# Patient Record
Sex: FEMALE | Race: WHITE | NOT HISPANIC OR LATINO | URBAN - METROPOLITAN AREA
[De-identification: names, ages, dates, MRNs, and addresses within clinical notes are randomized per-mention and may not be internally consistent; named-entity substitution may affect disease eponyms.]

---

## 2017-02-15 VITALS
OXYGEN SATURATION: 97 % | OXYGEN SATURATION: 96 % | OXYGEN SATURATION: 76 % | RESPIRATION RATE: 19 BRPM | DIASTOLIC BLOOD PRESSURE: 96 MMHG | SYSTOLIC BLOOD PRESSURE: 135 MMHG | SYSTOLIC BLOOD PRESSURE: 147 MMHG | OXYGEN SATURATION: 94 % | HEART RATE: 101 BPM | WEIGHT: 156 LBS | DIASTOLIC BLOOD PRESSURE: 93 MMHG | RESPIRATION RATE: 20 BRPM | SYSTOLIC BLOOD PRESSURE: 149 MMHG | DIASTOLIC BLOOD PRESSURE: 77 MMHG | RESPIRATION RATE: 17 BRPM | DIASTOLIC BLOOD PRESSURE: 85 MMHG | OXYGEN SATURATION: 98 % | SYSTOLIC BLOOD PRESSURE: 137 MMHG | HEART RATE: 95 BPM | TEMPERATURE: 97.8 F | RESPIRATION RATE: 15 BRPM | HEART RATE: 97 BPM | DIASTOLIC BLOOD PRESSURE: 73 MMHG | RESPIRATION RATE: 21 BRPM | DIASTOLIC BLOOD PRESSURE: 83 MMHG | SYSTOLIC BLOOD PRESSURE: 133 MMHG | HEART RATE: 99 BPM | SYSTOLIC BLOOD PRESSURE: 145 MMHG | HEART RATE: 94 BPM | HEART RATE: 91 BPM | SYSTOLIC BLOOD PRESSURE: 142 MMHG | RESPIRATION RATE: 18 BRPM | HEART RATE: 90 BPM | SYSTOLIC BLOOD PRESSURE: 122 MMHG | RESPIRATION RATE: 16 BRPM | SYSTOLIC BLOOD PRESSURE: 164 MMHG | HEART RATE: 102 BPM | HEART RATE: 96 BPM | OXYGEN SATURATION: 95 % | DIASTOLIC BLOOD PRESSURE: 80 MMHG | DIASTOLIC BLOOD PRESSURE: 95 MMHG | SYSTOLIC BLOOD PRESSURE: 140 MMHG | DIASTOLIC BLOOD PRESSURE: 87 MMHG | DIASTOLIC BLOOD PRESSURE: 88 MMHG | HEART RATE: 88 BPM | DIASTOLIC BLOOD PRESSURE: 82 MMHG | HEART RATE: 98 BPM | SYSTOLIC BLOOD PRESSURE: 144 MMHG | HEART RATE: 92 BPM | TEMPERATURE: 97.2 F | DIASTOLIC BLOOD PRESSURE: 79 MMHG | DIASTOLIC BLOOD PRESSURE: 74 MMHG | DIASTOLIC BLOOD PRESSURE: 71 MMHG | SYSTOLIC BLOOD PRESSURE: 146 MMHG | SYSTOLIC BLOOD PRESSURE: 153 MMHG | HEIGHT: 67 IN

## 2017-02-16 ENCOUNTER — AMBULATORY SURGICAL CENTER (AMBULATORY)
Dept: URBAN - METROPOLITAN AREA SURGERY 17 | Facility: SURGERY | Age: 72
End: 2017-02-16
Payer: COMMERCIAL

## 2017-02-16 DIAGNOSIS — K59.00 CONSTIPATION, UNSPECIFIED: ICD-10-CM

## 2017-02-16 DIAGNOSIS — K50.80 CROHN'S DISEASE OF BOTH SMALL AND LARGE INTESTINE WITHOUT CO: ICD-10-CM

## 2017-02-16 DIAGNOSIS — K56.69 OTHER INTESTINAL OBSTRUCTION: ICD-10-CM

## 2017-02-16 DIAGNOSIS — R10.84 GENERALIZED ABDOMINAL PAIN: ICD-10-CM

## 2017-02-16 DIAGNOSIS — R19.4 CHANGE IN BOWEL HABIT: ICD-10-CM

## 2017-02-16 PROCEDURE — 45386 COLONOSCOPY W/BALLOON DILAT: CPT | Performed by: INTERNAL MEDICINE

## 2017-02-16 PROCEDURE — 45381 COLONOSCOPY SUBMUCOUS NJX: CPT | Performed by: INTERNAL MEDICINE

## 2017-02-16 RX ADMIN — LIDOCAINE HYDROCHLORIDE 25 MG: 10 INJECTION, SOLUTION EPIDURAL; INFILTRATION; INTRACAUDAL; PERINEURAL at 07:59

## 2017-02-16 RX ADMIN — PROPOFOL 100 MG: 10 INJECTION, EMULSION INTRAVENOUS at 08:18

## 2017-02-16 RX ADMIN — PROPOFOL 50 MG: 10 INJECTION, EMULSION INTRAVENOUS at 07:58

## 2017-02-16 RX ADMIN — PROPOFOL 25 MG: 10 INJECTION, EMULSION INTRAVENOUS at 07:59

## 2017-02-16 RX ADMIN — PROPOFOL 100 MG: 10 INJECTION, EMULSION INTRAVENOUS at 07:55

## 2017-02-16 RX ADMIN — PROPOFOL 25 MG: 10 INJECTION, EMULSION INTRAVENOUS at 08:02

## 2017-02-16 RX ADMIN — PROPOFOL 50 MG: 10 INJECTION, EMULSION INTRAVENOUS at 08:07

## 2017-02-16 RX ADMIN — PROPOFOL 50 MG: 10 INJECTION, EMULSION INTRAVENOUS at 08:23

## 2017-02-16 RX ADMIN — LIDOCAINE HYDROCHLORIDE 50 MG: 10 INJECTION, SOLUTION EPIDURAL; INFILTRATION; INTRACAUDAL; PERINEURAL at 07:55

## 2017-02-16 RX ADMIN — PROPOFOL 50 MG: 10 INJECTION, EMULSION INTRAVENOUS at 08:06

## 2017-04-25 ENCOUNTER — OFFICE VISIT (OUTPATIENT)
Dept: OBSTETRICS AND GYNECOLOGY | Facility: CLINIC | Age: 72
End: 2017-04-25

## 2017-04-25 VITALS
WEIGHT: 161 LBS | DIASTOLIC BLOOD PRESSURE: 70 MMHG | SYSTOLIC BLOOD PRESSURE: 138 MMHG | HEIGHT: 66 IN | BODY MASS INDEX: 25.88 KG/M2

## 2017-04-25 DIAGNOSIS — N95.0 POST-MENOPAUSAL BLEEDING: Primary | ICD-10-CM

## 2017-04-25 PROCEDURE — 99212 OFFICE O/P EST SF 10 MIN: CPT | Performed by: OBSTETRICS & GYNECOLOGY

## 2017-04-25 NOTE — PROGRESS NOTES
Subjective   Joellen Dominguez is a 72 y.o. female is here today as a self referral for PM bleedingl.    History of Present Illness-patient experienced some light spotting approximately 1 week ago but has not noticed any for the past 2 days.    The following portions of the patient's history were reviewed and updated as appropriate: allergies, current medications, past family history, past medical history, past social history, past surgical history and problem list.    Review of Systems   Constitutional: Negative.    Gastrointestinal: Negative.    Genitourinary: Positive for vaginal bleeding.   Neurological: Negative.    Psychiatric/Behavioral: Negative.        Objective   Physical Exam   Constitutional: She is oriented to person, place, and time. She appears well-developed and well-nourished.   HENT:   Head: Normocephalic and atraumatic.   Eyes: Pupils are equal, round, and reactive to light.   Pulmonary/Chest: Effort normal.   Abdominal: Soft. She exhibits no distension. There is no tenderness.   Genitourinary: Vagina normal and uterus normal.   Neurological: She is alert and oriented to person, place, and time. She has normal reflexes.   Skin: Skin is warm and dry.   Psychiatric: She has a normal mood and affect. Her behavior is normal. Judgment and thought content normal.   Nursing note and vitals reviewed.        Assessment/Plan   Problems Addressed this Visit     None      Visit Diagnoses     Post-menopausal bleeding    -  Primary        Today's exam is negative.  Pathology report from her D&C in October revealed a fibroid tumor.  She had an ultrasound last fall as well which was negative.  We discussed proceeding with observation for now and considering a repeat hysteroscopy and D&C and adding a uterine ablation if she has further bleeding.

## 2017-06-07 ENCOUNTER — OFFICE (AMBULATORY)
Dept: URBAN - METROPOLITAN AREA CLINIC 75 | Facility: CLINIC | Age: 72
End: 2017-06-07
Payer: COMMERCIAL

## 2017-06-07 VITALS
DIASTOLIC BLOOD PRESSURE: 76 MMHG | HEART RATE: 74 BPM | WEIGHT: 160 LBS | HEIGHT: 67 IN | SYSTOLIC BLOOD PRESSURE: 124 MMHG

## 2017-06-07 DIAGNOSIS — K29.70 GASTRITIS, UNSPECIFIED, WITHOUT BLEEDING: ICD-10-CM

## 2017-06-07 DIAGNOSIS — R11.0 NAUSEA: ICD-10-CM

## 2017-06-07 DIAGNOSIS — K20.8 OTHER ESOPHAGITIS: ICD-10-CM

## 2017-06-07 DIAGNOSIS — R10.84 GENERALIZED ABDOMINAL PAIN: ICD-10-CM

## 2017-06-07 DIAGNOSIS — K50.10 CROHN'S DISEASE OF LARGE INTESTINE WITHOUT COMPLICATIONS: ICD-10-CM

## 2017-06-07 DIAGNOSIS — R53.83 OTHER FATIGUE: ICD-10-CM

## 2017-06-07 DIAGNOSIS — R19.4 CHANGE IN BOWEL HABIT: ICD-10-CM

## 2017-06-07 DIAGNOSIS — K50.00 CROHN'S DISEASE OF SMALL INTESTINE WITHOUT COMPLICATIONS: ICD-10-CM

## 2017-06-07 DIAGNOSIS — K59.00 CONSTIPATION, UNSPECIFIED: ICD-10-CM

## 2017-06-07 PROCEDURE — 99214 OFFICE O/P EST MOD 30 MIN: CPT

## 2017-06-07 RX ORDER — ADALIMUMAB 40MG/0.8ML
KIT SUBCUTANEOUS
Qty: 4 | Refills: 1 | Status: COMPLETED
Start: 2016-12-07 | End: 2017-12-12

## 2017-10-17 ENCOUNTER — OFFICE (AMBULATORY)
Dept: URBAN - METROPOLITAN AREA CLINIC 75 | Facility: CLINIC | Age: 72
End: 2017-10-17
Payer: COMMERCIAL

## 2017-10-17 VITALS
HEART RATE: 107 BPM | WEIGHT: 159 LBS | DIASTOLIC BLOOD PRESSURE: 90 MMHG | SYSTOLIC BLOOD PRESSURE: 140 MMHG | HEIGHT: 67 IN

## 2017-10-17 DIAGNOSIS — R19.4 CHANGE IN BOWEL HABIT: ICD-10-CM

## 2017-10-17 DIAGNOSIS — Z92.25 PERSONAL HISTORY OF IMMUNOSUPPRESSION THERAPY: ICD-10-CM

## 2017-10-17 DIAGNOSIS — K29.70 GASTRITIS, UNSPECIFIED, WITHOUT BLEEDING: ICD-10-CM

## 2017-10-17 DIAGNOSIS — K50.10 CROHN'S DISEASE OF LARGE INTESTINE WITHOUT COMPLICATIONS: ICD-10-CM

## 2017-10-17 DIAGNOSIS — K20.8 OTHER ESOPHAGITIS: ICD-10-CM

## 2017-10-17 DIAGNOSIS — K50.00 CROHN'S DISEASE OF SMALL INTESTINE WITHOUT COMPLICATIONS: ICD-10-CM

## 2017-10-17 DIAGNOSIS — R53.83 OTHER FATIGUE: ICD-10-CM

## 2017-10-17 PROCEDURE — 99214 OFFICE O/P EST MOD 30 MIN: CPT

## 2017-10-18 ENCOUNTER — OFFICE (AMBULATORY)
Dept: URBAN - METROPOLITAN AREA LAB 2 | Facility: LAB | Age: 72
End: 2017-10-18
Payer: MEDICARE

## 2017-10-18 DIAGNOSIS — R19.4 CHANGE IN BOWEL HABIT: ICD-10-CM

## 2017-10-18 DIAGNOSIS — K50.10 CROHN'S DISEASE OF LARGE INTESTINE WITHOUT COMPLICATIONS: ICD-10-CM

## 2017-10-18 DIAGNOSIS — K50.00 CROHN'S DISEASE OF SMALL INTESTINE WITHOUT COMPLICATIONS: ICD-10-CM

## 2017-10-18 PROCEDURE — 87999 UNLISTED MICROBIOLOGY PX: CPT

## 2017-12-12 ENCOUNTER — OFFICE (AMBULATORY)
Dept: URBAN - METROPOLITAN AREA CLINIC 75 | Facility: CLINIC | Age: 72
End: 2017-12-12
Payer: COMMERCIAL

## 2017-12-12 VITALS
HEIGHT: 67 IN | SYSTOLIC BLOOD PRESSURE: 140 MMHG | HEART RATE: 79 BPM | DIASTOLIC BLOOD PRESSURE: 80 MMHG | WEIGHT: 158 LBS

## 2017-12-12 DIAGNOSIS — K50.10 CROHN'S DISEASE OF LARGE INTESTINE WITHOUT COMPLICATIONS: ICD-10-CM

## 2017-12-12 DIAGNOSIS — R19.4 CHANGE IN BOWEL HABIT: ICD-10-CM

## 2017-12-12 DIAGNOSIS — Z92.25 PERSONAL HISTORY OF IMMUNOSUPPRESSION THERAPY: ICD-10-CM

## 2017-12-12 DIAGNOSIS — K50.00 CROHN'S DISEASE OF SMALL INTESTINE WITHOUT COMPLICATIONS: ICD-10-CM

## 2017-12-12 DIAGNOSIS — R11.0 NAUSEA: ICD-10-CM

## 2017-12-12 DIAGNOSIS — K29.70 GASTRITIS, UNSPECIFIED, WITHOUT BLEEDING: ICD-10-CM

## 2017-12-12 DIAGNOSIS — R10.84 GENERALIZED ABDOMINAL PAIN: ICD-10-CM

## 2017-12-12 PROCEDURE — 99214 OFFICE O/P EST MOD 30 MIN: CPT

## 2018-01-09 ENCOUNTER — OFFICE (AMBULATORY)
Dept: URBAN - METROPOLITAN AREA INFUSION 3 | Facility: INFUSION | Age: 73
End: 2018-01-09
Payer: COMMERCIAL

## 2018-01-09 VITALS
HEART RATE: 103 BPM | WEIGHT: 158 LBS | SYSTOLIC BLOOD PRESSURE: 103 MMHG | SYSTOLIC BLOOD PRESSURE: 129 MMHG | SYSTOLIC BLOOD PRESSURE: 126 MMHG | RESPIRATION RATE: 18 BRPM | SYSTOLIC BLOOD PRESSURE: 127 MMHG | RESPIRATION RATE: 16 BRPM | DIASTOLIC BLOOD PRESSURE: 73 MMHG | HEART RATE: 101 BPM | HEART RATE: 102 BPM | HEART RATE: 113 BPM | DIASTOLIC BLOOD PRESSURE: 59 MMHG | HEART RATE: 108 BPM | SYSTOLIC BLOOD PRESSURE: 102 MMHG | DIASTOLIC BLOOD PRESSURE: 70 MMHG | SYSTOLIC BLOOD PRESSURE: 97 MMHG | TEMPERATURE: 97.2 F | HEART RATE: 106 BPM | DIASTOLIC BLOOD PRESSURE: 63 MMHG | SYSTOLIC BLOOD PRESSURE: 121 MMHG | RESPIRATION RATE: 20 BRPM | HEART RATE: 100 BPM | HEIGHT: 67 IN | TEMPERATURE: 99 F | DIASTOLIC BLOOD PRESSURE: 72 MMHG | DIASTOLIC BLOOD PRESSURE: 74 MMHG | SYSTOLIC BLOOD PRESSURE: 135 MMHG

## 2018-01-09 DIAGNOSIS — K50.10 CROHN'S DISEASE OF LARGE INTESTINE WITHOUT COMPLICATIONS: ICD-10-CM

## 2018-01-09 PROCEDURE — 96415 CHEMO IV INFUSION ADDL HR: CPT | Performed by: INTERNAL MEDICINE

## 2018-01-09 PROCEDURE — 96413 CHEMO IV INFUSION 1 HR: CPT | Performed by: INTERNAL MEDICINE

## 2018-01-09 RX ADMIN — Medication 25 MG: at 10:28

## 2018-01-23 ENCOUNTER — OFFICE (AMBULATORY)
Dept: URBAN - METROPOLITAN AREA INFUSION 3 | Facility: INFUSION | Age: 73
End: 2018-01-23
Payer: COMMERCIAL

## 2018-01-23 VITALS
DIASTOLIC BLOOD PRESSURE: 74 MMHG | WEIGHT: 158 LBS | HEART RATE: 84 BPM | SYSTOLIC BLOOD PRESSURE: 130 MMHG | DIASTOLIC BLOOD PRESSURE: 90 MMHG | SYSTOLIC BLOOD PRESSURE: 146 MMHG | HEART RATE: 83 BPM | DIASTOLIC BLOOD PRESSURE: 83 MMHG | SYSTOLIC BLOOD PRESSURE: 145 MMHG | SYSTOLIC BLOOD PRESSURE: 128 MMHG | RESPIRATION RATE: 16 BRPM | HEART RATE: 86 BPM | HEART RATE: 80 BPM | HEIGHT: 67 IN | DIASTOLIC BLOOD PRESSURE: 84 MMHG | HEART RATE: 85 BPM | DIASTOLIC BLOOD PRESSURE: 82 MMHG | TEMPERATURE: 97.3 F | RESPIRATION RATE: 18 BRPM | TEMPERATURE: 98.2 F | SYSTOLIC BLOOD PRESSURE: 154 MMHG | HEART RATE: 87 BPM | SYSTOLIC BLOOD PRESSURE: 156 MMHG | DIASTOLIC BLOOD PRESSURE: 76 MMHG

## 2018-01-23 DIAGNOSIS — K50.10 CROHN'S DISEASE OF LARGE INTESTINE WITHOUT COMPLICATIONS: ICD-10-CM

## 2018-01-23 PROCEDURE — 96413 CHEMO IV INFUSION 1 HR: CPT | Performed by: INTERNAL MEDICINE

## 2018-01-23 PROCEDURE — 96415 CHEMO IV INFUSION ADDL HR: CPT | Performed by: INTERNAL MEDICINE

## 2018-01-23 RX ADMIN — Medication 25 MG: at 10:35

## 2018-01-23 NOTE — SERVICENOTES
MEDS PROVIDED BY [facility]
NEGATIVE PPD or Quantiferon Gold: Annual 1/18

## 2018-02-13 ENCOUNTER — OFFICE (AMBULATORY)
Dept: URBAN - METROPOLITAN AREA CLINIC 75 | Facility: CLINIC | Age: 73
End: 2018-02-13
Payer: COMMERCIAL

## 2018-02-13 VITALS
HEART RATE: 111 BPM | WEIGHT: 152 LBS | DIASTOLIC BLOOD PRESSURE: 80 MMHG | SYSTOLIC BLOOD PRESSURE: 140 MMHG | HEIGHT: 67 IN

## 2018-02-13 DIAGNOSIS — K50.00 CROHN'S DISEASE OF SMALL INTESTINE WITHOUT COMPLICATIONS: ICD-10-CM

## 2018-02-13 DIAGNOSIS — K59.00 CONSTIPATION, UNSPECIFIED: ICD-10-CM

## 2018-02-13 DIAGNOSIS — Z92.25 PERSONAL HISTORY OF IMMUNOSUPPRESSION THERAPY: ICD-10-CM

## 2018-02-13 DIAGNOSIS — K50.10 CROHN'S DISEASE OF LARGE INTESTINE WITHOUT COMPLICATIONS: ICD-10-CM

## 2018-02-13 DIAGNOSIS — K29.70 GASTRITIS, UNSPECIFIED, WITHOUT BLEEDING: ICD-10-CM

## 2018-02-13 DIAGNOSIS — R13.10 DYSPHAGIA, UNSPECIFIED: ICD-10-CM

## 2018-02-13 DIAGNOSIS — R19.4 CHANGE IN BOWEL HABIT: ICD-10-CM

## 2018-02-13 DIAGNOSIS — R53.83 OTHER FATIGUE: ICD-10-CM

## 2018-02-13 DIAGNOSIS — R11.0 NAUSEA: ICD-10-CM

## 2018-02-13 DIAGNOSIS — K20.8 OTHER ESOPHAGITIS: ICD-10-CM

## 2018-02-13 DIAGNOSIS — R10.84 GENERALIZED ABDOMINAL PAIN: ICD-10-CM

## 2018-02-13 PROCEDURE — 99214 OFFICE O/P EST MOD 30 MIN: CPT | Performed by: INTERNAL MEDICINE

## 2018-02-13 RX ORDER — MESALAMINE 1.2 G/1
4.8 TABLET, DELAYED RELEASE ORAL
Qty: 360 | Refills: 4 | Status: COMPLETED
Start: 2018-02-13 | End: 2018-10-02

## 2018-02-13 RX ORDER — MESALAMINE 500 MG/1
CAPSULE ORAL
Qty: 720 | Refills: 3 | Status: COMPLETED
Start: 2016-04-08 | End: 2018-02-13

## 2018-02-20 ENCOUNTER — OFFICE (AMBULATORY)
Dept: URBAN - METROPOLITAN AREA INFUSION 3 | Facility: INFUSION | Age: 73
End: 2018-02-20
Payer: COMMERCIAL

## 2018-02-20 VITALS
WEIGHT: 156 LBS | DIASTOLIC BLOOD PRESSURE: 85 MMHG | DIASTOLIC BLOOD PRESSURE: 75 MMHG | TEMPERATURE: 98.1 F | RESPIRATION RATE: 18 BRPM | RESPIRATION RATE: 16 BRPM | TEMPERATURE: 97.6 F | DIASTOLIC BLOOD PRESSURE: 83 MMHG | HEART RATE: 93 BPM | DIASTOLIC BLOOD PRESSURE: 88 MMHG | SYSTOLIC BLOOD PRESSURE: 143 MMHG | HEART RATE: 92 BPM | SYSTOLIC BLOOD PRESSURE: 155 MMHG | HEIGHT: 67 IN | HEART RATE: 86 BPM | DIASTOLIC BLOOD PRESSURE: 79 MMHG | DIASTOLIC BLOOD PRESSURE: 67 MMHG | SYSTOLIC BLOOD PRESSURE: 142 MMHG | HEART RATE: 88 BPM | DIASTOLIC BLOOD PRESSURE: 81 MMHG | SYSTOLIC BLOOD PRESSURE: 160 MMHG | HEART RATE: 94 BPM | SYSTOLIC BLOOD PRESSURE: 129 MMHG | SYSTOLIC BLOOD PRESSURE: 119 MMHG | SYSTOLIC BLOOD PRESSURE: 122 MMHG | HEART RATE: 87 BPM | SYSTOLIC BLOOD PRESSURE: 149 MMHG

## 2018-02-20 DIAGNOSIS — K50.10 CROHN'S DISEASE OF LARGE INTESTINE WITHOUT COMPLICATIONS: ICD-10-CM

## 2018-02-20 PROCEDURE — 96415 CHEMO IV INFUSION ADDL HR: CPT | Performed by: INTERNAL MEDICINE

## 2018-02-20 PROCEDURE — 96413 CHEMO IV INFUSION 1 HR: CPT | Performed by: INTERNAL MEDICINE

## 2018-02-20 RX ADMIN — Medication 25 MG: at 10:08

## 2018-03-29 ENCOUNTER — OFFICE (AMBULATORY)
Dept: URBAN - METROPOLITAN AREA INFUSION 3 | Facility: INFUSION | Age: 73
End: 2018-03-29
Payer: COMMERCIAL

## 2018-03-29 VITALS
DIASTOLIC BLOOD PRESSURE: 74 MMHG | SYSTOLIC BLOOD PRESSURE: 118 MMHG | HEIGHT: 67 IN | RESPIRATION RATE: 16 BRPM | HEART RATE: 96 BPM | DIASTOLIC BLOOD PRESSURE: 68 MMHG | RESPIRATION RATE: 18 BRPM | SYSTOLIC BLOOD PRESSURE: 111 MMHG | TEMPERATURE: 98 F | TEMPERATURE: 97.8 F

## 2018-03-29 DIAGNOSIS — D50.9 IRON DEFICIENCY ANEMIA, UNSPECIFIED: ICD-10-CM

## 2018-03-29 DIAGNOSIS — K90.9 INTESTINAL MALABSORPTION, UNSPECIFIED: ICD-10-CM

## 2018-03-29 PROCEDURE — 96365 THER/PROPH/DIAG IV INF INIT: CPT | Performed by: INTERNAL MEDICINE

## 2018-04-05 ENCOUNTER — OFFICE (AMBULATORY)
Dept: URBAN - METROPOLITAN AREA INFUSION 3 | Facility: INFUSION | Age: 73
End: 2018-04-05
Payer: COMMERCIAL

## 2018-04-05 VITALS
TEMPERATURE: 97.3 F | RESPIRATION RATE: 16 BRPM | HEART RATE: 82 BPM | TEMPERATURE: 96.8 F | DIASTOLIC BLOOD PRESSURE: 70 MMHG | HEART RATE: 84 BPM | DIASTOLIC BLOOD PRESSURE: 71 MMHG | SYSTOLIC BLOOD PRESSURE: 114 MMHG | HEIGHT: 67 IN | SYSTOLIC BLOOD PRESSURE: 119 MMHG

## 2018-04-05 DIAGNOSIS — K90.9 INTESTINAL MALABSORPTION, UNSPECIFIED: ICD-10-CM

## 2018-04-05 DIAGNOSIS — D50.9 IRON DEFICIENCY ANEMIA, UNSPECIFIED: ICD-10-CM

## 2018-04-05 PROCEDURE — 96365 THER/PROPH/DIAG IV INF INIT: CPT | Performed by: INTERNAL MEDICINE

## 2018-04-17 ENCOUNTER — OFFICE (AMBULATORY)
Dept: URBAN - METROPOLITAN AREA INFUSION 3 | Facility: INFUSION | Age: 73
End: 2018-04-17
Payer: COMMERCIAL

## 2018-04-17 VITALS
DIASTOLIC BLOOD PRESSURE: 73 MMHG | HEART RATE: 90 BPM | HEART RATE: 100 BPM | DIASTOLIC BLOOD PRESSURE: 71 MMHG | HEART RATE: 94 BPM | SYSTOLIC BLOOD PRESSURE: 107 MMHG | DIASTOLIC BLOOD PRESSURE: 69 MMHG | HEART RATE: 93 BPM | HEART RATE: 89 BPM | HEIGHT: 67 IN | SYSTOLIC BLOOD PRESSURE: 121 MMHG | SYSTOLIC BLOOD PRESSURE: 126 MMHG | DIASTOLIC BLOOD PRESSURE: 65 MMHG | SYSTOLIC BLOOD PRESSURE: 113 MMHG | TEMPERATURE: 97.8 F | TEMPERATURE: 98 F | SYSTOLIC BLOOD PRESSURE: 106 MMHG | SYSTOLIC BLOOD PRESSURE: 131 MMHG | SYSTOLIC BLOOD PRESSURE: 127 MMHG | HEART RATE: 95 BPM | RESPIRATION RATE: 16 BRPM | RESPIRATION RATE: 18 BRPM | SYSTOLIC BLOOD PRESSURE: 116 MMHG | WEIGHT: 156 LBS | DIASTOLIC BLOOD PRESSURE: 79 MMHG | DIASTOLIC BLOOD PRESSURE: 64 MMHG

## 2018-04-17 DIAGNOSIS — K50.10 CROHN'S DISEASE OF LARGE INTESTINE WITHOUT COMPLICATIONS: ICD-10-CM

## 2018-04-17 PROCEDURE — 96413 CHEMO IV INFUSION 1 HR: CPT | Performed by: INTERNAL MEDICINE

## 2018-04-17 PROCEDURE — 96415 CHEMO IV INFUSION ADDL HR: CPT | Performed by: INTERNAL MEDICINE

## 2018-04-17 RX ADMIN — Medication 25 MG: at 10:04

## 2018-04-17 NOTE — SERVICENOTES
MEDS PROVIDED BY [facility]
NEGATIVE PPD or Quantiferon Gold: Annual 1/18
Anser IFX lab drawn, given to Юлия to send out

## 2018-05-21 ENCOUNTER — OFFICE (AMBULATORY)
Dept: URBAN - METROPOLITAN AREA CLINIC 75 | Facility: CLINIC | Age: 73
End: 2018-05-21
Payer: COMMERCIAL

## 2018-05-21 VITALS
HEIGHT: 67 IN | WEIGHT: 155 LBS | DIASTOLIC BLOOD PRESSURE: 80 MMHG | HEART RATE: 93 BPM | SYSTOLIC BLOOD PRESSURE: 122 MMHG

## 2018-05-21 DIAGNOSIS — R11.0 NAUSEA: ICD-10-CM

## 2018-05-21 DIAGNOSIS — R13.10 DYSPHAGIA, UNSPECIFIED: ICD-10-CM

## 2018-05-21 DIAGNOSIS — K50.00 CROHN'S DISEASE OF SMALL INTESTINE WITHOUT COMPLICATIONS: ICD-10-CM

## 2018-05-21 DIAGNOSIS — K59.00 CONSTIPATION, UNSPECIFIED: ICD-10-CM

## 2018-05-21 DIAGNOSIS — R19.4 CHANGE IN BOWEL HABIT: ICD-10-CM

## 2018-05-21 DIAGNOSIS — R10.84 GENERALIZED ABDOMINAL PAIN: ICD-10-CM

## 2018-05-21 DIAGNOSIS — D50.9 IRON DEFICIENCY ANEMIA, UNSPECIFIED: ICD-10-CM

## 2018-05-21 DIAGNOSIS — K90.9 INTESTINAL MALABSORPTION, UNSPECIFIED: ICD-10-CM

## 2018-05-21 DIAGNOSIS — R53.83 OTHER FATIGUE: ICD-10-CM

## 2018-05-21 DIAGNOSIS — D64.9 ANEMIA, UNSPECIFIED: ICD-10-CM

## 2018-05-21 DIAGNOSIS — K29.70 GASTRITIS, UNSPECIFIED, WITHOUT BLEEDING: ICD-10-CM

## 2018-05-21 DIAGNOSIS — K50.10 CROHN'S DISEASE OF LARGE INTESTINE WITHOUT COMPLICATIONS: ICD-10-CM

## 2018-05-21 PROCEDURE — 99214 OFFICE O/P EST MOD 30 MIN: CPT | Performed by: INTERNAL MEDICINE

## 2018-05-21 RX ORDER — INFLIXIMAB 100 MG/10ML
INJECTION, POWDER, LYOPHILIZED, FOR SOLUTION INTRAVENOUS
Qty: 700 | Refills: 6 | Status: COMPLETED
End: 2018-08-16

## 2018-06-12 ENCOUNTER — OFFICE (AMBULATORY)
Dept: URBAN - METROPOLITAN AREA INFUSION 3 | Facility: INFUSION | Age: 73
End: 2018-06-12
Payer: COMMERCIAL

## 2018-06-12 VITALS
WEIGHT: 156 LBS | TEMPERATURE: 97.2 F | RESPIRATION RATE: 16 BRPM | SYSTOLIC BLOOD PRESSURE: 152 MMHG | SYSTOLIC BLOOD PRESSURE: 140 MMHG | DIASTOLIC BLOOD PRESSURE: 83 MMHG | HEART RATE: 92 BPM | DIASTOLIC BLOOD PRESSURE: 82 MMHG | HEART RATE: 87 BPM | HEART RATE: 105 BPM | SYSTOLIC BLOOD PRESSURE: 143 MMHG | SYSTOLIC BLOOD PRESSURE: 146 MMHG | SYSTOLIC BLOOD PRESSURE: 137 MMHG | DIASTOLIC BLOOD PRESSURE: 86 MMHG | RESPIRATION RATE: 18 BRPM | DIASTOLIC BLOOD PRESSURE: 77 MMHG | DIASTOLIC BLOOD PRESSURE: 85 MMHG | TEMPERATURE: 97.4 F | HEART RATE: 93 BPM | DIASTOLIC BLOOD PRESSURE: 74 MMHG | HEART RATE: 84 BPM | HEART RATE: 83 BPM | HEART RATE: 96 BPM | HEIGHT: 67 IN | SYSTOLIC BLOOD PRESSURE: 125 MMHG | SYSTOLIC BLOOD PRESSURE: 120 MMHG | SYSTOLIC BLOOD PRESSURE: 136 MMHG

## 2018-06-12 DIAGNOSIS — K50.10 CROHN'S DISEASE OF LARGE INTESTINE WITHOUT COMPLICATIONS: ICD-10-CM

## 2018-06-12 PROCEDURE — 96415 CHEMO IV INFUSION ADDL HR: CPT | Performed by: INTERNAL MEDICINE

## 2018-06-12 PROCEDURE — 96413 CHEMO IV INFUSION 1 HR: CPT | Performed by: INTERNAL MEDICINE

## 2018-06-12 RX ADMIN — Medication 25 MG: at 10:19

## 2018-06-12 NOTE — SERVICENOTES
Pt has red/flat rash on hands/forearms and legs per pt.  Pt said it started after taking new med. Dr Young at bedside to talk w/pt and evaluate rash.  Ok to proceed w/infusion per MD.  Task sent to Dr Maldonado.
MEDS PROVIDED BY [facility]
NEGATIVE PPD or Quantiferon Gold: Annual 1/18

## 2018-06-26 ENCOUNTER — OFFICE (AMBULATORY)
Dept: URBAN - METROPOLITAN AREA CLINIC 75 | Facility: CLINIC | Age: 73
End: 2018-06-26
Payer: COMMERCIAL

## 2018-06-26 VITALS
DIASTOLIC BLOOD PRESSURE: 80 MMHG | HEIGHT: 67 IN | SYSTOLIC BLOOD PRESSURE: 130 MMHG | HEART RATE: 84 BPM | WEIGHT: 155 LBS

## 2018-06-26 DIAGNOSIS — L28.2 OTHER PRURIGO: ICD-10-CM

## 2018-06-26 DIAGNOSIS — K62.5 HEMORRHAGE OF ANUS AND RECTUM: ICD-10-CM

## 2018-06-26 DIAGNOSIS — K50.10 CROHN'S DISEASE OF LARGE INTESTINE WITHOUT COMPLICATIONS: ICD-10-CM

## 2018-06-26 DIAGNOSIS — R13.10 DYSPHAGIA, UNSPECIFIED: ICD-10-CM

## 2018-06-26 DIAGNOSIS — Z92.25 PERSONAL HISTORY OF IMMUNOSUPPRESSION THERAPY: ICD-10-CM

## 2018-06-26 PROCEDURE — 99214 OFFICE O/P EST MOD 30 MIN: CPT

## 2018-06-26 NOTE — SERVICEHPINOTES
8/23/16... Cynthia returns for followup of her Crohn's disease. She also has been experiencing some difficulty with occasional dysphagia for liquids and solids such as chicken. Rarely she will have something to go down the wrong way such as saliva which is not pain tension. Her weight is stable her appetite has been good but she's been experiencing intermittent nausea which when it occurs can last for a whole day. She attributes this to her Crohn's disease. She has had difficulty with recent constipation and has needed to use low dose of MiraLax less than 17 g every other day or so to give her a bowel movement. She notes that normally her bowel movement is daily on a Navarro scale a four. When she becomes constipated is a Navarro 1. She questions whether she needs to continue on WelChol and because it may be contributing to her constipation I suggest that she decreased to 1 tablet 3 times daily and we will follow that up. otherwise she remains on folic acid Pentasa for control of her inflammatory bowel disease. Her last colonoscopy was in 2014 and her last EGD was in 2011 BR10/18/16.....We had a long discussion about her colonoscopy EGD and biopsies. We discussed options for therapy and because she appears to have a terminal ileal stricture causing majority of her difficulties, we will plan to try to do a dilation of the terminal ileum and subsequent injection with Kenalog. She is aware of the potential risk of perforation we also discussed and looked at all the other possible options such as mercaptopurine therapy, biologic therapy and she and her  opted to attempt the dilation therapy so as to avoid the use of other medications at this time based on the findings of her pathology. I am in agreement with this approach.6/7/17... We have followed the patient for Crohns ileocolitis. Disease course has been Improving on therapy but still symptomatic. Currently on treatment with Humira x 5 months. Baseline IBD symptoms have improved since last visit. The patient has been improving overall. Currently having 3-5 on a good day and 10+ bowel movement(s) per day on a bad day. Active GI symptoms include fecal urgency, RUQ pain and LUQ pain. They are considered by the patient to be moderate in nature. Alarm symptoms reported: none. The patient also reports the following potential extraintestinal symptom manifestations: none. Symptoms have recently caused the patient to avoid leaving home. She is less fatigued, no longer losing weight, and less bad days.10/17/17... We have followed the patient for Crohns ileocolitis. Disease course has been flaring since last visit. Currently on treatment with Humira weekly, welchol, and Pentasa, pamine forte (PRN). Baseline IBD symptoms have symptomatic since last visit. The patient has not been doing well overall. Currently having 3 bowel movement(s) per day. Active GI symptoms include bloody diarrhea which is mostly in the morning, anal leakage which is affecting her life.. They are considered by the patient to be moderate in nature. Alarm symptoms reported: blood in the stool. The patient also reports the following potential extraintestinal symptom manifestations: mouth sores. Symptoms have recently caused the patient to avoid leaving home. She feels like the Humira has only helped her minimally since initiation. 12/12/17... We have followed the patient for Crohns ileocolitis. Disease course has been having recent recurrent flares. Currently on treatment with Pentasa, Pamine Forte, Welchol, and changing to Remicade in January. Baseline IBD symptoms have symptomatic since last visit. The patient has been doing fair overall. Currently having 3 bowel movement(s) per day. Active GI symptoms include nausea and upper abdominal pain--feels tight today--some better today stools are soft to formed, blood with anal pain. They are considered by the patient to be moderate in nature. Alarm symptoms reported: none. The patient also reports the following potential extraintestinal symptom manifestations: possible fissures per patient. Symptoms have recently caused the patient to avoid leaving home especially morning episodes. Her upper abdominal pain worsened by Wednesday but has since improved. She states that there was no change with food. Pain was constant. She had a cough and some back problems as well which could be the source.2/13/18..... We have followed the patient for Crohns ileocolitis. Disease course has been stable on current medication. Currently on treatment with Pentasa and Remicade. Baseline IBD symptoms have asymptomatic since last visit. The patient has been doing well overall. Currently having bowel movement(s) per day. Active GI symptoms include nothing currently. They are considered by the patient to be minimal in nature. Alarm symptoms reported: none. The patient also reports the following potential extraintestinal symptom manifestations: none. Symptoms have recently caused the patient to change. in early January she was having a flare of her Crohn's disease. She take a short course of steroids that we have given her wish helped resolve her symptoms and now she is off of that and feels fine. She is concerned about her insurance along her continuing to cover Pentasa and we discussed...Lab 1/23/18...H&ampH 11.3/31.9, and IVC 19.3, CMP negative, CRP 0.5 5/21/18.....We have followed the patient for Crohns ileocolitis. Disease course has been flaring since last visit. Currently on treatment with Remicade. Baseline IBD symptoms have worsened since last visit. The patient has not been doing well overall. Currently having 2 bowel movement(s) per day. Active GI symptoms include RLQ pain. They are considered by the patient to be moderate in nature. Alarm symptoms reported: none. The patient also reports the following potential extraintestinal symptom manifestations: none. Symptoms have recently caused the patient to change nothing. the biggest change has been that she is experiencing pain or symptoms about 2 days per week. There is enough discomfort for her to not leave the house and not be able to eat well. The longest episode lasted 2 days. She has cut back from a trip to [facility] for a wedding and then inability where her  went to medical school in [facility] and did very well initially. We discussed her Remicade which he feels isn't working and we discussed increasing her dosing. We also discussed other options as noted below.6/26/18.... We have followed the patient for     Crohns ileocolitis  . Disease course has been   stable on current medication  . Currently on treatment with   Remicade 10mg/kg  . Baseline IBD symptoms have   remain the same  since last visit. The patient      has been doing well  overall. Currently having   3  bowel movement(s) per day. Active GI symptoms include   occasional and non-bloody diarrhea, also reports soft stools  . They are considered by the patient to be   mild   in nature. Alarm symptoms reported:   none  . The patient also reports the following potential extraintestinal symptom manifestations:   skin rash to juarez surface of hands bilaterally, across chest, and bilateral knees. Onset two months ago w/ progressive worsening. Tried topicals from PCP w/ no chnage  . Symptoms have recently caused the patient to   change nothing  .  Her last colonoscopy was 2/2017 with Long stricture in the TI due to Crohns disease. (Dilation, Injection). Stenosis in the terminal ileum. (Injection). Erythema in the colon.  She is due for repeat colonoscopy this year for reevaluation of stricture.  Regarding upper GI symptoms, she denies nausea, vomiting, heartburn, or acid reflux.  She does have issues with dysphagia.  Worse with meat and bread.  She will feel food lodge mid throat and have to wait or drink water for relief.  This happens one to 2 times a month.  Her appetite is good.  Her weight is stable.  Her most recent upper endoscopy was 9/16 w/ LA Grade D esophagitis in the gastroesophageal junction compatible with ulcerative esophagitis. (Biopsy). Normal mucosa in the cricopharyngeus, lower third of the esophagus, middle third of the esophagus and upper third of the esophagus. Normal mucosa in the cardia, fundus, stomach body, incisura of the stomach and pylorus. Erythema in the antrum and pre-pyloric region compatible with gastritis. (Biopsy). Normal mucosa in the duodenal bulb, area of the papilla and second part of the duodenum. (Biopsy). Lab work from 6/12/18 w/ H/H 12.5/35.1 CMP  otherwise normal CRP 13.3.BR

## 2018-07-11 ENCOUNTER — OFFICE (AMBULATORY)
Dept: URBAN - METROPOLITAN AREA INFUSION 3 | Facility: INFUSION | Age: 73
End: 2018-07-11
Payer: COMMERCIAL

## 2018-07-11 VITALS
DIASTOLIC BLOOD PRESSURE: 76 MMHG | TEMPERATURE: 97.9 F | TEMPERATURE: 96.6 F | SYSTOLIC BLOOD PRESSURE: 127 MMHG | HEART RATE: 94 BPM | SYSTOLIC BLOOD PRESSURE: 114 MMHG | DIASTOLIC BLOOD PRESSURE: 70 MMHG | SYSTOLIC BLOOD PRESSURE: 115 MMHG | HEART RATE: 90 BPM | DIASTOLIC BLOOD PRESSURE: 66 MMHG | HEART RATE: 88 BPM | HEIGHT: 67 IN | RESPIRATION RATE: 18 BRPM

## 2018-07-11 DIAGNOSIS — K50.00 CROHN'S DISEASE OF SMALL INTESTINE WITHOUT COMPLICATIONS: ICD-10-CM

## 2018-07-11 PROCEDURE — 96413 CHEMO IV INFUSION 1 HR: CPT

## 2018-07-11 PROCEDURE — 96365 THER/PROPH/DIAG IV INF INIT: CPT

## 2018-07-25 ENCOUNTER — OFFICE (AMBULATORY)
Dept: URBAN - METROPOLITAN AREA INFUSION 3 | Facility: INFUSION | Age: 73
End: 2018-07-25
Payer: COMMERCIAL

## 2018-07-25 VITALS
DIASTOLIC BLOOD PRESSURE: 82 MMHG | TEMPERATURE: 97.8 F | SYSTOLIC BLOOD PRESSURE: 141 MMHG | SYSTOLIC BLOOD PRESSURE: 135 MMHG | SYSTOLIC BLOOD PRESSURE: 133 MMHG | HEART RATE: 91 BPM | HEIGHT: 67 IN | TEMPERATURE: 96.9 F | DIASTOLIC BLOOD PRESSURE: 80 MMHG | DIASTOLIC BLOOD PRESSURE: 81 MMHG | RESPIRATION RATE: 18 BRPM | HEART RATE: 90 BPM

## 2018-07-25 DIAGNOSIS — K50.00 CROHN'S DISEASE OF SMALL INTESTINE WITHOUT COMPLICATIONS: ICD-10-CM

## 2018-07-25 PROCEDURE — 96365 THER/PROPH/DIAG IV INF INIT: CPT

## 2018-08-16 ENCOUNTER — OFFICE (AMBULATORY)
Dept: URBAN - METROPOLITAN AREA CLINIC 75 | Facility: CLINIC | Age: 73
End: 2018-08-16
Payer: COMMERCIAL

## 2018-08-16 VITALS
DIASTOLIC BLOOD PRESSURE: 80 MMHG | HEIGHT: 67 IN | HEART RATE: 97 BPM | SYSTOLIC BLOOD PRESSURE: 122 MMHG | WEIGHT: 154 LBS

## 2018-08-16 DIAGNOSIS — K12.1 OTHER FORMS OF STOMATITIS: ICD-10-CM

## 2018-08-16 DIAGNOSIS — K50.10 CROHN'S DISEASE OF LARGE INTESTINE WITHOUT COMPLICATIONS: ICD-10-CM

## 2018-08-16 DIAGNOSIS — K50.00 CROHN'S DISEASE OF SMALL INTESTINE WITHOUT COMPLICATIONS: ICD-10-CM

## 2018-08-16 PROCEDURE — 99213 OFFICE O/P EST LOW 20 MIN: CPT

## 2018-08-16 RX ORDER — NYSTATIN 100000 [USP'U]/ML
SUSPENSION ORAL
Qty: 400 | Refills: 1 | Status: COMPLETED
Start: 2018-08-07 | End: 2018-08-16

## 2018-08-16 RX ORDER — LIDOCAINE HYDROCHLORIDE 20 MG/ML
SOLUTION ORAL; TOPICAL
Qty: 560 | Refills: 1 | Status: COMPLETED
Start: 2018-08-16 | End: 2018-10-02

## 2018-08-16 RX ORDER — PREDNISONE 10 MG/1
TABLET ORAL
Qty: 38 | Refills: 0 | Status: COMPLETED
Start: 2018-08-16 | End: 2018-10-02

## 2018-08-20 VITALS
HEART RATE: 72 BPM | DIASTOLIC BLOOD PRESSURE: 73 MMHG | DIASTOLIC BLOOD PRESSURE: 58 MMHG | SYSTOLIC BLOOD PRESSURE: 135 MMHG | SYSTOLIC BLOOD PRESSURE: 117 MMHG | SYSTOLIC BLOOD PRESSURE: 124 MMHG | SYSTOLIC BLOOD PRESSURE: 114 MMHG | HEART RATE: 81 BPM | DIASTOLIC BLOOD PRESSURE: 85 MMHG | RESPIRATION RATE: 18 BRPM | SYSTOLIC BLOOD PRESSURE: 120 MMHG | HEART RATE: 83 BPM | DIASTOLIC BLOOD PRESSURE: 83 MMHG | HEIGHT: 67 IN | SYSTOLIC BLOOD PRESSURE: 129 MMHG | RESPIRATION RATE: 14 BRPM | SYSTOLIC BLOOD PRESSURE: 131 MMHG | SYSTOLIC BLOOD PRESSURE: 158 MMHG | RESPIRATION RATE: 15 BRPM | RESPIRATION RATE: 17 BRPM | HEART RATE: 78 BPM | WEIGHT: 154 LBS | RESPIRATION RATE: 16 BRPM | DIASTOLIC BLOOD PRESSURE: 74 MMHG | OXYGEN SATURATION: 97 % | OXYGEN SATURATION: 95 % | HEART RATE: 87 BPM | DIASTOLIC BLOOD PRESSURE: 69 MMHG | DIASTOLIC BLOOD PRESSURE: 72 MMHG | DIASTOLIC BLOOD PRESSURE: 70 MMHG | SYSTOLIC BLOOD PRESSURE: 136 MMHG | HEART RATE: 74 BPM | SYSTOLIC BLOOD PRESSURE: 126 MMHG | RESPIRATION RATE: 19 BRPM | OXYGEN SATURATION: 100 % | TEMPERATURE: 99.3 F | OXYGEN SATURATION: 98 % | OXYGEN SATURATION: 99 % | TEMPERATURE: 99 F | SYSTOLIC BLOOD PRESSURE: 134 MMHG | HEART RATE: 79 BPM | DIASTOLIC BLOOD PRESSURE: 63 MMHG | OXYGEN SATURATION: 72 %

## 2018-08-22 ENCOUNTER — OFFICE (AMBULATORY)
Dept: URBAN - METROPOLITAN AREA INFUSION 3 | Facility: INFUSION | Age: 73
End: 2018-08-22
Payer: COMMERCIAL

## 2018-08-22 VITALS
HEART RATE: 106 BPM | DIASTOLIC BLOOD PRESSURE: 71 MMHG | HEART RATE: 84 BPM | DIASTOLIC BLOOD PRESSURE: 76 MMHG | HEIGHT: 67 IN | DIASTOLIC BLOOD PRESSURE: 73 MMHG | TEMPERATURE: 98.1 F | TEMPERATURE: 97.2 F | SYSTOLIC BLOOD PRESSURE: 114 MMHG | DIASTOLIC BLOOD PRESSURE: 70 MMHG | SYSTOLIC BLOOD PRESSURE: 123 MMHG | HEART RATE: 85 BPM | RESPIRATION RATE: 18 BRPM | SYSTOLIC BLOOD PRESSURE: 124 MMHG | SYSTOLIC BLOOD PRESSURE: 119 MMHG

## 2018-08-22 DIAGNOSIS — K50.00 CROHN'S DISEASE OF SMALL INTESTINE WITHOUT COMPLICATIONS: ICD-10-CM

## 2018-08-22 PROCEDURE — 96365 THER/PROPH/DIAG IV INF INIT: CPT

## 2018-08-24 ENCOUNTER — OFFICE (AMBULATORY)
Dept: URBAN - METROPOLITAN AREA PATHOLOGY 4 | Facility: PATHOLOGY | Age: 73
End: 2018-08-24
Payer: COMMERCIAL

## 2018-08-24 ENCOUNTER — AMBULATORY SURGICAL CENTER (AMBULATORY)
Dept: URBAN - METROPOLITAN AREA SURGERY 17 | Facility: SURGERY | Age: 73
End: 2018-08-24
Payer: COMMERCIAL

## 2018-08-24 DIAGNOSIS — K31.89 OTHER DISEASES OF STOMACH AND DUODENUM: ICD-10-CM

## 2018-08-24 DIAGNOSIS — K50.80 CROHN'S DISEASE OF BOTH SMALL AND LARGE INTESTINE WITHOUT CO: ICD-10-CM

## 2018-08-24 DIAGNOSIS — K29.70 GASTRITIS, UNSPECIFIED, WITHOUT BLEEDING: ICD-10-CM

## 2018-08-24 DIAGNOSIS — K12.1 OTHER FORMS OF STOMATITIS: ICD-10-CM

## 2018-08-24 DIAGNOSIS — K25.9 GASTRIC ULCER, UNSPECIFIED AS ACUTE OR CHRONIC, WITHOUT HEMO: ICD-10-CM

## 2018-08-24 DIAGNOSIS — K52.9 NONINFECTIVE GASTROENTERITIS AND COLITIS, UNSPECIFIED: ICD-10-CM

## 2018-08-24 DIAGNOSIS — K62.6 ULCER OF ANUS AND RECTUM: ICD-10-CM

## 2018-08-24 DIAGNOSIS — R13.10 DYSPHAGIA, UNSPECIFIED: ICD-10-CM

## 2018-08-24 DIAGNOSIS — R11.0 NAUSEA: ICD-10-CM

## 2018-08-24 LAB
GI HISTOLOGY: A. UNSPECIFIED: (no result)
GI HISTOLOGY: B. UNSPECIFIED: (no result)
GI HISTOLOGY: C. UNSPECIFIED: (no result)
GI HISTOLOGY: D. SELECT: (no result)
GI HISTOLOGY: E. UNSPECIFIED: (no result)
GI HISTOLOGY: F. SELECT: (no result)
GI HISTOLOGY: G. UNSPECIFIED: (no result)
GI HISTOLOGY: PDF REPORT: (no result)

## 2018-08-24 PROCEDURE — 88305 TISSUE EXAM BY PATHOLOGIST: CPT | Performed by: INTERNAL MEDICINE

## 2018-08-24 PROCEDURE — 43239 EGD BIOPSY SINGLE/MULTIPLE: CPT | Performed by: INTERNAL MEDICINE

## 2018-08-24 PROCEDURE — 45380 COLONOSCOPY AND BIOPSY: CPT | Performed by: INTERNAL MEDICINE

## 2018-10-02 ENCOUNTER — OFFICE (AMBULATORY)
Dept: URBAN - METROPOLITAN AREA CLINIC 75 | Facility: CLINIC | Age: 73
End: 2018-10-02
Payer: COMMERCIAL

## 2018-10-02 VITALS
HEIGHT: 67 IN | HEART RATE: 88 BPM | WEIGHT: 159 LBS | SYSTOLIC BLOOD PRESSURE: 125 MMHG | DIASTOLIC BLOOD PRESSURE: 80 MMHG

## 2018-10-02 DIAGNOSIS — K50.00 CROHN'S DISEASE OF SMALL INTESTINE WITHOUT COMPLICATIONS: ICD-10-CM

## 2018-10-02 DIAGNOSIS — K21.9 GASTRO-ESOPHAGEAL REFLUX DISEASE WITHOUT ESOPHAGITIS: ICD-10-CM

## 2018-10-02 PROCEDURE — 99214 OFFICE O/P EST MOD 30 MIN: CPT

## 2018-10-16 ENCOUNTER — OFFICE (AMBULATORY)
Dept: URBAN - METROPOLITAN AREA INFUSION 3 | Facility: INFUSION | Age: 73
End: 2018-10-16
Payer: COMMERCIAL

## 2018-10-16 VITALS
TEMPERATURE: 98.2 F | HEIGHT: 67 IN | DIASTOLIC BLOOD PRESSURE: 72 MMHG | HEART RATE: 85 BPM | HEART RATE: 89 BPM | DIASTOLIC BLOOD PRESSURE: 74 MMHG | SYSTOLIC BLOOD PRESSURE: 117 MMHG | TEMPERATURE: 97.7 F | HEART RATE: 82 BPM | SYSTOLIC BLOOD PRESSURE: 124 MMHG | DIASTOLIC BLOOD PRESSURE: 70 MMHG | RESPIRATION RATE: 18 BRPM | SYSTOLIC BLOOD PRESSURE: 130 MMHG

## 2018-10-16 DIAGNOSIS — K50.00 CROHN'S DISEASE OF SMALL INTESTINE WITHOUT COMPLICATIONS: ICD-10-CM

## 2018-10-16 PROCEDURE — 96365 THER/PROPH/DIAG IV INF INIT: CPT

## 2018-10-16 NOTE — SERVICENOTES
Pt reports that she had molar extracted yesterday, discussed w/Dt Mireille and he gave the ok to proceed.
MEDS PROVIDED BY [facility]
NEGATIVE PPD or Quantiferon Gold: Annual 1/18

## 2018-11-09 ENCOUNTER — TRANSCRIBE ORDERS (OUTPATIENT)
Dept: ADMINISTRATIVE | Facility: HOSPITAL | Age: 73
End: 2018-11-09

## 2018-11-09 DIAGNOSIS — R74.8 ACID PHOSPHATASE ELEVATED: Primary | ICD-10-CM

## 2018-11-14 ENCOUNTER — HOSPITAL ENCOUNTER (OUTPATIENT)
Dept: ULTRASOUND IMAGING | Facility: HOSPITAL | Age: 73
Discharge: HOME | End: 2018-11-14
Admitting: INTERNAL MEDICINE

## 2018-11-14 DIAGNOSIS — R74.8 ACID PHOSPHATASE ELEVATED: ICD-10-CM

## 2018-11-14 PROCEDURE — 76705 ECHO EXAM OF ABDOMEN: CPT

## 2018-12-11 ENCOUNTER — OFFICE (AMBULATORY)
Dept: URBAN - METROPOLITAN AREA INFUSION 3 | Facility: INFUSION | Age: 73
End: 2018-12-11
Payer: COMMERCIAL

## 2018-12-11 VITALS
DIASTOLIC BLOOD PRESSURE: 77 MMHG | SYSTOLIC BLOOD PRESSURE: 126 MMHG | TEMPERATURE: 97.6 F | HEIGHT: 67 IN | DIASTOLIC BLOOD PRESSURE: 68 MMHG | HEART RATE: 108 BPM | SYSTOLIC BLOOD PRESSURE: 119 MMHG | HEART RATE: 89 BPM | RESPIRATION RATE: 16 BRPM

## 2018-12-11 DIAGNOSIS — K50.00 CROHN'S DISEASE OF SMALL INTESTINE WITHOUT COMPLICATIONS: ICD-10-CM

## 2018-12-11 PROCEDURE — 96365 THER/PROPH/DIAG IV INF INIT: CPT

## 2018-12-11 NOTE — SERVICENOTES
NEGATIVE PPD or Quantiferon Gold: Annual 12/18
MEDS PROVIDED BY [facility]

## 2019-02-05 ENCOUNTER — OFFICE (AMBULATORY)
Dept: URBAN - METROPOLITAN AREA CLINIC 75 | Facility: CLINIC | Age: 74
End: 2019-02-05
Payer: COMMERCIAL

## 2019-02-05 ENCOUNTER — OFFICE (AMBULATORY)
Dept: URBAN - METROPOLITAN AREA INFUSION 4 | Facility: INFUSION | Age: 74
End: 2019-02-05
Payer: COMMERCIAL

## 2019-02-05 VITALS
WEIGHT: 155 LBS | DIASTOLIC BLOOD PRESSURE: 80 MMHG | HEART RATE: 97 BPM | SYSTOLIC BLOOD PRESSURE: 140 MMHG | HEIGHT: 67 IN

## 2019-02-05 VITALS
SYSTOLIC BLOOD PRESSURE: 122 MMHG | SYSTOLIC BLOOD PRESSURE: 120 MMHG | HEART RATE: 96 BPM | HEART RATE: 92 BPM | SYSTOLIC BLOOD PRESSURE: 127 MMHG | TEMPERATURE: 97.2 F | RESPIRATION RATE: 18 BRPM | DIASTOLIC BLOOD PRESSURE: 76 MMHG | HEART RATE: 88 BPM | DIASTOLIC BLOOD PRESSURE: 78 MMHG | HEIGHT: 67 IN

## 2019-02-05 DIAGNOSIS — K58.1 IRRITABLE BOWEL SYNDROME WITH CONSTIPATION: ICD-10-CM

## 2019-02-05 DIAGNOSIS — R19.4 CHANGE IN BOWEL HABIT: ICD-10-CM

## 2019-02-05 DIAGNOSIS — K50.80 CROHN'S DISEASE OF BOTH SMALL AND LARGE INTESTINE WITHOUT CO: ICD-10-CM

## 2019-02-05 DIAGNOSIS — K50.00 CROHN'S DISEASE OF SMALL INTESTINE WITHOUT COMPLICATIONS: ICD-10-CM

## 2019-02-05 PROCEDURE — 99214 OFFICE O/P EST MOD 30 MIN: CPT | Performed by: INTERNAL MEDICINE

## 2019-02-05 PROCEDURE — 96365 THER/PROPH/DIAG IV INF INIT: CPT | Performed by: INTERNAL MEDICINE

## 2019-03-28 ENCOUNTER — OFFICE (AMBULATORY)
Dept: URBAN - METROPOLITAN AREA INFUSION 4 | Facility: INFUSION | Age: 74
End: 2019-03-28
Payer: COMMERCIAL

## 2019-03-28 VITALS
HEART RATE: 86 BPM | TEMPERATURE: 97 F | DIASTOLIC BLOOD PRESSURE: 77 MMHG | DIASTOLIC BLOOD PRESSURE: 83 MMHG | RESPIRATION RATE: 18 BRPM | SYSTOLIC BLOOD PRESSURE: 116 MMHG | HEART RATE: 83 BPM | HEIGHT: 67 IN | SYSTOLIC BLOOD PRESSURE: 129 MMHG | HEART RATE: 89 BPM | SYSTOLIC BLOOD PRESSURE: 128 MMHG | TEMPERATURE: 97.1 F | DIASTOLIC BLOOD PRESSURE: 76 MMHG

## 2019-03-28 DIAGNOSIS — K50.00 CROHN'S DISEASE OF SMALL INTESTINE WITHOUT COMPLICATIONS: ICD-10-CM

## 2019-03-28 PROCEDURE — 96365 THER/PROPH/DIAG IV INF INIT: CPT | Performed by: INTERNAL MEDICINE

## 2019-04-29 ENCOUNTER — OFFICE VISIT (OUTPATIENT)
Dept: INTERNAL MEDICINE | Facility: CLINIC | Age: 74
End: 2019-04-29

## 2019-04-29 ENCOUNTER — ANTICOAGULATION VISIT (OUTPATIENT)
Dept: INTERNAL MEDICINE | Facility: CLINIC | Age: 74
End: 2019-04-29

## 2019-04-29 VITALS
HEART RATE: 74 BPM | WEIGHT: 157 LBS | BODY MASS INDEX: 24.64 KG/M2 | DIASTOLIC BLOOD PRESSURE: 72 MMHG | SYSTOLIC BLOOD PRESSURE: 128 MMHG | HEIGHT: 67 IN

## 2019-04-29 DIAGNOSIS — E78.1 PURE HYPERGLYCERIDEMIA: ICD-10-CM

## 2019-04-29 DIAGNOSIS — I10 ESSENTIAL HYPERTENSION: ICD-10-CM

## 2019-04-29 DIAGNOSIS — F33.1 MODERATE EPISODE OF RECURRENT MAJOR DEPRESSIVE DISORDER (HCC): Primary | ICD-10-CM

## 2019-04-29 DIAGNOSIS — E03.9 ACQUIRED HYPOTHYROIDISM: ICD-10-CM

## 2019-04-29 DIAGNOSIS — D68.61 ANTIPHOSPHOLIPID ANTIBODY SYNDROME (HCC): ICD-10-CM

## 2019-04-29 PROBLEM — R32 INCONTINENCE: Status: ACTIVE | Noted: 2019-04-29

## 2019-04-29 PROBLEM — M51.369 DEGENERATION OF LUMBAR INTERVERTEBRAL DISC: Status: ACTIVE | Noted: 2018-07-25

## 2019-04-29 PROBLEM — E78.5 HYPERLIPIDEMIA: Status: ACTIVE | Noted: 2019-04-29

## 2019-04-29 PROBLEM — M51.36 DEGENERATION OF LUMBAR INTERVERTEBRAL DISC: Status: ACTIVE | Noted: 2018-07-25

## 2019-04-29 PROBLEM — F32.A DEPRESSION: Status: ACTIVE | Noted: 2019-04-29

## 2019-04-29 LAB — INR PPP: 2.7 (ref 0.9–1.1)

## 2019-04-29 PROCEDURE — 99214 OFFICE O/P EST MOD 30 MIN: CPT | Performed by: INTERNAL MEDICINE

## 2019-04-29 PROCEDURE — 36416 COLLJ CAPILLARY BLOOD SPEC: CPT | Performed by: INTERNAL MEDICINE

## 2019-04-29 PROCEDURE — 85610 PROTHROMBIN TIME: CPT | Performed by: INTERNAL MEDICINE

## 2019-04-29 RX ORDER — BUPROPION HYDROCHLORIDE 150 MG/1
150 TABLET ORAL DAILY
Qty: 90 TABLET | Refills: 1 | Status: SHIPPED | OUTPATIENT
Start: 2019-04-29 | End: 2019-10-01 | Stop reason: SDUPTHER

## 2019-04-29 NOTE — PROGRESS NOTES
Assessment and Plan  Problems Addressed this Visit        Cardiovascular and Mediastinum    Hyperlipidemia    Hypertension       Immune and Lymphatic    Antiphospholipid antibody syndrome (CMS/HCC)       Other    Depression - Primary    Relevant Medications    buPROPion XL (WELLBUTRIN XL) 150 MG 24 hr tablet      Other Visit Diagnoses     Acquired hypothyroidism            F/U and Patient Instructions    Return in about 4 months (around 8/29/2019) for Medicare Wellness.  There are no Patient Instructions on file for this visit.    Subjective    Joellen Dominguez is a 74 y.o. female being seen in our office today for Depression     History of the Present Illness  HPI Here for problems with depression.  She does not think it is working anymore.  Doesn't want to get out of bed, go be active, she's very nervous about bad things happening- how she would react and handle it.  She has poor energy.  Doesn't get much done during the day.   Having another epidural later this month, had decent results with the first one.   Crohn's is decent but still has regular episodes of diarrhea, etc. Every couple of weeks.   Patient History        Significant Past History  The following portions of the patient's history were reviewed and updated as appropriate:PMHroutine: Social history , Past Medical History, Allergies, Current Medications, Active Problem List and Health Maintenance              Social History  She  reports that she quit smoking about 52 years ago. Her smoking use included cigarettes. She has a 16.00 pack-year smoking history. She does not have any smokeless tobacco history on file. She reports that she does not drink alcohol or use drugs.                         Review of Symptoms  Review of Systems   Constitution: Negative for decreased appetite.   Respiratory: Negative.    Endocrine: Negative.    Musculoskeletal: Positive for back pain.   Genitourinary: Positive for bladder incontinence.     Objective  Vital Signs          BP Readings from Last 1 Encounters:   04/29/19 128/72     Wt Readings from Last 3 Encounters:   04/29/19 71.2 kg (157 lb)   04/25/17 73 kg (161 lb)   10/20/16 72.1 kg (159 lb)   Body mass index is 24.59 kg/m².          Physical Exam   Physical Exam   Constitutional: No distress.   Psychiatric: She has a normal mood and affect. Her behavior is normal.     Data Reviewed    No results found for this or any previous visit (from the past 2016 hour(s)).

## 2019-04-30 LAB — INR PPP: 2.7 (ref 0.9–1.1)

## 2019-05-30 ENCOUNTER — OFFICE (AMBULATORY)
Dept: URBAN - METROPOLITAN AREA INFUSION 4 | Facility: INFUSION | Age: 74
End: 2019-05-30
Payer: COMMERCIAL

## 2019-05-30 VITALS
DIASTOLIC BLOOD PRESSURE: 76 MMHG | DIASTOLIC BLOOD PRESSURE: 78 MMHG | SYSTOLIC BLOOD PRESSURE: 130 MMHG | HEART RATE: 81 BPM | SYSTOLIC BLOOD PRESSURE: 145 MMHG | HEIGHT: 67 IN | DIASTOLIC BLOOD PRESSURE: 73 MMHG | RESPIRATION RATE: 18 BRPM | HEART RATE: 82 BPM | TEMPERATURE: 97 F | HEART RATE: 90 BPM | TEMPERATURE: 97.7 F

## 2019-05-30 DIAGNOSIS — K50.00 CROHN'S DISEASE OF SMALL INTESTINE WITHOUT COMPLICATIONS: ICD-10-CM

## 2019-05-30 PROCEDURE — 96365 THER/PROPH/DIAG IV INF INIT: CPT | Performed by: INTERNAL MEDICINE

## 2019-06-11 ENCOUNTER — OFFICE (AMBULATORY)
Dept: URBAN - METROPOLITAN AREA CLINIC 75 | Facility: CLINIC | Age: 74
End: 2019-06-11
Payer: COMMERCIAL

## 2019-06-11 VITALS
WEIGHT: 153 LBS | HEART RATE: 96 BPM | SYSTOLIC BLOOD PRESSURE: 110 MMHG | HEIGHT: 67 IN | DIASTOLIC BLOOD PRESSURE: 74 MMHG

## 2019-06-11 DIAGNOSIS — R10.31 RIGHT LOWER QUADRANT PAIN: ICD-10-CM

## 2019-06-11 DIAGNOSIS — K50.80 CROHN'S DISEASE OF BOTH SMALL AND LARGE INTESTINE WITHOUT CO: ICD-10-CM

## 2019-06-11 PROCEDURE — 99214 OFFICE O/P EST MOD 30 MIN: CPT | Performed by: INTERNAL MEDICINE

## 2019-07-24 ENCOUNTER — OFFICE (AMBULATORY)
Dept: URBAN - METROPOLITAN AREA INFUSION 4 | Facility: INFUSION | Age: 74
End: 2019-07-24
Payer: COMMERCIAL

## 2019-07-24 VITALS
DIASTOLIC BLOOD PRESSURE: 72 MMHG | HEIGHT: 67 IN | SYSTOLIC BLOOD PRESSURE: 148 MMHG | SYSTOLIC BLOOD PRESSURE: 119 MMHG | DIASTOLIC BLOOD PRESSURE: 70 MMHG | HEART RATE: 93 BPM | HEART RATE: 92 BPM | RESPIRATION RATE: 18 BRPM | SYSTOLIC BLOOD PRESSURE: 116 MMHG | DIASTOLIC BLOOD PRESSURE: 88 MMHG | TEMPERATURE: 97.6 F | HEART RATE: 95 BPM | TEMPERATURE: 97.4 F

## 2019-07-24 DIAGNOSIS — K50.00 CROHN'S DISEASE OF SMALL INTESTINE WITHOUT COMPLICATIONS: ICD-10-CM

## 2019-07-24 PROCEDURE — 96365 THER/PROPH/DIAG IV INF INIT: CPT | Performed by: INTERNAL MEDICINE

## 2019-08-29 ENCOUNTER — RESULTS ENCOUNTER (OUTPATIENT)
Dept: INTERNAL MEDICINE | Facility: CLINIC | Age: 74
End: 2019-08-29

## 2019-08-29 DIAGNOSIS — I10 ESSENTIAL HYPERTENSION: ICD-10-CM

## 2019-08-29 DIAGNOSIS — D68.61 ANTIPHOSPHOLIPID ANTIBODY SYNDROME (HCC): ICD-10-CM

## 2019-08-29 DIAGNOSIS — E03.9 ACQUIRED HYPOTHYROIDISM: ICD-10-CM

## 2019-08-29 DIAGNOSIS — E78.1 PURE HYPERGLYCERIDEMIA: ICD-10-CM

## 2019-08-29 RX ORDER — CLOBETASOL PROPIONATE 0.5 MG/G
CREAM TOPICAL
Refills: 1 | COMMUNITY
Start: 2019-08-06 | End: 2020-05-07 | Stop reason: HOSPADM

## 2019-08-29 RX ORDER — CYANOCOBALAMIN 1000 UG/ML
INJECTION, SOLUTION INTRAMUSCULAR; SUBCUTANEOUS
Refills: 3 | COMMUNITY
Start: 2019-07-08 | End: 2020-05-07 | Stop reason: HOSPADM

## 2019-08-29 RX ORDER — DULOXETIN HYDROCHLORIDE 60 MG/1
60 CAPSULE, DELAYED RELEASE ORAL DAILY
Qty: 90 CAPSULE | Refills: 1 | Status: SHIPPED | OUTPATIENT
Start: 2019-08-29 | End: 2019-11-23 | Stop reason: SDUPTHER

## 2019-09-23 ENCOUNTER — HOSPITAL ENCOUNTER (OUTPATIENT)
Dept: GENERAL RADIOLOGY | Facility: HOSPITAL | Age: 74
Discharge: HOME | End: 2019-09-23
Admitting: INTERNAL MEDICINE

## 2019-09-23 ENCOUNTER — LAB (OUTPATIENT)
Dept: LAB | Facility: HOSPITAL | Age: 74
End: 2019-09-23

## 2019-09-23 ENCOUNTER — TRANSCRIBE ORDERS (OUTPATIENT)
Dept: ADMINISTRATIVE | Facility: HOSPITAL | Age: 74
End: 2019-09-23

## 2019-09-23 DIAGNOSIS — K50.80 CROHN'S DISEASE OF BOTH SMALL AND LARGE INTESTINE WITHOUT COMPLICATION (HCC): ICD-10-CM

## 2019-09-23 DIAGNOSIS — R10.31 RIGHT LOWER QUADRANT ABDOMINAL PAIN: ICD-10-CM

## 2019-09-23 DIAGNOSIS — K50.819 CROHN'S DISEASE OF SMALL AND LARGE INTESTINES WITH COMPLICATION (HCC): ICD-10-CM

## 2019-09-23 DIAGNOSIS — R10.31 RIGHT LOWER QUADRANT ABDOMINAL PAIN: Primary | ICD-10-CM

## 2019-09-23 LAB
ALBUMIN SERPL-MCNC: 3.7 G/DL (ref 3.5–5.2)
ALBUMIN/GLOB SERPL: 1.1 G/DL
ALP SERPL-CCNC: 105 U/L (ref 39–117)
ALT SERPL W P-5'-P-CCNC: 17 U/L (ref 1–33)
ANION GAP SERPL CALCULATED.3IONS-SCNC: 12.8 MMOL/L (ref 5–15)
AST SERPL-CCNC: 17 U/L (ref 1–32)
BASOPHILS # BLD AUTO: 0.1 10*3/MM3 (ref 0–0.2)
BASOPHILS NFR BLD AUTO: 0.9 % (ref 0–1.5)
BILIRUB SERPL-MCNC: 1.1 MG/DL (ref 0.2–1.2)
BUN BLD-MCNC: 26 MG/DL (ref 8–23)
BUN/CREAT SERPL: 23.4 (ref 7–25)
CALCIUM SPEC-SCNC: 9.3 MG/DL (ref 8.6–10.5)
CHLORIDE SERPL-SCNC: 103 MMOL/L (ref 98–107)
CO2 SERPL-SCNC: 25.2 MMOL/L (ref 22–29)
CREAT BLD-MCNC: 1.11 MG/DL (ref 0.57–1)
CRP SERPL-MCNC: 0.84 MG/DL (ref 0–0.5)
DEPRECATED RDW RBC AUTO: 40.4 FL (ref 37–54)
EOSINOPHIL # BLD AUTO: 0.98 10*3/MM3 (ref 0–0.4)
EOSINOPHIL NFR BLD AUTO: 8.7 % (ref 0.3–6.2)
ERYTHROCYTE [DISTWIDTH] IN BLOOD BY AUTOMATED COUNT: 13 % (ref 12.3–15.4)
GFR SERPL CREATININE-BSD FRML MDRD: 48 ML/MIN/1.73
GLOBULIN UR ELPH-MCNC: 3.4 GM/DL
GLUCOSE BLD-MCNC: 126 MG/DL (ref 65–99)
HCT VFR BLD AUTO: 40.7 % (ref 34–46.6)
HGB BLD-MCNC: 13.2 G/DL (ref 12–15.9)
IMM GRANULOCYTES # BLD AUTO: 0.09 10*3/MM3 (ref 0–0.05)
IMM GRANULOCYTES NFR BLD AUTO: 0.8 % (ref 0–0.5)
LYMPHOCYTES # BLD AUTO: 1.63 10*3/MM3 (ref 0.7–3.1)
LYMPHOCYTES NFR BLD AUTO: 14.4 % (ref 19.6–45.3)
MCH RBC QN AUTO: 28.6 PG (ref 26.6–33)
MCHC RBC AUTO-ENTMCNC: 32.4 G/DL (ref 31.5–35.7)
MCV RBC AUTO: 88.1 FL (ref 79–97)
MONOCYTES # BLD AUTO: 0.55 10*3/MM3 (ref 0.1–0.9)
MONOCYTES NFR BLD AUTO: 4.9 % (ref 5–12)
NEUTROPHILS # BLD AUTO: 7.97 10*3/MM3 (ref 1.7–7)
NEUTROPHILS NFR BLD AUTO: 70.3 % (ref 42.7–76)
NRBC BLD AUTO-RTO: 0 /100 WBC (ref 0–0.2)
PLATELET # BLD AUTO: 385 10*3/MM3 (ref 140–450)
PMV BLD AUTO: 9.7 FL (ref 6–12)
POTASSIUM BLD-SCNC: 3.9 MMOL/L (ref 3.5–5.2)
PROT SERPL-MCNC: 7.1 G/DL (ref 6–8.5)
RBC # BLD AUTO: 4.62 10*6/MM3 (ref 3.77–5.28)
SODIUM BLD-SCNC: 141 MMOL/L (ref 136–145)
WBC NRBC COR # BLD: 11.32 10*3/MM3 (ref 3.4–10.8)

## 2019-09-23 PROCEDURE — 86140 C-REACTIVE PROTEIN: CPT

## 2019-09-23 PROCEDURE — 36415 COLL VENOUS BLD VENIPUNCTURE: CPT

## 2019-09-23 PROCEDURE — 85025 COMPLETE CBC W/AUTO DIFF WBC: CPT

## 2019-09-23 PROCEDURE — 80053 COMPREHEN METABOLIC PANEL: CPT

## 2019-09-23 PROCEDURE — 71046 X-RAY EXAM CHEST 2 VIEWS: CPT

## 2019-09-24 RX ORDER — PROMETHAZINE HCL/CODEINE 6.25-10/5
5 SYRUP ORAL EVERY 4 HOURS PRN
Qty: 120 ML | Refills: 0 | Status: SHIPPED | OUTPATIENT
Start: 2019-09-24 | End: 2020-03-18 | Stop reason: ALTCHOICE

## 2019-09-26 ENCOUNTER — OFFICE (AMBULATORY)
Dept: URBAN - METROPOLITAN AREA INFUSION 4 | Facility: INFUSION | Age: 74
End: 2019-09-26
Payer: COMMERCIAL

## 2019-09-26 VITALS
SYSTOLIC BLOOD PRESSURE: 121 MMHG | SYSTOLIC BLOOD PRESSURE: 136 MMHG | RESPIRATION RATE: 18 BRPM | DIASTOLIC BLOOD PRESSURE: 74 MMHG | HEART RATE: 99 BPM | TEMPERATURE: 97.4 F | DIASTOLIC BLOOD PRESSURE: 76 MMHG | SYSTOLIC BLOOD PRESSURE: 102 MMHG | TEMPERATURE: 97.6 F | HEIGHT: 67 IN | HEART RATE: 96 BPM | DIASTOLIC BLOOD PRESSURE: 64 MMHG | HEART RATE: 94 BPM

## 2019-09-26 DIAGNOSIS — K50.00 CROHN'S DISEASE OF SMALL INTESTINE WITHOUT COMPLICATIONS: ICD-10-CM

## 2019-09-26 PROCEDURE — 96365 THER/PROPH/DIAG IV INF INIT: CPT | Performed by: INTERNAL MEDICINE

## 2019-10-01 RX ORDER — BUPROPION HYDROCHLORIDE 150 MG/1
150 TABLET ORAL DAILY
Qty: 90 TABLET | Refills: 1 | Status: SHIPPED | OUTPATIENT
Start: 2019-10-01 | End: 2019-11-23 | Stop reason: SDUPTHER

## 2019-10-04 RX ORDER — ATORVASTATIN CALCIUM 10 MG/1
10 TABLET, FILM COATED ORAL DAILY
Qty: 90 TABLET | Refills: 1 | Status: SHIPPED | OUTPATIENT
Start: 2019-10-04 | End: 2019-11-23 | Stop reason: SDUPTHER

## 2019-10-04 RX ORDER — LOSARTAN POTASSIUM 50 MG/1
50 TABLET ORAL DAILY
Qty: 90 TABLET | Refills: 1 | Status: SHIPPED | OUTPATIENT
Start: 2019-10-04 | End: 2019-11-23 | Stop reason: SDUPTHER

## 2019-10-04 RX ORDER — WARFARIN SODIUM 5 MG/1
5 TABLET ORAL
Qty: 90 TABLET | Refills: 1 | Status: SHIPPED | OUTPATIENT
Start: 2019-10-04 | End: 2019-11-23 | Stop reason: SDUPTHER

## 2019-10-07 ENCOUNTER — CLINICAL SUPPORT (OUTPATIENT)
Dept: INTERNAL MEDICINE | Facility: CLINIC | Age: 74
End: 2019-10-07

## 2019-10-07 ENCOUNTER — ANTICOAGULATION VISIT (OUTPATIENT)
Dept: INTERNAL MEDICINE | Facility: CLINIC | Age: 74
End: 2019-10-07

## 2019-10-07 LAB — INR PPP: 2.7 (ref 0.9–1.1)

## 2019-10-07 PROCEDURE — G0008 ADMIN INFLUENZA VIRUS VAC: HCPCS | Performed by: INTERNAL MEDICINE

## 2019-10-07 PROCEDURE — 85610 PROTHROMBIN TIME: CPT | Performed by: INTERNAL MEDICINE

## 2019-10-07 PROCEDURE — 90653 IIV ADJUVANT VACCINE IM: CPT | Performed by: INTERNAL MEDICINE

## 2019-10-07 PROCEDURE — 36416 COLLJ CAPILLARY BLOOD SPEC: CPT | Performed by: INTERNAL MEDICINE

## 2019-10-08 ENCOUNTER — OFFICE (AMBULATORY)
Dept: URBAN - METROPOLITAN AREA CLINIC 75 | Facility: CLINIC | Age: 74
End: 2019-10-08
Payer: COMMERCIAL

## 2019-10-08 VITALS
HEART RATE: 92 BPM | DIASTOLIC BLOOD PRESSURE: 82 MMHG | OXYGEN SATURATION: 97 % | HEIGHT: 67 IN | WEIGHT: 157 LBS | SYSTOLIC BLOOD PRESSURE: 142 MMHG

## 2019-10-08 DIAGNOSIS — R10.11 RIGHT UPPER QUADRANT PAIN: ICD-10-CM

## 2019-10-08 DIAGNOSIS — K50.80 CROHN'S DISEASE OF BOTH SMALL AND LARGE INTESTINE WITHOUT CO: ICD-10-CM

## 2019-10-08 DIAGNOSIS — R74.8 ABNORMAL LEVELS OF OTHER SERUM ENZYMES: ICD-10-CM

## 2019-10-08 PROCEDURE — 99214 OFFICE O/P EST MOD 30 MIN: CPT | Performed by: INTERNAL MEDICINE

## 2019-10-08 NOTE — SERVICEHPINOTES
8/23/16... Cynthia returns for followup of her Crohn's disease. She also has been experiencing some difficulty with occasional dysphagia for liquids and solids such as chicken. Rarely she will have something to go down the wrong way such as saliva which is not pain tension. Her weight is stable her appetite has been good but she's been experiencing intermittent nausea which when it occurs can last for a whole day. She attributes this to her Crohn's disease. She has had difficulty with recent constipation and has needed to use low dose of MiraLax less than 17 g every other day or so to give her a bowel movement. She notes that normally her bowel movement is daily on a [place] scale a four. When she becomes constipated is a [place] 1. She questions whether she needs to continue on WelChol and because it may be contributing to her constipation I suggest that she decreased to 1 tablet 3 times daily and we will follow that up. otherwise she remains on folic acid Pentasa for control of her inflammatory bowel disease. Her last colonoscopy was in 2014 and her last EGD was in 2011 BR10/18/16.....We had a long discussion about her colonoscopy EGD and biopsies. We discussed options for therapy and because she appears to have a terminal ileal stricture causing majority of her difficulties, we will plan to try to do a dilation of the terminal ileum and subsequent injection with Kenalog. She is aware of the potential risk of perforation we also discussed and looked at all the other possible options such as mercaptopurine therapy, biologic therapy and she and her  opted to attempt the dilation therapy so as to avoid the use of other medications at this time based on the findings of her pathology. I am in agreement with this approach.6/7/17... We have followed the patient for Crohns ileocolitis. Disease course has been Improving on therapy but still symptomatic. Currently on treatment with Humira x 5 months. Baseline IBD symptoms have improved since last visit. The patient has been improving overall. Currently having 3-5 on a good day and 10+ bowel movement(s) per day on a bad day. Active GI symptoms include fecal urgency, RUQ pain and LUQ pain. They are considered by the patient to be moderate in nature. Alarm symptoms reported: none. The patient also reports the following potential extraintestinal symptom manifestations: none. Symptoms have recently caused the patient to avoid leaving home. She is less fatigued, no longer losing weight, and less bad days.10/17/17... We have followed the patient for Crohns ileocolitis. Disease course has been flaring since last visit. Currently on treatment with Humira weekly, welchol, and Pentasa, pamine forte (PRN). Baseline IBD symptoms have symptomatic since last visit. The patient has not been doing well overall. Currently having 3 bowel movement(s) per day. Active GI symptoms include bloody diarrhea which is mostly in the morning, anal leakage which is affecting her life.. They are considered by the patient to be moderate in nature. Alarm symptoms reported: blood in the stool. The patient also reports the following potential extraintestinal symptom manifestations: mouth sores. Symptoms have recently caused the patient to avoid leaving home. She feels like the Humira has only helped her minimally since initiation. 12/12/17... We have followed the patient for Crohns ileocolitis. Disease course has been having recent recurrent flares. Currently on treatment with Pentasa, Pamine Forte, Welchol, and changing to Remicade in January. Baseline IBD symptoms have symptomatic since last visit. The patient has been doing fair overall. Currently having 3 bowel movement(s) per day. Active GI symptoms include nausea and upper abdominal pain--feels tight today--some better today stools are soft to formed, blood with anal pain. They are considered by the patient to be moderate in nature. Alarm symptoms reported: none. The patient also reports the following potential extraintestinal symptom manifestations: possible fissures per patient. Symptoms have recently caused the patient to avoid leaving home especially morning episodes. Her upper abdominal pain worsened by Wednesday but has since improved. She states that there was no change with food. Pain was constant. She had a cough and some back problems as well which could be the source.2/13/18..... We have followed the patient for Crohns ileocolitis. Disease course has been stable on current medication. Currently on treatment with Pentasa and Remicade. Baseline IBD symptoms have asymptomatic since last visit. The patient has been doing well overall. Currently having bowel movement(s) per day. Active GI symptoms include nothing currently. They are considered by the patient to be minimal in nature. Alarm symptoms reported: none. The patient also reports the following potential extraintestinal symptom manifestations: none. Symptoms have recently caused the patient to change. in early January she was having a flare of her Crohn's disease. She take a short course of steroids that we have given her wish helped resolve her symptoms and now she is off of that and feels fine. She is concerned about her insurance along her continuing to cover Pentasa and we discussed...Lab 1/23/18...H&ampH 11.3/31.9, and IVC 19.3, CMP negative, CRP 0.5 5/21/18.....We have followed the patient for Crohns ileocolitis. Disease course has been flaring since last visit. Currently on treatment with Remicade. Baseline IBD symptoms have worsened since last visit. The patient has not been doing well overall. Currently having 2 bowel movement(s) per day. Active GI symptoms include RLQ pain. They are considered by the patient to be moderate in nature. Alarm symptoms reported: none. The patient also reports the following potential extraintestinal symptom manifestations: none. Symptoms have recently caused the patient to change nothing. the biggest change has been that she is experiencing pain or symptoms about 2 days per week. There is enough discomfort for her to not leave the house and not be able to eat well. The longest episode lasted 2 days. She has cut back from a trip to [facility] for a wedding and then inability where her  went to medical school in [facility] and did very well initially. We discussed her Remicade which he feels isn't working and we discussed increasing her dosing. We also discussed other options as noted below.6/26/18.... We have followed the patient for Crohns ileocolitis. Disease course has been stable on current medication. Currently on treatment with Remicade 10mg/kg. Baseline IBD symptoms have remain the same since last visit. The patient has been doing well overall. Currently having 3 bowel movement(s) per day. Active GI symptoms include occasional and non-bloody diarrhea, also reports soft stools. They are considered by the patient to be mild in nature. Alarm symptoms reported: none. The patient also reports the following potential extraintestinal symptom manifestations: skin rash to juarez surface of hands bilaterally, across chest, and bilateral knees. Onset two months ago w/ progressive worsening. Tried topicals from PCP w/ no chnage. Symptoms have recently caused the patient to change nothing. Her last colonoscopy was 2/2017 with Long stricture in the TI due to Crohns disease. (Dilation, Injection). Stenosis in the terminal ileum. (Injection). Erythema in the colon. She is due for repeat colonoscopy this year for reevaluation of stricture. BRRegarding upper GI symptoms, she denies nausea, vomiting, heartburn, or acid reflux. She does have issues with dysphagia. Worse with meat and bread. She will feel food lodge mid throat and have to wait or drink water for relief. This happens one to 2 times a month. Her appetite is good. Her weight is stable. Her most recent upper endoscopy was 9/16 w/ LA Grade D esophagitis in the gastroesophageal junction compatible with ulcerative esophagitis. (Biopsy). Normal mucosa in the cricopharyngeus, lower third of the esophagus, middle third of the esophagus and upper third of the esophagus. Normal mucosa in the cardia, fundus, stomach body, incisura of the stomach and pylorus. Erythema in the antrum and pre-pyloric region compatible with gastritis. (Biopsy). Normal mucosa in the duodenal bulb, area of the papilla and second part of the duodenum. (Biopsy). BRLab work from 6/12/18 w/ H/H 12.5/35.1 CMP  otherwise normal CRP 13.3.8/16/18...Mrs. Bentley follows up with c/o sores in her mouth x2weeks ago. She can barely talk due to the pain. The sores have changed from diffuse erythema and redness to white round sores. They are on the buccal mucosa, hard palate, and tongue. We called in a combination mouthwash that was lidocaine, benadryl, and nystatin rinse. She reports that this numbed her mouth for 30mins only. No improvement on this after a week+. Her Crohn's medication was changed from Remicade to Entyvio infusions in June due to psoriasis. She has had 3 doses so far. She did see a dermatologist who gave her a cream for the psoriasis and told her that there is a chance the psoriasis may not resolve. She does have a brother with psoriasis. Labs from 7/25 showed H&ampH 13.0/35.0, CMP negative except alkaline phosphatase 120/117, CRP 3.9 10/2/18...Patient seen today in f/u. We have followed the patient for Crohns ileitis. Disease course has been stable on current medication. Currently on treatment with Entyvio and Pentasa. Baseline IBD symptoms have been asymptomatic since last visit. The patient has been doing well overall. Currently having 2 bowel movement(s) per day. Active GI symptoms include nothing currently. They are considered by the patient to be in remission in nature. Alarm symptoms reported: no. The patient also reports the following potential extraintestinal symptom manifestations: skin rash (improving bilateral arms. Was treated with steroids by her dermatologist. Thought to be triggered by Remicade). Symptoms have recently caused the patient to change nothing. She had routine labs draw last week at [facility], we do have final results. Oral lesions have completely resolved. EGD/Colon from 8/18Normal mucosa in the cricopharyngeus, lower third of the esophagus, middle third of the esophagus and upper third of the esophagus. BRNormal mucosa in the cardia, fundus, stomach body, incisura of the stomach and pylorus. BRErythema in the antrum and pre-pyloric region compatible with gastritis. (Biopsy). BRNormal mucosa in the duodenum. (Biopsy, Biopsy). Aphthas in the terminal ileum. (Biopsy). BRCongestion and erythema in the ascending colon, cecum and hepatic flexure compatible with colitis. (Biopsy). BRNormal mucosa in the transverse colon, descending colon, sigmoid colon and rectum. (Biopsy). BRCrohn's disease of both small and large intestine without complications. Biopsies w/ gastritis.We are seeing the patient for GERD. The patient has typically complained of heartburn and dysphagia. Treatment has consisted of Pantoprazole taken at once daily. This therapy has been associated with complete relief. The patient has not been having breakthru GERD symptoms. This problem does not wake the patient from sleep. They have been treating residual symptoms with Nothing. Continuing symptoms may be brought on by missing doses of antireflux medication. Of note she fell and broke her shoulder two weeks ago and is requiring PRN pain meds. Dr. Arciniega is following her for this. Not sure if she will need surgery or PT. 2/5/2019..... We have followed the patient for Crohns ileocolitis. Disease course has been stable on current medication. Currently on treatment with Entyvio and Pentasa(generic from [place]). Baseline IBD symptoms have improved since last visit. The patient has not been doing well due to alternating diarrhea and constipation with as much as 6 days of constipation after taking Pamine followed by laxatives overall. Currently having bowel movement(s) per day. Active GI symptoms include nothing currently and tenesmus and blood associated with severe bowel movements. They are considered by the patient to be mild in nature. Alarm symptoms reported: none. The patient also reports the following potential extraintestinal symptom manifestations: none. Symptoms have recently caused the patient to change nothing. She feels that her rectal bleeding and straining is due to constipation and wants to be able to control this better. 6/11/19..... We have followed the patient for Crohns ileocolitis. Disease course has been stable on current medication. Currently on treatment with Entyvio and Sulfasalazine. Baseline IBD symptoms have remain the same since last visit. The patient has been doing well overall. Currently having 2 bowel movement(s) per day. Active GI symptoms include nothing currently. They are considered by the patient to be in remission in nature. Alarm symptoms reported: none. The patient also reports the following potential extraintestinal symptom manifestations: none. Symptoms have recently caused the patient to change nothing. 10/08/19... We have followed the patient for     Crohns ileocolitis  . Disease course has been   stable on current medication  . Currently on treatment with   Entyvio, Pentasa, welchol and pamine forte PRN  . Baseline IBD symptoms have   remain the same  since last visit. The patient      has been doing well  overall. Currently having   2  bowel movement(s) per day. Active GI symptoms include   formed stools. Occasional diarrhea which she takes pamine forte for. She admits to morning nausea which resolves with morning eating. No vomiting and only occasional heartburn--takes Nexium 40mg QD  . She does mention RUQ pain which is new over the last couple days   . They are considered by the patient to be   mild   in nature. Alarm symptoms reported:   none  . The patient also reports the following potential extraintestinal symptom manifestations:   none  . Symptoms have recently caused the patient to   change nothing  . BR9/26 labs: H&ampH 13.2/35.2, MCV 90 CMP significant for creatinine 1.07 BUN 29, alkaline phosphatase 149/117

## 2019-10-08 NOTE — SERVICENOTES
The above note was scribed by Anna Ly PA-C. Dr. Maldonado saw this patient and examined this patient while in the office.

## 2019-10-31 RX ORDER — MELOXICAM 15 MG/1
15 TABLET ORAL DAILY
Qty: 90 TABLET | Refills: 2 | Status: SHIPPED | OUTPATIENT
Start: 2019-10-31 | End: 2019-11-23 | Stop reason: SDUPTHER

## 2019-10-31 RX ORDER — LEVOTHYROXINE SODIUM 0.12 MG/1
125 TABLET ORAL DAILY
Qty: 90 TABLET | Refills: 2 | Status: SHIPPED | OUTPATIENT
Start: 2019-10-31 | End: 2019-11-23 | Stop reason: SDUPTHER

## 2019-11-21 ENCOUNTER — OFFICE (AMBULATORY)
Dept: URBAN - METROPOLITAN AREA INFUSION 4 | Facility: INFUSION | Age: 74
End: 2019-11-21
Payer: COMMERCIAL

## 2019-11-21 VITALS
SYSTOLIC BLOOD PRESSURE: 134 MMHG | HEART RATE: 95 BPM | HEIGHT: 67 IN | TEMPERATURE: 98.1 F | SYSTOLIC BLOOD PRESSURE: 153 MMHG | TEMPERATURE: 97 F | DIASTOLIC BLOOD PRESSURE: 77 MMHG | SYSTOLIC BLOOD PRESSURE: 126 MMHG | HEART RATE: 93 BPM | HEART RATE: 89 BPM | DIASTOLIC BLOOD PRESSURE: 82 MMHG | DIASTOLIC BLOOD PRESSURE: 71 MMHG | RESPIRATION RATE: 18 BRPM

## 2019-11-21 DIAGNOSIS — K50.00 CROHN'S DISEASE OF SMALL INTESTINE WITHOUT COMPLICATIONS: ICD-10-CM

## 2019-11-21 PROCEDURE — 96365 THER/PROPH/DIAG IV INF INIT: CPT | Performed by: INTERNAL MEDICINE

## 2019-11-25 RX ORDER — BUPROPION HYDROCHLORIDE 150 MG/1
150 TABLET ORAL DAILY
Qty: 90 TABLET | Refills: 1 | Status: SHIPPED | OUTPATIENT
Start: 2019-11-25 | End: 2020-05-07 | Stop reason: HOSPADM

## 2019-11-25 RX ORDER — WARFARIN SODIUM 5 MG/1
5 TABLET ORAL
Qty: 90 TABLET | Refills: 1 | Status: SHIPPED | OUTPATIENT
Start: 2019-11-25 | End: 2020-05-07 | Stop reason: HOSPADM

## 2019-11-25 RX ORDER — DULOXETIN HYDROCHLORIDE 60 MG/1
60 CAPSULE, DELAYED RELEASE ORAL DAILY
Qty: 90 CAPSULE | Refills: 1 | Status: SHIPPED | OUTPATIENT
Start: 2019-11-25 | End: 2020-05-07 | Stop reason: HOSPADM

## 2019-11-25 RX ORDER — LEVOTHYROXINE SODIUM 0.12 MG/1
125 TABLET ORAL DAILY
Qty: 90 TABLET | Refills: 2 | Status: SHIPPED | OUTPATIENT
Start: 2019-11-25 | End: 2020-05-07 | Stop reason: HOSPADM

## 2019-11-25 RX ORDER — ATORVASTATIN CALCIUM 10 MG/1
10 TABLET, FILM COATED ORAL DAILY
Qty: 90 TABLET | Refills: 1 | Status: SHIPPED | OUTPATIENT
Start: 2019-11-25 | End: 2020-05-07 | Stop reason: HOSPADM

## 2019-11-25 RX ORDER — MELOXICAM 15 MG/1
15 TABLET ORAL DAILY
Qty: 90 TABLET | Refills: 2 | Status: SHIPPED | OUTPATIENT
Start: 2019-11-25 | End: 2020-05-07 | Stop reason: HOSPADM

## 2019-11-25 RX ORDER — LOSARTAN POTASSIUM 50 MG/1
50 TABLET ORAL DAILY
Qty: 90 TABLET | Refills: 1 | Status: SHIPPED | OUTPATIENT
Start: 2019-11-25 | End: 2020-05-07 | Stop reason: HOSPADM

## 2019-12-06 ENCOUNTER — CLINICAL SUPPORT (OUTPATIENT)
Dept: INTERNAL MEDICINE | Facility: CLINIC | Age: 74
End: 2019-12-06

## 2019-12-06 ENCOUNTER — OFFICE VISIT (OUTPATIENT)
Dept: INTERNAL MEDICINE | Facility: CLINIC | Age: 74
End: 2019-12-06

## 2019-12-06 VITALS
HEART RATE: 80 BPM | BODY MASS INDEX: 24.33 KG/M2 | RESPIRATION RATE: 16 BRPM | HEIGHT: 67 IN | DIASTOLIC BLOOD PRESSURE: 78 MMHG | WEIGHT: 155 LBS | SYSTOLIC BLOOD PRESSURE: 132 MMHG

## 2019-12-06 DIAGNOSIS — D68.61 ANTIPHOSPHOLIPID ANTIBODY SYNDROME (HCC): ICD-10-CM

## 2019-12-06 DIAGNOSIS — S60.219A ECCHYMOSIS OF WRIST: ICD-10-CM

## 2019-12-06 DIAGNOSIS — R79.1 SUPRATHERAPEUTIC INR: Primary | ICD-10-CM

## 2019-12-06 LAB — INR PPP: 5.8 (ref 0.9–1.1)

## 2019-12-06 PROCEDURE — 99213 OFFICE O/P EST LOW 20 MIN: CPT | Performed by: NURSE PRACTITIONER

## 2019-12-06 PROCEDURE — 85610 PROTHROMBIN TIME: CPT | Performed by: INTERNAL MEDICINE

## 2019-12-06 PROCEDURE — 93793 ANTICOAG MGMT PT WARFARIN: CPT | Performed by: INTERNAL MEDICINE

## 2019-12-06 PROCEDURE — 36416 COLLJ CAPILLARY BLOOD SPEC: CPT | Performed by: INTERNAL MEDICINE

## 2019-12-06 RX ORDER — PREDNISONE 20 MG/1
20 TABLET ORAL DAILY
COMMUNITY
End: 2020-03-18 | Stop reason: ALTCHOICE

## 2019-12-06 NOTE — PROGRESS NOTES
"Subjective   Chief Complaint   Patient presents with   • Coagulation Disorder     5.8 reading, arm bruised        History of Present Illness     73 yo wf presents with cc as above. INR 5.8 today. She has a bruise on her left wrist for five days. Doesn't recall injury. Denies pain. Denies bruising anywhere else. Denies blood in stool. She reports she had cranberries at [facility]. She is on Prednisone for her Crohn's--sees Dr. Flores.      Patient Active Problem List   Diagnosis   • Crohn's disease (CMS/HCC)   • Incontinence   • Degeneration of lumbar intervertebral disc   • Hyperlipidemia   • Hypertension   • Depression   • Antiphospholipid antibody syndrome (CMS/HCC)       Allergies   Allergen Reactions   • Sulfa Antibiotics Hives   • Infliximab Rash       Current Outpatient Medications on File Prior to Visit   Medication Sig Dispense Refill   • Ascorbic Acid (HM VITAMIN C) 500 MG chewable tablet Chew 1 tablet 2 (Two) Times a Day. HOLD PRIOR TO SURGERY     • aspirin 81 MG chewable tablet Chew 81 mg Every Night. HOLD PRIOR TO SURGERY     • atorvastatin (LIPITOR) 10 MG tablet Take 1 tablet by mouth Daily. 90 tablet 1   • B-D 3CC LUER-CELESTE SYR 25GX5/8\" 25G X 5/8\" 3 ML misc USE AS DIRECTED WITH B12  1   • buPROPion XL (WELLBUTRIN XL) 150 MG 24 hr tablet Take 1 tablet by mouth Daily. 90 tablet 1   • clobetasol (TEMOVATE) 0.05 % cream APPLY PEA SIZED AMOUNT TO LABIA AND PERINEUM TWICE A DAY FOR 2 WEEKS THEN ONCE DAILY FOR 1 WEEK THEN EVERY OTHER DAY FOR 2 WEEKS THEN THREE  1   • colesevelam (WELCHOL) 625 MG tablet Take 1,875 mg by mouth 3 (Three) Times a Day With Meals.     • cyanocobalamin 1000 MCG/ML injection INJECT 1 ML INTRAMUSCULARLY ONCE A WEEK FOR 4 WEEKS THEN 1 ML EVERY 2 WEEKS FOR ONE MONTH THEN 1 ML ONCE MONTHLY THEREAFTER  3   • DULoxetine (CYMBALTA) 60 MG capsule Take 1 capsule by mouth Daily. 90 capsule 1   • esomeprazole (NEXIUM) 40 MG capsule Take 40 mg by mouth Every Evening.     • eszopiclone " (LUNESTA) 3 MG tablet Take 3 mg by mouth As Needed.     • fexofenadine (ALLEGRA) 180 MG tablet Take 180 mg by mouth Daily.     • folic acid (FOLVITE) 1 MG tablet Take 1 mg by mouth Daily.     • Glucosamine-Chondroitin 750-600 MG chewable tablet Chew 1 tablet Daily. HOLD PRIOR TO SURGERY     • levothyroxine (SYNTHROID) 125 MCG tablet Take 1 tablet by mouth Daily. 90 tablet 2   • losartan (COZAAR) 50 MG tablet Take 1 tablet by mouth Daily. 90 tablet 1   • meloxicam (MOBIC) 15 MG tablet Take 1 tablet by mouth Daily. 90 tablet 2   • mesalamine (PENTASA) 500 MG CR capsule Take 1,000 mg by mouth 4 (Four) Times a Day.     • methscopolamine (PAMINE FORTE) 5 MG tablet Take 5 mg by mouth Daily As Needed.     • Omega-3 Fatty Acids (FISH OIL) 1000 MG capsule capsule Take 1,000 mg by mouth Daily. HOLD PRIOR TO SURGERY     • pantoprazole (PROTONIX) 40 MG EC tablet      • predniSONE (DELTASONE) 20 MG tablet Take 20 mg by mouth Daily.     • promethazine-codeine (PHENERGAN with CODEINE) 6.25-10 MG/5ML syrup Take 5 mL by mouth Every 4 (Four) Hours As Needed for Cough. 120 mL 0   • warfarin (COUMADIN) 5 MG tablet Take 1 tablet by mouth Daily. HOLD PRIOR TO SURGERY 90 tablet 1   • zinc gluconate 50 MG tablet Take 50 mg by mouth Daily. HOLD PRIOR TO SURGERY     • Multiple Vitamins-Minerals (OCUVITE-LUTEIN PO) Take 1 tablet by mouth Daily.       No current facility-administered medications on file prior to visit.        Past Medical History:   Diagnosis Date   • Acute deep vein thrombosis of lower extremity (CMS/HCC)    • Antiphospholipid antibody syndrome (CMS/HCC)    • Antiphospholipid antibody syndrome (CMS/HCC)    • Arthritis     OSTEO   • Benign essential hypertension    • Crohn's disease (CMS/HCC)    • Cutaneous candidiasis    • Depression    • Disease of thyroid gland    • Hyperlipidemia    • Hypertension    • Osteopenia    • Polyp, uterus corpus        Family History   Problem Relation Age of Onset   • Lung cancer Paternal Uncle   "      Social History     Socioeconomic History   • Marital status:      Spouse name: Not on file   • Number of children: Not on file   • Years of education: Not on file   • Highest education level: Not on file   Tobacco Use   • Smoking status: Former Smoker     Packs/day: 2.00     Years: 8.00     Pack years: 16.00     Types: Cigarettes     Last attempt to quit: 1967     Years since quittin.9   Substance and Sexual Activity   • Alcohol use: No   • Drug use: No   • Sexual activity: Defer       Past Surgical History:   Procedure Laterality Date   • ABDOMINAL HERNIA REPAIR     • AMPUTATION DIGIT Left     SECOND TOE    • BILATERAL SALPINGO OOPHORECTOMY     • BREAST BIOPSY Right     BENIGN   • CHOLECYSTECTOMY     • D&C HYSTEROSCOPY N/A 10/13/2016    Procedure: DILATATION AND CURETTAGE HYSTEROSCOPY WITH MYOSURE;  Surgeon: Christopher Doll MD;  Location[facility];  Service:    • EYE SURGERY Bilateral     cataract   • FACELIFT     • FEMORAL ARTERY - FEMORAL ARTERY BYPASS GRAFT Bilateral    • HEMORRHOIDECTOMY     • TONSILLECTOMY AND ADENOIDECTOMY       The following portions of the patient's history were reviewed and updated as appropriate: problem list, allergies, current medications, past medical history and past social history.    Review of Systems   Hematologic/Lymphatic:        Bruise Left wrist.    Gastrointestinal: Negative for hematochezia and melena.       Immunization History   Administered Date(s) Administered   • FLUAD TRI 65YR+ 10/07/2019       Objective   Vitals:    19 1114 19 1134   BP:  132/78   Pulse:  80   Resp:  16   Weight: 70.3 kg (155 lb)    Height: 170.2 cm (67\")      Body mass index is 24.28 kg/m².  Physical Exam   Constitutional: She is oriented to person, place, and time. She appears well-developed and well-nourished.   HENT:   Head: Normocephalic and atraumatic.   Cardiovascular: Normal rate, regular rhythm, S1 normal, S2 normal and normal heart sounds. "   Pulmonary/Chest: Effort normal and breath sounds normal.   Neurological: She is alert and oriented to person, place, and time.   Skin: Skin is warm and dry.   Palm sized bruise left ventral wrist.    Psychiatric: She has a normal mood and affect.   Vitals reviewed.      Procedures    Assessment/Plan   Joellen was seen today for coagulation disorder.    Diagnoses and all orders for this visit:    Supratherapeutic INR  Comments:  New problem. INR 5.8. Hold Coumadin. Recheck INR 12/09. Denies blood in stool or other bruising. WIll call should this occur.     Ecchymosis of wrist  Comments:  New problem; no known injury.         No Follow-up on file.

## 2019-12-09 ENCOUNTER — ANTICOAGULATION VISIT (OUTPATIENT)
Dept: INTERNAL MEDICINE | Facility: CLINIC | Age: 74
End: 2019-12-09

## 2019-12-09 ENCOUNTER — TELEPHONE (OUTPATIENT)
Dept: INTERNAL MEDICINE | Facility: CLINIC | Age: 74
End: 2019-12-09

## 2019-12-09 ENCOUNTER — CLINICAL SUPPORT (OUTPATIENT)
Dept: INTERNAL MEDICINE | Facility: CLINIC | Age: 74
End: 2019-12-09

## 2019-12-09 DIAGNOSIS — D68.61 ANTIPHOSPHOLIPID ANTIBODY SYNDROME (HCC): Primary | ICD-10-CM

## 2019-12-09 LAB
INR PPP: 1.8 (ref 0.9–1.1)
INR PPP: 1.8 (ref 0.9–1.1)

## 2019-12-09 PROCEDURE — 85610 PROTHROMBIN TIME: CPT | Performed by: INTERNAL MEDICINE

## 2019-12-09 PROCEDURE — 93793 ANTICOAG MGMT PT WARFARIN: CPT | Performed by: INTERNAL MEDICINE

## 2019-12-09 PROCEDURE — 36416 COLLJ CAPILLARY BLOOD SPEC: CPT | Performed by: INTERNAL MEDICINE

## 2019-12-09 NOTE — TELEPHONE ENCOUNTER
Dr. GLYNN I sent you INR results from today, she came in on Friday was high needed to repeat.  She's not sure how to take because she needs to be off starting this thurs for a procedure she is having early next week.

## 2019-12-09 NOTE — TELEPHONE ENCOUNTER
Tell her to take warfarin 5 mg daily ( I think her previous dose) until she is supposed to stop on Thursday.  After she restarts, call for f/u INR 2 weeks.

## 2019-12-23 ENCOUNTER — CLINICAL SUPPORT (OUTPATIENT)
Dept: INTERNAL MEDICINE | Facility: CLINIC | Age: 74
End: 2019-12-23

## 2019-12-23 ENCOUNTER — TELEPHONE (OUTPATIENT)
Dept: INTERNAL MEDICINE | Facility: CLINIC | Age: 74
End: 2019-12-23

## 2019-12-23 DIAGNOSIS — D68.61 ANTIPHOSPHOLIPID ANTIBODY SYNDROME (HCC): ICD-10-CM

## 2019-12-23 LAB — INR PPP: 1.3 (ref 0.9–1.1)

## 2019-12-23 PROCEDURE — 93793 ANTICOAG MGMT PT WARFARIN: CPT | Performed by: INTERNAL MEDICINE

## 2019-12-23 PROCEDURE — 85610 PROTHROMBIN TIME: CPT | Performed by: INTERNAL MEDICINE

## 2019-12-23 PROCEDURE — 36416 COLLJ CAPILLARY BLOOD SPEC: CPT | Performed by: INTERNAL MEDICINE

## 2019-12-23 NOTE — TELEPHONE ENCOUNTER
Pt/inr in chart 1.3 pt on 5mg and 2.5mg pt just restarted last Wenesday due to epidurals please advise of further instructions

## 2020-01-08 ENCOUNTER — OFFICE VISIT (OUTPATIENT)
Dept: INTERNAL MEDICINE | Facility: CLINIC | Age: 75
End: 2020-01-08

## 2020-01-08 VITALS
HEART RATE: 74 BPM | DIASTOLIC BLOOD PRESSURE: 70 MMHG | BODY MASS INDEX: 25.11 KG/M2 | SYSTOLIC BLOOD PRESSURE: 134 MMHG | WEIGHT: 160 LBS | HEIGHT: 67 IN

## 2020-01-08 DIAGNOSIS — M85.89 OSTEOPENIA OF MULTIPLE SITES: ICD-10-CM

## 2020-01-08 DIAGNOSIS — R25.1 TREMOR OF BOTH HANDS: Primary | ICD-10-CM

## 2020-01-08 DIAGNOSIS — Z12.31 SCREENING MAMMOGRAM, ENCOUNTER FOR: ICD-10-CM

## 2020-01-08 DIAGNOSIS — E78.49 OTHER HYPERLIPIDEMIA: ICD-10-CM

## 2020-01-08 DIAGNOSIS — D68.61 ANTIPHOSPHOLIPID ANTIBODY SYNDROME (HCC): ICD-10-CM

## 2020-01-08 LAB — INR PPP: 3.1 (ref 0.9–1.1)

## 2020-01-08 PROCEDURE — 99214 OFFICE O/P EST MOD 30 MIN: CPT | Performed by: INTERNAL MEDICINE

## 2020-01-08 PROCEDURE — 85610 PROTHROMBIN TIME: CPT | Performed by: INTERNAL MEDICINE

## 2020-01-16 ENCOUNTER — OFFICE (AMBULATORY)
Dept: URBAN - METROPOLITAN AREA INFUSION 4 | Facility: INFUSION | Age: 75
End: 2020-01-16
Payer: COMMERCIAL

## 2020-01-16 VITALS
HEART RATE: 98 BPM | RESPIRATION RATE: 18 BRPM | HEART RATE: 100 BPM | SYSTOLIC BLOOD PRESSURE: 154 MMHG | DIASTOLIC BLOOD PRESSURE: 89 MMHG | HEART RATE: 90 BPM | DIASTOLIC BLOOD PRESSURE: 90 MMHG | DIASTOLIC BLOOD PRESSURE: 94 MMHG | TEMPERATURE: 97.5 F | SYSTOLIC BLOOD PRESSURE: 142 MMHG | SYSTOLIC BLOOD PRESSURE: 170 MMHG | TEMPERATURE: 97 F | HEIGHT: 67 IN

## 2020-01-16 DIAGNOSIS — K50.00 CROHN'S DISEASE OF SMALL INTESTINE WITHOUT COMPLICATIONS: ICD-10-CM

## 2020-01-16 PROCEDURE — 96365 THER/PROPH/DIAG IV INF INIT: CPT | Performed by: INTERNAL MEDICINE

## 2020-01-16 NOTE — SERVICENOTES
MEDS PROVIDED BY [facility] 
NEGATIVE PPD or Quantiferon Gold:Annual 1/2019; lab order given to patient. Unable to obtain today.

## 2020-01-21 ENCOUNTER — TRANSCRIBE ORDERS (OUTPATIENT)
Dept: ADMINISTRATIVE | Facility: HOSPITAL | Age: 75
End: 2020-01-21

## 2020-01-21 ENCOUNTER — OFFICE (AMBULATORY)
Dept: URBAN - METROPOLITAN AREA CLINIC 75 | Facility: CLINIC | Age: 75
End: 2020-01-21
Payer: COMMERCIAL

## 2020-01-21 VITALS — WEIGHT: 157 LBS | DIASTOLIC BLOOD PRESSURE: 88 MMHG | SYSTOLIC BLOOD PRESSURE: 126 MMHG | HEIGHT: 67 IN

## 2020-01-21 DIAGNOSIS — R10.84 GENERALIZED ABDOMINAL PAIN: ICD-10-CM

## 2020-01-21 DIAGNOSIS — R19.4 CHANGE IN BOWEL HABIT: ICD-10-CM

## 2020-01-21 DIAGNOSIS — K50.819 CROHN'S DISEASE OF SMALL AND LARGE INTESTINES WITH COMPLICATION (HCC): Primary | ICD-10-CM

## 2020-01-21 DIAGNOSIS — K50.80 CROHN'S DISEASE OF BOTH SMALL AND LARGE INTESTINE WITHOUT CO: ICD-10-CM

## 2020-01-21 PROCEDURE — 99214 OFFICE O/P EST MOD 30 MIN: CPT | Performed by: INTERNAL MEDICINE

## 2020-02-13 ENCOUNTER — HOSPITAL ENCOUNTER (OUTPATIENT)
Dept: CT IMAGING | Facility: HOSPITAL | Age: 75
Discharge: HOME | End: 2020-02-13
Admitting: PHYSICIAN ASSISTANT

## 2020-02-13 DIAGNOSIS — K50.819 CROHN'S DISEASE OF SMALL AND LARGE INTESTINES WITH COMPLICATION (HCC): ICD-10-CM

## 2020-02-13 LAB — CREAT BLDA-MCNC: 1 MG/DL (ref 0.6–1.3)

## 2020-02-13 PROCEDURE — 25010000002 IOPAMIDOL 61 % SOLUTION: Performed by: PHYSICIAN ASSISTANT

## 2020-02-13 PROCEDURE — 74177 CT ABD & PELVIS W/CONTRAST: CPT

## 2020-02-13 PROCEDURE — 82565 ASSAY OF CREATININE: CPT

## 2020-02-13 RX ADMIN — IOPAMIDOL 95 ML: 612 INJECTION, SOLUTION INTRAVENOUS at 13:38

## 2020-03-10 ENCOUNTER — OFFICE (AMBULATORY)
Dept: URBAN - METROPOLITAN AREA CLINIC 75 | Facility: CLINIC | Age: 75
End: 2020-03-10
Payer: COMMERCIAL

## 2020-03-10 VITALS
SYSTOLIC BLOOD PRESSURE: 136 MMHG | OXYGEN SATURATION: 98 % | HEIGHT: 67 IN | HEART RATE: 98 BPM | WEIGHT: 156 LBS | DIASTOLIC BLOOD PRESSURE: 82 MMHG

## 2020-03-10 DIAGNOSIS — K50.80 CROHN'S DISEASE OF BOTH SMALL AND LARGE INTESTINE WITHOUT CO: ICD-10-CM

## 2020-03-10 DIAGNOSIS — R10.31 RIGHT LOWER QUADRANT PAIN: ICD-10-CM

## 2020-03-10 DIAGNOSIS — D64.9 ANEMIA, UNSPECIFIED: ICD-10-CM

## 2020-03-10 DIAGNOSIS — R10.11 RIGHT UPPER QUADRANT PAIN: ICD-10-CM

## 2020-03-10 DIAGNOSIS — K21.9 GASTRO-ESOPHAGEAL REFLUX DISEASE WITHOUT ESOPHAGITIS: ICD-10-CM

## 2020-03-10 DIAGNOSIS — K58.9 IRRITABLE BOWEL SYNDROME WITHOUT DIARRHEA: ICD-10-CM

## 2020-03-10 DIAGNOSIS — Z92.25 PERSONAL HISTORY OF IMMUNOSUPPRESSION THERAPY: ICD-10-CM

## 2020-03-10 DIAGNOSIS — K29.70 GASTRITIS, UNSPECIFIED, WITHOUT BLEEDING: ICD-10-CM

## 2020-03-10 DIAGNOSIS — R53.83 OTHER FATIGUE: ICD-10-CM

## 2020-03-10 DIAGNOSIS — K59.00 CONSTIPATION, UNSPECIFIED: ICD-10-CM

## 2020-03-10 PROBLEM — D50.9 ANEMIA, IRON DEFICIENCY: Status: ACTIVE | Noted: 2018-03-29

## 2020-03-10 PROBLEM — K90.9 INTESTINAL MALABSORPTION, UNSPEC: Status: ACTIVE | Noted: 2018-03-29

## 2020-03-10 PROCEDURE — 99214 OFFICE O/P EST MOD 30 MIN: CPT | Performed by: INTERNAL MEDICINE

## 2020-03-10 RX ORDER — VEDOLIZUMAB 300 MG/5ML
INJECTION, POWDER, LYOPHILIZED, FOR SOLUTION INTRAVENOUS
Qty: 0 | Refills: 0 | Status: COMPLETED
End: 2020-03-10

## 2020-03-10 RX ORDER — USTEKINUMAB 45 MG/.5ML
INJECTION, SOLUTION SUBCUTANEOUS
Qty: 1 | Refills: 13 | Status: COMPLETED
Start: 2020-03-10 | End: 2020-05-15

## 2020-03-17 ENCOUNTER — OFFICE VISIT (OUTPATIENT)
Dept: INTERNAL MEDICINE | Facility: CLINIC | Age: 75
End: 2020-03-17

## 2020-03-17 ENCOUNTER — CLINICAL SUPPORT (OUTPATIENT)
Dept: INTERNAL MEDICINE | Facility: CLINIC | Age: 75
End: 2020-03-17

## 2020-03-17 DIAGNOSIS — D68.61 ANTIPHOSPHOLIPID ANTIBODY SYNDROME (HCC): Primary | ICD-10-CM

## 2020-03-17 DIAGNOSIS — R79.1 ELEVATED INR: ICD-10-CM

## 2020-03-17 PROBLEM — L90.0 LICHEN SCLEROSUS ET ATROPHICUS: Status: ACTIVE | Noted: 2020-03-17

## 2020-03-17 LAB — INR PPP: 6.4 (ref 0.9–1.1)

## 2020-03-17 PROCEDURE — 85610 PROTHROMBIN TIME: CPT | Performed by: INTERNAL MEDICINE

## 2020-03-17 PROCEDURE — 36416 COLLJ CAPILLARY BLOOD SPEC: CPT | Performed by: INTERNAL MEDICINE

## 2020-03-17 PROCEDURE — 99214 OFFICE O/P EST MOD 30 MIN: CPT | Performed by: INTERNAL MEDICINE

## 2020-03-17 NOTE — PROGRESS NOTES
Assessment and Plan  Joellen was seen today for coagulation disorder.    Diagnoses and all orders for this visit:    Antiphospholipid antibody syndrome (CMS/HCC)  -     POCT INR    Elevated INR  Comments:  hold until 3/20 and return for repeat testing.  Likely due to long steroid taper. ER or call with any prlonged bleeding.       F/U and Patient Instructions    No follow-ups on file.  There are no Patient Instructions on file for this visit.    Subjective    Joellen Dominguez is a 74 y.o. female being seen in our office today for Coagulation Disorder (elevatedINR)     History of the Present Illness  HPI Here for INR monitoring- had bad bloody nose 2 days ago so she thought she needed to recheck.  Some GI bleeding but more related to current Crohn's flare, which is better.  She is on steroid taper currently and being switched to a different immunologic.  Denies lightheaded, dizzy.    Patient History        Significant Past History  The following portions of the patient's history were reviewed and updated as appropriate:PMHroutine: Social history , Allergies, Current Medications, Active Problem List and Health Maintenance              Social History  She  reports that she quit smoking about 53 years ago. Her smoking use included cigarettes. She has a 16.00 pack-year smoking history. She has never used smokeless tobacco. She reports that she does not drink alcohol or use drugs.                         Review of Symptoms  Review of Systems   Constitution: Negative.   Cardiovascular: Negative.    Respiratory: Negative.    Skin: Negative.    Gastrointestinal: Positive for diarrhea.   Psychiatric/Behavioral: Negative.      Objective  Vital Signs         BP Readings from Last 1 Encounters:   01/08/20 134/70     Wt Readings from Last 3 Encounters:   01/08/20 72.6 kg (160 lb)   12/06/19 70.3 kg (155 lb)   04/29/19 71.2 kg (157 lb)   There is no height or weight on file to calculate BMI.          Physical Exam   Physical Exam    Constitutional: No distress.   Pulmonary/Chest: Effort normal.   Musculoskeletal: She exhibits no edema.     Data Reviewed    Recent Results (from the past 2016 hour(s))   POCT INR    Collection Time: 01/08/20  4:57 PM   Result Value Ref Range    INR 3.1 (A) 0.9 - 1.1   POC Creatinine    Collection Time: 02/13/20 12:21 PM   Result Value Ref Range    Creatinine 1.00 0.60 - 1.30 mg/dL   POCT INR    Collection Time: 03/17/20  2:00 PM   Result Value Ref Range    INR 6.4 (A) 0.9 - 1.1

## 2020-03-17 NOTE — PROGRESS NOTES
"  Assessment and Plan  {[facility]}  F/U and Patient Instructions    No follow-ups on file.  There are no Patient Instructions on file for this visit.    Subjective    Joellen Dominguez is a 74 y.o. female being seen in our office today for No chief complaint on file.     History of the Present Illness  HPI Nose bleed on Sunday night, though she should come in for PT- toda INR is 6.4   Recently saw Dr. Flores- ordered CT, Crohn's flared.  She is being changed to Stelara.  She is also on Prednisone now- on a slow taper- will likely be on it another month or so.  Current 5 mg Tues, Sat  2.5 mg other days    Patient History        Significant Past History  The following portions of the patient's history were reviewed and updated as appropriate:{PMHrout[facility]:52693::\"Social history \",\"Allergies\",\"Current Medications\",\"Active Problem List\",\"Health Maintenance\"}              Social History  She  reports that she quit smoking about 53 years ago. Her smoking use included cigarettes. She has a 16.00 pack-year smoking history. She does not have any smokeless tobacco history on file. She reports that she does not drink alcohol or use drugs.                         Review of Symptoms  ROS  Objective  Vital Signs         BP Readings from Last 1 Encounters:   01/08/20 134/70     Wt Readings from Last 3 Encounters:   01/08/20 72.6 kg (160 lb)   12/06/19 70.3 kg (155 lb)   04/29/19 71.2 kg (157 lb)   There is no height or weight on file to calculate BMI.          Physical Exam   Physical Exam  Data Reviewed    Recent Results (from the past 2016 hour(s))   POCT INR    Collection Time: 01/08/20  4:57 PM   Result Value Ref Range    INR 3.1 (A) 0.9 - 1.1   POC Creatinine    Collection Time: 02/13/20 12:21 PM   Result Value Ref Range    Creatinine 1.00 0.60 - 1.30 mg/dL           "

## 2020-03-18 PROBLEM — E78.49 OTHER HYPERLIPIDEMIA: Status: ACTIVE | Noted: 2019-04-29

## 2020-03-18 RX ORDER — PREDNISONE 10 MG/1
TABLET ORAL
Status: ON HOLD | COMMUNITY
Start: 2020-02-21 | End: 2020-04-20

## 2020-03-20 ENCOUNTER — CLINICAL SUPPORT (OUTPATIENT)
Dept: INTERNAL MEDICINE | Facility: CLINIC | Age: 75
End: 2020-03-20

## 2020-03-20 DIAGNOSIS — R79.1 ELEVATED INR: Primary | ICD-10-CM

## 2020-03-20 DIAGNOSIS — R79.1 SUPRATHERAPEUTIC INR: Primary | ICD-10-CM

## 2020-03-20 LAB — INR PPP: 1.7 (ref 0.9–1.1)

## 2020-03-20 PROCEDURE — 85610 PROTHROMBIN TIME: CPT | Performed by: PHYSICIAN ASSISTANT

## 2020-03-20 PROCEDURE — 93793 ANTICOAG MGMT PT WARFARIN: CPT | Performed by: PHYSICIAN ASSISTANT

## 2020-03-20 PROCEDURE — 36416 COLLJ CAPILLARY BLOOD SPEC: CPT | Performed by: PHYSICIAN ASSISTANT

## 2020-03-20 RX ORDER — WARFARIN SODIUM 1 MG/1
TABLET ORAL
Qty: 30 TABLET | Refills: 1 | Status: ON HOLD | OUTPATIENT
Start: 2020-03-20 | End: 2020-05-07 | Stop reason: SDUPTHER

## 2020-03-23 ENCOUNTER — CLINICAL SUPPORT (OUTPATIENT)
Dept: INTERNAL MEDICINE | Facility: CLINIC | Age: 75
End: 2020-03-23

## 2020-03-23 LAB — INR PPP: 1.2 (ref 0.9–1.1)

## 2020-03-23 PROCEDURE — 36416 COLLJ CAPILLARY BLOOD SPEC: CPT | Performed by: INTERNAL MEDICINE

## 2020-03-23 PROCEDURE — 85610 PROTHROMBIN TIME: CPT | Performed by: INTERNAL MEDICINE

## 2020-03-30 ENCOUNTER — OFFICE (AMBULATORY)
Dept: URBAN - METROPOLITAN AREA INFUSION 4 | Facility: INFUSION | Age: 75
End: 2020-03-30
Payer: COMMERCIAL

## 2020-03-30 VITALS
RESPIRATION RATE: 18 BRPM | HEART RATE: 91 BPM | HEART RATE: 90 BPM | HEIGHT: 67 IN | SYSTOLIC BLOOD PRESSURE: 125 MMHG | SYSTOLIC BLOOD PRESSURE: 142 MMHG | HEART RATE: 89 BPM | RESPIRATION RATE: 16 BRPM | SYSTOLIC BLOOD PRESSURE: 134 MMHG | TEMPERATURE: 97.1 F | DIASTOLIC BLOOD PRESSURE: 83 MMHG | TEMPERATURE: 96.7 F | DIASTOLIC BLOOD PRESSURE: 82 MMHG | WEIGHT: 156 LBS | DIASTOLIC BLOOD PRESSURE: 93 MMHG

## 2020-03-30 DIAGNOSIS — K50.80 CROHN'S DISEASE OF BOTH SMALL AND LARGE INTESTINE WITHOUT CO: ICD-10-CM

## 2020-03-30 PROCEDURE — 96365 THER/PROPH/DIAG IV INF INIT: CPT | Performed by: INTERNAL MEDICINE

## 2020-03-30 NOTE — SERVICENOTES
NEGATIVE PPD or Quantiferon Gold: Annual 1/2020
MEDS PROVIDED BY [facility]

## 2020-04-07 ENCOUNTER — CLINICAL SUPPORT (OUTPATIENT)
Dept: INTERNAL MEDICINE | Facility: CLINIC | Age: 75
End: 2020-04-07

## 2020-04-07 DIAGNOSIS — D68.61 ANTIPHOSPHOLIPID ANTIBODY SYNDROME (HCC): Primary | ICD-10-CM

## 2020-04-07 LAB — INR PPP: 3 (ref 0.9–1.1)

## 2020-04-07 PROCEDURE — 85610 PROTHROMBIN TIME: CPT | Performed by: INTERNAL MEDICINE

## 2020-04-07 PROCEDURE — 36416 COLLJ CAPILLARY BLOOD SPEC: CPT | Performed by: INTERNAL MEDICINE

## 2020-04-08 ENCOUNTER — OFFICE VISIT (OUTPATIENT)
Dept: INTERNAL MEDICINE | Facility: CLINIC | Age: 75
End: 2020-04-08

## 2020-04-08 ENCOUNTER — RESULTS ENCOUNTER (OUTPATIENT)
Dept: INTERNAL MEDICINE | Facility: CLINIC | Age: 75
End: 2020-04-08

## 2020-04-08 DIAGNOSIS — D68.61 ANTIPHOSPHOLIPID ANTIBODY SYNDROME (HCC): Primary | ICD-10-CM

## 2020-04-08 DIAGNOSIS — D68.61 ANTIPHOSPHOLIPID ANTIBODY SYNDROME (HCC): ICD-10-CM

## 2020-04-08 DIAGNOSIS — E78.49 OTHER HYPERLIPIDEMIA: ICD-10-CM

## 2020-04-19 ENCOUNTER — APPOINTMENT (OUTPATIENT)
Dept: GENERAL RADIOLOGY | Facility: HOSPITAL | Age: 75
End: 2020-04-19

## 2020-04-19 ENCOUNTER — APPOINTMENT (OUTPATIENT)
Dept: CARDIOLOGY | Facility: HOSPITAL | Age: 75
End: 2020-04-19

## 2020-04-19 ENCOUNTER — HOSPITAL ENCOUNTER (INPATIENT)
Facility: HOSPITAL | Age: 75
LOS: 18 days | Discharge: HOME HEALTH | End: 2020-05-07
Attending: EMERGENCY MEDICINE | Admitting: INTERNAL MEDICINE

## 2020-04-19 ENCOUNTER — APPOINTMENT (OUTPATIENT)
Dept: CT IMAGING | Facility: HOSPITAL | Age: 75
End: 2020-04-19

## 2020-04-19 DIAGNOSIS — I48.91 ATRIAL FIBRILLATION WITH RAPID VENTRICULAR RESPONSE (HCC): ICD-10-CM

## 2020-04-19 DIAGNOSIS — I21.4 NSTEMI (NON-ST ELEVATED MYOCARDIAL INFARCTION) (HCC): ICD-10-CM

## 2020-04-19 DIAGNOSIS — R65.21 SEPSIS WITH ACUTE RENAL FAILURE AND SEPTIC SHOCK, DUE TO UNSPECIFIED ORGANISM, UNSPECIFIED ACUTE RENAL FAILURE TYPE (HCC): Primary | ICD-10-CM

## 2020-04-19 DIAGNOSIS — A41.9 SEPSIS WITH ACUTE RENAL FAILURE AND SEPTIC SHOCK, DUE TO UNSPECIFIED ORGANISM, UNSPECIFIED ACUTE RENAL FAILURE TYPE (HCC): Primary | ICD-10-CM

## 2020-04-19 DIAGNOSIS — R79.1 ELEVATED INR: ICD-10-CM

## 2020-04-19 DIAGNOSIS — D64.9 ACUTE ANEMIA: ICD-10-CM

## 2020-04-19 DIAGNOSIS — I48.91 NEW ONSET ATRIAL FIBRILLATION (HCC): ICD-10-CM

## 2020-04-19 DIAGNOSIS — N17.9 ACUTE KIDNEY INJURY (HCC): ICD-10-CM

## 2020-04-19 DIAGNOSIS — D72.829 LEUKOCYTOSIS, UNSPECIFIED TYPE: ICD-10-CM

## 2020-04-19 DIAGNOSIS — Z87.19 HISTORY OF CROHN'S DISEASE: ICD-10-CM

## 2020-04-19 DIAGNOSIS — N17.9 SEPSIS WITH ACUTE RENAL FAILURE AND SEPTIC SHOCK, DUE TO UNSPECIFIED ORGANISM, UNSPECIFIED ACUTE RENAL FAILURE TYPE (HCC): Primary | ICD-10-CM

## 2020-04-19 LAB
ALBUMIN SERPL-MCNC: 3.7 G/DL (ref 3.5–5.2)
ALBUMIN/GLOB SERPL: 1.1 G/DL
ALP SERPL-CCNC: 98 U/L (ref 39–117)
ALT SERPL W P-5'-P-CCNC: 47 U/L (ref 1–33)
AMORPH URATE CRY URNS QL MICRO: ABNORMAL /HPF
ANION GAP SERPL CALCULATED.3IONS-SCNC: 11.4 MMOL/L (ref 5–15)
ANION GAP SERPL CALCULATED.3IONS-SCNC: 18.1 MMOL/L (ref 5–15)
APTT PPP: 107.5 SECONDS (ref 22.7–35.4)
ARTERIAL PATENCY WRIST A: POSITIVE
AST SERPL-CCNC: 37 U/L (ref 1–32)
ATMOSPHERIC PRESS: 743.8 MMHG
B PARAPERT DNA SPEC QL NAA+PROBE: NOT DETECTED
B PERT DNA SPEC QL NAA+PROBE: NOT DETECTED
BACTERIA UR QL AUTO: ABNORMAL /HPF
BASE EXCESS BLDA CALC-SCNC: -7.7 MMOL/L (ref 0–2)
BASOPHILS # BLD AUTO: 0.03 10*3/MM3 (ref 0–0.2)
BASOPHILS # BLD AUTO: 0.1 10*3/MM3 (ref 0–0.2)
BASOPHILS NFR BLD AUTO: 0.1 % (ref 0–1.5)
BASOPHILS NFR BLD AUTO: 0.4 % (ref 0–1.5)
BDY SITE: ABNORMAL
BH CV ECHO MEAS - AO MAX PG (FULL): 0.98 MMHG
BH CV ECHO MEAS - AO MAX PG: 4.7 MMHG
BH CV ECHO MEAS - AO MEAN PG (FULL): 0 MMHG
BH CV ECHO MEAS - AO MEAN PG: 2 MMHG
BH CV ECHO MEAS - AO V2 MAX: 108 CM/SEC
BH CV ECHO MEAS - AO V2 MEAN: 71.7 CM/SEC
BH CV ECHO MEAS - AO V2 VTI: 19.6 CM
BH CV ECHO MEAS - ASC AORTA: 3.1 CM
BH CV ECHO MEAS - AVA(I,A): 3.4 CM^2
BH CV ECHO MEAS - AVA(I,D): 3.4 CM^2
BH CV ECHO MEAS - AVA(V,A): 3.4 CM^2
BH CV ECHO MEAS - AVA(V,D): 3.4 CM^2
BH CV ECHO MEAS - BSA(HAYCOCK): 1.8 M^2
BH CV ECHO MEAS - BSA: 1.8 M^2
BH CV ECHO MEAS - BZI_BMI: 22.8 KILOGRAMS/M^2
BH CV ECHO MEAS - BZI_METRIC_HEIGHT: 172.7 CM
BH CV ECHO MEAS - BZI_METRIC_WEIGHT: 68 KG
BH CV ECHO MEAS - EDV(CUBED): 19.7 ML
BH CV ECHO MEAS - EDV(MOD-SP2): 61 ML
BH CV ECHO MEAS - EDV(MOD-SP4): 59 ML
BH CV ECHO MEAS - EDV(TEICH): 27 ML
BH CV ECHO MEAS - EF(CUBED): 45.9 %
BH CV ECHO MEAS - EF(MOD-BP): 62 %
BH CV ECHO MEAS - EF(MOD-SP2): 60.7 %
BH CV ECHO MEAS - EF(MOD-SP4): 62.7 %
BH CV ECHO MEAS - EF(TEICH): 40 %
BH CV ECHO MEAS - ESV(CUBED): 10.6 ML
BH CV ECHO MEAS - ESV(MOD-SP2): 24 ML
BH CV ECHO MEAS - ESV(MOD-SP4): 22 ML
BH CV ECHO MEAS - ESV(TEICH): 16.2 ML
BH CV ECHO MEAS - FS: 18.5 %
BH CV ECHO MEAS - IVS/LVPW: 0.93
BH CV ECHO MEAS - IVSD: 1.4 CM
BH CV ECHO MEAS - LAT PEAK E' VEL: 14.6 CM/SEC
BH CV ECHO MEAS - LV DIASTOLIC VOL/BSA (35-75): 32.6 ML/M^2
BH CV ECHO MEAS - LV MASS(C)D: 130.3 GRAMS
BH CV ECHO MEAS - LV MASS(C)DI: 72.1 GRAMS/M^2
BH CV ECHO MEAS - LV MAX PG: 3.7 MMHG
BH CV ECHO MEAS - LV MEAN PG: 2 MMHG
BH CV ECHO MEAS - LV SYSTOLIC VOL/BSA (12-30): 12.2 ML/M^2
BH CV ECHO MEAS - LV V1 MAX: 96 CM/SEC
BH CV ECHO MEAS - LV V1 MEAN: 61.6 CM/SEC
BH CV ECHO MEAS - LV V1 VTI: 17.5 CM
BH CV ECHO MEAS - LVIDD: 2.7 CM
BH CV ECHO MEAS - LVIDS: 2.2 CM
BH CV ECHO MEAS - LVLD AP2: 6.5 CM
BH CV ECHO MEAS - LVLD AP4: 6.3 CM
BH CV ECHO MEAS - LVLS AP2: 6 CM
BH CV ECHO MEAS - LVLS AP4: 6 CM
BH CV ECHO MEAS - LVOT AREA (M): 3.8 CM^2
BH CV ECHO MEAS - LVOT AREA: 3.8 CM^2
BH CV ECHO MEAS - LVOT DIAM: 2.2 CM
BH CV ECHO MEAS - LVPWD: 1.5 CM
BH CV ECHO MEAS - MED PEAK E' VEL: 13.9 CM/SEC
BH CV ECHO MEAS - MV DEC SLOPE: 382 CM/SEC^2
BH CV ECHO MEAS - MV DEC TIME: 0.13 SEC
BH CV ECHO MEAS - MV E MAX VEL: 120 CM/SEC
BH CV ECHO MEAS - MV MAX PG: 5.9 MMHG
BH CV ECHO MEAS - MV MEAN PG: 2 MMHG
BH CV ECHO MEAS - MV P1/2T MAX VEL: 115 CM/SEC
BH CV ECHO MEAS - MV P1/2T: 88.2 MSEC
BH CV ECHO MEAS - MV V2 MAX: 121 CM/SEC
BH CV ECHO MEAS - MV V2 MEAN: 59.7 CM/SEC
BH CV ECHO MEAS - MV V2 VTI: 25 CM
BH CV ECHO MEAS - MVA P1/2T LCG: 1.9 CM^2
BH CV ECHO MEAS - MVA(P1/2T): 2.5 CM^2
BH CV ECHO MEAS - MVA(VTI): 2.7 CM^2
BH CV ECHO MEAS - PA ACC TIME: 0.11 SEC
BH CV ECHO MEAS - PA MAX PG (FULL): -1.2 MMHG
BH CV ECHO MEAS - PA MAX PG: 1.8 MMHG
BH CV ECHO MEAS - PA PR(ACCEL): 27.7 MMHG
BH CV ECHO MEAS - PA V2 MAX: 67.9 CM/SEC
BH CV ECHO MEAS - PVA(V,A): 4.9 CM^2
BH CV ECHO MEAS - PVA(V,D): 4.9 CM^2
BH CV ECHO MEAS - QP/QS: 1.1
BH CV ECHO MEAS - RAP SYSTOLE: 3 MMHG
BH CV ECHO MEAS - RV MAX PG: 3 MMHG
BH CV ECHO MEAS - RV MEAN PG: 2 MMHG
BH CV ECHO MEAS - RV V1 MAX: 86.9 CM/SEC
BH CV ECHO MEAS - RV V1 MEAN: 63.8 CM/SEC
BH CV ECHO MEAS - RV V1 VTI: 18.6 CM
BH CV ECHO MEAS - RVOT AREA: 3.8 CM^2
BH CV ECHO MEAS - RVOT DIAM: 2.2 CM
BH CV ECHO MEAS - RVSP: 39.2 MMHG
BH CV ECHO MEAS - SI(CUBED): 5 ML/M^2
BH CV ECHO MEAS - SI(LVOT): 36.8 ML/M^2
BH CV ECHO MEAS - SI(MOD-SP2): 20.5 ML/M^2
BH CV ECHO MEAS - SI(MOD-SP4): 20.5 ML/M^2
BH CV ECHO MEAS - SI(TEICH): 6 ML/M^2
BH CV ECHO MEAS - SV(CUBED): 9 ML
BH CV ECHO MEAS - SV(LVOT): 66.5 ML
BH CV ECHO MEAS - SV(MOD-SP2): 37 ML
BH CV ECHO MEAS - SV(MOD-SP4): 37 ML
BH CV ECHO MEAS - SV(RVOT): 70.7 ML
BH CV ECHO MEAS - SV(TEICH): 10.8 ML
BH CV ECHO MEAS - TR MAX VEL: 301 CM/SEC
BH CV ECHO MEASUREMENTS AVERAGE E/E' RATIO: 8.42
BH CV XLRA - RV BASE: 2.9 CM
BH CV XLRA - RV LENGTH: 5.6 CM
BH CV XLRA - RV MID: 2.3 CM
BH CV XLRA - TDI S': 12.8 CM/SEC
BILIRUB SERPL-MCNC: 0.6 MG/DL (ref 0.2–1.2)
BILIRUB UR QL STRIP: NEGATIVE
BUN BLD-MCNC: 48 MG/DL (ref 8–23)
BUN BLD-MCNC: 53 MG/DL (ref 8–23)
BUN/CREAT SERPL: 31.2 (ref 7–25)
BUN/CREAT SERPL: 32.7 (ref 7–25)
C PNEUM DNA NPH QL NAA+NON-PROBE: NOT DETECTED
CALCIUM SPEC-SCNC: 8.1 MG/DL (ref 8.6–10.5)
CALCIUM SPEC-SCNC: 9.4 MG/DL (ref 8.6–10.5)
CHLORIDE SERPL-SCNC: 106 MMOL/L (ref 98–107)
CHLORIDE SERPL-SCNC: 114 MMOL/L (ref 98–107)
CLARITY UR: CLEAR
CO2 SERPL-SCNC: 17.9 MMOL/L (ref 22–29)
CO2 SERPL-SCNC: 18.6 MMOL/L (ref 22–29)
COARSE GRAN CASTS URNS QL MICRO: ABNORMAL /LPF
COLOR UR: YELLOW
CREAT BLD-MCNC: 1.47 MG/DL (ref 0.57–1)
CREAT BLD-MCNC: 1.7 MG/DL (ref 0.57–1)
D DIMER PPP FEU-MCNC: 1.13 MCGFEU/ML (ref 0–0.49)
D-LACTATE SERPL-SCNC: 1.5 MMOL/L (ref 0.5–2)
D-LACTATE SERPL-SCNC: 2.7 MMOL/L (ref 0.5–2)
DEPRECATED RDW RBC AUTO: 44 FL (ref 37–54)
DEPRECATED RDW RBC AUTO: 44.2 FL (ref 37–54)
EOSINOPHIL # BLD AUTO: 0.01 10*3/MM3 (ref 0–0.4)
EOSINOPHIL # BLD AUTO: 0.01 10*3/MM3 (ref 0–0.4)
EOSINOPHIL NFR BLD AUTO: 0 % (ref 0.3–6.2)
EOSINOPHIL NFR BLD AUTO: 0 % (ref 0.3–6.2)
ERYTHROCYTE [DISTWIDTH] IN BLOOD BY AUTOMATED COUNT: 14.8 % (ref 12.3–15.4)
ERYTHROCYTE [DISTWIDTH] IN BLOOD BY AUTOMATED COUNT: 15.2 % (ref 12.3–15.4)
FLUAV H1 2009 PAND RNA NPH QL NAA+PROBE: NOT DETECTED
FLUAV H1 HA GENE NPH QL NAA+PROBE: NOT DETECTED
FLUAV H3 RNA NPH QL NAA+PROBE: NOT DETECTED
FLUAV SUBTYP SPEC NAA+PROBE: NOT DETECTED
FLUBV RNA ISLT QL NAA+PROBE: NOT DETECTED
GAS FLOW AIRWAY: 1 LPM
GFR SERPL CREATININE-BSD FRML MDRD: 29 ML/MIN/1.73
GFR SERPL CREATININE-BSD FRML MDRD: 35 ML/MIN/1.73
GLOBULIN UR ELPH-MCNC: 3.3 GM/DL
GLUCOSE BLD-MCNC: 107 MG/DL (ref 65–99)
GLUCOSE BLD-MCNC: 180 MG/DL (ref 65–99)
GLUCOSE BLDC GLUCOMTR-MCNC: 117 MG/DL (ref 70–130)
GLUCOSE BLDC GLUCOMTR-MCNC: 123 MG/DL (ref 70–130)
GLUCOSE BLDC GLUCOMTR-MCNC: 132 MG/DL (ref 70–130)
GLUCOSE UR STRIP-MCNC: NEGATIVE MG/DL
HADV DNA SPEC NAA+PROBE: NOT DETECTED
HCO3 BLDA-SCNC: 20 MMOL/L (ref 22–28)
HCOV 229E RNA SPEC QL NAA+PROBE: NOT DETECTED
HCOV HKU1 RNA SPEC QL NAA+PROBE: NOT DETECTED
HCOV NL63 RNA SPEC QL NAA+PROBE: NOT DETECTED
HCOV OC43 RNA SPEC QL NAA+PROBE: NOT DETECTED
HCT VFR BLD AUTO: 27.5 % (ref 34–46.6)
HCT VFR BLD AUTO: 33 % (ref 34–46.6)
HGB BLD-MCNC: 10.5 G/DL (ref 12–15.9)
HGB BLD-MCNC: 8.6 G/DL (ref 12–15.9)
HGB UR QL STRIP.AUTO: NEGATIVE
HMPV RNA NPH QL NAA+NON-PROBE: NOT DETECTED
HPIV1 RNA SPEC QL NAA+PROBE: NOT DETECTED
HPIV2 RNA SPEC QL NAA+PROBE: NOT DETECTED
HPIV3 RNA NPH QL NAA+PROBE: NOT DETECTED
HPIV4 P GENE NPH QL NAA+PROBE: NOT DETECTED
HYALINE CASTS UR QL AUTO: ABNORMAL /LPF
IMM GRANULOCYTES # BLD AUTO: 0.55 10*3/MM3 (ref 0–0.05)
IMM GRANULOCYTES # BLD AUTO: 0.58 10*3/MM3 (ref 0–0.05)
IMM GRANULOCYTES NFR BLD AUTO: 2.5 % (ref 0–0.5)
IMM GRANULOCYTES NFR BLD AUTO: 2.6 % (ref 0–0.5)
INR PPP: 1.11 (ref 0.9–1.1)
KETONES UR QL STRIP: ABNORMAL
LACTATE HOLD SPECIMEN: NORMAL
LEFT ATRIUM VOLUME INDEX: 41 ML/M2
LEUKOCYTE ESTERASE UR QL STRIP.AUTO: NEGATIVE
LYMPHOCYTES # BLD AUTO: 1 10*3/MM3 (ref 0.7–3.1)
LYMPHOCYTES # BLD AUTO: 1.69 10*3/MM3 (ref 0.7–3.1)
LYMPHOCYTES NFR BLD AUTO: 4.8 % (ref 19.6–45.3)
LYMPHOCYTES NFR BLD AUTO: 7.3 % (ref 19.6–45.3)
M PNEUMO IGG SER IA-ACNC: NOT DETECTED
MAGNESIUM SERPL-MCNC: 2 MG/DL (ref 1.6–2.4)
MAXIMAL PREDICTED HEART RATE: 144 BPM
MCH RBC QN AUTO: 25.7 PG (ref 26.6–33)
MCH RBC QN AUTO: 27 PG (ref 26.6–33)
MCHC RBC AUTO-ENTMCNC: 31.3 G/DL (ref 31.5–35.7)
MCHC RBC AUTO-ENTMCNC: 31.8 G/DL (ref 31.5–35.7)
MCV RBC AUTO: 82.1 FL (ref 79–97)
MCV RBC AUTO: 84.8 FL (ref 79–97)
MODALITY: ABNORMAL
MONOCYTES # BLD AUTO: 0.99 10*3/MM3 (ref 0.1–0.9)
MONOCYTES # BLD AUTO: 1.13 10*3/MM3 (ref 0.1–0.9)
MONOCYTES NFR BLD AUTO: 4.3 % (ref 5–12)
MONOCYTES NFR BLD AUTO: 5.4 % (ref 5–12)
NEUTROPHILS # BLD AUTO: 18.18 10*3/MM3 (ref 1.7–7)
NEUTROPHILS # BLD AUTO: 19.67 10*3/MM3 (ref 1.7–7)
NEUTROPHILS NFR BLD AUTO: 85.5 % (ref 42.7–76)
NEUTROPHILS NFR BLD AUTO: 87.1 % (ref 42.7–76)
NITRITE UR QL STRIP: NEGATIVE
NRBC BLD AUTO-RTO: 0.2 /100 WBC (ref 0–0.2)
NRBC BLD AUTO-RTO: 0.2 /100 WBC (ref 0–0.2)
NT-PROBNP SERPL-MCNC: 6770 PG/ML (ref 5–1800)
PCO2 BLDA: 49.7 MM HG (ref 35–45)
PH BLDA: 7.21 PH UNITS (ref 7.35–7.45)
PH UR STRIP.AUTO: <=5 [PH] (ref 5–8)
PLATELET # BLD AUTO: 345 10*3/MM3 (ref 140–450)
PLATELET # BLD AUTO: 477 10*3/MM3 (ref 140–450)
PMV BLD AUTO: 10 FL (ref 6–12)
PMV BLD AUTO: 9.9 FL (ref 6–12)
PO2 BLDA: 63.6 MM HG (ref 80–100)
POTASSIUM BLD-SCNC: 4.5 MMOL/L (ref 3.5–5.2)
POTASSIUM BLD-SCNC: 4.7 MMOL/L (ref 3.5–5.2)
PROCALCITONIN SERPL-MCNC: 0.1 NG/ML (ref 0.1–0.25)
PROT SERPL-MCNC: 7 G/DL (ref 6–8.5)
PROT UR QL STRIP: ABNORMAL
PROTHROMBIN TIME: 14 SECONDS (ref 11.7–14.2)
RBC # BLD AUTO: 3.35 10*6/MM3 (ref 3.77–5.28)
RBC # BLD AUTO: 3.89 10*6/MM3 (ref 3.77–5.28)
RBC # UR: ABNORMAL /HPF
REF LAB TEST METHOD: ABNORMAL
RHINOVIRUS RNA SPEC NAA+PROBE: NOT DETECTED
RSV RNA NPH QL NAA+NON-PROBE: NOT DETECTED
SAO2 % BLDCOA: 86.7 % (ref 92–99)
SARS-COV-2 RNA RESP QL NAA+PROBE: NOT DETECTED
SODIUM BLD-SCNC: 142 MMOL/L (ref 136–145)
SODIUM BLD-SCNC: 144 MMOL/L (ref 136–145)
SP GR UR STRIP: 1.02 (ref 1–1.03)
SQUAMOUS #/AREA URNS HPF: ABNORMAL /HPF
STRESS TARGET HR: 122 BPM
TOTAL RATE: 28 BREATHS/MINUTE
TROPONIN T SERPL-MCNC: 0.2 NG/ML (ref 0–0.03)
TROPONIN T SERPL-MCNC: 0.2 NG/ML (ref 0–0.03)
TSH SERPL DL<=0.05 MIU/L-ACNC: 0.22 UIU/ML (ref 0.27–4.2)
UROBILINOGEN UR QL STRIP: ABNORMAL
WBC CASTS #/AREA URNS LPF: ABNORMAL /LPF
WBC NRBC COR # BLD: 20.9 10*3/MM3 (ref 3.4–10.8)
WBC NRBC COR # BLD: 23.04 10*3/MM3 (ref 3.4–10.8)
WBC UR QL AUTO: ABNORMAL /HPF

## 2020-04-19 PROCEDURE — 85730 THROMBOPLASTIN TIME PARTIAL: CPT | Performed by: INTERNAL MEDICINE

## 2020-04-19 PROCEDURE — 99222 1ST HOSP IP/OBS MODERATE 55: CPT | Performed by: INTERNAL MEDICINE

## 2020-04-19 PROCEDURE — 81001 URINALYSIS AUTO W/SCOPE: CPT | Performed by: EMERGENCY MEDICINE

## 2020-04-19 PROCEDURE — 36600 WITHDRAWAL OF ARTERIAL BLOOD: CPT

## 2020-04-19 PROCEDURE — 85610 PROTHROMBIN TIME: CPT | Performed by: EMERGENCY MEDICINE

## 2020-04-19 PROCEDURE — 82803 BLOOD GASES ANY COMBINATION: CPT

## 2020-04-19 PROCEDURE — 71045 X-RAY EXAM CHEST 1 VIEW: CPT

## 2020-04-19 PROCEDURE — 84443 ASSAY THYROID STIM HORMONE: CPT | Performed by: EMERGENCY MEDICINE

## 2020-04-19 PROCEDURE — 85025 COMPLETE CBC W/AUTO DIFF WBC: CPT | Performed by: EMERGENCY MEDICINE

## 2020-04-19 PROCEDURE — 83605 ASSAY OF LACTIC ACID: CPT | Performed by: EMERGENCY MEDICINE

## 2020-04-19 PROCEDURE — 25010000002 PIPERACILLIN SOD-TAZOBACTAM PER 1 G: Performed by: EMERGENCY MEDICINE

## 2020-04-19 PROCEDURE — 83735 ASSAY OF MAGNESIUM: CPT | Performed by: EMERGENCY MEDICINE

## 2020-04-19 PROCEDURE — 85025 COMPLETE CBC W/AUTO DIFF WBC: CPT | Performed by: INTERNAL MEDICINE

## 2020-04-19 PROCEDURE — 25010000002 HEPARIN (PORCINE) PER 1000 UNITS: Performed by: INTERNAL MEDICINE

## 2020-04-19 PROCEDURE — 93306 TTE W/DOPPLER COMPLETE: CPT

## 2020-04-19 PROCEDURE — 93010 ELECTROCARDIOGRAM REPORT: CPT | Performed by: INTERNAL MEDICINE

## 2020-04-19 PROCEDURE — 80053 COMPREHEN METABOLIC PANEL: CPT | Performed by: EMERGENCY MEDICINE

## 2020-04-19 PROCEDURE — 0100U HC BIOFIRE FILMARRAY RESP PANEL 2: CPT | Performed by: EMERGENCY MEDICINE

## 2020-04-19 PROCEDURE — 82962 GLUCOSE BLOOD TEST: CPT

## 2020-04-19 PROCEDURE — 25010000002 VANCOMYCIN 10 G RECONSTITUTED SOLUTION: Performed by: EMERGENCY MEDICINE

## 2020-04-19 PROCEDURE — 87635 SARS-COV-2 COVID-19 AMP PRB: CPT | Performed by: EMERGENCY MEDICINE

## 2020-04-19 PROCEDURE — 84484 ASSAY OF TROPONIN QUANT: CPT | Performed by: EMERGENCY MEDICINE

## 2020-04-19 PROCEDURE — 84484 ASSAY OF TROPONIN QUANT: CPT | Performed by: INTERNAL MEDICINE

## 2020-04-19 PROCEDURE — 93306 TTE W/DOPPLER COMPLETE: CPT | Performed by: INTERNAL MEDICINE

## 2020-04-19 PROCEDURE — 87040 BLOOD CULTURE FOR BACTERIA: CPT | Performed by: EMERGENCY MEDICINE

## 2020-04-19 PROCEDURE — 85379 FIBRIN DEGRADATION QUANT: CPT | Performed by: EMERGENCY MEDICINE

## 2020-04-19 PROCEDURE — 84145 PROCALCITONIN (PCT): CPT | Performed by: EMERGENCY MEDICINE

## 2020-04-19 PROCEDURE — 25010000002 FUROSEMIDE PER 20 MG: Performed by: INTERNAL MEDICINE

## 2020-04-19 PROCEDURE — 99284 EMERGENCY DEPT VISIT MOD MDM: CPT

## 2020-04-19 PROCEDURE — 93005 ELECTROCARDIOGRAM TRACING: CPT | Performed by: EMERGENCY MEDICINE

## 2020-04-19 PROCEDURE — 74176 CT ABD & PELVIS W/O CONTRAST: CPT

## 2020-04-19 PROCEDURE — 25010000002 DIGOXIN PER 500 MCG: Performed by: EMERGENCY MEDICINE

## 2020-04-19 PROCEDURE — 83880 ASSAY OF NATRIURETIC PEPTIDE: CPT | Performed by: EMERGENCY MEDICINE

## 2020-04-19 PROCEDURE — P9612 CATHETERIZE FOR URINE SPEC: HCPCS

## 2020-04-19 RX ORDER — FUROSEMIDE 10 MG/ML
40 INJECTION INTRAMUSCULAR; INTRAVENOUS ONCE
Status: COMPLETED | OUTPATIENT
Start: 2020-04-19 | End: 2020-04-19

## 2020-04-19 RX ORDER — SODIUM CHLORIDE 0.9 % (FLUSH) 0.9 %
10 SYRINGE (ML) INJECTION EVERY 12 HOURS SCHEDULED
Status: DISCONTINUED | OUTPATIENT
Start: 2020-04-19 | End: 2020-05-07 | Stop reason: HOSPADM

## 2020-04-19 RX ORDER — HEPARIN SODIUM 5000 [USP'U]/ML
40-80 INJECTION, SOLUTION INTRAVENOUS; SUBCUTANEOUS EVERY 6 HOURS PRN
Status: DISCONTINUED | OUTPATIENT
Start: 2020-04-19 | End: 2020-04-28

## 2020-04-19 RX ORDER — SODIUM CHLORIDE 9 MG/ML
125 INJECTION, SOLUTION INTRAVENOUS CONTINUOUS
Status: DISCONTINUED | OUTPATIENT
Start: 2020-04-19 | End: 2020-04-19

## 2020-04-19 RX ORDER — SODIUM CHLORIDE 0.9 % (FLUSH) 0.9 %
10 SYRINGE (ML) INJECTION AS NEEDED
Status: DISCONTINUED | OUTPATIENT
Start: 2020-04-19 | End: 2020-05-07 | Stop reason: HOSPADM

## 2020-04-19 RX ORDER — DILTIAZEM HYDROCHLORIDE 5 MG/ML
10 INJECTION INTRAVENOUS ONCE
Status: COMPLETED | OUTPATIENT
Start: 2020-04-19 | End: 2020-04-19

## 2020-04-19 RX ORDER — ACETAMINOPHEN 650 MG/1
650 SUPPOSITORY RECTAL EVERY 4 HOURS PRN
Status: DISCONTINUED | OUTPATIENT
Start: 2020-04-19 | End: 2020-05-07 | Stop reason: HOSPADM

## 2020-04-19 RX ORDER — HEPARIN SODIUM 10000 [USP'U]/100ML
18 INJECTION, SOLUTION INTRAVENOUS
Status: DISCONTINUED | OUTPATIENT
Start: 2020-04-19 | End: 2020-04-28

## 2020-04-19 RX ORDER — ONDANSETRON 2 MG/ML
4 INJECTION INTRAMUSCULAR; INTRAVENOUS EVERY 6 HOURS PRN
Status: DISCONTINUED | OUTPATIENT
Start: 2020-04-19 | End: 2020-05-07 | Stop reason: HOSPADM

## 2020-04-19 RX ORDER — ACETAMINOPHEN 325 MG/1
650 TABLET ORAL EVERY 4 HOURS PRN
Status: DISCONTINUED | OUTPATIENT
Start: 2020-04-19 | End: 2020-05-07 | Stop reason: HOSPADM

## 2020-04-19 RX ORDER — ASPIRIN 81 MG/1
324 TABLET, CHEWABLE ORAL ONCE
Status: COMPLETED | OUTPATIENT
Start: 2020-04-19 | End: 2020-04-19

## 2020-04-19 RX ORDER — DIGOXIN 0.25 MG/ML
500 INJECTION INTRAMUSCULAR; INTRAVENOUS ONCE
Status: COMPLETED | OUTPATIENT
Start: 2020-04-19 | End: 2020-04-19

## 2020-04-19 RX ORDER — HEPARIN SODIUM 5000 [USP'U]/ML
80 INJECTION, SOLUTION INTRAVENOUS; SUBCUTANEOUS ONCE
Status: COMPLETED | OUTPATIENT
Start: 2020-04-19 | End: 2020-04-19

## 2020-04-19 RX ADMIN — VANCOMYCIN HYDROCHLORIDE 1250 MG: 10 INJECTION, POWDER, LYOPHILIZED, FOR SOLUTION INTRAVENOUS at 12:50

## 2020-04-19 RX ADMIN — SODIUM CHLORIDE, PRESERVATIVE FREE 10 ML: 5 INJECTION INTRAVENOUS at 20:16

## 2020-04-19 RX ADMIN — SODIUM CHLORIDE 125 ML/HR: 9 INJECTION, SOLUTION INTRAVENOUS at 11:25

## 2020-04-19 RX ADMIN — DIGOXIN 500 MCG: 0.25 INJECTION INTRAMUSCULAR; INTRAVENOUS at 14:02

## 2020-04-19 RX ADMIN — METOPROLOL TARTRATE 5 MG: 5 INJECTION, SOLUTION INTRAVENOUS at 10:21

## 2020-04-19 RX ADMIN — SODIUM CHLORIDE 10 MG/HR: 900 INJECTION, SOLUTION INTRAVENOUS at 22:44

## 2020-04-19 RX ADMIN — TAZOBACTAM SODIUM AND PIPERACILLIN SODIUM 3.38 G: 375; 3 INJECTION, SOLUTION INTRAVENOUS at 11:50

## 2020-04-19 RX ADMIN — DILTIAZEM HYDROCHLORIDE 10 MG: 5 INJECTION INTRAVENOUS at 13:05

## 2020-04-19 RX ADMIN — HEPARIN SODIUM 18 UNITS/KG/HR: 10000 INJECTION, SOLUTION INTRAVENOUS at 14:53

## 2020-04-19 RX ADMIN — FUROSEMIDE 40 MG: 10 INJECTION, SOLUTION INTRAMUSCULAR; INTRAVENOUS at 17:14

## 2020-04-19 RX ADMIN — HEPARIN SODIUM 5400 UNITS: 5000 INJECTION INTRAVENOUS; SUBCUTANEOUS at 14:52

## 2020-04-19 RX ADMIN — SODIUM CHLORIDE 500 ML: 9 INJECTION, SOLUTION INTRAVENOUS at 10:53

## 2020-04-19 RX ADMIN — SODIUM CHLORIDE 500 ML: 9 INJECTION, SOLUTION INTRAVENOUS at 11:46

## 2020-04-19 RX ADMIN — METOPROLOL TARTRATE 25 MG: 25 TABLET ORAL at 10:14

## 2020-04-19 RX ADMIN — SODIUM CHLORIDE 5 MG/HR: 900 INJECTION, SOLUTION INTRAVENOUS at 13:06

## 2020-04-19 RX ADMIN — ASPIRIN 324 MG: 81 TABLET, CHEWABLE ORAL at 11:43

## 2020-04-20 ENCOUNTER — APPOINTMENT (OUTPATIENT)
Dept: NUCLEAR MEDICINE | Facility: HOSPITAL | Age: 75
End: 2020-04-20

## 2020-04-20 ENCOUNTER — APPOINTMENT (OUTPATIENT)
Dept: CARDIOLOGY | Facility: HOSPITAL | Age: 75
End: 2020-04-20

## 2020-04-20 ENCOUNTER — APPOINTMENT (OUTPATIENT)
Dept: GENERAL RADIOLOGY | Facility: HOSPITAL | Age: 75
End: 2020-04-20

## 2020-04-20 LAB
ANION GAP SERPL CALCULATED.3IONS-SCNC: 15.2 MMOL/L (ref 5–15)
APTT PPP: 106.7 SECONDS (ref 22.7–35.4)
APTT PPP: 171.6 SECONDS (ref 22.7–35.4)
APTT PPP: 62.7 SECONDS (ref 22.7–35.4)
BASOPHILS # BLD AUTO: 0.04 10*3/MM3 (ref 0–0.2)
BASOPHILS NFR BLD AUTO: 0.2 % (ref 0–1.5)
BH CV LOWER VASCULAR LEFT COMMON FEMORAL AUGMENT: NORMAL
BH CV LOWER VASCULAR LEFT COMMON FEMORAL COMPETENT: NORMAL
BH CV LOWER VASCULAR LEFT COMMON FEMORAL COMPRESS: NORMAL
BH CV LOWER VASCULAR LEFT COMMON FEMORAL PHASIC: NORMAL
BH CV LOWER VASCULAR LEFT COMMON FEMORAL SPONT: NORMAL
BH CV LOWER VASCULAR LEFT DISTAL FEMORAL COMPRESS: NORMAL
BH CV LOWER VASCULAR LEFT GASTRONEMIUS COMPRESS: NORMAL
BH CV LOWER VASCULAR LEFT GREATER SAPH AK COMPRESS: NORMAL
BH CV LOWER VASCULAR LEFT GREATER SAPH BK COMPRESS: NORMAL
BH CV LOWER VASCULAR LEFT MID FEMORAL AUGMENT: NORMAL
BH CV LOWER VASCULAR LEFT MID FEMORAL COMPETENT: NORMAL
BH CV LOWER VASCULAR LEFT MID FEMORAL COMPRESS: NORMAL
BH CV LOWER VASCULAR LEFT MID FEMORAL PHASIC: NORMAL
BH CV LOWER VASCULAR LEFT MID FEMORAL SPONT: NORMAL
BH CV LOWER VASCULAR LEFT PERONEAL COMPRESS: NORMAL
BH CV LOWER VASCULAR LEFT POPLITEAL AUGMENT: NORMAL
BH CV LOWER VASCULAR LEFT POPLITEAL COMPETENT: NORMAL
BH CV LOWER VASCULAR LEFT POPLITEAL COMPRESS: NORMAL
BH CV LOWER VASCULAR LEFT POPLITEAL PHASIC: NORMAL
BH CV LOWER VASCULAR LEFT POPLITEAL SPONT: NORMAL
BH CV LOWER VASCULAR LEFT POSTERIOR TIBIAL COMPRESS: NORMAL
BH CV LOWER VASCULAR LEFT PROFUNDA FEMORAL COMPRESS: NORMAL
BH CV LOWER VASCULAR LEFT PROXIMAL FEMORAL COMPRESS: NORMAL
BH CV LOWER VASCULAR LEFT SAPHENOFEMORAL JUNCTION COMPRESS: NORMAL
BH CV LOWER VASCULAR RIGHT COMMON FEMORAL AUGMENT: NORMAL
BH CV LOWER VASCULAR RIGHT COMMON FEMORAL COMPETENT: NORMAL
BH CV LOWER VASCULAR RIGHT COMMON FEMORAL COMPRESS: NORMAL
BH CV LOWER VASCULAR RIGHT COMMON FEMORAL PHASIC: NORMAL
BH CV LOWER VASCULAR RIGHT COMMON FEMORAL SPONT: NORMAL
BH CV LOWER VASCULAR RIGHT DISTAL FEMORAL COMPRESS: NORMAL
BH CV LOWER VASCULAR RIGHT GASTRONEMIUS COMPRESS: NORMAL
BH CV LOWER VASCULAR RIGHT GREATER SAPH AK COMPRESS: NORMAL
BH CV LOWER VASCULAR RIGHT GREATER SAPH BK COMPRESS: NORMAL
BH CV LOWER VASCULAR RIGHT MID FEMORAL AUGMENT: NORMAL
BH CV LOWER VASCULAR RIGHT MID FEMORAL COMPETENT: NORMAL
BH CV LOWER VASCULAR RIGHT MID FEMORAL COMPRESS: NORMAL
BH CV LOWER VASCULAR RIGHT MID FEMORAL PHASIC: NORMAL
BH CV LOWER VASCULAR RIGHT MID FEMORAL SPONT: NORMAL
BH CV LOWER VASCULAR RIGHT PERONEAL COMPRESS: NORMAL
BH CV LOWER VASCULAR RIGHT POPLITEAL AUGMENT: NORMAL
BH CV LOWER VASCULAR RIGHT POPLITEAL COMPETENT: NORMAL
BH CV LOWER VASCULAR RIGHT POPLITEAL COMPRESS: NORMAL
BH CV LOWER VASCULAR RIGHT POPLITEAL PHASIC: NORMAL
BH CV LOWER VASCULAR RIGHT POPLITEAL SPONT: NORMAL
BH CV LOWER VASCULAR RIGHT POSTERIOR TIBIAL COMPRESS: NORMAL
BH CV LOWER VASCULAR RIGHT PROFUNDA FEMORAL COMPRESS: NORMAL
BH CV LOWER VASCULAR RIGHT PROXIMAL FEMORAL COMPRESS: NORMAL
BH CV LOWER VASCULAR RIGHT SAPHENOFEMORAL JUNCTION COMPRESS: NORMAL
BUN BLD-MCNC: 46 MG/DL (ref 8–23)
BUN/CREAT SERPL: 40.7 (ref 7–25)
CALCIUM SPEC-SCNC: 8.3 MG/DL (ref 8.6–10.5)
CHLORIDE SERPL-SCNC: 107 MMOL/L (ref 98–107)
CO2 SERPL-SCNC: 21.8 MMOL/L (ref 22–29)
CREAT BLD-MCNC: 1.13 MG/DL (ref 0.57–1)
DEPRECATED RDW RBC AUTO: 41.6 FL (ref 37–54)
EOSINOPHIL # BLD AUTO: 0.1 10*3/MM3 (ref 0–0.4)
EOSINOPHIL NFR BLD AUTO: 0.6 % (ref 0.3–6.2)
ERYTHROCYTE [DISTWIDTH] IN BLOOD BY AUTOMATED COUNT: 14.5 % (ref 12.3–15.4)
GFR SERPL CREATININE-BSD FRML MDRD: 47 ML/MIN/1.73
GLUCOSE BLD-MCNC: 103 MG/DL (ref 65–99)
GLUCOSE BLDC GLUCOMTR-MCNC: 112 MG/DL (ref 70–130)
GLUCOSE BLDC GLUCOMTR-MCNC: 114 MG/DL (ref 70–130)
HCT VFR BLD AUTO: 28.6 % (ref 34–46.6)
HGB BLD-MCNC: 8.9 G/DL (ref 12–15.9)
IMM GRANULOCYTES # BLD AUTO: 0.34 10*3/MM3 (ref 0–0.05)
IMM GRANULOCYTES NFR BLD AUTO: 2.1 % (ref 0–0.5)
LYMPHOCYTES # BLD AUTO: 0.88 10*3/MM3 (ref 0.7–3.1)
LYMPHOCYTES NFR BLD AUTO: 5.3 % (ref 19.6–45.3)
MCH RBC QN AUTO: 24.9 PG (ref 26.6–33)
MCHC RBC AUTO-ENTMCNC: 31.1 G/DL (ref 31.5–35.7)
MCV RBC AUTO: 80.1 FL (ref 79–97)
MONOCYTES # BLD AUTO: 0.71 10*3/MM3 (ref 0.1–0.9)
MONOCYTES NFR BLD AUTO: 4.3 % (ref 5–12)
NEUTROPHILS # BLD AUTO: 14.49 10*3/MM3 (ref 1.7–7)
NEUTROPHILS NFR BLD AUTO: 87.5 % (ref 42.7–76)
NRBC BLD AUTO-RTO: 0.1 /100 WBC (ref 0–0.2)
PLATELET # BLD AUTO: 313 10*3/MM3 (ref 140–450)
PMV BLD AUTO: 10.8 FL (ref 6–12)
POTASSIUM BLD-SCNC: 3.7 MMOL/L (ref 3.5–5.2)
RBC # BLD AUTO: 3.57 10*6/MM3 (ref 3.77–5.28)
SODIUM BLD-SCNC: 144 MMOL/L (ref 136–145)
T4 FREE SERPL-MCNC: 1.81 NG/DL (ref 0.93–1.7)
TROPONIN T SERPL-MCNC: 0.13 NG/ML (ref 0–0.03)
TSH SERPL DL<=0.05 MIU/L-ACNC: 0.08 UIU/ML (ref 0.27–4.2)
WBC NRBC COR # BLD: 16.56 10*3/MM3 (ref 3.4–10.8)

## 2020-04-20 PROCEDURE — 82962 GLUCOSE BLOOD TEST: CPT

## 2020-04-20 PROCEDURE — 0 XENON XE 133: Performed by: INTERNAL MEDICINE

## 2020-04-20 PROCEDURE — 99232 SBSQ HOSP IP/OBS MODERATE 35: CPT | Performed by: INTERNAL MEDICINE

## 2020-04-20 PROCEDURE — 84439 ASSAY OF FREE THYROXINE: CPT | Performed by: INTERNAL MEDICINE

## 2020-04-20 PROCEDURE — 25010000002 HEPARIN (PORCINE) PER 1000 UNITS: Performed by: INTERNAL MEDICINE

## 2020-04-20 PROCEDURE — 78582 LUNG VENTILAT&PERFUS IMAGING: CPT

## 2020-04-20 PROCEDURE — 85025 COMPLETE CBC W/AUTO DIFF WBC: CPT | Performed by: INTERNAL MEDICINE

## 2020-04-20 PROCEDURE — 80048 BASIC METABOLIC PNL TOTAL CA: CPT | Performed by: INTERNAL MEDICINE

## 2020-04-20 PROCEDURE — 84484 ASSAY OF TROPONIN QUANT: CPT | Performed by: INTERNAL MEDICINE

## 2020-04-20 PROCEDURE — 84443 ASSAY THYROID STIM HORMONE: CPT | Performed by: INTERNAL MEDICINE

## 2020-04-20 PROCEDURE — 93970 EXTREMITY STUDY: CPT

## 2020-04-20 PROCEDURE — 85730 THROMBOPLASTIN TIME PARTIAL: CPT | Performed by: INTERNAL MEDICINE

## 2020-04-20 PROCEDURE — 0 TECHNETIUM ALBUMIN AGGREGATED: Performed by: INTERNAL MEDICINE

## 2020-04-20 PROCEDURE — 71046 X-RAY EXAM CHEST 2 VIEWS: CPT

## 2020-04-20 PROCEDURE — A9540 TC99M MAA: HCPCS | Performed by: INTERNAL MEDICINE

## 2020-04-20 PROCEDURE — A9558 XE133 XENON 10MCI: HCPCS | Performed by: INTERNAL MEDICINE

## 2020-04-20 RX ORDER — PANTOPRAZOLE SODIUM 40 MG/1
40 TABLET, DELAYED RELEASE ORAL EVERY MORNING
Status: DISCONTINUED | OUTPATIENT
Start: 2020-04-20 | End: 2020-05-02

## 2020-04-20 RX ORDER — ATORVASTATIN CALCIUM 20 MG/1
10 TABLET, FILM COATED ORAL DAILY
Status: DISCONTINUED | OUTPATIENT
Start: 2020-04-20 | End: 2020-05-07 | Stop reason: HOSPADM

## 2020-04-20 RX ORDER — LOSARTAN POTASSIUM 50 MG/1
50 TABLET ORAL DAILY
Status: DISCONTINUED | OUTPATIENT
Start: 2020-04-20 | End: 2020-04-20

## 2020-04-20 RX ORDER — FOLIC ACID 1 MG/1
1 TABLET ORAL DAILY
Status: DISCONTINUED | OUTPATIENT
Start: 2020-04-20 | End: 2020-05-07 | Stop reason: HOSPADM

## 2020-04-20 RX ORDER — METOPROLOL TARTRATE 50 MG/1
50 TABLET, FILM COATED ORAL EVERY 12 HOURS SCHEDULED
Status: DISCONTINUED | OUTPATIENT
Start: 2020-04-20 | End: 2020-05-07 | Stop reason: HOSPADM

## 2020-04-20 RX ORDER — LEVOTHYROXINE SODIUM 0.1 MG/1
100 TABLET ORAL
Status: DISCONTINUED | OUTPATIENT
Start: 2020-04-21 | End: 2020-05-07 | Stop reason: HOSPADM

## 2020-04-20 RX ORDER — DULOXETIN HYDROCHLORIDE 60 MG/1
60 CAPSULE, DELAYED RELEASE ORAL DAILY
Status: DISCONTINUED | OUTPATIENT
Start: 2020-04-20 | End: 2020-05-07 | Stop reason: HOSPADM

## 2020-04-20 RX ORDER — BUPROPION HYDROCHLORIDE 150 MG/1
150 TABLET ORAL DAILY
Status: DISCONTINUED | OUTPATIENT
Start: 2020-04-20 | End: 2020-05-07 | Stop reason: HOSPADM

## 2020-04-20 RX ADMIN — METOPROLOL TARTRATE 50 MG: 25 TABLET ORAL at 09:52

## 2020-04-20 RX ADMIN — FOLIC ACID 1 MG: 1 TABLET ORAL at 12:39

## 2020-04-20 RX ADMIN — HEPARIN SODIUM 5000 UNITS: 5000 INJECTION INTRAVENOUS; SUBCUTANEOUS at 18:15

## 2020-04-20 RX ADMIN — DULOXETINE HYDROCHLORIDE 60 MG: 60 CAPSULE, DELAYED RELEASE ORAL at 12:39

## 2020-04-20 RX ADMIN — MESALAMINE 1000 MG: 250 CAPSULE ORAL at 18:18

## 2020-04-20 RX ADMIN — SODIUM CHLORIDE, PRESERVATIVE FREE 10 ML: 5 INJECTION INTRAVENOUS at 21:18

## 2020-04-20 RX ADMIN — MESALAMINE 1000 MG: 250 CAPSULE ORAL at 12:39

## 2020-04-20 RX ADMIN — MESALAMINE 1000 MG: 250 CAPSULE ORAL at 21:18

## 2020-04-20 RX ADMIN — SODIUM CHLORIDE, PRESERVATIVE FREE 10 ML: 5 INJECTION INTRAVENOUS at 08:36

## 2020-04-20 RX ADMIN — Medication 1 DOSE: at 11:56

## 2020-04-20 RX ADMIN — METOPROLOL TARTRATE 50 MG: 25 TABLET ORAL at 21:18

## 2020-04-20 RX ADMIN — SODIUM CHLORIDE 10 MG/HR: 900 INJECTION, SOLUTION INTRAVENOUS at 08:48

## 2020-04-20 RX ADMIN — ATORVASTATIN CALCIUM 10 MG: 20 TABLET, FILM COATED ORAL at 12:38

## 2020-04-20 RX ADMIN — PANTOPRAZOLE SODIUM 40 MG: 40 TABLET, DELAYED RELEASE ORAL at 12:38

## 2020-04-20 RX ADMIN — BUPROPION HYDROCHLORIDE 150 MG: 150 TABLET, FILM COATED, EXTENDED RELEASE ORAL at 12:39

## 2020-04-20 RX ADMIN — XENON XE-133 12.7 MILLICURIE: 10 GAS RESPIRATORY (INHALATION) at 11:20

## 2020-04-20 RX ADMIN — HEPARIN SODIUM 15 UNITS/KG/HR: 10000 INJECTION, SOLUTION INTRAVENOUS at 18:14

## 2020-04-21 PROBLEM — I21.4 NSTEMI (NON-ST ELEVATED MYOCARDIAL INFARCTION): Status: ACTIVE | Noted: 2020-04-19

## 2020-04-21 LAB
ANION GAP SERPL CALCULATED.3IONS-SCNC: 14.2 MMOL/L (ref 5–15)
APTT PPP: 134.9 SECONDS (ref 22.7–35.4)
APTT PPP: 28.5 SECONDS (ref 22.7–35.4)
BASOPHILS # BLD AUTO: 0.11 10*3/MM3 (ref 0–0.2)
BASOPHILS NFR BLD AUTO: 0.6 % (ref 0–1.5)
BUN BLD-MCNC: 46 MG/DL (ref 8–23)
BUN/CREAT SERPL: 39 (ref 7–25)
CALCIUM SPEC-SCNC: 8.7 MG/DL (ref 8.6–10.5)
CHLORIDE SERPL-SCNC: 106 MMOL/L (ref 98–107)
CO2 SERPL-SCNC: 20.8 MMOL/L (ref 22–29)
CREAT BLD-MCNC: 1.18 MG/DL (ref 0.57–1)
DEPRECATED RDW RBC AUTO: 43.3 FL (ref 37–54)
EOSINOPHIL # BLD AUTO: 0.36 10*3/MM3 (ref 0–0.4)
EOSINOPHIL NFR BLD AUTO: 2 % (ref 0.3–6.2)
ERYTHROCYTE [DISTWIDTH] IN BLOOD BY AUTOMATED COUNT: 15.6 % (ref 12.3–15.4)
GFR SERPL CREATININE-BSD FRML MDRD: 45 ML/MIN/1.73
GLUCOSE BLD-MCNC: 142 MG/DL (ref 65–99)
HCT VFR BLD AUTO: 27.9 % (ref 34–46.6)
HGB BLD-MCNC: 9.5 G/DL (ref 12–15.9)
IMM GRANULOCYTES # BLD AUTO: 0.52 10*3/MM3 (ref 0–0.05)
IMM GRANULOCYTES NFR BLD AUTO: 2.9 % (ref 0–0.5)
LYMPHOCYTES # BLD AUTO: 1.08 10*3/MM3 (ref 0.7–3.1)
LYMPHOCYTES NFR BLD AUTO: 6.1 % (ref 19.6–45.3)
MCH RBC QN AUTO: 28.9 PG (ref 26.6–33)
MCHC RBC AUTO-ENTMCNC: 34.1 G/DL (ref 31.5–35.7)
MCV RBC AUTO: 84.8 FL (ref 79–97)
MONOCYTES # BLD AUTO: 0.85 10*3/MM3 (ref 0.1–0.9)
MONOCYTES NFR BLD AUTO: 4.8 % (ref 5–12)
NEUTROPHILS # BLD AUTO: 14.86 10*3/MM3 (ref 1.7–7)
NEUTROPHILS NFR BLD AUTO: 83.6 % (ref 42.7–76)
NRBC BLD AUTO-RTO: 0.1 /100 WBC (ref 0–0.2)
PLATELET # BLD AUTO: 183 10*3/MM3 (ref 140–450)
PMV BLD AUTO: 11 FL (ref 6–12)
POTASSIUM BLD-SCNC: 4.2 MMOL/L (ref 3.5–5.2)
RBC # BLD AUTO: 3.29 10*6/MM3 (ref 3.77–5.28)
SODIUM BLD-SCNC: 141 MMOL/L (ref 136–145)
TROPONIN T SERPL-MCNC: 0.14 NG/ML (ref 0–0.03)
WBC NRBC COR # BLD: 17.78 10*3/MM3 (ref 3.4–10.8)

## 2020-04-21 PROCEDURE — 85730 THROMBOPLASTIN TIME PARTIAL: CPT | Performed by: INTERNAL MEDICINE

## 2020-04-21 PROCEDURE — 25010000002 HEPARIN (PORCINE) PER 1000 UNITS: Performed by: INTERNAL MEDICINE

## 2020-04-21 PROCEDURE — 99232 SBSQ HOSP IP/OBS MODERATE 35: CPT | Performed by: INTERNAL MEDICINE

## 2020-04-21 PROCEDURE — 93010 ELECTROCARDIOGRAM REPORT: CPT | Performed by: INTERNAL MEDICINE

## 2020-04-21 PROCEDURE — 80048 BASIC METABOLIC PNL TOTAL CA: CPT | Performed by: INTERNAL MEDICINE

## 2020-04-21 PROCEDURE — 93005 ELECTROCARDIOGRAM TRACING: CPT | Performed by: INTERNAL MEDICINE

## 2020-04-21 PROCEDURE — 84484 ASSAY OF TROPONIN QUANT: CPT | Performed by: INTERNAL MEDICINE

## 2020-04-21 PROCEDURE — 85025 COMPLETE CBC W/AUTO DIFF WBC: CPT | Performed by: INTERNAL MEDICINE

## 2020-04-21 RX ORDER — DILTIAZEM HYDROCHLORIDE 5 MG/ML
10 INJECTION INTRAVENOUS ONCE
Status: COMPLETED | OUTPATIENT
Start: 2020-04-21 | End: 2020-04-21

## 2020-04-21 RX ADMIN — HEPARIN SODIUM 14 UNITS/KG/HR: 10000 INJECTION, SOLUTION INTRAVENOUS at 18:32

## 2020-04-21 RX ADMIN — DULOXETINE HYDROCHLORIDE 60 MG: 60 CAPSULE, DELAYED RELEASE ORAL at 08:08

## 2020-04-21 RX ADMIN — MESALAMINE 1000 MG: 250 CAPSULE ORAL at 20:03

## 2020-04-21 RX ADMIN — PANTOPRAZOLE SODIUM 40 MG: 40 TABLET, DELAYED RELEASE ORAL at 06:09

## 2020-04-21 RX ADMIN — MESALAMINE 1000 MG: 250 CAPSULE ORAL at 08:08

## 2020-04-21 RX ADMIN — SODIUM CHLORIDE, PRESERVATIVE FREE 10 ML: 5 INJECTION INTRAVENOUS at 08:23

## 2020-04-21 RX ADMIN — FOLIC ACID 1 MG: 1 TABLET ORAL at 08:08

## 2020-04-21 RX ADMIN — MESALAMINE 1000 MG: 250 CAPSULE ORAL at 12:19

## 2020-04-21 RX ADMIN — METOPROLOL TARTRATE 50 MG: 25 TABLET ORAL at 20:03

## 2020-04-21 RX ADMIN — HEPARIN SODIUM 5000 UNITS: 5000 INJECTION INTRAVENOUS; SUBCUTANEOUS at 06:55

## 2020-04-21 RX ADMIN — SODIUM CHLORIDE 10 MG/HR: 900 INJECTION, SOLUTION INTRAVENOUS at 18:32

## 2020-04-21 RX ADMIN — METOPROLOL TARTRATE 2.5 MG: 5 INJECTION, SOLUTION INTRAVENOUS at 08:17

## 2020-04-21 RX ADMIN — BUPROPION HYDROCHLORIDE 150 MG: 150 TABLET, FILM COATED, EXTENDED RELEASE ORAL at 08:08

## 2020-04-21 RX ADMIN — SODIUM CHLORIDE, PRESERVATIVE FREE 10 ML: 5 INJECTION INTRAVENOUS at 20:03

## 2020-04-21 RX ADMIN — SODIUM CHLORIDE, PRESERVATIVE FREE 10 ML: 5 INJECTION INTRAVENOUS at 08:08

## 2020-04-21 RX ADMIN — MESALAMINE 1000 MG: 250 CAPSULE ORAL at 17:29

## 2020-04-21 RX ADMIN — SODIUM CHLORIDE 10 MG/HR: 900 INJECTION, SOLUTION INTRAVENOUS at 09:23

## 2020-04-21 RX ADMIN — DILTIAZEM HYDROCHLORIDE 10 MG: 5 INJECTION INTRAVENOUS at 09:21

## 2020-04-21 RX ADMIN — LEVOTHYROXINE SODIUM 100 MCG: 100 TABLET ORAL at 06:09

## 2020-04-21 RX ADMIN — ATORVASTATIN CALCIUM 10 MG: 20 TABLET, FILM COATED ORAL at 08:08

## 2020-04-21 RX ADMIN — METOPROLOL TARTRATE 50 MG: 25 TABLET ORAL at 08:08

## 2020-04-22 LAB
ANION GAP SERPL CALCULATED.3IONS-SCNC: 11.6 MMOL/L (ref 5–15)
APTT PPP: 35.6 SECONDS (ref 22.7–35.4)
APTT PPP: 37.7 SECONDS (ref 22.7–35.4)
BASOPHILS # BLD AUTO: 0.05 10*3/MM3 (ref 0–0.2)
BASOPHILS NFR BLD AUTO: 0.3 % (ref 0–1.5)
BUN BLD-MCNC: 34 MG/DL (ref 8–23)
BUN/CREAT SERPL: 33.3 (ref 7–25)
CALCIUM SPEC-SCNC: 9.1 MG/DL (ref 8.6–10.5)
CHLORIDE SERPL-SCNC: 106 MMOL/L (ref 98–107)
CO2 SERPL-SCNC: 25.4 MMOL/L (ref 22–29)
CREAT BLD-MCNC: 1.02 MG/DL (ref 0.57–1)
DEPRECATED RDW RBC AUTO: 44 FL (ref 37–54)
EOSINOPHIL # BLD AUTO: 0.36 10*3/MM3 (ref 0–0.4)
EOSINOPHIL NFR BLD AUTO: 1.8 % (ref 0.3–6.2)
ERYTHROCYTE [DISTWIDTH] IN BLOOD BY AUTOMATED COUNT: 15 % (ref 12.3–15.4)
GFR SERPL CREATININE-BSD FRML MDRD: 53 ML/MIN/1.73
GLUCOSE BLD-MCNC: 139 MG/DL (ref 65–99)
HCT VFR BLD AUTO: 31.1 % (ref 34–46.6)
HGB BLD-MCNC: 9.9 G/DL (ref 12–15.9)
IMM GRANULOCYTES # BLD AUTO: 0.79 10*3/MM3 (ref 0–0.05)
IMM GRANULOCYTES NFR BLD AUTO: 4 % (ref 0–0.5)
LYMPHOCYTES # BLD AUTO: 1.72 10*3/MM3 (ref 0.7–3.1)
LYMPHOCYTES NFR BLD AUTO: 8.6 % (ref 19.6–45.3)
MCH RBC QN AUTO: 26.5 PG (ref 26.6–33)
MCHC RBC AUTO-ENTMCNC: 31.8 G/DL (ref 31.5–35.7)
MCV RBC AUTO: 83.4 FL (ref 79–97)
MONOCYTES # BLD AUTO: 0.98 10*3/MM3 (ref 0.1–0.9)
MONOCYTES NFR BLD AUTO: 4.9 % (ref 5–12)
NEUTROPHILS # BLD AUTO: 15.99 10*3/MM3 (ref 1.7–7)
NEUTROPHILS NFR BLD AUTO: 80.4 % (ref 42.7–76)
NRBC BLD AUTO-RTO: 0.1 /100 WBC (ref 0–0.2)
PLATELET # BLD AUTO: 410 10*3/MM3 (ref 140–450)
PMV BLD AUTO: 10.5 FL (ref 6–12)
POTASSIUM BLD-SCNC: 3.8 MMOL/L (ref 3.5–5.2)
RBC # BLD AUTO: 3.73 10*6/MM3 (ref 3.77–5.28)
SODIUM BLD-SCNC: 143 MMOL/L (ref 136–145)
WBC NRBC COR # BLD: 19.89 10*3/MM3 (ref 3.4–10.8)

## 2020-04-22 PROCEDURE — B2151ZZ FLUOROSCOPY OF LEFT HEART USING LOW OSMOLAR CONTRAST: ICD-10-PCS | Performed by: INTERNAL MEDICINE

## 2020-04-22 PROCEDURE — 25010000002 HEPARIN (PORCINE) PER 1000 UNITS: Performed by: INTERNAL MEDICINE

## 2020-04-22 PROCEDURE — 93460 R&L HRT ART/VENTRICLE ANGIO: CPT | Performed by: INTERNAL MEDICINE

## 2020-04-22 PROCEDURE — B2111ZZ FLUOROSCOPY OF MULTIPLE CORONARY ARTERIES USING LOW OSMOLAR CONTRAST: ICD-10-PCS | Performed by: INTERNAL MEDICINE

## 2020-04-22 PROCEDURE — C1769 GUIDE WIRE: HCPCS | Performed by: INTERNAL MEDICINE

## 2020-04-22 PROCEDURE — 0 IOPAMIDOL PER 1 ML: Performed by: INTERNAL MEDICINE

## 2020-04-22 PROCEDURE — 93010 ELECTROCARDIOGRAM REPORT: CPT | Performed by: INTERNAL MEDICINE

## 2020-04-22 PROCEDURE — 80048 BASIC METABOLIC PNL TOTAL CA: CPT | Performed by: INTERNAL MEDICINE

## 2020-04-22 PROCEDURE — 25010000002 AMIODARONE IN DEXTROSE 5% 360-4.14 MG/200ML-% SOLUTION: Performed by: INTERNAL MEDICINE

## 2020-04-22 PROCEDURE — C1894 INTRO/SHEATH, NON-LASER: HCPCS | Performed by: INTERNAL MEDICINE

## 2020-04-22 PROCEDURE — 85730 THROMBOPLASTIN TIME PARTIAL: CPT | Performed by: INTERNAL MEDICINE

## 2020-04-22 PROCEDURE — 25010000002 AMIODARONE IN DEXTROSE 5% 150-4.21 MG/100ML-% SOLUTION: Performed by: INTERNAL MEDICINE

## 2020-04-22 PROCEDURE — 4A023N8 MEASUREMENT OF CARDIAC SAMPLING AND PRESSURE, BILATERAL, PERCUTANEOUS APPROACH: ICD-10-PCS | Performed by: INTERNAL MEDICINE

## 2020-04-22 PROCEDURE — 25010000002 FENTANYL CITRATE (PF) 100 MCG/2ML SOLUTION: Performed by: INTERNAL MEDICINE

## 2020-04-22 PROCEDURE — 93005 ELECTROCARDIOGRAM TRACING: CPT | Performed by: INTERNAL MEDICINE

## 2020-04-22 PROCEDURE — 99152 MOD SED SAME PHYS/QHP 5/>YRS: CPT | Performed by: INTERNAL MEDICINE

## 2020-04-22 PROCEDURE — 25010000002 MIDAZOLAM PER 1 MG: Performed by: INTERNAL MEDICINE

## 2020-04-22 PROCEDURE — 85025 COMPLETE CBC W/AUTO DIFF WBC: CPT | Performed by: INTERNAL MEDICINE

## 2020-04-22 RX ORDER — ACETAMINOPHEN 325 MG/1
650 TABLET ORAL EVERY 4 HOURS PRN
Status: DISCONTINUED | OUTPATIENT
Start: 2020-04-22 | End: 2020-05-07 | Stop reason: HOSPADM

## 2020-04-22 RX ORDER — LIDOCAINE HYDROCHLORIDE 20 MG/ML
INJECTION, SOLUTION INFILTRATION; PERINEURAL AS NEEDED
Status: DISCONTINUED | OUTPATIENT
Start: 2020-04-22 | End: 2020-04-22 | Stop reason: HOSPADM

## 2020-04-22 RX ORDER — FENTANYL CITRATE 50 UG/ML
INJECTION, SOLUTION INTRAMUSCULAR; INTRAVENOUS AS NEEDED
Status: DISCONTINUED | OUTPATIENT
Start: 2020-04-22 | End: 2020-04-22 | Stop reason: HOSPADM

## 2020-04-22 RX ORDER — SODIUM CHLORIDE 9 MG/ML
INJECTION, SOLUTION INTRAVENOUS CONTINUOUS PRN
Status: COMPLETED | OUTPATIENT
Start: 2020-04-22 | End: 2020-04-22

## 2020-04-22 RX ORDER — MIDAZOLAM HYDROCHLORIDE 1 MG/ML
INJECTION INTRAMUSCULAR; INTRAVENOUS AS NEEDED
Status: DISCONTINUED | OUTPATIENT
Start: 2020-04-22 | End: 2020-04-22 | Stop reason: HOSPADM

## 2020-04-22 RX ADMIN — AMIODARONE HYDROCHLORIDE 1 MG/MIN: 1.8 INJECTION, SOLUTION INTRAVENOUS at 15:24

## 2020-04-22 RX ADMIN — METOPROLOL TARTRATE 50 MG: 25 TABLET ORAL at 20:10

## 2020-04-22 RX ADMIN — BUPROPION HYDROCHLORIDE 150 MG: 150 TABLET, FILM COATED, EXTENDED RELEASE ORAL at 09:34

## 2020-04-22 RX ADMIN — AMIODARONE HYDROCHLORIDE 0.5 MG/MIN: 1.8 INJECTION, SOLUTION INTRAVENOUS at 23:04

## 2020-04-22 RX ADMIN — AMIODARONE HYDROCHLORIDE 150 MG: 1.5 INJECTION, SOLUTION INTRAVENOUS at 15:09

## 2020-04-22 RX ADMIN — DULOXETINE HYDROCHLORIDE 60 MG: 60 CAPSULE, DELAYED RELEASE ORAL at 09:34

## 2020-04-22 RX ADMIN — ATORVASTATIN CALCIUM 10 MG: 20 TABLET, FILM COATED ORAL at 09:34

## 2020-04-22 RX ADMIN — PANTOPRAZOLE SODIUM 40 MG: 40 TABLET, DELAYED RELEASE ORAL at 06:07

## 2020-04-22 RX ADMIN — SODIUM CHLORIDE 10 MG/HR: 900 INJECTION, SOLUTION INTRAVENOUS at 02:43

## 2020-04-22 RX ADMIN — MESALAMINE 1000 MG: 250 CAPSULE ORAL at 20:10

## 2020-04-22 RX ADMIN — HEPARIN SODIUM 11 UNITS/KG/HR: 10000 INJECTION, SOLUTION INTRAVENOUS at 20:19

## 2020-04-22 RX ADMIN — METOPROLOL TARTRATE 50 MG: 25 TABLET ORAL at 09:34

## 2020-04-22 RX ADMIN — LEVOTHYROXINE SODIUM 100 MCG: 100 TABLET ORAL at 06:07

## 2020-04-22 RX ADMIN — MESALAMINE 1000 MG: 250 CAPSULE ORAL at 09:34

## 2020-04-22 RX ADMIN — FOLIC ACID 1 MG: 1 TABLET ORAL at 09:34

## 2020-04-22 RX ADMIN — SODIUM CHLORIDE, PRESERVATIVE FREE 10 ML: 5 INJECTION INTRAVENOUS at 20:16

## 2020-04-22 RX ADMIN — MESALAMINE 1000 MG: 250 CAPSULE ORAL at 18:42

## 2020-04-23 ENCOUNTER — APPOINTMENT (OUTPATIENT)
Dept: CARDIOLOGY | Facility: HOSPITAL | Age: 75
End: 2020-04-23

## 2020-04-23 ENCOUNTER — APPOINTMENT (OUTPATIENT)
Dept: GENERAL RADIOLOGY | Facility: HOSPITAL | Age: 75
End: 2020-04-23

## 2020-04-23 LAB
ANION GAP SERPL CALCULATED.3IONS-SCNC: 13.9 MMOL/L (ref 5–15)
APTT PPP: 33.6 SECONDS (ref 22.7–35.4)
APTT PPP: 38.4 SECONDS (ref 22.7–35.4)
ASCENDING AORTA: 3.5 CM
BASOPHILS # BLD AUTO: 0.08 10*3/MM3 (ref 0–0.2)
BASOPHILS NFR BLD AUTO: 0.4 % (ref 0–1.5)
BH CV ECHO MEAS - ASC AORTA: 3.5 CM
BH CV ECHO MEAS - BSA(HAYCOCK): 1.9 M^2
BH CV ECHO MEAS - BSA: 1.9 M^2
BH CV ECHO MEAS - BZI_BMI: 24.2 KILOGRAMS/M^2
BH CV ECHO MEAS - BZI_METRIC_HEIGHT: 172.7 CM
BH CV ECHO MEAS - BZI_METRIC_WEIGHT: 72.1 KG
BH CV ECHO MEAS - MR MAX PG: 96 MMHG
BH CV ECHO MEAS - MR MAX VEL: 490 CM/SEC
BH CV ECHO MEAS - MR MEAN PG: 59 MMHG
BH CV ECHO MEAS - MR MEAN VEL: 363 CM/SEC
BH CV ECHO MEAS - MR VTI: 145 CM
BH CV VAS BP RIGHT ARM: NORMAL MMHG
BUN BLD-MCNC: 35 MG/DL (ref 8–23)
BUN/CREAT SERPL: 32.4 (ref 7–25)
CALCIUM SPEC-SCNC: 9.2 MG/DL (ref 8.6–10.5)
CHLORIDE SERPL-SCNC: 105 MMOL/L (ref 98–107)
CO2 SERPL-SCNC: 24.1 MMOL/L (ref 22–29)
CREAT BLD-MCNC: 1.08 MG/DL (ref 0.57–1)
DEPRECATED RDW RBC AUTO: 43.5 FL (ref 37–54)
EOSINOPHIL # BLD AUTO: 0.19 10*3/MM3 (ref 0–0.4)
EOSINOPHIL NFR BLD AUTO: 1 % (ref 0.3–6.2)
ERYTHROCYTE [DISTWIDTH] IN BLOOD BY AUTOMATED COUNT: 14.7 % (ref 12.3–15.4)
GFR SERPL CREATININE-BSD FRML MDRD: 49 ML/MIN/1.73
GLUCOSE BLD-MCNC: 145 MG/DL (ref 65–99)
HCT VFR BLD AUTO: 30.4 % (ref 34–46.6)
HGB BLD-MCNC: 9.4 G/DL (ref 12–15.9)
IMM GRANULOCYTES # BLD AUTO: 0.67 10*3/MM3 (ref 0–0.05)
IMM GRANULOCYTES NFR BLD AUTO: 3.5 % (ref 0–0.5)
LYMPHOCYTES # BLD AUTO: 1.43 10*3/MM3 (ref 0.7–3.1)
LYMPHOCYTES NFR BLD AUTO: 7.5 % (ref 19.6–45.3)
MCH RBC QN AUTO: 25.2 PG (ref 26.6–33)
MCHC RBC AUTO-ENTMCNC: 30.9 G/DL (ref 31.5–35.7)
MCV RBC AUTO: 81.5 FL (ref 79–97)
MONOCYTES # BLD AUTO: 0.88 10*3/MM3 (ref 0.1–0.9)
MONOCYTES NFR BLD AUTO: 4.6 % (ref 5–12)
MR PISA EROA: 23 CM2
MV REGURGITANT VOLUME: 33 CC
NEUTROPHILS # BLD AUTO: 15.71 10*3/MM3 (ref 1.7–7)
NEUTROPHILS NFR BLD AUTO: 83 % (ref 42.7–76)
NRBC BLD AUTO-RTO: 0.1 /100 WBC (ref 0–0.2)
PISA ALIASING VEL: 3.7 M/S
PISA RADIUS: 0.7 CM
PLATELET # BLD AUTO: 400 10*3/MM3 (ref 140–450)
PMV BLD AUTO: 9.9 FL (ref 6–12)
POTASSIUM BLD-SCNC: 4.1 MMOL/L (ref 3.5–5.2)
RBC # BLD AUTO: 3.73 10*6/MM3 (ref 3.77–5.28)
SODIUM BLD-SCNC: 143 MMOL/L (ref 136–145)
WBC NRBC COR # BLD: 18.96 10*3/MM3 (ref 3.4–10.8)

## 2020-04-23 PROCEDURE — 99152 MOD SED SAME PHYS/QHP 5/>YRS: CPT

## 2020-04-23 PROCEDURE — 76376 3D RENDER W/INTRP POSTPROCES: CPT

## 2020-04-23 PROCEDURE — 71045 X-RAY EXAM CHEST 1 VIEW: CPT

## 2020-04-23 PROCEDURE — 93320 DOPPLER ECHO COMPLETE: CPT

## 2020-04-23 PROCEDURE — 25010000002 AMIODARONE IN DEXTROSE 5% 360-4.14 MG/200ML-% SOLUTION: Performed by: INTERNAL MEDICINE

## 2020-04-23 PROCEDURE — 94640 AIRWAY INHALATION TREATMENT: CPT

## 2020-04-23 PROCEDURE — 85730 THROMBOPLASTIN TIME PARTIAL: CPT | Performed by: INTERNAL MEDICINE

## 2020-04-23 PROCEDURE — 94799 UNLISTED PULMONARY SVC/PX: CPT

## 2020-04-23 PROCEDURE — 25010000002 FENTANYL CITRATE (PF) 100 MCG/2ML SOLUTION: Performed by: INTERNAL MEDICINE

## 2020-04-23 PROCEDURE — 93312 ECHO TRANSESOPHAGEAL: CPT

## 2020-04-23 PROCEDURE — 85025 COMPLETE CBC W/AUTO DIFF WBC: CPT | Performed by: INTERNAL MEDICINE

## 2020-04-23 PROCEDURE — 80048 BASIC METABOLIC PNL TOTAL CA: CPT | Performed by: INTERNAL MEDICINE

## 2020-04-23 PROCEDURE — 93325 DOPPLER ECHO COLOR FLOW MAPG: CPT | Performed by: INTERNAL MEDICINE

## 2020-04-23 PROCEDURE — 25010000002 HEPARIN (PORCINE) PER 1000 UNITS: Performed by: INTERNAL MEDICINE

## 2020-04-23 PROCEDURE — 25010000002 MIDAZOLAM PER 1 MG: Performed by: INTERNAL MEDICINE

## 2020-04-23 PROCEDURE — 76376 3D RENDER W/INTRP POSTPROCES: CPT | Performed by: INTERNAL MEDICINE

## 2020-04-23 PROCEDURE — 99153 MOD SED SAME PHYS/QHP EA: CPT

## 2020-04-23 PROCEDURE — 93320 DOPPLER ECHO COMPLETE: CPT | Performed by: INTERNAL MEDICINE

## 2020-04-23 PROCEDURE — 93312 ECHO TRANSESOPHAGEAL: CPT | Performed by: INTERNAL MEDICINE

## 2020-04-23 PROCEDURE — 93325 DOPPLER ECHO COLOR FLOW MAPG: CPT

## 2020-04-23 PROCEDURE — B246ZZ4 ULTRASONOGRAPHY OF RIGHT AND LEFT HEART, TRANSESOPHAGEAL: ICD-10-PCS | Performed by: INTERNAL MEDICINE

## 2020-04-23 RX ORDER — IPRATROPIUM BROMIDE AND ALBUTEROL SULFATE 2.5; .5 MG/3ML; MG/3ML
3 SOLUTION RESPIRATORY (INHALATION) EVERY 4 HOURS PRN
Status: DISCONTINUED | OUTPATIENT
Start: 2020-04-23 | End: 2020-05-07 | Stop reason: HOSPADM

## 2020-04-23 RX ORDER — FENTANYL CITRATE 50 UG/ML
INJECTION, SOLUTION INTRAMUSCULAR; INTRAVENOUS
Status: COMPLETED | OUTPATIENT
Start: 2020-04-23 | End: 2020-04-23

## 2020-04-23 RX ORDER — SODIUM CHLORIDE 9 MG/ML
INJECTION, SOLUTION INTRAVENOUS
Status: COMPLETED | OUTPATIENT
Start: 2020-04-23 | End: 2020-04-23

## 2020-04-23 RX ORDER — LIDOCAINE HYDROCHLORIDE 20 MG/ML
SOLUTION OROPHARYNGEAL
Status: COMPLETED | OUTPATIENT
Start: 2020-04-23 | End: 2020-04-23

## 2020-04-23 RX ORDER — MIDAZOLAM HYDROCHLORIDE 1 MG/ML
INJECTION INTRAMUSCULAR; INTRAVENOUS
Status: COMPLETED | OUTPATIENT
Start: 2020-04-23 | End: 2020-04-23

## 2020-04-23 RX ADMIN — IPRATROPIUM BROMIDE AND ALBUTEROL SULFATE 3 ML: 2.5; .5 SOLUTION RESPIRATORY (INHALATION) at 06:44

## 2020-04-23 RX ADMIN — METOPROLOL TARTRATE 50 MG: 25 TABLET ORAL at 20:40

## 2020-04-23 RX ADMIN — MIDAZOLAM 2 MG: 1 INJECTION INTRAMUSCULAR; INTRAVENOUS at 11:45

## 2020-04-23 RX ADMIN — MIDAZOLAM 2 MG: 1 INJECTION INTRAMUSCULAR; INTRAVENOUS at 11:48

## 2020-04-23 RX ADMIN — LIDOCAINE HYDROCHLORIDE 10 ML: 20 SOLUTION ORAL; TOPICAL at 11:22

## 2020-04-23 RX ADMIN — METOPROLOL TARTRATE 2.5 MG: 5 INJECTION, SOLUTION INTRAVENOUS at 04:44

## 2020-04-23 RX ADMIN — MESALAMINE 1000 MG: 250 CAPSULE ORAL at 17:12

## 2020-04-23 RX ADMIN — FOLIC ACID 1 MG: 1 TABLET ORAL at 17:12

## 2020-04-23 RX ADMIN — HEPARIN SODIUM 15 UNITS/KG/HR: 10000 INJECTION, SOLUTION INTRAVENOUS at 04:23

## 2020-04-23 RX ADMIN — METOPROLOL TARTRATE 50 MG: 25 TABLET ORAL at 07:13

## 2020-04-23 RX ADMIN — AMIODARONE HYDROCHLORIDE 0.5 MG/MIN: 1.8 INJECTION, SOLUTION INTRAVENOUS at 12:53

## 2020-04-23 RX ADMIN — LEVOTHYROXINE SODIUM 100 MCG: 100 TABLET ORAL at 06:12

## 2020-04-23 RX ADMIN — ATORVASTATIN CALCIUM 10 MG: 20 TABLET, FILM COATED ORAL at 17:12

## 2020-04-23 RX ADMIN — DULOXETINE HYDROCHLORIDE 60 MG: 60 CAPSULE, DELAYED RELEASE ORAL at 17:12

## 2020-04-23 RX ADMIN — SODIUM CHLORIDE, PRESERVATIVE FREE 10 ML: 5 INJECTION INTRAVENOUS at 07:44

## 2020-04-23 RX ADMIN — MESALAMINE 1000 MG: 250 CAPSULE ORAL at 20:39

## 2020-04-23 RX ADMIN — FENTANYL CITRATE 25 MCG: 50 INJECTION INTRAMUSCULAR; INTRAVENOUS at 11:45

## 2020-04-23 RX ADMIN — SODIUM CHLORIDE, PRESERVATIVE FREE 10 ML: 5 INJECTION INTRAVENOUS at 20:40

## 2020-04-23 RX ADMIN — BENZOCAINE, BUTAMBEN, AND TETRACAINE HYDROCHLORIDE 1 SPRAY: .028; .004; .004 AEROSOL, SPRAY TOPICAL at 11:25

## 2020-04-23 RX ADMIN — PANTOPRAZOLE SODIUM 40 MG: 40 TABLET, DELAYED RELEASE ORAL at 06:12

## 2020-04-23 RX ADMIN — FENTANYL CITRATE 25 MCG: 50 INJECTION INTRAMUSCULAR; INTRAVENOUS at 11:48

## 2020-04-23 RX ADMIN — BUPROPION HYDROCHLORIDE 150 MG: 150 TABLET, FILM COATED, EXTENDED RELEASE ORAL at 17:12

## 2020-04-23 RX ADMIN — SODIUM CHLORIDE 50 ML/HR: 9 INJECTION, SOLUTION INTRAVENOUS at 11:21

## 2020-04-24 LAB
ANION GAP SERPL CALCULATED.3IONS-SCNC: 11.2 MMOL/L (ref 5–15)
APTT PPP: 181.4 SECONDS (ref 22.7–35.4)
APTT PPP: 33.9 SECONDS (ref 22.7–35.4)
APTT PPP: 35.1 SECONDS (ref 22.7–35.4)
BACTERIA SPEC AEROBE CULT: NORMAL
BACTERIA SPEC AEROBE CULT: NORMAL
BASOPHILS # BLD AUTO: 0.04 10*3/MM3 (ref 0–0.2)
BASOPHILS NFR BLD AUTO: 0.2 % (ref 0–1.5)
BUN BLD-MCNC: 33 MG/DL (ref 8–23)
BUN/CREAT SERPL: 33.7 (ref 7–25)
CALCIUM SPEC-SCNC: 9.1 MG/DL (ref 8.6–10.5)
CHLORIDE SERPL-SCNC: 101 MMOL/L (ref 98–107)
CO2 SERPL-SCNC: 27.8 MMOL/L (ref 22–29)
CREAT BLD-MCNC: 0.98 MG/DL (ref 0.57–1)
DEPRECATED RDW RBC AUTO: 43.1 FL (ref 37–54)
EOSINOPHIL # BLD AUTO: 0.3 10*3/MM3 (ref 0–0.4)
EOSINOPHIL NFR BLD AUTO: 1.7 % (ref 0.3–6.2)
ERYTHROCYTE [DISTWIDTH] IN BLOOD BY AUTOMATED COUNT: 15 % (ref 12.3–15.4)
GFR SERPL CREATININE-BSD FRML MDRD: 55 ML/MIN/1.73
GLUCOSE BLD-MCNC: 136 MG/DL (ref 65–99)
HCT VFR BLD AUTO: 30.3 % (ref 34–46.6)
HGB BLD-MCNC: 9.3 G/DL (ref 12–15.9)
IMM GRANULOCYTES # BLD AUTO: 0.49 10*3/MM3 (ref 0–0.05)
IMM GRANULOCYTES NFR BLD AUTO: 2.7 % (ref 0–0.5)
INR PPP: 1.13 (ref 0.9–1.1)
LYMPHOCYTES # BLD AUTO: 1.27 10*3/MM3 (ref 0.7–3.1)
LYMPHOCYTES NFR BLD AUTO: 7.1 % (ref 19.6–45.3)
MCH RBC QN AUTO: 24.6 PG (ref 26.6–33)
MCHC RBC AUTO-ENTMCNC: 30.7 G/DL (ref 31.5–35.7)
MCV RBC AUTO: 80.2 FL (ref 79–97)
MONOCYTES # BLD AUTO: 0.99 10*3/MM3 (ref 0.1–0.9)
MONOCYTES NFR BLD AUTO: 5.5 % (ref 5–12)
NEUTROPHILS # BLD AUTO: 14.85 10*3/MM3 (ref 1.7–7)
NEUTROPHILS NFR BLD AUTO: 82.8 % (ref 42.7–76)
NRBC BLD AUTO-RTO: 0.1 /100 WBC (ref 0–0.2)
PLATELET # BLD AUTO: 380 10*3/MM3 (ref 140–450)
PMV BLD AUTO: 10.4 FL (ref 6–12)
POTASSIUM BLD-SCNC: 3.7 MMOL/L (ref 3.5–5.2)
PROTHROMBIN TIME: 14.2 SECONDS (ref 11.7–14.2)
RBC # BLD AUTO: 3.78 10*6/MM3 (ref 3.77–5.28)
SODIUM BLD-SCNC: 140 MMOL/L (ref 136–145)
WBC NRBC COR # BLD: 17.94 10*3/MM3 (ref 3.4–10.8)

## 2020-04-24 PROCEDURE — 93010 ELECTROCARDIOGRAM REPORT: CPT | Performed by: INTERNAL MEDICINE

## 2020-04-24 PROCEDURE — 36415 COLL VENOUS BLD VENIPUNCTURE: CPT | Performed by: INTERNAL MEDICINE

## 2020-04-24 PROCEDURE — 80048 BASIC METABOLIC PNL TOTAL CA: CPT | Performed by: INTERNAL MEDICINE

## 2020-04-24 PROCEDURE — 25010000002 HEPARIN (PORCINE) PER 1000 UNITS: Performed by: INTERNAL MEDICINE

## 2020-04-24 PROCEDURE — 99232 SBSQ HOSP IP/OBS MODERATE 35: CPT | Performed by: INTERNAL MEDICINE

## 2020-04-24 PROCEDURE — 85730 THROMBOPLASTIN TIME PARTIAL: CPT | Performed by: INTERNAL MEDICINE

## 2020-04-24 PROCEDURE — 25010000002 AMIODARONE IN DEXTROSE 5% 150-4.21 MG/100ML-% SOLUTION: Performed by: INTERNAL MEDICINE

## 2020-04-24 PROCEDURE — 85025 COMPLETE CBC W/AUTO DIFF WBC: CPT | Performed by: INTERNAL MEDICINE

## 2020-04-24 PROCEDURE — 25010000002 AMIODARONE IN DEXTROSE 5% 360-4.14 MG/200ML-% SOLUTION: Performed by: NURSE PRACTITIONER

## 2020-04-24 PROCEDURE — 93005 ELECTROCARDIOGRAM TRACING: CPT | Performed by: NURSE PRACTITIONER

## 2020-04-24 PROCEDURE — 85610 PROTHROMBIN TIME: CPT | Performed by: INTERNAL MEDICINE

## 2020-04-24 RX ORDER — WARFARIN SODIUM 5 MG/1
5 TABLET ORAL
Status: DISCONTINUED | OUTPATIENT
Start: 2020-04-24 | End: 2020-04-24

## 2020-04-24 RX ORDER — WARFARIN SODIUM 2.5 MG/1
2.5 TABLET ORAL
Status: COMPLETED | OUTPATIENT
Start: 2020-04-24 | End: 2020-04-24

## 2020-04-24 RX ADMIN — PANTOPRAZOLE SODIUM 40 MG: 40 TABLET, DELAYED RELEASE ORAL at 06:47

## 2020-04-24 RX ADMIN — FOLIC ACID 1 MG: 1 TABLET ORAL at 08:17

## 2020-04-24 RX ADMIN — ATORVASTATIN CALCIUM 10 MG: 20 TABLET, FILM COATED ORAL at 08:16

## 2020-04-24 RX ADMIN — WARFARIN 2.5 MG: 2.5 TABLET ORAL at 17:24

## 2020-04-24 RX ADMIN — BUPROPION HYDROCHLORIDE 150 MG: 150 TABLET, FILM COATED, EXTENDED RELEASE ORAL at 08:17

## 2020-04-24 RX ADMIN — LEVOTHYROXINE SODIUM 100 MCG: 100 TABLET ORAL at 05:22

## 2020-04-24 RX ADMIN — HEPARIN SODIUM 18 UNITS/KG/HR: 10000 INJECTION, SOLUTION INTRAVENOUS at 13:59

## 2020-04-24 RX ADMIN — AMIODARONE HYDROCHLORIDE 150 MG: 1.5 INJECTION, SOLUTION INTRAVENOUS at 11:44

## 2020-04-24 RX ADMIN — METOPROLOL TARTRATE 50 MG: 25 TABLET ORAL at 20:38

## 2020-04-24 RX ADMIN — AMIODARONE HYDROCHLORIDE 0.5 MG/MIN: 1.8 INJECTION, SOLUTION INTRAVENOUS at 01:29

## 2020-04-24 RX ADMIN — MESALAMINE 1000 MG: 250 CAPSULE ORAL at 17:24

## 2020-04-24 RX ADMIN — DULOXETINE HYDROCHLORIDE 60 MG: 60 CAPSULE, DELAYED RELEASE ORAL at 08:17

## 2020-04-24 RX ADMIN — HEPARIN SODIUM 5400 UNITS: 5000 INJECTION INTRAVENOUS; SUBCUTANEOUS at 13:55

## 2020-04-24 RX ADMIN — MESALAMINE 1000 MG: 250 CAPSULE ORAL at 20:38

## 2020-04-24 RX ADMIN — METOPROLOL TARTRATE 50 MG: 25 TABLET ORAL at 08:17

## 2020-04-24 RX ADMIN — AMIODARONE HYDROCHLORIDE 0.5 MG/MIN: 1.8 INJECTION, SOLUTION INTRAVENOUS at 14:06

## 2020-04-24 RX ADMIN — MESALAMINE 1000 MG: 250 CAPSULE ORAL at 11:44

## 2020-04-24 RX ADMIN — SODIUM CHLORIDE, PRESERVATIVE FREE 10 ML: 5 INJECTION INTRAVENOUS at 08:16

## 2020-04-24 RX ADMIN — MESALAMINE 1000 MG: 250 CAPSULE ORAL at 08:17

## 2020-04-25 LAB
ANION GAP SERPL CALCULATED.3IONS-SCNC: 9.6 MMOL/L (ref 5–15)
APTT PPP: 123.7 SECONDS (ref 22.7–35.4)
APTT PPP: 84.1 SECONDS (ref 22.7–35.4)
BASOPHILS # BLD AUTO: 0.05 10*3/MM3 (ref 0–0.2)
BASOPHILS NFR BLD AUTO: 0.3 % (ref 0–1.5)
BUN BLD-MCNC: 27 MG/DL (ref 8–23)
BUN/CREAT SERPL: 31.8 (ref 7–25)
CALCIUM SPEC-SCNC: 8.8 MG/DL (ref 8.6–10.5)
CHLORIDE SERPL-SCNC: 102 MMOL/L (ref 98–107)
CO2 SERPL-SCNC: 27.4 MMOL/L (ref 22–29)
CREAT BLD-MCNC: 0.85 MG/DL (ref 0.57–1)
DEPRECATED RDW RBC AUTO: 42.9 FL (ref 37–54)
EOSINOPHIL # BLD AUTO: 0.51 10*3/MM3 (ref 0–0.4)
EOSINOPHIL NFR BLD AUTO: 2.7 % (ref 0.3–6.2)
ERYTHROCYTE [DISTWIDTH] IN BLOOD BY AUTOMATED COUNT: 14.7 % (ref 12.3–15.4)
GFR SERPL CREATININE-BSD FRML MDRD: 65 ML/MIN/1.73
GLUCOSE BLD-MCNC: 134 MG/DL (ref 65–99)
HCT VFR BLD AUTO: 29.1 % (ref 34–46.6)
HGB BLD-MCNC: 8.9 G/DL (ref 12–15.9)
IMM GRANULOCYTES # BLD AUTO: 0.5 10*3/MM3 (ref 0–0.05)
IMM GRANULOCYTES NFR BLD AUTO: 2.7 % (ref 0–0.5)
INR PPP: 1.16 (ref 0.9–1.1)
LYMPHOCYTES # BLD AUTO: 1.32 10*3/MM3 (ref 0.7–3.1)
LYMPHOCYTES NFR BLD AUTO: 7 % (ref 19.6–45.3)
MCH RBC QN AUTO: 24.4 PG (ref 26.6–33)
MCHC RBC AUTO-ENTMCNC: 30.6 G/DL (ref 31.5–35.7)
MCV RBC AUTO: 79.7 FL (ref 79–97)
MONOCYTES # BLD AUTO: 1.03 10*3/MM3 (ref 0.1–0.9)
MONOCYTES NFR BLD AUTO: 5.5 % (ref 5–12)
NEUTROPHILS # BLD AUTO: 15.42 10*3/MM3 (ref 1.7–7)
NEUTROPHILS NFR BLD AUTO: 81.8 % (ref 42.7–76)
NRBC BLD AUTO-RTO: 0.1 /100 WBC (ref 0–0.2)
PLATELET # BLD AUTO: 305 10*3/MM3 (ref 140–450)
PMV BLD AUTO: 10.9 FL (ref 6–12)
POTASSIUM BLD-SCNC: 3.3 MMOL/L (ref 3.5–5.2)
PROTHROMBIN TIME: 14.5 SECONDS (ref 11.7–14.2)
RBC # BLD AUTO: 3.65 10*6/MM3 (ref 3.77–5.28)
SODIUM BLD-SCNC: 139 MMOL/L (ref 136–145)
WBC NRBC COR # BLD: 18.83 10*3/MM3 (ref 3.4–10.8)

## 2020-04-25 PROCEDURE — 92960 CARDIOVERSION ELECTRIC EXT: CPT | Performed by: INTERNAL MEDICINE

## 2020-04-25 PROCEDURE — 5A2204Z RESTORATION OF CARDIAC RHYTHM, SINGLE: ICD-10-PCS | Performed by: INTERNAL MEDICINE

## 2020-04-25 PROCEDURE — 85610 PROTHROMBIN TIME: CPT | Performed by: INTERNAL MEDICINE

## 2020-04-25 PROCEDURE — 25010000002 AMIODARONE IN DEXTROSE 5% 360-4.14 MG/200ML-% SOLUTION: Performed by: NURSE PRACTITIONER

## 2020-04-25 PROCEDURE — 85025 COMPLETE CBC W/AUTO DIFF WBC: CPT | Performed by: INTERNAL MEDICINE

## 2020-04-25 PROCEDURE — 85730 THROMBOPLASTIN TIME PARTIAL: CPT | Performed by: INTERNAL MEDICINE

## 2020-04-25 PROCEDURE — 99232 SBSQ HOSP IP/OBS MODERATE 35: CPT | Performed by: INTERNAL MEDICINE

## 2020-04-25 PROCEDURE — 80048 BASIC METABOLIC PNL TOTAL CA: CPT | Performed by: INTERNAL MEDICINE

## 2020-04-25 PROCEDURE — 25010000003 POTASSIUM CHLORIDE 10 MEQ/100ML SOLUTION: Performed by: INTERNAL MEDICINE

## 2020-04-25 PROCEDURE — 25010000002 HEPARIN (PORCINE) PER 1000 UNITS: Performed by: INTERNAL MEDICINE

## 2020-04-25 RX ORDER — POTASSIUM CHLORIDE 7.45 MG/ML
10 INJECTION INTRAVENOUS
Status: COMPLETED | OUTPATIENT
Start: 2020-04-25 | End: 2020-04-25

## 2020-04-25 RX ORDER — AMIODARONE HYDROCHLORIDE 200 MG/1
200 TABLET ORAL EVERY 12 HOURS SCHEDULED
Status: DISCONTINUED | OUTPATIENT
Start: 2020-04-25 | End: 2020-05-03

## 2020-04-25 RX ORDER — SODIUM CHLORIDE 9 MG/ML
INJECTION, SOLUTION INTRAVENOUS CONTINUOUS PRN
Status: COMPLETED | OUTPATIENT
Start: 2020-04-25 | End: 2020-04-25

## 2020-04-25 RX ORDER — WARFARIN SODIUM 5 MG/1
5 TABLET ORAL
Status: COMPLETED | OUTPATIENT
Start: 2020-04-25 | End: 2020-04-25

## 2020-04-25 RX ORDER — LOPERAMIDE HYDROCHLORIDE 2 MG/1
2 CAPSULE ORAL 4 TIMES DAILY PRN
Status: DISCONTINUED | OUTPATIENT
Start: 2020-04-25 | End: 2020-05-01

## 2020-04-25 RX ADMIN — LOPERAMIDE HYDROCHLORIDE 2 MG: 2 CAPSULE ORAL at 20:09

## 2020-04-25 RX ADMIN — MESALAMINE 1000 MG: 250 CAPSULE ORAL at 08:04

## 2020-04-25 RX ADMIN — HEPARIN SODIUM 13 UNITS/KG/HR: 10000 INJECTION, SOLUTION INTRAVENOUS at 14:50

## 2020-04-25 RX ADMIN — AMIODARONE HYDROCHLORIDE 0.5 MG/MIN: 1.8 INJECTION, SOLUTION INTRAVENOUS at 03:26

## 2020-04-25 RX ADMIN — WARFARIN SODIUM 5 MG: 5 TABLET ORAL at 17:54

## 2020-04-25 RX ADMIN — SODIUM CHLORIDE, PRESERVATIVE FREE 10 ML: 5 INJECTION INTRAVENOUS at 20:10

## 2020-04-25 RX ADMIN — LEVOTHYROXINE SODIUM 100 MCG: 100 TABLET ORAL at 06:08

## 2020-04-25 RX ADMIN — AMIODARONE HYDROCHLORIDE 200 MG: 200 TABLET ORAL at 20:09

## 2020-04-25 RX ADMIN — FOLIC ACID 1 MG: 1 TABLET ORAL at 08:04

## 2020-04-25 RX ADMIN — MESALAMINE 1000 MG: 250 CAPSULE ORAL at 17:55

## 2020-04-25 RX ADMIN — MESALAMINE 1000 MG: 250 CAPSULE ORAL at 14:19

## 2020-04-25 RX ADMIN — SODIUM CHLORIDE, PRESERVATIVE FREE 10 ML: 5 INJECTION INTRAVENOUS at 08:08

## 2020-04-25 RX ADMIN — LOPERAMIDE HYDROCHLORIDE 2 MG: 2 CAPSULE ORAL at 14:19

## 2020-04-25 RX ADMIN — POTASSIUM CHLORIDE 10 MEQ: 7.46 INJECTION, SOLUTION INTRAVENOUS at 14:48

## 2020-04-25 RX ADMIN — DULOXETINE HYDROCHLORIDE 60 MG: 60 CAPSULE, DELAYED RELEASE ORAL at 08:04

## 2020-04-25 RX ADMIN — METOPROLOL TARTRATE 50 MG: 25 TABLET ORAL at 20:09

## 2020-04-25 RX ADMIN — MESALAMINE 1000 MG: 250 CAPSULE ORAL at 20:09

## 2020-04-25 RX ADMIN — ATORVASTATIN CALCIUM 10 MG: 20 TABLET, FILM COATED ORAL at 08:04

## 2020-04-25 RX ADMIN — POTASSIUM CHLORIDE 10 MEQ: 7.46 INJECTION, SOLUTION INTRAVENOUS at 15:45

## 2020-04-25 RX ADMIN — AMIODARONE HYDROCHLORIDE 200 MG: 200 TABLET ORAL at 16:57

## 2020-04-25 RX ADMIN — PANTOPRAZOLE SODIUM 40 MG: 40 TABLET, DELAYED RELEASE ORAL at 06:08

## 2020-04-25 RX ADMIN — BUPROPION HYDROCHLORIDE 150 MG: 150 TABLET, FILM COATED, EXTENDED RELEASE ORAL at 08:04

## 2020-04-25 RX ADMIN — METOPROLOL TARTRATE 50 MG: 25 TABLET ORAL at 08:04

## 2020-04-26 ENCOUNTER — APPOINTMENT (OUTPATIENT)
Dept: GENERAL RADIOLOGY | Facility: HOSPITAL | Age: 75
End: 2020-04-26

## 2020-04-26 LAB
ANION GAP SERPL CALCULATED.3IONS-SCNC: 13.1 MMOL/L (ref 5–15)
APTT PPP: 50.9 SECONDS (ref 22.7–35.4)
APTT PPP: 91.7 SECONDS (ref 22.7–35.4)
APTT PPP: 99.9 SECONDS (ref 22.7–35.4)
BASOPHILS # BLD AUTO: 0.04 10*3/MM3 (ref 0–0.2)
BASOPHILS NFR BLD AUTO: 0.2 % (ref 0–1.5)
BUN BLD-MCNC: 24 MG/DL (ref 8–23)
BUN/CREAT SERPL: 23.5 (ref 7–25)
CALCIUM SPEC-SCNC: 8.5 MG/DL (ref 8.6–10.5)
CHLORIDE SERPL-SCNC: 100 MMOL/L (ref 98–107)
CO2 SERPL-SCNC: 25.9 MMOL/L (ref 22–29)
CREAT BLD-MCNC: 1.02 MG/DL (ref 0.57–1)
DEPRECATED RDW RBC AUTO: 42.3 FL (ref 37–54)
EOSINOPHIL # BLD AUTO: 0.46 10*3/MM3 (ref 0–0.4)
EOSINOPHIL NFR BLD AUTO: 2.1 % (ref 0.3–6.2)
ERYTHROCYTE [DISTWIDTH] IN BLOOD BY AUTOMATED COUNT: 14.8 % (ref 12.3–15.4)
GFR SERPL CREATININE-BSD FRML MDRD: 53 ML/MIN/1.73
GLUCOSE BLD-MCNC: 117 MG/DL (ref 65–99)
HCT VFR BLD AUTO: 28.2 % (ref 34–46.6)
HGB BLD-MCNC: 8.8 G/DL (ref 12–15.9)
IMM GRANULOCYTES # BLD AUTO: 0.62 10*3/MM3 (ref 0–0.05)
IMM GRANULOCYTES NFR BLD AUTO: 2.9 % (ref 0–0.5)
INR PPP: 1.27 (ref 0.9–1.1)
LYMPHOCYTES # BLD AUTO: 1.38 10*3/MM3 (ref 0.7–3.1)
LYMPHOCYTES NFR BLD AUTO: 6.4 % (ref 19.6–45.3)
MCH RBC QN AUTO: 24.9 PG (ref 26.6–33)
MCHC RBC AUTO-ENTMCNC: 31.2 G/DL (ref 31.5–35.7)
MCV RBC AUTO: 79.9 FL (ref 79–97)
MONOCYTES # BLD AUTO: 0.95 10*3/MM3 (ref 0.1–0.9)
MONOCYTES NFR BLD AUTO: 4.4 % (ref 5–12)
NEUTROPHILS # BLD AUTO: 18.16 10*3/MM3 (ref 1.7–7)
NEUTROPHILS NFR BLD AUTO: 84 % (ref 42.7–76)
NRBC BLD AUTO-RTO: 0 /100 WBC (ref 0–0.2)
NT-PROBNP SERPL-MCNC: 1417 PG/ML (ref 5–1800)
PLATELET # BLD AUTO: 323 10*3/MM3 (ref 140–450)
PMV BLD AUTO: 11.5 FL (ref 6–12)
POTASSIUM BLD-SCNC: 3 MMOL/L (ref 3.5–5.2)
PROCALCITONIN SERPL-MCNC: 0.13 NG/ML (ref 0.1–0.25)
PROTHROMBIN TIME: 15.6 SECONDS (ref 11.7–14.2)
RBC # BLD AUTO: 3.53 10*6/MM3 (ref 3.77–5.28)
SODIUM BLD-SCNC: 139 MMOL/L (ref 136–145)
WBC NRBC COR # BLD: 21.61 10*3/MM3 (ref 3.4–10.8)

## 2020-04-26 PROCEDURE — 83880 ASSAY OF NATRIURETIC PEPTIDE: CPT | Performed by: INTERNAL MEDICINE

## 2020-04-26 PROCEDURE — 87040 BLOOD CULTURE FOR BACTERIA: CPT | Performed by: INTERNAL MEDICINE

## 2020-04-26 PROCEDURE — 94799 UNLISTED PULMONARY SVC/PX: CPT

## 2020-04-26 PROCEDURE — 85730 THROMBOPLASTIN TIME PARTIAL: CPT | Performed by: INTERNAL MEDICINE

## 2020-04-26 PROCEDURE — 25010000002 FUROSEMIDE PER 20 MG: Performed by: INTERNAL MEDICINE

## 2020-04-26 PROCEDURE — 71046 X-RAY EXAM CHEST 2 VIEWS: CPT

## 2020-04-26 PROCEDURE — 85025 COMPLETE CBC W/AUTO DIFF WBC: CPT | Performed by: INTERNAL MEDICINE

## 2020-04-26 PROCEDURE — 84145 PROCALCITONIN (PCT): CPT | Performed by: INTERNAL MEDICINE

## 2020-04-26 PROCEDURE — 36415 COLL VENOUS BLD VENIPUNCTURE: CPT | Performed by: INTERNAL MEDICINE

## 2020-04-26 PROCEDURE — 99232 SBSQ HOSP IP/OBS MODERATE 35: CPT | Performed by: INTERNAL MEDICINE

## 2020-04-26 PROCEDURE — 80048 BASIC METABOLIC PNL TOTAL CA: CPT | Performed by: INTERNAL MEDICINE

## 2020-04-26 PROCEDURE — 25010000002 HEPARIN (PORCINE) PER 1000 UNITS: Performed by: INTERNAL MEDICINE

## 2020-04-26 PROCEDURE — 85610 PROTHROMBIN TIME: CPT | Performed by: INTERNAL MEDICINE

## 2020-04-26 RX ORDER — WARFARIN SODIUM 5 MG/1
5 TABLET ORAL
Status: COMPLETED | OUTPATIENT
Start: 2020-04-26 | End: 2020-04-26

## 2020-04-26 RX ORDER — POTASSIUM CHLORIDE 1.5 G/1.77G
40 POWDER, FOR SOLUTION ORAL AS NEEDED
Status: DISCONTINUED | OUTPATIENT
Start: 2020-04-26 | End: 2020-05-07 | Stop reason: HOSPADM

## 2020-04-26 RX ORDER — FUROSEMIDE 10 MG/ML
40 INJECTION INTRAMUSCULAR; INTRAVENOUS ONCE
Status: COMPLETED | OUTPATIENT
Start: 2020-04-26 | End: 2020-04-26

## 2020-04-26 RX ORDER — DOXYCYCLINE 100 MG/1
100 CAPSULE ORAL 2 TIMES DAILY
Status: DISCONTINUED | OUTPATIENT
Start: 2020-04-26 | End: 2020-04-30

## 2020-04-26 RX ORDER — POTASSIUM CHLORIDE 750 MG/1
40 CAPSULE, EXTENDED RELEASE ORAL ONCE
Status: COMPLETED | OUTPATIENT
Start: 2020-04-26 | End: 2020-04-26

## 2020-04-26 RX ORDER — POTASSIUM CHLORIDE 7.45 MG/ML
10 INJECTION INTRAVENOUS
Status: DISCONTINUED | OUTPATIENT
Start: 2020-04-26 | End: 2020-05-07 | Stop reason: HOSPADM

## 2020-04-26 RX ORDER — WARFARIN SODIUM 5 MG/1
5 TABLET ORAL
Status: DISCONTINUED | OUTPATIENT
Start: 2020-04-26 | End: 2020-04-26 | Stop reason: SDUPTHER

## 2020-04-26 RX ORDER — POTASSIUM CHLORIDE 750 MG/1
40 CAPSULE, EXTENDED RELEASE ORAL AS NEEDED
Status: DISCONTINUED | OUTPATIENT
Start: 2020-04-26 | End: 2020-05-07 | Stop reason: HOSPADM

## 2020-04-26 RX ADMIN — HEPARIN SODIUM 15 UNITS/KG/HR: 10000 INJECTION, SOLUTION INTRAVENOUS at 21:34

## 2020-04-26 RX ADMIN — HEPARIN SODIUM 15 UNITS/KG/HR: 10000 INJECTION, SOLUTION INTRAVENOUS at 16:14

## 2020-04-26 RX ADMIN — FUROSEMIDE 40 MG: 10 INJECTION, SOLUTION INTRAMUSCULAR; INTRAVENOUS at 16:14

## 2020-04-26 RX ADMIN — HEPARIN SODIUM 11 UNITS/KG/HR: 10000 INJECTION, SOLUTION INTRAVENOUS at 08:07

## 2020-04-26 RX ADMIN — DULOXETINE HYDROCHLORIDE 60 MG: 60 CAPSULE, DELAYED RELEASE ORAL at 08:03

## 2020-04-26 RX ADMIN — WARFARIN SODIUM 5 MG: 5 TABLET ORAL at 18:10

## 2020-04-26 RX ADMIN — POTASSIUM CHLORIDE 40 MEQ: 10 CAPSULE, COATED, EXTENDED RELEASE ORAL at 16:15

## 2020-04-26 RX ADMIN — SODIUM CHLORIDE, PRESERVATIVE FREE 10 ML: 5 INJECTION INTRAVENOUS at 16:15

## 2020-04-26 RX ADMIN — MESALAMINE 1000 MG: 250 CAPSULE ORAL at 12:20

## 2020-04-26 RX ADMIN — MESALAMINE 1000 MG: 250 CAPSULE ORAL at 18:10

## 2020-04-26 RX ADMIN — BUPROPION HYDROCHLORIDE 150 MG: 150 TABLET, FILM COATED, EXTENDED RELEASE ORAL at 08:05

## 2020-04-26 RX ADMIN — SODIUM CHLORIDE, PRESERVATIVE FREE 10 ML: 5 INJECTION INTRAVENOUS at 20:15

## 2020-04-26 RX ADMIN — LOPERAMIDE HYDROCHLORIDE 2 MG: 2 CAPSULE ORAL at 12:20

## 2020-04-26 RX ADMIN — LOPERAMIDE HYDROCHLORIDE 2 MG: 2 CAPSULE ORAL at 06:10

## 2020-04-26 RX ADMIN — AMIODARONE HYDROCHLORIDE 200 MG: 200 TABLET ORAL at 08:03

## 2020-04-26 RX ADMIN — METOPROLOL TARTRATE 50 MG: 25 TABLET ORAL at 20:14

## 2020-04-26 RX ADMIN — POTASSIUM CHLORIDE 40 MEQ: 10 CAPSULE, COATED, EXTENDED RELEASE ORAL at 20:14

## 2020-04-26 RX ADMIN — LEVOTHYROXINE SODIUM 100 MCG: 100 TABLET ORAL at 06:10

## 2020-04-26 RX ADMIN — FOLIC ACID 1 MG: 1 TABLET ORAL at 08:02

## 2020-04-26 RX ADMIN — LOPERAMIDE HYDROCHLORIDE 2 MG: 2 CAPSULE ORAL at 18:10

## 2020-04-26 RX ADMIN — ATORVASTATIN CALCIUM 10 MG: 20 TABLET, FILM COATED ORAL at 08:02

## 2020-04-26 RX ADMIN — IPRATROPIUM BROMIDE AND ALBUTEROL SULFATE 3 ML: 2.5; .5 SOLUTION RESPIRATORY (INHALATION) at 21:58

## 2020-04-26 RX ADMIN — PANTOPRAZOLE SODIUM 40 MG: 40 TABLET, DELAYED RELEASE ORAL at 06:10

## 2020-04-26 RX ADMIN — DOXYCYCLINE 100 MG: 100 CAPSULE ORAL at 22:38

## 2020-04-26 RX ADMIN — MESALAMINE 1000 MG: 250 CAPSULE ORAL at 20:14

## 2020-04-26 RX ADMIN — POTASSIUM CHLORIDE 40 MEQ: 10 CAPSULE, COATED, EXTENDED RELEASE ORAL at 16:27

## 2020-04-26 RX ADMIN — METOPROLOL TARTRATE 50 MG: 25 TABLET ORAL at 08:02

## 2020-04-26 RX ADMIN — SODIUM CHLORIDE, PRESERVATIVE FREE 10 ML: 5 INJECTION INTRAVENOUS at 08:03

## 2020-04-26 RX ADMIN — AMIODARONE HYDROCHLORIDE 200 MG: 200 TABLET ORAL at 20:14

## 2020-04-26 RX ADMIN — POTASSIUM CHLORIDE 40 MEQ: 10 CAPSULE, COATED, EXTENDED RELEASE ORAL at 12:20

## 2020-04-26 RX ADMIN — MESALAMINE 1000 MG: 250 CAPSULE ORAL at 08:02

## 2020-04-27 ENCOUNTER — APPOINTMENT (OUTPATIENT)
Dept: CT IMAGING | Facility: HOSPITAL | Age: 75
End: 2020-04-27

## 2020-04-27 PROBLEM — I48.0 PAF (PAROXYSMAL ATRIAL FIBRILLATION) (HCC): Status: ACTIVE | Noted: 2020-04-27

## 2020-04-27 PROBLEM — D72.829 LEUKOCYTOSIS: Status: ACTIVE | Noted: 2020-04-27

## 2020-04-27 PROBLEM — I21.A1 MYOCARDIAL INFARCTION TYPE 2: Status: ACTIVE | Noted: 2020-04-19

## 2020-04-27 PROBLEM — Z92.89 HISTORY OF CARDIOVERSION: Status: ACTIVE | Noted: 2020-04-27

## 2020-04-27 PROBLEM — R06.02 SOB (SHORTNESS OF BREATH): Status: ACTIVE | Noted: 2020-04-27

## 2020-04-27 PROBLEM — I27.20 PULMONARY HTN: Status: ACTIVE | Noted: 2020-04-27

## 2020-04-27 PROBLEM — Z98.890 HISTORY OF CARDIOVERSION: Status: ACTIVE | Noted: 2020-04-27

## 2020-04-27 LAB
ANION GAP SERPL CALCULATED.3IONS-SCNC: 12.9 MMOL/L (ref 5–15)
APTT PPP: 102.3 SECONDS (ref 22.7–35.4)
APTT PPP: 96.3 SECONDS (ref 22.7–35.4)
APTT PPP: 96.9 SECONDS (ref 22.7–35.4)
BASOPHILS # BLD AUTO: 0.06 10*3/MM3 (ref 0–0.2)
BASOPHILS NFR BLD AUTO: 0.3 % (ref 0–1.5)
BUN BLD-MCNC: 28 MG/DL (ref 8–23)
BUN/CREAT SERPL: 22.8 (ref 7–25)
CALCIUM SPEC-SCNC: 9.1 MG/DL (ref 8.6–10.5)
CHLORIDE SERPL-SCNC: 105 MMOL/L (ref 98–107)
CO2 SERPL-SCNC: 23.1 MMOL/L (ref 22–29)
CREAT BLD-MCNC: 1.23 MG/DL (ref 0.57–1)
DEPRECATED RDW RBC AUTO: 44.7 FL (ref 37–54)
EOSINOPHIL # BLD AUTO: 0.19 10*3/MM3 (ref 0–0.4)
EOSINOPHIL NFR BLD AUTO: 1 % (ref 0.3–6.2)
ERYTHROCYTE [DISTWIDTH] IN BLOOD BY AUTOMATED COUNT: 15.1 % (ref 12.3–15.4)
GFR SERPL CREATININE-BSD FRML MDRD: 42 ML/MIN/1.73
GLUCOSE BLD-MCNC: 138 MG/DL (ref 65–99)
HCT VFR BLD AUTO: 31.7 % (ref 34–46.6)
HGB BLD-MCNC: 9.5 G/DL (ref 12–15.9)
IMM GRANULOCYTES # BLD AUTO: 0.55 10*3/MM3 (ref 0–0.05)
IMM GRANULOCYTES NFR BLD AUTO: 2.8 % (ref 0–0.5)
INR PPP: 1.9 (ref 0.9–1.1)
LYMPHOCYTES # BLD AUTO: 1.12 10*3/MM3 (ref 0.7–3.1)
LYMPHOCYTES NFR BLD AUTO: 5.7 % (ref 19.6–45.3)
MCH RBC QN AUTO: 24.8 PG (ref 26.6–33)
MCHC RBC AUTO-ENTMCNC: 30 G/DL (ref 31.5–35.7)
MCV RBC AUTO: 82.8 FL (ref 79–97)
MONOCYTES # BLD AUTO: 0.82 10*3/MM3 (ref 0.1–0.9)
MONOCYTES NFR BLD AUTO: 4.1 % (ref 5–12)
NEUTROPHILS # BLD AUTO: 17.04 10*3/MM3 (ref 1.7–7)
NEUTROPHILS NFR BLD AUTO: 86.1 % (ref 42.7–76)
NRBC BLD AUTO-RTO: 0.1 /100 WBC (ref 0–0.2)
PLATELET # BLD AUTO: 323 10*3/MM3 (ref 140–450)
PMV BLD AUTO: 10.2 FL (ref 6–12)
POTASSIUM BLD-SCNC: 5.4 MMOL/L (ref 3.5–5.2)
PROTHROMBIN TIME: 21.5 SECONDS (ref 11.7–14.2)
RBC # BLD AUTO: 3.83 10*6/MM3 (ref 3.77–5.28)
SODIUM BLD-SCNC: 141 MMOL/L (ref 136–145)
WBC NRBC COR # BLD: 19.78 10*3/MM3 (ref 3.4–10.8)

## 2020-04-27 PROCEDURE — 99233 SBSQ HOSP IP/OBS HIGH 50: CPT | Performed by: INTERNAL MEDICINE

## 2020-04-27 PROCEDURE — 80048 BASIC METABOLIC PNL TOTAL CA: CPT | Performed by: INTERNAL MEDICINE

## 2020-04-27 PROCEDURE — 97162 PT EVAL MOD COMPLEX 30 MIN: CPT

## 2020-04-27 PROCEDURE — 85730 THROMBOPLASTIN TIME PARTIAL: CPT | Performed by: INTERNAL MEDICINE

## 2020-04-27 PROCEDURE — 0 DIATRIZOATE MEGLUMINE & SODIUM PER 1 ML: Performed by: INTERNAL MEDICINE

## 2020-04-27 PROCEDURE — 74177 CT ABD & PELVIS W/CONTRAST: CPT

## 2020-04-27 PROCEDURE — 36415 COLL VENOUS BLD VENIPUNCTURE: CPT | Performed by: INTERNAL MEDICINE

## 2020-04-27 PROCEDURE — 25010000002 HEPARIN (PORCINE) PER 1000 UNITS: Performed by: INTERNAL MEDICINE

## 2020-04-27 PROCEDURE — 97110 THERAPEUTIC EXERCISES: CPT

## 2020-04-27 PROCEDURE — 85025 COMPLETE CBC W/AUTO DIFF WBC: CPT | Performed by: INTERNAL MEDICINE

## 2020-04-27 PROCEDURE — 25010000002 IOPAMIDOL 61 % SOLUTION: Performed by: INTERNAL MEDICINE

## 2020-04-27 PROCEDURE — 71250 CT THORAX DX C-: CPT

## 2020-04-27 PROCEDURE — 85610 PROTHROMBIN TIME: CPT | Performed by: INTERNAL MEDICINE

## 2020-04-27 RX ORDER — WARFARIN SODIUM 2.5 MG/1
2.5 TABLET ORAL
Status: COMPLETED | OUTPATIENT
Start: 2020-04-27 | End: 2020-04-27

## 2020-04-27 RX ADMIN — AMIODARONE HYDROCHLORIDE 200 MG: 200 TABLET ORAL at 20:00

## 2020-04-27 RX ADMIN — AMIODARONE HYDROCHLORIDE 200 MG: 200 TABLET ORAL at 08:03

## 2020-04-27 RX ADMIN — METOPROLOL TARTRATE 50 MG: 25 TABLET ORAL at 20:01

## 2020-04-27 RX ADMIN — DOXYCYCLINE 100 MG: 100 CAPSULE ORAL at 20:00

## 2020-04-27 RX ADMIN — LOPERAMIDE HYDROCHLORIDE 2 MG: 2 CAPSULE ORAL at 08:03

## 2020-04-27 RX ADMIN — MESALAMINE 1000 MG: 250 CAPSULE ORAL at 11:42

## 2020-04-27 RX ADMIN — BUPROPION HYDROCHLORIDE 150 MG: 150 TABLET, FILM COATED, EXTENDED RELEASE ORAL at 08:03

## 2020-04-27 RX ADMIN — SODIUM CHLORIDE, PRESERVATIVE FREE 10 ML: 5 INJECTION INTRAVENOUS at 20:00

## 2020-04-27 RX ADMIN — DIATRIZOATE MEGLUMINE AND DIATRIZOATE SODIUM 30 ML: 600; 100 SOLUTION ORAL; RECTAL at 14:29

## 2020-04-27 RX ADMIN — IOPAMIDOL 85 ML: 612 INJECTION, SOLUTION INTRAVENOUS at 16:09

## 2020-04-27 RX ADMIN — PANTOPRAZOLE SODIUM 40 MG: 40 TABLET, DELAYED RELEASE ORAL at 06:09

## 2020-04-27 RX ADMIN — LEVOTHYROXINE SODIUM 100 MCG: 100 TABLET ORAL at 06:09

## 2020-04-27 RX ADMIN — MESALAMINE 1000 MG: 250 CAPSULE ORAL at 08:03

## 2020-04-27 RX ADMIN — MESALAMINE 1000 MG: 250 CAPSULE ORAL at 16:54

## 2020-04-27 RX ADMIN — LOPERAMIDE HYDROCHLORIDE 2 MG: 2 CAPSULE ORAL at 20:00

## 2020-04-27 RX ADMIN — DULOXETINE HYDROCHLORIDE 60 MG: 60 CAPSULE, DELAYED RELEASE ORAL at 08:03

## 2020-04-27 RX ADMIN — FOLIC ACID 1 MG: 1 TABLET ORAL at 08:03

## 2020-04-27 RX ADMIN — DOXYCYCLINE 100 MG: 100 CAPSULE ORAL at 08:03

## 2020-04-27 RX ADMIN — ATORVASTATIN CALCIUM 10 MG: 20 TABLET, FILM COATED ORAL at 08:02

## 2020-04-27 RX ADMIN — HEPARIN SODIUM 9 UNITS/KG/HR: 10000 INJECTION, SOLUTION INTRAVENOUS at 23:13

## 2020-04-27 RX ADMIN — METOPROLOL TARTRATE 50 MG: 25 TABLET ORAL at 08:03

## 2020-04-27 RX ADMIN — MESALAMINE 1000 MG: 250 CAPSULE ORAL at 20:01

## 2020-04-27 RX ADMIN — SODIUM CHLORIDE, PRESERVATIVE FREE 10 ML: 5 INJECTION INTRAVENOUS at 08:03

## 2020-04-27 RX ADMIN — WARFARIN 2.5 MG: 2.5 TABLET ORAL at 16:49

## 2020-04-28 LAB
ANION GAP SERPL CALCULATED.3IONS-SCNC: 10.8 MMOL/L (ref 5–15)
APTT PPP: 75.5 SECONDS (ref 22.7–35.4)
BASOPHILS # BLD AUTO: 0.06 10*3/MM3 (ref 0–0.2)
BASOPHILS NFR BLD AUTO: 0.4 % (ref 0–1.5)
BUN BLD-MCNC: 26 MG/DL (ref 8–23)
BUN/CREAT SERPL: 26.5 (ref 7–25)
CALCIUM SPEC-SCNC: 9.1 MG/DL (ref 8.6–10.5)
CHLORIDE SERPL-SCNC: 97 MMOL/L (ref 98–107)
CO2 SERPL-SCNC: 25.2 MMOL/L (ref 22–29)
CREAT BLD-MCNC: 0.98 MG/DL (ref 0.57–1)
DEPRECATED RDW RBC AUTO: 41 FL (ref 37–54)
EOSINOPHIL # BLD AUTO: 0.38 10*3/MM3 (ref 0–0.4)
EOSINOPHIL NFR BLD AUTO: 2.3 % (ref 0.3–6.2)
ERYTHROCYTE [DISTWIDTH] IN BLOOD BY AUTOMATED COUNT: 14.9 % (ref 12.3–15.4)
GFR SERPL CREATININE-BSD FRML MDRD: 55 ML/MIN/1.73
GLUCOSE BLD-MCNC: 105 MG/DL (ref 65–99)
HCT VFR BLD AUTO: 28.1 % (ref 34–46.6)
HGB BLD-MCNC: 8.8 G/DL (ref 12–15.9)
IMM GRANULOCYTES # BLD AUTO: 0.47 10*3/MM3 (ref 0–0.05)
IMM GRANULOCYTES NFR BLD AUTO: 2.9 % (ref 0–0.5)
INR PPP: 2.57 (ref 0.9–1.1)
LYMPHOCYTES # BLD AUTO: 1.14 10*3/MM3 (ref 0.7–3.1)
LYMPHOCYTES NFR BLD AUTO: 7 % (ref 19.6–45.3)
MCH RBC QN AUTO: 24.4 PG (ref 26.6–33)
MCHC RBC AUTO-ENTMCNC: 31.3 G/DL (ref 31.5–35.7)
MCV RBC AUTO: 78.1 FL (ref 79–97)
MONOCYTES # BLD AUTO: 0.76 10*3/MM3 (ref 0.1–0.9)
MONOCYTES NFR BLD AUTO: 4.7 % (ref 5–12)
NEUTROPHILS # BLD AUTO: 13.49 10*3/MM3 (ref 1.7–7)
NEUTROPHILS NFR BLD AUTO: 82.7 % (ref 42.7–76)
NRBC BLD AUTO-RTO: 0 /100 WBC (ref 0–0.2)
PLATELET # BLD AUTO: 334 10*3/MM3 (ref 140–450)
PMV BLD AUTO: 11 FL (ref 6–12)
POTASSIUM BLD-SCNC: 4 MMOL/L (ref 3.5–5.2)
PROCALCITONIN SERPL-MCNC: 0.13 NG/ML (ref 0.1–0.25)
PROTHROMBIN TIME: 27.3 SECONDS (ref 11.7–14.2)
RBC # BLD AUTO: 3.6 10*6/MM3 (ref 3.77–5.28)
SODIUM BLD-SCNC: 133 MMOL/L (ref 136–145)
WBC NRBC COR # BLD: 16.3 10*3/MM3 (ref 3.4–10.8)

## 2020-04-28 PROCEDURE — 80048 BASIC METABOLIC PNL TOTAL CA: CPT | Performed by: INTERNAL MEDICINE

## 2020-04-28 PROCEDURE — 84145 PROCALCITONIN (PCT): CPT | Performed by: INTERNAL MEDICINE

## 2020-04-28 PROCEDURE — 85730 THROMBOPLASTIN TIME PARTIAL: CPT | Performed by: INTERNAL MEDICINE

## 2020-04-28 PROCEDURE — 99232 SBSQ HOSP IP/OBS MODERATE 35: CPT | Performed by: INTERNAL MEDICINE

## 2020-04-28 PROCEDURE — 99222 1ST HOSP IP/OBS MODERATE 55: CPT | Performed by: INTERNAL MEDICINE

## 2020-04-28 PROCEDURE — 97110 THERAPEUTIC EXERCISES: CPT

## 2020-04-28 PROCEDURE — 85025 COMPLETE CBC W/AUTO DIFF WBC: CPT | Performed by: INTERNAL MEDICINE

## 2020-04-28 PROCEDURE — 83993 ASSAY FOR CALPROTECTIN FECAL: CPT | Performed by: INTERNAL MEDICINE

## 2020-04-28 PROCEDURE — 85610 PROTHROMBIN TIME: CPT | Performed by: INTERNAL MEDICINE

## 2020-04-28 RX ORDER — WARFARIN SODIUM 1 MG/1
1 TABLET ORAL
Status: COMPLETED | OUTPATIENT
Start: 2020-04-28 | End: 2020-04-28

## 2020-04-28 RX ADMIN — MESALAMINE 1000 MG: 250 CAPSULE ORAL at 18:05

## 2020-04-28 RX ADMIN — LEVOTHYROXINE SODIUM 100 MCG: 100 TABLET ORAL at 06:33

## 2020-04-28 RX ADMIN — ATORVASTATIN CALCIUM 10 MG: 20 TABLET, FILM COATED ORAL at 08:30

## 2020-04-28 RX ADMIN — MESALAMINE 1000 MG: 250 CAPSULE ORAL at 12:04

## 2020-04-28 RX ADMIN — WARFARIN SODIUM 1 MG: 1 TABLET ORAL at 18:05

## 2020-04-28 RX ADMIN — FOLIC ACID 1 MG: 1 TABLET ORAL at 08:30

## 2020-04-28 RX ADMIN — AMIODARONE HYDROCHLORIDE 200 MG: 200 TABLET ORAL at 08:30

## 2020-04-28 RX ADMIN — AMIODARONE HYDROCHLORIDE 200 MG: 200 TABLET ORAL at 20:12

## 2020-04-28 RX ADMIN — PANTOPRAZOLE SODIUM 40 MG: 40 TABLET, DELAYED RELEASE ORAL at 06:33

## 2020-04-28 RX ADMIN — LOPERAMIDE HYDROCHLORIDE 2 MG: 2 CAPSULE ORAL at 12:04

## 2020-04-28 RX ADMIN — MESALAMINE 1000 MG: 250 CAPSULE ORAL at 08:30

## 2020-04-28 RX ADMIN — SODIUM CHLORIDE, PRESERVATIVE FREE 10 ML: 5 INJECTION INTRAVENOUS at 08:30

## 2020-04-28 RX ADMIN — SODIUM CHLORIDE, PRESERVATIVE FREE 10 ML: 5 INJECTION INTRAVENOUS at 20:12

## 2020-04-28 RX ADMIN — METOPROLOL TARTRATE 50 MG: 25 TABLET ORAL at 08:30

## 2020-04-28 RX ADMIN — MESALAMINE 1000 MG: 250 CAPSULE ORAL at 20:13

## 2020-04-28 RX ADMIN — DULOXETINE HYDROCHLORIDE 60 MG: 60 CAPSULE, DELAYED RELEASE ORAL at 08:30

## 2020-04-28 RX ADMIN — DOXYCYCLINE 100 MG: 100 CAPSULE ORAL at 20:12

## 2020-04-28 RX ADMIN — BUPROPION HYDROCHLORIDE 150 MG: 150 TABLET, FILM COATED, EXTENDED RELEASE ORAL at 08:30

## 2020-04-28 RX ADMIN — METOPROLOL TARTRATE 50 MG: 25 TABLET ORAL at 20:13

## 2020-04-28 RX ADMIN — DOXYCYCLINE 100 MG: 100 CAPSULE ORAL at 08:30

## 2020-04-29 ENCOUNTER — TELEPHONE (OUTPATIENT)
Dept: INTERNAL MEDICINE | Facility: CLINIC | Age: 75
End: 2020-04-29

## 2020-04-29 LAB
ADV 40+41 DNA STL QL NAA+NON-PROBE: NOT DETECTED
ANION GAP SERPL CALCULATED.3IONS-SCNC: 9.9 MMOL/L (ref 5–15)
ASTRO TYP 1-8 RNA STL QL NAA+NON-PROBE: NOT DETECTED
BASOPHILS # BLD AUTO: 0.04 10*3/MM3 (ref 0–0.2)
BASOPHILS NFR BLD AUTO: 0.2 % (ref 0–1.5)
BUN BLD-MCNC: 24 MG/DL (ref 8–23)
BUN/CREAT SERPL: 24.5 (ref 7–25)
C CAYETANENSIS DNA STL QL NAA+NON-PROBE: NOT DETECTED
C DIFF TOX GENS STL QL NAA+PROBE: NEGATIVE
CALCIUM SPEC-SCNC: 8.9 MG/DL (ref 8.6–10.5)
CAMPY SP DNA.DIARRHEA STL QL NAA+PROBE: NOT DETECTED
CHLORIDE SERPL-SCNC: 103 MMOL/L (ref 98–107)
CO2 SERPL-SCNC: 27.1 MMOL/L (ref 22–29)
CREAT BLD-MCNC: 0.98 MG/DL (ref 0.57–1)
CRYPTOSP STL CULT: NOT DETECTED
DEPRECATED RDW RBC AUTO: 41.5 FL (ref 37–54)
E COLI DNA SPEC QL NAA+PROBE: NOT DETECTED
E HISTOLYT AG STL-ACNC: NOT DETECTED
EAEC PAA PLAS AGGR+AATA ST NAA+NON-PRB: NOT DETECTED
EC STX1 + STX2 GENES STL NAA+PROBE: NOT DETECTED
EOSINOPHIL # BLD AUTO: 0.4 10*3/MM3 (ref 0–0.4)
EOSINOPHIL NFR BLD AUTO: 2.4 % (ref 0.3–6.2)
EPEC EAE GENE STL QL NAA+NON-PROBE: NOT DETECTED
ERYTHROCYTE [DISTWIDTH] IN BLOOD BY AUTOMATED COUNT: 15 % (ref 12.3–15.4)
ETEC LTA+ST1A+ST1B TOX ST NAA+NON-PROBE: NOT DETECTED
FATTY ACIDS: NORMAL
G LAMBLIA DNA SPEC QL NAA+PROBE: NOT DETECTED
GFR SERPL CREATININE-BSD FRML MDRD: 55 ML/MIN/1.73
GLUCOSE BLD-MCNC: 117 MG/DL (ref 65–99)
HCT VFR BLD AUTO: 27.9 % (ref 34–46.6)
HGB BLD-MCNC: 8.7 G/DL (ref 12–15.9)
IMM GRANULOCYTES # BLD AUTO: 0.46 10*3/MM3 (ref 0–0.05)
IMM GRANULOCYTES NFR BLD AUTO: 2.7 % (ref 0–0.5)
INR PPP: 2.41 (ref 0.9–1.1)
LACTOFERRIN STL QL LA: POSITIVE
LYMPHOCYTES # BLD AUTO: 1.08 10*3/MM3 (ref 0.7–3.1)
LYMPHOCYTES NFR BLD AUTO: 6.4 % (ref 19.6–45.3)
MCH RBC QN AUTO: 24.4 PG (ref 26.6–33)
MCHC RBC AUTO-ENTMCNC: 31.2 G/DL (ref 31.5–35.7)
MCV RBC AUTO: 78.2 FL (ref 79–97)
MONOCYTES # BLD AUTO: 0.85 10*3/MM3 (ref 0.1–0.9)
MONOCYTES NFR BLD AUTO: 5 % (ref 5–12)
NEUTRAL FATS: NORMAL
NEUTROPHILS # BLD AUTO: 14.12 10*3/MM3 (ref 1.7–7)
NEUTROPHILS NFR BLD AUTO: 83.3 % (ref 42.7–76)
NOROVIRUS GI+II RNA STL QL NAA+NON-PROBE: NOT DETECTED
NRBC BLD AUTO-RTO: 0 /100 WBC (ref 0–0.2)
P SHIGELLOIDES DNA STL QL NAA+PROBE: NOT DETECTED
PLATELET # BLD AUTO: 312 10*3/MM3 (ref 140–450)
PMV BLD AUTO: 10.2 FL (ref 6–12)
POTASSIUM BLD-SCNC: 3.7 MMOL/L (ref 3.5–5.2)
PROTHROMBIN TIME: 26 SECONDS (ref 11.7–14.2)
RBC # BLD AUTO: 3.57 10*6/MM3 (ref 3.77–5.28)
RV RNA STL NAA+PROBE: NOT DETECTED
SALMONELLA DNA SPEC QL NAA+PROBE: NOT DETECTED
SAPO I+II+IV+V RNA STL QL NAA+NON-PROBE: NOT DETECTED
SHIGELLA SP+EIEC IPAH STL QL NAA+PROBE: NOT DETECTED
SODIUM BLD-SCNC: 140 MMOL/L (ref 136–145)
V CHOLERAE DNA SPEC QL NAA+PROBE: NOT DETECTED
VIBRIO DNA SPEC NAA+PROBE: NOT DETECTED
WBC NRBC COR # BLD: 16.95 10*3/MM3 (ref 3.4–10.8)
YERSINIA STL CULT: NOT DETECTED

## 2020-04-29 PROCEDURE — 99231 SBSQ HOSP IP/OBS SF/LOW 25: CPT | Performed by: INTERNAL MEDICINE

## 2020-04-29 PROCEDURE — 99232 SBSQ HOSP IP/OBS MODERATE 35: CPT | Performed by: INTERNAL MEDICINE

## 2020-04-29 PROCEDURE — 83630 LACTOFERRIN FECAL (QUAL): CPT | Performed by: INTERNAL MEDICINE

## 2020-04-29 PROCEDURE — 97110 THERAPEUTIC EXERCISES: CPT

## 2020-04-29 PROCEDURE — 80048 BASIC METABOLIC PNL TOTAL CA: CPT | Performed by: INTERNAL MEDICINE

## 2020-04-29 PROCEDURE — 0097U HC BIOFIRE FILMARRAY GI PANEL: CPT | Performed by: INTERNAL MEDICINE

## 2020-04-29 PROCEDURE — 89125 SPECIMEN FAT STAIN: CPT | Performed by: INTERNAL MEDICINE

## 2020-04-29 PROCEDURE — 85025 COMPLETE CBC W/AUTO DIFF WBC: CPT | Performed by: INTERNAL MEDICINE

## 2020-04-29 PROCEDURE — 85610 PROTHROMBIN TIME: CPT | Performed by: INTERNAL MEDICINE

## 2020-04-29 PROCEDURE — 87493 C DIFF AMPLIFIED PROBE: CPT | Performed by: INTERNAL MEDICINE

## 2020-04-29 RX ORDER — WARFARIN SODIUM 2.5 MG/1
2.5 TABLET ORAL
Status: DISCONTINUED | OUTPATIENT
Start: 2020-04-29 | End: 2020-05-02

## 2020-04-29 RX ADMIN — AMIODARONE HYDROCHLORIDE 200 MG: 200 TABLET ORAL at 08:26

## 2020-04-29 RX ADMIN — LEVOTHYROXINE SODIUM 100 MCG: 100 TABLET ORAL at 06:20

## 2020-04-29 RX ADMIN — DULOXETINE HYDROCHLORIDE 60 MG: 60 CAPSULE, DELAYED RELEASE ORAL at 08:30

## 2020-04-29 RX ADMIN — METOPROLOL TARTRATE 50 MG: 25 TABLET ORAL at 21:00

## 2020-04-29 RX ADMIN — MESALAMINE 1000 MG: 250 CAPSULE ORAL at 08:26

## 2020-04-29 RX ADMIN — LOPERAMIDE HYDROCHLORIDE 2 MG: 2 CAPSULE ORAL at 14:05

## 2020-04-29 RX ADMIN — DOXYCYCLINE 100 MG: 100 CAPSULE ORAL at 08:26

## 2020-04-29 RX ADMIN — SODIUM CHLORIDE, PRESERVATIVE FREE 10 ML: 5 INJECTION INTRAVENOUS at 21:00

## 2020-04-29 RX ADMIN — WARFARIN 2.5 MG: 2.5 TABLET ORAL at 17:08

## 2020-04-29 RX ADMIN — METOPROLOL TARTRATE 50 MG: 25 TABLET ORAL at 08:27

## 2020-04-29 RX ADMIN — MESALAMINE 1000 MG: 250 CAPSULE ORAL at 20:55

## 2020-04-29 RX ADMIN — FOLIC ACID 1 MG: 1 TABLET ORAL at 08:26

## 2020-04-29 RX ADMIN — MESALAMINE 1000 MG: 250 CAPSULE ORAL at 17:07

## 2020-04-29 RX ADMIN — DOXYCYCLINE 100 MG: 100 CAPSULE ORAL at 20:55

## 2020-04-29 RX ADMIN — PANTOPRAZOLE SODIUM 40 MG: 40 TABLET, DELAYED RELEASE ORAL at 06:20

## 2020-04-29 RX ADMIN — ATORVASTATIN CALCIUM 10 MG: 20 TABLET, FILM COATED ORAL at 08:39

## 2020-04-29 RX ADMIN — MESALAMINE 1000 MG: 250 CAPSULE ORAL at 11:47

## 2020-04-29 RX ADMIN — SODIUM CHLORIDE, PRESERVATIVE FREE 10 ML: 5 INJECTION INTRAVENOUS at 08:27

## 2020-04-29 RX ADMIN — ACETAMINOPHEN 650 MG: 325 TABLET, FILM COATED ORAL at 14:05

## 2020-04-29 RX ADMIN — AMIODARONE HYDROCHLORIDE 200 MG: 200 TABLET ORAL at 20:55

## 2020-04-29 RX ADMIN — BUPROPION HYDROCHLORIDE 150 MG: 150 TABLET, FILM COATED, EXTENDED RELEASE ORAL at 08:30

## 2020-04-29 NOTE — TELEPHONE ENCOUNTER
DEVI AT [facility] CALLING TO GET VERBAL ORDER FOR  SKILLED NURSING AND PHYSICAL THERAPY. HUB ATTEMPTED WARM TRANSFER, UNSUCCESSFUL. ALSO WANTS TO MAKE SURE THAT THEY ARE TO REPORT PROTIME TO DR DOTY.    PLEASE CALL BACK [phone].

## 2020-04-30 LAB
ANION GAP SERPL CALCULATED.3IONS-SCNC: 11.5 MMOL/L (ref 5–15)
BASOPHILS # BLD AUTO: 0.05 10*3/MM3 (ref 0–0.2)
BASOPHILS NFR BLD AUTO: 0.4 % (ref 0–1.5)
BUN BLD-MCNC: 23 MG/DL (ref 8–23)
BUN/CREAT SERPL: 25.3 (ref 7–25)
CALCIUM SPEC-SCNC: 8.8 MG/DL (ref 8.6–10.5)
CALPROTECTIN STL-MCNT: 272 UG/G (ref 0–120)
CHLORIDE SERPL-SCNC: 102 MMOL/L (ref 98–107)
CO2 SERPL-SCNC: 26.5 MMOL/L (ref 22–29)
CREAT BLD-MCNC: 0.91 MG/DL (ref 0.57–1)
DEPRECATED RDW RBC AUTO: 42 FL (ref 37–54)
EOSINOPHIL # BLD AUTO: 0.47 10*3/MM3 (ref 0–0.4)
EOSINOPHIL NFR BLD AUTO: 3.4 % (ref 0.3–6.2)
ERYTHROCYTE [DISTWIDTH] IN BLOOD BY AUTOMATED COUNT: 15.2 % (ref 12.3–15.4)
GFR SERPL CREATININE-BSD FRML MDRD: 60 ML/MIN/1.73
GLUCOSE BLD-MCNC: 112 MG/DL (ref 65–99)
HCT VFR BLD AUTO: 26.3 % (ref 34–46.6)
HGB BLD-MCNC: 8.3 G/DL (ref 12–15.9)
IMM GRANULOCYTES # BLD AUTO: 0.39 10*3/MM3 (ref 0–0.05)
IMM GRANULOCYTES NFR BLD AUTO: 2.8 % (ref 0–0.5)
INR PPP: 2.31 (ref 0.9–1.1)
LYMPHOCYTES # BLD AUTO: 1.23 10*3/MM3 (ref 0.7–3.1)
LYMPHOCYTES NFR BLD AUTO: 8.9 % (ref 19.6–45.3)
MCH RBC QN AUTO: 24.6 PG (ref 26.6–33)
MCHC RBC AUTO-ENTMCNC: 31.6 G/DL (ref 31.5–35.7)
MCV RBC AUTO: 78 FL (ref 79–97)
MONOCYTES # BLD AUTO: 0.76 10*3/MM3 (ref 0.1–0.9)
MONOCYTES NFR BLD AUTO: 5.5 % (ref 5–12)
NEUTROPHILS # BLD AUTO: 10.91 10*3/MM3 (ref 1.7–7)
NEUTROPHILS NFR BLD AUTO: 79 % (ref 42.7–76)
NRBC BLD AUTO-RTO: 0 /100 WBC (ref 0–0.2)
PLATELET # BLD AUTO: 279 10*3/MM3 (ref 140–450)
PMV BLD AUTO: 10.9 FL (ref 6–12)
POTASSIUM BLD-SCNC: 3.4 MMOL/L (ref 3.5–5.2)
PROTHROMBIN TIME: 25.1 SECONDS (ref 11.7–14.2)
RBC # BLD AUTO: 3.37 10*6/MM3 (ref 3.77–5.28)
SODIUM BLD-SCNC: 140 MMOL/L (ref 136–145)
WBC NRBC COR # BLD: 13.81 10*3/MM3 (ref 3.4–10.8)

## 2020-04-30 PROCEDURE — 97110 THERAPEUTIC EXERCISES: CPT

## 2020-04-30 PROCEDURE — 85025 COMPLETE CBC W/AUTO DIFF WBC: CPT | Performed by: INTERNAL MEDICINE

## 2020-04-30 PROCEDURE — 80048 BASIC METABOLIC PNL TOTAL CA: CPT | Performed by: INTERNAL MEDICINE

## 2020-04-30 PROCEDURE — 99232 SBSQ HOSP IP/OBS MODERATE 35: CPT | Performed by: INTERNAL MEDICINE

## 2020-04-30 PROCEDURE — 85610 PROTHROMBIN TIME: CPT | Performed by: INTERNAL MEDICINE

## 2020-04-30 RX ADMIN — DULOXETINE HYDROCHLORIDE 60 MG: 60 CAPSULE, DELAYED RELEASE ORAL at 07:54

## 2020-04-30 RX ADMIN — METOPROLOL TARTRATE 50 MG: 25 TABLET ORAL at 07:54

## 2020-04-30 RX ADMIN — FOLIC ACID 1 MG: 1 TABLET ORAL at 07:53

## 2020-04-30 RX ADMIN — LOPERAMIDE HYDROCHLORIDE 2 MG: 2 CAPSULE ORAL at 17:26

## 2020-04-30 RX ADMIN — MESALAMINE 1000 MG: 250 CAPSULE ORAL at 12:38

## 2020-04-30 RX ADMIN — ATORVASTATIN CALCIUM 10 MG: 20 TABLET, FILM COATED ORAL at 07:53

## 2020-04-30 RX ADMIN — AMIODARONE HYDROCHLORIDE 200 MG: 200 TABLET ORAL at 20:19

## 2020-04-30 RX ADMIN — POTASSIUM CHLORIDE 40 MEQ: 10 CAPSULE, COATED, EXTENDED RELEASE ORAL at 07:47

## 2020-04-30 RX ADMIN — WARFARIN 2.5 MG: 2.5 TABLET ORAL at 17:26

## 2020-04-30 RX ADMIN — PANTOPRAZOLE SODIUM 40 MG: 40 TABLET, DELAYED RELEASE ORAL at 06:02

## 2020-04-30 RX ADMIN — MESALAMINE 1000 MG: 250 CAPSULE ORAL at 17:24

## 2020-04-30 RX ADMIN — MESALAMINE 1000 MG: 250 CAPSULE ORAL at 20:19

## 2020-04-30 RX ADMIN — AMIODARONE HYDROCHLORIDE 200 MG: 200 TABLET ORAL at 07:53

## 2020-04-30 RX ADMIN — MESALAMINE 1000 MG: 250 CAPSULE ORAL at 07:47

## 2020-04-30 RX ADMIN — METOPROLOL TARTRATE 50 MG: 25 TABLET ORAL at 20:21

## 2020-04-30 RX ADMIN — BUPROPION HYDROCHLORIDE 150 MG: 150 TABLET, FILM COATED, EXTENDED RELEASE ORAL at 07:53

## 2020-04-30 RX ADMIN — SODIUM CHLORIDE, PRESERVATIVE FREE 10 ML: 5 INJECTION INTRAVENOUS at 07:55

## 2020-04-30 RX ADMIN — DOXYCYCLINE 100 MG: 100 CAPSULE ORAL at 07:53

## 2020-04-30 RX ADMIN — LEVOTHYROXINE SODIUM 100 MCG: 100 TABLET ORAL at 06:02

## 2020-05-01 LAB
ANION GAP SERPL CALCULATED.3IONS-SCNC: 10.6 MMOL/L (ref 5–15)
BACTERIA SPEC AEROBE CULT: NORMAL
BACTERIA SPEC AEROBE CULT: NORMAL
BASOPHILS # BLD AUTO: 0.09 10*3/MM3 (ref 0–0.2)
BASOPHILS NFR BLD AUTO: 0.6 % (ref 0–1.5)
BUN BLD-MCNC: 22 MG/DL (ref 8–23)
BUN/CREAT SERPL: 26.5 (ref 7–25)
CALCIUM SPEC-SCNC: 9 MG/DL (ref 8.6–10.5)
CHLORIDE SERPL-SCNC: 104 MMOL/L (ref 98–107)
CO2 SERPL-SCNC: 26.4 MMOL/L (ref 22–29)
CREAT BLD-MCNC: 0.83 MG/DL (ref 0.57–1)
DEPRECATED RDW RBC AUTO: 42.9 FL (ref 37–54)
EOSINOPHIL # BLD AUTO: 0.55 10*3/MM3 (ref 0–0.4)
EOSINOPHIL NFR BLD AUTO: 3.9 % (ref 0.3–6.2)
ERYTHROCYTE [DISTWIDTH] IN BLOOD BY AUTOMATED COUNT: 15.2 % (ref 12.3–15.4)
GFR SERPL CREATININE-BSD FRML MDRD: 67 ML/MIN/1.73
GLUCOSE BLD-MCNC: 117 MG/DL (ref 65–99)
GLUCOSE BLDC GLUCOMTR-MCNC: 121 MG/DL (ref 70–130)
HCT VFR BLD AUTO: 28.7 % (ref 34–46.6)
HGB BLD-MCNC: 8.9 G/DL (ref 12–15.9)
IMM GRANULOCYTES # BLD AUTO: 0.37 10*3/MM3 (ref 0–0.05)
IMM GRANULOCYTES NFR BLD AUTO: 2.6 % (ref 0–0.5)
INR PPP: 2.41 (ref 0.9–1.1)
LYMPHOCYTES # BLD AUTO: 1.37 10*3/MM3 (ref 0.7–3.1)
LYMPHOCYTES NFR BLD AUTO: 9.7 % (ref 19.6–45.3)
MCH RBC QN AUTO: 24.5 PG (ref 26.6–33)
MCHC RBC AUTO-ENTMCNC: 31 G/DL (ref 31.5–35.7)
MCV RBC AUTO: 78.8 FL (ref 79–97)
MONOCYTES # BLD AUTO: 0.78 10*3/MM3 (ref 0.1–0.9)
MONOCYTES NFR BLD AUTO: 5.5 % (ref 5–12)
NEUTROPHILS # BLD AUTO: 10.95 10*3/MM3 (ref 1.7–7)
NEUTROPHILS NFR BLD AUTO: 77.7 % (ref 42.7–76)
NRBC BLD AUTO-RTO: 0 /100 WBC (ref 0–0.2)
PLATELET # BLD AUTO: 293 10*3/MM3 (ref 140–450)
PMV BLD AUTO: 10.7 FL (ref 6–12)
POTASSIUM BLD-SCNC: 3.7 MMOL/L (ref 3.5–5.2)
PROTHROMBIN TIME: 25.9 SECONDS (ref 11.7–14.2)
RBC # BLD AUTO: 3.64 10*6/MM3 (ref 3.77–5.28)
SODIUM BLD-SCNC: 141 MMOL/L (ref 136–145)
WBC NRBC COR # BLD: 14.11 10*3/MM3 (ref 3.4–10.8)

## 2020-05-01 PROCEDURE — 99232 SBSQ HOSP IP/OBS MODERATE 35: CPT | Performed by: INTERNAL MEDICINE

## 2020-05-01 PROCEDURE — 82962 GLUCOSE BLOOD TEST: CPT

## 2020-05-01 PROCEDURE — 97110 THERAPEUTIC EXERCISES: CPT | Performed by: PHYSICAL THERAPIST

## 2020-05-01 PROCEDURE — 80048 BASIC METABOLIC PNL TOTAL CA: CPT | Performed by: INTERNAL MEDICINE

## 2020-05-01 PROCEDURE — 85025 COMPLETE CBC W/AUTO DIFF WBC: CPT | Performed by: INTERNAL MEDICINE

## 2020-05-01 PROCEDURE — 85610 PROTHROMBIN TIME: CPT | Performed by: INTERNAL MEDICINE

## 2020-05-01 RX ORDER — LOPERAMIDE HYDROCHLORIDE 2 MG/1
2 CAPSULE ORAL
Status: DISCONTINUED | OUTPATIENT
Start: 2020-05-01 | End: 2020-05-07 | Stop reason: HOSPADM

## 2020-05-01 RX ADMIN — DULOXETINE HYDROCHLORIDE 60 MG: 60 CAPSULE, DELAYED RELEASE ORAL at 08:00

## 2020-05-01 RX ADMIN — MESALAMINE 1000 MG: 250 CAPSULE ORAL at 17:11

## 2020-05-01 RX ADMIN — MESALAMINE 1000 MG: 250 CAPSULE ORAL at 21:21

## 2020-05-01 RX ADMIN — PANTOPRAZOLE SODIUM 40 MG: 40 TABLET, DELAYED RELEASE ORAL at 05:38

## 2020-05-01 RX ADMIN — SODIUM CHLORIDE, PRESERVATIVE FREE 10 ML: 5 INJECTION INTRAVENOUS at 08:00

## 2020-05-01 RX ADMIN — SODIUM CHLORIDE, PRESERVATIVE FREE 10 ML: 5 INJECTION INTRAVENOUS at 21:22

## 2020-05-01 RX ADMIN — METOPROLOL TARTRATE 50 MG: 25 TABLET ORAL at 21:21

## 2020-05-01 RX ADMIN — LOPERAMIDE HYDROCHLORIDE 2 MG: 2 CAPSULE ORAL at 12:43

## 2020-05-01 RX ADMIN — METOPROLOL TARTRATE 50 MG: 25 TABLET ORAL at 08:00

## 2020-05-01 RX ADMIN — AMIODARONE HYDROCHLORIDE 200 MG: 200 TABLET ORAL at 21:21

## 2020-05-01 RX ADMIN — ATORVASTATIN CALCIUM 10 MG: 20 TABLET, FILM COATED ORAL at 08:00

## 2020-05-01 RX ADMIN — BUPROPION HYDROCHLORIDE 150 MG: 150 TABLET, FILM COATED, EXTENDED RELEASE ORAL at 08:00

## 2020-05-01 RX ADMIN — FOLIC ACID 1 MG: 1 TABLET ORAL at 08:00

## 2020-05-01 RX ADMIN — LEVOTHYROXINE SODIUM 100 MCG: 100 TABLET ORAL at 05:37

## 2020-05-01 RX ADMIN — MESALAMINE 1000 MG: 250 CAPSULE ORAL at 12:43

## 2020-05-01 RX ADMIN — WARFARIN 2.5 MG: 2.5 TABLET ORAL at 17:11

## 2020-05-01 RX ADMIN — AMIODARONE HYDROCHLORIDE 200 MG: 200 TABLET ORAL at 08:00

## 2020-05-01 RX ADMIN — LOPERAMIDE HYDROCHLORIDE 2 MG: 2 CAPSULE ORAL at 17:10

## 2020-05-01 RX ADMIN — MESALAMINE 1000 MG: 250 CAPSULE ORAL at 07:57

## 2020-05-01 NOTE — PLAN OF CARE
Problem: Patient Care Overview  Goal: Plan of Care Review  Flowsheets (Taken 5/1/2020 1315)  Outcome Summary: Pt was agreeable but experienced increased nausea and then diarrhea while sitting EOB. Pt performed BLe exercises with no difficulty, but refused further activity. Will follow up tomorrow to progress as tolerated.   Patient was wearing a face mask during this therapy encounter. Therapist used appropriate personal protective equipment including mask and gloves.  Mask used was standard procedure mask. Appropriate PPE was worn during the entire therapy session. Hand hygiene was completed before and after therapy session. Patient is not in enhanced droplet precautions.

## 2020-05-01 NOTE — PLAN OF CARE
Pt denies pain, has had a couple of loose stools this shift, rested on and off, vss, will ctm.        Problem: Patient Care Overview  Goal: Plan of Care Review  Outcome: Ongoing (interventions implemented as appropriate)  Flowsheets (Taken 5/1/2020 9692)  Plan of Care Reviewed With: patient

## 2020-05-01 NOTE — PROGRESS NOTES
"      [facility]         Dr Yang Sayied   LOS: 12 days   Patient Care Team:  Chitra Leyva MD as PCP - General (Internal Medicine)    Chief Complaint:Non-ST segment elevation with A. fib RVR diastolic CHF and antiphospholipid antibody syndrome on anticoagulation.  Current issues include leukocytosis persistent    Interval History: Ongoing diarrhea.  Remains weak and tired.    REVIEW OF SYSTEMS:   CARDIOVASCULAR: No chest pain, chest pressure or chest discomfort. No palpitations or edema.   RESPIRATORY: Shortness of breath with exertion no cough or sputum.   GASTROINTESTINAL: No anorexia, nausea, vomiting but ongoing diarrhea.  No abdominal pain or blood.   HEMATOLOGIC: No bleeding or bruising.     Ventilator/Non-Invasive Ventilation Settings (From admission, onward)    None            Vital Signs  Temp:  [97.7 °F (36.5 °C)-98.3 °F (36.8 °C)] 98.1 °F (36.7 °C)  Heart Rate:  [57-69] 61  Resp:  [16] 16  BP: (129-144)/(65-72) 144/72    Intake/Output Summary (Last 24 hours) at 5/1/2020 1124  Last data filed at 4/30/2020 1829  Gross per 24 hour   Intake 567 ml   Output --   Net 567 ml     Flowsheet Rows      First Filed Value   Admission Height  172.7 cm (68\") Documented at 04/19/2020 1025   Admission Weight  68 kg (150 lb) Documented at 04/19/2020 1025          Physical Exam:  Patient is examined using the personal protective equipment as per guidelines from infection control for this particular patient as enacted.  Hand hygiene was performed before and after patient interaction.   General Appearance:    Alert, cooperative, in no acute distress.  Following simple commands  Neck midline trachea no thyromegaly   Lungs:    Diminished breath sounds bilaterally    Heart:    Regular rhythm and normal rate, normal S1 and S2, no            murmur, no gallop, no rub, no click   Chest Wall:    No abnormalities observed   Abdomen:     Normal bowel sounds, no masses, no organomegaly, soft        non-tender, " non-distended, no guarding, no rebound                tenderness   Extremities:   Moves all extremities well, no edema, no cyanosis, no             redness  CNS no focal neurological deficits no distress  Skin no rashes no nodules  Psych oriented to time place and person normal memory  Musculoskeletal no cyanosis no clubbing normal range of motion     Results Review:        Results from last 7 days   Lab Units 05/01/20 0440 04/30/20  0502 04/29/20  0713   SODIUM mmol/L 141 140 140   POTASSIUM mmol/L 3.7 3.4* 3.7   CHLORIDE mmol/L 104 102 103   CO2 mmol/L 26.4 26.5 27.1   BUN mg/dL 22 23 24*   CREATININE mg/dL 0.83 0.91 0.98   GLUCOSE mg/dL 117* 112* 117*   CALCIUM mg/dL 9.0 8.8 8.9         Results from last 7 days   Lab Units 05/01/20 0440 04/30/20  0503 04/29/20  0713   WBC 10*3/mm3 14.11* 13.81* 16.95*   HEMOGLOBIN g/dL 8.9* 8.3* 8.7*   HEMATOCRIT % 28.7* 26.3* 27.9*   PLATELETS 10*3/mm3 293 279 312     Results from last 7 days   Lab Units 05/01/20 0440 04/30/20  0502 04/29/20  0713 04/28/20  0502 04/27/20  1955 04/27/20  1403   INR  2.41* 2.31* 2.41* 2.57*  --   --    APTT seconds  --   --   --  75.5* 102.3* 96.3*                       I reviewed the patient's new clinical results.  I personally viewed and interpreted the patient's CXR        Medication Review:     amiodarone 200 mg Oral Q12H   atorvastatin 10 mg Oral Daily   buPROPion  mg Oral Daily   DULoxetine 60 mg Oral Daily   folic acid 1 mg Oral Daily   levothyroxine 100 mcg Oral Q AM   mesalamine 1,000 mg Oral 4x Daily   metoprolol tartrate 50 mg Oral Q12H   pantoprazole 40 mg Oral QAM   sodium chloride 10 mL Intravenous Q12H   warfarin 2.5 mg Oral Daily         Pharmacy to dose warfarin        ASSESSMENT:   1. A. fib with RVR  2. Leukocytosis  3. Non-ST elevation MI type I versus 2 with no chest pain  4. Acute diastolic congestive heart failure  5. Acute kidney injury, better  6. Antiphospholipid antibody syndrome with negative DVT  work-up  7. Hypothyroidism  8. Hyperlipidemia  9. Acute hypoxic resp failure  10. Lactic acidosis on presentation, resolved  11. Pulmonary hypertension, mild  12. Hyperkalemia    PLAN:  Stable leukocytosis with CT chest showing some right upper lobe new infiltrates questionable aspiration.  Noted that she is immunocompromised but currently clinically she seems to be stable no fever and not on any oxygen.  Procalcitonin negative x3.  Since there is a question of antibiotic associated diarrhea and patient not manifesting any symptoms of worsening pneumonia etc. we will go ahead and discontinue antibiotics.  No aspiration as such reported by the patient and nursing staff.  She does not have any fever cough etc. and her COVID test was negative on admission.  She will need follow-up CT chest in 6 to 8 weeks to ensure complete resolution of the right upper lobe infiltrate  Heparin discontinued continue Coumadin keep INR between 2 and 3.  Wean down oxygen as tolerated  PT OT to continue  Monitor clinical progress closely  Diarrhea ongoing per patient and currently GI following.  GI plans to try steroids if diarrhea does not improve  Patient now wants to go home with home health.  CCP currently following  Trey Luque MD  05/01/20  11:24

## 2020-05-01 NOTE — THERAPY TREATMENT NOTE
Patient Name: Joellen Dominguez  : 1945    MRN: [id number]                              Today's Date: 2020       Admit Date: 2020    Visit Dx:     ICD-10-CM ICD-9-CM   1. Sepsis with acute renal failure and septic shock, due to unspecified organism, unspecified acute renal failure type (CMS/[facility]) A41.9 038.9    R65.21 995.92    N17.9 785.52     584.9   2. New onset atrial fibrillation (CMS/[facility]) I48.91 427.31   3. Atrial fibrillation with rapid ventricular response (CMS/[facility]) I48.91 427.31   4. Leukocytosis, unspecified type D72.829 288.60   5. Acute kidney injury (CMS/[facility]) N17.9 584.9   6. Acute anemia D64.9 285.9   7. NSTEMI (non-ST elevated myocardial infarction) (CMS/[facility]) I21.4 410.70   8. History of Crohn's disease Z87.19 V12.70     Patient Active Problem List   Diagnosis   • Crohn's disease (CMS/[facility])   • Incontinence   • Degeneration of lumbar intervertebral disc   • Other hyperlipidemia   • Essential hypertension   • Depression   • Antiphospholipid antibody syndrome (CMS/[facility])   • Lichen sclerosus et atrophicus   • Sepsis with acute renal failure and septic shock (CMS/[facility])   • Myocardial infarction type 2 (CMS/[facility])   • SOB (shortness of breath)   • PAF (paroxysmal atrial fibrillation) (CMS/[facility])   • History of cardioversion   • Leukocytosis   • Pulmonary HTN (CMS/[facility])     Past Medical History:   Diagnosis Date   • Acute deep vein thrombosis of lower extremity (CMS/[facility])    • Antiphospholipid antibody syndrome (CMS/[facility])    • Antiphospholipid antibody syndrome (CMS/HCC)    • Arthritis     OSTEO   • Benign essential hypertension    • COPD (chronic obstructive pulmonary disease) (CMS/[facility])    • Coronary artery disease    • Crohn's disease (CMS/[facility])    • Cutaneous candidiasis    • Depression    • Disease of thyroid gland    • Elevated cholesterol    • GERD (gastroesophageal reflux disease)    • Hyperlipidemia    • Hypertension    • Osteopenia    • Polyp, uterus corpus      Past Surgical History:   Procedure  Laterality Date   • ABDOMINAL HERNIA REPAIR     • AMPUTATION DIGIT Left     SECOND TOE    • BILATERAL SALPINGO OOPHORECTOMY     • BREAST BIOPSY Right     BENIGN   • CARDIAC CATHETERIZATION N/A 4/22/2020    Procedure: Right and Left Heart Cath;  Surgeon: Paulo Singleton MD;  [facility];  Service: Cardiology;  Laterality: N/A;   • CARDIAC CATHETERIZATION N/A 4/22/2020    Procedure: Coronary angiography;  Surgeon: Paulo Singleton MD;  [facility];  Service: Cardiology;  Laterality: N/A;   • CARDIAC CATHETERIZATION N/A 4/22/2020    Procedure: Left ventriculography;  Surgeon: Paulo Singleton MD;  Location:  [facility] CATH INVASIVE LOCATION;  Service: Cardiology;  Laterality: N/A;   • CARDIAC ELECTROPHYSIOLOGY PROCEDURE N/A 4/25/2020    Procedure: Cardioversion;  Surgeon: Paulo Singleton MD;  [facility];  Service: Cardiology;  Laterality: N/A;   • CHOLECYSTECTOMY     • COLONOSCOPY     • COSMETIC SURGERY     • D&C HYSTEROSCOPY N/A 10/13/2016    Procedure: DILATATION AND CURETTAGE HYSTEROSCOPY WITH MYOSURE;  Surgeon: Christopher Doll MD;  Location: [facility] OR;  Service:    • ENDOSCOPY     • EYE SURGERY Bilateral     cataract   • FACELIFT     • FEMORAL ARTERY - FEMORAL ARTERY BYPASS GRAFT Bilateral    • HEMORRHOIDECTOMY     • TONSILLECTOMY AND ADENOIDECTOMY     • VASCULAR SURGERY      femoral stents      General Information     Row Name 05/01/20 1311          PT Evaluation Time/Intention    Document Type  therapy note (daily note)  -JAMEY     Mode of Treatment  physical therapy  -     Row Name 05/01/20 1311          General Information    Existing Precautions/Restrictions  oxygen therapy device and L/min;fall  -       User Key  (r) = Recorded By, (t) = Taken By, (c) = Cosigned By    Initials Name Provider Type    Avis Ellis, PT Physical Therapist        Mobility     Row Name 05/01/20 4597           Bed Mobility Assessment/Treatment    Supine-Sit [place] (Bed Mobility)  supervision;verbal cues  -     Sit-Supine [place] (Bed Mobility)  minimum assist (75% patient effort)  -     Assistive Device (Bed Mobility)  bed rails;head of bed elevated  -     Comment (Bed Mobility)  Pt refused OOB activity secondary to fatigue, nausea and then diarrhea  -       User Key  (r) = Recorded By, (t) = Taken By, (c) = Cosigned By    Initials Name Provider Type    Avis Ellis, HERLINDA Physical Therapist        Obj/Interventions     Row Name 05/01/20 1311          Therapeutic Exercise    Comment (Therapeutic Exercise)  BLE AP, LAQ, seated marching x 10 reps  -     Row Name 05/01/20 1311          Static Sitting Balance    Level of [place] (Unsupported Sitting, Static Balance)  independent  -     Sitting Position (Unsupported Sitting, Static Balance)  sitting on edge of bed  -     Time Able to Maintain Position (Unsupported Sitting, Static Balance)  4 to 5 minutes  -       User Key  (r) = Recorded By, (t) = Taken By, (c) = Cosigned By    Initials Name Provider Type    Avis Ellis, HERLINDA Physical Therapist        Goals/Plan    No documentation.       Clinical Impression     Row Name 05/01/20 0135          Pain Assessment    Additional Documentation  Pain Scale: Numbers Pre/Post-Treatment (Group)  -[facility] Name 05/01/20 8435          Pain Scale: Numbers Pre/Post-Treatment    Pain Scale: Numbers, Pretreatment  0/10 - no pain  -     Pain Scale: Numbers, Post-Treatment  0/10 - no pain  -     Row Name 05/01/20 1219          Plan of Care Review    Plan of Care Reviewed With  patient  -     Outcome Summary  Pt was agreeable but experienced increased nausea and then diarrhea while sitting EOB. Pt performed BLe exercises with no difficulty, but refused further activity. Will follow up tomorrow to progress as tolerated.   -     Row Name 05/01/20 4441          Positioning and  Restraints    Pre-Treatment Position  in bed  -KH     Post Treatment Position  bed  -KH     In Bed  fowlers;call light within reach;encouraged to call for assist;exit alarm on  -       User Key  (r) = Recorded By, (t) = Taken By, (c) = Cosigned By    Initials Name Provider Type    Avis Ellis, PT Physical Therapist        Outcome Measures     Row Name 05/01/20 1317          How much help from another person do you currently need...    Turning from your back to your side while in flat bed without using bedrails?  4  -KH     Moving from lying on back to sitting on the side of a flat bed without bedrails?  4  -KH     Moving to and from a bed to a chair (including a wheelchair)?  3  -KH     Standing up from a chair using your arms (e.g., wheelchair, bedside chair)?  3  -KH     Climbing 3-5 steps with a railing?  2  -KH     To walk in hospital room?  3  -KH     AM-PAC 6 Clicks Score (PT)  19  -KH     Row Name 05/01/20 1317          Functional Assessment    Outcome Measure Options  AM-PAC 6 Clicks Basic Mobility (PT)  -       User Key  (r) = Recorded By, (t) = Taken By, (c) = Cosigned By    Initials Name Provider Type    Avis Ellis, PT Physical Therapist          PT Recommendation and Plan     Plan of Care Reviewed With: patient  Outcome Summary: Pt was agreeable but experienced increased nausea and then diarrhea while sitting EOB. Pt performed BLe exercises with no difficulty, but refused further activity. Will follow up tomorrow to progress as tolerated.      Time Calculation:   PT Charges     Row Name 05/01/20 1232             Time Calculation    Start Time  0915  -      Stop Time  0923  -      Time Calculation (min)  8 min  -      PT Received On  05/01/20  -      PT - Next Appointment  05/02/20  -         Time Calculation- PT    Total Timed Code Minutes- PT  8 minute(s)  -        User Key  (r) = Recorded By, (t) = Taken By, (c) = Cosigned By    Initials Name Provider Type     KH Avis Lyles, PT Physical Therapist        Therapy Charges for Today     Code Description Service Date Service Provider Modifiers Qty    30219234651 HC PT THER PROC EA 15 MIN 5/1/2020 Avis Lyles, PT GP 1          PT G-Codes  Outcome Measure Options: AM-PAC 6 Clicks Basic Mobility (PT)  AM-PAC 6 Clicks Score (PT): 19    Avis Lyles, PT  5/1/2020

## 2020-05-01 NOTE — PLAN OF CARE
Pt resting comfortably. Talkling on the phone w family. No c/o voiced. VSS cont to  monitor  Problem: Patient Care Overview  Goal: Plan of Care Review  Outcome: Ongoing (interventions implemented as appropriate)  Flowsheets  Taken 5/1/2020 0229 by Beba Amaya, RN  Progress: improving  Plan of Care Reviewed With: patient  Taken 4/30/2020 1636 by Wilman Ramos, RN  Outcome Summary: Patient alert and oriented. Patient was up to the chair today. No s/s of acute distress. On 2 liters of oxygen nc. Will continue to monitor.  Goal: Individualization and Mutuality  Outcome: Ongoing (interventions implemented as appropriate)  Goal: Discharge Needs Assessment  Outcome: Ongoing (interventions implemented as appropriate)  Goal: Interprofessional Rounds/Family Conf  Outcome: Ongoing (interventions implemented as appropriate)     Problem: Skin Injury Risk (Adult)  Goal: Skin Health and Integrity  Outcome: Ongoing (interventions implemented as appropriate)     Problem: Fall Risk (Adult)  Goal: Absence of Fall  Outcome: Ongoing (interventions implemented as appropriate)     Problem: Arrhythmia/Dysrhythmia (Symptomatic) (Adult)  Goal: Signs and Symptoms of Listed Potential Problems Will be Absent, Minimized or Managed (Arrhythmia/Dysrhythmia)  Outcome: Ongoing (interventions implemented as appropriate)

## 2020-05-01 NOTE — PROGRESS NOTES
"[facility]  Inpatient Progress Note    Reason for Follow Up:  Diarrhea, Crohn's disease    Subjective     Interval History:   Says she is not doing well today.  Having more diarrhea, says it is \"all the time.\"  Nurse endorses 2 bowel movements this morning, one with some maroon tinge to it.    Otherwise she is eating well.  Denies abdominal pain.    Current Facility-Administered Medications:   •  acetaminophen (TYLENOL) tablet 650 mg, 650 mg, Oral, Q4H PRN, 650 mg at 04/29/20 1405 **OR** acetaminophen (TYLENOL) suppository 650 mg, 650 mg, Rectal, Q4H PRN, Paulo Singleton MD  •  acetaminophen (TYLENOL) tablet 650 mg, 650 mg, Oral, Q4H PRN, Paulo Singleton MD  •  amiodarone (PACERONE) tablet 200 mg, 200 mg, Oral, Q12H, Paulo Singleton MD, 200 mg at 05/01/20 0800  •  atorvastatin (LIPITOR) tablet 10 mg, 10 mg, Oral, Daily, Paulo Singleton MD, 10 mg at 05/01/20 0800  •  buPROPion XL (WELLBUTRIN XL) 24 hr tablet 150 mg, 150 mg, Oral, Daily, Paulo Singleton MD, 150 mg at 05/01/20 0800  •  DULoxetine (CYMBALTA) DR capsule 60 mg, 60 mg, Oral, Daily, Paulo Singleton MD, 60 mg at 05/01/20 0800  •  folic acid (FOLVITE) tablet 1 mg, 1 mg, Oral, Daily, Paulo Singleton MD, 1 mg at 05/01/20 0800  •  ipratropium-albuterol (DUO-NEB) nebulizer solution 3 mL, 3 mL, Nebulization, Q4H PRN, Paulo Singleton MD, 3 mL at 04/26/20 2243  •  levothyroxine (SYNTHROID, LEVOTHROID) tablet 100 mcg, 100 mcg, Oral, Q AM, Paulo Singleton MD, 100 mcg at 05/01/20 0537  •  loperamide (IMODIUM) capsule 2 mg, 2 mg, Oral, 4x Daily PRN, Paulo Singleton MD, 2 mg at 04/30/20 1726  •  mesalamine (PENTASA) CR capsule 1,000 mg, 1,000 mg, Oral, 4x Daily, Paulo Singleton MD, 1,000 mg at 05/01/20 0757  •  metoprolol tartrate (LOPRESSOR) injection 2.5 mg, 2.5 mg, Intravenous, Q4H PRN, Paulo Singleton MD, 2.5 mg at 04/23/20 0444  •  metoprolol tartrate " (LOPRESSOR) injection 5 mg, 5 mg, Intravenous, Q6H PRN, Paulo Singleton MD  •  metoprolol tartrate (LOPRESSOR) tablet 50 mg, 50 mg, Oral, Q12H, Paulo Singleton MD, 50 mg at 05/01/20 0800  •  ondansetron (ZOFRAN) injection 4 mg, 4 mg, Intravenous, Q6H PRN, Paulo Singleton MD  •  pantoprazole (PROTONIX) EC tablet 40 mg, 40 mg, Oral, QAM, Paulo Singleton MD, 40 mg at 05/01/20 0538  •  Pharmacy to dose warfarin, , Does not apply, Continuous PRN, Paulo Singleton MD  •  potassium chloride (MICRO-K) CR capsule 40 mEq, 40 mEq, Oral, PRN, 40 mEq at 04/30/20 0747 **OR** potassium chloride (KLOR-CON) packet 40 mEq, 40 mEq, Oral, PRN **OR** potassium chloride 10 mEq in 100 mL IVPB, 10 mEq, Intravenous, Q1H PRN, Paulo Singleton MD  •  [COMPLETED] Insert peripheral IV, , , Once **AND** sodium chloride 0.9 % flush 10 mL, 10 mL, Intravenous, PRN, Paulo Singleton MD  •  sodium chloride 0.9 % flush 10 mL, 10 mL, Intravenous, Q12H, Paulo Singleton MD, 10 mL at 05/01/20 0800  •  sodium chloride 0.9 % flush 10 mL, 10 mL, Intravenous, PRN, Paulo Singleton MD, 10 mL at 04/26/20 1615  •  warfarin (COUMADIN) tablet 2.5 mg, 2.5 mg, Oral, Daily, Paulo Singleton MD, 2.5 mg at 04/30/20 1726  Review of Systems:   All systems reviewed and negative except for: GI: Diarrhea; constitution weakness    Objective     Vital Signs  Temp:  [97.7 °F (36.5 °C)-98.3 °F (36.8 °C)] 98.1 °F (36.7 °C)  Heart Rate:  [57-69] 61  Resp:  [16] 16  BP: (129-144)/(65-72) 144/72  Body mass index is 21.87 kg/m².    Intake/Output Summary (Last 24 hours) at 5/1/2020 1035  Last data filed at 4/30/2020 1829  Gross per 24 hour   Intake 567 ml   Output --   Net 567 ml     No intake/output data recorded.     Physical Exam:   General: patient awake, alert and cooperative, appears elderly and fral   Eyes: Normal lids and lashes, no scleral icterus   Neck: supple, normal ROM   Skin: warm and dry, not  jaundiced   Cardiovascular: regular rhythm and rate, no murmurs auscultated   Pulm: clear to auscultation bilaterally, regular and unlabored   Abdomen: soft,  Obese nontender, nondistended; normal bowel sounds   Rectal: deferred   Extremities: no rash or edema   Psychiatric: Normal mood and behavior; memory intact     Results Review:     I reviewed the patient's new clinical results.    Results from last 7 days   Lab Units 05/01/20  0440 04/30/20  0503 04/29/20  0713   WBC 10*3/mm3 14.11* 13.81* 16.95*   HEMOGLOBIN g/dL 8.9* 8.3* 8.7*   HEMATOCRIT % 28.7* 26.3* 27.9*   PLATELETS 10*3/mm3 293 279 312     Results from last 7 days   Lab Units 05/01/20 0440 04/30/20  0502 04/29/20  0713   SODIUM mmol/L 141 140 140   POTASSIUM mmol/L 3.7 3.4* 3.7   CHLORIDE mmol/L 104 102 103   CO2 mmol/L 26.4 26.5 27.1   BUN mg/dL 22 23 24*   CREATININE mg/dL 0.83 0.91 0.98   CALCIUM mg/dL 9.0 8.8 8.9   GLUCOSE mg/dL 117* 112* 117*     Results from last 7 days   Lab Units 05/01/20 0440 04/30/20  0502 04/29/20  0713   INR  2.41* 2.31* 2.41*     No results found for: LIPASE    Radiology:  CT Chest Without Contrast   Final Result      CT Abdomen Pelvis With Contrast   Final Result      XR Chest 2 View   Final Result   Mild hazy opacity has developed in the right upper lung, may   be developing infiltrate or edema, follow-up recommended. Small pleural   effusions.        This report was finalized on 4/26/2020 1:04 PM by Dr. Roman Rosario M.D.          XR Chest 1 View   Final Result   Interstitial prominence is more apparent on the current examination.   This could reflect some vascular congestion. Patient is suspected to   have a small right pleural effusion.       This report was finalized on 4/23/2020 5:38 AM by Dr. Domitila Tsai M.D.          XR Chest PA & Lateral   Final Result   Minimal patchy density at the right middle lobe, minimal   left pleural effusion, follow-up recommended.       This report was finalized on  4/20/2020 12:41 PM by Dr. Roman Rosario M.D.          NM Lung Ventilation Perfusion   Final Result   Low probability ventilation/perfusion scintigraphy.       This report was finalized on 4/20/2020 12:32 PM by Dr. Houston Kimbrough M.D.          CT Abdomen Pelvis Without Contrast   Final Result      XR Chest 1 View   Final Result          Assessment/Plan     Patient Active Problem List   Diagnosis   • Crohn's disease (CMS/HCC)   • Incontinence   • Degeneration of lumbar intervertebral disc   • Other hyperlipidemia   • Essential hypertension   • Depression   • Antiphospholipid antibody syndrome (CMS/HCC)   • Lichen sclerosus et atrophicus   • Sepsis with acute renal failure and septic shock (CMS/HCC)   • Myocardial infarction type 2 (CMS/HCC)   • SOB (shortness of breath)   • PAF (paroxysmal atrial fibrillation) (CMS/HCC)   • History of cardioversion   • Leukocytosis   • Pulmonary HTN (CMS/HCC)       Impression  1.  Diarrhea: Possibly medication associated versus secondary to her Crohn's disease.  Seems to be a little worse this morning.    2.  Crohn's disease: On Stelara, followed in the outpatient setting by Dr. Flores    3.  DVT: On Coumadin    4.  Anemia: Acute on chronic.  While she did have some evidence of a small amount of blood in her stool earlier this morning, her nurse denies any excessive GI bleeding    Plan  Schedule Imodium  Continue diet as tolerated  She really needs to work with PT given weakness  Follow hemoglobin  Follow stools for further bleeding  She is going to need close follow-up with [facility]  Continue mesalamine  I would like to see how she does with scheduled Imodium prior to starting low-dose prednisone to limit side effects    I discussed the patients findings and my recommendations with patient and nursing staff.    Maria L Pastor MD

## 2020-05-01 NOTE — PROGRESS NOTES
"      [facility]         Dr Yang Sayied   LOS: 12 days   Patient Care Team:  Chitra Leyva MD as PCP - General (Internal Medicine)    Chief Complaint: Non-ST segment elevation with A. fib RVR diastolic CHF and antiphospholipid antibody syndrome on anticoagulation.  Current issues include leukocytosis persistent    Interval History: Continues to have diarrhea.  Feels weak but slowly improving.    REVIEW OF SYSTEMS:   CARDIOVASCULAR: No chest pain, chest pressure or chest discomfort. No palpitations or edema.   RESPIRATORY: Shortness of breath with exertion, no cough or sputum.   GASTROINTESTINAL: No anorexia, nausea, vomiting but ongoing diarrhea.  No abdominal pain or blood.   HEMATOLOGIC: No bleeding or bruising.     Ventilator/Non-Invasive Ventilation Settings (From admission, onward)    None            Vital Signs  Temp:  [97.7 °F (36.5 °C)-98.3 °F (36.8 °C)] 98.1 °F (36.7 °C)  Heart Rate:  [57-69] 61  Resp:  [16] 16  BP: (129-144)/(65-72) 144/72    Intake/Output Summary (Last 24 hours) at 5/1/2020 1126  Last data filed at 4/30/2020 1829  Gross per 24 hour   Intake 567 ml   Output --   Net 567 ml     Flowsheet Rows      First Filed Value   Admission Height  172.7 cm (68\") Documented at 04/19/2020 1025   Admission Weight  68 kg (150 lb) Documented at 04/19/2020 1025          Physical Exam:  Patient is examined using the personal protective equipment as per guidelines from infection control for this particular patient as enacted.  Hand hygiene was performed before and after patient interaction.   General Appearance:    Alert, cooperative, in no acute distress.  Following simple commands  Neck midline trachea no thyromegaly   Lungs:    Diminished breath sounds rhonchi bilaterally on the basis    Heart:    Regular rhythm and normal rate, normal S1 and S2, no            murmur, no gallop, no rub, no click   Chest Wall:    No abnormalities observed   Abdomen:    Mild distention no tenderness rebound " or guarding.  Bowel sounds positive   Extremities:   Moves all extremities well, no edema, no cyanosis, no             redness  CNS no focal neurological deficits no distress  Skin no rashes no nodules  Psych oriented to time place and person normal memory  Musculoskeletal no cyanosis no clubbing normal range of motion     Results Review:        Results from last 7 days   Lab Units 05/01/20 0440 04/30/20  0502 04/29/20  0713   SODIUM mmol/L 141 140 140   POTASSIUM mmol/L 3.7 3.4* 3.7   CHLORIDE mmol/L 104 102 103   CO2 mmol/L 26.4 26.5 27.1   BUN mg/dL 22 23 24*   CREATININE mg/dL 0.83 0.91 0.98   GLUCOSE mg/dL 117* 112* 117*   CALCIUM mg/dL 9.0 8.8 8.9         Results from last 7 days   Lab Units 05/01/20 0440 04/30/20  0503 04/29/20  0713   WBC 10*3/mm3 14.11* 13.81* 16.95*   HEMOGLOBIN g/dL 8.9* 8.3* 8.7*   HEMATOCRIT % 28.7* 26.3* 27.9*   PLATELETS 10*3/mm3 293 279 312     Results from last 7 days   Lab Units 05/01/20 0440 04/30/20  0502 04/29/20  0713 04/28/20  0502 04/27/20 1955 04/27/20  1403   INR  2.41* 2.31* 2.41* 2.57*  --   --    APTT seconds  --   --   --  75.5* 102.3* 96.3*   Procalcitonin 0.13                    I reviewed the patient's new clinical results.  I personally viewed and interpreted the patient's CXR        Medication Review:     amiodarone 200 mg Oral Q12H   atorvastatin 10 mg Oral Daily   buPROPion  mg Oral Daily   DULoxetine 60 mg Oral Daily   folic acid 1 mg Oral Daily   levothyroxine 100 mcg Oral Q AM   mesalamine 1,000 mg Oral 4x Daily   metoprolol tartrate 50 mg Oral Q12H   pantoprazole 40 mg Oral QAM   sodium chloride 10 mL Intravenous Q12H   warfarin 2.5 mg Oral Daily         Pharmacy to dose warfarin        ASSESSMENT:   1. A. fib with RVR  2. Leukocytosis  3. Non-ST elevation MI type I versus 2 with no chest pain  4. Acute diastolic congestive heart failure  5. Acute kidney injury, better  6. Antiphospholipid antibody syndrome with negative DVT  work-up  7. Hypothyroidism  8. Hyperlipidemia  9. Acute hypoxic resp failure  10. Lactic acidosis on presentation, resolved  11. Pulmonary hypertension, mild  12. Hyperkalemia    PLAN:  Stable leukocytosis with CT chest showing some right upper lobe new infiltrates questionable aspiration.  Noted that she is immunocompromised but currently clinically she seems to be stable no fever and not on any oxygen.  Procalcitonin negative x3.  Antibiotics were discontinued due to concerns about antibiotics associated diarrhea.  No aspiration as such reported by the patient and nursing staff.  She does not have any fever cough etc. and her COVID test was negative on admission.  She will need follow-up CT chest in 6 to 8 weeks to ensure complete resolution of the right upper lobe infiltrate  On Coumadin keep INR between 2 and 3.  Wean down oxygen as tolerated  PT OT to continue  Monitor clinical progress closely  Diarrhea ongoing per patient and currently GI following.  GI plans to try steroids if diarrhea does not improve  Patient now wants to go home with home health.  CCP currently following          Trey Luque MD  05/01/20  11:26

## 2020-05-02 LAB
ANION GAP SERPL CALCULATED.3IONS-SCNC: 10.1 MMOL/L (ref 5–15)
BASOPHILS # BLD AUTO: 0.09 10*3/MM3 (ref 0–0.2)
BASOPHILS NFR BLD AUTO: 0.7 % (ref 0–1.5)
BUN BLD-MCNC: 22 MG/DL (ref 8–23)
BUN/CREAT SERPL: 22.9 (ref 7–25)
CALCIUM SPEC-SCNC: 8.9 MG/DL (ref 8.6–10.5)
CHLORIDE SERPL-SCNC: 104 MMOL/L (ref 98–107)
CO2 SERPL-SCNC: 26.9 MMOL/L (ref 22–29)
CREAT BLD-MCNC: 0.96 MG/DL (ref 0.57–1)
DEPRECATED RDW RBC AUTO: 43.4 FL (ref 37–54)
EOSINOPHIL # BLD AUTO: 0.5 10*3/MM3 (ref 0–0.4)
EOSINOPHIL NFR BLD AUTO: 3.6 % (ref 0.3–6.2)
ERYTHROCYTE [DISTWIDTH] IN BLOOD BY AUTOMATED COUNT: 15.2 % (ref 12.3–15.4)
GFR SERPL CREATININE-BSD FRML MDRD: 57 ML/MIN/1.73
GLUCOSE BLD-MCNC: 111 MG/DL (ref 65–99)
HCT VFR BLD AUTO: 29.3 % (ref 34–46.6)
HGB BLD-MCNC: 8.9 G/DL (ref 12–15.9)
IMM GRANULOCYTES # BLD AUTO: 0.27 10*3/MM3 (ref 0–0.05)
IMM GRANULOCYTES NFR BLD AUTO: 2 % (ref 0–0.5)
INR PPP: 2.64 (ref 0.9–1.1)
LYMPHOCYTES # BLD AUTO: 1.56 10*3/MM3 (ref 0.7–3.1)
LYMPHOCYTES NFR BLD AUTO: 11.3 % (ref 19.6–45.3)
MCH RBC QN AUTO: 23.9 PG (ref 26.6–33)
MCHC RBC AUTO-ENTMCNC: 30.4 G/DL (ref 31.5–35.7)
MCV RBC AUTO: 78.6 FL (ref 79–97)
MONOCYTES # BLD AUTO: 0.87 10*3/MM3 (ref 0.1–0.9)
MONOCYTES NFR BLD AUTO: 6.3 % (ref 5–12)
NEUTROPHILS # BLD AUTO: 10.46 10*3/MM3 (ref 1.7–7)
NEUTROPHILS NFR BLD AUTO: 76.1 % (ref 42.7–76)
NRBC BLD AUTO-RTO: 0 /100 WBC (ref 0–0.2)
PLATELET # BLD AUTO: 262 10*3/MM3 (ref 140–450)
PMV BLD AUTO: 10.5 FL (ref 6–12)
POTASSIUM BLD-SCNC: 3.3 MMOL/L (ref 3.5–5.2)
PROTHROMBIN TIME: 27.9 SECONDS (ref 11.7–14.2)
RBC # BLD AUTO: 3.73 10*6/MM3 (ref 3.77–5.28)
SODIUM BLD-SCNC: 141 MMOL/L (ref 136–145)
WBC NRBC COR # BLD: 13.75 10*3/MM3 (ref 3.4–10.8)

## 2020-05-02 PROCEDURE — 97110 THERAPEUTIC EXERCISES: CPT | Performed by: PHYSICAL THERAPIST

## 2020-05-02 PROCEDURE — 99232 SBSQ HOSP IP/OBS MODERATE 35: CPT | Performed by: INTERNAL MEDICINE

## 2020-05-02 PROCEDURE — 63710000001 PREDNISONE PER 1 MG: Performed by: INTERNAL MEDICINE

## 2020-05-02 PROCEDURE — 85025 COMPLETE CBC W/AUTO DIFF WBC: CPT | Performed by: INTERNAL MEDICINE

## 2020-05-02 PROCEDURE — 85610 PROTHROMBIN TIME: CPT | Performed by: INTERNAL MEDICINE

## 2020-05-02 PROCEDURE — 80048 BASIC METABOLIC PNL TOTAL CA: CPT | Performed by: INTERNAL MEDICINE

## 2020-05-02 RX ORDER — PREDNISONE 20 MG/1
40 TABLET ORAL
Status: DISCONTINUED | OUTPATIENT
Start: 2020-05-02 | End: 2020-05-07 | Stop reason: HOSPADM

## 2020-05-02 RX ORDER — WARFARIN SODIUM 2 MG/1
2 TABLET ORAL
Status: COMPLETED | OUTPATIENT
Start: 2020-05-02 | End: 2020-05-02

## 2020-05-02 RX ADMIN — METOPROLOL TARTRATE 50 MG: 25 TABLET ORAL at 09:44

## 2020-05-02 RX ADMIN — PANTOPRAZOLE SODIUM 40 MG: 40 TABLET, DELAYED RELEASE ORAL at 09:44

## 2020-05-02 RX ADMIN — ATORVASTATIN CALCIUM 10 MG: 20 TABLET, FILM COATED ORAL at 09:44

## 2020-05-02 RX ADMIN — METOPROLOL TARTRATE 50 MG: 25 TABLET ORAL at 20:00

## 2020-05-02 RX ADMIN — LOPERAMIDE HYDROCHLORIDE 2 MG: 2 CAPSULE ORAL at 17:04

## 2020-05-02 RX ADMIN — LOPERAMIDE HYDROCHLORIDE 2 MG: 2 CAPSULE ORAL at 09:44

## 2020-05-02 RX ADMIN — LOPERAMIDE HYDROCHLORIDE 2 MG: 2 CAPSULE ORAL at 12:08

## 2020-05-02 RX ADMIN — MESALAMINE 1000 MG: 250 CAPSULE ORAL at 17:04

## 2020-05-02 RX ADMIN — BUPROPION HYDROCHLORIDE 150 MG: 150 TABLET, FILM COATED, EXTENDED RELEASE ORAL at 09:44

## 2020-05-02 RX ADMIN — WARFARIN 2 MG: 2 TABLET ORAL at 17:04

## 2020-05-02 RX ADMIN — AMIODARONE HYDROCHLORIDE 200 MG: 200 TABLET ORAL at 09:44

## 2020-05-02 RX ADMIN — MESALAMINE 1000 MG: 250 CAPSULE ORAL at 12:07

## 2020-05-02 RX ADMIN — MESALAMINE 1000 MG: 250 CAPSULE ORAL at 20:00

## 2020-05-02 RX ADMIN — PREDNISONE 40 MG: 20 TABLET ORAL at 14:17

## 2020-05-02 RX ADMIN — DULOXETINE HYDROCHLORIDE 60 MG: 60 CAPSULE, DELAYED RELEASE ORAL at 09:44

## 2020-05-02 RX ADMIN — MESALAMINE 1000 MG: 250 CAPSULE ORAL at 09:43

## 2020-05-02 RX ADMIN — SODIUM CHLORIDE, PRESERVATIVE FREE 10 ML: 5 INJECTION INTRAVENOUS at 09:44

## 2020-05-02 RX ADMIN — POTASSIUM CHLORIDE 40 MEQ: 10 CAPSULE, COATED, EXTENDED RELEASE ORAL at 20:00

## 2020-05-02 RX ADMIN — AMIODARONE HYDROCHLORIDE 200 MG: 200 TABLET ORAL at 20:00

## 2020-05-02 RX ADMIN — FOLIC ACID 1 MG: 1 TABLET ORAL at 09:43

## 2020-05-02 RX ADMIN — SODIUM CHLORIDE, PRESERVATIVE FREE 10 ML: 5 INJECTION INTRAVENOUS at 20:01

## 2020-05-02 RX ADMIN — LEVOTHYROXINE SODIUM 100 MCG: 100 TABLET ORAL at 05:50

## 2020-05-02 NOTE — PLAN OF CARE
Patient  resting  at this  time.  So  far  this  shift  has  had  one  episode  of  loose  stool.  Denies  any  pain.   Has  been    NSR  to  SB on  monitor.     Possible  discharge  today  if   continues to  make  progress. Nursing  will  continue  to monitor.  Problem: Patient Care Overview  Goal: Plan of Care Review  Outcome: Ongoing (interventions implemented as appropriate)  Flowsheets (Taken 5/2/2020 0345)  Progress: improving  Plan of Care Reviewed With: patient  Goal: Discharge Needs Assessment  Outcome: Ongoing (interventions implemented as appropriate)  Flowsheets (Taken 5/2/2020 0345)  Concerns to be Addressed: denies needs/concerns at this time; discharge planning  Readmission Within the Last 30 Days: no previous admission in last 30 days  Goal: Interprofessional Rounds/Family Conf  Outcome: Ongoing (interventions implemented as appropriate)  Flowsheets (Taken 5/2/2020 0345)  Participants: nursing; patient     Problem: Skin Injury Risk (Adult)  Goal: Skin Health and Integrity  Outcome: Ongoing (interventions implemented as appropriate)  Flowsheets (Taken 5/2/2020 0345)  Skin Health and Integrity: making progress toward outcome     Problem: Fall Risk (Adult)  Goal: Absence of Fall  Outcome: Ongoing (interventions implemented as appropriate)  Flowsheets (Taken 5/2/2020 0345)  Absence of Fall: making progress toward outcome     Problem: Arrhythmia/Dysrhythmia (Symptomatic) (Adult)  Goal: Signs and Symptoms of Listed Potential Problems Will be Absent, Minimized or Managed (Arrhythmia/Dysrhythmia)  Outcome: Ongoing (interventions implemented as appropriate)  Flowsheets (Taken 5/2/2020 0345)  Problems Assessed (Arrhythmia/Dysrhythmia): all  Problems Present (Dysrhythmia): none

## 2020-05-02 NOTE — PROGRESS NOTES
[facility]     Inpatient Follow Up Note    Patient Identification:  Name: Joellen Dominguez  Age: 75 y.o.  Sex: female  :  1945  MRN: [id number]    Information from:patient     CC: Crohn's disease    History:   Patient has been diagnosed with Crohn's disease since .  She has had multiple courses of steroids and has been through all the Biologics with the exception of Stelara.  She received her first infusion of Stelara recently.  She says that she is having a lot of diarrhea and abdominal pain which is consistent with her Crohn's disease.  She says that prednisone is worked well for her in the past.  She wonders why we have been reticent to use it this hospitalization.    Review of Systems:  Constitutional:  Negative   Cardiovascular:  Negative   Respiratory:  Negative             Problem List:  Patient Active Problem List    Diagnosis   • *SOB (shortness of breath) [R06.02]   • PAF (paroxysmal atrial fibrillation) (CMS/HCC) [I48.0]   • History of cardioversion [Z98.890]   • Leukocytosis [D72.829]   • Pulmonary HTN (CMS/HCC) [I27.20]   • Sepsis with acute renal failure and septic shock (CMS/HCC) [A41.9, R65.21, N17.9]   • Myocardial infarction type 2 (CMS/HCC) [I21.A1]   • Lichen sclerosus et atrophicus [L90.0]   • Incontinence [R32]   • Other hyperlipidemia [E78.49]   • Essential hypertension [I10]   • Depression [F32.9]   • Antiphospholipid antibody syndrome (CMS/HCC) [D68.61]   • Degeneration of lumbar intervertebral disc [M51.36]   • Crohn's disease (CMS/HCC) [K50.90]     Current Meds:  MAR Reviewed  Scheduled Meds:  amiodarone 200 mg Oral Q12H   atorvastatin 10 mg Oral Daily   buPROPion  mg Oral Daily   DULoxetine 60 mg Oral Daily   folic acid 1 mg Oral Daily   levothyroxine 100 mcg Oral Q AM   loperamide 2 mg Oral TID AC   mesalamine 1,000 mg Oral 4x Daily   metoprolol tartrate 50 mg Oral Q12H   pantoprazole 40 mg Oral QAM   sodium chloride 10 mL Intravenous Q12H  "  warfarin 2 mg Oral Once     Continuous Infusions:  Pharmacy to dose warfarin      PRN Meds:.•  acetaminophen **OR** acetaminophen  •  acetaminophen  •  ipratropium-albuterol  •  metoprolol tartrate  •  metoprolol tartrate  •  ondansetron  •  Pharmacy to dose warfarin  •  potassium chloride **OR** potassium chloride **OR** potassium chloride  •  [COMPLETED] Insert peripheral IV **AND** sodium chloride  •  sodium chloride  Allergies:  Allergies   Allergen Reactions   • Sulfa Antibiotics Hives   • Infliximab Rash       Intake/Output:     Intake/Output Summary (Last 24 hours) at 2020 1252  Last data filed at 2020 1700  Gross per 24 hour   Intake 330 ml   Output --   Net 330 ml     New allergies/reactions:  None    Physical Exam:  Vitals:   Temp (24hrs), Av.9 °F (36.6 °C), Min:97.7 °F (36.5 °C), Max:98.2 °F (36.8 °C)    Temp:  [97.7 °F (36.5 °C)-98.2 °F (36.8 °C)] 97.8 °F (36.6 °C)  Heart Rate:  [58-65] 58  Resp:  [16-18] 18  BP: (121-153)/(64-82) 144/82  /82 (BP Location: Left arm, Patient Position: Sitting)   Pulse 58   Temp 97.8 °F (36.6 °C) (Oral)   Resp 18   Ht 172.7 cm (67.99\")   Wt 68.4 kg (150 lb 14.4 oz)   SpO2 97%   BMI 22.95 kg/m²     Exam:  NAD  PERRLA. Sclerae and conjunctivae normal  HENT: external inspection normal. Hearing intact.  No respiratory distress.  Alert, oriented, normal affect.         DATA:  Radiology and Labs:   Recent Results (from the past 24 hour(s))   Basic Metabolic Panel    Collection Time: 20  4:33 AM   Result Value Ref Range    Glucose 111 (H) 65 - 99 mg/dL    BUN 22 8 - 23 mg/dL    Creatinine 0.96 0.57 - 1.00 mg/dL    Sodium 141 136 - 145 mmol/L    Potassium 3.3 (L) 3.5 - 5.2 mmol/L    Chloride 104 98 - 107 mmol/L    CO2 26.9 22.0 - 29.0 mmol/L    Calcium 8.9 8.6 - 10.5 mg/dL    eGFR Non African Amer 57 (L) >60 mL/min/1.73    BUN/Creatinine Ratio 22.9 7.0 - 25.0    Anion Gap 10.1 5.0 - 15.0 mmol/L   Protime-INR    Collection Time: 20  4:33 AM "   Result Value Ref Range    Protime 27.9 (H) 11.7 - 14.2 Seconds    INR 2.64 (H) 0.90 - 1.10   CBC Auto Differential    Collection Time: 05/02/20  4:33 AM   Result Value Ref Range    WBC 13.75 (H) 3.40 - 10.80 10*3/mm3    RBC 3.73 (L) 3.77 - 5.28 10*6/mm3    Hemoglobin 8.9 (L) 12.0 - 15.9 g/dL    Hematocrit 29.3 (L) 34.0 - 46.6 %    MCV 78.6 (L) 79.0 - 97.0 fL    MCH 23.9 (L) 26.6 - 33.0 pg    MCHC 30.4 (L) 31.5 - 35.7 g/dL    RDW 15.2 12.3 - 15.4 %    RDW-SD 43.4 37.0 - 54.0 fl    MPV 10.5 6.0 - 12.0 fL    Platelets 262 140 - 450 10*3/mm3    Neutrophil % 76.1 (H) 42.7 - 76.0 %    Lymphocyte % 11.3 (L) 19.6 - 45.3 %    Monocyte % 6.3 5.0 - 12.0 %    Eosinophil % 3.6 0.3 - 6.2 %    Basophil % 0.7 0.0 - 1.5 %    Immature Grans % 2.0 (H) 0.0 - 0.5 %    Neutrophils, Absolute 10.46 (H) 1.70 - 7.00 10*3/mm3    Lymphocytes, Absolute 1.56 0.70 - 3.10 10*3/mm3    Monocytes, Absolute 0.87 0.10 - 0.90 10*3/mm3    Eosinophils, Absolute 0.50 (H) 0.00 - 0.40 10*3/mm3    Basophils, Absolute 0.09 0.00 - 0.20 10*3/mm3    Immature Grans, Absolute 0.27 (H) 0.00 - 0.05 10*3/mm3    nRBC 0.0 0.0 - 0.2 /100 WBC       Assessment:   Problem List:     SOB (shortness of breath)    Essential hypertension    Sepsis with acute renal failure and septic shock (CMS/HCC)    Myocardial infarction type 2 (CMS/HCC)    PAF (paroxysmal atrial fibrillation) (CMS/[facility])    History of cardioversion    Leukocytosis    Pulmonary HTN (CMS/HCC)    Crohn's disease.  She is on Stelara, only received 1 dose fairly recently so it is too early to determine whether she is going to get a response to it.    Plan:   Patient actually would like to get back on steroids.  We discussed the concerns about medication, specifically additional immunosuppression in a world full of COVID.  She is willing to accept these risks.  We also discussed that perhaps she will be able to be tapered off it very quickly if she has a good response to Stelara.  Prednisone 40 mg daily  started.      Mahamed Rendon MD  [facility]  5/2/2020

## 2020-05-02 NOTE — THERAPY TREATMENT NOTE
Patient Name: Joellen Dominguez  : 1945    MRN: [id number]                              Today's Date: 2020       Admit Date: 2020    Visit Dx:     ICD-10-CM ICD-9-CM   1. Sepsis with acute renal failure and septic shock, due to unspecified organism, unspecified acute renal failure type (CMS/[facility]) A41.9 038.9    R65.21 995.92    N17.9 785.52     584.9   2. New onset atrial fibrillation (CMS/[facility]) I48.91 427.31   3. Atrial fibrillation with rapid ventricular response (CMS/[facility]) I48.91 427.31   4. Leukocytosis, unspecified type D72.829 288.60   5. Acute kidney injury (CMS/[facility]) N17.9 584.9   6. Acute anemia D64.9 285.9   7. NSTEMI (non-ST elevated myocardial infarction) (CMS/[facility]) I21.4 410.70   8. History of Crohn's disease Z87.19 V12.70     Patient Active Problem List   Diagnosis   • Crohn's disease (CMS/[facility])   • Incontinence   • Degeneration of lumbar intervertebral disc   • Other hyperlipidemia   • Essential hypertension   • Depression   • Antiphospholipid antibody syndrome (CMS/[facility])   • Lichen sclerosus et atrophicus   • Sepsis with acute renal failure and septic shock (CMS/[facility])   • Myocardial infarction type 2 (CMS/[facility])   • SOB (shortness of breath)   • PAF (paroxysmal atrial fibrillation) (CMS/[facility])   • History of cardioversion   • Leukocytosis   • Pulmonary HTN (CMS/[facility])     Past Medical History:   Diagnosis Date   • Acute deep vein thrombosis of lower extremity (CMS/[facility])    • Antiphospholipid antibody syndrome (CMS/[facility])    • Antiphospholipid antibody syndrome (CMS/HCC)    • Arthritis     OSTEO   • Benign essential hypertension    • COPD (chronic obstructive pulmonary disease) (CMS/[facility])    • Coronary artery disease    • Crohn's disease (CMS/[facility])    • Cutaneous candidiasis    • Depression    • Disease of thyroid gland    • Elevated cholesterol    • GERD (gastroesophageal reflux disease)    • Hyperlipidemia    • Hypertension    • Osteopenia    • Polyp, uterus corpus      Past Surgical History:   Procedure  Laterality Date   • ABDOMINAL HERNIA REPAIR     • AMPUTATION DIGIT Left     SECOND TOE    • BILATERAL SALPINGO OOPHORECTOMY     • BREAST BIOPSY Right     BENIGN   • CARDIAC CATHETERIZATION N/A 4/22/2020    Procedure: Right and Left Heart Cath;  Surgeon: Paulo Singleton MD;  [facility];  Service: Cardiology;  Laterality: N/A;   • CARDIAC CATHETERIZATION N/A 4/22/2020    Procedure: Coronary angiography;  Surgeon: Paulo Singleton MD;  [facility];  Service: Cardiology;  Laterality: N/A;   • CARDIAC CATHETERIZATION N/A 4/22/2020    Procedure: Left ventriculography;  Surgeon: Paulo Singleton MD;  Location:  [facility] CATH INVASIVE LOCATION;  Service: Cardiology;  Laterality: N/A;   • CARDIAC ELECTROPHYSIOLOGY PROCEDURE N/A 4/25/2020    Procedure: Cardioversion;  Surgeon: Paulo Singleton MD;  [facility];  Service: Cardiology;  Laterality: N/A;   • CHOLECYSTECTOMY     • COLONOSCOPY     • COSMETIC SURGERY     • D&C HYSTEROSCOPY N/A 10/13/2016    Procedure: DILATATION AND CURETTAGE HYSTEROSCOPY WITH MYOSURE;  Surgeon: Christopher Doll MD;  Location: [facility] OR;  Service:    • ENDOSCOPY     • EYE SURGERY Bilateral     cataract   • FACELIFT     • FEMORAL ARTERY - FEMORAL ARTERY BYPASS GRAFT Bilateral    • HEMORRHOIDECTOMY     • TONSILLECTOMY AND ADENOIDECTOMY     • VASCULAR SURGERY      femoral stents      General Information     Row Name 05/02/20 1143          PT Evaluation Time/Intention    Document Type  therapy note (daily note)  -JAMEY     Mode of Treatment  physical therapy  -JAMEY       User Key  (r) = Recorded By, (t) = Taken By, (c) = Cosigned By    Initials Name Provider Type    Avis Ellis, PT Physical Therapist        Mobility     Row Name 05/02/20 1143          Bed Mobility Assessment/Treatment    Comment (Bed Mobility)  Up in chair  -     Row Name 05/02/20 1143          Sit-Stand  Transfer    Sit-Stand [place] (Transfers)  minimum assist (75% patient effort);contact guard;verbal cues  -     Assistive Device (Sit-Stand Transfers)  walker, front-wheeled  -[facility] Name 05/02/20 1143          Gait/Stairs Assessment/Training    [place]ett Level (Gait)  verbal cues;contact guard  -     Assistive Device (Gait)  walker, front-wheeled  -     Distance in Feet (Gait)  40 ft with 1 seated rest break  -     Deviations/Abnormal Patterns (Gait)  emery decreased;gait speed decreased;stride length decreased  -     Bilateral Gait Deviations  forward flexed posture  -       User Key  (r) = Recorded By, (t) = Taken By, (c) = Cosigned By    Initials Name Provider Type    Avis Ellis, HERLINDA Physical Therapist        Obj/Interventions     [facility] Name 05/02/20 1144          Therapeutic Exercise    Comment (Therapeutic Exercise)  BLE AP, LAQ, seated marching x 10 reps  -       User Key  (r) = Recorded By, (t) = Taken By, (c) = Cosigned By    Initials Name Provider Type    Avis Ellis, HERLINDA Physical Therapist        Goals/Plan    No documentation.       Clinical Impression     [facility] Name 05/02/20 1144          Pain Assessment    Additional Documentation  Pain Scale: Numbers Pre/Post-Treatment (Group)  -KH     Row Name 05/02/20 1144          Pain Scale: Numbers Pre/Post-Treatment    Pain Scale: Numbers, Pretreatment  0/10 - no pain  -     Pain Scale: Numbers, Post-Treatment  0/10 - no pain  -KH     Row Name 05/02/20 1144          Plan of Care Review    Plan of Care Reviewed With  patient  -     Outcome Summary  Pt demonstrated improved exercise tolerance today. She ambulated increased ambulation distance, but fatigues quickly. She required a seated rest break to cover for a few minutes after walking 20 ft.   -[facility] Name 05/02/20 1144          Positioning and Restraints    Pre-Treatment Position  sitting in chair/recliner  -     Post Treatment Position  chair  -      In Chair  sitting;call light within reach;encouraged to call for assist;exit alarm on;notified nsg  -       User Key  (r) = Recorded By, (t) = Taken By, (c) = Cosigned By    Initials Name Provider Type    Avis Ellis, PT Physical Therapist        Outcome Measures     Row Name 05/02/20 1146          How much help from another person do you currently need...    Turning from your back to your side while in flat bed without using bedrails?  4  -KH     Moving from lying on back to sitting on the side of a flat bed without bedrails?  4  -KH     Moving to and from a bed to a chair (including a wheelchair)?  3  -KH     Standing up from a chair using your arms (e.g., wheelchair, bedside chair)?  3  -KH     Climbing 3-5 steps with a railing?  2  -KH     To walk in hospital room?  3  -KH     AM-PAC 6 Clicks Score (PT)  19  -KH     Row Name 05/02/20 1146          Functional Assessment    Outcome Measure Options  AM-PAC 6 Clicks Basic Mobility (PT)  -       User Key  (r) = Recorded By, (t) = Taken By, (c) = Cosigned By    Initials Name Provider Type    Avis Ellis, PT Physical Therapist          PT Recommendation and Plan     Plan of Care Reviewed With: patient  Outcome Summary: Pt demonstrated improved exercise tolerance today. She ambulated increased ambulation distance, but fatigues quickly. She required a seated rest break to cover for a few minutes after walking 20 ft.      Time Calculation:   PT Charges     Row Name 05/02/20 1147             Time Calculation    Start Time  0920  -      Stop Time  0930  -      Time Calculation (min)  10 min  -      PT Received On  05/02/20  -JAMEY      PT - Next Appointment  05/03/20  -JAMEY         Time Calculation- PT    Total Timed Code Minutes- PT  10 minute(s)  -JAMEY        User Key  (r) = Recorded By, (t) = Taken By, (c) = Cosigned By    Initials Name Provider Type    Avis Ellis, PT Physical Therapist        Therapy Charges for Today      Code Description Service Date Service Provider Modifiers Qty    42639350694  PT THER PROC EA 15 MIN 5/1/2020 Avis Lyles, PT GP 1    42623257683 HC PT THER PROC EA 15 MIN 5/2/2020 Avis Lyles, PT GP 1          PT G-Codes  Outcome Measure Options: AM-PAC 6 Clicks Basic Mobility (PT)  AM-PAC 6 Clicks Score (PT): 19    Avis Lyles, PT  5/2/2020

## 2020-05-02 NOTE — PROGRESS NOTES
Dr. GABY Dhaliwal    [address]    5/2/2020    Patient ID:  Name:  Joellen Dominguez  MRN:  [id number]  1945  75 y.o.  female            CC/Reason for visit: Non-ST elevation myocardial infarction, diarrhea    Interval hx: Has been off of antibiotics now for 2 days.  Still having diarrhea.  GI is evaluating patient.    ROS: Still some diarrhea.  No abdominal pain.  No fever, no chills, no bloody stool    Vitals:  Vitals:    05/02/20 0129 05/02/20 0539 05/02/20 0729 05/02/20 1354   BP: 140/64  144/82 126/62   BP Location: Left arm  Left arm Left arm   Patient Position: Lying  Sitting Lying   Pulse: 58   57   Resp: 18  18 18   Temp: 97.9 °F (36.6 °C)  97.8 °F (36.6 °C) 98.1 °F (36.7 °C)   TempSrc: Oral  Oral Oral   SpO2: 90%  97% 99%   Weight:  68.4 kg (150 lb 14.4 oz)     Height:               Body mass index is 22.95 kg/m².    Intake/Output Summary (Last 24 hours) at 5/2/2020 1457  Last data filed at 5/1/2020 1700  Gross per 24 hour   Intake 120 ml   Output --   Net 120 ml       Exam:  GEN:  No distress  Alert, oriented x 3.   LUNGS: Clear breath sounds bilat, no use of accessory muscles  CV:  Normal S1S2, without murmur, no edema  ABD:  Non tender, no enlarged liver or masses      Scheduled meds:    amiodarone 200 mg Oral Q12H   atorvastatin 10 mg Oral Daily   buPROPion  mg Oral Daily   DULoxetine 60 mg Oral Daily   folic acid 1 mg Oral Daily   levothyroxine 100 mcg Oral Q AM   loperamide 2 mg Oral TID AC   mesalamine 1,000 mg Oral 4x Daily   metoprolol tartrate 50 mg Oral Q12H   pantoprazole 40 mg Oral QAM   predniSONE 40 mg Oral Daily With Breakfast   sodium chloride 10 mL Intravenous Q12H   warfarin 2 mg Oral Once     IV meds:                        Pharmacy to dose warfarin        Data Review:   I reviewed the patient's medications and new clinical results.    COVID19   Date Value Ref Range Status   04/19/2020 Not Detected Not Detected Final         Lab Results   Component Value Date     CALCIUM 8.9 05/02/2020    MG 2.0 04/19/2020     Results from last 7 days   Lab Units 05/02/20  0433 05/01/20  0440 04/30/20  0503 04/30/20  0502  04/28/20  1431  04/26/20  1658 04/26/20  0652   SODIUM mmol/L 141 141  --  140   < >  --    < >  --  139   POTASSIUM mmol/L 3.3* 3.7  --  3.4*   < >  --    < >  --  3.0*   CHLORIDE mmol/L 104 104  --  102   < >  --    < >  --  100   CO2 mmol/L 26.9 26.4  --  26.5   < >  --    < >  --  25.9   BUN mg/dL 22 22  --  23   < >  --    < >  --  24*   CREATININE mg/dL 0.96 0.83  --  0.91   < >  --    < >  --  1.02*   CALCIUM mg/dL 8.9 9.0  --  8.8   < >  --    < >  --  8.5*   GLUCOSE mg/dL 111* 117*  --  112*   < >  --    < >  --  117*   WBC 10*3/mm3 13.75* 14.11* 13.81*  --    < >  --    < >  --  21.61*   HEMOGLOBIN g/dL 8.9* 8.9* 8.3*  --    < >  --    < >  --  8.8*   PLATELETS 10*3/mm3 262 293 279  --    < >  --    < >  --  323   INR  2.64* 2.41*  --  2.31*   < >  --    < >  --  1.27*   PROBNP pg/mL  --   --   --   --   --   --   --   --  1,417.0   PROCALCITONIN ng/mL  --   --   --   --   --  0.13  --  0.13  --     < > = values in this interval not displayed.     Results from last 7 days   Lab Units 04/26/20  2232   BLOODCX  No growth at 5 days  No growth at 5 days         ASSESSMENT:   1. A. fib with RVR  2. Leukocytosis  3. Non-ST elevation MI type I versus 2 with no chest pain  4. Acute diastolic congestive heart failure  5. Acute kidney injury, better  6. Antiphospholipid antibody syndrome with negative DVT work-up  7. Hypothyroidism  8. Hyperlipidemia  9. Acute hypoxic resp failure  10. Lactic acidosis on presentation, resolved  11. Pulmonary hypertension, mild  12. Hyperkalemia  13. Crohn's disease  14.     PLAN:  Patient is new to me during this admission.  She continues to have diarrhea.  Currently her work-up is ongoing by GI consultants.  They have started her on prednisone.  She has a history of Crohn's disease.  Continue Coumadin for anticoagulation, patient has  antiphospholipid syndrome as well as atrial fibrillation.  Has been weaned off of oxygen.  Mobilize patient daily out of bed.  Avoid any antibiotics.  I would like to discharge her as soon as possible if GI not planning on any intervention.  She can follow-up in the office with them as an outpatient.  This patient has several chronic medical conditions, and now several acute medical illnesses.  Extensive amount of data was reviewed.  Images were directly visualized by me.  This patient warrants high complexity medical decision-making.          Matthew Dhaliwal MD  5/2/2020

## 2020-05-02 NOTE — PLAN OF CARE
Problem: Patient Care Overview  Goal: Plan of Care Review  Outcome: Ongoing (interventions implemented as appropriate)  Flowsheets (Taken 5/2/2020 7612)  Outcome Summary: Pt is alert and oriented.  Still having diarrhea.  VSS.  Starting on prednisone this afternoon.  Has not c/o pain.  Able to walk to the BR with assistance and her walker.  Will continue to monitor.  Goal: Individualization and Mutuality  Outcome: Ongoing (interventions implemented as appropriate)  Goal: Discharge Needs Assessment  Outcome: Ongoing (interventions implemented as appropriate)  Goal: Interprofessional Rounds/Family Conf  Outcome: Ongoing (interventions implemented as appropriate)     Problem: Skin Injury Risk (Adult)  Goal: Skin Health and Integrity  Outcome: Ongoing (interventions implemented as appropriate)     Problem: Fall Risk (Adult)  Goal: Absence of Fall  Outcome: Ongoing (interventions implemented as appropriate)     Problem: Arrhythmia/Dysrhythmia (Symptomatic) (Adult)  Goal: Signs and Symptoms of Listed Potential Problems Will be Absent, Minimized or Managed (Arrhythmia/Dysrhythmia)  Outcome: Ongoing (interventions implemented as appropriate)

## 2020-05-02 NOTE — PROGRESS NOTES
Pharmacy Consult Day #9: Warfarin Dosing/ Monitoring    Joellen Dominguez is a 75 y.o. female, who has a past medical history of Acute deep vein thrombosis of lower extremity (CMS/HCC), Antiphospholipid antibody syndrome (CMS/HCC), Antiphospholipid antibody syndrome (CMS/HCC), Arthritis, Benign essential hypertension, COPD (chronic obstructive pulmonary disease) (CMS/HCC), Coronary artery disease, Crohn's disease (CMS/HCC), Cutaneous candidiasis, Depression, Disease of thyroid gland, Elevated cholesterol, GERD (gastroesophageal reflux disease), Hyperlipidemia, Hypertension, Osteopenia, and Polyp, uterus corpus.    Social History     Tobacco Use    Smoking status: Former Smoker     Packs/day: 2.00     Years: 16.00     Pack years: 32.00     Types: Cigarettes     Last attempt to quit: 1967     Years since quittin.3    Smokeless tobacco: Never Used   Substance Use Topics    Alcohol use: No    Drug use: No     Results from last 7 days   Lab Units 20  0433 20  0440 20  0503 20  0502 20  0713 20  0706 20  0502 20  1955 20  1403 20  0706 20  2232 20  1413 20  0652   INR  2.64* 2.41*  --  2.31* 2.41*  --  2.57*  --   --  1.90*  --   --  1.27*   APTT seconds  --   --   --   --   --   --  75.5* 102.3* 96.3* 96.9* 91.7* 50.9* 99.9*   HEMOGLOBIN g/dL 8.9* 8.9* 8.3*  --  8.7* 8.8*  --   --   --  9.5*  --   --  8.8*   HEMATOCRIT % 29.3* 28.7* 26.3*  --  27.9* 28.1*  --   --   --  31.7*  --   --  28.2*   PLATELETS 10*3/mm3 262 293 279  --  312 334  --   --   --  323  --   --  323     Results from last 7 days   Lab Units 203 20  0440 20  0502   SODIUM mmol/L 141 141 140   POTASSIUM mmol/L 3.3* 3.7 3.4*   CHLORIDE mmol/L 104 104 102   CO2 mmol/L 26.9 26.4 26.5   BUN mg/dL 22 22 23   CREATININE mg/dL 0.96 0.83 0.91   CALCIUM mg/dL 8.9 9.0 8.8   GLUCOSE mg/dL 111* 117* 112*     Anticoagulation history: Patient confirmed that warfarin  home dose was 2.5 mg qTSat and 5 mg all other days of the week. She was recently placed on steroids, which necessitated a dose decrease to 2.5 mg daily.    Hospital Anticoagulation:  Consulting provider: Dr. Singleton  Start date: 4/24/20  Indication: Atrial fibrillation  Target INR: 2-3  Expected duration: Lifelong  Bridge Therapy: None                Date 4/24 4/25 4/26 4/27 4/28 4/29 4/30 5/1 5/2    INR 1.13 1.16 1.27 1.9 2.57 2.41 2.31 2.41 2.64    Warfarin dose 2.5 mg 5 mg 5 mg 2.5 mg 1 mg 2.5 mg 2.5 mg 2.5 mg 2 mg      Potential hospital drug interactions:   Amiodarone (major-new medication started in hospital)-concurrent use potentiates anticoagulant response, resulting in an increase in INR and an increased risk of bleeding.   Duloxetine (moderate-home med)-concurrent use may result in altered anticoagulation effects including increased risk of bleeding.  Mesalamine (moderate-home med)-concurrent use may result in decreased warfarin efficacy.  Protonix (moderate-takes PPI at home)-concurrent use inhibits [id number] warfarin metabolism, resulting in an increase in INR and an increased risk of bleeding.   Levothyroxine (minor-home med)-concurrent use increases metabolism of vitamin K-dependent clotting factors, resulting in an increased risk of bleeding.      Home medications not restarted in the hospital with potential interactions:  Fish oil (major)-concurrent use decreases platelet aggregation due to inhibitory effect on thromboxane A2 levels, resulting in an increased risk of bleeding.  Meloxicam (major)-concurrent use can increase risk of bleeding due to additive effect on hemostasis.  Glucosamine/Chondroitin (moderate)-concurrent use may result in elevations of INR and potentiation of anticoagulant effects.  Colesevelam (moderate)-concurrent use reduces absorption of warfarin by nonspecific binding, resulting in decreased INR results.    Relevant nutrition status: Regular cardiac diet w/ Boost  supplements (Breakfast and Dinner)    Other: Doxycycline started on 4/26 and stopped on 4/30    Education complete: No  Date:     Assessment/Plan:  Patient's INR is therapeutic at 2.64, but with amiodarone being added to her regimen, I am going to dose her at 2 mg tonight, then reassess INR tomorrow morning. Patient does have quite a few interactions, both with home medications not restarted in the hospital, and with medications she is on in the hospital. This could affect dosing, so will dose daily at this time. Daily PT/INR has already been ordered. Pharmacy will continue to follow until discharge or discontinuation of warfarin.     Arlen Thompson, Pharm.D., [facility]   Clinical Staff Pharmacist  5/2/2020 11:31 AM

## 2020-05-02 NOTE — PLAN OF CARE
Problem: Patient Care Overview  Goal: Plan of Care Review  Flowsheets (Taken 5/2/2020 1144)  Outcome Summary: Pt demonstrated improved exercise tolerance today. She ambulated increased ambulation distance, but fatigues quickly. She required a seated rest break to cover for a few minutes after walking 20 ft.   Patient was wearing a face mask during this therapy encounter. Therapist used appropriate personal protective equipment including mask and gloves.  Mask used was standard procedure mask. Appropriate PPE was worn during the entire therapy session. Hand hygiene was completed before and after therapy session. Patient is not in enhanced droplet precautions.

## 2020-05-03 LAB
ANION GAP SERPL CALCULATED.3IONS-SCNC: 11.6 MMOL/L (ref 5–15)
BASOPHILS # BLD AUTO: 0.02 10*3/MM3 (ref 0–0.2)
BASOPHILS NFR BLD AUTO: 0.1 % (ref 0–1.5)
BUN BLD-MCNC: 25 MG/DL (ref 8–23)
BUN/CREAT SERPL: 25.3 (ref 7–25)
CALCIUM SPEC-SCNC: 9 MG/DL (ref 8.6–10.5)
CHLORIDE SERPL-SCNC: 104 MMOL/L (ref 98–107)
CO2 SERPL-SCNC: 25.4 MMOL/L (ref 22–29)
CREAT BLD-MCNC: 0.99 MG/DL (ref 0.57–1)
DEPRECATED RDW RBC AUTO: 42 FL (ref 37–54)
EOSINOPHIL # BLD AUTO: 0.01 10*3/MM3 (ref 0–0.4)
EOSINOPHIL NFR BLD AUTO: 0.1 % (ref 0.3–6.2)
ERYTHROCYTE [DISTWIDTH] IN BLOOD BY AUTOMATED COUNT: 14.9 % (ref 12.3–15.4)
GFR SERPL CREATININE-BSD FRML MDRD: 55 ML/MIN/1.73
GLUCOSE BLD-MCNC: 172 MG/DL (ref 65–99)
HCT VFR BLD AUTO: 28.9 % (ref 34–46.6)
HGB BLD-MCNC: 9 G/DL (ref 12–15.9)
IMM GRANULOCYTES # BLD AUTO: 0.24 10*3/MM3 (ref 0–0.05)
IMM GRANULOCYTES NFR BLD AUTO: 1.7 % (ref 0–0.5)
INR PPP: 2.71 (ref 0.9–1.1)
LYMPHOCYTES # BLD AUTO: 0.85 10*3/MM3 (ref 0.7–3.1)
LYMPHOCYTES NFR BLD AUTO: 5.9 % (ref 19.6–45.3)
MCH RBC QN AUTO: 24.9 PG (ref 26.6–33)
MCHC RBC AUTO-ENTMCNC: 31.1 G/DL (ref 31.5–35.7)
MCV RBC AUTO: 79.8 FL (ref 79–97)
MONOCYTES # BLD AUTO: 0.5 10*3/MM3 (ref 0.1–0.9)
MONOCYTES NFR BLD AUTO: 3.5 % (ref 5–12)
NEUTROPHILS # BLD AUTO: 12.71 10*3/MM3 (ref 1.7–7)
NEUTROPHILS NFR BLD AUTO: 88.7 % (ref 42.7–76)
NRBC BLD AUTO-RTO: 0.1 /100 WBC (ref 0–0.2)
PLATELET # BLD AUTO: 268 10*3/MM3 (ref 140–450)
PMV BLD AUTO: 11.8 FL (ref 6–12)
POTASSIUM BLD-SCNC: 4.7 MMOL/L (ref 3.5–5.2)
PROTHROMBIN TIME: 28.4 SECONDS (ref 11.7–14.2)
RBC # BLD AUTO: 3.62 10*6/MM3 (ref 3.77–5.28)
SODIUM BLD-SCNC: 141 MMOL/L (ref 136–145)
WBC NRBC COR # BLD: 14.33 10*3/MM3 (ref 3.4–10.8)

## 2020-05-03 PROCEDURE — 63710000001 PREDNISONE PER 1 MG: Performed by: INTERNAL MEDICINE

## 2020-05-03 PROCEDURE — 85610 PROTHROMBIN TIME: CPT | Performed by: INTERNAL MEDICINE

## 2020-05-03 PROCEDURE — 99232 SBSQ HOSP IP/OBS MODERATE 35: CPT | Performed by: INTERNAL MEDICINE

## 2020-05-03 PROCEDURE — 80048 BASIC METABOLIC PNL TOTAL CA: CPT | Performed by: INTERNAL MEDICINE

## 2020-05-03 PROCEDURE — 85025 COMPLETE CBC W/AUTO DIFF WBC: CPT | Performed by: INTERNAL MEDICINE

## 2020-05-03 RX ORDER — WARFARIN SODIUM 1 MG/1
1 TABLET ORAL
Status: DISCONTINUED | OUTPATIENT
Start: 2020-05-03 | End: 2020-05-03

## 2020-05-03 RX ORDER — PHYTONADIONE 5 MG/1
5 TABLET ORAL ONCE
Status: COMPLETED | OUTPATIENT
Start: 2020-05-03 | End: 2020-05-03

## 2020-05-03 RX ORDER — AMIODARONE HYDROCHLORIDE 200 MG/1
200 TABLET ORAL
Status: DISCONTINUED | OUTPATIENT
Start: 2020-05-04 | End: 2020-05-07 | Stop reason: HOSPADM

## 2020-05-03 RX ADMIN — MESALAMINE 1000 MG: 250 CAPSULE ORAL at 17:01

## 2020-05-03 RX ADMIN — METOPROLOL TARTRATE 50 MG: 25 TABLET ORAL at 21:23

## 2020-05-03 RX ADMIN — SODIUM CHLORIDE, PRESERVATIVE FREE 10 ML: 5 INJECTION INTRAVENOUS at 08:28

## 2020-05-03 RX ADMIN — LOPERAMIDE HYDROCHLORIDE 2 MG: 2 CAPSULE ORAL at 11:47

## 2020-05-03 RX ADMIN — MESALAMINE 1000 MG: 250 CAPSULE ORAL at 21:23

## 2020-05-03 RX ADMIN — PHYTONADIONE 5 MG: 5 TABLET ORAL at 18:21

## 2020-05-03 RX ADMIN — SODIUM CHLORIDE, PRESERVATIVE FREE 10 ML: 5 INJECTION INTRAVENOUS at 21:23

## 2020-05-03 RX ADMIN — LOPERAMIDE HYDROCHLORIDE 2 MG: 2 CAPSULE ORAL at 17:01

## 2020-05-03 RX ADMIN — BUPROPION HYDROCHLORIDE 150 MG: 150 TABLET, FILM COATED, EXTENDED RELEASE ORAL at 08:28

## 2020-05-03 RX ADMIN — FOLIC ACID 1 MG: 1 TABLET ORAL at 08:27

## 2020-05-03 RX ADMIN — ATORVASTATIN CALCIUM 10 MG: 20 TABLET, FILM COATED ORAL at 08:27

## 2020-05-03 RX ADMIN — MESALAMINE 1000 MG: 250 CAPSULE ORAL at 11:46

## 2020-05-03 RX ADMIN — MESALAMINE 1000 MG: 250 CAPSULE ORAL at 08:27

## 2020-05-03 RX ADMIN — POTASSIUM CHLORIDE 40 MEQ: 10 CAPSULE, COATED, EXTENDED RELEASE ORAL at 00:24

## 2020-05-03 RX ADMIN — AMIODARONE HYDROCHLORIDE 200 MG: 200 TABLET ORAL at 08:28

## 2020-05-03 RX ADMIN — PREDNISONE 40 MG: 20 TABLET ORAL at 08:27

## 2020-05-03 RX ADMIN — LOPERAMIDE HYDROCHLORIDE 2 MG: 2 CAPSULE ORAL at 06:53

## 2020-05-03 RX ADMIN — LEVOTHYROXINE SODIUM 100 MCG: 100 TABLET ORAL at 06:53

## 2020-05-03 RX ADMIN — DULOXETINE HYDROCHLORIDE 60 MG: 60 CAPSULE, DELAYED RELEASE ORAL at 08:28

## 2020-05-03 RX ADMIN — METOPROLOL TARTRATE 50 MG: 25 TABLET ORAL at 08:27

## 2020-05-03 NOTE — PROGRESS NOTES
Pharmacy Consult Day #10: Warfarin Dosing/ Monitoring    Joellen Dominguez is a 75 y.o. female, who has a past medical history of Acute deep vein thrombosis of lower extremity (CMS/HCC), Antiphospholipid antibody syndrome (CMS/HCC), Antiphospholipid antibody syndrome (CMS/HCC), Arthritis, Benign essential hypertension, COPD (chronic obstructive pulmonary disease) (CMS/HCC), Coronary artery disease, Crohn's disease (CMS/HCC), Cutaneous candidiasis, Depression, Disease of thyroid gland, Elevated cholesterol, GERD (gastroesophageal reflux disease), Hyperlipidemia, Hypertension, Osteopenia, and Polyp, uterus corpus.    Social History     Tobacco Use    Smoking status: Former Smoker     Packs/day: 2.00     Years: 16.00     Pack years: 32.00     Types: Cigarettes     Last attempt to quit: 1967     Years since quittin.3    Smokeless tobacco: Never Used   Substance Use Topics    Alcohol use: No    Drug use: No     Results from last 7 days   Lab Units 20  0433 20  0440 20  0503 20  0502 20  0713 20  0706 20  0502 20  1955 20  1403 20  0706 20  2232 20  1413   INR  2.71* 2.64* 2.41*  --  2.31* 2.41*  --  2.57*  --   --  1.90*  --   --    APTT seconds  --   --   --   --   --   --   --  75.5* 102.3* 96.3* 96.9* 91.7* 50.9*   HEMOGLOBIN g/dL 9.0* 8.9* 8.9* 8.3*  --  8.7* 8.8*  --   --   --  9.5*  --   --    HEMATOCRIT % 28.9* 29.3* 28.7* 26.3*  --  27.9* 28.1*  --   --   --  31.7*  --   --    PLATELETS 10*3/mm3 268 262 293 279  --  312 334  --   --   --  323  --   --      Results from last 7 days   Lab Units 20  0433 20  0440   SODIUM mmol/L 141 141 141   POTASSIUM mmol/L 4.7 3.3* 3.7   CHLORIDE mmol/L 104 104 104   CO2 mmol/L 25.4 26.9 26.4   BUN mg/dL 25* 22 22   CREATININE mg/dL 0.99 0.96 0.83   CALCIUM mg/dL 9.0 8.9 9.0   GLUCOSE mg/dL 172* 111* 117*     Anticoagulation history: Patient confirmed that warfarin  home dose was 2.5 mg qTSat and 5 mg all other days of the week. She was recently placed on steroids, which necessitated a dose decrease to 2.5 mg daily.    Hospital Anticoagulation:  Consulting provider: Dr. Singleton  Start date: 4/24/20  Indication: Atrial fibrillation  Target INR: 2-3  Expected duration: Lifelong  Bridge Therapy: None                Date 4/24 4/25 4/26 4/27 4/28 4/29 4/30 5/1 5/2 5/3   INR 1.13 1.16 1.27 1.9 2.57 2.41 2.31 2.41 2.64 2.71   Warfarin dose 2.5 mg 5 mg 5 mg 2.5 mg 1 mg 2.5 mg 2.5 mg 2.5 mg 2 mg 1 mg     Potential hospital drug interactions:   Amiodarone (major-new medication started in hospital)-concurrent use potentiates anticoagulant response, resulting in an increase in INR and an increased risk of bleeding.   Duloxetine (moderate-home med)-concurrent use may result in altered anticoagulation effects including increased risk of bleeding.  Mesalamine (moderate-home med)-concurrent use may result in decreased warfarin efficacy.  Protonix (moderate-takes PPI at home)-concurrent use inhibits [id number] warfarin metabolism, resulting in an increase in INR and an increased risk of bleeding.   Levothyroxine (minor-home med)-concurrent use increases metabolism of vitamin K-dependent clotting factors, resulting in an increased risk of bleeding.      Home medications not restarted in the hospital with potential interactions:  Fish oil (major)-concurrent use decreases platelet aggregation due to inhibitory effect on thromboxane A2 levels, resulting in an increased risk of bleeding.  Meloxicam (major)-concurrent use can increase risk of bleeding due to additive effect on hemostasis.  Glucosamine/Chondroitin (moderate)-concurrent use may result in elevations of INR and potentiation of anticoagulant effects.  Colesevelam (moderate)-concurrent use reduces absorption of warfarin by nonspecific binding, resulting in decreased INR results.    Relevant nutrition status: Regular cardiac diet  w/ Boost supplements (Breakfast and Dinner)    Other: Doxycycline started on 4/26 and stopped on 4/30    Education complete: No  Date:     Assessment/Plan:  Patient's INR is therapeutic at 2.71, but continues to trend up due to new amiodarone. I am going to dose her at 1 mg tonight to try and stop upward trend from continuing past 3, then reassess INR tomorrow morning. Patient does have quite a few interactions, both with home medications not restarted in the hospital, and with medications she is on in the hospital. This could affect dosing, so will dose daily at this time. Daily PT/INR has already been ordered. Pharmacy will continue to follow until discharge or discontinuation of warfarin.     Arlen Thompson, Pharm.D., [facility]   Clinical Staff Pharmacist  5/3/2020 9:13 AM

## 2020-05-03 NOTE — PLAN OF CARE
Problem: Patient Care Overview  Goal: Plan of Care Review  Outcome: Ongoing (interventions implemented as appropriate)  Flowsheets (Taken 5/3/2020 9448)  Outcome Summary: Pt is alert and oriented.  Still having frequent stools.  VSS, not requiring O2.  HR sinus rhythm.  Has not c/o pain.  Will continue to monitor.  Goal: Individualization and Mutuality  Outcome: Ongoing (interventions implemented as appropriate)  Goal: Discharge Needs Assessment  Outcome: Ongoing (interventions implemented as appropriate)  Goal: Interprofessional Rounds/Family Conf  Outcome: Ongoing (interventions implemented as appropriate)     Problem: Skin Injury Risk (Adult)  Goal: Skin Health and Integrity  Outcome: Ongoing (interventions implemented as appropriate)     Problem: Fall Risk (Adult)  Goal: Absence of Fall  Outcome: Ongoing (interventions implemented as appropriate)     Problem: Arrhythmia/Dysrhythmia (Symptomatic) (Adult)  Goal: Signs and Symptoms of Listed Potential Problems Will be Absent, Minimized or Managed (Arrhythmia/Dysrhythmia)  Outcome: Ongoing (interventions implemented as appropriate)

## 2020-05-03 NOTE — NURSING NOTE
Pt toes on both feet are turning a dark purple color.  She has a large bruise/hematoma on the inside of her right arm. Pt does take coumadin.  Will surendra MCDONOUGH to let him know.  KENTON Puckett RN

## 2020-05-03 NOTE — PLAN OF CARE
Patient  resting  at this  time.  States  little  abdominal  cramp  but  declines  medication.  Alert  and  able  to  make  needs  known.  Had  2   doses  of  oral  potassium  and due    re-draw  this  am.  No  loose  stool  so  far  this  shift..   Has  been  SB to  SR  on  monitor.  Nursing  will  continue  to  monitor.  Problem: Patient Care Overview  Goal: Plan of Care Review  Outcome: Ongoing (interventions implemented as appropriate)  Flowsheets (Taken 5/2/2020 0345)  Progress: improving  Goal: Interprofessional Rounds/Family Conf  Outcome: Ongoing (interventions implemented as appropriate)  Flowsheets (Taken 5/2/2020 0345)  Participants: nursing;patient     Problem: Skin Injury Risk (Adult)  Goal: Skin Health and Integrity  Outcome: Ongoing (interventions implemented as appropriate)  Flowsheets (Taken 5/3/2020 0515)  Skin Health and Integrity: making progress toward outcome     Problem: Fall Risk (Adult)  Goal: Absence of Fall  Outcome: Ongoing (interventions implemented as appropriate)  Flowsheets (Taken 5/3/2020 0515)  Absence of Fall: making progress toward outcome     Problem: Arrhythmia/Dysrhythmia (Symptomatic) (Adult)  Goal: Signs and Symptoms of Listed Potential Problems Will be Absent, Minimized or Managed (Arrhythmia/Dysrhythmia)  Outcome: Ongoing (interventions implemented as appropriate)  Flowsheets (Taken 5/3/2020 0515)  Problems Assessed (Arrhythmia/Dysrhythmia): all  Problems Present (Dysrhythmia): none

## 2020-05-03 NOTE — SIGNIFICANT NOTE
05/03/20 1013   Rehab Treatment   Discipline physical therapist   Reason Treatment Not Performed patient/family declined treatment  (Pt refused stating she has been up to the bathroom multiple times already this morning and continues to have diarrhea. Will follow up tomorrow)   Recommendation   PT - Next Appointment 05/04/20

## 2020-05-03 NOTE — PROGRESS NOTES
[facility]/[place]     Inpatient Follow Up Note    Patient Identification:  Name: Joellen Dominguez  Age: 75 y.o.  Sex: female  :  1945  MRN: [id number]    Information from:patient     CC: Crohn's     History:   Patient really not doing any better.  Continues to have multiple diarrhea stools throughout the day and abdominal cramping.  She says that she feels mentally like she lost 12 hours.  We discussed that I wonder if this might represent a neurologic side effect of her steroids.  In any case oral steroid therapy does not seem to have impacted upon her care at this point.  However, she is only had 2 doses of it.    Review of Systems:  Constitutional:  Negative   Cardiovascular:  Negative   Respiratory:  Negative             Problem List:  Patient Active Problem List    Diagnosis   • *SOB (shortness of breath) [R06.02]   • PAF (paroxysmal atrial fibrillation) (CMS/HCC) [I48.0]   • History of cardioversion [Z98.890]   • Leukocytosis [D72.829]   • Pulmonary HTN (CMS/HCC) [I27.20]   • Sepsis with acute renal failure and septic shock (CMS/HCC) [A41.9, R65.21, N17.9]   • Myocardial infarction type 2 (CMS/HCC) [I21.A1]   • Lichen sclerosus et atrophicus [L90.0]   • Incontinence [R32]   • Other hyperlipidemia [E78.49]   • Essential hypertension [I10]   • Depression [F32.9]   • Antiphospholipid antibody syndrome (CMS/HCC) [D68.61]   • Degeneration of lumbar intervertebral disc [M51.36]   • Crohn's disease (CMS/HCC) [K50.90]     Current Meds:  MAR Reviewed  Scheduled Meds:  amiodarone 200 mg Oral Q12H   atorvastatin 10 mg Oral Daily   buPROPion  mg Oral Daily   DULoxetine 60 mg Oral Daily   folic acid 1 mg Oral Daily   levothyroxine 100 mcg Oral Q AM   loperamide 2 mg Oral TID AC   mesalamine 1,000 mg Oral 4x Daily   metoprolol tartrate 50 mg Oral Q12H   predniSONE 40 mg Oral Daily With Breakfast   sodium chloride 10 mL Intravenous Q12H   warfarin 1 mg Oral Once     Continuous  "Infusions:  Pharmacy to dose warfarin      PRN Meds:.•  acetaminophen **OR** acetaminophen  •  acetaminophen  •  ipratropium-albuterol  •  metoprolol tartrate  •  ondansetron  •  Pharmacy to dose warfarin  •  potassium chloride **OR** potassium chloride **OR** potassium chloride  •  [COMPLETED] Insert peripheral IV **AND** sodium chloride  •  sodium chloride  Allergies:  Allergies   Allergen Reactions   • Sulfa Antibiotics Hives   • Infliximab Rash       Intake/Output:     Intake/Output Summary (Last 24 hours) at 5/3/2020 1301  Last data filed at 5/3/2020 0829  Gross per 24 hour   Intake 120 ml   Output --   Net 120 ml     New allergies/reactions:  None    Physical Exam:  Vitals:   Temp (24hrs), Av °F (36.7 °C), Min:97.7 °F (36.5 °C), Max:98.3 °F (36.8 °C)    Temp:  [97.7 °F (36.5 °C)-98.3 °F (36.8 °C)] 97.9 °F (36.6 °C)  Heart Rate:  [57-66] 64  Resp:  [18-20] 20  BP: (113-157)/(62-88) 113/68  /68 (Patient Position: Lying)   Pulse 64   Temp 97.9 °F (36.6 °C) (Oral)   Resp 20   Ht 172.7 cm (67.99\")   Wt 68 kg (150 lb)   SpO2 98%   BMI 22.81 kg/m²     Exam:  NAD  PERRLA. Sclerae and conjunctivae normal  HENT: external inspection normal. Hearing intact.  No respiratory distress.   Alert, oriented, normal affect.         DATA:  Radiology and Labs:   Recent Results (from the past 24 hour(s))   Basic Metabolic Panel    Collection Time: 20  4:45 AM   Result Value Ref Range    Glucose 172 (H) 65 - 99 mg/dL    BUN 25 (H) 8 - 23 mg/dL    Creatinine 0.99 0.57 - 1.00 mg/dL    Sodium 141 136 - 145 mmol/L    Potassium 4.7 3.5 - 5.2 mmol/L    Chloride 104 98 - 107 mmol/L    CO2 25.4 22.0 - 29.0 mmol/L    Calcium 9.0 8.6 - 10.5 mg/dL    eGFR Non African Amer 55 (L) >60 mL/min/1.73    BUN/Creatinine Ratio 25.3 (H) 7.0 - 25.0    Anion Gap 11.6 5.0 - 15.0 mmol/L   Protime-INR    Collection Time: 20  4:45 AM   Result Value Ref Range    Protime 28.4 (H) 11.7 - 14.2 Seconds    INR 2.71 (H) 0.90 - 1.10   CBC " Auto Differential    Collection Time: 05/03/20  4:45 AM   Result Value Ref Range    WBC 14.33 (H) 3.40 - 10.80 10*3/mm3    RBC 3.62 (L) 3.77 - 5.28 10*6/mm3    Hemoglobin 9.0 (L) 12.0 - 15.9 g/dL    Hematocrit 28.9 (L) 34.0 - 46.6 %    MCV 79.8 79.0 - 97.0 fL    MCH 24.9 (L) 26.6 - 33.0 pg    MCHC 31.1 (L) 31.5 - 35.7 g/dL    RDW 14.9 12.3 - 15.4 %    RDW-SD 42.0 37.0 - 54.0 fl    MPV 11.8 6.0 - 12.0 fL    Platelets 268 140 - 450 10*3/mm3    Neutrophil % 88.7 (H) 42.7 - 76.0 %    Lymphocyte % 5.9 (L) 19.6 - 45.3 %    Monocyte % 3.5 (L) 5.0 - 12.0 %    Eosinophil % 0.1 (L) 0.3 - 6.2 %    Basophil % 0.1 0.0 - 1.5 %    Immature Grans % 1.7 (H) 0.0 - 0.5 %    Neutrophils, Absolute 12.71 (H) 1.70 - 7.00 10*3/mm3    Lymphocytes, Absolute 0.85 0.70 - 3.10 10*3/mm3    Monocytes, Absolute 0.50 0.10 - 0.90 10*3/mm3    Eosinophils, Absolute 0.01 0.00 - 0.40 10*3/mm3    Basophils, Absolute 0.02 0.00 - 0.20 10*3/mm3    Immature Grans, Absolute 0.24 (H) 0.00 - 0.05 10*3/mm3    nRBC 0.1 0.0 - 0.2 /100 WBC       Assessment:   Problem List:     SOB (shortness of breath)    Essential hypertension    Sepsis with acute renal failure and septic shock (CMS/HCC)    Myocardial infarction type 2 (CMS/HCC)    PAF (paroxysmal atrial fibrillation) (CMS/HCC)    History of cardioversion    Leukocytosis    Pulmonary HTN (CMS/HCC)    Crohn's disease.  She has been treated with Stelara recently.  This was her first dose.  She is now on oral steroids.  She may have had some neurologic side effects from them.    Plan:   Continue current therapy for now.  She may need to get IV steroids.  It is noted that her C. difficile toxin assay was negative just a few days ago.      Mahamed Rendon MD  [facility] [place]  5/3/2020

## 2020-05-03 NOTE — NURSING NOTE
Spoke with Dr. Dhaliwal and he is now aware of patient condition regarding bruise/hematoma and purple discoloration of toes and knuckles.  See ayaka.  KENTON Puckett RN

## 2020-05-03 NOTE — PROGRESS NOTES
Dr. GABY Dhaliwal    [address]    5/3/2020    Patient ID:  Name:  Joellen Dominguez  MRN:  [id number]  1945  75 y.o.  female            CC/Reason for visit: Exacerbation of Crohn's disease    Interval hx: Continues to have diarrhea.  Had 7 bowel movements this morning since 6 AM until 1 PM.  She is having some abdominal cramping.  Denies blood in her bowel movement.  On prednisone as per GI    ROS: Having some abdominal cramping, no fever, no vomiting    Vitals:  Vitals:    05/03/20 0353 05/03/20 0829 05/03/20 1000 05/03/20 1300   BP: 157/88 113/68  125/72   BP Location: Left arm   Left arm   Patient Position: Lying Lying  Sitting   Pulse: 66 64  63   Resp: 18 20  18   Temp:   97.9 °F (36.6 °C) 97.9 °F (36.6 °C)   TempSrc:   Oral Oral   SpO2: 95% 98%  100%   Weight:       Height:               Body mass index is 22.81 kg/m².    Intake/Output Summary (Last 24 hours) at 5/3/2020 1408  Last data filed at 5/3/2020 1100  Gross per 24 hour   Intake 360 ml   Output --   Net 360 ml       Exam:  GEN:  No distress  Alert, oriented x 3.   LUNGS: Clear breath sounds bilat, no use of accessory muscles  CV:  Normal S1S2, without murmur, no edema  ABD:  Mildly tender, no rebound, no enlarged liver or masses      Scheduled meds:    amiodarone 200 mg Oral Q12H   atorvastatin 10 mg Oral Daily   buPROPion  mg Oral Daily   DULoxetine 60 mg Oral Daily   folic acid 1 mg Oral Daily   levothyroxine 100 mcg Oral Q AM   loperamide 2 mg Oral TID AC   mesalamine 1,000 mg Oral 4x Daily   metoprolol tartrate 50 mg Oral Q12H   predniSONE 40 mg Oral Daily With Breakfast   sodium chloride 10 mL Intravenous Q12H   warfarin 1 mg Oral Once     IV meds:                        Pharmacy to dose warfarin        Data Review:   I reviewed the patient's medications and new clinical results.    COVID19   Date Value Ref Range Status   04/19/2020 Not Detected Not Detected Final         Lab Results   Component Value Date    CALCIUM 9.0  05/03/2020    MG 2.0 04/19/2020     Results from last 7 days   Lab Units 05/03/20  0445 05/02/20  0433 05/01/20  0440  04/28/20  1431  04/26/20  1658   SODIUM mmol/L 141 141 141   < >  --    < >  --    POTASSIUM mmol/L 4.7 3.3* 3.7   < >  --    < >  --    CHLORIDE mmol/L 104 104 104   < >  --    < >  --    CO2 mmol/L 25.4 26.9 26.4   < >  --    < >  --    BUN mg/dL 25* 22 22   < >  --    < >  --    CREATININE mg/dL 0.99 0.96 0.83   < >  --    < >  --    CALCIUM mg/dL 9.0 8.9 9.0   < >  --    < >  --    GLUCOSE mg/dL 172* 111* 117*   < >  --    < >  --    WBC 10*3/mm3 14.33* 13.75* 14.11*   < >  --    < >  --    HEMOGLOBIN g/dL 9.0* 8.9* 8.9*   < >  --    < >  --    PLATELETS 10*3/mm3 268 262 293   < >  --    < >  --    INR  2.71* 2.64* 2.41*   < >  --    < >  --    PROCALCITONIN ng/mL  --   --   --   --  0.13  --  0.13    < > = values in this interval not displayed.     Results from last 7 days   Lab Units 04/26/20  2232   BLOODCX  No growth at 5 days  No growth at 5 days     Results from last 7 days   Lab Units 04/29/20  0901   ADENOVIRUS  Not Detected           ASSESSMENT:   Acute exacerbation of Crohn's disease, diarrhea  1. A. fib with RVR  2. Leukocytosis  3. Non-ST elevation MI type I versus 2 with no chest pain  4. Acute diastolic congestive heart failure  5. Acute kidney injury, better  6. Antiphospholipid antibody syndrome with negative DVT work-up  7. Hypothyroidism  8. Hyperlipidemia  9. Acute hypoxic resp failure  10. Lactic acidosis on presentation, resolved  11. Pulmonary hypertension, mild  12. Hyperkalemia      PLAN:  Continues to have multiple episodes of diarrhea throughout the day.  She is still having some cramping.  Continue oral prednisone as per GI management.  Continue to monitor.  May need intravenous steroids.      Matthew Dhaliwal MD  5/3/2020

## 2020-05-04 LAB
ANION GAP SERPL CALCULATED.3IONS-SCNC: 8.7 MMOL/L (ref 5–15)
BASOPHILS # BLD AUTO: 0.04 10*3/MM3 (ref 0–0.2)
BASOPHILS NFR BLD AUTO: 0.3 % (ref 0–1.5)
BUN BLD-MCNC: 26 MG/DL (ref 8–23)
BUN/CREAT SERPL: 23.2 (ref 7–25)
CALCIUM SPEC-SCNC: 9.1 MG/DL (ref 8.6–10.5)
CHLORIDE SERPL-SCNC: 103 MMOL/L (ref 98–107)
CO2 SERPL-SCNC: 26.3 MMOL/L (ref 22–29)
CREAT BLD-MCNC: 1.12 MG/DL (ref 0.57–1)
DEPRECATED RDW RBC AUTO: 42.7 FL (ref 37–54)
EOSINOPHIL # BLD AUTO: 0.02 10*3/MM3 (ref 0–0.4)
EOSINOPHIL NFR BLD AUTO: 0.1 % (ref 0.3–6.2)
ERYTHROCYTE [DISTWIDTH] IN BLOOD BY AUTOMATED COUNT: 15.1 % (ref 12.3–15.4)
GFR SERPL CREATININE-BSD FRML MDRD: 47 ML/MIN/1.73
GLUCOSE BLD-MCNC: 113 MG/DL (ref 65–99)
HCT VFR BLD AUTO: 29.2 % (ref 34–46.6)
HGB BLD-MCNC: 8.8 G/DL (ref 12–15.9)
IMM GRANULOCYTES # BLD AUTO: 0.25 10*3/MM3 (ref 0–0.05)
IMM GRANULOCYTES NFR BLD AUTO: 1.6 % (ref 0–0.5)
INR PPP: 2.62 (ref 0.9–1.1)
LYMPHOCYTES # BLD AUTO: 1.45 10*3/MM3 (ref 0.7–3.1)
LYMPHOCYTES NFR BLD AUTO: 9.3 % (ref 19.6–45.3)
MCH RBC QN AUTO: 24 PG (ref 26.6–33)
MCHC RBC AUTO-ENTMCNC: 30.1 G/DL (ref 31.5–35.7)
MCV RBC AUTO: 79.8 FL (ref 79–97)
MONOCYTES # BLD AUTO: 1 10*3/MM3 (ref 0.1–0.9)
MONOCYTES NFR BLD AUTO: 6.4 % (ref 5–12)
NEUTROPHILS # BLD AUTO: 12.84 10*3/MM3 (ref 1.7–7)
NEUTROPHILS NFR BLD AUTO: 82.3 % (ref 42.7–76)
NRBC BLD AUTO-RTO: 0 /100 WBC (ref 0–0.2)
PLATELET # BLD AUTO: 255 10*3/MM3 (ref 140–450)
PMV BLD AUTO: 10.3 FL (ref 6–12)
POTASSIUM BLD-SCNC: 4.1 MMOL/L (ref 3.5–5.2)
PROTHROMBIN TIME: 27.7 SECONDS (ref 11.7–14.2)
RBC # BLD AUTO: 3.66 10*6/MM3 (ref 3.77–5.28)
SODIUM BLD-SCNC: 138 MMOL/L (ref 136–145)
WBC NRBC COR # BLD: 15.6 10*3/MM3 (ref 3.4–10.8)

## 2020-05-04 PROCEDURE — 85610 PROTHROMBIN TIME: CPT | Performed by: INTERNAL MEDICINE

## 2020-05-04 PROCEDURE — 80048 BASIC METABOLIC PNL TOTAL CA: CPT | Performed by: INTERNAL MEDICINE

## 2020-05-04 PROCEDURE — 85025 COMPLETE CBC W/AUTO DIFF WBC: CPT | Performed by: INTERNAL MEDICINE

## 2020-05-04 PROCEDURE — 63710000001 PREDNISONE PER 1 MG: Performed by: INTERNAL MEDICINE

## 2020-05-04 PROCEDURE — 99232 SBSQ HOSP IP/OBS MODERATE 35: CPT | Performed by: INTERNAL MEDICINE

## 2020-05-04 RX ORDER — WARFARIN SODIUM 1 MG/1
1 TABLET ORAL
Status: COMPLETED | OUTPATIENT
Start: 2020-05-04 | End: 2020-05-04

## 2020-05-04 RX ADMIN — LOPERAMIDE HYDROCHLORIDE 2 MG: 2 CAPSULE ORAL at 17:33

## 2020-05-04 RX ADMIN — PREDNISONE 40 MG: 20 TABLET ORAL at 08:19

## 2020-05-04 RX ADMIN — MESALAMINE 1000 MG: 250 CAPSULE ORAL at 08:19

## 2020-05-04 RX ADMIN — SODIUM CHLORIDE, PRESERVATIVE FREE 10 ML: 5 INJECTION INTRAVENOUS at 20:25

## 2020-05-04 RX ADMIN — MESALAMINE 1000 MG: 250 CAPSULE ORAL at 17:33

## 2020-05-04 RX ADMIN — MESALAMINE 1000 MG: 250 CAPSULE ORAL at 12:01

## 2020-05-04 RX ADMIN — LOPERAMIDE HYDROCHLORIDE 2 MG: 2 CAPSULE ORAL at 12:01

## 2020-05-04 RX ADMIN — FOLIC ACID 1 MG: 1 TABLET ORAL at 08:19

## 2020-05-04 RX ADMIN — LOPERAMIDE HYDROCHLORIDE 2 MG: 2 CAPSULE ORAL at 05:52

## 2020-05-04 RX ADMIN — DULOXETINE HYDROCHLORIDE 60 MG: 60 CAPSULE, DELAYED RELEASE ORAL at 08:19

## 2020-05-04 RX ADMIN — MESALAMINE 1000 MG: 250 CAPSULE ORAL at 20:24

## 2020-05-04 RX ADMIN — LEVOTHYROXINE SODIUM 100 MCG: 100 TABLET ORAL at 05:51

## 2020-05-04 RX ADMIN — AMIODARONE HYDROCHLORIDE 200 MG: 200 TABLET ORAL at 08:19

## 2020-05-04 RX ADMIN — BUPROPION HYDROCHLORIDE 150 MG: 150 TABLET, FILM COATED, EXTENDED RELEASE ORAL at 08:19

## 2020-05-04 RX ADMIN — SODIUM CHLORIDE, PRESERVATIVE FREE 10 ML: 5 INJECTION INTRAVENOUS at 08:19

## 2020-05-04 RX ADMIN — METOPROLOL TARTRATE 50 MG: 25 TABLET ORAL at 20:24

## 2020-05-04 RX ADMIN — WARFARIN SODIUM 1 MG: 1 TABLET ORAL at 17:33

## 2020-05-04 RX ADMIN — METOPROLOL TARTRATE 50 MG: 25 TABLET ORAL at 08:19

## 2020-05-04 RX ADMIN — ATORVASTATIN CALCIUM 10 MG: 20 TABLET, FILM COATED ORAL at 08:19

## 2020-05-04 NOTE — PROGRESS NOTES
Pharmacy Consult: Warfarin Dosing/ Monitoring    Joellen Dominguez is a 75 y.o. female, estimated creatinine clearance is 45.7 mL/min (A) (by C-G formula based on SCr of 1.12 mg/dL (H)). weighing 67.7 kg (149 lb 4.8 oz).     has a past medical history of Acute deep vein thrombosis of lower extremity (CMS/HCC), Antiphospholipid antibody syndrome (CMS/HCC), Antiphospholipid antibody syndrome (CMS/HCC), Arthritis, Benign essential hypertension, COPD (chronic obstructive pulmonary disease) (CMS/HCC), Coronary artery disease, Crohn's disease (CMS/HCC), Cutaneous candidiasis, Depression, Disease of thyroid gland, Elevated cholesterol, GERD (gastroesophageal reflux disease), Hyperlipidemia, Hypertension, Osteopenia, and Polyp, uterus corpus.    Social History     Tobacco Use    Smoking status: Former Smoker     Packs/day: 2.00     Years: 16.00     Pack years: 32.00     Types: Cigarettes     Last attempt to quit: 1967     Years since quittin.3    Smokeless tobacco: Never Used   Substance Use Topics    Alcohol use: No    Drug use: No     Results from last 7 days   Lab Units 205 20  0433 20  0440 20  0503 20  0502 20  0713 20  0706 20  0502 20  1955   INR  2.62* 2.71* 2.64* 2.41*  --  2.31* 2.41*  --  2.57*  --    APTT seconds  --   --   --   --   --   --   --   --  75.5* 102.3*   HEMOGLOBIN g/dL 8.8* 9.0* 8.9* 8.9* 8.3*  --  8.7* 8.8*  --   --    HEMATOCRIT % 29.2* 28.9* 29.3* 28.7* 26.3*  --  27.9* 28.1*  --   --    PLATELETS 10*3/mm3 255 268 262 293 279  --  312 334  --   --      Results from last 7 days   Lab Units 205 20  0433   SODIUM mmol/L 138 141 141   POTASSIUM mmol/L 4.1 4.7 3.3*   CHLORIDE mmol/L 103 104 104   CO2 mmol/L 26.3 25.4 26.9   BUN mg/dL 26* 25* 22   CREATININE mg/dL 1.12* 0.99 0.96   CALCIUM mg/dL 9.1 9.0 8.9   GLUCOSE mg/dL 113* 172* 111*     Anticoagulation history: Patient confirmed that  warfarin home dose was 2.5 mg qTSat and 5 mg all other days of the week. She was recently placed on steroids, which necessitated a dose decrease to 2.5 mg daily.    Hospital Anticoagulation:  Consulting provider: Dr. Singleton  Start date: 4/24/20  Indication: Atrial fibrillation  Target INR: 2-3  Expected duration: Lifelong  Bridge Therapy: None                Date 4/24 4/25 4/26 4/27 4/28 4/29 4/30 5/1 5/2 5/3 5/4   INR 1.13 1.16 1.27 1.9 2.57 2.41 2.31 2.41 2.64 2.71 2.62   Warfarin dose 2.5 mg 5 mg 5 mg 2.5 mg 1 mg 2.5 mg 2.5 mg 2.5 mg 2 mg 1 mg 1 mg     Potential hospital drug interactions:   Amiodarone (major-new medication started in hospital)-concurrent use potentiates anticoagulant response, resulting in an increase in INR and an increased risk of bleeding.   Duloxetine (moderate-home med)-concurrent use may result in altered anticoagulation effects including increased risk of bleeding.  Mesalamine (moderate-home med)-concurrent use may result in decreased warfarin efficacy.  Protonix (moderate-takes PPI at home)-concurrent use inhibits [id number]-ocjhpopv warfarin metabolism, resulting in an increase in INR and an increased risk of bleeding.   Levothyroxine (minor-home med)-concurrent use increases metabolism of vitamin K-dependent clotting factors, resulting in an increased risk of bleeding.   Prednisone (new medication started in hospital)- may result in increased risk of bleeding or diminished effects of warfarin.    Home medications not restarted in the hospital with potential interactions:  Fish oil (major)-concurrent use decreases platelet aggregation due to inhibitory effect on thromboxane A2 levels, resulting in an increased risk of bleeding.  Meloxicam (major)-concurrent use can increase risk of bleeding due to additive effect on hemostasis.  Glucosamine/Chondroitin (moderate)-concurrent use may result in elevations of INR and potentiation of anticoagulant effects.  Colesevelam  (moderate)-concurrent use reduces absorption of warfarin by nonspecific binding, resulting in decreased INR results.    Relevant nutrition status: Regular cardiac diet w/ Boost supplements (Breakfast and Dinner)    Other: Doxycycline started on 4/26 and stopped on 4/30    Education complete: No  Date:     Assessment/Plan:  Patient's INR today is therapeutic at 2.62. Will continue a lower dose of warfarin 1 mg PO x1 this evening, then reassess INR tomorrow morning. Patient does have quite a few interactions, both with home medications not restarted in the hospital, and with medications she is on in the hospital. This could affect dosing, so will dose daily at this time. Daily PT/INR has already been ordered. Pharmacy will continue to follow until discharge or discontinuation of warfarin.     Maximino Bro RPH  5/4/2020

## 2020-05-04 NOTE — PROGRESS NOTES
Dr. GABY Dhaliwal    [address]    5/4/2020    Patient ID:  Name:  Joellen Dominguez  MRN:  [id number]  1945  75 y.o.  female            CC/Reason for visit: Exacerbation of Crohn's disease     Interval hx: Continues to have diarrhea but much better.  Today she has had only 2 bowel movements.  This is much improved compared to yesterday.  She says her abdominal cramping is also easing up.    ROS:  no fever, no vomiting    Vitals:  Vitals:    05/04/20 0500 05/04/20 0744 05/04/20 0815 05/04/20 1452   BP:  137/69  131/66   BP Location:       Patient Position:       Pulse:  62  62   Resp:  16  16   Temp:  97.6 °F (36.4 °C)  97.9 °F (36.6 °C)   TempSrc:  Oral  Oral   SpO2:  97% 95% 94%   Weight: 67.7 kg (149 lb 4.8 oz)      Height:               Body mass index is 22.71 kg/m².    Intake/Output Summary (Last 24 hours) at 5/4/2020 1643  Last data filed at 5/4/2020 1100  Gross per 24 hour   Intake 360 ml   Output --   Net 360 ml       Exam:  GEN:  No distress  Alert, oriented x 3.   LUNGS: Clear breath sounds bilat, no use of accessory muscles  CV:  Normal S1S2, without murmur, no edema  ABD:  Non tender, no enlarged liver or masses      Scheduled meds:    amiodarone 200 mg Oral Q24H   atorvastatin 10 mg Oral Daily   buPROPion  mg Oral Daily   DULoxetine 60 mg Oral Daily   folic acid 1 mg Oral Daily   levothyroxine 100 mcg Oral Q AM   loperamide 2 mg Oral TID AC   mesalamine 1,000 mg Oral 4x Daily   metoprolol tartrate 50 mg Oral Q12H   predniSONE 40 mg Oral Daily With Breakfast   sodium chloride 10 mL Intravenous Q12H   warfarin 1 mg Oral Once     IV meds:                        Pharmacy to dose warfarin        Data Review:   I reviewed the patient's medications and new clinical results.    COVID19   Date Value Ref Range Status   04/19/2020 Not Detected Not Detected Final         Lab Results   Component Value Date    CALCIUM 9.1 05/04/2020    MG 2.0 04/19/2020     Results from last 7 days   Lab  Units 05/04/20  0458 05/03/20  0445 05/02/20  0433  04/28/20  1431   SODIUM mmol/L 138 141 141   < >  --    POTASSIUM mmol/L 4.1 4.7 3.3*   < >  --    CHLORIDE mmol/L 103 104 104   < >  --    CO2 mmol/L 26.3 25.4 26.9   < >  --    BUN mg/dL 26* 25* 22   < >  --    CREATININE mg/dL 1.12* 0.99 0.96   < >  --    CALCIUM mg/dL 9.1 9.0 8.9   < >  --    GLUCOSE mg/dL 113* 172* 111*   < >  --    WBC 10*3/mm3 15.60* 14.33* 13.75*   < >  --    HEMOGLOBIN g/dL 8.8* 9.0* 8.9*   < >  --    PLATELETS 10*3/mm3 255 268 262   < >  --    INR  2.62* 2.71* 2.64*   < >  --    PROCALCITONIN ng/mL  --   --   --   --  0.13    < > = values in this interval not displayed.         ASSESSMENT:   Acute exacerbation of Crohn's disease, diarrhea  1. A. fib with RVR  2. Leukocytosis  3. Non-ST elevation MI type I versus 2 with no chest pain  4. Acute diastolic congestive heart failure  5. Acute kidney injury, better  6. Antiphospholipid antibody syndrome with negative DVT work-up  7. Hypothyroidism  8. Hyperlipidemia  9. Acute hypoxic resp failure  10. Lactic acidosis on presentation, resolved  11. Pulmonary hypertension, mild  12. Hyperkalemia  13.     PLAN:  Plan on discharging her tomorrow if her diarrhea and abdominal cramping continue to improve.  Continue prednisone orally as per GI recommendations.  No need for intravenous steroids since she is clinically responding to oral steroids.      Matthew Dhaliwal MD  5/4/2020

## 2020-05-04 NOTE — PLAN OF CARE
No complaints voiced, VS'S, NSR on monitor, continues with loose bm, eyes closed at long interval, resting quietly

## 2020-05-04 NOTE — PROGRESS NOTES
[facility]  Inpatient Progress Note    Reason for Follow Up: Diarrhea    Subjective     Interval History:   She is had 1 stool overnight she is definitely improved    Current Facility-Administered Medications:   •  acetaminophen (TYLENOL) tablet 650 mg, 650 mg, Oral, Q4H PRN, 650 mg at 04/29/20 1405 **OR** acetaminophen (TYLENOL) suppository 650 mg, 650 mg, Rectal, Q4H PRN, Paulo Singleton MD  •  acetaminophen (TYLENOL) tablet 650 mg, 650 mg, Oral, Q4H PRN, Paulo Signleton MD  •  amiodarone (PACERONE) tablet 200 mg, 200 mg, Oral, Q24H, Matthew Dhaliwal MD, 200 mg at 05/04/20 0819  •  atorvastatin (LIPITOR) tablet 10 mg, 10 mg, Oral, Daily, Paulo Singleton MD, 10 mg at 05/04/20 0819  •  buPROPion XL (WELLBUTRIN XL) 24 hr tablet 150 mg, 150 mg, Oral, Daily, Paulo Singleton MD, 150 mg at 05/04/20 0819  •  DULoxetine (CYMBALTA) DR capsule 60 mg, 60 mg, Oral, Daily, Paulo Singleton MD, 60 mg at 05/04/20 0819  •  folic acid (FOLVITE) tablet 1 mg, 1 mg, Oral, Daily, Paulo Singleton MD, 1 mg at 05/04/20 0819  •  ipratropium-albuterol (DUO-NEB) nebulizer solution 3 mL, 3 mL, Nebulization, Q4H PRN, Paulo Singleton MD, 3 mL at 04/26/20 2158  •  levothyroxine (SYNTHROID, LEVOTHROID) tablet 100 mcg, 100 mcg, Oral, Q AM, Paulo Singleton MD, 100 mcg at 05/04/20 0551  •  loperamide (IMODIUM) capsule 2 mg, 2 mg, Oral, TID AC, Maria L Pastor MD, 2 mg at 05/04/20 0552  •  mesalamine (PENTASA) CR capsule 1,000 mg, 1,000 mg, Oral, 4x Daily, Paulo Singleton MD, 1,000 mg at 05/04/20 0819  •  metoprolol tartrate (LOPRESSOR) injection 2.5 mg, 2.5 mg, Intravenous, Q4H PRN, Paulo Singleton MD, 2.5 mg at 04/23/20 0444  •  metoprolol tartrate (LOPRESSOR) tablet 50 mg, 50 mg, Oral, Q12H, Paulo Singleton MD, 50 mg at 05/04/20 0819  •  ondansetron (ZOFRAN) injection 4 mg, 4 mg, Intravenous, Q6H PRN, Paulo Singleton MD  •  Pharmacy to dose  warfarin, , Does not apply, Continuous PRN, Paulo Singleton MD  •  potassium chloride (MICRO-K) CR capsule 40 mEq, 40 mEq, Oral, PRN, 40 mEq at 05/03/20 0024 **OR** potassium chloride (KLOR-CON) packet 40 mEq, 40 mEq, Oral, PRN **OR** potassium chloride 10 mEq in 100 mL IVPB, 10 mEq, Intravenous, Q1H PRN, Paulo Singleton MD  •  predniSONE (DELTASONE) tablet 40 mg, 40 mg, Oral, Daily With Breakfast, Mahamed Rendon MD, 40 mg at 05/04/20 0819  •  [COMPLETED] Insert peripheral IV, , , Once **AND** sodium chloride 0.9 % flush 10 mL, 10 mL, Intravenous, PRN, Paulo Singleton MD  •  sodium chloride 0.9 % flush 10 mL, 10 mL, Intravenous, Q12H, Paulo Singleton MD, 10 mL at 05/04/20 0819  •  sodium chloride 0.9 % flush 10 mL, 10 mL, Intravenous, PRN, Paulo Singleton MD, 10 mL at 04/26/20 1615  Review of Systems:    She has weakness fatigue all other systems reviewed and negative    Objective     Vital Signs  Temp:  [97.6 °F (36.4 °C)-98 °F (36.7 °C)] 97.6 °F (36.4 °C)  Heart Rate:  [60-63] 62  Resp:  [16-18] 16  BP: (125-151)/(69-80) 137/69  Body mass index is 22.71 kg/m².    Intake/Output Summary (Last 24 hours) at 5/4/2020 1115  Last data filed at 5/4/2020 0900  Gross per 24 hour   Intake 120 ml   Output --   Net 120 ml     I/O this shift:  In: 120 [P.O.:120]  Out: -      Physical Exam:   General: patient awake, alert and cooperative   Eyes: Normal lids and lashes, no scleral icterus   Neck: supple, normal ROM   Skin: warm and dry, not jaundiced   Cardiovascular: regular rhythm and rate, no murmurs auscultated   Pulm: clear to auscultation bilaterally, regular and unlabored   Abdomen: soft, nontender, nondistended; normal bowel sounds   Extremities: no rash or edema   Psychiatric: Normal mood and behavior; memory intact     Results Review:     I reviewed the patient's new clinical results.    Results from last 7 days   Lab Units 05/04/20  0458 05/03/20  0445 05/02/20  0433   WBC 10*3/mm3  15.60* 14.33* 13.75*   HEMOGLOBIN g/dL 8.8* 9.0* 8.9*   HEMATOCRIT % 29.2* 28.9* 29.3*   PLATELETS 10*3/mm3 255 268 262     Results from last 7 days   Lab Units 05/04/20 0458 05/03/20 0445 05/02/20 0433   SODIUM mmol/L 138 141 141   POTASSIUM mmol/L 4.1 4.7 3.3*   CHLORIDE mmol/L 103 104 104   CO2 mmol/L 26.3 25.4 26.9   BUN mg/dL 26* 25* 22   CREATININE mg/dL 1.12* 0.99 0.96   CALCIUM mg/dL 9.1 9.0 8.9   GLUCOSE mg/dL 113* 172* 111*     Results from last 7 days   Lab Units 05/04/20 0458 05/03/20 0445 05/02/20 0433   INR  2.62* 2.71* 2.64*     No results found for: LIPASE    Radiology:  CT Chest Without Contrast   Final Result      CT Abdomen Pelvis With Contrast   Final Result      XR Chest 2 View   Final Result   Mild hazy opacity has developed in the right upper lung, may   be developing infiltrate or edema, follow-up recommended. Small pleural   effusions.        This report was finalized on 4/26/2020 1:04 PM by Dr. Roman Rosario M.D.          XR Chest 1 View   Final Result   Interstitial prominence is more apparent on the current examination.   This could reflect some vascular congestion. Patient is suspected to   have a small right pleural effusion.       This report was finalized on 4/23/2020 5:38 AM by Dr. Domitila Tsai M.D.          XR Chest PA & Lateral   Final Result   Minimal patchy density at the right middle lobe, minimal   left pleural effusion, follow-up recommended.       This report was finalized on 4/20/2020 12:41 PM by Dr. Roman Rosario M.D.          NM Lung Ventilation Perfusion   Final Result   Low probability ventilation/perfusion scintigraphy.       This report was finalized on 4/20/2020 12:32 PM by Dr. Houston Kimbrough M.D.          CT Abdomen Pelvis Without Contrast   Final Result      XR Chest 1 View   Final Result          Assessment/Plan     Patient Active Problem List   Diagnosis   • Crohn's disease (CMS/HCC)   • Incontinence   • Degeneration of lumbar  intervertebral disc   • Other hyperlipidemia   • Essential hypertension   • Depression   • Antiphospholipid antibody syndrome (CMS/HCC)   • Lichen sclerosus et atrophicus   • Sepsis with acute renal failure and septic shock (CMS/HCC)   • Myocardial infarction type 2 (CMS/HCC)   • SOB (shortness of breath)   • PAF (paroxysmal atrial fibrillation) (CMS/HCC)   • History of cardioversion   • Leukocytosis   • Pulmonary HTN (CMS/HCC)          Assessment:   Problem List:     SOB (shortness of breath)    Essential hypertension    Sepsis with acute renal failure and septic shock (CMS/HCC)    Myocardial infarction type 2 (CMS/HCC)    PAF (paroxysmal atrial fibrillation) (CMS/HCC)    History of cardioversion    Leukocytosis    Pulmonary HTN (CMS/HCC)     Crohn's disease.  She has been treated with Stelara recently.  This was her first dose.  She is now on oral steroids.       Plan:   Continue current therapy for now.    We will hold on tapering steroids  She will need to follow-up with Dr. Flores for  IBD as an OP  I discussed the patients findings and my recommendations with patient and nursing staff.    Aston Gentile MD

## 2020-05-04 NOTE — PLAN OF CARE
Pt denies pain, has not had any stools this shift, vss, will ctm.        Problem: Patient Care Overview  Goal: Plan of Care Review  Outcome: Ongoing (interventions implemented as appropriate)  Flowsheets (Taken 5/4/2020 8892)  Plan of Care Reviewed With: patient

## 2020-05-05 LAB
ANION GAP SERPL CALCULATED.3IONS-SCNC: 11.5 MMOL/L (ref 5–15)
BASOPHILS # BLD AUTO: 0.03 10*3/MM3 (ref 0–0.2)
BASOPHILS NFR BLD AUTO: 0.2 % (ref 0–1.5)
BUN BLD-MCNC: 28 MG/DL (ref 8–23)
BUN/CREAT SERPL: 32.9 (ref 7–25)
CALCIUM SPEC-SCNC: 9.3 MG/DL (ref 8.6–10.5)
CHLORIDE SERPL-SCNC: 101 MMOL/L (ref 98–107)
CO2 SERPL-SCNC: 27.5 MMOL/L (ref 22–29)
CREAT BLD-MCNC: 0.85 MG/DL (ref 0.57–1)
DEPRECATED RDW RBC AUTO: 42.2 FL (ref 37–54)
EOSINOPHIL # BLD AUTO: 0.06 10*3/MM3 (ref 0–0.4)
EOSINOPHIL NFR BLD AUTO: 0.4 % (ref 0.3–6.2)
ERYTHROCYTE [DISTWIDTH] IN BLOOD BY AUTOMATED COUNT: 15.2 % (ref 12.3–15.4)
GFR SERPL CREATININE-BSD FRML MDRD: 65 ML/MIN/1.73
GLUCOSE BLD-MCNC: 107 MG/DL (ref 65–99)
HCT VFR BLD AUTO: 28.7 % (ref 34–46.6)
HGB BLD-MCNC: 8.9 G/DL (ref 12–15.9)
IMM GRANULOCYTES # BLD AUTO: 0.18 10*3/MM3 (ref 0–0.05)
IMM GRANULOCYTES NFR BLD AUTO: 1.1 % (ref 0–0.5)
INR PPP: 1.4 (ref 0.9–1.1)
LYMPHOCYTES # BLD AUTO: 1.66 10*3/MM3 (ref 0.7–3.1)
LYMPHOCYTES NFR BLD AUTO: 10.6 % (ref 19.6–45.3)
MCH RBC QN AUTO: 24.5 PG (ref 26.6–33)
MCHC RBC AUTO-ENTMCNC: 31 G/DL (ref 31.5–35.7)
MCV RBC AUTO: 78.8 FL (ref 79–97)
MONOCYTES # BLD AUTO: 1.12 10*3/MM3 (ref 0.1–0.9)
MONOCYTES NFR BLD AUTO: 7.1 % (ref 5–12)
NEUTROPHILS # BLD AUTO: 12.67 10*3/MM3 (ref 1.7–7)
NEUTROPHILS NFR BLD AUTO: 80.6 % (ref 42.7–76)
NRBC BLD AUTO-RTO: 0 /100 WBC (ref 0–0.2)
PLATELET # BLD AUTO: 278 10*3/MM3 (ref 140–450)
PMV BLD AUTO: 11.4 FL (ref 6–12)
POTASSIUM BLD-SCNC: 4.1 MMOL/L (ref 3.5–5.2)
PROTHROMBIN TIME: 16.9 SECONDS (ref 11.7–14.2)
RBC # BLD AUTO: 3.64 10*6/MM3 (ref 3.77–5.28)
SODIUM BLD-SCNC: 140 MMOL/L (ref 136–145)
WBC NRBC COR # BLD: 15.72 10*3/MM3 (ref 3.4–10.8)

## 2020-05-05 PROCEDURE — 97110 THERAPEUTIC EXERCISES: CPT

## 2020-05-05 PROCEDURE — 85610 PROTHROMBIN TIME: CPT | Performed by: INTERNAL MEDICINE

## 2020-05-05 PROCEDURE — 63710000001 PREDNISONE PER 1 MG: Performed by: INTERNAL MEDICINE

## 2020-05-05 PROCEDURE — 99232 SBSQ HOSP IP/OBS MODERATE 35: CPT | Performed by: INTERNAL MEDICINE

## 2020-05-05 PROCEDURE — 80048 BASIC METABOLIC PNL TOTAL CA: CPT | Performed by: INTERNAL MEDICINE

## 2020-05-05 PROCEDURE — 85025 COMPLETE CBC W/AUTO DIFF WBC: CPT | Performed by: INTERNAL MEDICINE

## 2020-05-05 RX ORDER — WARFARIN SODIUM 3 MG/1
3 TABLET ORAL ONCE
Status: COMPLETED | OUTPATIENT
Start: 2020-05-05 | End: 2020-05-05

## 2020-05-05 RX ADMIN — LOPERAMIDE HYDROCHLORIDE 2 MG: 2 CAPSULE ORAL at 12:01

## 2020-05-05 RX ADMIN — PREDNISONE 40 MG: 20 TABLET ORAL at 08:07

## 2020-05-05 RX ADMIN — METOPROLOL TARTRATE 50 MG: 25 TABLET ORAL at 08:06

## 2020-05-05 RX ADMIN — LOPERAMIDE HYDROCHLORIDE 2 MG: 2 CAPSULE ORAL at 17:16

## 2020-05-05 RX ADMIN — MESALAMINE 1000 MG: 250 CAPSULE ORAL at 08:06

## 2020-05-05 RX ADMIN — FOLIC ACID 1 MG: 1 TABLET ORAL at 08:07

## 2020-05-05 RX ADMIN — BUPROPION HYDROCHLORIDE 150 MG: 150 TABLET, FILM COATED, EXTENDED RELEASE ORAL at 08:08

## 2020-05-05 RX ADMIN — SODIUM CHLORIDE, PRESERVATIVE FREE 10 ML: 5 INJECTION INTRAVENOUS at 20:53

## 2020-05-05 RX ADMIN — MESALAMINE 1000 MG: 250 CAPSULE ORAL at 12:01

## 2020-05-05 RX ADMIN — LOPERAMIDE HYDROCHLORIDE 2 MG: 2 CAPSULE ORAL at 08:07

## 2020-05-05 RX ADMIN — METOPROLOL TARTRATE 50 MG: 25 TABLET ORAL at 20:52

## 2020-05-05 RX ADMIN — MESALAMINE 1000 MG: 250 CAPSULE ORAL at 20:51

## 2020-05-05 RX ADMIN — MESALAMINE 1000 MG: 250 CAPSULE ORAL at 17:18

## 2020-05-05 RX ADMIN — DULOXETINE HYDROCHLORIDE 60 MG: 60 CAPSULE, DELAYED RELEASE ORAL at 08:07

## 2020-05-05 RX ADMIN — ATORVASTATIN CALCIUM 10 MG: 20 TABLET, FILM COATED ORAL at 08:07

## 2020-05-05 RX ADMIN — SODIUM CHLORIDE, PRESERVATIVE FREE 10 ML: 5 INJECTION INTRAVENOUS at 08:07

## 2020-05-05 RX ADMIN — LEVOTHYROXINE SODIUM 100 MCG: 100 TABLET ORAL at 05:59

## 2020-05-05 RX ADMIN — WARFARIN 3 MG: 3 TABLET ORAL at 17:16

## 2020-05-05 RX ADMIN — AMIODARONE HYDROCHLORIDE 200 MG: 200 TABLET ORAL at 08:07

## 2020-05-05 NOTE — PROGRESS NOTES
[facility]  Inpatient Progress Note    Reason for Follow Up: Crohn's disease, diarrhea    Subjective     Interval History:   Diarrhea has slowed, she is quite pleased with the effect the steroids have had on her bowels    Current Facility-Administered Medications:   •  acetaminophen (TYLENOL) tablet 650 mg, 650 mg, Oral, Q4H PRN, 650 mg at 04/29/20 1405 **OR** acetaminophen (TYLENOL) suppository 650 mg, 650 mg, Rectal, Q4H PRN, Paulo Singleton MD  •  acetaminophen (TYLENOL) tablet 650 mg, 650 mg, Oral, Q4H PRN, Paulo Singleton MD  •  amiodarone (PACERONE) tablet 200 mg, 200 mg, Oral, Q24H, Matthew Dhaliwal MD, 200 mg at 05/05/20 0807  •  atorvastatin (LIPITOR) tablet 10 mg, 10 mg, Oral, Daily, Paulo Singleton MD, 10 mg at 05/05/20 0807  •  buPROPion XL (WELLBUTRIN XL) 24 hr tablet 150 mg, 150 mg, Oral, Daily, Paulo Singleton MD, 150 mg at 05/05/20 0808  •  DULoxetine (CYMBALTA) DR capsule 60 mg, 60 mg, Oral, Daily, Paulo Singleton MD, 60 mg at 05/05/20 0807  •  folic acid (FOLVITE) tablet 1 mg, 1 mg, Oral, Daily, Paulo Singleton MD, 1 mg at 05/05/20 0807  •  ipratropium-albuterol (DUO-NEB) nebulizer solution 3 mL, 3 mL, Nebulization, Q4H PRN, Paulo Singleton MD, 3 mL at 04/26/20 2158  •  levothyroxine (SYNTHROID, LEVOTHROID) tablet 100 mcg, 100 mcg, Oral, Q AM, Paulo Singleton MD, 100 mcg at 05/05/20 0559  •  loperamide (IMODIUM) capsule 2 mg, 2 mg, Oral, TID AC, Maria L Pastor MD, 2 mg at 05/05/20 1201  •  mesalamine (PENTASA) CR capsule 1,000 mg, 1,000 mg, Oral, 4x Daily, Paulo Singleton MD, 1,000 mg at 05/05/20 1201  •  metoprolol tartrate (LOPRESSOR) injection 2.5 mg, 2.5 mg, Intravenous, Q4H PRN, Paluo Singleton MD, 2.5 mg at 04/23/20 0444  •  metoprolol tartrate (LOPRESSOR) tablet 50 mg, 50 mg, Oral, Q12H, Paulo Singleton MD, 50 mg at 05/05/20 0806  •  ondansetron (ZOFRAN) injection 4 mg, 4 mg, Intravenous,  Q6H PRN, Paulo Singleton MD  •  Pharmacy to dose warfarin, , Does not apply, Continuous PRN, Paulo Singleton MD  •  potassium chloride (MICRO-K) CR capsule 40 mEq, 40 mEq, Oral, PRN, 40 mEq at 05/03/20 0024 **OR** potassium chloride (KLOR-CON) packet 40 mEq, 40 mEq, Oral, PRN **OR** potassium chloride 10 mEq in 100 mL IVPB, 10 mEq, Intravenous, Q1H PRN, Paulo Singleton MD  •  predniSONE (DELTASONE) tablet 40 mg, 40 mg, Oral, Daily With Breakfast, Mahamed Rendon MD, 40 mg at 05/05/20 0807  •  [COMPLETED] Insert peripheral IV, , , Once **AND** sodium chloride 0.9 % flush 10 mL, 10 mL, Intravenous, PRN, Paulo Singleton MD  •  sodium chloride 0.9 % flush 10 mL, 10 mL, Intravenous, Q12H, Paulo Singleton MD, 10 mL at 05/05/20 0807  •  sodium chloride 0.9 % flush 10 mL, 10 mL, Intravenous, PRN, Paulo Singleton MD, 10 mL at 04/26/20 1615  Review of Systems:    She has weakness fatigue all other systems reviewed and negative    Objective     Vital Signs  Temp:  [97.4 °F (36.3 °C)-97.9 °F (36.6 °C)] 97.8 °F (36.6 °C)  Heart Rate:  [62-70] 64  Resp:  [16] 16  BP: (131-169)/(66-87) 169/87  Body mass index is 22.56 kg/m².    Intake/Output Summary (Last 24 hours) at 5/5/2020 1217  Last data filed at 5/5/2020 0936  Gross per 24 hour   Intake 360 ml   Output --   Net 360 ml     I/O this shift:  In: 240 [P.O.:240]  Out: -      Physical Exam:   General: patient awake, alert and cooperative   Eyes: Normal lids and lashes, no scleral icterus   Neck: supple, normal ROM   Skin: warm and dry, not jaundiced   Cardiovascular: regular rhythm and rate, no murmurs auscultated   Pulm: clear to auscultation bilaterally, regular and unlabored   Abdomen: soft, nontender, nondistended; normal bowel sounds   Extremities: no rash or edema   Psychiatric: Normal mood and behavior; memory intact     Results Review:     I reviewed the patient's new clinical results.    Results from last 7 days   Lab Units  05/05/20  0600 05/04/20  0458 05/03/20 0445   WBC 10*3/mm3 15.72* 15.60* 14.33*   HEMOGLOBIN g/dL 8.9* 8.8* 9.0*   HEMATOCRIT % 28.7* 29.2* 28.9*   PLATELETS 10*3/mm3 278 255 268     Results from last 7 days   Lab Units 05/05/20  0600 05/04/20  0458 05/03/20 0445   SODIUM mmol/L 140 138 141   POTASSIUM mmol/L 4.1 4.1 4.7   CHLORIDE mmol/L 101 103 104   CO2 mmol/L 27.5 26.3 25.4   BUN mg/dL 28* 26* 25*   CREATININE mg/dL 0.85 1.12* 0.99   CALCIUM mg/dL 9.3 9.1 9.0   GLUCOSE mg/dL 107* 113* 172*     Results from last 7 days   Lab Units 05/05/20  0559 05/04/20 0458 05/03/20 0445   INR  1.40* 2.62* 2.71*     No results found for: LIPASE    Radiology:  CT Chest Without Contrast   Final Result      CT Abdomen Pelvis With Contrast   Final Result      XR Chest 2 View   Final Result   Mild hazy opacity has developed in the right upper lung, may   be developing infiltrate or edema, follow-up recommended. Small pleural   effusions.        This report was finalized on 4/26/2020 1:04 PM by Dr. Roman Rosario M.D.          XR Chest 1 View   Final Result   Interstitial prominence is more apparent on the current examination.   This could reflect some vascular congestion. Patient is suspected to   have a small right pleural effusion.       This report was finalized on 4/23/2020 5:38 AM by Dr. Domitila Tsai M.D.          XR Chest PA & Lateral   Final Result   Minimal patchy density at the right middle lobe, minimal   left pleural effusion, follow-up recommended.       This report was finalized on 4/20/2020 12:41 PM by Dr. Roman Rosario M.D.          NM Lung Ventilation Perfusion   Final Result   Low probability ventilation/perfusion scintigraphy.       This report was finalized on 4/20/2020 12:32 PM by Dr. Houston Kimbrough M.D.          CT Abdomen Pelvis Without Contrast   Final Result      XR Chest 1 View   Final Result          Assessment/Plan     Patient Active Problem List   Diagnosis   • Crohn's disease  (CMS/HCC)   • Incontinence   • Degeneration of lumbar intervertebral disc   • Other hyperlipidemia   • Essential hypertension   • Depression   • Antiphospholipid antibody syndrome (CMS/HCC)   • Lichen sclerosus et atrophicus   • Sepsis with acute renal failure and septic shock (CMS/HCC)   • Myocardial infarction type 2 (CMS/HCC)   • SOB (shortness of breath)   • PAF (paroxysmal atrial fibrillation) (CMS/HCC)   • History of cardioversion   • Leukocytosis   • Pulmonary HTN (CMS/HCC)       Assessment:   Problem List:     SOB (shortness of breath)    Essential hypertension    Sepsis with acute renal failure and septic shock (CMS/HCC)    Myocardial infarction type 2 (CMS/HCC)    PAF (paroxysmal atrial fibrillation) (CMS/HCC)    History of cardioversion    Leukocytosis    Pulmonary HTN (CMS/HCC)     Crohn's disease.  She has been treated with Stelara recently.  This was her first dose.  She is now on oral steroids.       Plan:   Continue current therapy for now.    We will hold on tapering steroids  She will need to follow-up with Dr. Flores for  IBD as an OP to discuss tapering steroids  She should plan on telehealth with him in the next couple weeks  Continue on 40 mg of prednisone daily until she discusses it with him  We are okay with discharge  I discussed the patients findings and my recommendations with patient and nursing staff.    Aston Gentile MD

## 2020-05-05 NOTE — PROGRESS NOTES
Dr. GABY Dhaliwal    [address]    5/5/2020    Patient ID:  Name:  Joellen Dominguez  MRN:  [id number]  1945  75 y.o.  female            CC/Reason for visit: Exacerbation of Crohn's disease     Interval hx: Today having some abdominal cramping again.  Still having some episodes of diarrhea.  No appetite today.  Did not eat lunch due to some nausea.    ROS:  no fever, no vomiting, no bloody stools    Vitals:  Vitals:    05/05/20 0002 05/05/20 0435 05/05/20 0446 05/05/20 0716   BP: 133/68 167/82  169/87   BP Location: Left arm Left arm  Left arm   Patient Position: Lying Lying  Lying   Pulse: 63 70  64   Resp: 16 16  16   Temp: 97.4 °F (36.3 °C) 97.5 °F (36.4 °C)  97.8 °F (36.6 °C)   TempSrc: Oral Oral  Oral   SpO2: 93% 99%  96%   Weight:   67.3 kg (148 lb 5.9 oz)    Height:               Body mass index is 22.56 kg/m².    Intake/Output Summary (Last 24 hours) at 5/5/2020 1415  Last data filed at 5/5/2020 0936  Gross per 24 hour   Intake 360 ml   Output --   Net 360 ml       Exam:  GEN:  No distress  Alert, oriented x 3.   LUNGS: Clear breath sounds bilat, no use of accessory muscles  CV:  Normal S1S2, without murmur, no edema  ABD:  Non tender, no enlarged liver or masses      Scheduled meds:    amiodarone 200 mg Oral Q24H   atorvastatin 10 mg Oral Daily   buPROPion  mg Oral Daily   DULoxetine 60 mg Oral Daily   folic acid 1 mg Oral Daily   levothyroxine 100 mcg Oral Q AM   loperamide 2 mg Oral TID AC   mesalamine 1,000 mg Oral 4x Daily   metoprolol tartrate 50 mg Oral Q12H   predniSONE 40 mg Oral Daily With Breakfast   sodium chloride 10 mL Intravenous Q12H   warfarin 3 mg Oral Once     IV meds:                        Pharmacy to dose warfarin        Data Review:   I reviewed the patient's medications and new clinical results.    COVID19   Date Value Ref Range Status   04/19/2020 Not Detected Not Detected Final         Lab Results   Component Value Date    CALCIUM 9.3 05/05/2020    MG 2.0  04/19/2020     Results from last 7 days   Lab Units 05/05/20  0600 05/05/20  0559 05/04/20  0458 05/03/20  0445  04/28/20  1431   SODIUM mmol/L 140  --  138 141   < >  --    POTASSIUM mmol/L 4.1  --  4.1 4.7   < >  --    CHLORIDE mmol/L 101  --  103 104   < >  --    CO2 mmol/L 27.5  --  26.3 25.4   < >  --    BUN mg/dL 28*  --  26* 25*   < >  --    CREATININE mg/dL 0.85  --  1.12* 0.99   < >  --    CALCIUM mg/dL 9.3  --  9.1 9.0   < >  --    GLUCOSE mg/dL 107*  --  113* 172*   < >  --    WBC 10*3/mm3 15.72*  --  15.60* 14.33*   < >  --    HEMOGLOBIN g/dL 8.9*  --  8.8* 9.0*   < >  --    PLATELETS 10*3/mm3 278  --  255 268   < >  --    INR   --  1.40* 2.62* 2.71*   < >  --    PROCALCITONIN ng/mL  --   --   --   --   --  0.13    < > = values in this interval not displayed.         Results from last 7 days   Lab Units 04/29/20  0901   ADENOVIRUS  Not Detected         ASSESSMENT:   Acute exacerbation of Crohn's disease, diarrhea  1. A. fib with RVR  2. Leukocytosis  3. Non-ST elevation MI type I versus 2 with no chest pain  4. Acute diastolic congestive heart failure  5. Acute kidney injury, better  6. Antiphospholipid antibody syndrome with negative DVT work-up  7. Hypothyroidism  8. Hyperlipidemia  9. Acute hypoxic resp failure  10. Lactic acidosis on presentation, resolved  11. Pulmonary hypertension, mild  12. Hyperkalemia    PLAN:  We will keep her in the hospital 1 more day due to abdominal cramping, poor appetite and not feeling well today.  She will try to eat some at supper.  At lunch she did not eat much because she felt nauseated and poor appetite with some abdominal cramping.  Continue oral prednisone for management of Crohn's exacerbation.        Matthew Dhaliwal MD  5/5/2020

## 2020-05-05 NOTE — PROGRESS NOTES
"Adult Nutrition  Assessment/PES    Patient Name:  Joellen Dominguez  YOB: 1945  MRN: [id number]  Admit Date:  4/19/2020    Assessment Date:  5/5/2020    Comments:  Nutrition follow up.  75% x 1 meal per chart PO data.  Per MD note, patient did not eat much at lunch today d/t nausea and abdominal cramping, poor appetite.  Diarrhea still present, but improved per chart.  Boost Plus in place BID as oral nutrition supplement.  Patient will likely d/c tomorrow per MD notes.    Due to the COVID pandemic, nutrition assessment completed based on review of electronic medical record. This RD currently working remotely and can be reached at [phone], via secure chat or email.     RD will continue to monitor.     Reason for Assessment     Row Name 05/05/20 1536          Reason for Assessment    Reason For Assessment  follow-up protocol         Anthropometrics     Row Name 05/05/20 1536          Anthropometrics    Height  172.7 cm (67.99\")        Admit Weight    Admit Weight  -- 148# 5/5        Ideal Body Weight (IBW)    Ideal Body Weight (IBW) (kg)  64.13        Body Mass Index (BMI)    BMI Assessment  BMI 18.5-24.9: normal 22.51         Labs/Tests/Procedures/Meds     Row Name 05/05/20 1537          Labs/Procedures/Meds    Lab Results Reviewed  reviewed, pertinent     Lab Results Comments  Gluc, BUN, WBC, Hgb, Hct        Diagnostic Tests/Procedures    Diagnostic Test/Procedure Reviewed  reviewed, pertinent        Medications    Pertinent Medications Reviewed  reviewed, pertinent     Pertinent Medications Comments  folic acid, synthroid, imodium, prednisone, coumadin         Physical Findings     Row Name 05/05/20 1538          Physical Findings    Gastrointestinal  diarrhea;nausea;other (see comments) diarrhea still present, but improved; with nausea and abdominal cramping around lunch time today     Skin  other (see comments) B=19, L elbow pink blanchable, bruised         Estimated/Assessed Needs     Row Name " "05/05/20 1536          Calculation Measurements    Height  172.7 cm (67.99\")         Nutrition Prescription Ordered     Row Name 05/05/20 1539          Nutrition Prescription PO    Current PO Diet  Regular     Supplement  Boost Plus (Ensure Enlive, Ensure Plus)     Supplement Frequency  2 times a day     Common Modifiers  Cardiac         Evaluation of Received Nutrient/Fluid Intake     Row Name 05/05/20 1539          PO Evaluation    Number of Meals  1     % PO Intake  75         Problem/Interventions:    Intervention Goal     Row Name 05/05/20 1540          Intervention Goal    General  Maintain nutrition;Reduce/improve symptoms;Disease management/therapy     PO  Increase intake;Tolerate PO;PO intake (%)     PO Intake %  75 %     Weight  Maintain weight         Nutrition Intervention     Row Name 05/05/20 1540          Nutrition Intervention    RD/Tech Action  Follow Tx progress;Care plan reviewd     Recommended/Ordered  Supplement         Education/Evaluation     Row Name 05/05/20 7697          Education    Education  Will Instruct as appropriate        Monitor/Evaluation    Monitor  Per protocol;PO intake;Supplement intake;Pertinent labs;Weight;Skin status;Symptoms     Education Follow-up  Reinforce PRN           Electronically signed by:  Dolly Arteaga RD  05/05/20 15:41  "

## 2020-05-05 NOTE — PLAN OF CARE
Pt denies pain, less diarrhea this shift, sat up in chair, vss, will ctm.          Problem: Patient Care Overview  Goal: Plan of Care Review  Outcome: Ongoing (interventions implemented as appropriate)  Flowsheets (Taken 5/5/2020 7558)  Plan of Care Reviewed With: patient

## 2020-05-05 NOTE — PROGRESS NOTES
Pharmacy Consult: Warfarin Dosing/ Monitoring    Joellen Dominguez is a 75 y.o. female, estimated creatinine clearance is 45.7 mL/min (A) (by C-G formula based on SCr of 1.12 mg/dL (H)). weighing 67.7 kg (149 lb 4.8 oz).     has a past medical history of Acute deep vein thrombosis of lower extremity (CMS/HCC), Antiphospholipid antibody syndrome (CMS/HCC), Antiphospholipid antibody syndrome (CMS/HCC), Arthritis, Benign essential hypertension, COPD (chronic obstructive pulmonary disease) (CMS/HCC), Coronary artery disease, Crohn's disease (CMS/HCC), Cutaneous candidiasis, Depression, Disease of thyroid gland, Elevated cholesterol, GERD (gastroesophageal reflux disease), Hyperlipidemia, Hypertension, Osteopenia, and Polyp, uterus corpus.    Social History     Tobacco Use    Smoking status: Former Smoker     Packs/day: 2.00     Years: 16.00     Pack years: 32.00     Types: Cigarettes     Last attempt to quit: 1967     Years since quittin.3    Smokeless tobacco: Never Used   Substance Use Topics    Alcohol use: No    Drug use: No     Results from last 7 days   Lab Units 205 20  0433 20  0440 20  0503 20  0502 20  0713 20  0706 20  0502 20  1955   INR  2.62* 2.71* 2.64* 2.41*  --  2.31* 2.41*  --  2.57*  --    APTT seconds  --   --   --   --   --   --   --   --  75.5* 102.3*   HEMOGLOBIN g/dL 8.8* 9.0* 8.9* 8.9* 8.3*  --  8.7* 8.8*  --   --    HEMATOCRIT % 29.2* 28.9* 29.3* 28.7* 26.3*  --  27.9* 28.1*  --   --    PLATELETS 10*3/mm3 255 268 262 293 279  --  312 334  --   --      Results from last 7 days   Lab Units 205 20  0433   SODIUM mmol/L 138 141 141   POTASSIUM mmol/L 4.1 4.7 3.3*   CHLORIDE mmol/L 103 104 104   CO2 mmol/L 26.3 25.4 26.9   BUN mg/dL 26* 25* 22   CREATININE mg/dL 1.12* 0.99 0.96   CALCIUM mg/dL 9.1 9.0 8.9   GLUCOSE mg/dL 113* 172* 111*     Anticoagulation history: Patient confirmed that  warfarin home dose was 2.5 mg qTSat and 5 mg all other days of the week. She was recently placed on steroids, which necessitated a dose decrease to 2.5 mg daily.    Hospital Anticoagulation:  Consulting provider: Dr. Singleton  Start date: 4/24/20  Indication: Atrial fibrillation  Target INR: 2-3  Expected duration: Lifelong  Bridge Therapy: None                Date 4/24 4/25 4/26 4/27 4/28 4/29 4/30 5/1 5/2 5/3 5/4 5/5   INR 1.13 1.16 1.27 1.9 2.57 2.41 2.31 2.41 2.64 2.71 2.62 1.40   Warfarin dose 2.5 mg 5 mg 5 mg 2.5 mg 1 mg 2.5 mg 2.5 mg 2.5 mg 2 mg 1 mg 1 mg 3 mg     Potential hospital drug interactions:   Amiodarone (major-new medication started in hospital)-concurrent use potentiates anticoagulant response, resulting in an increase in INR and an increased risk of bleeding.   Duloxetine (moderate-home med)-concurrent use may result in altered anticoagulation effects including increased risk of bleeding.  Mesalamine (moderate-home med)-concurrent use may result in decreased warfarin efficacy.  Protonix (moderate-takes PPI at home)-concurrent use inhibits [id number] warfarin metabolism, resulting in an increase in INR and an increased risk of bleeding.   Levothyroxine (minor-home med)-concurrent use increases metabolism of vitamin K-dependent clotting factors, resulting in an increased risk of bleeding.   Prednisone (new medication started in hospital)- may result in increased risk of bleeding or diminished effects of warfarin.    Home medications not restarted in the hospital with potential interactions:  Fish oil (major)-concurrent use decreases platelet aggregation due to inhibitory effect on thromboxane A2 levels, resulting in an increased risk of bleeding.  Meloxicam (major)-concurrent use can increase risk of bleeding due to additive effect on hemostasis.  Glucosamine/Chondroitin (moderate)-concurrent use may result in elevations of INR and potentiation of anticoagulant effects.  Colesevelam  "(moderate)-concurrent use reduces absorption of warfarin by nonspecific binding, resulting in decreased INR results.    Relevant nutrition status: Regular cardiac diet w/ Boost supplements (Breakfast and Dinner)    Other: Phytonadione 5 mg PO x1 given on 05/03 @ 1821    Education complete: No  Date:     Assessment/Plan:  Patient's INR today=1.4, which decreased significantly from yesterdays lab value of 2.62. Patient received reversal with phytonadione 5 mg PO x1 on 05/03 d/t \"bruise/hematoma and purple discoloration of toes and knuckles,\" which was noted by Kelli Puckett RN. Spoke to current RN, Enriqueta and she states that discoloration has since resolved. Per Dr. Dhaliwal we are okay to proceed with warfarin dosing. Since the patient has newly started on amiodarone (5/4) will proceed cautiously and give warfarin 3 mg PO x1, then reassess INR tomorrow morning. Patient does have quite a few interactions, both with home medications not restarted in the hospital, and with medications she is on in the hospital.  Will continue to dose daily at this time d/t drug interactions and fluctuating INR. Daily PT/INR has already been ordered. Pharmacy will continue to follow until discharge or discontinuation of warfarin.     Maximino Bro RPH  5/4/2020         "

## 2020-05-05 NOTE — PLAN OF CARE
Patient  resting  at  this  time.  No  complaints  voiced.   No loose  stool  at this  time. No  cramping.  Has  been SR on monitor. Possible  discharge this  morning. Nursing will continue to monitor

## 2020-05-05 NOTE — PLAN OF CARE
Problem: Patient Care Overview  Goal: Plan of Care Review  Outcome: Ongoing (interventions implemented as appropriate)  Flowsheets (Taken 5/5/2020 1218)  Plan of Care Reviewed With: patient  Outcome Summary: Pt agreeable to PT today. She amb in room w/ 1 seated rest limited by fatigue/SOA. Pt performed seated TE req a few rests for fatigue. PT will continue to progress as pt tolerates.     Patient was wearing a face mask during this therapy encounter. Therapist used appropriate personal protective equipment including mask and gloves.  Mask used was standard procedure mask. Appropriate PPE was worn during the entire therapy session. Hand hygiene was completed before and after therapy session. Patient is not in enhanced droplet precautions.

## 2020-05-05 NOTE — THERAPY TREATMENT NOTE
Patient Name: Joellen Dominguez  : 1945    MRN: [id number]                              Today's Date: 2020       Admit Date: 2020    Visit Dx:     ICD-10-CM ICD-9-CM   1. Sepsis with acute renal failure and septic shock, due to unspecified organism, unspecified acute renal failure type (CMS/[facility]) A41.9 038.9    R65.21 995.92    N17.9 785.52     584.9   2. New onset atrial fibrillation (CMS/[facility]) I48.91 427.31   3. Atrial fibrillation with rapid ventricular response (CMS/[facility]) I48.91 427.31   4. Leukocytosis, unspecified type D72.829 288.60   5. Acute kidney injury (CMS/[facility]) N17.9 584.9   6. Acute anemia D64.9 285.9   7. NSTEMI (non-ST elevated myocardial infarction) (CMS/[facility]) I21.4 410.70   8. History of Crohn's disease Z87.19 V12.70     Patient Active Problem List   Diagnosis   • Crohn's disease (CMS/[facility])   • Incontinence   • Degeneration of lumbar intervertebral disc   • Other hyperlipidemia   • Essential hypertension   • Depression   • Antiphospholipid antibody syndrome (CMS/[facility])   • Lichen sclerosus et atrophicus   • Sepsis with acute renal failure and septic shock (CMS/[facility])   • Myocardial infarction type 2 (CMS/[facility])   • SOB (shortness of breath)   • PAF (paroxysmal atrial fibrillation) (CMS/[facility])   • History of cardioversion   • Leukocytosis   • Pulmonary HTN (CMS/[facility])     Past Medical History:   Diagnosis Date   • Acute deep vein thrombosis of lower extremity (CMS/[facility])    • Antiphospholipid antibody syndrome (CMS/[facility])    • Antiphospholipid antibody syndrome (CMS/HCC)    • Arthritis     OSTEO   • Benign essential hypertension    • COPD (chronic obstructive pulmonary disease) (CMS/[facility])    • Coronary artery disease    • Crohn's disease (CMS/[facility])    • Cutaneous candidiasis    • Depression    • Disease of thyroid gland    • Elevated cholesterol    • GERD (gastroesophageal reflux disease)    • Hyperlipidemia    • Hypertension    • Osteopenia    • Polyp, uterus corpus      Past Surgical History:   Procedure  Laterality Date   • ABDOMINAL HERNIA REPAIR     • AMPUTATION DIGIT Left     SECOND TOE    • BILATERAL SALPINGO OOPHORECTOMY     • BREAST BIOPSY Right     BENIGN   • CARDIAC CATHETERIZATION N/A 4/22/2020    Procedure: Right and Left Heart Cath;  Surgeon: Paulo Singleton MD;  [facility];  Service: Cardiology;  Laterality: N/A;   • CARDIAC CATHETERIZATION N/A 4/22/2020    Procedure: Coronary angiography;  Surgeon: Paulo Singleton MD;  [facility];  Service: Cardiology;  Laterality: N/A;   • CARDIAC CATHETERIZATION N/A 4/22/2020    Procedure: Left ventriculography;  Surgeon: Paulo Singleton MD;  Location:  [facility] CATH INVASIVE LOCATION;  Service: Cardiology;  Laterality: N/A;   • CARDIAC ELECTROPHYSIOLOGY PROCEDURE N/A 4/25/2020    Procedure: Cardioversion;  Surgeon: Paulo Singleton MD;  Location: [facility];  Service: Cardiology;  Laterality: N/A;   • CHOLECYSTECTOMY     • COLONOSCOPY     • COSMETIC SURGERY     • D&C HYSTEROSCOPY N/A 10/13/2016    Procedure: DILATATION AND CURETTAGE HYSTEROSCOPY WITH MYOSURE;  Surgeon: Christopher Doll MD;  Location: [facility] OR;  Service:    • ENDOSCOPY     • EYE SURGERY Bilateral     cataract   • FACELIFT     • FEMORAL ARTERY - FEMORAL ARTERY BYPASS GRAFT Bilateral    • HEMORRHOIDECTOMY     • TONSILLECTOMY AND ADENOIDECTOMY     • VASCULAR SURGERY      femoral stents      General Information     Row Name 05/05/20 1214          PT Evaluation Time/Intention    Document Type  therapy note (daily note)  -PH     Mode of Treatment  physical therapy  -     Row Name 05/05/20 1214          Safety Issues, Functional Mobility    Impairments Affecting Function (Mobility)  endurance/activity tolerance;strength;shortness of breath  -PH       User Key  (r) = Recorded By, (t) = Taken By, (c) = Cosigned By    Initials Name Provider Type    PH Beba Santiago PTA Physical Therapy  Assistant        Mobility     Row Name 05/05/20 1214          Bed Mobility Assessment/Treatment    Bed Mobility Assessment/Treatment  supine-sit  -PH     Supine-Sit [place] (Bed Mobility)  supervision  -PH     Assistive Device (Bed Mobility)  head of bed elevated;bed rails  -PH     Row Name 05/05/20 1214          Transfer Assessment/Treatment    Comment (Transfers)  STS x 2 - bed/toilet. Sup from toilet  -PH     Row Name 05/05/20 1214          Sit-Stand Transfer    Sit-Stand [place] (Transfers)  minimum assist (75% patient effort);supervision  -PH     Assistive Device (Sit-Stand Transfers)  walker, front-wheeled  -PH     Row Name 05/05/20 1214          Gait/Stairs Assessment/Training    Gait/Stairs Assessment/Training  gait/ambulation independence  -PH     [place] Level (Gait)  supervision;contact guard;verbal cues  -PH     Assistive Device (Gait)  walker, front-wheeled  -PH     Distance in Feet (Gait)  10' to BR toilet then 15' to chair  -PH     Pattern (Gait)  step-through  -PH     Deviations/Abnormal Patterns (Gait)  gait speed decreased;emery decreased;stride length decreased  -PH     Bilateral Gait Deviations  forward flexed posture  -PH     Comment (Gait/Stairs)  Pt limited by SOA/fatigue.   -PH       User Key  (r) = Recorded By, (t) = Taken By, (c) = Cosigned By    Initials Name Provider Type    PH Beba Santiago PTA Physical Therapy Assistant        Obj/Interventions     Row Name 05/05/20 1216          Therapeutic Exercise    Lower Extremity (Therapeutic Exercise)  gluteal sets;heel slides, bilateral;LAQ (long arc quad), bilateral;marching while seated;SLR (straight leg raise), bilateral;quad sets, bilateral  -PH     Lower Extremity Range of Motion (Therapeutic Exercise)  ankle dorsiflexion/plantar flexion, bilateral  -PH     Exercise Type (Therapeutic Exercise)  AROM (active range of motion)  -PH     Position (Therapeutic Exercise)  seated;other (see comments) reclined in chair   -PH     Sets/Reps (Therapeutic Exercise)  2/10 w/ 2 rests for fatigue/SOA  -PH       User Key  (r) = Recorded By, (t) = Taken By, (c) = Cosigned By    Initials Name Provider Type     Beba Santiago PTA Physical Therapy Assistant        Goals/Plan    No documentation.       Clinical Impression     Row Name 05/05/20 1218          Pain Scale: Numbers Pre/Post-Treatment    Pain Scale: Numbers, Pretreatment  0/10 - no pain  -PH     Pain Scale: Numbers, Post-Treatment  0/10 - no pain  -PH     Row Name 05/05/20 1218          Plan of Care Review    Plan of Care Reviewed With  patient  -PH     Outcome Summary  Pt agreeable to PT today. She amb in room w/ 1 seated rest limited by fatigue/SOA. Pt performed seated TE req a few rests for fatigue. PT will continue to progress as pt tolerates.   -PH     Row Name 05/05/20 1218          Vital Signs    O2 Delivery Pre Treatment  room air  -PH     Intra SpO2 (%)  95  -PH     O2 Delivery Intra Treatment  room air  -PH     Post SpO2 (%)  94  -PH     O2 Delivery Post Treatment  room air  -PH     Row Name 05/05/20 1218          Positioning and Restraints    Pre-Treatment Position  in bed  -PH     Post Treatment Position  chair  -PH     In Chair  reclined;call light within reach;encouraged to call for assist;exit alarm on  -PH       User Key  (r) = Recorded By, (t) = Taken By, (c) = Cosigned By    Initials Name Provider Type     Beba Santiago PTA Physical Therapy Assistant        Outcome Measures    No documentation.         PT Recommendation and Plan     Plan of Care Reviewed With: patient  Outcome Summary: Pt agreeable to PT today. She amb in room w/ 1 seated rest limited by fatigue/SOA. Pt performed seated TE req a few rests for fatigue. PT will continue to progress as pt tolerates.      Time Calculation:   PT Charges     Row Name 05/05/20 1221             Time Calculation    Start Time  1032  -PH      Stop Time  1057  -PH      Time Calculation (min)  25 min  -PH       PT Received On  05/05/20  -      PT - Next Appointment  05/06/20  -        User Key  (r) = Recorded By, (t) = Taken By, (c) = Cosigned By    Initials Name Provider Type    Beba Fonseca PTA Physical Therapy Assistant        Therapy Charges for Today     Code Description Service Date Service Provider Modifiers Qty    07400015260 HC PT THER PROC EA 15 MIN 5/5/2020 Beba Santiago PTA GP 2          PT G-Codes  Outcome Measure Options: AM-PAC 6 Clicks Basic Mobility (PT)  AM-PAC 6 Clicks Score (PT): 19    Beba Santiago PTA  5/5/2020

## 2020-05-06 PROBLEM — K50.019 CROHN'S DISEASE OF SMALL INTESTINE WITH COMPLICATION (HCC): Status: ACTIVE | Noted: 2020-05-06

## 2020-05-06 LAB
ANION GAP SERPL CALCULATED.3IONS-SCNC: 12 MMOL/L (ref 5–15)
BASOPHILS # BLD AUTO: 0.04 10*3/MM3 (ref 0–0.2)
BASOPHILS NFR BLD AUTO: 0.2 % (ref 0–1.5)
BUN BLD-MCNC: 28 MG/DL (ref 8–23)
BUN/CREAT SERPL: 28.3 (ref 7–25)
CALCIUM SPEC-SCNC: 9.2 MG/DL (ref 8.6–10.5)
CHLORIDE SERPL-SCNC: 98 MMOL/L (ref 98–107)
CO2 SERPL-SCNC: 27 MMOL/L (ref 22–29)
CREAT BLD-MCNC: 0.99 MG/DL (ref 0.57–1)
DEPRECATED RDW RBC AUTO: 41.6 FL (ref 37–54)
EOSINOPHIL # BLD AUTO: 0.09 10*3/MM3 (ref 0–0.4)
EOSINOPHIL NFR BLD AUTO: 0.5 % (ref 0.3–6.2)
ERYTHROCYTE [DISTWIDTH] IN BLOOD BY AUTOMATED COUNT: 15.3 % (ref 12.3–15.4)
GFR SERPL CREATININE-BSD FRML MDRD: 55 ML/MIN/1.73
GLUCOSE BLD-MCNC: 100 MG/DL (ref 65–99)
HCT VFR BLD AUTO: 29.9 % (ref 34–46.6)
HCT VFR BLD AUTO: 30.2 % (ref 34–46.6)
HGB BLD-MCNC: 9.3 G/DL (ref 12–15.9)
HGB BLD-MCNC: 9.4 G/DL (ref 12–15.9)
IMM GRANULOCYTES # BLD AUTO: 0.24 10*3/MM3 (ref 0–0.05)
IMM GRANULOCYTES NFR BLD AUTO: 1.4 % (ref 0–0.5)
INR PPP: 1.33 (ref 0.9–1.1)
LYMPHOCYTES # BLD AUTO: 1.73 10*3/MM3 (ref 0.7–3.1)
LYMPHOCYTES NFR BLD AUTO: 10.2 % (ref 19.6–45.3)
MCH RBC QN AUTO: 24.8 PG (ref 26.6–33)
MCHC RBC AUTO-ENTMCNC: 31.1 G/DL (ref 31.5–35.7)
MCV RBC AUTO: 79.7 FL (ref 79–97)
MONOCYTES # BLD AUTO: 1.31 10*3/MM3 (ref 0.1–0.9)
MONOCYTES NFR BLD AUTO: 7.7 % (ref 5–12)
NEUTROPHILS # BLD AUTO: 13.5 10*3/MM3 (ref 1.7–7)
NEUTROPHILS NFR BLD AUTO: 80 % (ref 42.7–76)
NRBC BLD AUTO-RTO: 0.1 /100 WBC (ref 0–0.2)
PLATELET # BLD AUTO: 256 10*3/MM3 (ref 140–450)
PMV BLD AUTO: 11 FL (ref 6–12)
POTASSIUM BLD-SCNC: 4.3 MMOL/L (ref 3.5–5.2)
PROTHROMBIN TIME: 16.2 SECONDS (ref 11.7–14.2)
RBC # BLD AUTO: 3.75 10*6/MM3 (ref 3.77–5.28)
SODIUM BLD-SCNC: 137 MMOL/L (ref 136–145)
WBC NRBC COR # BLD: 16.91 10*3/MM3 (ref 3.4–10.8)

## 2020-05-06 PROCEDURE — 85014 HEMATOCRIT: CPT | Performed by: INTERNAL MEDICINE

## 2020-05-06 PROCEDURE — 85018 HEMOGLOBIN: CPT | Performed by: INTERNAL MEDICINE

## 2020-05-06 PROCEDURE — 25010000002 ONDANSETRON PER 1 MG: Performed by: INTERNAL MEDICINE

## 2020-05-06 PROCEDURE — 80048 BASIC METABOLIC PNL TOTAL CA: CPT | Performed by: INTERNAL MEDICINE

## 2020-05-06 PROCEDURE — 85610 PROTHROMBIN TIME: CPT | Performed by: INTERNAL MEDICINE

## 2020-05-06 PROCEDURE — 99232 SBSQ HOSP IP/OBS MODERATE 35: CPT | Performed by: INTERNAL MEDICINE

## 2020-05-06 PROCEDURE — 85025 COMPLETE CBC W/AUTO DIFF WBC: CPT | Performed by: INTERNAL MEDICINE

## 2020-05-06 PROCEDURE — 63710000001 PREDNISONE PER 1 MG: Performed by: INTERNAL MEDICINE

## 2020-05-06 RX ORDER — WARFARIN SODIUM 4 MG/1
4 TABLET ORAL ONCE
Status: DISCONTINUED | OUTPATIENT
Start: 2020-05-06 | End: 2020-05-06

## 2020-05-06 RX ORDER — PHYTONADIONE 5 MG/1
10 TABLET ORAL ONCE
Status: COMPLETED | OUTPATIENT
Start: 2020-05-06 | End: 2020-05-06

## 2020-05-06 RX ADMIN — ATORVASTATIN CALCIUM 10 MG: 20 TABLET, FILM COATED ORAL at 08:30

## 2020-05-06 RX ADMIN — METOPROLOL TARTRATE 50 MG: 25 TABLET ORAL at 08:30

## 2020-05-06 RX ADMIN — FOLIC ACID 1 MG: 1 TABLET ORAL at 08:30

## 2020-05-06 RX ADMIN — AMIODARONE HYDROCHLORIDE 200 MG: 200 TABLET ORAL at 08:30

## 2020-05-06 RX ADMIN — PREDNISONE 40 MG: 20 TABLET ORAL at 08:32

## 2020-05-06 RX ADMIN — LEVOTHYROXINE SODIUM 100 MCG: 100 TABLET ORAL at 06:09

## 2020-05-06 RX ADMIN — MESALAMINE 1000 MG: 250 CAPSULE ORAL at 08:30

## 2020-05-06 RX ADMIN — BUPROPION HYDROCHLORIDE 150 MG: 150 TABLET, FILM COATED, EXTENDED RELEASE ORAL at 08:30

## 2020-05-06 RX ADMIN — SODIUM CHLORIDE, PRESERVATIVE FREE 10 ML: 5 INJECTION INTRAVENOUS at 20:11

## 2020-05-06 RX ADMIN — PHYTONADIONE 10 MG: 5 TABLET ORAL at 17:17

## 2020-05-06 RX ADMIN — LOPERAMIDE HYDROCHLORIDE 2 MG: 2 CAPSULE ORAL at 11:45

## 2020-05-06 RX ADMIN — DULOXETINE HYDROCHLORIDE 60 MG: 60 CAPSULE, DELAYED RELEASE ORAL at 08:30

## 2020-05-06 RX ADMIN — MESALAMINE 1000 MG: 250 CAPSULE ORAL at 17:17

## 2020-05-06 RX ADMIN — ONDANSETRON 4 MG: 2 INJECTION INTRAMUSCULAR; INTRAVENOUS at 20:11

## 2020-05-06 RX ADMIN — LOPERAMIDE HYDROCHLORIDE 2 MG: 2 CAPSULE ORAL at 06:09

## 2020-05-06 RX ADMIN — METOPROLOL TARTRATE 50 MG: 25 TABLET ORAL at 21:17

## 2020-05-06 RX ADMIN — MESALAMINE 1000 MG: 250 CAPSULE ORAL at 21:17

## 2020-05-06 RX ADMIN — SODIUM CHLORIDE, PRESERVATIVE FREE 10 ML: 5 INJECTION INTRAVENOUS at 08:30

## 2020-05-06 RX ADMIN — MESALAMINE 1000 MG: 250 CAPSULE ORAL at 11:45

## 2020-05-06 RX ADMIN — LOPERAMIDE HYDROCHLORIDE 2 MG: 2 CAPSULE ORAL at 17:18

## 2020-05-06 NOTE — PLAN OF CARE
Pt denies pain, had been doing well all day,MD was getting ready to dc patient, then pt had diarrhea with blood in it, Dr. Dhaliwal notified, placed call to GI, vss, will ctm.        Problem: Patient Care Overview  Goal: Plan of Care Review  5/6/2020 1546 by Enriqueta Kim, RN  Outcome: Ongoing (interventions implemented as appropriate)  Flowsheets (Taken 5/6/2020 1546)  Plan of Care Reviewed With: patient  5/6/2020 1546 by Enriqueta Kim RN  Reactivated  5/6/2020 1514 by Enriqueta Kim, RN  Outcome: Outcome(s) achieved

## 2020-05-06 NOTE — PROGRESS NOTES
Pharmacy Consult: Warfarin Dosing/ Monitoring    Joellen Dominguez is a 75 y.o. female, estimated creatinine clearance is 45.7 mL/min (A) (by C-G formula based on SCr of 1.12 mg/dL (H)). weighing 67.7 kg (149 lb 4.8 oz).     has a past medical history of Acute deep vein thrombosis of lower extremity (CMS/HCC), Antiphospholipid antibody syndrome (CMS/HCC), Antiphospholipid antibody syndrome (CMS/HCC), Arthritis, Benign essential hypertension, COPD (chronic obstructive pulmonary disease) (CMS/HCC), Coronary artery disease, Crohn's disease (CMS/HCC), Cutaneous candidiasis, Depression, Disease of thyroid gland, Elevated cholesterol, GERD (gastroesophageal reflux disease), Hyperlipidemia, Hypertension, Osteopenia, and Polyp, uterus corpus.    Social History     Tobacco Use    Smoking status: Former Smoker     Packs/day: 2.00     Years: 16.00     Pack years: 32.00     Types: Cigarettes     Last attempt to quit: 1967     Years since quittin.3    Smokeless tobacco: Never Used   Substance Use Topics    Alcohol use: No    Drug use: No     Results from last 7 days   Lab Units 20  0523 20  0502 20  0600 20  0559 20  0458 20  0445 20  0433 20  0440 20  0503 20  0502   INR  1.33*  --   --  1.40* 2.62* 2.71* 2.64* 2.41*  --  2.31*   HEMOGLOBIN g/dL  --  9.3* 8.9*  --  8.8* 9.0* 8.9* 8.9* 8.3*  --    HEMATOCRIT %  --  29.9* 28.7*  --  29.2* 28.9* 29.3* 28.7* 26.3*  --    PLATELETS 10*3/mm3  --  256 278  --  255 268 262 293 279  --      Results from last 7 days   Lab Units 20  0502 20  0600 20  0458   SODIUM mmol/L 137 140 138   POTASSIUM mmol/L 4.3 4.1 4.1   CHLORIDE mmol/L 98 101 103   CO2 mmol/L 27.0 27.5 26.3   BUN mg/dL 28* 28* 26*   CREATININE mg/dL 0.99 0.85 1.12*   CALCIUM mg/dL 9.2 9.3 9.1   GLUCOSE mg/dL 100* 107* 113*     Anticoagulation history: Patient confirmed that warfarin home dose was 2.5 mg qTSat and 5 mg all other days of the week. She  was recently placed on steroids, which necessitated a dose decrease to 2.5 mg daily.    Hospital Anticoagulation:  Consulting provider: Dr. Singleton  Start date: 4/24/20  Indication: Atrial fibrillation  Target INR: 2-3  Expected duration: Lifelong  Bridge Therapy: None                Date 4/24 4/25 4/26 4/27 4/28 4/29 4/30 5/1 5/2 5/3 5/4 5/5   INR 1.13 1.16 1.27 1.9 2.57 2.41 2.31 2.41 2.64 2.71 2.62 1.40   Warfarin dose 2.5 mg 5 mg 5 mg 2.5 mg 1 mg 2.5 mg 2.5 mg 2.5 mg 2 mg 1 mg 1 mg 3 mg     Date  5/6              INR 1.33              Warfarin dose 4 mg                  Potential hospital drug interactions:   Amiodarone (major-new medication started in hospital)-concurrent use potentiates anticoagulant response, resulting in an increase in INR and an increased risk of bleeding.   Duloxetine (moderate-home med)-concurrent use may result in altered anticoagulation effects including increased risk of bleeding.  Mesalamine (moderate-home med)-concurrent use may result in decreased warfarin efficacy.  Protonix (moderate-takes PPI at home)-concurrent use inhibits SEP1D3-lzabphld warfarin metabolism, resulting in an increase in INR and an increased risk of bleeding.   Levothyroxine (minor-home med)-concurrent use increases metabolism of vitamin K-dependent clotting factors, resulting in an increased risk of bleeding.   Prednisone (new medication started in hospital)- may result in increased risk of bleeding or diminished effects of warfarin.    Home medications not restarted in the hospital with potential interactions:  Fish oil (major)-concurrent use decreases platelet aggregation due to inhibitory effect on thromboxane A2 levels, resulting in an increased risk of bleeding.  Meloxicam (major)-concurrent use can increase risk of bleeding due to additive effect on hemostasis.  Glucosamine/Chondroitin (moderate)-concurrent use may result in elevations of INR and potentiation of anticoagulant effects.  Colesevelam  (moderate)-concurrent use reduces absorption of warfarin by nonspecific binding, resulting in decreased INR results.    Relevant nutrition status: Regular cardiac diet w/ Boost supplements (Breakfast and Dinner)    Other: Phytonadione 5 mg PO x1 given on 05/03 @ 1821    Education complete: No  Date:     Assessment/Plan:   Inr today = 1.33    continues to be sub therapeutic in spite of amiodarone,  will give 4 mg dose today and evaluate in am  Denice Valenzuela, [facility]    5/4/2020

## 2020-05-06 NOTE — PLAN OF CARE
No complaints voiced, denies pain, VSS, NSR on monitor, eyes closed at long interval, resting quietly, will continue to monitor

## 2020-05-06 NOTE — PROGRESS NOTES
Gastroenterology   Inpatient Progress Note    Reason for Follow Up:  Crohn's disease     Subjective     Interval History:   Pt was to be discharged today but had an episode of loose stool that mixed with blood that looked like dark material.  Not bright red blood pre rectum.  Pt had some mild cramping associated with the episode.  No nausea and vomiting     Current Facility-Administered Medications:   •  acetaminophen (TYLENOL) tablet 650 mg, 650 mg, Oral, Q4H PRN, 650 mg at 04/29/20 1405 **OR** acetaminophen (TYLENOL) suppository 650 mg, 650 mg, Rectal, Q4H PRN, Paulo Singleton MD  •  acetaminophen (TYLENOL) tablet 650 mg, 650 mg, Oral, Q4H PRN, Paulo Singleton MD  •  amiodarone (PACERONE) tablet 200 mg, 200 mg, Oral, Q24H, Matthew Dhaliwal MD, 200 mg at 05/06/20 0830  •  atorvastatin (LIPITOR) tablet 10 mg, 10 mg, Oral, Daily, Paulo Singleton MD, 10 mg at 05/06/20 0830  •  buPROPion XL (WELLBUTRIN XL) 24 hr tablet 150 mg, 150 mg, Oral, Daily, Paulo Singleton MD, 150 mg at 05/06/20 0830  •  DULoxetine (CYMBALTA) DR capsule 60 mg, 60 mg, Oral, Daily, Paulo Singleton MD, 60 mg at 05/06/20 0830  •  folic acid (FOLVITE) tablet 1 mg, 1 mg, Oral, Daily, Paulo Singleton MD, 1 mg at 05/06/20 0830  •  ipratropium-albuterol (DUO-NEB) nebulizer solution 3 mL, 3 mL, Nebulization, Q4H PRN, Paulo Singleton MD, 3 mL at 04/26/20 2158  •  levothyroxine (SYNTHROID, LEVOTHROID) tablet 100 mcg, 100 mcg, Oral, Q AM, Paulo Singleton MD, 100 mcg at 05/06/20 0609  •  loperamide (IMODIUM) capsule 2 mg, 2 mg, Oral, TID AC, Maria L Pastor MD, 2 mg at 05/06/20 1145  •  mesalamine (PENTASA) CR capsule 1,000 mg, 1,000 mg, Oral, 4x Daily, Paulo Singleton MD, 1,000 mg at 05/06/20 1145  •  metoprolol tartrate (LOPRESSOR) injection 2.5 mg, 2.5 mg, Intravenous, Q4H PRN, Paulo Singleton MD, 2.5 mg at 04/23/20 0444  •  metoprolol tartrate (LOPRESSOR) tablet 50 mg, 50 mg, Oral,  Q12H, Paulo Singleton MD, 50 mg at 05/06/20 0830  •  ondansetron (ZOFRAN) injection 4 mg, 4 mg, Intravenous, Q6H PRN, Paulo Singleton MD  •  phytonadione (MEPHYTON, VITAMIN K) tablet 10 mg, 10 mg, Oral, Once, Matthew Dhaliwal MD  •  potassium chloride (MICRO-K) CR capsule 40 mEq, 40 mEq, Oral, PRN, 40 mEq at 05/03/20 0024 **OR** potassium chloride (KLOR-CON) packet 40 mEq, 40 mEq, Oral, PRN **OR** potassium chloride 10 mEq in 100 mL IVPB, 10 mEq, Intravenous, Q1H PRN, Paulo Singleton MD  •  predniSONE (DELTASONE) tablet 40 mg, 40 mg, Oral, Daily With Breakfast, Mahamed Rendon MD, 40 mg at 05/06/20 0832  •  [COMPLETED] Insert peripheral IV, , , Once **AND** sodium chloride 0.9 % flush 10 mL, 10 mL, Intravenous, PRN, Paulo Singleton MD  •  sodium chloride 0.9 % flush 10 mL, 10 mL, Intravenous, Q12H, Paulo Singleton MD, 10 mL at 05/06/20 0830  •  sodium chloride 0.9 % flush 10 mL, 10 mL, Intravenous, PRN, Paulo Singleton MD, 10 mL at 04/26/20 1615  Review of Systems:    All systems were reviewed and negative except for:  Constitution:  positive for fatigue    Objective     Vital Signs  Temp:  [97.5 °F (36.4 °C)-98.4 °F (36.9 °C)] 97.8 °F (36.6 °C)  Heart Rate:  [54-67] 54  Resp:  [16] 16  BP: (126-139)/(57-76) 131/64  Body mass index is 22.54 kg/m².    Intake/Output Summary (Last 24 hours) at 5/6/2020 1622  Last data filed at 5/6/2020 0859  Gross per 24 hour   Intake 210 ml   Output --   Net 210 ml     I/O this shift:  In: 210 [P.O.:210]  Out: -      Physical Exam:   General: patient awake, alert and cooperative   Eyes: no scleral icterus   Skin: warm and dry, not jaundiced   Abdomen: soft, nontender, nondistended; normal bowel sounds, no masses palpated, no periumbical lymphadenopathy   Psychiatric: Appropriate affect and behavior     Results Review:     I reviewed the patient's new clinical results.    Results from last 7 days   Lab Units 05/06/20  1601 05/06/20  8982  05/05/20  0600 05/04/20  0458   WBC 10*3/mm3  --  16.91* 15.72* 15.60*   HEMOGLOBIN g/dL 9.4* 9.3* 8.9* 8.8*   HEMATOCRIT % 30.2* 29.9* 28.7* 29.2*   PLATELETS 10*3/mm3  --  256 278 255     Results from last 7 days   Lab Units 05/06/20  0502 05/05/20  0600 05/04/20  0458   SODIUM mmol/L 137 140 138   POTASSIUM mmol/L 4.3 4.1 4.1   CHLORIDE mmol/L 98 101 103   CO2 mmol/L 27.0 27.5 26.3   BUN mg/dL 28* 28* 26*   CREATININE mg/dL 0.99 0.85 1.12*   CALCIUM mg/dL 9.2 9.3 9.1   GLUCOSE mg/dL 100* 107* 113*     Results from last 7 days   Lab Units 05/06/20  0523 05/05/20  0559 05/04/20 0458   INR  1.33* 1.40* 2.62*     No results found for: LIPASE    Radiology:  CT Chest Without Contrast   Final Result      CT Abdomen Pelvis With Contrast   Final Result      XR Chest 2 View   Final Result   Mild hazy opacity has developed in the right upper lung, may   be developing infiltrate or edema, follow-up recommended. Small pleural   effusions.        This report was finalized on 4/26/2020 1:04 PM by Dr. Roman Rosario M.D.          XR Chest 1 View   Final Result   Interstitial prominence is more apparent on the current examination.   This could reflect some vascular congestion. Patient is suspected to   have a small right pleural effusion.       This report was finalized on 4/23/2020 5:38 AM by Dr. Domitila Tsai M.D.          XR Chest PA & Lateral   Final Result   Minimal patchy density at the right middle lobe, minimal   left pleural effusion, follow-up recommended.       This report was finalized on 4/20/2020 12:41 PM by Dr. Roman Rosario M.D.          NM Lung Ventilation Perfusion   Final Result   Low probability ventilation/perfusion scintigraphy.       This report was finalized on 4/20/2020 12:32 PM by Dr. Houston Kimbrough M.D.          CT Abdomen Pelvis Without Contrast   Final Result      XR Chest 1 View   Final Result          Assessment/Plan     Patient Active Problem List   Diagnosis   • Crohn's  disease (CMS/HCC)   • Incontinence   • Degeneration of lumbar intervertebral disc   • Other hyperlipidemia   • Essential hypertension   • Depression   • Antiphospholipid antibody syndrome (CMS/HCC)   • Lichen sclerosus et atrophicus   • Sepsis with acute renal failure and septic shock (CMS/HCC)   • Myocardial infarction type 2 (CMS/HCC)   • SOB (shortness of breath)   • PAF (paroxysmal atrial fibrillation) (CMS/HCC)   • History of cardioversion   • Leukocytosis   • Pulmonary HTN (CMS/HCC)   • Crohn's disease of small intestine with complication (CMS/[facility])       Assessment:  1. With a known history of Crohn's disease longstanding for many years has now been started on IV Stelara x1 with plans for maintenance Stelara subcu.  During this hospitalization she was started on oral prednisone she is on day 5 of this.  She was doing fairly well.  She did have one episode of cramping associated with some looser stool and blood.  This has not resulted in a decrease in her hemoglobin as her post episode hemoglobin was actually slightly increased.  Have discussed what is going on with her  who thought she would prefer to be discharged but the patient seems to be okay with observation I feel this is wise as we do not have an explanation as to whether or not this is going to be ongoing or a one-time event.  If she has a good night tonight she can be discharged home tomorrow on p.o. prednisone as planned today.  If she continues to have loose stools with blood we would do some stool studies to be sure she has not developed C. difficile and we would decide if endoscopic evaluation was appropriate.  Patient and  were unclear when the last colonoscopy was we will contact Dr. Brunner's office tomorrow if that becomes clinically relevant for her hospital stay      Plan:  ·   I discussed the patients findings and my recommendations with patient, family and nursing staff.    Francisco Salomon MD

## 2020-05-06 NOTE — PROGRESS NOTES
Dr. GABY Dhaliwal    [address]    5/6/2020    Patient ID:  Name:  Joellen Dominguez  MRN:  [id number]  1945  75 y.o.  female            CC/Reason for visit: Exacerbation of Crohn's disease, diarrhea, bloody stool    Interval hx: Patient is feeling worse.  She had bloody bowel movement right now.  Over the past few days she had felt some abdominal cramping.  She has been on moderate dose oral steroids for Crohn's disease exacerbation but had not had any bloody bowel movement until today.    ROS: Positive for abdominal cramping.  Negative for nausea vomiting.  Negative for chest pain.    Vitals:  Vitals:    05/06/20 0605 05/06/20 0744 05/06/20 0751 05/06/20 1413   BP:  129/57 138/76 131/64   BP Location:  Left arm Left arm Left arm   Patient Position:  Lying Sitting Lying   Pulse:  59 58 54   Resp:  16 16 16   Temp:  97.8 °F (36.6 °C) 98.4 °F (36.9 °C) 97.8 °F (36.6 °C)   TempSrc:  Oral Oral Oral   SpO2:   94% 91%   Weight: 67.2 kg (148 lb 3.2 oz)      Height:               Body mass index is 22.54 kg/m².    Intake/Output Summary (Last 24 hours) at 5/6/2020 1531  Last data filed at 5/6/2020 0859  Gross per 24 hour   Intake 210 ml   Output --   Net 210 ml       Exam:  GEN:  No distress  Alert, oriented x 3.   LUNGS: Clear breath sounds bilat, no use of accessory muscles  CV:  Normal S1S2, without murmur, no edema  ABD:  Non tender, no enlarged liver or masses      Scheduled meds:    amiodarone 200 mg Oral Q24H   atorvastatin 10 mg Oral Daily   buPROPion  mg Oral Daily   DULoxetine 60 mg Oral Daily   folic acid 1 mg Oral Daily   levothyroxine 100 mcg Oral Q AM   loperamide 2 mg Oral TID AC   mesalamine 1,000 mg Oral 4x Daily   metoprolol tartrate 50 mg Oral Q12H   phytonadione 10 mg Oral Once   predniSONE 40 mg Oral Daily With Breakfast   sodium chloride 10 mL Intravenous Q12H     IV meds:                           Data Review:   I reviewed the patient's medications and new clinical  results.    COVID19   Date Value Ref Range Status   04/19/2020 Not Detected Not Detected Final         Lab Results   Component Value Date    CALCIUM 9.2 05/06/2020    MG 2.0 04/19/2020     Results from last 7 days   Lab Units 05/06/20  0523 05/06/20  0502 05/05/20  0600 05/05/20  0559 05/04/20  0458   SODIUM mmol/L  --  137 140  --  138   POTASSIUM mmol/L  --  4.3 4.1  --  4.1   CHLORIDE mmol/L  --  98 101  --  103   CO2 mmol/L  --  27.0 27.5  --  26.3   BUN mg/dL  --  28* 28*  --  26*   CREATININE mg/dL  --  0.99 0.85  --  1.12*   CALCIUM mg/dL  --  9.2 9.3  --  9.1   GLUCOSE mg/dL  --  100* 107*  --  113*   WBC 10*3/mm3  --  16.91* 15.72*  --  15.60*   HEMOGLOBIN g/dL  --  9.3* 8.9*  --  8.8*   PLATELETS 10*3/mm3  --  256 278  --  255   INR  1.33*  --   --  1.40* 2.62*                 ASSESSMENT:   Acute exacerbation of Crohn's disease, diarrhea  Bloody bowel movements  A. fib with RVR  Leukocytosis  Non-ST elevation MI type I versus 2 with no chest pain  Acute diastolic congestive heart failure  Acute kidney injury, better  Antiphospholipid antibody syndrome with negative DVT work-up  Hypothyroidism  Hyperlipidemia  Acute hypoxic resp failure  Lactic acidosis on presentation, resolved  Pulmonary hypertension, mild  Hyperkalemia    PLAN:  Cancel discharge.  Patient is worse today.  She just developed bloody bowel movements.  Reconsult gastroenterology to evaluate patient.  Continue medium dose oral prednisone.  Stop Coumadin.  Give vitamin K orally once.  Continue checking PT and INR and PTT every morning.  May need CT imaging of the abdomen and pelvis.    This patient has several chronic medical conditions, and now several acute medical illnesses.  Extensive amount of data was reviewed.  Images were directly visualized by me.  This patient warrants high complexity medical decision-making.        Matthew Dhaliwal MD  5/6/2020

## 2020-05-07 ENCOUNTER — READMISSION MANAGEMENT (OUTPATIENT)
Dept: CALL CENTER | Facility: HOSPITAL | Age: 75
End: 2020-05-07

## 2020-05-07 VITALS
HEART RATE: 61 BPM | BODY MASS INDEX: 22.43 KG/M2 | DIASTOLIC BLOOD PRESSURE: 60 MMHG | SYSTOLIC BLOOD PRESSURE: 125 MMHG | HEIGHT: 68 IN | RESPIRATION RATE: 16 BRPM | WEIGHT: 148 LBS | TEMPERATURE: 97.9 F | OXYGEN SATURATION: 90 %

## 2020-05-07 LAB
ANION GAP SERPL CALCULATED.3IONS-SCNC: 9 MMOL/L (ref 5–15)
BASOPHILS # BLD AUTO: 0.02 10*3/MM3 (ref 0–0.2)
BASOPHILS NFR BLD AUTO: 0.1 % (ref 0–1.5)
BUN BLD-MCNC: 29 MG/DL (ref 8–23)
BUN/CREAT SERPL: 29.3 (ref 7–25)
CALCIUM SPEC-SCNC: 9.3 MG/DL (ref 8.6–10.5)
CHLORIDE SERPL-SCNC: 99 MMOL/L (ref 98–107)
CO2 SERPL-SCNC: 31 MMOL/L (ref 22–29)
CREAT BLD-MCNC: 0.99 MG/DL (ref 0.57–1)
DEPRECATED RDW RBC AUTO: 41.9 FL (ref 37–54)
EOSINOPHIL # BLD AUTO: 0.2 10*3/MM3 (ref 0–0.4)
EOSINOPHIL NFR BLD AUTO: 1.1 % (ref 0.3–6.2)
ERYTHROCYTE [DISTWIDTH] IN BLOOD BY AUTOMATED COUNT: 15.1 % (ref 12.3–15.4)
GFR SERPL CREATININE-BSD FRML MDRD: 55 ML/MIN/1.73
GLUCOSE BLD-MCNC: 99 MG/DL (ref 65–99)
HCT VFR BLD AUTO: 31.6 % (ref 34–46.6)
HGB BLD-MCNC: 9.6 G/DL (ref 12–15.9)
IMM GRANULOCYTES # BLD AUTO: 0.25 10*3/MM3 (ref 0–0.05)
IMM GRANULOCYTES NFR BLD AUTO: 1.4 % (ref 0–0.5)
INR PPP: 1.38 (ref 0.9–1.1)
LYMPHOCYTES # BLD AUTO: 1.73 10*3/MM3 (ref 0.7–3.1)
LYMPHOCYTES NFR BLD AUTO: 9.4 % (ref 19.6–45.3)
MCH RBC QN AUTO: 23.9 PG (ref 26.6–33)
MCHC RBC AUTO-ENTMCNC: 30.4 G/DL (ref 31.5–35.7)
MCV RBC AUTO: 78.6 FL (ref 79–97)
MONOCYTES # BLD AUTO: 1.22 10*3/MM3 (ref 0.1–0.9)
MONOCYTES NFR BLD AUTO: 6.6 % (ref 5–12)
NEUTROPHILS # BLD AUTO: 14.95 10*3/MM3 (ref 1.7–7)
NEUTROPHILS NFR BLD AUTO: 81.4 % (ref 42.7–76)
NRBC BLD AUTO-RTO: 0 /100 WBC (ref 0–0.2)
PLATELET # BLD AUTO: 287 10*3/MM3 (ref 140–450)
PMV BLD AUTO: 11.4 FL (ref 6–12)
POTASSIUM BLD-SCNC: 4.6 MMOL/L (ref 3.5–5.2)
PROTHROMBIN TIME: 16.7 SECONDS (ref 11.7–14.2)
RBC # BLD AUTO: 4.02 10*6/MM3 (ref 3.77–5.28)
SODIUM BLD-SCNC: 139 MMOL/L (ref 136–145)
WBC NRBC COR # BLD: 18.37 10*3/MM3 (ref 3.4–10.8)

## 2020-05-07 PROCEDURE — 63710000001 PREDNISONE PER 1 MG: Performed by: INTERNAL MEDICINE

## 2020-05-07 PROCEDURE — 85610 PROTHROMBIN TIME: CPT | Performed by: INTERNAL MEDICINE

## 2020-05-07 PROCEDURE — 99232 SBSQ HOSP IP/OBS MODERATE 35: CPT | Performed by: INTERNAL MEDICINE

## 2020-05-07 PROCEDURE — 85025 COMPLETE CBC W/AUTO DIFF WBC: CPT | Performed by: INTERNAL MEDICINE

## 2020-05-07 PROCEDURE — 80048 BASIC METABOLIC PNL TOTAL CA: CPT | Performed by: INTERNAL MEDICINE

## 2020-05-07 RX ORDER — LOPERAMIDE HYDROCHLORIDE 2 MG/1
2 CAPSULE ORAL
Qty: 90 CAPSULE | Refills: 1 | Status: SHIPPED | OUTPATIENT
Start: 2020-05-07 | End: 2020-05-26 | Stop reason: HOSPADM

## 2020-05-07 RX ORDER — WARFARIN SODIUM 1 MG/1
TABLET ORAL
Qty: 90 TABLET | Refills: 1 | Status: SHIPPED | OUTPATIENT
Start: 2020-05-07 | End: 2020-05-26 | Stop reason: HOSPADM

## 2020-05-07 RX ORDER — AMIODARONE HYDROCHLORIDE 200 MG/1
200 TABLET ORAL
Qty: 30 TABLET | Refills: 1 | Status: SHIPPED | OUTPATIENT
Start: 2020-05-08 | End: 2020-05-28 | Stop reason: SDUPTHER

## 2020-05-07 RX ORDER — LEVOTHYROXINE SODIUM 0.1 MG/1
100 TABLET ORAL DAILY
Qty: 30 TABLET | Refills: 1 | Status: SHIPPED | OUTPATIENT
Start: 2020-05-07 | End: 2020-06-23 | Stop reason: SDUPTHER

## 2020-05-07 RX ORDER — PREDNISONE 20 MG/1
40 TABLET ORAL
Qty: 60 TABLET | Refills: 1 | Status: SHIPPED | OUTPATIENT
Start: 2020-05-08 | End: 2020-05-26 | Stop reason: HOSPADM

## 2020-05-07 RX ORDER — METOPROLOL TARTRATE 50 MG/1
50 TABLET, FILM COATED ORAL EVERY 12 HOURS SCHEDULED
Qty: 60 TABLET | Refills: 1 | Status: SHIPPED | OUTPATIENT
Start: 2020-05-07 | End: 2020-05-26 | Stop reason: HOSPADM

## 2020-05-07 RX ADMIN — DULOXETINE HYDROCHLORIDE 60 MG: 60 CAPSULE, DELAYED RELEASE ORAL at 09:32

## 2020-05-07 RX ADMIN — AMIODARONE HYDROCHLORIDE 200 MG: 200 TABLET ORAL at 09:32

## 2020-05-07 RX ADMIN — MESALAMINE 1000 MG: 250 CAPSULE ORAL at 09:31

## 2020-05-07 RX ADMIN — LOPERAMIDE HYDROCHLORIDE 2 MG: 2 CAPSULE ORAL at 13:21

## 2020-05-07 RX ADMIN — FOLIC ACID 1 MG: 1 TABLET ORAL at 09:32

## 2020-05-07 RX ADMIN — LEVOTHYROXINE SODIUM 100 MCG: 100 TABLET ORAL at 06:16

## 2020-05-07 RX ADMIN — METOPROLOL TARTRATE 50 MG: 25 TABLET ORAL at 09:32

## 2020-05-07 RX ADMIN — PREDNISONE 40 MG: 20 TABLET ORAL at 09:31

## 2020-05-07 RX ADMIN — ATORVASTATIN CALCIUM 10 MG: 20 TABLET, FILM COATED ORAL at 09:32

## 2020-05-07 RX ADMIN — LOPERAMIDE HYDROCHLORIDE 2 MG: 2 CAPSULE ORAL at 06:16

## 2020-05-07 RX ADMIN — BUPROPION HYDROCHLORIDE 150 MG: 150 TABLET, FILM COATED, EXTENDED RELEASE ORAL at 09:31

## 2020-05-07 RX ADMIN — MESALAMINE 1000 MG: 250 CAPSULE ORAL at 13:21

## 2020-05-07 NOTE — PROGRESS NOTES
"Adult Nutrition  Assessment/PES    Patient Name:  Joellen Dominguez  YOB: 1945  MRN: [id number]  Admit Date:  4/19/2020    Assessment Date:  5/7/2020    Comments:  Nutrition follow up.  Patient had loose BM with blood yesterday.  No further blood in stool.  No abdominal pain.  % at meals today.  Possible d/c later today per MD note.    Due to the COVID pandemic, nutrition assessment completed based on review of electronic medical record. This RD currently working remotely and can be reached at [phone], via secure chat or email.     RD will continue to monitor.     Reason for Assessment     Row Name 05/07/20 1346          Reason for Assessment    Reason For Assessment  follow-up protocol         Anthropometrics     Row Name 05/07/20 1346          Anthropometrics    Height  172.7 cm (67.99\")        Admit Weight    Admit Weight  -- 148# 5/7        Ideal Body Weight (IBW)    Ideal Body Weight (IBW) (kg)  64.13        Body Mass Index (BMI)    BMI Assessment  BMI 18.5-24.9: normal 22.44         Labs/Tests/Procedures/Meds     Row Name 05/07/20 1348          Labs/Procedures/Meds    Lab Results Reviewed  reviewed, pertinent     Lab Results Comments  BUN, GFR, WBC, Hgb, Hct        Diagnostic Tests/Procedures    Diagnostic Test/Procedure Reviewed  reviewed, pertinent        Medications    Pertinent Medications Reviewed  reviewed, pertinent     Pertinent Medications Comments  folic acid, synthroid, imodium, prednisone         Physical Findings     Row Name 05/07/20 1349          Physical Findings    Gastrointestinal  diarrhea;other (see comments) loose stool with blood yesterday     Skin  other (see comments) B=19, bruised, L elbow pink blanchable         Estimated/Assessed Needs     Row Name 05/07/20 1346          Calculation Measurements    Height  172.7 cm (67.99\")         Nutrition Prescription Ordered     Row Name 05/07/20 1350          Nutrition Prescription PO    Current PO Diet  Regular     " Supplement  Boost Plus (Ensure Enlive, Ensure Plus)     Supplement Frequency  2 times a day     Common Modifiers  Cardiac         Evaluation of Received Nutrient/Fluid Intake     Row Name 05/07/20 1350          PO Evaluation    Number of Meals  3     % PO Intake           Problem/Interventions:  Intervention Goal     Row Name 05/07/20 1350          Intervention Goal    General  Maintain nutrition;Reduce/improve symptoms;Disease management/therapy     PO  Increase intake;PO intake (%)     PO Intake %  75 %     Weight  Maintain weight         Nutrition Intervention     Row Name 05/07/20 1351          Nutrition Intervention    RD/Tech Action  Follow Tx progress;Care plan reviewd     Recommended/Ordered  Supplement         Education/Evaluation     Row Name 05/07/20 1351          Education    Education  Will Instruct as appropriate        Monitor/Evaluation    Monitor  Per protocol;PO intake;Supplement intake;Pertinent labs;Weight;Skin status;Symptoms           Electronically signed by:  Dolly Arteaga RD  05/07/20 13:51

## 2020-05-07 NOTE — PLAN OF CARE
No complaints voiced, denies pain, no active bleeding noted,Hgb 9.4, VS'S, NSR on monitor, eyes closed at long interval, resting quietly,will continue to monitor

## 2020-05-07 NOTE — PROGRESS NOTES
Case Management Discharge Note      Final Note: F/u w/ [facility]; spouse would like HH aid for assist w/ personal care. [facility] does not currently have an aide. Pt/spouse are agreeable w/ [facility]. Nena/[facility] notified, left voicemail re; aide and PT/INR per orders. Denice/[facility] to arrange rollator delivery to pt's home. Spouse will provide transportation home/private auto.     Provided Post Acute Provider List?: N/A(Denies need. Plan is home)  Post Acute Provider List: Home Health, Inpatient Rehab  Provided Post Acute Provider Quality & Resource List?: N/A  Delivered To: Patient  Method of Delivery: In person    Destination      No service has been selected for the patient.      Durable Medical Equipment      No service has been selected for the patient.      Dialysis/Infusion      No service has been selected for the patient.      Home Medical Care      No service has been selected for the patient.      Therapy      No service has been selected for the patient.      Community Resources      No service has been selected for the patient.             Final Discharge Disposition Code: 06 - home with home health care

## 2020-05-07 NOTE — OUTREACH NOTE
Prep Survey      Responses   [facility] facility patient discharged from?  [place]   Is LACE score < 7 ?  No   Eligibility  [facility]   Date of Admission  04/19/20   Date of Discharge  05/07/20   Discharge Disposition  Home-Health Care Svc   Discharge diagnosis  A-fib, A/C CHF, NSTEMI, Acute exacerbation Crohn's disease   COVID-19 Test Status  Not tested   Does the patient have one of the following disease processes/diagnoses(primary or secondary)?  Other   Does the patient have Home health ordered?  Yes   What is the Home health agency?   [facility]   Is there a DME ordered?  Yes   What DME was ordered?  rollator from [place]   Prep survey completed?  Yes          Tiffany Montez RN

## 2020-05-07 NOTE — PROGRESS NOTES
Gastroenterology   Inpatient Progress Note    Reason for Follow Up: Crohn's disease    Subjective     Interval History:   No issues overnight.  No abdominal pain no bloody bowel movements.  Patient feels okay this morning is eating    Current Facility-Administered Medications:   •  acetaminophen (TYLENOL) tablet 650 mg, 650 mg, Oral, Q4H PRN, 650 mg at 04/29/20 1405 **OR** acetaminophen (TYLENOL) suppository 650 mg, 650 mg, Rectal, Q4H PRN, Paulo Singleton MD  •  acetaminophen (TYLENOL) tablet 650 mg, 650 mg, Oral, Q4H PRN, Paulo Singleton MD  •  amiodarone (PACERONE) tablet 200 mg, 200 mg, Oral, Q24H, Matthew Dhaliwal MD, 200 mg at 05/07/20 0932  •  atorvastatin (LIPITOR) tablet 10 mg, 10 mg, Oral, Daily, Paulo Singleton MD, 10 mg at 05/07/20 0932  •  buPROPion XL (WELLBUTRIN XL) 24 hr tablet 150 mg, 150 mg, Oral, Daily, Paulo Singleton MD, 150 mg at 05/07/20 0931  •  DULoxetine (CYMBALTA) DR capsule 60 mg, 60 mg, Oral, Daily, Paulo Singleton MD, 60 mg at 05/07/20 0932  •  folic acid (FOLVITE) tablet 1 mg, 1 mg, Oral, Daily, Paulo Singleton MD, 1 mg at 05/07/20 0932  •  ipratropium-albuterol (DUO-NEB) nebulizer solution 3 mL, 3 mL, Nebulization, Q4H PRN, Paulo Singleton MD, 3 mL at 04/26/20 2158  •  levothyroxine (SYNTHROID, LEVOTHROID) tablet 100 mcg, 100 mcg, Oral, Q AM, Paulo Singleton MD, 100 mcg at 05/07/20 0616  •  loperamide (IMODIUM) capsule 2 mg, 2 mg, Oral, TID AC, Maria L Pastor MD, 2 mg at 05/07/20 0616  •  mesalamine (PENTASA) CR capsule 1,000 mg, 1,000 mg, Oral, 4x Daily, Paulo Singleton MD, 1,000 mg at 05/07/20 0931  •  metoprolol tartrate (LOPRESSOR) injection 2.5 mg, 2.5 mg, Intravenous, Q4H PRN, Paulo Singleton MD, 2.5 mg at 04/23/20 0444  •  metoprolol tartrate (LOPRESSOR) tablet 50 mg, 50 mg, Oral, Q12H, Paulo Singleton MD, 50 mg at 05/07/20 0932  •  ondansetron (ZOFRAN) injection 4 mg, 4 mg, Intravenous, Q6H PRN,  Paulo Singleton MD, 4 mg at 05/06/20 2011  •  potassium chloride (MICRO-K) CR capsule 40 mEq, 40 mEq, Oral, PRN, 40 mEq at 05/03/20 0024 **OR** potassium chloride (KLOR-CON) packet 40 mEq, 40 mEq, Oral, PRN **OR** potassium chloride 10 mEq in 100 mL IVPB, 10 mEq, Intravenous, Q1H PRN, Paulo Singleton MD  •  predniSONE (DELTASONE) tablet 40 mg, 40 mg, Oral, Daily With Breakfast, Mahamed Rendon MD, 40 mg at 05/07/20 0931  •  [COMPLETED] Insert peripheral IV, , , Once **AND** sodium chloride 0.9 % flush 10 mL, 10 mL, Intravenous, PRN, Paulo Singleton MD  •  sodium chloride 0.9 % flush 10 mL, 10 mL, Intravenous, Q12H, Paulo Singleton MD, 10 mL at 05/06/20 2011  •  sodium chloride 0.9 % flush 10 mL, 10 mL, Intravenous, PRN, Paulo Singleton MD, 10 mL at 04/26/20 1615  Review of Systems:    All systems were reviewed and negative except for:  Gastrointestinal: positive for  diarrhea    Objective     Vital Signs  Temp:  [97.5 °F (36.4 °C)-98.6 °F (37 °C)] 97.5 °F (36.4 °C)  Heart Rate:  [54-62] 61  Resp:  [16] 16  BP: (131-151)/(64-76) 151/71  Body mass index is 22.51 kg/m².    Intake/Output Summary (Last 24 hours) at 5/7/2020 0954  Last data filed at 5/7/2020 0913  Gross per 24 hour   Intake 120 ml   Output --   Net 120 ml     I/O this shift:  In: 120 [P.O.:120]  Out: -      Physical Exam:   General: patient awake, alert and cooperative   Eyes: no scleral icterus   Skin: warm and dry, not jaundiced   Abdomen: soft, nontender, nondistended; normal bowel sounds, no masses palpated, no periumbical lymphadenopathy   Psychiatric: Appropriate affect and behavior     Results Review:     I reviewed the patient's new clinical results.    Results from last 7 days   Lab Units 05/07/20  0716 05/06/20  1601 05/06/20  0502 05/05/20  0600   WBC 10*3/mm3 18.37*  --  16.91* 15.72*   HEMOGLOBIN g/dL 9.6* 9.4* 9.3* 8.9*   HEMATOCRIT % 31.6* 30.2* 29.9* 28.7*   PLATELETS 10*3/mm3 287  --  256 278      Results from last 7 days   Lab Units 05/07/20  0716 05/06/20  0502 05/05/20  0600   SODIUM mmol/L 139 137 140   POTASSIUM mmol/L 4.6 4.3 4.1   CHLORIDE mmol/L 99 98 101   CO2 mmol/L 31.0* 27.0 27.5   BUN mg/dL 29* 28* 28*   CREATININE mg/dL 0.99 0.99 0.85   CALCIUM mg/dL 9.3 9.2 9.3   GLUCOSE mg/dL 99 100* 107*     Results from last 7 days   Lab Units 05/07/20  0716 05/06/20  0523 05/05/20  0559   INR  1.38* 1.33* 1.40*     No results found for: LIPASE    Radiology:  CT Chest Without Contrast   Final Result      CT Abdomen Pelvis With Contrast   Final Result      XR Chest 2 View   Final Result   Mild hazy opacity has developed in the right upper lung, may   be developing infiltrate or edema, follow-up recommended. Small pleural   effusions.        This report was finalized on 4/26/2020 1:04 PM by Dr. Roman Rosario M.D.          XR Chest 1 View   Final Result   Interstitial prominence is more apparent on the current examination.   This could reflect some vascular congestion. Patient is suspected to   have a small right pleural effusion.       This report was finalized on 4/23/2020 5:38 AM by Dr. Domitila Tsai M.D.          XR Chest PA & Lateral   Final Result   Minimal patchy density at the right middle lobe, minimal   left pleural effusion, follow-up recommended.       This report was finalized on 4/20/2020 12:41 PM by Dr. Roman Rosario M.D.          NM Lung Ventilation Perfusion   Final Result   Low probability ventilation/perfusion scintigraphy.       This report was finalized on 4/20/2020 12:32 PM by Dr. Houston Kimbrough M.D.          CT Abdomen Pelvis Without Contrast   Final Result      XR Chest 1 View   Final Result          Assessment/Plan     Patient Active Problem List   Diagnosis   • Crohn's disease (CMS/HCC)   • Incontinence   • Degeneration of lumbar intervertebral disc   • Other hyperlipidemia   • Essential hypertension   • Depression   • Antiphospholipid antibody syndrome  (CMS/HCC)   • Lichen sclerosus et atrophicus   • Sepsis with acute renal failure and septic shock (CMS/HCC)   • Myocardial infarction type 2 (CMS/HCC)   • SOB (shortness of breath)   • PAF (paroxysmal atrial fibrillation) (CMS/HCC)   • History of cardioversion   • Leukocytosis   • Pulmonary HTN (CMS/HCC)   • Crohn's disease of small intestine with complication (CMS/HCC)       Assessment:  1. Patient with a known history of Crohn's disease recent starting on Stelara.  Is now on day 6 of oral steroids.  Was supposed to be discharged yesterday but had an acute episode of diarrhea with some hematochezia.  She has been monitored overnight for that new symptom and has had no further bleeding or diarrhea overnight.  She has no complaints this morning.  Her hemoglobin has been stable and is in fact slightly increased this morning.  She feels like she would like to go home.  2. Leukocytosis      Plan:  · I think overall from a GI standpoint she is okay to go home.  She has not had any further bleeding overnight.  She is on a stable regimen of having gotten her Stelara and should go home on her oral prednisone.  She does have plans in place to follow-up with Dr. Flores as well.  The only new finding is that her white count does continue to elevate.  This may be in part due to the administration of steroids.  If there are no localizing symptoms of infection the patient could likely be discharged and this can be monitored as an outpatient.  If there is any concern for other etiology for the leukocytosis this should be evaluated prior to her discharge  I discussed the patients findings and my recommendations with patient.    Francisco Salomon MD

## 2020-05-07 NOTE — DISCHARGE SUMMARY
Patient Identification:  Name: Joellen Dominguez  Age: 75 y.o.  Sex: female  :  1945  MRN: [id number]  Primary Care Physician: Chitra Leyva MD    Admit date: 2020  Discharge date and time: May 7, 2020  Discharged Condition: good    Discharge Diagnoses:    Acute exacerbation of Crohn's disease, diarrhea  Bloody bowel movements  A. fib with RVR  Leukocytosis  Non-ST elevation MI type I versus 2 with no chest pain  Acute diastolic congestive heart failure  Acute kidney injury, better  Antiphospholipid antibody syndrome with negative DVT work-up  Hypothyroidism  Hyperlipidemia  Acute hypoxic resp failure  Lactic acidosis on presentation, resolved  Pulmonary hypertension, mild  Hyperkalemia  Shortness of breath  Generalized weakness         Hospital Course: Joellen Dominguez presented to [facility] with shortness of breath and weakness.  She was diagnosed with atrial fibrillation, acute diastolic heart failure and elevated troponin.  Cardiology was consulted.  They adjusted her cardiac medications until her blood pressure normalized over the following days and her heart rate improved.  Her elevated troponin was deemed to be secondary to demand ischemia as well as mildly abnormal kidney function, but not due to acute coronary event.  She did remain on anticoagulation, namely Coumadin which she usually takes at home.  She will need her INR checked within 1 week with her primary care physician.  Her kidney abnormality improved after a few days.  Unfortunately she did develop significant weakness, poor appetite, nausea and abdominal cramping.  Subsequently she started having episodes of diarrhea, very frequently.  GI was consulted and she was diagnosed with acute exacerbation of Crohn's disease.  She has a history of Crohn's disease.  She did have some bloody bowel movements recently but today she feels very well.  She was seen by GI and started on oral prednisone.  They have agreed that she  is safe for discharge today and has maximally benefited from acute inpatient care.  She should follow-up with her gastroenterologist within 1 week.        Consults:   IP CONSULT TO PULMONOLOGY  IP CONSULT TO CARDIOLOGY  IP CONSULT TO HOSPITALIST  IP CONSULT TO CARDIOLOGY  IP CONSULT TO GASTROENTEROLOGY  IP CONSULT TO GASTROENTEROLOGY    Significant Diagnostic Studies:   Imaging Results (Most Recent)     Procedure Component Value Units Date/Time    CT Chest Without Contrast [[id number]] Collected:  20     Updated:  20    Narrative:       CT CHEST WITHOUT CONTRAST AND CT ABDOMEN AND PELVIS WITH INTRAVENOUS  CONTRAST     HISTORY: Shortness of breath. Pneumonia. Crohn's disease. Elevated white  count.     TECHNIQUE/FINDINGS: The CT scan was performed through the chest without  contrast followed by CT imaging through the abdomen and pelvis with  intravenous contrast. It is compared to recent CT scan of the abdomen  and pelvis without contrast dated 2020 and demonstrates the  followin. There is some scattered pneumonia in both lungs, particularly in the  right upper lobe as partially visualized on yesterday's chest x-ray and  some of these areas are definitely new when compared to the abdominal CT  scan performed 8 days ago. There is an associated tiny left pleural  effusion. The etiology of the pneumonia is indeterminate but includes  the possibility of COVID-19 pneumonia.  2. There is no mediastinal or hilar or axillary adenopathy. There is no  pericardial effusion.  3. On the current exam there appears to be some slight wall thickening  of the cecum which is new since the previous study and concerning for  Crohn's colitis in this setting. There is no evidence of free fluid or  abscess. The small bowel loops are unremarkable.  4. The liver, spleen, pancreas, and right adrenal gland are  unremarkable. The left adrenal gland contains a fat-containing mass  measuring up to 5.4 cm and most  consistent with an adrenal myolipoma.  Both kidneys are unremarkable except for several very small cortical  cysts.  5. The patient has had repair of an abdominal aortic aneurysm with an  aortobifemoral bypass graft which is patent. There is no retroperitoneal  lymphadenopathy. The remainder of the pelvis is unremarkable.                 Radiation dose reduction techniques were utilized, including automated  exposure control and exposure modulation based on body size.     This report was finalized on 2020 6:07 PM by Dr. Kyle Razo M.D.       CT Abdomen Pelvis With Contrast [[id number]] Collected:  20     Updated:  20    Narrative:       CT CHEST WITHOUT CONTRAST AND CT ABDOMEN AND PELVIS WITH INTRAVENOUS  CONTRAST     HISTORY: Shortness of breath. Pneumonia. Crohn's disease. Elevated white  count.     TECHNIQUE/FINDINGS: The CT scan was performed through the chest without  contrast followed by CT imaging through the abdomen and pelvis with  intravenous contrast. It is compared to recent CT scan of the abdomen  and pelvis without contrast dated 2020 and demonstrates the  followin. There is some scattered pneumonia in both lungs, particularly in the  right upper lobe as partially visualized on yesterday's chest x-ray and  some of these areas are definitely new when compared to the abdominal CT  scan performed 8 days ago. There is an associated tiny left pleural  effusion. The etiology of the pneumonia is indeterminate but includes  the possibility of COVID-19 pneumonia.  2. There is no mediastinal or hilar or axillary adenopathy. There is no  pericardial effusion.  3. On the current exam there appears to be some slight wall thickening  of the cecum which is new since the previous study and concerning for  Crohn's colitis in this setting. There is no evidence of free fluid or  abscess. The small bowel loops are unremarkable.  4. The liver, spleen, pancreas, and right adrenal  gland are  unremarkable. The left adrenal gland contains a fat-containing mass  measuring up to 5.4 cm and most consistent with an adrenal myolipoma.  Both kidneys are unremarkable except for several very small cortical  cysts.  5. The patient has had repair of an abdominal aortic aneurysm with an  aortobifemoral bypass graft which is patent. There is no retroperitoneal  lymphadenopathy. The remainder of the pelvis is unremarkable.                 Radiation dose reduction techniques were utilized, including automated  exposure control and exposure modulation based on body size.     This report was finalized on 4/27/2020 6:07 PM by Dr. Kyle Razo M.D.       XR Chest 2 View [[id number]] Collected:  04/26/20 1301     Updated:  04/26/20 1307    Narrative:       XR CHEST 2 VW-     HISTORY: Female who is 75 years-old, congestive heart failure     TECHNIQUE: Frontal and lateral views of the chest     COMPARISON: 04/23/2020     FINDINGS: Heart size is borderline. Slight vascular and interstitial  prominence. Mild hazy opacity has developed in the right upper lung, may  be developing infiltrate or edema, follow-up recommended. Small pleural  effusions, increased from 04/20/2020. No pneumothorax. Persistent  elevation of the right hemidiaphragm. No acute osseous process.       Impression:       Mild hazy opacity has developed in the right upper lung, may  be developing infiltrate or edema, follow-up recommended. Small pleural  effusions.      This report was finalized on 4/26/2020 1:04 PM by Dr. Roman Rosario M.D.       XR Chest 1 View [[id number]] Collected:  04/23/20 0536     Updated:  04/23/20 0541    Narrative:       PORTABLE CHEST RADIOGRAPH     HISTORY: Wheezing     COMPARISON: 04/19/2020     FINDINGS:  Cardiomegaly is identified. There is interstitial prominence. This is  more apparent on the prior examination. No pneumothorax is seen. There  is scarring seen at the left lung base. There is some blunting of  right  costophrenic angle. This may represent trace effusion. Elevation of the  right hemidiaphragm is stable.       Impression:       Interstitial prominence is more apparent on the current examination.  This could reflect some vascular congestion. Patient is suspected to  have a small right pleural effusion.     This report was finalized on 4/23/2020 5:38 AM by Dr. Domitila Tsai M.D.       XR Chest PA & Lateral [[id number]] Collected:  04/20/20 1235     Updated:  04/20/20 1244    Narrative:       XR CHEST PA AND LATERAL-     HISTORY: Female who is 75 years-old,  short of breath     TECHNIQUE: Frontal and lateral views of the chest     COMPARISON: 04/19/2020     FINDINGS: Patient is rotated towards the left. The heart size appears  normal. Pulmonary vasculature is unremarkable. Minimal likely  atelectasis at the bases. Minimal patchy density suspected location of  the right middle lobe may represent developing infiltrate, follow-up  recommended. Minimal left pleural effusion. No pneumothorax. Right  hemidiaphragm remains elevated. No acute osseous process.       Impression:       Minimal patchy density at the right middle lobe, minimal  left pleural effusion, follow-up recommended.     This report was finalized on 4/20/2020 12:41 PM by Dr. Roman Rosario M.D.       NM Lung Ventilation Perfusion [[id number]] Collected:  04/20/20 1210     Updated:  04/20/20 1235    Narrative:       NUCLEAR MEDICINE VENTILATION AND PERFUSION STUDY     HISTORY: Shortness of air.     COMPARISON: PA and lateral chest 04/20/2020, CT abdomen pelvis  04/19/2020, AP chest 04/19/2020.     TECHNIQUE:  Planar ventilation scan in posterior projection after  inhalation of 12.7mCi Xe-133 gas with wash-in, rebreathing and wash-out  phases. Apparently, the patient exhaled and was unable to follow  instructions for the ventilation segment exam.  Followed by perfusion  scan with 5.8mCi Tc-MAA IV in multiple projections.      FINDINGS:  There  is matched decreased ventilation and perfusion to the  right lung base that is attributed to combination of elevation of the  the right hemidiaphragm and right basilar atelectasis. No focal  segmental matched or mismatched defect is demonstrated. Ventilation  imaging is limited on this exam.       Impression:       Low probability ventilation/perfusion scintigraphy.     This report was finalized on 4/20/2020 12:32 PM by Dr. Houston Kimbrough M.D.       CT Abdomen Pelvis Without Contrast [[id number]] Collected:  04/19/20 1236     Updated:  04/19/20 1453    Narrative:       CT SCAN OF THE ABDOMEN AND PELVIS WITHOUT CONTRAST     HISTORY: Abdominal pain and elevated white count. History of Crohn's  disease.     The CT scan was performed as an emergency procedure through the abdomen  and pelvis without contrast and compared to the previous CT scans of the  abdomen and pelvis dated 02/13/2020. The following findings are present:  1. There is a tiny left pleural effusion. The liver, spleen, pancreas,  and right adrenal gland appear normal. There is a prominent  fat-containing mass involving the left adrenal gland measuring 5.4 cm  consistent with an adrenal myolipoma that is unchanged. The gallbladder  has been removed.  2. The kidneys are normal in size. There are several small bilateral  renal cysts. There is no evidence of renal obstruction.  3. The patient has had previous aortobifemoral bypass grafts and no  complicating features seen without contrast. There is no retroperitoneal  lymphadenopathy.  4. The large and small bowel loops are normal in caliber and no  inflammatory change is seen by CT scan. No abnormality is seen in the  pelvis.                 Radiation dose reduction techniques were utilized, including automated  exposure control and exposure modulation based on body size.     This report was finalized on 4/19/2020 2:50 PM by Dr. Kyle Razo M.D.       XR Chest 1 View [[id number]] Collected:  04/19/20  "1121     Updated:  04/19/20 1453    Narrative:       ONE VIEW PORTABLE CHEST     HISTORY: Shortness of breath.     FINDINGS: The lungs are moderately expanded and grossly clear. The heart  is borderline enlarged and the overall appearance shows no change from  09/23/2019.     This report was finalized on 4/19/2020 2:50 PM by Dr. Kyle Razo M.D.                   TEST  RESULTS PENDING AT DISCHARGE      Discharge Exam:  Alert and oriented x 4, in NAD  Supple neck, midline trach  RRR, no m/r/g, no edema  Clear bilaterally, no wheezing, nonlabored  No clubbing or cyanosis     Disposition:  Home    Patient Instructions:      Discharge Medications      New Medications      Instructions Start Date   amiodarone 200 MG tablet  Commonly known as:  PACERONE   200 mg, Oral, Every 24 Hours Scheduled   Start Date:  May 8, 2020     loperamide 2 MG capsule  Commonly known as:  IMODIUM   2 mg, Oral, 3 Times Daily Before Meals      metoprolol tartrate 50 MG tablet  Commonly known as:  LOPRESSOR   50 mg, Oral, Every 12 Hours Scheduled      predniSONE 20 MG tablet  Commonly known as:  DELTASONE   40 mg, Oral, Daily With Breakfast   Start Date:  May 8, 2020        Changes to Medications      Instructions Start Date   levothyroxine 100 MCG tablet  Commonly known as:  SYNTHROID, LEVOTHROID  What changed:    · medication strength  · how much to take   100 mcg, Oral, Daily      warfarin 1 MG tablet  Commonly known as:  Coumadin  What changed:    · additional instructions  · Another medication with the same name was removed. Continue taking this medication, and follow the directions you see here.   Take 3 tablets daily.         Continue These Medications      Instructions Start Date   B-D 3CC LUER-CELESTE SYR 25GX5/8\" 25G X 5/8\" 3 ML misc  Generic drug:  Syringe/Needle (Disp)   USE AS DIRECTED WITH B12         Stop These Medications    aspirin 81 MG chewable tablet     atorvastatin 10 MG tablet  Commonly known as:  Lipitor     buPROPion XL " 150 MG 24 hr tablet  Commonly known as:  Wellbutrin XL     clobetasol 0.05 % cream  Commonly known as:  TEMOVATE     colesevelam 625 MG tablet  Commonly known as:  WELCHOL     cyanocobalamin 1000 MCG/ML injection     DULoxetine 60 MG capsule  Commonly known as:  CYMBALTA     fexofenadine 180 MG tablet  Commonly known as:  ALLEGRA     fish oil 1000 MG capsule capsule     folic acid 1 MG tablet  Commonly known as:  FOLVITE     Glucosamine-Chondroitin 750-600 MG chewable tablet     HM Vitamin C 500 MG chewable tablet  Generic drug:  Ascorbic Acid     losartan 50 MG tablet  Commonly known as:  COZAAR     Lunesta 3 MG tablet  Generic drug:  eszopiclone     meloxicam 15 MG tablet  Commonly known as:  MOBIC     nexIUM 40 MG capsule  Generic drug:  esomeprazole     OCUVITE-LUTEIN PO     Pamine Forte 5 MG tablet  Generic drug:  methscopolamine     pantoprazole 40 MG EC tablet  Commonly known as:  PROTONIX     Pentasa 500 MG CR capsule  Generic drug:  mesalamine     zinc gluconate 50 MG tablet             Your medication list      START taking these medications      Instructions Last Dose Given Next Dose Due   amiodarone 200 MG tablet  Commonly known as:  PACERONE  Start taking on:  May 8, 2020      Take 1 tablet by mouth Daily.       loperamide 2 MG capsule  Commonly known as:  IMODIUM      Take 1 capsule by mouth 3 (Three) Times a Day Before Meals.       metoprolol tartrate 50 MG tablet  Commonly known as:  LOPRESSOR      Take 1 tablet by mouth Every 12 (Twelve) Hours.       predniSONE 20 MG tablet  Commonly known as:  DELTASONE  Start taking on:  May 8, 2020      Take 2 tablets by mouth Daily With Breakfast.          CHANGE how you take these medications      Instructions Last Dose Given Next Dose Due   levothyroxine 100 MCG tablet  Commonly known as:  SYNTHROID, LEVOTHROID  What changed:    · medication strength  · how much to take      Take 1 tablet by mouth Daily.       warfarin 1 MG tablet  Commonly known as:   "Coumadin  What changed:    · additional instructions  · Another medication with the same name was removed. Continue taking this medication, and follow the directions you see here.      Take 3 tablets daily.          CONTINUE taking these medications      Instructions Last Dose Given Next Dose Due   B-D 3CC LUER-CELESTE SYR 25GX5/8\" 25G X 5/8\" 3 ML misc  Generic drug:  Syringe/Needle (Disp)      USE AS DIRECTED WITH B12          STOP taking these medications    aspirin 81 MG chewable tablet        atorvastatin 10 MG tablet  Commonly known as:  Lipitor        buPROPion  MG 24 hr tablet  Commonly known as:  Wellbutrin XL        clobetasol 0.05 % cream  Commonly known as:  TEMOVATE        colesevelam 625 MG tablet  Commonly known as:  WELCHOL        cyanocobalamin 1000 MCG/ML injection        DULoxetine 60 MG capsule  Commonly known as:  CYMBALTA        fexofenadine 180 MG tablet  Commonly known as:  ALLEGRA        fish oil 1000 MG capsule capsule        folic acid 1 MG tablet  Commonly known as:  FOLVITE        Glucosamine-Chondroitin 750-600 MG chewable tablet        HM Vitamin C 500 MG chewable tablet  Generic drug:  Ascorbic Acid        losartan 50 MG tablet  Commonly known as:  COZAAR        Lunesta 3 MG tablet  Generic drug:  eszopiclone        meloxicam 15 MG tablet  Commonly known as:  MOBIC        nexIUM 40 MG capsule  Generic drug:  esomeprazole        OCUVITE-LUTEIN PO        Pamine Forte 5 MG tablet  Generic drug:  methscopolamine        pantoprazole 40 MG EC tablet  Commonly known as:  PROTONIX        Pentasa 500 MG CR capsule  Generic drug:  mesalamine        zinc gluconate 50 MG tablet              Where to Get Your Medications      These medications were sent to [facility] [address] - [place], [place] - [address] [facility] [phone] [facility] [phone]   [address] [facility] [place] [address]    Phone:  [phone]   · amiodarone 200 MG tablet  · levothyroxine 100 MCG " tablet  · loperamide 2 MG capsule  · metoprolol tartrate 50 MG tablet  · predniSONE 20 MG tablet  · warfarin 1 MG tablet             Medication Reconciliation: Please see electronically completed Med Rec.    Total time spent discharging patient including evaluation, medication reconciliation, arranging follow up, and post hospitalization instructions and education to patient and family total time exceeds 30 minutes.    Signed:  Matthew Dhaliwal MD  5/7/2020  15:12

## 2020-05-07 NOTE — DISCHARGE PLACEMENT REQUEST
"Presley King (75 y.o. Female)     Date of Birth Social Security Number Address Home Phone MRN    1945  [address] [address] [phone] [id number]    [employer] Mar[employer] Status          Amish        Admission Date Admission Type Admitting Provider Attending Provider Department, Room/Bed    4/19/20 Emergency Issac Mesa MD Anaya, Ervin H., MD [address], [address]1    Discharge Date Discharge Disposition Discharge Destination         Home or Self Care              Attending Provider:  Matthew Dhaliwal MD    Allergies:  Sulfa Antibiotics, Infliximab    Isolation:  None   Infection:  None   Code Status:  CPR    Ht:  172.7 cm (67.99\")   Wt:  67.1 kg (148 lb)    Admission Cmt:  None   Principal Problem:  SOB (shortness of breath) [R06.02]                 Active Insurance as of 4/19/2020     Primary Coverage     Payor Plan Insurance Group Employer/Plan Group    MEDICARE MEDICARE A & B      Payor Plan Address Payor Plan Phone Number Payor Plan Fax Number Effective Dates    [address] [phone]  9/1/2011 - None Entered    [facility] [id number]       Subscriber Name Subscriber Birth Date Member ID       PRESLEY KING 1945 [id number]           Secondary Coverage     Payor Plan Insurance Group Employer/Plan Group    AARP MC SUP AARP HEALTH CARE OPTIONS      Payor Plan Address Payor Plan Phone Number Payor Plan Fax Number Effective Dates    [facility] [phone]  1/1/2016 - None Entered    [address]       [facility] [address]       Subscriber Name Subscriber Birth Date Member ID       PRESLEY KING 1945 [id number]                 Emergency Contacts      (Rel.) Home Phone Work Phone Mobile Phone    Dr.Richard Alberto (Spouse) [phone] -- [phone]    Andrade King (Son) -- -- [phone]    Jacy King (Other) [phone] -- --              "

## 2020-05-08 ENCOUNTER — TRANSITIONAL CARE MANAGEMENT TELEPHONE ENCOUNTER (OUTPATIENT)
Dept: CALL CENTER | Facility: HOSPITAL | Age: 75
End: 2020-05-08

## 2020-05-08 NOTE — OUTREACH NOTE
Call Center TCM Note      Responses   [facility] patient discharged from?  [place]   COVID-19 Test Status  Not tested   Does the patient have one of the following disease processes/diagnoses(primary or secondary)?  Other   TCM attempt successful?  No   Unsuccessful attempts  Attempt 1   Call Status  Rescheduled          Denice Mills RN    5/8/2020, 10:42

## 2020-05-08 NOTE — OUTREACH NOTE
Call Center TCM Note      Responses   [facility] patient discharged from?  [facility]   COVID-19 Test Status  Not tested   Does the patient have one of the following disease processes/diagnoses(primary or secondary)?  Other   TCM attempt successful?  Yes   Call start time  1427   Call end time  1435   Discharge diagnosis  A-fib, A/C CHF, NSTEMI, Acute exacerbation Crohn's disease   Is patient permission given to speak with other caregiver?  Yes   Person spoke with today (if not patient) and relationship  -- [Wade ]   Meds reviewed with patient/caregiver?  Yes   Is the patient having any side effects they believe may be caused by any medication additions or changes?  No   Does the patient have all medications ordered at discharge?  Yes   Is the patient taking all medications as directed (includes completed medication regime)?  N/A   Medication comments  -- [Pts  just went and filled them. Pt is going to start taking when she wakes up. ]   Does the patient have a primary care provider?   Yes   Does the patient have an appointment with their PCP within 7 days of discharge?  Greater than 7 days   What is preventing the patient from scheduling follow up appointments within 7 days of discharge?  Waiting on return call   Nursing Interventions  Advised patient to make appointment, Educated patient on importance of making appointment   Has the patient kept scheduled appointments due by today?  N/A   What is the Home health agency?   [facility]    Has home health visited the patient within 72 hours of discharge?  No   What DME was ordered?  rollator from [employer]   Has all DME been delivered?  Yes   Pulse Ox monitoring  None   Psychosocial issues?  No   Did the patient receive a copy of their discharge instructions?  Yes   Nursing interventions  Reviewed instructions with patient   What is the patient's perception of their health status since discharge?  Improving   Is the patient/caregiver able to teach back signs  and symptoms related to disease process for when to call PCP?  Yes   Is the patient/caregiver able to teach back signs and symptoms related to disease process for when to call 911?  Yes   Is the patient/caregiver able to teach back the hierarchy of who to call/visit for symptoms/problems? PCP, Specialist, Home health nurse, Urgent Care, ED, 911  Yes   Additional teach back comments  -- [Pts  said she is doing very well. Pt is eating well and is feel better today. Has slept a lot today. ]   TCM call completed?  Yes          Denice Mills RN    5/8/2020, 14:34

## 2020-05-09 ENCOUNTER — READMISSION MANAGEMENT (OUTPATIENT)
Dept: CALL CENTER | Facility: HOSPITAL | Age: 75
End: 2020-05-09

## 2020-05-09 NOTE — OUTREACH NOTE
Medical Week 1 Survey      Responses   [facility]   COVID-19 Test Status  Not tested   Does the patient have one of the following disease processes/diagnoses(primary or secondary)?  Other   Is there a successful TCM telephone encounter documented?  No   Week 1 attempt successful?  Yes   Call start time  1414   Call end time  1420   Discharge diagnosis  A-fib, A/C CHF, NSTEMI, Acute exacerbation Crohn's disease   Meds reviewed with patient/caregiver?  Yes   Is the patient having any side effects they believe may be caused by any medication additions or changes?  No   Does the patient have all medications ordered at discharge?  Yes   Is the patient taking all medications as directed (includes completed medication regime)?  N/A   Does the patient have a primary care provider?   Yes   Does the patient have an appointment with their PCP within 7 days of discharge?  No   Comments regarding PCP  Pt will make an appointment this week with PCP   What is preventing the patient from scheduling follow up appointments within 7 days of discharge?  Haven't had time   Nursing Interventions  Advised patient to make appointment   Has the patient kept scheduled appointments due by today?  N/A   What is the Home health agency?   [facility]    What DME was ordered?  rollator from [employer]   Has all DME been delivered?  Yes   Pulse Ox monitoring  None   Psychosocial issues?  No   Did the patient receive a copy of their discharge instructions?  Yes   Nursing interventions  Reviewed instructions with patient   What is the patient's perception of their health status since discharge?  Improving   Is the patient/caregiver able to teach back signs and symptoms related to disease process for when to call PCP?  Yes   Is the patient/caregiver able to teach back signs and symptoms related to disease process for when to call 911?  Yes   Is the patient/caregiver able to teach back the hierarchy of who to  call/visit for symptoms/problems? PCP, Specialist, Home health nurse, Urgent Care, ED, 911  Yes   Additional teach back comments  Pt denies fever, cough, SOB.  She was encouraged to monitir temperature daily.   Change toothbrush and self quarantine.  She verbalized understanding.    Week 1 call completed?  Yes   Wrap up additional comments  Pt reports she is doing well.  denies any needs at this time.           Shayy Schwarz RN

## 2020-05-10 ENCOUNTER — READMISSION MANAGEMENT (OUTPATIENT)
Dept: CALL CENTER | Facility: HOSPITAL | Age: 75
End: 2020-05-10

## 2020-05-10 NOTE — OUTREACH NOTE
Medical Week 1 Survey      Responses   [facility] patient discharged from?  [place]   COVID-19 Test Status  Not tested   Does the patient have one of the following disease processes/diagnoses(primary or secondary)?  Other   Is there a successful TCM telephone encounter documented?  No   Week 1 attempt successful?  Yes   Call start time  1102   Call end time  1104   Discharge diagnosis  A-fib, A/C CHF, NSTEMI, Acute exacerbation Crohn's disease   Did the patient receive a copy of their discharge instructions?  Yes   What is the patient's perception of their health status since discharge?  Improving   Is the patient/caregiver able to teach back signs and symptoms related to disease process for when to call PCP?  Yes   Is the patient/caregiver able to teach back signs and symptoms related to disease process for when to call 911?  Yes   Is the patient/caregiver able to teach back the hierarchy of who to call/visit for symptoms/problems? PCP, Specialist, Home health nurse, Urgent Care, ED, 911  Yes   Additional teach back comments  Call was brief.  Patient stated she just woke up.  Slept well and no sob or fever.  States she is doing fine.   Week 1 call completed?  Yes   Wrap up additional comments  No questions or needs at this time          Etelvina Rojas LPN

## 2020-05-11 ENCOUNTER — READMISSION MANAGEMENT (OUTPATIENT)
Dept: CALL CENTER | Facility: HOSPITAL | Age: 75
End: 2020-05-11

## 2020-05-11 NOTE — OUTREACH NOTE
Medical Week 1 Survey      Responses   [facility]   COVID-19 Test Status  Not tested   Does the patient have one of the following disease processes/diagnoses(primary or secondary)?  Other   Is there a successful TCM telephone encounter documented?  No   Week 1 attempt successful?  Yes   Call start time  1016   Call end time  1019   Is patient permission given to speak with other caregiver?  Yes   List who call center can speak with  DR. DARREL KING   Person spoke with today (if not patient) and relationship  DR. DARREL KING, SPOUSE   Meds reviewed with patient/caregiver?  Yes   Is the patient having any side effects they believe may be caused by any medication additions or changes?  No   Does the patient have all medications ordered at discharge?  Yes   Is the patient taking all medications as directed (includes completed medication regime)?  Yes   Does the patient have a primary care provider?   Yes   Does the patient have an appointment with their PCP within 7 days of discharge?  No   Comments regarding PCP   STATES THEY ARE WAITING FOR A CALL BACK FROM PCP OFFICE TO MAKE AN APPOINTMENT   What is preventing the patient from scheduling follow up appointments within 7 days of discharge?  Waiting on return call   Nursing Interventions  Educated patient on importance of making appointment   Has the patient kept scheduled appointments due by today?  N/A   What is the Home health agency?   [facility]   Has home health visited the patient within 72 hours of discharge?  Yes   What DME was ordered?  rollator from [employer]   Has all DME been delivered?  Yes   Pulse Ox monitoring  None   Did the patient receive a copy of their discharge instructions?  Yes   Nursing interventions  Reviewed instructions with patient   What is the patient's perception of their health status since discharge?  Improving   Is the patient/caregiver able to teach back signs and symptoms related to  disease process for when to call PCP?  Yes   Is the patient/caregiver able to teach back signs and symptoms related to disease process for when to call 911?  Yes   Is the patient/caregiver able to teach back the hierarchy of who to call/visit for symptoms/problems? PCP, Specialist, Home health nurse, Urgent Care, ED, 911  Yes   Week 1 call completed?  Yes          Jessica Hernandez LPN   [facility]

## 2020-05-13 ENCOUNTER — TELEPHONE (OUTPATIENT)
Dept: INTERNAL MEDICINE | Facility: CLINIC | Age: 75
End: 2020-05-13

## 2020-05-13 NOTE — TELEPHONE ENCOUNTER
Hospital Follow-Up     Patient Name:  Joellen Dominguez    :  1945    MRN:  [id number]      New or established patient: Established  Date of discharge: 2020  Facility discharged from: [facility]  Diagnosis/Symptoms: Shortness of Breath, Acute/Elevated MI, Diastaltic Heart Failure, Chron's Issues, 160 BPM AFib  Length of stay (If applicable): Unknown  Best call back number: [phone]    Patient's  (Dr. Wade Dominguez on [facility]) called and stated that the patient was admitted to [facility] for several issues (see above) and wanted to see if they could schedule a Hospital FU. The patient has labs on 2020 and her Subsequent Medicare Wellness Visit on 2020, but they want to see if the patient can be seen sooner than the . Patient was discharged on 2020, so the 2 week lewis would be 2020.     also stated that the patient's INR was measured at 1, and he wanted to know if the dosage of the warfarin (Coumadin) 1 MG tablet could be adjusted accordingly.    Please call and advise.  [phone]   The scribe's documentation has been prepared under my direction and personally reviewed by me in its entirety. I confirm that the note above accurately reflects all work, treatment, procedures, and medical decision making performed by me.

## 2020-05-14 ENCOUNTER — OFFICE VISIT (OUTPATIENT)
Dept: INTERNAL MEDICINE | Facility: CLINIC | Age: 75
End: 2020-05-14

## 2020-05-14 ENCOUNTER — READMISSION MANAGEMENT (OUTPATIENT)
Dept: CALL CENTER | Facility: HOSPITAL | Age: 75
End: 2020-05-14

## 2020-05-14 VITALS — TEMPERATURE: 97.1 F | WEIGHT: 145 LBS | HEIGHT: 67 IN | BODY MASS INDEX: 22.76 KG/M2

## 2020-05-14 DIAGNOSIS — D59.12 COLD AGGLUTININ DISEASE (HCC): ICD-10-CM

## 2020-05-14 DIAGNOSIS — D68.61 ANTIPHOSPHOLIPID ANTIBODY SYNDROME (HCC): ICD-10-CM

## 2020-05-14 DIAGNOSIS — I48.0 PAF (PAROXYSMAL ATRIAL FIBRILLATION) (HCC): ICD-10-CM

## 2020-05-14 DIAGNOSIS — K50.019 CROHN'S DISEASE OF SMALL INTESTINE WITH COMPLICATION (HCC): ICD-10-CM

## 2020-05-14 DIAGNOSIS — I27.20 PULMONARY HTN (HCC): Primary | ICD-10-CM

## 2020-05-14 PROCEDURE — 99214 OFFICE O/P EST MOD 30 MIN: CPT | Performed by: INTERNAL MEDICINE

## 2020-05-14 NOTE — PROGRESS NOTES
Assessment and Plan  Joellen was seen today for shortness of breath.    Diagnoses and all orders for this visit:    Pulmonary HTN (CMS/[facility])  Comments:  would recommend sleep study as further eval.  Orders:  -     Ambulatory Referral to Sleep Medicine    PAF (paroxysmal atrial fibrillation) (CMS/[facility])  Comments:  Doing ok, anticoagulated, rate controlled.  Not tolerating well- may send to cardiology.    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  Supposed to see Dr. Flores for wean- it is more than a few more days, call me.     spent 31 minutes with patient and  reviewing hospital records, reports, etc and creating a plan going forward      F/U and Patient Instructions    Return in about 4 weeks (around 6/11/2020).  There are no Patient Instructions on file for this visit.    Subjective    Joellen Dominguez is a 75 y.o. female being seen in our office today for Shortness of Breath (hospital follow up)     History of the Present Illness  HPI Hospitalized with a fib,diastolic heart failure, moderate pulmonary hypertension and elevated troponin.  She had some worsening renal function and flare in Crohn's disease.  She has been home for one week.    Her toes turned purple from cold agglutinins.    She feels weak and SOB  Currently taking warfarin 3 mg daily    Patient History        Significant Past History  The following portions of the patient's history were reviewed and updated as appropriate:PMHroutine: Social history , Allergies, Current Medications, Active Problem List and Health Maintenance              Social History  She  reports that she quit smoking about 53 years ago. Her smoking use included cigarettes. She has a 32.00 pack-year smoking history. She has never used smokeless tobacco. She reports that she does not  drink alcohol or use drugs.                         Review of Symptoms  Review of Systems   Constitution: Positive for decreased appetite and weight loss.   HENT: Negative for congestion.    Cardiovascular: Positive for dyspnea on exertion. Negative for chest pain and near-syncope.   Respiratory: Positive for shortness of breath. Negative for cough and wheezing.    Skin: Color change: toes.   Musculoskeletal: Negative.    Gastrointestinal: Positive for bloating and change in bowel habit.     Objective  Vital Signs         BP Readings from Last 1 Encounters:   05/07/20 125/60     Wt Readings from Last 3 Encounters:   05/14/20 65.8 kg (145 lb)   05/07/20 67.1 kg (148 lb)   01/08/20 72.6 kg (160 lb)   Body mass index is 22.71 kg/m².          Physical Exam   Physical Exam   Constitutional: She appears lethargic. She is cooperative.   Cardiovascular: Normal rate.   Pulmonary/Chest: Effort normal and breath sounds normal.   Musculoskeletal: She exhibits no edema.   Lymphadenopathy:     She has no cervical adenopathy.   Neurological: She appears lethargic.   Skin: Skin is warm and dry.   Psychiatric: She has a normal mood and affect. Her behavior is normal. Judgment and thought content normal.     Data Reviewed    Recent Results (from the past 2016 hour(s))   POCT INR    Collection Time: 03/17/20  2:00 PM   Result Value Ref Range    INR 6.4 (A) 0.9 - 1.1   POC INR    Collection Time: 03/20/20 10:08 AM   Result Value Ref Range    INR 1.70 (A) 0.9 - 1.1   POC INR    Collection Time: 03/23/20 12:00 AM   Result Value Ref Range    INR 1.20 (A) 0.9 - 1.1   POC INR    Collection Time: 04/07/20 11:04 AM   Result Value Ref Range    INR 3.00 (A) 0.9 - 1.1   Protime-INR    Collection Time: 04/19/20 10:13 AM   Result Value Ref Range    Protime 14.0 11.7 - 14.2 Seconds    INR 1.11 (H) 0.90 - 1.10   Comprehensive Metabolic Panel    Collection Time: 04/19/20 10:13 AM   Result Value Ref Range    Glucose 180 (H) 65 - 99 mg/dL    BUN 53 (H)  8 - 23 mg/dL    Creatinine 1.70 (H) 0.57 - 1.00 mg/dL    Sodium 142 136 - 145 mmol/L    Potassium 4.5 3.5 - 5.2 mmol/L    Chloride 106 98 - 107 mmol/L    CO2 17.9 (L) 22.0 - 29.0 mmol/L    Calcium 9.4 8.6 - 10.5 mg/dL    Total Protein 7.0 6.0 - 8.5 g/dL    Albumin 3.70 3.50 - 5.20 g/dL    ALT (SGPT) 47 (H) 1 - 33 U/L    AST (SGOT) 37 (H) 1 - 32 U/L    Alkaline Phosphatase 98 39 - 117 U/L    Total Bilirubin 0.6 0.2 - 1.2 mg/dL    eGFR Non African Amer 29 (L) >60 mL/min/1.73    Globulin 3.3 gm/dL    A/G Ratio 1.1 g/dL    BUN/Creatinine Ratio 31.2 (H) 7.0 - 25.0    Anion Gap 18.1 (H) 5.0 - 15.0 mmol/L   BNP    Collection Time: 04/19/20 10:13 AM   Result Value Ref Range    proBNP 6,770.0 (H) 5.0-1,800.0 pg/mL   D-dimer, Quantitative    Collection Time: 04/19/20 10:13 AM   Result Value Ref Range    D-Dimer, Quantitative 1.13 (H) 0.00 - 0.49 MCGFEU/mL   Troponin    Collection Time: 04/19/20 10:13 AM   Result Value Ref Range    Troponin T 0.201 (C) 0.000 - 0.030 ng/mL   Lactic Acid, Plasma    Collection Time: 04/19/20 10:13 AM   Result Value Ref Range    Lactate 2.7 (C) 0.5 - 2.0 mmol/L   Procalcitonin    Collection Time: 04/19/20 10:13 AM   Result Value Ref Range    Procalcitonin 0.10 0.10 - 0.25 ng/mL   CBC Auto Differential    Collection Time: 04/19/20 10:13 AM   Result Value Ref Range    WBC 23.04 (H) 3.40 - 10.80 10*3/mm3    RBC 3.89 3.77 - 5.28 10*6/mm3    Hemoglobin 10.5 (L) 12.0 - 15.9 g/dL    Hematocrit 33.0 (L) 34.0 - 46.6 %    MCV 84.8 79.0 - 97.0 fL    MCH 27.0 26.6 - 33.0 pg    MCHC 31.8 31.5 - 35.7 g/dL    RDW 15.2 12.3 - 15.4 %    RDW-SD 44.0 37.0 - 54.0 fl    MPV 9.9 6.0 - 12.0 fL    Platelets 477 (H) 140 - 450 10*3/mm3    Neutrophil % 85.5 (H) 42.7 - 76.0 %    Lymphocyte % 7.3 (L) 19.6 - 45.3 %    Monocyte % 4.3 (L) 5.0 - 12.0 %    Eosinophil % 0.0 (L) 0.3 - 6.2 %    Basophil % 0.4 0.0 - 1.5 %    Immature Grans % 2.5 (H) 0.0 - 0.5 %    Neutrophils, Absolute 19.67 (H) 1.70 - 7.00 10*3/mm3    Lymphocytes,  Absolute 1.69 0.70 - 3.10 10*3/mm3    Monocytes, Absolute 0.99 (H) 0.10 - 0.90 10*3/mm3    Eosinophils, Absolute 0.01 0.00 - 0.40 10*3/mm3    Basophils, Absolute 0.10 0.00 - 0.20 10*3/mm3    Immature Grans, Absolute 0.58 (H) 0.00 - 0.05 10*3/mm3    nRBC 0.2 0.0 - 0.2 /100 WBC   TSH    Collection Time: 04/19/20 10:13 AM   Result Value Ref Range    TSH 0.222 (L) 0.270 - 4.200 uIU/mL   Magnesium    Collection Time: 04/19/20 10:13 AM   Result Value Ref Range    Magnesium 2.0 1.6 - 2.4 mg/dL   Lactic Acid, Reflex Timer (This will reflex a repeat order 3-3:15 hours after ordered.)    Collection Time: 04/19/20 10:13 AM   Result Value Ref Range    Hold Tube Hold for add-ons.    Respiratory Panel, PCR - Swab, Nasopharynx    Collection Time: 04/19/20 10:27 AM   Result Value Ref Range    ADENOVIRUS, PCR Not Detected Not Detected    Coronavirus 229E Not Detected Not Detected    Coronavirus HKU1 Not Detected Not Detected    Coronavirus NL63 Not Detected Not Detected    Coronavirus OC43 Not Detected Not Detected    Human Metapneumovirus Not Detected Not Detected    Human Rhinovirus/Enterovirus Not Detected Not Detected    Influenza B PCR Not Detected Not Detected    Parainfluenza Virus 1 Not Detected Not Detected    Parainfluenza Virus 2 Not Detected Not Detected    Parainfluenza Virus 3 Not Detected Not Detected    Parainfluenza Virus 4 Not Detected Not Detected    Bordetella pertussis pcr Not Detected Not Detected    Influenza A H1 2009 PCR Not Detected Not Detected    Chlamydophila pneumoniae PCR Not Detected Not Detected    Mycoplasma pneumo by PCR Not Detected Not Detected    Influenza A PCR Not Detected Not Detected    Influenza A H3 Not Detected Not Detected    Influenza A H1 Not Detected Not Detected    RSV, PCR Not Detected Not Detected    Bordetella parapertussis PCR Not Detected Not Detected   SARS-CoV-2, PCR (IN-HOUSE), NP Swab in Transport Media - Swab, Nasopharynx    Collection Time: 04/19/20 10:27 AM   Result Value  Ref Range    COVID19 Not Detected Not Detected   Blood Culture - Blood, Arm, Right    Collection Time: 04/19/20 11:36 AM   Result Value Ref Range    Blood Culture No growth at 5 days    Blood Culture - Blood, Arm, Left    Collection Time: 04/19/20 11:36 AM   Result Value Ref Range    Blood Culture No growth at 5 days    Urinalysis With Microscopic If Indicated (No Culture) - Urine, Catheter    Collection Time: 04/19/20 11:44 AM   Result Value Ref Range    Color, UA Yellow Yellow, Straw    Appearance, UA Clear Clear    pH, UA <=5.0 5.0 - 8.0    Specific Gravity, UA 1.025 1.005 - 1.030    Glucose, UA Negative Negative    Ketones, UA Trace (A) Negative    Bilirubin, UA Negative Negative    Blood, UA Negative Negative    Protein, UA 30 mg/dL (1+) (A) Negative    Leuk Esterase, UA Negative Negative    Nitrite, UA Negative Negative    Urobilinogen, UA 0.2 E.U./dL 0.2 - 1.0 E.U./dL   Urinalysis, Microscopic Only - Urine, Catheter    Collection Time: 04/19/20 11:44 AM   Result Value Ref Range    RBC, UA 0-2 None Seen, 0-2 /HPF    WBC, UA 0-2 None Seen, 0-2 /HPF    Bacteria, UA Trace (A) None Seen /HPF    Squamous Epithelial Cells, UA 0-2 None Seen, 0-2 /HPF    Hyaline Casts, UA 31-50 None Seen /LPF    WBC Casts 0-2 None Seen /LPF    Coarse Granular Casts, UA 0-2 None Seen /LPF    Amorphous Crystals, UA Small/1+ None Seen /HPF    Methodology Manual Light Microscopy    Blood Gas, Arterial    Collection Time: 04/19/20  1:40 PM   Result Value Ref Range    Site Arterial: right radial     Chong's Test Positive     pH, Arterial 7.212 (C) 7.350 - 7.450 pH units    pCO2, Arterial 49.7 (H) 35.0 - 45.0 mm Hg    pO2, Arterial 63.6 (L) 80.0 - 100.0 mm Hg    HCO3, Arterial 20.0 (L) 22.0 - 28.0 mmol/L    Base Excess, Arterial -7.7 (L) 0.0 - 2.0 mmol/L    O2 Saturation Calculated 86.7 (L) 92.0 - 99.0 %    Barometric Pressure for Blood Gas 743.8 mmHg    Modality Cannula     Flow Rate 1 lpm    Rate 28 Breaths/minute   STAT Adult  Transthoracic Echo Complete W/ Cont if Necessary Per Protocol    Collection Time: 04/19/20  2:31 PM   Result Value Ref Range    BSA 1.8 m^2    IVSd 1.4 cm    LVIDd 2.7 cm    LVIDs 2.2 cm    LVPWd 1.5 cm    IVS/LVPW 0.93     FS 18.5 %    EDV(Teich) 27.0 ml    ESV(Teich) 16.2 ml    EF(Teich) 40.0 %    EDV(cubed) 19.7 ml    ESV(cubed) 10.6 ml    EF(cubed) 45.9 %    LV mass(C)d 130.3 grams    LV mass(C)dI 72.1 grams/m^2    SV(Teich) 10.8 ml    SI(Teich) 6.0 ml/m^2    SV(cubed) 9.0 ml    SI(cubed) 5.0 ml/m^2    asc Aorta Diam 3.1 cm    LVOT diam 2.2 cm    LVOT area 3.8 cm^2    LVOT area(traced) 3.8 cm^2    RVOT diam 2.2 cm    RVOT area 3.8 cm^2    LVLd ap4 6.3 cm    EDV(MOD-sp4) 59.0 ml    LVLs ap4 6.0 cm    ESV(MOD-sp4) 22.0 ml    EF(MOD-sp4) 62.7 %    LVLd ap2 6.5 cm    EDV(MOD-sp2) 61.0 ml    LVLs ap2 6.0 cm    ESV(MOD-sp2) 24.0 ml    EF(MOD-sp2) 60.7 %    SV(MOD-sp4) 37.0 ml    SI(MOD-sp4) 20.5 ml/m^2    SV(MOD-sp2) 37.0 ml    SI(MOD-sp2) 20.5 ml/m^2    LV Dueñas Vol (BSA corrected) 32.6 ml/m^2    LV Sys Vol (BSA corrected) 12.2 ml/m^2    MV E max mary 120.0 cm/sec    MV V2 max 121.0 cm/sec    MV max PG 5.9 mmHg    MV V2 mean 59.7 cm/sec    MV mean PG 2.0 mmHg    MV V2 VTI 25.0 cm    MVA(VTI) 2.7 cm^2    MV P1/2t max mary 115.0 cm/sec    MV P1/2t 88.2 msec    MVA(P1/2t) 2.5 cm^2    MV dec slope 382.0 cm/sec^2    MV dec time 0.13 sec    Ao pk mary 108.0 cm/sec    Ao max PG 4.7 mmHg    Ao max PG (full) 0.98 mmHg    Ao V2 mean 71.7 cm/sec    Ao mean PG 2.0 mmHg    Ao mean PG (full) 0 mmHg    Ao V2 VTI 19.6 cm    JOSE(I,A) 3.4 cm^2    JOSE(I,D) 3.4 cm^2    JOSE(V,A) 3.4 cm^2    JOSE(V,D) 3.4 cm^2    LV V1 max PG 3.7 mmHg    LV V1 mean PG 2.0 mmHg    LV V1 max 96.0 cm/sec    LV V1 mean 61.6 cm/sec    LV V1 VTI 17.5 cm    SV(LVOT) 66.5 ml    SV(RVOT) 70.7 ml    SI(LVOT) 36.8 ml/m^2    PA V2 max 67.9 cm/sec    PA max PG 1.8 mmHg    PA max PG (full) -1.2 mmHg    BH CV ECHO SCOTTY - PVA(V,A) 4.9 cm^2    BH CV ECHO SCOTTY - PVA(V,D) 4.9  cm^2    PA acc time 0.11 sec    RV V1 max PG 3.0 mmHg    RV V1 mean PG 2.0 mmHg    RV V1 max 86.9 cm/sec    RV V1 mean 63.8 cm/sec    RV V1 VTI 18.6 cm    TR max darrion 301.0 cm/sec    RVSP(TR) 39.2 mmHg    RAP systole 3.0 mmHg    PA pr(Accel) 27.7 mmHg    Qp/Qs 1.1     MVA P1/2T LCG 1.9 cm^2     CV ECHO SCOTTY - BZI_BMI 22.8 kilograms/m^2     CV ECHO SCOTTY - BSA(HAYCOCK) 1.8 m^2     CV ECHO SCOTTY - BZI_METRIC_WEIGHT 68.0 kg     CV ECHO SCOTTY - BZI_METRIC_HEIGHT 172.7 cm    Target HR (85%) 122 bpm    Max. Pred. HR (100%) 144 bpm    TDI S' 12.80 cm/sec    RV Base 2.90 cm    RV Length 5.60 cm    RV Mid 2.30 cm    LA Volume Index 41.0 mL/m2    Avg E/e' ratio 8.42     EF(MOD-bp) 62 %    Lat Peak E' Darrion 14.6 cm/sec    Med Peak E' Darrion 13.90 cm/sec   POC Glucose Once    Collection Time: 04/19/20  4:09 PM   Result Value Ref Range    Glucose 123 70 - 130 mg/dL   Troponin    Collection Time: 04/19/20  4:22 PM   Result Value Ref Range    Troponin T 0.196 (C) 0.000 - 0.030 ng/mL   Basic Metabolic Panel    Collection Time: 04/19/20  4:22 PM   Result Value Ref Range    Glucose 107 (H) 65 - 99 mg/dL    BUN 48 (H) 8 - 23 mg/dL    Creatinine 1.47 (H) 0.57 - 1.00 mg/dL    Sodium 144 136 - 145 mmol/L    Potassium 4.7 3.5 - 5.2 mmol/L    Chloride 114 (H) 98 - 107 mmol/L    CO2 18.6 (L) 22.0 - 29.0 mmol/L    Calcium 8.1 (L) 8.6 - 10.5 mg/dL    eGFR Non African Amer 35 (L) >60 mL/min/1.73    BUN/Creatinine Ratio 32.7 (H) 7.0 - 25.0    Anion Gap 11.4 5.0 - 15.0 mmol/L   Lactic Acid, Reflex    Collection Time: 04/19/20  4:28 PM   Result Value Ref Range    Lactate 1.5 0.5 - 2.0 mmol/L   CBC Auto Differential    Collection Time: 04/19/20  5:36 PM   Result Value Ref Range    WBC 20.90 (H) 3.40 - 10.80 10*3/mm3    RBC 3.35 (L) 3.77 - 5.28 10*6/mm3    Hemoglobin 8.6 (L) 12.0 - 15.9 g/dL    Hematocrit 27.5 (L) 34.0 - 46.6 %    MCV 82.1 79.0 - 97.0 fL    MCH 25.7 (L) 26.6 - 33.0 pg    MCHC 31.3 (L) 31.5 - 35.7 g/dL    RDW 14.8 12.3 - 15.4 %     RDW-SD 44.2 37.0 - 54.0 fl    MPV 10.0 6.0 - 12.0 fL    Platelets 345 140 - 450 10*3/mm3    Neutrophil % 87.1 (H) 42.7 - 76.0 %    Lymphocyte % 4.8 (L) 19.6 - 45.3 %    Monocyte % 5.4 5.0 - 12.0 %    Eosinophil % 0.0 (L) 0.3 - 6.2 %    Basophil % 0.1 0.0 - 1.5 %    Immature Grans % 2.6 (H) 0.0 - 0.5 %    Neutrophils, Absolute 18.18 (H) 1.70 - 7.00 10*3/mm3    Lymphocytes, Absolute 1.00 0.70 - 3.10 10*3/mm3    Monocytes, Absolute 1.13 (H) 0.10 - 0.90 10*3/mm3    Eosinophils, Absolute 0.01 0.00 - 0.40 10*3/mm3    Basophils, Absolute 0.03 0.00 - 0.20 10*3/mm3    Immature Grans, Absolute 0.55 (H) 0.00 - 0.05 10*3/mm3    nRBC 0.2 0.0 - 0.2 /100 WBC   POC Glucose Once    Collection Time: 04/19/20  7:10 PM   Result Value Ref Range    Glucose 132 (H) 70 - 130 mg/dL   aPTT    Collection Time: 04/19/20 10:23 PM   Result Value Ref Range    .5 (H) 22.7 - 35.4 seconds   POC Glucose Once    Collection Time: 04/19/20 10:57 PM   Result Value Ref Range    Glucose 117 70 - 130 mg/dL   POC Glucose Once    Collection Time: 04/20/20  3:10 AM   Result Value Ref Range    Glucose 114 70 - 130 mg/dL   aPTT    Collection Time: 04/20/20  6:14 AM   Result Value Ref Range    .6 (C) 22.7 - 35.4 seconds   Basic Metabolic Panel    Collection Time: 04/20/20  6:14 AM   Result Value Ref Range    Glucose 103 (H) 65 - 99 mg/dL    BUN 46 (H) 8 - 23 mg/dL    Creatinine 1.13 (H) 0.57 - 1.00 mg/dL    Sodium 144 136 - 145 mmol/L    Potassium 3.7 3.5 - 5.2 mmol/L    Chloride 107 98 - 107 mmol/L    CO2 21.8 (L) 22.0 - 29.0 mmol/L    Calcium 8.3 (L) 8.6 - 10.5 mg/dL    eGFR Non African Amer 47 (L) >60 mL/min/1.73    BUN/Creatinine Ratio 40.7 (H) 7.0 - 25.0    Anion Gap 15.2 (H) 5.0 - 15.0 mmol/L   Troponin    Collection Time: 04/20/20  6:14 AM   Result Value Ref Range    Troponin T 0.130 (C) 0.000 - 0.030 ng/mL   TSH    Collection Time: 04/20/20  6:14 AM   Result Value Ref Range    TSH 0.076 (L) 0.270 - 4.200 uIU/mL   T4, Free    Collection Time:  04/20/20  6:14 AM   Result Value Ref Range    Free T4 1.81 (H) 0.93 - 1.70 ng/dL   CBC Auto Differential    Collection Time: 04/20/20  6:14 AM   Result Value Ref Range    WBC 16.56 (H) 3.40 - 10.80 10*3/mm3    RBC 3.57 (L) 3.77 - 5.28 10*6/mm3    Hemoglobin 8.9 (L) 12.0 - 15.9 g/dL    Hematocrit 28.6 (L) 34.0 - 46.6 %    MCV 80.1 79.0 - 97.0 fL    MCH 24.9 (L) 26.6 - 33.0 pg    MCHC 31.1 (L) 31.5 - 35.7 g/dL    RDW 14.5 12.3 - 15.4 %    RDW-SD 41.6 37.0 - 54.0 fl    MPV 10.8 6.0 - 12.0 fL    Platelets 313 140 - 450 10*3/mm3    Neutrophil % 87.5 (H) 42.7 - 76.0 %    Lymphocyte % 5.3 (L) 19.6 - 45.3 %    Monocyte % 4.3 (L) 5.0 - 12.0 %    Eosinophil % 0.6 0.3 - 6.2 %    Basophil % 0.2 0.0 - 1.5 %    Immature Grans % 2.1 (H) 0.0 - 0.5 %    Neutrophils, Absolute 14.49 (H) 1.70 - 7.00 10*3/mm3    Lymphocytes, Absolute 0.88 0.70 - 3.10 10*3/mm3    Monocytes, Absolute 0.71 0.10 - 0.90 10*3/mm3    Eosinophils, Absolute 0.10 0.00 - 0.40 10*3/mm3    Basophils, Absolute 0.04 0.00 - 0.20 10*3/mm3    Immature Grans, Absolute 0.34 (H) 0.00 - 0.05 10*3/mm3    nRBC 0.1 0.0 - 0.2 /100 WBC   POC Glucose Once    Collection Time: 04/20/20  7:32 AM   Result Value Ref Range    Glucose 112 70 - 130 mg/dL   Duplex Venous Lower Extremity - Bilateral CAR    Collection Time: 04/20/20  1:59 PM   Result Value Ref Range    Right Common Femoral Spont Y     Right Common Femoral Phasic Y     Right Common Femoral Augment Y     Right Common Femoral Competent Y     Right Common Femoral Compress C     Right Saphenofemoral Junction Compress C     Right Profunda Femoral Compress C     Right Proximal Femoral Compress C     Right Mid Femoral Spont Y     Right Mid Femoral Phasic Y     Right Mid Femoral Augment Y     Right Mid Femoral Competent Y     Right Mid Femoral Compress C     Right Distal Femoral Compress C     Right Popliteal Spont Y     Right Popliteal Phasic Y     Right Popliteal Augment Y     Right Popliteal Competent Y     Right Popliteal  Compress C     Right Posterior Tibial Compress C     Right Peroneal Compress C     Right GastronemiusSoleal Compress C     Right Greater Saph AK Compress C     Right Greater Saph BK Compress C     Left Common Femoral Spont Y     Left Common Femoral Phasic Y     Left Common Femoral Augment Y     Left Common Femoral Competent Y     Left Common Femoral Compress C     Left Saphenofemoral Junction Compress C     Left Profunda Femoral Compress C     Left Proximal Femoral Compress C     Left Mid Femoral Spont Y     Left Mid Femoral Phasic Y     Left Mid Femoral Augment Y     Left Mid Femoral Competent Y     Left Mid Femoral Compress C     Left Distal Femoral Compress C     Left Popliteal Spont Y     Left Popliteal Phasic Y     Left Popliteal Augment Y     Left Popliteal Competent Y     Left Popliteal Compress C     Left Posterior Tibial Compress C     Left Peroneal Compress C     Left GastronemiusSoleal Compress C     Left Greater Saph AK Compress C     Left Greater Saph BK Compress C    aPTT    Collection Time: 04/20/20  3:14 PM   Result Value Ref Range    PTT 62.7 (H) 22.7 - 35.4 seconds   aPTT    Collection Time: 04/20/20  7:09 PM   Result Value Ref Range    .7 (H) 22.7 - 35.4 seconds   aPTT    Collection Time: 04/21/20  5:12 AM   Result Value Ref Range    PTT 28.5 22.7 - 35.4 seconds   Basic Metabolic Panel    Collection Time: 04/21/20  5:12 AM   Result Value Ref Range    Glucose 142 (H) 65 - 99 mg/dL    BUN 46 (H) 8 - 23 mg/dL    Creatinine 1.18 (H) 0.57 - 1.00 mg/dL    Sodium 141 136 - 145 mmol/L    Potassium 4.2 3.5 - 5.2 mmol/L    Chloride 106 98 - 107 mmol/L    CO2 20.8 (L) 22.0 - 29.0 mmol/L    Calcium 8.7 8.6 - 10.5 mg/dL    eGFR Non African Amer 45 (L) >60 mL/min/1.73    BUN/Creatinine Ratio 39.0 (H) 7.0 - 25.0    Anion Gap 14.2 5.0 - 15.0 mmol/L   Troponin    Collection Time: 04/21/20  5:12 AM   Result Value Ref Range    Troponin T 0.136 (C) 0.000 - 0.030 ng/mL   CBC Auto Differential    Collection  Time: 04/21/20  5:12 AM   Result Value Ref Range    WBC 17.78 (H) 3.40 - 10.80 10*3/mm3    RBC 3.29 (L) 3.77 - 5.28 10*6/mm3    Hemoglobin 9.5 (L) 12.0 - 15.9 g/dL    Hematocrit 27.9 (L) 34.0 - 46.6 %    MCV 84.8 79.0 - 97.0 fL    MCH 28.9 26.6 - 33.0 pg    MCHC 34.1 31.5 - 35.7 g/dL    RDW 15.6 (H) 12.3 - 15.4 %    RDW-SD 43.3 37.0 - 54.0 fl    MPV 11.0 6.0 - 12.0 fL    Platelets 183 140 - 450 10*3/mm3    Neutrophil % 83.6 (H) 42.7 - 76.0 %    Lymphocyte % 6.1 (L) 19.6 - 45.3 %    Monocyte % 4.8 (L) 5.0 - 12.0 %    Eosinophil % 2.0 0.3 - 6.2 %    Basophil % 0.6 0.0 - 1.5 %    Immature Grans % 2.9 (H) 0.0 - 0.5 %    Neutrophils, Absolute 14.86 (H) 1.70 - 7.00 10*3/mm3    Lymphocytes, Absolute 1.08 0.70 - 3.10 10*3/mm3    Monocytes, Absolute 0.85 0.10 - 0.90 10*3/mm3    Eosinophils, Absolute 0.36 0.00 - 0.40 10*3/mm3    Basophils, Absolute 0.11 0.00 - 0.20 10*3/mm3    Immature Grans, Absolute 0.52 (H) 0.00 - 0.05 10*3/mm3    nRBC 0.1 0.0 - 0.2 /100 WBC   aPTT    Collection Time: 04/21/20  3:08 PM   Result Value Ref Range    .9 (H) 22.7 - 35.4 seconds   aPTT    Collection Time: 04/21/20 11:19 PM   Result Value Ref Range    PTT 37.7 (H) 22.7 - 35.4 seconds   Basic Metabolic Panel    Collection Time: 04/22/20  7:29 AM   Result Value Ref Range    Glucose 139 (H) 65 - 99 mg/dL    BUN 34 (H) 8 - 23 mg/dL    Creatinine 1.02 (H) 0.57 - 1.00 mg/dL    Sodium 143 136 - 145 mmol/L    Potassium 3.8 3.5 - 5.2 mmol/L    Chloride 106 98 - 107 mmol/L    CO2 25.4 22.0 - 29.0 mmol/L    Calcium 9.1 8.6 - 10.5 mg/dL    eGFR Non African Amer 53 (L) >60 mL/min/1.73    BUN/Creatinine Ratio 33.3 (H) 7.0 - 25.0    Anion Gap 11.6 5.0 - 15.0 mmol/L   CBC Auto Differential    Collection Time: 04/22/20  7:29 AM   Result Value Ref Range    WBC 19.89 (H) 3.40 - 10.80 10*3/mm3    RBC 3.73 (L) 3.77 - 5.28 10*6/mm3    Hemoglobin 9.9 (L) 12.0 - 15.9 g/dL    Hematocrit 31.1 (L) 34.0 - 46.6 %    MCV 83.4 79.0 - 97.0 fL    MCH 26.5 (L) 26.6 - 33.0  pg    MCHC 31.8 31.5 - 35.7 g/dL    RDW 15.0 12.3 - 15.4 %    RDW-SD 44.0 37.0 - 54.0 fl    MPV 10.5 6.0 - 12.0 fL    Platelets 410 140 - 450 10*3/mm3    Neutrophil % 80.4 (H) 42.7 - 76.0 %    Lymphocyte % 8.6 (L) 19.6 - 45.3 %    Monocyte % 4.9 (L) 5.0 - 12.0 %    Eosinophil % 1.8 0.3 - 6.2 %    Basophil % 0.3 0.0 - 1.5 %    Immature Grans % 4.0 (H) 0.0 - 0.5 %    Neutrophils, Absolute 15.99 (H) 1.70 - 7.00 10*3/mm3    Lymphocytes, Absolute 1.72 0.70 - 3.10 10*3/mm3    Monocytes, Absolute 0.98 (H) 0.10 - 0.90 10*3/mm3    Eosinophils, Absolute 0.36 0.00 - 0.40 10*3/mm3    Basophils, Absolute 0.05 0.00 - 0.20 10*3/mm3    Immature Grans, Absolute 0.79 (H) 0.00 - 0.05 10*3/mm3    nRBC 0.1 0.0 - 0.2 /100 WBC   aPTT    Collection Time: 04/22/20  7:29 AM   Result Value Ref Range    PTT 35.6 (H) 22.7 - 35.4 seconds   aPTT    Collection Time: 04/23/20  3:07 AM   Result Value Ref Range    PTT 38.4 (H) 22.7 - 35.4 seconds   Basic Metabolic Panel    Collection Time: 04/23/20  3:07 AM   Result Value Ref Range    Glucose 145 (H) 65 - 99 mg/dL    BUN 35 (H) 8 - 23 mg/dL    Creatinine 1.08 (H) 0.57 - 1.00 mg/dL    Sodium 143 136 - 145 mmol/L    Potassium 4.1 3.5 - 5.2 mmol/L    Chloride 105 98 - 107 mmol/L    CO2 24.1 22.0 - 29.0 mmol/L    Calcium 9.2 8.6 - 10.5 mg/dL    eGFR Non African Amer 49 (L) >60 mL/min/1.73    BUN/Creatinine Ratio 32.4 (H) 7.0 - 25.0    Anion Gap 13.9 5.0 - 15.0 mmol/L   CBC Auto Differential    Collection Time: 04/23/20  3:07 AM   Result Value Ref Range    WBC 18.96 (H) 3.40 - 10.80 10*3/mm3    RBC 3.73 (L) 3.77 - 5.28 10*6/mm3    Hemoglobin 9.4 (L) 12.0 - 15.9 g/dL    Hematocrit 30.4 (L) 34.0 - 46.6 %    MCV 81.5 79.0 - 97.0 fL    MCH 25.2 (L) 26.6 - 33.0 pg    MCHC 30.9 (L) 31.5 - 35.7 g/dL    RDW 14.7 12.3 - 15.4 %    RDW-SD 43.5 37.0 - 54.0 fl    MPV 9.9 6.0 - 12.0 fL    Platelets 400 140 - 450 10*3/mm3    Neutrophil % 83.0 (H) 42.7 - 76.0 %    Lymphocyte % 7.5 (L) 19.6 - 45.3 %    Monocyte % 4.6 (L)  5.0 - 12.0 %    Eosinophil % 1.0 0.3 - 6.2 %    Basophil % 0.4 0.0 - 1.5 %    Immature Grans % 3.5 (H) 0.0 - 0.5 %    Neutrophils, Absolute 15.71 (H) 1.70 - 7.00 10*3/mm3    Lymphocytes, Absolute 1.43 0.70 - 3.10 10*3/mm3    Monocytes, Absolute 0.88 0.10 - 0.90 10*3/mm3    Eosinophils, Absolute 0.19 0.00 - 0.40 10*3/mm3    Basophils, Absolute 0.08 0.00 - 0.20 10*3/mm3    Immature Grans, Absolute 0.67 (H) 0.00 - 0.05 10*3/mm3    nRBC 0.1 0.0 - 0.2 /100 WBC   aPTT    Collection Time: 04/23/20 10:15 AM   Result Value Ref Range    PTT 33.6 22.7 - 35.4 seconds   Adult Transesophageal Echo 3D ([facility]) W/ Cont If Necessary Per Protocol    Collection Time: 04/23/20 12:10 PM   Result Value Ref Range    BSA 1.9 m^2    asc Aorta Diam 3.5 cm    MR max betsy 490.0 cm/sec    MR max PG 96.0 mmHg    MR mean betsy 363.0 cm/sec    MR mean PG 59.0 mmHg    MR .0 cm     CV ECHO SCOTTY - BZI_BMI 24.2 kilograms/m^2     [facility]^2     CV ECHO SCOTTY - BZI_METRIC_WEIGHT 72.1 kg     CV ECHO SCOTTY - BZI_METRIC_HEIGHT 172.7 cm     CV VAS BP RIGHT /84 mmHg    Ascending aorta 3.50 cm    PISA ALIASING BETSY 3.70 m/s    Radius 0.7 cm    MR PISA EROA 23.00 cm2    MV regurgitant volume 33 cc   aPTT    Collection Time: 04/24/20  7:08 AM   Result Value Ref Range    PTT 35.1 22.7 - 35.4 seconds   Basic Metabolic Panel    Collection Time: 04/24/20  7:08 AM   Result Value Ref Range    Glucose 136 (H) 65 - 99 mg/dL    BUN 33 (H) 8 - 23 mg/dL    Creatinine 0.98 0.57 - 1.00 mg/dL    Sodium 140 136 - 145 mmol/L    Potassium 3.7 3.5 - 5.2 mmol/L    Chloride 101 98 - 107 mmol/L    CO2 27.8 22.0 - 29.0 mmol/L    Calcium 9.1 8.6 - 10.5 mg/dL    eGFR Non African Amer 55 (L) >60 mL/min/1.73    BUN/Creatinine Ratio 33.7 (H) 7.0 - 25.0    Anion Gap 11.2 5.0 - 15.0 mmol/L   CBC Auto Differential    Collection Time: 04/24/20  7:08 AM   Result Value Ref Range    WBC 17.94 (H) 3.40 - 10.80 10*3/mm3    RBC 3.78 3.77 - 5.28 10*6/mm3     Hemoglobin 9.3 (L) 12.0 - 15.9 g/dL    Hematocrit 30.3 (L) 34.0 - 46.6 %    MCV 80.2 79.0 - 97.0 fL    MCH 24.6 (L) 26.6 - 33.0 pg    MCHC 30.7 (L) 31.5 - 35.7 g/dL    RDW 15.0 12.3 - 15.4 %    RDW-SD 43.1 37.0 - 54.0 fl    MPV 10.4 6.0 - 12.0 fL    Platelets 380 140 - 450 10*3/mm3    Neutrophil % 82.8 (H) 42.7 - 76.0 %    Lymphocyte % 7.1 (L) 19.6 - 45.3 %    Monocyte % 5.5 5.0 - 12.0 %    Eosinophil % 1.7 0.3 - 6.2 %    Basophil % 0.2 0.0 - 1.5 %    Immature Grans % 2.7 (H) 0.0 - 0.5 %    Neutrophils, Absolute 14.85 (H) 1.70 - 7.00 10*3/mm3    Lymphocytes, Absolute 1.27 0.70 - 3.10 10*3/mm3    Monocytes, Absolute 0.99 (H) 0.10 - 0.90 10*3/mm3    Eosinophils, Absolute 0.30 0.00 - 0.40 10*3/mm3    Basophils, Absolute 0.04 0.00 - 0.20 10*3/mm3    Immature Grans, Absolute 0.49 (H) 0.00 - 0.05 10*3/mm3    nRBC 0.1 0.0 - 0.2 /100 WBC   aPTT    Collection Time: 04/24/20 11:47 AM   Result Value Ref Range    PTT 33.9 22.7 - 35.4 seconds   Protime-INR    Collection Time: 04/24/20 11:47 AM   Result Value Ref Range    Protime 14.2 11.7 - 14.2 Seconds    INR 1.13 (H) 0.90 - 1.10   aPTT    Collection Time: 04/24/20  7:57 PM   Result Value Ref Range    .4 (C) 22.7 - 35.4 seconds   aPTT    Collection Time: 04/25/20  5:28 AM   Result Value Ref Range    .7 (H) 22.7 - 35.4 seconds   Basic Metabolic Panel    Collection Time: 04/25/20  5:28 AM   Result Value Ref Range    Glucose 134 (H) 65 - 99 mg/dL    BUN 27 (H) 8 - 23 mg/dL    Creatinine 0.85 0.57 - 1.00 mg/dL    Sodium 139 136 - 145 mmol/L    Potassium 3.3 (L) 3.5 - 5.2 mmol/L    Chloride 102 98 - 107 mmol/L    CO2 27.4 22.0 - 29.0 mmol/L    Calcium 8.8 8.6 - 10.5 mg/dL    eGFR Non African Amer 65 >60 mL/min/1.73    BUN/Creatinine Ratio 31.8 (H) 7.0 - 25.0    Anion Gap 9.6 5.0 - 15.0 mmol/L   CBC Auto Differential    Collection Time: 04/25/20  5:28 AM   Result Value Ref Range    WBC 18.83 (H) 3.40 - 10.80 10*3/mm3    RBC 3.65 (L) 3.77 - 5.28 10*6/mm3    Hemoglobin  8.9 (L) 12.0 - 15.9 g/dL    Hematocrit 29.1 (L) 34.0 - 46.6 %    MCV 79.7 79.0 - 97.0 fL    MCH 24.4 (L) 26.6 - 33.0 pg    MCHC 30.6 (L) 31.5 - 35.7 g/dL    RDW 14.7 12.3 - 15.4 %    RDW-SD 42.9 37.0 - 54.0 fl    MPV 10.9 6.0 - 12.0 fL    Platelets 305 140 - 450 10*3/mm3    Neutrophil % 81.8 (H) 42.7 - 76.0 %    Lymphocyte % 7.0 (L) 19.6 - 45.3 %    Monocyte % 5.5 5.0 - 12.0 %    Eosinophil % 2.7 0.3 - 6.2 %    Basophil % 0.3 0.0 - 1.5 %    Immature Grans % 2.7 (H) 0.0 - 0.5 %    Neutrophils, Absolute 15.42 (H) 1.70 - 7.00 10*3/mm3    Lymphocytes, Absolute 1.32 0.70 - 3.10 10*3/mm3    Monocytes, Absolute 1.03 (H) 0.10 - 0.90 10*3/mm3    Eosinophils, Absolute 0.51 (H) 0.00 - 0.40 10*3/mm3    Basophils, Absolute 0.05 0.00 - 0.20 10*3/mm3    Immature Grans, Absolute 0.50 (H) 0.00 - 0.05 10*3/mm3    nRBC 0.1 0.0 - 0.2 /100 WBC   aPTT    Collection Time: 04/25/20  3:03 PM   Result Value Ref Range    PTT 84.1 (H) 22.7 - 35.4 seconds   Protime-INR    Collection Time: 04/25/20  3:03 PM   Result Value Ref Range    Protime 14.5 (H) 11.7 - 14.2 Seconds    INR 1.16 (H) 0.90 - 1.10   aPTT    Collection Time: 04/26/20  6:52 AM   Result Value Ref Range    PTT 99.9 (H) 22.7 - 35.4 seconds   Basic Metabolic Panel    Collection Time: 04/26/20  6:52 AM   Result Value Ref Range    Glucose 117 (H) 65 - 99 mg/dL    BUN 24 (H) 8 - 23 mg/dL    Creatinine 1.02 (H) 0.57 - 1.00 mg/dL    Sodium 139 136 - 145 mmol/L    Potassium 3.0 (L) 3.5 - 5.2 mmol/L    Chloride 100 98 - 107 mmol/L    CO2 25.9 22.0 - 29.0 mmol/L    Calcium 8.5 (L) 8.6 - 10.5 mg/dL    eGFR Non African Amer 53 (L) >60 mL/min/1.73    BUN/Creatinine Ratio 23.5 7.0 - 25.0    Anion Gap 13.1 5.0 - 15.0 mmol/L   Protime-INR    Collection Time: 04/26/20  6:52 AM   Result Value Ref Range    Protime 15.6 (H) 11.7 - 14.2 Seconds    INR 1.27 (H) 0.90 - 1.10   BNP    Collection Time: 04/26/20  6:52 AM   Result Value Ref Range    proBNP 1,417.0 5.0-1,800.0 pg/mL   CBC Auto Differential     Collection Time: 04/26/20  6:52 AM   Result Value Ref Range    WBC 21.61 (H) 3.40 - 10.80 10*3/mm3    RBC 3.53 (L) 3.77 - 5.28 10*6/mm3    Hemoglobin 8.8 (L) 12.0 - 15.9 g/dL    Hematocrit 28.2 (L) 34.0 - 46.6 %    MCV 79.9 79.0 - 97.0 fL    MCH 24.9 (L) 26.6 - 33.0 pg    MCHC 31.2 (L) 31.5 - 35.7 g/dL    RDW 14.8 12.3 - 15.4 %    RDW-SD 42.3 37.0 - 54.0 fl    MPV 11.5 6.0 - 12.0 fL    Platelets 323 140 - 450 10*3/mm3    Neutrophil % 84.0 (H) 42.7 - 76.0 %    Lymphocyte % 6.4 (L) 19.6 - 45.3 %    Monocyte % 4.4 (L) 5.0 - 12.0 %    Eosinophil % 2.1 0.3 - 6.2 %    Basophil % 0.2 0.0 - 1.5 %    Immature Grans % 2.9 (H) 0.0 - 0.5 %    Neutrophils, Absolute 18.16 (H) 1.70 - 7.00 10*3/mm3    Lymphocytes, Absolute 1.38 0.70 - 3.10 10*3/mm3    Monocytes, Absolute 0.95 (H) 0.10 - 0.90 10*3/mm3    Eosinophils, Absolute 0.46 (H) 0.00 - 0.40 10*3/mm3    Basophils, Absolute 0.04 0.00 - 0.20 10*3/mm3    Immature Grans, Absolute 0.62 (H) 0.00 - 0.05 10*3/mm3    nRBC 0.0 0.0 - 0.2 /100 WBC   aPTT    Collection Time: 04/26/20  2:13 PM   Result Value Ref Range    PTT 50.9 (H) 22.7 - 35.4 seconds   Procalcitonin    Collection Time: 04/26/20  4:58 PM   Result Value Ref Range    Procalcitonin 0.13 0.10 - 0.25 ng/mL   aPTT    Collection Time: 04/26/20 10:32 PM   Result Value Ref Range    PTT 91.7 (H) 22.7 - 35.4 seconds   Blood Culture - Blood, Arm, Left    Collection Time: 04/26/20 10:32 PM   Result Value Ref Range    Blood Culture No growth at 5 days    Blood Culture - Blood, Hand, Left    Collection Time: 04/26/20 10:32 PM   Result Value Ref Range    Blood Culture No growth at 5 days    aPTT    Collection Time: 04/27/20  7:06 AM   Result Value Ref Range    PTT 96.9 (H) 22.7 - 35.4 seconds   Protime-INR    Collection Time: 04/27/20  7:06 AM   Result Value Ref Range    Protime 21.5 (H) 11.7 - 14.2 Seconds    INR 1.90 (H) 0.90 - 1.10   CBC Auto Differential    Collection Time: 04/27/20  7:06 AM   Result Value Ref Range    WBC 19.78 (H)  3.40 - 10.80 10*3/mm3    RBC 3.83 3.77 - 5.28 10*6/mm3    Hemoglobin 9.5 (L) 12.0 - 15.9 g/dL    Hematocrit 31.7 (L) 34.0 - 46.6 %    MCV 82.8 79.0 - 97.0 fL    MCH 24.8 (L) 26.6 - 33.0 pg    MCHC 30.0 (L) 31.5 - 35.7 g/dL    RDW 15.1 12.3 - 15.4 %    RDW-SD 44.7 37.0 - 54.0 fl    MPV 10.2 6.0 - 12.0 fL    Platelets 323 140 - 450 10*3/mm3    Neutrophil % 86.1 (H) 42.7 - 76.0 %    Lymphocyte % 5.7 (L) 19.6 - 45.3 %    Monocyte % 4.1 (L) 5.0 - 12.0 %    Eosinophil % 1.0 0.3 - 6.2 %    Basophil % 0.3 0.0 - 1.5 %    Immature Grans % 2.8 (H) 0.0 - 0.5 %    Neutrophils, Absolute 17.04 (H) 1.70 - 7.00 10*3/mm3    Lymphocytes, Absolute 1.12 0.70 - 3.10 10*3/mm3    Monocytes, Absolute 0.82 0.10 - 0.90 10*3/mm3    Eosinophils, Absolute 0.19 0.00 - 0.40 10*3/mm3    Basophils, Absolute 0.06 0.00 - 0.20 10*3/mm3    Immature Grans, Absolute 0.55 (H) 0.00 - 0.05 10*3/mm3    nRBC 0.1 0.0 - 0.2 /100 WBC   Basic Metabolic Panel    Collection Time: 04/27/20  9:16 AM   Result Value Ref Range    Glucose 138 (H) 65 - 99 mg/dL    BUN 28 (H) 8 - 23 mg/dL    Creatinine 1.23 (H) 0.57 - 1.00 mg/dL    Sodium 141 136 - 145 mmol/L    Potassium 5.4 (H) 3.5 - 5.2 mmol/L    Chloride 105 98 - 107 mmol/L    CO2 23.1 22.0 - 29.0 mmol/L    Calcium 9.1 8.6 - 10.5 mg/dL    eGFR Non African Amer 42 (L) >60 mL/min/1.73    BUN/Creatinine Ratio 22.8 7.0 - 25.0    Anion Gap 12.9 5.0 - 15.0 mmol/L   aPTT    Collection Time: 04/27/20  2:03 PM   Result Value Ref Range    PTT 96.3 (H) 22.7 - 35.4 seconds   aPTT    Collection Time: 04/27/20  7:55 PM   Result Value Ref Range    .3 (H) 22.7 - 35.4 seconds   aPTT    Collection Time: 04/28/20  5:02 AM   Result Value Ref Range    PTT 75.5 (H) 22.7 - 35.4 seconds   Protime-INR    Collection Time: 04/28/20  5:02 AM   Result Value Ref Range    Protime 27.3 (H) 11.7 - 14.2 Seconds    INR 2.57 (H) 0.90 - 1.10   Basic Metabolic Panel    Collection Time: 04/28/20  7:06 AM   Result Value Ref Range    Glucose 105 (H) 65  - 99 mg/dL    BUN 26 (H) 8 - 23 mg/dL    Creatinine 0.98 0.57 - 1.00 mg/dL    Sodium 133 (L) 136 - 145 mmol/L    Potassium 4.0 3.5 - 5.2 mmol/L    Chloride 97 (L) 98 - 107 mmol/L    CO2 25.2 22.0 - 29.0 mmol/L    Calcium 9.1 8.6 - 10.5 mg/dL    eGFR Non African Amer 55 (L) >60 mL/min/1.73    BUN/Creatinine Ratio 26.5 (H) 7.0 - 25.0    Anion Gap 10.8 5.0 - 15.0 mmol/L   CBC Auto Differential    Collection Time: 04/28/20  7:06 AM   Result Value Ref Range    WBC 16.30 (H) 3.40 - 10.80 10*3/mm3    RBC 3.60 (L) 3.77 - 5.28 10*6/mm3    Hemoglobin 8.8 (L) 12.0 - 15.9 g/dL    Hematocrit 28.1 (L) 34.0 - 46.6 %    MCV 78.1 (L) 79.0 - 97.0 fL    MCH 24.4 (L) 26.6 - 33.0 pg    MCHC 31.3 (L) 31.5 - 35.7 g/dL    RDW 14.9 12.3 - 15.4 %    RDW-SD 41.0 37.0 - 54.0 fl    MPV 11.0 6.0 - 12.0 fL    Platelets 334 140 - 450 10*3/mm3    Neutrophil % 82.7 (H) 42.7 - 76.0 %    Lymphocyte % 7.0 (L) 19.6 - 45.3 %    Monocyte % 4.7 (L) 5.0 - 12.0 %    Eosinophil % 2.3 0.3 - 6.2 %    Basophil % 0.4 0.0 - 1.5 %    Immature Grans % 2.9 (H) 0.0 - 0.5 %    Neutrophils, Absolute 13.49 (H) 1.70 - 7.00 10*3/mm3    Lymphocytes, Absolute 1.14 0.70 - 3.10 10*3/mm3    Monocytes, Absolute 0.76 0.10 - 0.90 10*3/mm3    Eosinophils, Absolute 0.38 0.00 - 0.40 10*3/mm3    Basophils, Absolute 0.06 0.00 - 0.20 10*3/mm3    Immature Grans, Absolute 0.47 (H) 0.00 - 0.05 10*3/mm3    nRBC 0.0 0.0 - 0.2 /100 WBC   Procalcitonin    Collection Time: 04/28/20  2:31 PM   Result Value Ref Range    Procalcitonin 0.13 0.10 - 0.25 ng/mL   Calprotectin, Fecal - Stool, Per Rectum    Collection Time: 04/28/20  6:16 PM   Result Value Ref Range    Calprotectin, Fecal 272 (H) 0 - 120 ug/g   Basic Metabolic Panel    Collection Time: 04/29/20  7:13 AM   Result Value Ref Range    Glucose 117 (H) 65 - 99 mg/dL    BUN 24 (H) 8 - 23 mg/dL    Creatinine 0.98 0.57 - 1.00 mg/dL    Sodium 140 136 - 145 mmol/L    Potassium 3.7 3.5 - 5.2 mmol/L    Chloride 103 98 - 107 mmol/L    CO2 27.1 22.0 -  29.0 mmol/L    Calcium 8.9 8.6 - 10.5 mg/dL    eGFR Non African Amer 55 (L) >60 mL/min/1.73    BUN/Creatinine Ratio 24.5 7.0 - 25.0    Anion Gap 9.9 5.0 - 15.0 mmol/L   Protime-INR    Collection Time: 04/29/20  7:13 AM   Result Value Ref Range    Protime 26.0 (H) 11.7 - 14.2 Seconds    INR 2.41 (H) 0.90 - 1.10   CBC Auto Differential    Collection Time: 04/29/20  7:13 AM   Result Value Ref Range    WBC 16.95 (H) 3.40 - 10.80 10*3/mm3    RBC 3.57 (L) 3.77 - 5.28 10*6/mm3    Hemoglobin 8.7 (L) 12.0 - 15.9 g/dL    Hematocrit 27.9 (L) 34.0 - 46.6 %    MCV 78.2 (L) 79.0 - 97.0 fL    MCH 24.4 (L) 26.6 - 33.0 pg    MCHC 31.2 (L) 31.5 - 35.7 g/dL    RDW 15.0 12.3 - 15.4 %    RDW-SD 41.5 37.0 - 54.0 fl    MPV 10.2 6.0 - 12.0 fL    Platelets 312 140 - 450 10*3/mm3    Neutrophil % 83.3 (H) 42.7 - 76.0 %    Lymphocyte % 6.4 (L) 19.6 - 45.3 %    Monocyte % 5.0 5.0 - 12.0 %    Eosinophil % 2.4 0.3 - 6.2 %    Basophil % 0.2 0.0 - 1.5 %    Immature Grans % 2.7 (H) 0.0 - 0.5 %    Neutrophils, Absolute 14.12 (H) 1.70 - 7.00 10*3/mm3    Lymphocytes, Absolute 1.08 0.70 - 3.10 10*3/mm3    Monocytes, Absolute 0.85 0.10 - 0.90 10*3/mm3    Eosinophils, Absolute 0.40 0.00 - 0.40 10*3/mm3    Basophils, Absolute 0.04 0.00 - 0.20 10*3/mm3    Immature Grans, Absolute 0.46 (H) 0.00 - 0.05 10*3/mm3    nRBC 0.0 0.0 - 0.2 /100 WBC   Gastrointestinal Panel, PCR - Stool, Per Rectum    Collection Time: 04/29/20  9:01 AM   Result Value Ref Range    Campylobacter Not Detected Not Detected    Plesiomonas shigelloides Not Detected Not Detected    Salmonella Not Detected Not Detected    Vibrio Not Detected Not Detected    Vibrio cholerae Not Detected Not Detected    Yersinia enterocolitica Not Detected Not Detected    Enteroaggregative E. coli (EAEC) Not Detected Not Detected    Enteropathogenic E. coli (EPEC) Not Detected Not Detected    Enterotoxigenic E. coli (ETEC) lt/st Not Detected Not Detected    Shiga-like toxin-producing E. coli (STEC) stx1/stx2  Not Detected Not Detected    E. coli O157 Not Detected Not Detected    Shigella/Enteroinvasive E. coli (EIEC) Not Detected Not Detected    Cryptosporidium Not Detected Not Detected    Cyclospora cayetanensis Not Detected Not Detected    Entamoeba histolytica Not Detected Not Detected    Giardia lamblia Not Detected Not Detected    Adenovirus F40/41 Not Detected Not Detected    Astrovirus Not Detected Not Detected    Norovirus GI/GII Not Detected Not Detected    Rotavirus A Not Detected Not Detected    Sapovirus (I, II, IV or V) Not Detected Not Detected   Fecal Lactoferrin - Stool, Per Rectum    Collection Time: 04/29/20 10:31 AM   Result Value Ref Range    Lactoferrin, Qual Positive (A) Negative   Fecal Fat, Qualitative - Stool, Per Rectum    Collection Time: 04/29/20 10:31 AM   Result Value Ref Range    Fecal Fats, Qualitative, Fatty Acids 0-100 fat droplets / hpf 0-100 fat droplets / hpf    Fecal Fat Qualitative, Neutral Fats 0-50 fat droplets / hpf 0-50 fat droplets / hpf   Clostridium Difficile Toxin, PCR - Stool, Per Rectum    Collection Time: 04/29/20 10:31 AM   Result Value Ref Range    C. Difficile Toxins by PCR Negative Negative   Basic Metabolic Panel    Collection Time: 04/30/20  5:02 AM   Result Value Ref Range    Glucose 112 (H) 65 - 99 mg/dL    BUN 23 8 - 23 mg/dL    Creatinine 0.91 0.57 - 1.00 mg/dL    Sodium 140 136 - 145 mmol/L    Potassium 3.4 (L) 3.5 - 5.2 mmol/L    Chloride 102 98 - 107 mmol/L    CO2 26.5 22.0 - 29.0 mmol/L    Calcium 8.8 8.6 - 10.5 mg/dL    eGFR Non African Amer 60 (L) >60 mL/min/1.73    BUN/Creatinine Ratio 25.3 (H) 7.0 - 25.0    Anion Gap 11.5 5.0 - 15.0 mmol/L   Protime-INR    Collection Time: 04/30/20  5:02 AM   Result Value Ref Range    Protime 25.1 (H) 11.7 - 14.2 Seconds    INR 2.31 (H) 0.90 - 1.10   CBC Auto Differential    Collection Time: 04/30/20  5:03 AM   Result Value Ref Range    WBC 13.81 (H) 3.40 - 10.80 10*3/mm3    RBC 3.37 (L) 3.77 - 5.28 10*6/mm3     Hemoglobin 8.3 (L) 12.0 - 15.9 g/dL    Hematocrit 26.3 (L) 34.0 - 46.6 %    MCV 78.0 (L) 79.0 - 97.0 fL    MCH 24.6 (L) 26.6 - 33.0 pg    MCHC 31.6 31.5 - 35.7 g/dL    RDW 15.2 12.3 - 15.4 %    RDW-SD 42.0 37.0 - 54.0 fl    MPV 10.9 6.0 - 12.0 fL    Platelets 279 140 - 450 10*3/mm3    Neutrophil % 79.0 (H) 42.7 - 76.0 %    Lymphocyte % 8.9 (L) 19.6 - 45.3 %    Monocyte % 5.5 5.0 - 12.0 %    Eosinophil % 3.4 0.3 - 6.2 %    Basophil % 0.4 0.0 - 1.5 %    Immature Grans % 2.8 (H) 0.0 - 0.5 %    Neutrophils, Absolute 10.91 (H) 1.70 - 7.00 10*3/mm3    Lymphocytes, Absolute 1.23 0.70 - 3.10 10*3/mm3    Monocytes, Absolute 0.76 0.10 - 0.90 10*3/mm3    Eosinophils, Absolute 0.47 (H) 0.00 - 0.40 10*3/mm3    Basophils, Absolute 0.05 0.00 - 0.20 10*3/mm3    Immature Grans, Absolute 0.39 (H) 0.00 - 0.05 10*3/mm3    nRBC 0.0 0.0 - 0.2 /100 WBC   Basic Metabolic Panel    Collection Time: 05/01/20  4:40 AM   Result Value Ref Range    Glucose 117 (H) 65 - 99 mg/dL    BUN 22 8 - 23 mg/dL    Creatinine 0.83 0.57 - 1.00 mg/dL    Sodium 141 136 - 145 mmol/L    Potassium 3.7 3.5 - 5.2 mmol/L    Chloride 104 98 - 107 mmol/L    CO2 26.4 22.0 - 29.0 mmol/L    Calcium 9.0 8.6 - 10.5 mg/dL    eGFR Non African Amer 67 >60 mL/min/1.73    BUN/Creatinine Ratio 26.5 (H) 7.0 - 25.0    Anion Gap 10.6 5.0 - 15.0 mmol/L   Protime-INR    Collection Time: 05/01/20  4:40 AM   Result Value Ref Range    Protime 25.9 (H) 11.7 - 14.2 Seconds    INR 2.41 (H) 0.90 - 1.10   CBC Auto Differential    Collection Time: 05/01/20  4:40 AM   Result Value Ref Range    WBC 14.11 (H) 3.40 - 10.80 10*3/mm3    RBC 3.64 (L) 3.77 - 5.28 10*6/mm3    Hemoglobin 8.9 (L) 12.0 - 15.9 g/dL    Hematocrit 28.7 (L) 34.0 - 46.6 %    MCV 78.8 (L) 79.0 - 97.0 fL    MCH 24.5 (L) 26.6 - 33.0 pg    MCHC 31.0 (L) 31.5 - 35.7 g/dL    RDW 15.2 12.3 - 15.4 %    RDW-SD 42.9 37.0 - 54.0 fl    MPV 10.7 6.0 - 12.0 fL    Platelets 293 140 - 450 10*3/mm3    Neutrophil % 77.7 (H) 42.7 - 76.0 %     Lymphocyte % 9.7 (L) 19.6 - 45.3 %    Monocyte % 5.5 5.0 - 12.0 %    Eosinophil % 3.9 0.3 - 6.2 %    Basophil % 0.6 0.0 - 1.5 %    Immature Grans % 2.6 (H) 0.0 - 0.5 %    Neutrophils, Absolute 10.95 (H) 1.70 - 7.00 10*3/mm3    Lymphocytes, Absolute 1.37 0.70 - 3.10 10*3/mm3    Monocytes, Absolute 0.78 0.10 - 0.90 10*3/mm3    Eosinophils, Absolute 0.55 (H) 0.00 - 0.40 10*3/mm3    Basophils, Absolute 0.09 0.00 - 0.20 10*3/mm3    Immature Grans, Absolute 0.37 (H) 0.00 - 0.05 10*3/mm3    nRBC 0.0 0.0 - 0.2 /100 WBC   POC Glucose Once    Collection Time: 05/01/20  6:26 AM   Result Value Ref Range    Glucose 121 70 - 130 mg/dL   Basic Metabolic Panel    Collection Time: 05/02/20  4:33 AM   Result Value Ref Range    Glucose 111 (H) 65 - 99 mg/dL    BUN 22 8 - 23 mg/dL    Creatinine 0.96 0.57 - 1.00 mg/dL    Sodium 141 136 - 145 mmol/L    Potassium 3.3 (L) 3.5 - 5.2 mmol/L    Chloride 104 98 - 107 mmol/L    CO2 26.9 22.0 - 29.0 mmol/L    Calcium 8.9 8.6 - 10.5 mg/dL    eGFR Non African Amer 57 (L) >60 mL/min/1.73    BUN/Creatinine Ratio 22.9 7.0 - 25.0    Anion Gap 10.1 5.0 - 15.0 mmol/L   Protime-INR    Collection Time: 05/02/20  4:33 AM   Result Value Ref Range    Protime 27.9 (H) 11.7 - 14.2 Seconds    INR 2.64 (H) 0.90 - 1.10   CBC Auto Differential    Collection Time: 05/02/20  4:33 AM   Result Value Ref Range    WBC 13.75 (H) 3.40 - 10.80 10*3/mm3    RBC 3.73 (L) 3.77 - 5.28 10*6/mm3    Hemoglobin 8.9 (L) 12.0 - 15.9 g/dL    Hematocrit 29.3 (L) 34.0 - 46.6 %    MCV 78.6 (L) 79.0 - 97.0 fL    MCH 23.9 (L) 26.6 - 33.0 pg    MCHC 30.4 (L) 31.5 - 35.7 g/dL    RDW 15.2 12.3 - 15.4 %    RDW-SD 43.4 37.0 - 54.0 fl    MPV 10.5 6.0 - 12.0 fL    Platelets 262 140 - 450 10*3/mm3    Neutrophil % 76.1 (H) 42.7 - 76.0 %    Lymphocyte % 11.3 (L) 19.6 - 45.3 %    Monocyte % 6.3 5.0 - 12.0 %    Eosinophil % 3.6 0.3 - 6.2 %    Basophil % 0.7 0.0 - 1.5 %    Immature Grans % 2.0 (H) 0.0 - 0.5 %    Neutrophils, Absolute 10.46 (H) 1.70 -  7.00 10*3/mm3    Lymphocytes, Absolute 1.56 0.70 - 3.10 10*3/mm3    Monocytes, Absolute 0.87 0.10 - 0.90 10*3/mm3    Eosinophils, Absolute 0.50 (H) 0.00 - 0.40 10*3/mm3    Basophils, Absolute 0.09 0.00 - 0.20 10*3/mm3    Immature Grans, Absolute 0.27 (H) 0.00 - 0.05 10*3/mm3    nRBC 0.0 0.0 - 0.2 /100 WBC   Basic Metabolic Panel    Collection Time: 05/03/20  4:45 AM   Result Value Ref Range    Glucose 172 (H) 65 - 99 mg/dL    BUN 25 (H) 8 - 23 mg/dL    Creatinine 0.99 0.57 - 1.00 mg/dL    Sodium 141 136 - 145 mmol/L    Potassium 4.7 3.5 - 5.2 mmol/L    Chloride 104 98 - 107 mmol/L    CO2 25.4 22.0 - 29.0 mmol/L    Calcium 9.0 8.6 - 10.5 mg/dL    eGFR Non African Amer 55 (L) >60 mL/min/1.73    BUN/Creatinine Ratio 25.3 (H) 7.0 - 25.0    Anion Gap 11.6 5.0 - 15.0 mmol/L   Protime-INR    Collection Time: 05/03/20  4:45 AM   Result Value Ref Range    Protime 28.4 (H) 11.7 - 14.2 Seconds    INR 2.71 (H) 0.90 - 1.10   CBC Auto Differential    Collection Time: 05/03/20  4:45 AM   Result Value Ref Range    WBC 14.33 (H) 3.40 - 10.80 10*3/mm3    RBC 3.62 (L) 3.77 - 5.28 10*6/mm3    Hemoglobin 9.0 (L) 12.0 - 15.9 g/dL    Hematocrit 28.9 (L) 34.0 - 46.6 %    MCV 79.8 79.0 - 97.0 fL    MCH 24.9 (L) 26.6 - 33.0 pg    MCHC 31.1 (L) 31.5 - 35.7 g/dL    RDW 14.9 12.3 - 15.4 %    RDW-SD 42.0 37.0 - 54.0 fl    MPV 11.8 6.0 - 12.0 fL    Platelets 268 140 - 450 10*3/mm3    Neutrophil % 88.7 (H) 42.7 - 76.0 %    Lymphocyte % 5.9 (L) 19.6 - 45.3 %    Monocyte % 3.5 (L) 5.0 - 12.0 %    Eosinophil % 0.1 (L) 0.3 - 6.2 %    Basophil % 0.1 0.0 - 1.5 %    Immature Grans % 1.7 (H) 0.0 - 0.5 %    Neutrophils, Absolute 12.71 (H) 1.70 - 7.00 10*3/mm3    Lymphocytes, Absolute 0.85 0.70 - 3.10 10*3/mm3    Monocytes, Absolute 0.50 0.10 - 0.90 10*3/mm3    Eosinophils, Absolute 0.01 0.00 - 0.40 10*3/mm3    Basophils, Absolute 0.02 0.00 - 0.20 10*3/mm3    Immature Grans, Absolute 0.24 (H) 0.00 - 0.05 10*3/mm3    nRBC 0.1 0.0 - 0.2 /100 WBC   Basic  Metabolic Panel    Collection Time: 05/04/20  4:58 AM   Result Value Ref Range    Glucose 113 (H) 65 - 99 mg/dL    BUN 26 (H) 8 - 23 mg/dL    Creatinine 1.12 (H) 0.57 - 1.00 mg/dL    Sodium 138 136 - 145 mmol/L    Potassium 4.1 3.5 - 5.2 mmol/L    Chloride 103 98 - 107 mmol/L    CO2 26.3 22.0 - 29.0 mmol/L    Calcium 9.1 8.6 - 10.5 mg/dL    eGFR Non African Amer 47 (L) >60 mL/min/1.73    BUN/Creatinine Ratio 23.2 7.0 - 25.0    Anion Gap 8.7 5.0 - 15.0 mmol/L   Protime-INR    Collection Time: 05/04/20  4:58 AM   Result Value Ref Range    Protime 27.7 (H) 11.7 - 14.2 Seconds    INR 2.62 (H) 0.90 - 1.10   CBC Auto Differential    Collection Time: 05/04/20  4:58 AM   Result Value Ref Range    WBC 15.60 (H) 3.40 - 10.80 10*3/mm3    RBC 3.66 (L) 3.77 - 5.28 10*6/mm3    Hemoglobin 8.8 (L) 12.0 - 15.9 g/dL    Hematocrit 29.2 (L) 34.0 - 46.6 %    MCV 79.8 79.0 - 97.0 fL    MCH 24.0 (L) 26.6 - 33.0 pg    MCHC 30.1 (L) 31.5 - 35.7 g/dL    RDW 15.1 12.3 - 15.4 %    RDW-SD 42.7 37.0 - 54.0 fl    MPV 10.3 6.0 - 12.0 fL    Platelets 255 140 - 450 10*3/mm3    Neutrophil % 82.3 (H) 42.7 - 76.0 %    Lymphocyte % 9.3 (L) 19.6 - 45.3 %    Monocyte % 6.4 5.0 - 12.0 %    Eosinophil % 0.1 (L) 0.3 - 6.2 %    Basophil % 0.3 0.0 - 1.5 %    Immature Grans % 1.6 (H) 0.0 - 0.5 %    Neutrophils, Absolute 12.84 (H) 1.70 - 7.00 10*3/mm3    Lymphocytes, Absolute 1.45 0.70 - 3.10 10*3/mm3    Monocytes, Absolute 1.00 (H) 0.10 - 0.90 10*3/mm3    Eosinophils, Absolute 0.02 0.00 - 0.40 10*3/mm3    Basophils, Absolute 0.04 0.00 - 0.20 10*3/mm3    Immature Grans, Absolute 0.25 (H) 0.00 - 0.05 10*3/mm3    nRBC 0.0 0.0 - 0.2 /100 WBC   Protime-INR    Collection Time: 05/05/20  5:59 AM   Result Value Ref Range    Protime 16.9 (H) 11.7 - 14.2 Seconds    INR 1.40 (H) 0.90 - 1.10   Basic Metabolic Panel    Collection Time: 05/05/20  6:00 AM   Result Value Ref Range    Glucose 107 (H) 65 - 99 mg/dL    BUN 28 (H) 8 - 23 mg/dL    Creatinine 0.85 0.57 - 1.00 mg/dL     Sodium 140 136 - 145 mmol/L    Potassium 4.1 3.5 - 5.2 mmol/L    Chloride 101 98 - 107 mmol/L    CO2 27.5 22.0 - 29.0 mmol/L    Calcium 9.3 8.6 - 10.5 mg/dL    eGFR Non African Amer 65 >60 mL/min/1.73    BUN/Creatinine Ratio 32.9 (H) 7.0 - 25.0    Anion Gap 11.5 5.0 - 15.0 mmol/L   CBC Auto Differential    Collection Time: 05/05/20  6:00 AM   Result Value Ref Range    WBC 15.72 (H) 3.40 - 10.80 10*3/mm3    RBC 3.64 (L) 3.77 - 5.28 10*6/mm3    Hemoglobin 8.9 (L) 12.0 - 15.9 g/dL    Hematocrit 28.7 (L) 34.0 - 46.6 %    MCV 78.8 (L) 79.0 - 97.0 fL    MCH 24.5 (L) 26.6 - 33.0 pg    MCHC 31.0 (L) 31.5 - 35.7 g/dL    RDW 15.2 12.3 - 15.4 %    RDW-SD 42.2 37.0 - 54.0 fl    MPV 11.4 6.0 - 12.0 fL    Platelets 278 140 - 450 10*3/mm3    Neutrophil % 80.6 (H) 42.7 - 76.0 %    Lymphocyte % 10.6 (L) 19.6 - 45.3 %    Monocyte % 7.1 5.0 - 12.0 %    Eosinophil % 0.4 0.3 - 6.2 %    Basophil % 0.2 0.0 - 1.5 %    Immature Grans % 1.1 (H) 0.0 - 0.5 %    Neutrophils, Absolute 12.67 (H) 1.70 - 7.00 10*3/mm3    Lymphocytes, Absolute 1.66 0.70 - 3.10 10*3/mm3    Monocytes, Absolute 1.12 (H) 0.10 - 0.90 10*3/mm3    Eosinophils, Absolute 0.06 0.00 - 0.40 10*3/mm3    Basophils, Absolute 0.03 0.00 - 0.20 10*3/mm3    Immature Grans, Absolute 0.18 (H) 0.00 - 0.05 10*3/mm3    nRBC 0.0 0.0 - 0.2 /100 WBC   Basic Metabolic Panel    Collection Time: 05/06/20  5:02 AM   Result Value Ref Range    Glucose 100 (H) 65 - 99 mg/dL    BUN 28 (H) 8 - 23 mg/dL    Creatinine 0.99 0.57 - 1.00 mg/dL    Sodium 137 136 - 145 mmol/L    Potassium 4.3 3.5 - 5.2 mmol/L    Chloride 98 98 - 107 mmol/L    CO2 27.0 22.0 - 29.0 mmol/L    Calcium 9.2 8.6 - 10.5 mg/dL    eGFR Non African Amer 55 (L) >60 mL/min/1.73    BUN/Creatinine Ratio 28.3 (H) 7.0 - 25.0    Anion Gap 12.0 5.0 - 15.0 mmol/L   CBC Auto Differential    Collection Time: 05/06/20  5:02 AM   Result Value Ref Range    WBC 16.91 (H) 3.40 - 10.80 10*3/mm3    RBC 3.75 (L) 3.77 - 5.28 10*6/mm3    Hemoglobin 9.3  (L) 12.0 - 15.9 g/dL    Hematocrit 29.9 (L) 34.0 - 46.6 %    MCV 79.7 79.0 - 97.0 fL    MCH 24.8 (L) 26.6 - 33.0 pg    MCHC 31.1 (L) 31.5 - 35.7 g/dL    RDW 15.3 12.3 - 15.4 %    RDW-SD 41.6 37.0 - 54.0 fl    MPV 11.0 6.0 - 12.0 fL    Platelets 256 140 - 450 10*3/mm3    Neutrophil % 80.0 (H) 42.7 - 76.0 %    Lymphocyte % 10.2 (L) 19.6 - 45.3 %    Monocyte % 7.7 5.0 - 12.0 %    Eosinophil % 0.5 0.3 - 6.2 %    Basophil % 0.2 0.0 - 1.5 %    Immature Grans % 1.4 (H) 0.0 - 0.5 %    Neutrophils, Absolute 13.50 (H) 1.70 - 7.00 10*3/mm3    Lymphocytes, Absolute 1.73 0.70 - 3.10 10*3/mm3    Monocytes, Absolute 1.31 (H) 0.10 - 0.90 10*3/mm3    Eosinophils, Absolute 0.09 0.00 - 0.40 10*3/mm3    Basophils, Absolute 0.04 0.00 - 0.20 10*3/mm3    Immature Grans, Absolute 0.24 (H) 0.00 - 0.05 10*3/mm3    nRBC 0.1 0.0 - 0.2 /100 WBC     *Note: Due to a large number of results and/or encounters for the requested time period, some results have not been displayed. A complete set of results can be found in Results Review.

## 2020-05-14 NOTE — OUTREACH NOTE
Medical Week 1 Survey      Responses   [facility] patient discharged from?  [place]   COVID-19 Test Status  Not tested   Does the patient have one of the following disease processes/diagnoses(primary or secondary)?  Other   Is there a successful TCM telephone encounter documented?  No   Week 1 attempt successful?  Yes   Call start time  1329   Rescheduled  Rescheduled-pt requested [At MD appt]   Discharge diagnosis  A-fib, A/C CHF, NSTEMI, Acute exacerbation Crohn's disease   Is patient permission given to speak with other caregiver?  Yes   Person spoke with today (if not patient) and relationship  DR. DARREL KING, SPOUSE          Maura Galvez RN

## 2020-05-15 ENCOUNTER — READMISSION MANAGEMENT (OUTPATIENT)
Dept: CALL CENTER | Facility: HOSPITAL | Age: 75
End: 2020-05-15

## 2020-05-15 NOTE — OUTREACH NOTE
Medical Week 1 Survey      Responses   [facility]   COVID-19 Test Status  Negative   Does the patient have one of the following disease processes/diagnoses(primary or secondary)?  Other   Is there a successful TCM telephone encounter documented?  No   Week 1 attempt successful?  Yes   Call start time  0932   Call end time  0942   Discharge diagnosis  A-fib, A/C CHF, NSTEMI, Acute exacerbation Crohn's disease   Is patient permission given to speak with other caregiver?  Yes   List who call center can speak with  DR. DARREL KING   Meds reviewed with patient/caregiver?  Yes   Is the patient having any side effects they believe may be caused by any medication additions or changes?  No   Does the patient have all medications ordered at discharge?  Yes   Is the patient taking all medications as directed (includes completed medication regime)?  Yes   Medication comments  Patient reports that she will check with physician on if she is able to restart any of the discontinued medications.    Does the patient have a primary care provider?   Yes   Does the patient have an appointment with their PCP within 7 days of discharge?  No   Comments regarding PCP  Chitra Leyva MD PCP   What is preventing the patient from scheduling follow up appointments within 7 days of discharge?  Haven't had time   Nursing Interventions  Educated patient on importance of making appointment, Advised patient to make appointment   Has the patient kept scheduled appointments due by today?  N/A   Comments  Patient reports that she will contact Dr Flores for follow up appt.    What is the Home health agency?   [facility]    Home health comments  Patient reports HH has came 3 X week, and next week will go down to twice a week.    Pulse Ox monitoring  Intermittent   Pulse Ox device source  Other [O2 sat checked with  visits. ]   Psychosocial issues?  No   Comments  Patient reports no fever. Patient monitors  daily weight. Discussed notifying cardiology with fluid weight gain.    Did the patient receive a copy of their discharge instructions?  Yes   Nursing interventions  Reviewed instructions with patient   What is the patient's perception of their health status since discharge?  Improving   Is the patient/caregiver able to teach back the hierarchy of who to call/visit for symptoms/problems? PCP, Specialist, Home health nurse, Urgent Care, ED, 911  Yes   Week 1 call completed?  Yes   Wrap up additional comments  Patient reports on home quarantine post hospitalization.           Denice Purvis RN

## 2020-05-18 ENCOUNTER — READMISSION MANAGEMENT (OUTPATIENT)
Dept: CALL CENTER | Facility: HOSPITAL | Age: 75
End: 2020-05-18

## 2020-05-18 NOTE — OUTREACH NOTE
Medical Week 2 Survey      Responses   [facility]   COVID-19 Test Status  Negative   Does the patient have one of the following disease processes/diagnoses(primary or secondary)?  Other   Week 2 attempt successful?  Yes   Call start time  1232   Discharge diagnosis  A-fib, A/C CHF, NSTEMI, Acute exacerbation Crohn's disease   Call end time  1243   Is patient permission given to speak with other caregiver?  Yes   Meds reviewed with patient/caregiver?  Yes   Is the patient having any side effects they believe may be caused by any medication additions or changes?  No   Does the patient have all medications ordered at discharge?  Yes   Is the patient taking all medications as directed (includes completed medication regime)?  Yes   Medication comments  States she thinks she is getting worse. States she is having a serious problem with constipation. States abdomen is tight and she has no energy. States she is having trouble breathing and has no energy.    Does the patient have a primary care provider?   Yes   Comments regarding PCP  Chitra Leyva MD PCP   Has the patient kept scheduled appointments due by today?  Yes   Comments  Has appt with Dr. Flores 5/19. States she feels like she is    What is the Home health agency?   [facility]   Has home health visited the patient within 72 hours of discharge?  Yes   What DME was ordered?  rollator from [employer]   Pulse Ox monitoring  Intermittent   Pulse Ox device source  Other   O2 Sat comments  HH checks   Psychosocial issues?  No   Comments  Denies fever. Shortness of breath due to abdominal distention. Discussed dulcolox suppository along with her miralax and suggested colace.    Did the patient receive a copy of their discharge instructions?  Yes   Nursing interventions  Reviewed instructions with patient   What is the patient's perception of their health status since discharge?  New symptoms unrelated to diagnosis   Is the  patient/caregiver able to teach back signs and symptoms related to disease process for when to call PCP?  Yes   Is the patient/caregiver able to teach back signs and symptoms related to disease process for when to call 911?  Yes   Is the patient/caregiver able to teach back the hierarchy of who to call/visit for symptoms/problems? PCP, Specialist, Home health nurse, Urgent Care, ED, 911  Yes   Week 2 Call Completed?  Yes          Gila Madrid RN

## 2020-05-19 ENCOUNTER — APPOINTMENT (OUTPATIENT)
Dept: GENERAL RADIOLOGY | Facility: HOSPITAL | Age: 75
End: 2020-05-19

## 2020-05-19 ENCOUNTER — APPOINTMENT (OUTPATIENT)
Dept: CT IMAGING | Facility: HOSPITAL | Age: 75
End: 2020-05-19

## 2020-05-19 ENCOUNTER — HOSPITAL ENCOUNTER (INPATIENT)
Facility: HOSPITAL | Age: 75
LOS: 7 days | Discharge: HOME HEALTH | End: 2020-05-26
Attending: EMERGENCY MEDICINE | Admitting: INTERNAL MEDICINE

## 2020-05-19 DIAGNOSIS — I48.91 RAPID ATRIAL FIBRILLATION (HCC): Primary | ICD-10-CM

## 2020-05-19 DIAGNOSIS — D68.32 WARFARIN-INDUCED COAGULOPATHY (HCC): ICD-10-CM

## 2020-05-19 DIAGNOSIS — K62.5 RECTAL BLEEDING: ICD-10-CM

## 2020-05-19 DIAGNOSIS — E86.9 VOLUME DEPLETION: ICD-10-CM

## 2020-05-19 DIAGNOSIS — R53.81 PHYSICAL DECONDITIONING: ICD-10-CM

## 2020-05-19 DIAGNOSIS — N17.0 ACUTE KIDNEY INJURY (AKI) WITH ACUTE TUBULAR NECROSIS (ATN) (HCC): ICD-10-CM

## 2020-05-19 DIAGNOSIS — T45.515A WARFARIN-INDUCED COAGULOPATHY (HCC): ICD-10-CM

## 2020-05-19 LAB
ALBUMIN SERPL-MCNC: 3.7 G/DL (ref 3.5–5.2)
ALBUMIN/GLOB SERPL: 1.5 G/DL
ALP SERPL-CCNC: 63 U/L (ref 39–117)
ALT SERPL W P-5'-P-CCNC: 22 U/L (ref 1–33)
ANION GAP SERPL CALCULATED.3IONS-SCNC: 14.3 MMOL/L (ref 5–15)
AST SERPL-CCNC: 17 U/L (ref 1–32)
BASOPHILS # BLD AUTO: 0.04 10*3/MM3 (ref 0–0.2)
BASOPHILS NFR BLD AUTO: 0.1 % (ref 0–1.5)
BILIRUB SERPL-MCNC: 0.8 MG/DL (ref 0.2–1.2)
BUN BLD-MCNC: 42 MG/DL (ref 8–23)
BUN/CREAT SERPL: 28.8 (ref 7–25)
CALCIUM SPEC-SCNC: 9.2 MG/DL (ref 8.6–10.5)
CHLORIDE SERPL-SCNC: 105 MMOL/L (ref 98–107)
CO2 SERPL-SCNC: 23.7 MMOL/L (ref 22–29)
CREAT BLD-MCNC: 1.46 MG/DL (ref 0.57–1)
D-LACTATE SERPL-SCNC: 3.1 MMOL/L (ref 0.5–2)
DEPRECATED RDW RBC AUTO: 44.8 FL (ref 37–54)
EOSINOPHIL # BLD AUTO: 0 10*3/MM3 (ref 0–0.4)
EOSINOPHIL NFR BLD AUTO: 0 % (ref 0.3–6.2)
ERYTHROCYTE [DISTWIDTH] IN BLOOD BY AUTOMATED COUNT: 16.1 % (ref 12.3–15.4)
GFR SERPL CREATININE-BSD FRML MDRD: 35 ML/MIN/1.73
GLOBULIN UR ELPH-MCNC: 2.5 GM/DL
GLUCOSE BLD-MCNC: 235 MG/DL (ref 65–99)
HCT VFR BLD AUTO: 38.2 % (ref 34–46.6)
HGB BLD-MCNC: 11.5 G/DL (ref 12–15.9)
IMM GRANULOCYTES # BLD AUTO: 1.02 10*3/MM3 (ref 0–0.05)
IMM GRANULOCYTES NFR BLD AUTO: 3.7 % (ref 0–0.5)
INR PPP: 5.07 (ref 0.9–1.1)
LYMPHOCYTES # BLD AUTO: 0.83 10*3/MM3 (ref 0.7–3.1)
LYMPHOCYTES NFR BLD AUTO: 3 % (ref 19.6–45.3)
MCH RBC QN AUTO: 23.8 PG (ref 26.6–33)
MCHC RBC AUTO-ENTMCNC: 30.1 G/DL (ref 31.5–35.7)
MCV RBC AUTO: 79.1 FL (ref 79–97)
MONOCYTES # BLD AUTO: 0.46 10*3/MM3 (ref 0.1–0.9)
MONOCYTES NFR BLD AUTO: 1.7 % (ref 5–12)
NEUTROPHILS # BLD AUTO: 25.16 10*3/MM3 (ref 1.7–7)
NEUTROPHILS NFR BLD AUTO: 91.5 % (ref 42.7–76)
NRBC BLD AUTO-RTO: 0 /100 WBC (ref 0–0.2)
NT-PROBNP SERPL-MCNC: 2270 PG/ML (ref 5–1800)
PLATELET # BLD AUTO: 454 10*3/MM3 (ref 140–450)
PMV BLD AUTO: 10.2 FL (ref 6–12)
POTASSIUM BLD-SCNC: 5.2 MMOL/L (ref 3.5–5.2)
PROCALCITONIN SERPL-MCNC: 0.11 NG/ML (ref 0.1–0.25)
PROT SERPL-MCNC: 6.2 G/DL (ref 6–8.5)
PROTHROMBIN TIME: 46.8 SECONDS (ref 11.7–14.2)
RBC # BLD AUTO: 4.83 10*6/MM3 (ref 3.77–5.28)
SODIUM BLD-SCNC: 143 MMOL/L (ref 136–145)
TROPONIN T SERPL-MCNC: 0.01 NG/ML (ref 0–0.03)
WBC NRBC COR # BLD: 27.51 10*3/MM3 (ref 3.4–10.8)

## 2020-05-19 PROCEDURE — 93010 ELECTROCARDIOGRAM REPORT: CPT | Performed by: INTERNAL MEDICINE

## 2020-05-19 PROCEDURE — 99284 EMERGENCY DEPT VISIT MOD MDM: CPT

## 2020-05-19 PROCEDURE — 93005 ELECTROCARDIOGRAM TRACING: CPT

## 2020-05-19 PROCEDURE — 85025 COMPLETE CBC W/AUTO DIFF WBC: CPT | Performed by: EMERGENCY MEDICINE

## 2020-05-19 PROCEDURE — 84484 ASSAY OF TROPONIN QUANT: CPT | Performed by: EMERGENCY MEDICINE

## 2020-05-19 PROCEDURE — 87040 BLOOD CULTURE FOR BACTERIA: CPT | Performed by: EMERGENCY MEDICINE

## 2020-05-19 PROCEDURE — 80053 COMPREHEN METABOLIC PANEL: CPT | Performed by: EMERGENCY MEDICINE

## 2020-05-19 PROCEDURE — 85610 PROTHROMBIN TIME: CPT | Performed by: EMERGENCY MEDICINE

## 2020-05-19 PROCEDURE — 71045 X-RAY EXAM CHEST 1 VIEW: CPT

## 2020-05-19 PROCEDURE — 74176 CT ABD & PELVIS W/O CONTRAST: CPT

## 2020-05-19 PROCEDURE — 83605 ASSAY OF LACTIC ACID: CPT | Performed by: EMERGENCY MEDICINE

## 2020-05-19 PROCEDURE — 84145 PROCALCITONIN (PCT): CPT | Performed by: EMERGENCY MEDICINE

## 2020-05-19 PROCEDURE — 83880 ASSAY OF NATRIURETIC PEPTIDE: CPT | Performed by: EMERGENCY MEDICINE

## 2020-05-19 PROCEDURE — 36415 COLL VENOUS BLD VENIPUNCTURE: CPT

## 2020-05-19 RX ORDER — SODIUM CHLORIDE 9 MG/ML
75 INJECTION, SOLUTION INTRAVENOUS CONTINUOUS
Status: DISCONTINUED | OUTPATIENT
Start: 2020-05-19 | End: 2020-05-21

## 2020-05-19 RX ORDER — DILTIAZEM HYDROCHLORIDE 5 MG/ML
10 INJECTION INTRAVENOUS ONCE
Status: DISCONTINUED | OUTPATIENT
Start: 2020-05-19 | End: 2020-05-19

## 2020-05-19 RX ADMIN — SODIUM CHLORIDE 125 ML/HR: 9 INJECTION, SOLUTION INTRAVENOUS at 21:58

## 2020-05-19 RX ADMIN — METOPROLOL TARTRATE 5 MG: 5 INJECTION, SOLUTION INTRAVENOUS at 21:43

## 2020-05-19 RX ADMIN — METOPROLOL TARTRATE 5 MG: 5 INJECTION, SOLUTION INTRAVENOUS at 22:25

## 2020-05-19 RX ADMIN — SODIUM CHLORIDE 500 ML: 9 INJECTION, SOLUTION INTRAVENOUS at 22:01

## 2020-05-20 ENCOUNTER — READMISSION MANAGEMENT (OUTPATIENT)
Dept: CALL CENTER | Facility: HOSPITAL | Age: 75
End: 2020-05-20

## 2020-05-20 PROBLEM — T45.515A WARFARIN-INDUCED COAGULOPATHY: Chronic | Status: ACTIVE | Noted: 2020-05-20

## 2020-05-20 PROBLEM — N17.9 AKI (ACUTE KIDNEY INJURY): Status: ACTIVE | Noted: 2020-05-20

## 2020-05-20 PROBLEM — N17.9 AKI (ACUTE KIDNEY INJURY): Chronic | Status: ACTIVE | Noted: 2020-05-20

## 2020-05-20 PROBLEM — T45.515A WARFARIN-INDUCED COAGULOPATHY: Status: ACTIVE | Noted: 2020-05-20

## 2020-05-20 PROBLEM — E87.20 LACTIC ACIDOSIS: Chronic | Status: ACTIVE | Noted: 2020-05-20

## 2020-05-20 PROBLEM — K62.5 RECTAL BLEEDING: Chronic | Status: ACTIVE | Noted: 2020-05-20

## 2020-05-20 PROBLEM — D68.32 WARFARIN-INDUCED COAGULOPATHY (HCC): Status: ACTIVE | Noted: 2020-05-20

## 2020-05-20 PROBLEM — I48.91 RAPID ATRIAL FIBRILLATION: Chronic | Status: ACTIVE | Noted: 2020-05-19

## 2020-05-20 PROBLEM — D68.32 WARFARIN-INDUCED COAGULOPATHY: Chronic | Status: ACTIVE | Noted: 2020-05-20

## 2020-05-20 PROBLEM — E87.20 LACTIC ACIDOSIS: Status: ACTIVE | Noted: 2020-05-20

## 2020-05-20 PROBLEM — I50.32 CHRONIC DIASTOLIC CHF (CONGESTIVE HEART FAILURE): Status: ACTIVE | Noted: 2020-05-20

## 2020-05-20 PROBLEM — K62.5 RECTAL BLEEDING: Status: ACTIVE | Noted: 2020-05-20

## 2020-05-20 PROBLEM — D72.829 LEUKOCYTOSIS: Chronic | Status: ACTIVE | Noted: 2020-04-27

## 2020-05-20 LAB
ANION GAP SERPL CALCULATED.3IONS-SCNC: 9.6 MMOL/L (ref 5–15)
BUN BLD-MCNC: 41 MG/DL (ref 8–23)
BUN/CREAT SERPL: 35.7 (ref 7–25)
CALCIUM SPEC-SCNC: 8.2 MG/DL (ref 8.6–10.5)
CHLORIDE SERPL-SCNC: 114 MMOL/L (ref 98–107)
CO2 SERPL-SCNC: 18.4 MMOL/L (ref 22–29)
CREAT BLD-MCNC: 1.15 MG/DL (ref 0.57–1)
D-LACTATE SERPL-SCNC: 2.3 MMOL/L (ref 0.5–2)
DEPRECATED RDW RBC AUTO: 44.6 FL (ref 37–54)
ERYTHROCYTE [DISTWIDTH] IN BLOOD BY AUTOMATED COUNT: 16.2 % (ref 12.3–15.4)
GFR SERPL CREATININE-BSD FRML MDRD: 46 ML/MIN/1.73
GLUCOSE BLD-MCNC: 117 MG/DL (ref 65–99)
HCT VFR BLD AUTO: 31 % (ref 34–46.6)
HGB BLD-MCNC: 9.6 G/DL (ref 12–15.9)
INR PPP: 5.26 (ref 0.9–1.1)
LACTATE HOLD SPECIMEN: NORMAL
MCH RBC QN AUTO: 23.9 PG (ref 26.6–33)
MCHC RBC AUTO-ENTMCNC: 31 G/DL (ref 31.5–35.7)
MCV RBC AUTO: 77.3 FL (ref 79–97)
PLATELET # BLD AUTO: 365 10*3/MM3 (ref 140–450)
PMV BLD AUTO: 9.7 FL (ref 6–12)
POTASSIUM BLD-SCNC: 3.9 MMOL/L (ref 3.5–5.2)
PROTHROMBIN TIME: 48.2 SECONDS (ref 11.7–14.2)
RBC # BLD AUTO: 4.01 10*6/MM3 (ref 3.77–5.28)
SODIUM BLD-SCNC: 142 MMOL/L (ref 136–145)
WBC NRBC COR # BLD: 25.19 10*3/MM3 (ref 3.4–10.8)

## 2020-05-20 PROCEDURE — 99222 1ST HOSP IP/OBS MODERATE 55: CPT | Performed by: INTERNAL MEDICINE

## 2020-05-20 PROCEDURE — 97162 PT EVAL MOD COMPLEX 30 MIN: CPT

## 2020-05-20 PROCEDURE — 85027 COMPLETE CBC AUTOMATED: CPT | Performed by: NURSE PRACTITIONER

## 2020-05-20 PROCEDURE — 85610 PROTHROMBIN TIME: CPT | Performed by: NURSE PRACTITIONER

## 2020-05-20 PROCEDURE — 93005 ELECTROCARDIOGRAM TRACING: CPT | Performed by: NURSE PRACTITIONER

## 2020-05-20 PROCEDURE — 97165 OT EVAL LOW COMPLEX 30 MIN: CPT | Performed by: OCCUPATIONAL THERAPIST

## 2020-05-20 PROCEDURE — 99221 1ST HOSP IP/OBS SF/LOW 40: CPT | Performed by: INTERNAL MEDICINE

## 2020-05-20 PROCEDURE — 63710000001 PREDNISONE PER 1 MG: Performed by: NURSE PRACTITIONER

## 2020-05-20 PROCEDURE — 93010 ELECTROCARDIOGRAM REPORT: CPT | Performed by: INTERNAL MEDICINE

## 2020-05-20 PROCEDURE — 80048 BASIC METABOLIC PNL TOTAL CA: CPT | Performed by: NURSE PRACTITIONER

## 2020-05-20 PROCEDURE — 83605 ASSAY OF LACTIC ACID: CPT | Performed by: EMERGENCY MEDICINE

## 2020-05-20 PROCEDURE — 97110 THERAPEUTIC EXERCISES: CPT

## 2020-05-20 RX ORDER — ACETAMINOPHEN 325 MG/1
650 TABLET ORAL EVERY 4 HOURS PRN
Status: DISCONTINUED | OUTPATIENT
Start: 2020-05-20 | End: 2020-05-26 | Stop reason: HOSPADM

## 2020-05-20 RX ORDER — LEVOTHYROXINE SODIUM 0.1 MG/1
100 TABLET ORAL
Status: DISCONTINUED | OUTPATIENT
Start: 2020-05-20 | End: 2020-05-20

## 2020-05-20 RX ORDER — ALUMINA, MAGNESIA, AND SIMETHICONE 2400; 2400; 240 MG/30ML; MG/30ML; MG/30ML
15 SUSPENSION ORAL EVERY 6 HOURS PRN
Status: DISCONTINUED | OUTPATIENT
Start: 2020-05-20 | End: 2020-05-26 | Stop reason: HOSPADM

## 2020-05-20 RX ORDER — BISACODYL 5 MG/1
5 TABLET, DELAYED RELEASE ORAL DAILY PRN
Status: DISCONTINUED | OUTPATIENT
Start: 2020-05-20 | End: 2020-05-26 | Stop reason: HOSPADM

## 2020-05-20 RX ORDER — PREDNISONE 20 MG/1
40 TABLET ORAL
Status: DISCONTINUED | OUTPATIENT
Start: 2020-05-20 | End: 2020-05-22

## 2020-05-20 RX ORDER — METOPROLOL TARTRATE 50 MG/1
50 TABLET, FILM COATED ORAL EVERY 12 HOURS SCHEDULED
Status: DISCONTINUED | OUTPATIENT
Start: 2020-05-20 | End: 2020-05-20

## 2020-05-20 RX ORDER — NITROGLYCERIN 0.4 MG/1
0.4 TABLET SUBLINGUAL
Status: DISCONTINUED | OUTPATIENT
Start: 2020-05-20 | End: 2020-05-26 | Stop reason: HOSPADM

## 2020-05-20 RX ORDER — ATENOLOL 50 MG/1
50 TABLET ORAL EVERY 12 HOURS SCHEDULED
Status: DISCONTINUED | OUTPATIENT
Start: 2020-05-20 | End: 2020-05-21

## 2020-05-20 RX ORDER — LEVOTHYROXINE SODIUM 0.1 MG/1
100 TABLET ORAL
Status: DISCONTINUED | OUTPATIENT
Start: 2020-05-20 | End: 2020-05-26 | Stop reason: HOSPADM

## 2020-05-20 RX ORDER — AMIODARONE HYDROCHLORIDE 200 MG/1
200 TABLET ORAL
Status: DISCONTINUED | OUTPATIENT
Start: 2020-05-20 | End: 2020-05-22

## 2020-05-20 RX ORDER — ONDANSETRON 2 MG/ML
4 INJECTION INTRAMUSCULAR; INTRAVENOUS EVERY 6 HOURS PRN
Status: DISCONTINUED | OUTPATIENT
Start: 2020-05-20 | End: 2020-05-26 | Stop reason: HOSPADM

## 2020-05-20 RX ORDER — ONDANSETRON 4 MG/1
4 TABLET, FILM COATED ORAL EVERY 6 HOURS PRN
Status: DISCONTINUED | OUTPATIENT
Start: 2020-05-20 | End: 2020-05-26 | Stop reason: HOSPADM

## 2020-05-20 RX ORDER — SODIUM CHLORIDE 0.9 % (FLUSH) 0.9 %
10 SYRINGE (ML) INJECTION AS NEEDED
Status: DISCONTINUED | OUTPATIENT
Start: 2020-05-20 | End: 2020-05-26 | Stop reason: HOSPADM

## 2020-05-20 RX ADMIN — SODIUM CHLORIDE, PRESERVATIVE FREE 10 ML: 5 INJECTION INTRAVENOUS at 20:09

## 2020-05-20 RX ADMIN — AMIODARONE HYDROCHLORIDE 200 MG: 200 TABLET ORAL at 08:17

## 2020-05-20 RX ADMIN — ATENOLOL 50 MG: 50 TABLET ORAL at 20:09

## 2020-05-20 RX ADMIN — METOPROLOL TARTRATE 50 MG: 50 TABLET, FILM COATED ORAL at 08:17

## 2020-05-20 RX ADMIN — LEVOTHYROXINE SODIUM 100 MCG: 100 TABLET ORAL at 08:18

## 2020-05-20 RX ADMIN — PREDNISONE 40 MG: 20 TABLET ORAL at 08:17

## 2020-05-21 ENCOUNTER — APPOINTMENT (OUTPATIENT)
Dept: CT IMAGING | Facility: HOSPITAL | Age: 75
End: 2020-05-21

## 2020-05-21 LAB
ADV 40+41 DNA STL QL NAA+NON-PROBE: NOT DETECTED
ANION GAP SERPL CALCULATED.3IONS-SCNC: 9.8 MMOL/L (ref 5–15)
ANISOCYTOSIS BLD QL: ABNORMAL
ASTRO TYP 1-8 RNA STL QL NAA+NON-PROBE: NOT DETECTED
BILIRUB UR QL STRIP: NEGATIVE
BUN BLD-MCNC: 35 MG/DL (ref 8–23)
BUN/CREAT SERPL: 38.9 (ref 7–25)
C CAYETANENSIS DNA STL QL NAA+NON-PROBE: NOT DETECTED
CALCIUM SPEC-SCNC: 8.5 MG/DL (ref 8.6–10.5)
CAMPY SP DNA.DIARRHEA STL QL NAA+PROBE: NOT DETECTED
CHLORIDE SERPL-SCNC: 112 MMOL/L (ref 98–107)
CLARITY UR: CLEAR
CO2 SERPL-SCNC: 19.2 MMOL/L (ref 22–29)
COLOR UR: YELLOW
CREAT BLD-MCNC: 0.9 MG/DL (ref 0.57–1)
CRYPTOSP STL CULT: NOT DETECTED
DEPRECATED RDW RBC AUTO: 44.2 FL (ref 37–54)
E COLI DNA SPEC QL NAA+PROBE: NOT DETECTED
E HISTOLYT AG STL-ACNC: NOT DETECTED
EAEC PAA PLAS AGGR+AATA ST NAA+NON-PRB: NOT DETECTED
EC STX1 + STX2 GENES STL NAA+PROBE: NOT DETECTED
EPEC EAE GENE STL QL NAA+NON-PROBE: NOT DETECTED
ERYTHROCYTE [DISTWIDTH] IN BLOOD BY AUTOMATED COUNT: 16.1 % (ref 12.3–15.4)
ETEC LTA+ST1A+ST1B TOX ST NAA+NON-PROBE: NOT DETECTED
G LAMBLIA DNA SPEC QL NAA+PROBE: NOT DETECTED
GFR SERPL CREATININE-BSD FRML MDRD: 61 ML/MIN/1.73
GLUCOSE BLD-MCNC: 105 MG/DL (ref 65–99)
GLUCOSE UR STRIP-MCNC: NEGATIVE MG/DL
HCT VFR BLD AUTO: 31 % (ref 34–46.6)
HGB BLD-MCNC: 9.4 G/DL (ref 12–15.9)
HGB UR QL STRIP.AUTO: NEGATIVE
INR PPP: 1.98 (ref 0.9–1.1)
KETONES UR QL STRIP: NEGATIVE
LEUKOCYTE ESTERASE UR QL STRIP.AUTO: NEGATIVE
LYMPHOCYTES # BLD MANUAL: 1.46 10*3/MM3 (ref 0.7–3.1)
LYMPHOCYTES NFR BLD MANUAL: 6 % (ref 19.6–45.3)
LYMPHOCYTES NFR BLD MANUAL: 7 % (ref 5–12)
MCH RBC QN AUTO: 23.3 PG (ref 26.6–33)
MCHC RBC AUTO-ENTMCNC: 30.3 G/DL (ref 31.5–35.7)
MCV RBC AUTO: 76.9 FL (ref 79–97)
MICROCYTES BLD QL: ABNORMAL
MONOCYTES # BLD AUTO: 1.7 10*3/MM3 (ref 0.1–0.9)
NEUTROPHILS # BLD AUTO: 21.11 10*3/MM3 (ref 1.7–7)
NEUTROPHILS NFR BLD MANUAL: 87 % (ref 42.7–76)
NITRITE UR QL STRIP: NEGATIVE
NOROVIRUS GI+II RNA STL QL NAA+NON-PROBE: NOT DETECTED
NRBC SPEC MANUAL: 1 /100 WBC (ref 0–0.2)
OVALOCYTES BLD QL SMEAR: ABNORMAL
P SHIGELLOIDES DNA STL QL NAA+PROBE: NOT DETECTED
PH UR STRIP.AUTO: <=5 [PH] (ref 5–8)
PLAT MORPH BLD: NORMAL
PLATELET # BLD AUTO: 391 10*3/MM3 (ref 140–450)
PMV BLD AUTO: 9.6 FL (ref 6–12)
POIKILOCYTOSIS BLD QL SMEAR: ABNORMAL
POLYCHROMASIA BLD QL SMEAR: ABNORMAL
POTASSIUM BLD-SCNC: 3.9 MMOL/L (ref 3.5–5.2)
PROT UR QL STRIP: NEGATIVE
PROTHROMBIN TIME: 22.2 SECONDS (ref 11.7–14.2)
RBC # BLD AUTO: 4.03 10*6/MM3 (ref 3.77–5.28)
RV RNA STL NAA+PROBE: NOT DETECTED
SALMONELLA DNA SPEC QL NAA+PROBE: NOT DETECTED
SAPO I+II+IV+V RNA STL QL NAA+NON-PROBE: NOT DETECTED
SHIGELLA SP+EIEC IPAH STL QL NAA+PROBE: NOT DETECTED
SODIUM BLD-SCNC: 141 MMOL/L (ref 136–145)
SP GR UR STRIP: 1.01 (ref 1–1.03)
UROBILINOGEN UR QL STRIP: NORMAL
V CHOLERAE DNA SPEC QL NAA+PROBE: NOT DETECTED
VIBRIO DNA SPEC NAA+PROBE: NOT DETECTED
WBC MORPH BLD: NORMAL
WBC NRBC COR # BLD: 24.27 10*3/MM3 (ref 3.4–10.8)
YERSINIA STL CULT: NOT DETECTED

## 2020-05-21 PROCEDURE — 25010000002 FUROSEMIDE PER 20 MG: Performed by: INTERNAL MEDICINE

## 2020-05-21 PROCEDURE — 63710000001 PREDNISONE PER 1 MG: Performed by: NURSE PRACTITIONER

## 2020-05-21 PROCEDURE — 85007 BL SMEAR W/DIFF WBC COUNT: CPT | Performed by: INTERNAL MEDICINE

## 2020-05-21 PROCEDURE — 85610 PROTHROMBIN TIME: CPT | Performed by: INTERNAL MEDICINE

## 2020-05-21 PROCEDURE — 99232 SBSQ HOSP IP/OBS MODERATE 35: CPT | Performed by: INTERNAL MEDICINE

## 2020-05-21 PROCEDURE — 80048 BASIC METABOLIC PNL TOTAL CA: CPT | Performed by: INTERNAL MEDICINE

## 2020-05-21 PROCEDURE — 81003 URINALYSIS AUTO W/O SCOPE: CPT | Performed by: NURSE PRACTITIONER

## 2020-05-21 PROCEDURE — 85025 COMPLETE CBC W/AUTO DIFF WBC: CPT | Performed by: INTERNAL MEDICINE

## 2020-05-21 PROCEDURE — 0097U HC BIOFIRE FILMARRAY GI PANEL: CPT | Performed by: NURSE PRACTITIONER

## 2020-05-21 RX ORDER — WARFARIN SODIUM 3 MG/1
3 TABLET ORAL
Status: DISCONTINUED | OUTPATIENT
Start: 2020-05-21 | End: 2020-05-21 | Stop reason: DRUGHIGH

## 2020-05-21 RX ORDER — ATENOLOL 50 MG/1
50 TABLET ORAL ONCE
Status: COMPLETED | OUTPATIENT
Start: 2020-05-21 | End: 2020-05-21

## 2020-05-21 RX ORDER — DOCUSATE SODIUM 100 MG/1
100 CAPSULE, LIQUID FILLED ORAL DAILY
Status: DISCONTINUED | OUTPATIENT
Start: 2020-05-21 | End: 2020-05-26 | Stop reason: HOSPADM

## 2020-05-21 RX ORDER — ATENOLOL 50 MG/1
100 TABLET ORAL EVERY 12 HOURS SCHEDULED
Status: DISCONTINUED | OUTPATIENT
Start: 2020-05-21 | End: 2020-05-26 | Stop reason: HOSPADM

## 2020-05-21 RX ORDER — WARFARIN SODIUM 3 MG/1
3 TABLET ORAL
Status: COMPLETED | OUTPATIENT
Start: 2020-05-21 | End: 2020-05-21

## 2020-05-21 RX ORDER — WARFARIN SODIUM 5 MG/1
5 TABLET ORAL
Status: DISCONTINUED | OUTPATIENT
Start: 2020-05-21 | End: 2020-05-21

## 2020-05-21 RX ORDER — GUAIFENESIN 600 MG/1
600 TABLET, EXTENDED RELEASE ORAL EVERY 12 HOURS SCHEDULED
Status: DISCONTINUED | OUTPATIENT
Start: 2020-05-21 | End: 2020-05-26 | Stop reason: HOSPADM

## 2020-05-21 RX ORDER — FUROSEMIDE 10 MG/ML
20 INJECTION INTRAMUSCULAR; INTRAVENOUS ONCE
Status: COMPLETED | OUTPATIENT
Start: 2020-05-21 | End: 2020-05-21

## 2020-05-21 RX ORDER — WARFARIN SODIUM 3 MG/1
3 TABLET ORAL
Status: DISCONTINUED | OUTPATIENT
Start: 2020-05-21 | End: 2020-05-21 | Stop reason: ALTCHOICE

## 2020-05-21 RX ADMIN — AMIODARONE HYDROCHLORIDE 200 MG: 200 TABLET ORAL at 08:08

## 2020-05-21 RX ADMIN — PREDNISONE 40 MG: 20 TABLET ORAL at 08:08

## 2020-05-21 RX ADMIN — ATENOLOL 100 MG: 50 TABLET ORAL at 20:53

## 2020-05-21 RX ADMIN — LEVOTHYROXINE SODIUM 100 MCG: 100 TABLET ORAL at 06:38

## 2020-05-21 RX ADMIN — FUROSEMIDE 20 MG: 10 INJECTION, SOLUTION INTRAMUSCULAR; INTRAVENOUS at 14:16

## 2020-05-21 RX ADMIN — GUAIFENESIN 600 MG: 600 TABLET, EXTENDED RELEASE ORAL at 20:53

## 2020-05-21 RX ADMIN — WARFARIN 3 MG: 3 TABLET ORAL at 18:47

## 2020-05-21 RX ADMIN — ACETAMINOPHEN 650 MG: 325 TABLET, FILM COATED ORAL at 10:56

## 2020-05-21 RX ADMIN — ATENOLOL 50 MG: 50 TABLET ORAL at 10:56

## 2020-05-21 RX ADMIN — DOCUSATE SODIUM 100 MG: 100 CAPSULE, LIQUID FILLED ORAL at 17:39

## 2020-05-21 RX ADMIN — ATENOLOL 50 MG: 50 TABLET ORAL at 08:08

## 2020-05-21 RX ADMIN — ACETAMINOPHEN 650 MG: 325 TABLET, FILM COATED ORAL at 21:00

## 2020-05-21 NOTE — OUTREACH NOTE
Medical Week 3 Survey      Responses   [facility] patient discharged from?  [place]   COVID-19 Test Status  Negative   Does the patient have one of the following disease processes/diagnoses(primary or secondary)?  Other   Week 3 attempt successful?  No   Revoke  Readmitted          Chitra Benavides RN

## 2020-05-22 ENCOUNTER — APPOINTMENT (OUTPATIENT)
Dept: CT IMAGING | Facility: HOSPITAL | Age: 75
End: 2020-05-22

## 2020-05-22 LAB
ANION GAP SERPL CALCULATED.3IONS-SCNC: 12.8 MMOL/L (ref 5–15)
BUN BLD-MCNC: 39 MG/DL (ref 8–23)
BUN/CREAT SERPL: 39.4 (ref 7–25)
CALCIUM SPEC-SCNC: 8.9 MG/DL (ref 8.6–10.5)
CHLORIDE SERPL-SCNC: 106 MMOL/L (ref 98–107)
CO2 SERPL-SCNC: 24.2 MMOL/L (ref 22–29)
CREAT BLD-MCNC: 0.99 MG/DL (ref 0.57–1)
DEPRECATED RDW RBC AUTO: 43.5 FL (ref 37–54)
ERYTHROCYTE [DISTWIDTH] IN BLOOD BY AUTOMATED COUNT: 16.2 % (ref 12.3–15.4)
GFR SERPL CREATININE-BSD FRML MDRD: 55 ML/MIN/1.73
GLUCOSE BLD-MCNC: 111 MG/DL (ref 65–99)
HCT VFR BLD AUTO: 33 % (ref 34–46.6)
HGB BLD-MCNC: 10.1 G/DL (ref 12–15.9)
INR PPP: 1.6 (ref 0.9–1.1)
MCH RBC QN AUTO: 23.9 PG (ref 26.6–33)
MCHC RBC AUTO-ENTMCNC: 30.6 G/DL (ref 31.5–35.7)
MCV RBC AUTO: 78 FL (ref 79–97)
NT-PROBNP SERPL-MCNC: 2516 PG/ML (ref 5–1800)
PLATELET # BLD AUTO: 384 10*3/MM3 (ref 140–450)
PMV BLD AUTO: 10.4 FL (ref 6–12)
POTASSIUM BLD-SCNC: 4.3 MMOL/L (ref 3.5–5.2)
PROTHROMBIN TIME: 18.7 SECONDS (ref 11.7–14.2)
RBC # BLD AUTO: 4.23 10*6/MM3 (ref 3.77–5.28)
SARS-COV-2 RNA RESP QL NAA+PROBE: NOT DETECTED
SODIUM BLD-SCNC: 143 MMOL/L (ref 136–145)
WBC NRBC COR # BLD: 22.77 10*3/MM3 (ref 3.4–10.8)

## 2020-05-22 PROCEDURE — 63710000001 PREDNISONE PER 1 MG: Performed by: NURSE PRACTITIONER

## 2020-05-22 PROCEDURE — 85610 PROTHROMBIN TIME: CPT | Performed by: INTERNAL MEDICINE

## 2020-05-22 PROCEDURE — 87635 SARS-COV-2 COVID-19 AMP PRB: CPT | Performed by: INTERNAL MEDICINE

## 2020-05-22 PROCEDURE — 83880 ASSAY OF NATRIURETIC PEPTIDE: CPT | Performed by: NURSE PRACTITIONER

## 2020-05-22 PROCEDURE — 99232 SBSQ HOSP IP/OBS MODERATE 35: CPT | Performed by: INTERNAL MEDICINE

## 2020-05-22 PROCEDURE — 85027 COMPLETE CBC AUTOMATED: CPT | Performed by: NURSE PRACTITIONER

## 2020-05-22 PROCEDURE — 80048 BASIC METABOLIC PNL TOTAL CA: CPT | Performed by: NURSE PRACTITIONER

## 2020-05-22 PROCEDURE — 97530 THERAPEUTIC ACTIVITIES: CPT

## 2020-05-22 PROCEDURE — 97110 THERAPEUTIC EXERCISES: CPT

## 2020-05-22 PROCEDURE — 99233 SBSQ HOSP IP/OBS HIGH 50: CPT | Performed by: INTERNAL MEDICINE

## 2020-05-22 PROCEDURE — 71250 CT THORAX DX C-: CPT

## 2020-05-22 RX ORDER — AMIODARONE HYDROCHLORIDE 200 MG/1
300 TABLET ORAL
Status: DISCONTINUED | OUTPATIENT
Start: 2020-05-23 | End: 2020-05-23

## 2020-05-22 RX ORDER — WARFARIN SODIUM 3 MG/1
3 TABLET ORAL
Status: COMPLETED | OUTPATIENT
Start: 2020-05-22 | End: 2020-05-22

## 2020-05-22 RX ADMIN — GUAIFENESIN 600 MG: 600 TABLET, EXTENDED RELEASE ORAL at 09:46

## 2020-05-22 RX ADMIN — GUAIFENESIN 600 MG: 600 TABLET, EXTENDED RELEASE ORAL at 20:03

## 2020-05-22 RX ADMIN — LEVOTHYROXINE SODIUM 100 MCG: 100 TABLET ORAL at 06:17

## 2020-05-22 RX ADMIN — DOCUSATE SODIUM 100 MG: 100 CAPSULE, LIQUID FILLED ORAL at 09:47

## 2020-05-22 RX ADMIN — AMIODARONE HYDROCHLORIDE 200 MG: 200 TABLET ORAL at 09:46

## 2020-05-22 RX ADMIN — WARFARIN 3 MG: 3 TABLET ORAL at 18:15

## 2020-05-22 RX ADMIN — ATENOLOL 100 MG: 50 TABLET ORAL at 09:46

## 2020-05-22 RX ADMIN — PREDNISONE 40 MG: 20 TABLET ORAL at 09:46

## 2020-05-22 RX ADMIN — ATENOLOL 100 MG: 50 TABLET ORAL at 20:03

## 2020-05-23 LAB
ANION GAP SERPL CALCULATED.3IONS-SCNC: 12.3 MMOL/L (ref 5–15)
BASOPHILS # BLD AUTO: 0.06 10*3/MM3 (ref 0–0.2)
BASOPHILS NFR BLD AUTO: 0.2 % (ref 0–1.5)
BUN BLD-MCNC: 41 MG/DL (ref 8–23)
BUN/CREAT SERPL: 45.6 (ref 7–25)
CALCIUM SPEC-SCNC: 8.6 MG/DL (ref 8.6–10.5)
CHLORIDE SERPL-SCNC: 107 MMOL/L (ref 98–107)
CO2 SERPL-SCNC: 22.7 MMOL/L (ref 22–29)
CREAT BLD-MCNC: 0.9 MG/DL (ref 0.57–1)
DEPRECATED RDW RBC AUTO: 46.1 FL (ref 37–54)
EOSINOPHIL # BLD AUTO: 0.02 10*3/MM3 (ref 0–0.4)
EOSINOPHIL NFR BLD AUTO: 0.1 % (ref 0.3–6.2)
ERYTHROCYTE [DISTWIDTH] IN BLOOD BY AUTOMATED COUNT: 16.2 % (ref 12.3–15.4)
GFR SERPL CREATININE-BSD FRML MDRD: 61 ML/MIN/1.73
GLUCOSE BLD-MCNC: 119 MG/DL (ref 65–99)
HCT VFR BLD AUTO: 32.2 % (ref 34–46.6)
HGB BLD-MCNC: 9.9 G/DL (ref 12–15.9)
IMM GRANULOCYTES # BLD AUTO: 1.24 10*3/MM3 (ref 0–0.05)
IMM GRANULOCYTES NFR BLD AUTO: 4.7 % (ref 0–0.5)
INR PPP: 1.87 (ref 0.9–1.1)
LYMPHOCYTES # BLD AUTO: 1.02 10*3/MM3 (ref 0.7–3.1)
LYMPHOCYTES NFR BLD AUTO: 3.9 % (ref 19.6–45.3)
MCH RBC QN AUTO: 24.3 PG (ref 26.6–33)
MCHC RBC AUTO-ENTMCNC: 30.7 G/DL (ref 31.5–35.7)
MCV RBC AUTO: 78.9 FL (ref 79–97)
MONOCYTES # BLD AUTO: 0.98 10*3/MM3 (ref 0.1–0.9)
MONOCYTES NFR BLD AUTO: 3.7 % (ref 5–12)
NEUTROPHILS # BLD AUTO: 22.83 10*3/MM3 (ref 1.7–7)
NEUTROPHILS NFR BLD AUTO: 87.4 % (ref 42.7–76)
NRBC BLD AUTO-RTO: 0 /100 WBC (ref 0–0.2)
PLATELET # BLD AUTO: 325 10*3/MM3 (ref 140–450)
PMV BLD AUTO: 9.8 FL (ref 6–12)
POTASSIUM BLD-SCNC: 4 MMOL/L (ref 3.5–5.2)
PROTHROMBIN TIME: 21.2 SECONDS (ref 11.7–14.2)
RBC # BLD AUTO: 4.08 10*6/MM3 (ref 3.77–5.28)
SODIUM BLD-SCNC: 142 MMOL/L (ref 136–145)
WBC NRBC COR # BLD: 26.15 10*3/MM3 (ref 3.4–10.8)

## 2020-05-23 PROCEDURE — 99231 SBSQ HOSP IP/OBS SF/LOW 25: CPT | Performed by: INTERNAL MEDICINE

## 2020-05-23 PROCEDURE — 85610 PROTHROMBIN TIME: CPT | Performed by: INTERNAL MEDICINE

## 2020-05-23 PROCEDURE — 63710000001 PREDNISONE PER 1 MG: Performed by: INTERNAL MEDICINE

## 2020-05-23 PROCEDURE — 80048 BASIC METABOLIC PNL TOTAL CA: CPT | Performed by: INTERNAL MEDICINE

## 2020-05-23 PROCEDURE — 99232 SBSQ HOSP IP/OBS MODERATE 35: CPT | Performed by: INTERNAL MEDICINE

## 2020-05-23 PROCEDURE — 97110 THERAPEUTIC EXERCISES: CPT

## 2020-05-23 PROCEDURE — 85025 COMPLETE CBC W/AUTO DIFF WBC: CPT | Performed by: INTERNAL MEDICINE

## 2020-05-23 PROCEDURE — 63710000001 PREDNISONE PER 5 MG: Performed by: INTERNAL MEDICINE

## 2020-05-23 RX ORDER — WARFARIN SODIUM 2 MG/1
2 TABLET ORAL
Status: COMPLETED | OUTPATIENT
Start: 2020-05-23 | End: 2020-05-23

## 2020-05-23 RX ORDER — AMIODARONE HYDROCHLORIDE 200 MG/1
200 TABLET ORAL
Status: DISCONTINUED | OUTPATIENT
Start: 2020-05-23 | End: 2020-05-26 | Stop reason: HOSPADM

## 2020-05-23 RX ADMIN — AMIODARONE HYDROCHLORIDE 200 MG: 200 TABLET ORAL at 08:06

## 2020-05-23 RX ADMIN — ATENOLOL 100 MG: 50 TABLET ORAL at 08:06

## 2020-05-23 RX ADMIN — PREDNISONE 30 MG: 20 TABLET ORAL at 08:06

## 2020-05-23 RX ADMIN — GUAIFENESIN 600 MG: 600 TABLET, EXTENDED RELEASE ORAL at 08:06

## 2020-05-23 RX ADMIN — LEVOTHYROXINE SODIUM 100 MCG: 100 TABLET ORAL at 06:29

## 2020-05-23 RX ADMIN — ACETAMINOPHEN 650 MG: 325 TABLET, FILM COATED ORAL at 02:58

## 2020-05-23 RX ADMIN — WARFARIN 2 MG: 2 TABLET ORAL at 17:13

## 2020-05-23 RX ADMIN — GUAIFENESIN 600 MG: 600 TABLET, EXTENDED RELEASE ORAL at 20:55

## 2020-05-23 RX ADMIN — ATENOLOL 100 MG: 50 TABLET ORAL at 20:55

## 2020-05-24 LAB
ANION GAP SERPL CALCULATED.3IONS-SCNC: 10.4 MMOL/L (ref 5–15)
BACTERIA SPEC AEROBE CULT: NORMAL
BACTERIA SPEC AEROBE CULT: NORMAL
BASOPHILS # BLD AUTO: 0.09 10*3/MM3 (ref 0–0.2)
BASOPHILS NFR BLD AUTO: 0.4 % (ref 0–1.5)
BUN BLD-MCNC: 36 MG/DL (ref 8–23)
BUN/CREAT SERPL: 38.3 (ref 7–25)
CALCIUM SPEC-SCNC: 8.5 MG/DL (ref 8.6–10.5)
CHLORIDE SERPL-SCNC: 107 MMOL/L (ref 98–107)
CO2 SERPL-SCNC: 22.6 MMOL/L (ref 22–29)
CREAT BLD-MCNC: 0.94 MG/DL (ref 0.57–1)
DEPRECATED RDW RBC AUTO: 48 FL (ref 37–54)
EOSINOPHIL # BLD AUTO: 0.22 10*3/MM3 (ref 0–0.4)
EOSINOPHIL NFR BLD AUTO: 0.9 % (ref 0.3–6.2)
ERYTHROCYTE [DISTWIDTH] IN BLOOD BY AUTOMATED COUNT: 17.2 % (ref 12.3–15.4)
GFR SERPL CREATININE-BSD FRML MDRD: 58 ML/MIN/1.73
GLUCOSE BLD-MCNC: 99 MG/DL (ref 65–99)
HCT VFR BLD AUTO: 33.6 % (ref 34–46.6)
HGB BLD-MCNC: 10.3 G/DL (ref 12–15.9)
IMM GRANULOCYTES # BLD AUTO: 1.13 10*3/MM3 (ref 0–0.05)
IMM GRANULOCYTES NFR BLD AUTO: 4.5 % (ref 0–0.5)
INR PPP: 2.09 (ref 0.9–1.1)
LYMPHOCYTES # BLD AUTO: 1.81 10*3/MM3 (ref 0.7–3.1)
LYMPHOCYTES NFR BLD AUTO: 7.2 % (ref 19.6–45.3)
MCH RBC QN AUTO: 24.9 PG (ref 26.6–33)
MCHC RBC AUTO-ENTMCNC: 30.7 G/DL (ref 31.5–35.7)
MCV RBC AUTO: 81.4 FL (ref 79–97)
MONOCYTES # BLD AUTO: 1.23 10*3/MM3 (ref 0.1–0.9)
MONOCYTES NFR BLD AUTO: 4.9 % (ref 5–12)
NEUTROPHILS # BLD AUTO: 20.6 10*3/MM3 (ref 1.7–7)
NEUTROPHILS NFR BLD AUTO: 82.1 % (ref 42.7–76)
NRBC BLD AUTO-RTO: 0 /100 WBC (ref 0–0.2)
PLATELET # BLD AUTO: 406 10*3/MM3 (ref 140–450)
PMV BLD AUTO: 9.4 FL (ref 6–12)
POTASSIUM BLD-SCNC: 4.1 MMOL/L (ref 3.5–5.2)
PROTHROMBIN TIME: 23.2 SECONDS (ref 11.7–14.2)
RBC # BLD AUTO: 4.13 10*6/MM3 (ref 3.77–5.28)
SODIUM BLD-SCNC: 140 MMOL/L (ref 136–145)
WBC NRBC COR # BLD: 25.08 10*3/MM3 (ref 3.4–10.8)

## 2020-05-24 PROCEDURE — 85610 PROTHROMBIN TIME: CPT | Performed by: INTERNAL MEDICINE

## 2020-05-24 PROCEDURE — 80048 BASIC METABOLIC PNL TOTAL CA: CPT | Performed by: INTERNAL MEDICINE

## 2020-05-24 PROCEDURE — 99231 SBSQ HOSP IP/OBS SF/LOW 25: CPT | Performed by: INTERNAL MEDICINE

## 2020-05-24 PROCEDURE — 85025 COMPLETE CBC W/AUTO DIFF WBC: CPT | Performed by: INTERNAL MEDICINE

## 2020-05-24 PROCEDURE — 63710000001 PREDNISONE PER 1 MG: Performed by: INTERNAL MEDICINE

## 2020-05-24 PROCEDURE — 63710000001 PREDNISONE PER 5 MG: Performed by: INTERNAL MEDICINE

## 2020-05-24 RX ORDER — WARFARIN SODIUM 2 MG/1
2 TABLET ORAL
Status: COMPLETED | OUTPATIENT
Start: 2020-05-24 | End: 2020-05-24

## 2020-05-24 RX ADMIN — ATENOLOL 100 MG: 50 TABLET ORAL at 08:21

## 2020-05-24 RX ADMIN — AMIODARONE HYDROCHLORIDE 200 MG: 200 TABLET ORAL at 08:21

## 2020-05-24 RX ADMIN — DOCUSATE SODIUM 100 MG: 100 CAPSULE, LIQUID FILLED ORAL at 08:22

## 2020-05-24 RX ADMIN — GUAIFENESIN 600 MG: 600 TABLET, EXTENDED RELEASE ORAL at 20:27

## 2020-05-24 RX ADMIN — GUAIFENESIN 600 MG: 600 TABLET, EXTENDED RELEASE ORAL at 08:21

## 2020-05-24 RX ADMIN — ATENOLOL 100 MG: 50 TABLET ORAL at 20:27

## 2020-05-24 RX ADMIN — WARFARIN 2 MG: 2 TABLET ORAL at 17:56

## 2020-05-24 RX ADMIN — LEVOTHYROXINE SODIUM 100 MCG: 100 TABLET ORAL at 06:39

## 2020-05-24 RX ADMIN — PREDNISONE 30 MG: 20 TABLET ORAL at 08:21

## 2020-05-25 ENCOUNTER — APPOINTMENT (OUTPATIENT)
Dept: GENERAL RADIOLOGY | Facility: HOSPITAL | Age: 75
End: 2020-05-25

## 2020-05-25 LAB
ANION GAP SERPL CALCULATED.3IONS-SCNC: 7.4 MMOL/L (ref 5–15)
BASOPHILS # BLD AUTO: 0.05 10*3/MM3 (ref 0–0.2)
BASOPHILS NFR BLD AUTO: 0.2 % (ref 0–1.5)
BUN BLD-MCNC: 34 MG/DL (ref 8–23)
BUN/CREAT SERPL: 37 (ref 7–25)
CALCIUM SPEC-SCNC: 8.6 MG/DL (ref 8.6–10.5)
CHLORIDE SERPL-SCNC: 107 MMOL/L (ref 98–107)
CO2 SERPL-SCNC: 27.6 MMOL/L (ref 22–29)
CREAT BLD-MCNC: 0.92 MG/DL (ref 0.57–1)
DEPRECATED RDW RBC AUTO: 48 FL (ref 37–54)
EOSINOPHIL # BLD AUTO: 0.14 10*3/MM3 (ref 0–0.4)
EOSINOPHIL NFR BLD AUTO: 0.6 % (ref 0.3–6.2)
ERYTHROCYTE [DISTWIDTH] IN BLOOD BY AUTOMATED COUNT: 16.8 % (ref 12.3–15.4)
GFR SERPL CREATININE-BSD FRML MDRD: 59 ML/MIN/1.73
GLUCOSE BLD-MCNC: 101 MG/DL (ref 65–99)
HCT VFR BLD AUTO: 31 % (ref 34–46.6)
HGB BLD-MCNC: 9.5 G/DL (ref 12–15.9)
IMM GRANULOCYTES # BLD AUTO: 1.17 10*3/MM3 (ref 0–0.05)
IMM GRANULOCYTES NFR BLD AUTO: 5 % (ref 0–0.5)
INR PPP: 2.02 (ref 0.9–1.1)
LYMPHOCYTES # BLD AUTO: 1.77 10*3/MM3 (ref 0.7–3.1)
LYMPHOCYTES NFR BLD AUTO: 7.5 % (ref 19.6–45.3)
MCH RBC QN AUTO: 24.4 PG (ref 26.6–33)
MCHC RBC AUTO-ENTMCNC: 30.6 G/DL (ref 31.5–35.7)
MCV RBC AUTO: 79.7 FL (ref 79–97)
MONOCYTES # BLD AUTO: 1.1 10*3/MM3 (ref 0.1–0.9)
MONOCYTES NFR BLD AUTO: 4.7 % (ref 5–12)
NEUTROPHILS # BLD AUTO: 19.32 10*3/MM3 (ref 1.7–7)
NEUTROPHILS NFR BLD AUTO: 82 % (ref 42.7–76)
NRBC BLD AUTO-RTO: 0 /100 WBC (ref 0–0.2)
PLATELET # BLD AUTO: 385 10*3/MM3 (ref 140–450)
PMV BLD AUTO: 10.3 FL (ref 6–12)
POTASSIUM BLD-SCNC: 4.4 MMOL/L (ref 3.5–5.2)
PROTHROMBIN TIME: 22.5 SECONDS (ref 11.7–14.2)
RBC # BLD AUTO: 3.89 10*6/MM3 (ref 3.77–5.28)
SODIUM BLD-SCNC: 142 MMOL/L (ref 136–145)
WBC NRBC COR # BLD: 23.55 10*3/MM3 (ref 3.4–10.8)

## 2020-05-25 PROCEDURE — 94799 UNLISTED PULMONARY SVC/PX: CPT

## 2020-05-25 PROCEDURE — 80048 BASIC METABOLIC PNL TOTAL CA: CPT | Performed by: INTERNAL MEDICINE

## 2020-05-25 PROCEDURE — 71046 X-RAY EXAM CHEST 2 VIEWS: CPT

## 2020-05-25 PROCEDURE — 63710000001 PREDNISONE PER 1 MG: Performed by: INTERNAL MEDICINE

## 2020-05-25 PROCEDURE — 63710000001 PREDNISONE PER 5 MG: Performed by: INTERNAL MEDICINE

## 2020-05-25 PROCEDURE — 85610 PROTHROMBIN TIME: CPT | Performed by: INTERNAL MEDICINE

## 2020-05-25 PROCEDURE — 85025 COMPLETE CBC W/AUTO DIFF WBC: CPT | Performed by: INTERNAL MEDICINE

## 2020-05-25 RX ORDER — WARFARIN SODIUM 2 MG/1
2 TABLET ORAL
Status: COMPLETED | OUTPATIENT
Start: 2020-05-25 | End: 2020-05-25

## 2020-05-25 RX ADMIN — LEVOTHYROXINE SODIUM 100 MCG: 100 TABLET ORAL at 06:32

## 2020-05-25 RX ADMIN — AMIODARONE HYDROCHLORIDE 200 MG: 200 TABLET ORAL at 08:23

## 2020-05-25 RX ADMIN — ATENOLOL 100 MG: 50 TABLET ORAL at 21:35

## 2020-05-25 RX ADMIN — GUAIFENESIN 600 MG: 600 TABLET, EXTENDED RELEASE ORAL at 21:35

## 2020-05-25 RX ADMIN — GUAIFENESIN 600 MG: 600 TABLET, EXTENDED RELEASE ORAL at 08:23

## 2020-05-25 RX ADMIN — WARFARIN 2 MG: 2 TABLET ORAL at 17:55

## 2020-05-25 RX ADMIN — DOCUSATE SODIUM 100 MG: 100 CAPSULE, LIQUID FILLED ORAL at 08:24

## 2020-05-25 RX ADMIN — ATENOLOL 100 MG: 50 TABLET ORAL at 08:23

## 2020-05-25 RX ADMIN — PREDNISONE 30 MG: 20 TABLET ORAL at 08:23

## 2020-05-26 ENCOUNTER — READMISSION MANAGEMENT (OUTPATIENT)
Dept: CALL CENTER | Facility: HOSPITAL | Age: 75
End: 2020-05-26

## 2020-05-26 VITALS
SYSTOLIC BLOOD PRESSURE: 121 MMHG | HEART RATE: 86 BPM | HEIGHT: 67 IN | RESPIRATION RATE: 18 BRPM | WEIGHT: 143.1 LBS | DIASTOLIC BLOOD PRESSURE: 85 MMHG | BODY MASS INDEX: 22.46 KG/M2 | TEMPERATURE: 97.9 F | OXYGEN SATURATION: 92 %

## 2020-05-26 LAB
ANISOCYTOSIS BLD QL: ABNORMAL
DEPRECATED RDW RBC AUTO: 48.4 FL (ref 37–54)
ELLIPTOCYTES BLD QL SMEAR: ABNORMAL
EOSINOPHIL # BLD MANUAL: 0.42 10*3/MM3 (ref 0–0.4)
EOSINOPHIL NFR BLD MANUAL: 2 % (ref 0.3–6.2)
ERYTHROCYTE [DISTWIDTH] IN BLOOD BY AUTOMATED COUNT: 17.2 % (ref 12.3–15.4)
FOLATE SERPL-MCNC: 9.5 NG/ML (ref 4.78–24.2)
HCT VFR BLD AUTO: 31.2 % (ref 34–46.6)
HGB BLD-MCNC: 9.6 G/DL (ref 12–15.9)
INR PPP: 2.1 (ref 0.9–1.1)
IRON 24H UR-MRATE: 23 MCG/DL (ref 37–145)
IRON SATN MFR SERPL: 4 % (ref 20–50)
LYMPHOCYTES # BLD MANUAL: 1.08 10*3/MM3 (ref 0.7–3.1)
LYMPHOCYTES NFR BLD MANUAL: 5.1 % (ref 19.6–45.3)
LYMPHOCYTES NFR BLD MANUAL: 6.1 % (ref 5–12)
MCH RBC QN AUTO: 24.8 PG (ref 26.6–33)
MCHC RBC AUTO-ENTMCNC: 30.8 G/DL (ref 31.5–35.7)
MCV RBC AUTO: 80.6 FL (ref 79–97)
MONOCYTES # BLD AUTO: 1.3 10*3/MM3 (ref 0.1–0.9)
NEUTROPHILS # BLD AUTO: 18.46 10*3/MM3 (ref 1.7–7)
NEUTROPHILS NFR BLD MANUAL: 86.9 % (ref 42.7–76)
NRBC SPEC MANUAL: 1 /100 WBC (ref 0–0.2)
OVALOCYTES BLD QL SMEAR: ABNORMAL
PLAT MORPH BLD: NORMAL
PLATELET # BLD AUTO: 368 10*3/MM3 (ref 140–450)
PMV BLD AUTO: 10.1 FL (ref 6–12)
POIKILOCYTOSIS BLD QL SMEAR: ABNORMAL
POLYCHROMASIA BLD QL SMEAR: ABNORMAL
PROCALCITONIN SERPL-MCNC: 0.09 NG/ML (ref 0.1–0.25)
PROTHROMBIN TIME: 23.3 SECONDS (ref 11.7–14.2)
RBC # BLD AUTO: 3.87 10*6/MM3 (ref 3.77–5.28)
RETICS # AUTO: 0.09 10*6/MM3 (ref 0.02–0.13)
RETICS/RBC NFR AUTO: 2.26 % (ref 0.7–1.9)
TIBC SERPL-MCNC: 522 MCG/DL (ref 298–536)
TRANSFERRIN SERPL-MCNC: 350 MG/DL (ref 200–360)
VIT B12 BLD-MCNC: 229 PG/ML (ref 211–946)
WBC MORPH BLD: NORMAL
WBC NRBC COR # BLD: 21.24 10*3/MM3 (ref 3.4–10.8)

## 2020-05-26 PROCEDURE — 85025 COMPLETE CBC W/AUTO DIFF WBC: CPT | Performed by: HOSPITALIST

## 2020-05-26 PROCEDURE — 63710000001 PREDNISONE PER 5 MG: Performed by: INTERNAL MEDICINE

## 2020-05-26 PROCEDURE — 85610 PROTHROMBIN TIME: CPT | Performed by: INTERNAL MEDICINE

## 2020-05-26 PROCEDURE — 85045 AUTOMATED RETICULOCYTE COUNT: CPT | Performed by: HOSPITALIST

## 2020-05-26 PROCEDURE — 94799 UNLISTED PULMONARY SVC/PX: CPT

## 2020-05-26 PROCEDURE — 84466 ASSAY OF TRANSFERRIN: CPT | Performed by: HOSPITALIST

## 2020-05-26 PROCEDURE — 84145 PROCALCITONIN (PCT): CPT | Performed by: HOSPITALIST

## 2020-05-26 PROCEDURE — 97110 THERAPEUTIC EXERCISES: CPT

## 2020-05-26 PROCEDURE — 85007 BL SMEAR W/DIFF WBC COUNT: CPT | Performed by: HOSPITALIST

## 2020-05-26 PROCEDURE — 83540 ASSAY OF IRON: CPT | Performed by: HOSPITALIST

## 2020-05-26 PROCEDURE — 82607 VITAMIN B-12: CPT | Performed by: HOSPITALIST

## 2020-05-26 PROCEDURE — 94618 PULMONARY STRESS TESTING: CPT

## 2020-05-26 PROCEDURE — 82746 ASSAY OF FOLIC ACID SERUM: CPT | Performed by: HOSPITALIST

## 2020-05-26 PROCEDURE — 63710000001 PREDNISONE PER 1 MG: Performed by: INTERNAL MEDICINE

## 2020-05-26 PROCEDURE — 25010000002 IRON SUCROSE PER 1 MG: Performed by: HOSPITALIST

## 2020-05-26 RX ORDER — PREDNISONE 10 MG/1
30 TABLET ORAL
Qty: 90 TABLET | Refills: 0 | Status: SHIPPED | OUTPATIENT
Start: 2020-05-27 | End: 2020-09-30

## 2020-05-26 RX ORDER — WARFARIN SODIUM 2 MG/1
2 TABLET ORAL
Status: DISCONTINUED | OUTPATIENT
Start: 2020-05-26 | End: 2020-05-26 | Stop reason: HOSPADM

## 2020-05-26 RX ORDER — WARFARIN SODIUM 2 MG/1
TABLET ORAL
Qty: 30 TABLET | Refills: 0 | Status: SHIPPED | OUTPATIENT
Start: 2020-05-26 | End: 2020-10-26 | Stop reason: SDUPTHER

## 2020-05-26 RX ORDER — ATENOLOL 100 MG/1
100 TABLET ORAL EVERY 12 HOURS SCHEDULED
Qty: 60 TABLET | Refills: 0 | Status: SHIPPED | OUTPATIENT
Start: 2020-05-26 | End: 2020-06-09

## 2020-05-26 RX ADMIN — IRON SUCROSE 300 MG: 20 INJECTION, SOLUTION INTRAVENOUS at 12:27

## 2020-05-26 RX ADMIN — WARFARIN 2 MG: 2 TABLET ORAL at 17:08

## 2020-05-26 RX ADMIN — DOCUSATE SODIUM 100 MG: 100 CAPSULE, LIQUID FILLED ORAL at 08:32

## 2020-05-26 RX ADMIN — LEVOTHYROXINE SODIUM 100 MCG: 100 TABLET ORAL at 06:40

## 2020-05-26 RX ADMIN — GUAIFENESIN 600 MG: 600 TABLET, EXTENDED RELEASE ORAL at 08:32

## 2020-05-26 RX ADMIN — PREDNISONE 30 MG: 20 TABLET ORAL at 08:32

## 2020-05-26 RX ADMIN — AMIODARONE HYDROCHLORIDE 200 MG: 200 TABLET ORAL at 08:32

## 2020-05-26 RX ADMIN — ATENOLOL 100 MG: 50 TABLET ORAL at 08:32

## 2020-05-27 ENCOUNTER — TRANSITIONAL CARE MANAGEMENT TELEPHONE ENCOUNTER (OUTPATIENT)
Dept: CALL CENTER | Facility: HOSPITAL | Age: 75
End: 2020-05-27

## 2020-05-27 NOTE — OUTREACH NOTE
Prep Survey      Responses   [facility] patient discharged from?  [place]   Is LACE score < 7 ?  No   [facility]   Date of Admission  05/19/20   Date of Discharge  05/26/20   Discharge Disposition  Home or Self Care   Discharge diagnosis  AFIB with RVR   COVID-19 Test Status  Negative   Does the patient have one of the following disease processes/diagnoses(primary or secondary)?  Other   Does the patient have Home health ordered?  Yes   What is the Home health agency?   [facility]   Is there a DME ordered?  No   Prep survey completed?  Yes          Chitra Benavides RN

## 2020-05-27 NOTE — OUTREACH NOTE
Call Center TCM Note      Responses   [facility] [place] [facility]   COVID-19 Test Status  Negative   Does the patient have one of the following disease processes/diagnoses(primary or secondary)?  Other   TCM attempt successful?  Yes   Call start time  1149   Call end time  1154   General alerts for this patient   is a MD   Discharge diagnosis  AFIB with RVR   Is patient permission given to speak with other caregiver?  Yes   List who call center can speak with  DR. DARREL KING   Person spoke with today (if not patient) and relationship  DR. DARREL KING, SPOUSE   Meds reviewed with patient/caregiver?  Yes   Is the patient having any side effects they believe may be caused by any medication additions or changes?  No   Does the patient have all medications ordered at discharge?  Yes   Is the patient taking all medications as directed (includes completed medication regime)?  Yes   Does the patient have a primary care provider?   Yes   Does the patient have an appointment with their PCP within 7 days of discharge?  Yes   Comments regarding PCP  Chitra Leyva MD PCP- patient already had appt scheduled with Dr Leyva for a wellness exam- will notifiy office she needs hospital followup   Has the patient kept scheduled appointments due by today?  N/A   What is the Home health agency?   [facility]   Has home health visited the patient within 72 hours of discharge?  Call prior to 72 hours   Home health comments  [facility] will come see patient tomorrow   What DME was ordered?  rollator from [place]   Psychosocial issues?  No   Comments   reports she is glad to be home but sis not have a very restful night. No chest pain or fever reported.    Did the patient receive a copy of their discharge instructions?  Yes   Nursing interventions  Reviewed instructions with patient   What is the patient's perception of their health status since discharge?  Same   Is the patient/caregiver able to teach  back signs and symptoms related to disease process for when to call PCP?  Yes   Is the patient/caregiver able to teach back signs and symptoms related to disease process for when to call 911?  Yes   Is the patient/caregiver able to teach back the hierarchy of who to call/visit for symptoms/problems? PCP, Specialist, Home health nurse, Urgent Care, ED, 911  Yes   Additional teach back comments  Call was brief.  needed to cut call short.    TCM call completed?  Yes          Yaya Westbrook RN    5/27/2020, 11:54

## 2020-05-28 ENCOUNTER — TELEMEDICINE (OUTPATIENT)
Dept: INTERNAL MEDICINE | Facility: CLINIC | Age: 75
End: 2020-05-28

## 2020-05-28 DIAGNOSIS — I48.0 PAF (PAROXYSMAL ATRIAL FIBRILLATION) (HCC): Primary | ICD-10-CM

## 2020-05-28 DIAGNOSIS — N17.9 AKI (ACUTE KIDNEY INJURY) (HCC): Chronic | ICD-10-CM

## 2020-05-28 DIAGNOSIS — K50.019 CROHN'S DISEASE OF SMALL INTESTINE WITH COMPLICATION (HCC): ICD-10-CM

## 2020-05-28 DIAGNOSIS — D72.829 LEUKOCYTOSIS, UNSPECIFIED TYPE: ICD-10-CM

## 2020-05-28 PROBLEM — R65.21 SEPSIS WITH ACUTE RENAL FAILURE AND SEPTIC SHOCK: Status: RESOLVED | Noted: 2020-04-19 | Resolved: 2020-05-28

## 2020-05-28 PROBLEM — A41.9 SEPSIS WITH ACUTE RENAL FAILURE AND SEPTIC SHOCK (HCC): Status: RESOLVED | Noted: 2020-04-19 | Resolved: 2020-05-28

## 2020-05-28 PROBLEM — E87.20 LACTIC ACIDOSIS: Status: RESOLVED | Noted: 2020-05-20 | Resolved: 2020-05-28

## 2020-05-28 PROCEDURE — 99214 OFFICE O/P EST MOD 30 MIN: CPT | Performed by: INTERNAL MEDICINE

## 2020-05-28 RX ORDER — AMIODARONE HYDROCHLORIDE 200 MG/1
200 TABLET ORAL DAILY
COMMUNITY
Start: 2020-05-07 | End: 2020-07-08 | Stop reason: SDUPTHER

## 2020-05-28 RX ORDER — ATENOLOL 100 MG/1
100 TABLET ORAL DAILY
COMMUNITY
Start: 2020-05-26 | End: 2020-06-02

## 2020-05-28 NOTE — PROGRESS NOTES
Assessment and Plan  Diagnoses and all orders for this visit:    PAF (paroxysmal atrial fibrillation) (CMS/[facility])  Comments:  agree with seeing Dr. Reyez- she is not tolerating a fib very well.  Close monitor on GI symtoms which seem to trigger worse a fib issue    Crohn's disease of small intestine with complication (CMS/[facility])  Comments:  hold Pentasa for now.  Call with change/worsening as she weans the prednisone.    Leukocytosis, unspecified type  Comments:  recheck along with h/h next week     MARY ANN (acute kidney injury) (CMS/[facility])  Comments:  recheck next week.     long video visit with patient and .  They understand what to watch for as far as decompensation.  Reviewed hospital d/c with them and several events of the hospitalization.    Recheck next week.  F/U and Patient Instructions    No follow-ups on file.  There are no Patient Instructions on file for this visit.    Subjective    Joellen Dominguez is a 75 y.o. female being seen in our office today for No chief complaint on file.     History of the Present Illness  HPI Via video- she was just in [place] for a fib with RVR. She has had a problem her crohn's- bleeding which results in anemia.  This makes everything worse.  She is not tolerating the a fib very well- she is SOB.  She is home but not moving around much.  She needs help with some ADLs.  She is eating and drinking well.   No obvious bleeding from rectum, she     Patient History        Significant Past History  The following portions of the patient's history were reviewed and updated as appropriate:PMHroutine: Social history , Allergies, Current Medications, Active Problem List and Health Maintenance              Social History  She  reports that she quit smoking about 53 years ago. Her smoking use included cigarettes. She has a 32.00 pack-year smoking history. She has never used smokeless tobacco. She reports that she does not drink alcohol or use drugs.                         Review of  Symptoms  Review of Systems   Constitution: Decreased appetite: improving.   HENT: Negative.    Cardiovascular: Positive for dyspnea on exertion and irregular heartbeat.   Respiratory: Positive for shortness of breath. Negative for cough.    Musculoskeletal: Positive for arthritis.   Gastrointestinal: Positive for bloating. Negative for abdominal pain. Change in bowel habit: all c/w Crohn's.   Genitourinary: Positive for bladder incontinence (being evaluated for bladder stimulator).   Neurological: Negative.    Psychiatric/Behavioral: Negative.      Objective  Vital Signs         BP Readings from Last 1 Encounters:   05/26/20 121/85     Wt Readings from Last 3 Encounters:   05/20/20 64.9 kg (143 lb 1.6 oz)   05/14/20 65.8 kg (145 lb)   05/07/20 67.1 kg (148 lb)   There is no height or weight on file to calculate BMI.          Physical Exam   Physical Exam   Constitutional: No distress.     Data Reviewed    Recent Results (from the past 2016 hour(s))   POCT INR    Collection Time: 03/17/20  2:00 PM   Result Value Ref Range    INR 6.4 (A) 0.9 - 1.1   POC INR    Collection Time: 03/20/20 10:08 AM   Result Value Ref Range    INR 1.70 (A) 0.9 - 1.1   POC INR    Collection Time: 03/23/20 12:00 AM   Result Value Ref Range    INR 1.20 (A) 0.9 - 1.1   POC INR    Collection Time: 04/07/20 11:04 AM   Result Value Ref Range    INR 3.00 (A) 0.9 - 1.1   Protime-INR    Collection Time: 04/19/20 10:13 AM   Result Value Ref Range    Protime 14.0 11.7 - 14.2 Seconds    INR 1.11 (H) 0.90 - 1.10   Comprehensive Metabolic Panel    Collection Time: 04/19/20 10:13 AM   Result Value Ref Range    Glucose 180 (H) 65 - 99 mg/dL    BUN 53 (H) 8 - 23 mg/dL    Creatinine 1.70 (H) 0.57 - 1.00 mg/dL    Sodium 142 136 - 145 mmol/L    Potassium 4.5 3.5 - 5.2 mmol/L    Chloride 106 98 - 107 mmol/L    CO2 17.9 (L) 22.0 - 29.0 mmol/L    Calcium 9.4 8.6 - 10.5 mg/dL    Total Protein 7.0 6.0 - 8.5 g/dL    Albumin 3.70 3.50 - 5.20 g/dL    ALT (SGPT) 47  (H) 1 - 33 U/L    AST (SGOT) 37 (H) 1 - 32 U/L    Alkaline Phosphatase 98 39 - 117 U/L    Total Bilirubin 0.6 0.2 - 1.2 mg/dL    eGFR Non African Amer 29 (L) >60 mL/min/1.73    Globulin 3.3 gm/dL    A/G Ratio 1.1 g/dL    BUN/Creatinine Ratio 31.2 (H) 7.0 - 25.0    Anion Gap 18.1 (H) 5.0 - 15.0 mmol/L   BNP    Collection Time: 04/19/20 10:13 AM   Result Value Ref Range    proBNP 6,770.0 (H) 5.0-1,800.0 pg/mL   D-dimer, Quantitative    Collection Time: 04/19/20 10:13 AM   Result Value Ref Range    D-Dimer, Quantitative 1.13 (H) 0.00 - 0.49 MCGFEU/mL   Troponin    Collection Time: 04/19/20 10:13 AM   Result Value Ref Range    Troponin T 0.201 (C) 0.000 - 0.030 ng/mL   Lactic Acid, Plasma    Collection Time: 04/19/20 10:13 AM   Result Value Ref Range    Lactate 2.7 (C) 0.5 - 2.0 mmol/L   Procalcitonin    Collection Time: 04/19/20 10:13 AM   Result Value Ref Range    Procalcitonin 0.10 0.10 - 0.25 ng/mL   CBC Auto Differential    Collection Time: 04/19/20 10:13 AM   Result Value Ref Range    WBC 23.04 (H) 3.40 - 10.80 10*3/mm3    RBC 3.89 3.77 - 5.28 10*6/mm3    Hemoglobin 10.5 (L) 12.0 - 15.9 g/dL    Hematocrit 33.0 (L) 34.0 - 46.6 %    MCV 84.8 79.0 - 97.0 fL    MCH 27.0 26.6 - 33.0 pg    MCHC 31.8 31.5 - 35.7 g/dL    RDW 15.2 12.3 - 15.4 %    RDW-SD 44.0 37.0 - 54.0 fl    MPV 9.9 6.0 - 12.0 fL    Platelets 477 (H) 140 - 450 10*3/mm3    Neutrophil % 85.5 (H) 42.7 - 76.0 %    Lymphocyte % 7.3 (L) 19.6 - 45.3 %    Monocyte % 4.3 (L) 5.0 - 12.0 %    Eosinophil % 0.0 (L) 0.3 - 6.2 %    Basophil % 0.4 0.0 - 1.5 %    Immature Grans % 2.5 (H) 0.0 - 0.5 %    Neutrophils, Absolute 19.67 (H) 1.70 - 7.00 10*3/mm3    Lymphocytes, Absolute 1.69 0.70 - 3.10 10*3/mm3    Monocytes, Absolute 0.99 (H) 0.10 - 0.90 10*3/mm3    Eosinophils, Absolute 0.01 0.00 - 0.40 10*3/mm3    Basophils, Absolute 0.10 0.00 - 0.20 10*3/mm3    Immature Grans, Absolute 0.58 (H) 0.00 - 0.05 10*3/mm3    nRBC 0.2 0.0 - 0.2 /100 WBC   TSH    Collection Time:  04/19/20 10:13 AM   Result Value Ref Range    TSH 0.222 (L) 0.270 - 4.200 uIU/mL   Magnesium    Collection Time: 04/19/20 10:13 AM   Result Value Ref Range    Magnesium 2.0 1.6 - 2.4 mg/dL   Lactic Acid, Reflex Timer (This will reflex a repeat order 3-3:15 hours after ordered.)    Collection Time: 04/19/20 10:13 AM   Result Value Ref Range    Hold Tube Hold for add-ons.    Respiratory Panel, PCR - Swab, Nasopharynx    Collection Time: 04/19/20 10:27 AM   Result Value Ref Range    ADENOVIRUS, PCR Not Detected Not Detected    Coronavirus 229E Not Detected Not Detected    Coronavirus HKU1 Not Detected Not Detected    Coronavirus NL63 Not Detected Not Detected    Coronavirus OC43 Not Detected Not Detected    Human Metapneumovirus Not Detected Not Detected    Human Rhinovirus/Enterovirus Not Detected Not Detected    Influenza B PCR Not Detected Not Detected    Parainfluenza Virus 1 Not Detected Not Detected    Parainfluenza Virus 2 Not Detected Not Detected    Parainfluenza Virus 3 Not Detected Not Detected    Parainfluenza Virus 4 Not Detected Not Detected    Bordetella pertussis pcr Not Detected Not Detected    Influenza A H1 2009 PCR Not Detected Not Detected    Chlamydophila pneumoniae PCR Not Detected Not Detected    Mycoplasma pneumo by PCR Not Detected Not Detected    Influenza A PCR Not Detected Not Detected    Influenza A H3 Not Detected Not Detected    Influenza A H1 Not Detected Not Detected    RSV, PCR Not Detected Not Detected    Bordetella parapertussis PCR Not Detected Not Detected   SARS-CoV-2, PCR (IN-HOUSE), NP Swab in Transport Media - Swab, Nasopharynx    Collection Time: 04/19/20 10:27 AM   Result Value Ref Range    COVID19 Not Detected Not Detected   Blood Culture - Blood, Arm, Right    Collection Time: 04/19/20 11:36 AM   Result Value Ref Range    Blood Culture No growth at 5 days    Blood Culture - Blood, Arm, Left    Collection Time: 04/19/20 11:36 AM   Result Value Ref Range    Blood Culture No  growth at 5 days    Urinalysis With Microscopic If Indicated (No Culture) - Urine, Catheter    Collection Time: 04/19/20 11:44 AM   Result Value Ref Range    Color, UA Yellow Yellow, Straw    Appearance, UA Clear Clear    pH, UA <=5.0 5.0 - 8.0    Specific Gravity, UA 1.025 1.005 - 1.030    Glucose, UA Negative Negative    Ketones, UA Trace (A) Negative    Bilirubin, UA Negative Negative    Blood, UA Negative Negative    Protein, UA 30 mg/dL (1+) (A) Negative    Leuk Esterase, UA Negative Negative    Nitrite, UA Negative Negative    Urobilinogen, UA 0.2 E.U./dL 0.2 - 1.0 E.U./dL   Urinalysis, Microscopic Only - Urine, Catheter    Collection Time: 04/19/20 11:44 AM   Result Value Ref Range    RBC, UA 0-2 None Seen, 0-2 /HPF    WBC, UA 0-2 None Seen, 0-2 /HPF    Bacteria, UA Trace (A) None Seen /HPF    Squamous Epithelial Cells, UA 0-2 None Seen, 0-2 /HPF    Hyaline Casts, UA 31-50 None Seen /LPF    WBC Casts 0-2 None Seen /LPF    Coarse Granular Casts, UA 0-2 None Seen /LPF    Amorphous Crystals, UA Small/1+ None Seen /HPF    Methodology Manual Light Microscopy    Blood Gas, Arterial    Collection Time: 04/19/20  1:40 PM   Result Value Ref Range    Site Arterial: right radial     Chong's Test Positive     pH, Arterial 7.212 (C) 7.350 - 7.450 pH units    pCO2, Arterial 49.7 (H) 35.0 - 45.0 mm Hg    pO2, Arterial 63.6 (L) 80.0 - 100.0 mm Hg    HCO3, Arterial 20.0 (L) 22.0 - 28.0 mmol/L    Base Excess, Arterial -7.7 (L) 0.0 - 2.0 mmol/L    O2 Saturation Calculated 86.7 (L) 92.0 - 99.0 %    Barometric Pressure for Blood Gas 743.8 mmHg    Modality Cannula     Flow Rate 1 lpm    Rate 28 Breaths/minute   STAT Adult Transthoracic Echo Complete W/ Cont if Necessary Per Protocol    Collection Time: 04/19/20  2:31 PM   Result Value Ref Range    BSA 1.8 m^2    IVSd 1.4 cm    LVIDd 2.7 cm    LVIDs 2.2 cm    LVPWd 1.5 cm    IVS/LVPW 0.93     FS 18.5 %    EDV(Teich) 27.0 ml    ESV(Teich) 16.2 ml    EF(Teich) 40.0 %    EDV(cubed)  19.7 ml    ESV(cubed) 10.6 ml    EF(cubed) 45.9 %    LV mass(C)d 130.3 grams    LV mass(C)dI 72.1 grams/m^2    SV(Teich) 10.8 ml    SI(Teich) 6.0 ml/m^2    SV(cubed) 9.0 ml    SI(cubed) 5.0 ml/m^2    asc Aorta Diam 3.1 cm    LVOT diam 2.2 cm    LVOT area 3.8 cm^2    LVOT area(traced) 3.8 cm^2    RVOT diam 2.2 cm    RVOT area 3.8 cm^2    LVLd ap4 6.3 cm    EDV(MOD-sp4) 59.0 ml    LVLs ap4 6.0 cm    ESV(MOD-sp4) 22.0 ml    EF(MOD-sp4) 62.7 %    LVLd ap2 6.5 cm    EDV(MOD-sp2) 61.0 ml    LVLs ap2 6.0 cm    ESV(MOD-sp2) 24.0 ml    EF(MOD-sp2) 60.7 %    SV(MOD-sp4) 37.0 ml    SI(MOD-sp4) 20.5 ml/m^2    SV(MOD-sp2) 37.0 ml    SI(MOD-sp2) 20.5 ml/m^2    LV Dueñas Vol (BSA corrected) 32.6 ml/m^2    LV Sys Vol (BSA corrected) 12.2 ml/m^2    MV E max mary 120.0 cm/sec    MV V2 max 121.0 cm/sec    MV max PG 5.9 mmHg    MV V2 mean 59.7 cm/sec    MV mean PG 2.0 mmHg    MV V2 VTI 25.0 cm    MVA(VTI) 2.7 cm^2    MV P1/2t max mary 115.0 cm/sec    MV P1/2t 88.2 msec    MVA(P1/2t) 2.5 cm^2    MV dec slope 382.0 cm/sec^2    MV dec time 0.13 sec    Ao pk mary 108.0 cm/sec    Ao max PG 4.7 mmHg    Ao max PG (full) 0.98 mmHg    Ao V2 mean 71.7 cm/sec    Ao mean PG 2.0 mmHg    Ao mean PG (full) 0 mmHg    Ao V2 VTI 19.6 cm    JOSE(I,A) 3.4 cm^2    JOSE(I,D) 3.4 cm^2    JOSE(V,A) 3.4 cm^2    JOSE(V,D) 3.4 cm^2    LV V1 max PG 3.7 mmHg    LV V1 mean PG 2.0 mmHg    LV V1 max 96.0 cm/sec    LV V1 mean 61.6 cm/sec    LV V1 VTI 17.5 cm    SV(LVOT) 66.5 ml    SV(RVOT) 70.7 ml    SI(LVOT) 36.8 ml/m^2    PA V2 max 67.9 cm/sec    PA max PG 1.8 mmHg    PA max PG (full) -1.2 mmHg     CV ECHO SCOTTY - PVA(V,A) 4.9 cm^2    BH CV ECHO SCOTTY - PVA(V,D) 4.9 cm^2    PA acc time 0.11 sec    RV V1 max PG 3.0 mmHg    RV V1 mean PG 2.0 mmHg    RV V1 max 86.9 cm/sec    RV V1 mean 63.8 cm/sec    RV V1 VTI 18.6 cm    TR max mary 301.0 cm/sec    RVSP(TR) 39.2 mmHg    RAP systole 3.0 mmHg    PA pr(Accel) 27.7 mmHg    Qp/Qs 1.1     MVA P1/2T LCG 1.9 cm^2     CV ECHO SCOTTY -  BZI_BMI 22.8 kilograms/m^2     CV ECHO SCOTTY - BSA([facility]) 1.8 m^2     CV ECHO SCOTTY - BZI_METRIC_WEIGHT 68.0 kg     CV ECHO SCOTTY - BZI_METRIC_HEIGHT 172.7 cm    Target HR (85%) 122 bpm    Max. Pred. HR (100%) 144 bpm    TDI S' 12.80 cm/sec    RV Base 2.90 cm    RV Length 5.60 cm    RV Mid 2.30 cm    LA Volume Index 41.0 mL/m2    Avg E/e' ratio 8.42     EF(MOD-bp) 62 %    Lat Peak E' Darrion 14.6 cm/sec    Med Peak E' Darrion 13.90 cm/sec   POC Glucose Once    Collection Time: 04/19/20  4:09 PM   Result Value Ref Range    Glucose 123 70 - 130 mg/dL   Troponin    Collection Time: 04/19/20  4:22 PM   Result Value Ref Range    Troponin T 0.196 (C) 0.000 - 0.030 ng/mL   Basic Metabolic Panel    Collection Time: 04/19/20  4:22 PM   Result Value Ref Range    Glucose 107 (H) 65 - 99 mg/dL    BUN 48 (H) 8 - 23 mg/dL    Creatinine 1.47 (H) 0.57 - 1.00 mg/dL    Sodium 144 136 - 145 mmol/L    Potassium 4.7 3.5 - 5.2 mmol/L    Chloride 114 (H) 98 - 107 mmol/L    CO2 18.6 (L) 22.0 - 29.0 mmol/L    Calcium 8.1 (L) 8.6 - 10.5 mg/dL    eGFR Non African Amer 35 (L) >60 mL/min/1.73    BUN/Creatinine Ratio 32.7 (H) 7.0 - 25.0    Anion Gap 11.4 5.0 - 15.0 mmol/L   Lactic Acid, Reflex    Collection Time: 04/19/20  4:28 PM   Result Value Ref Range    Lactate 1.5 0.5 - 2.0 mmol/L   CBC Auto Differential    Collection Time: 04/19/20  5:36 PM   Result Value Ref Range    WBC 20.90 (H) 3.40 - 10.80 10*3/mm3    RBC 3.35 (L) 3.77 - 5.28 10*6/mm3    Hemoglobin 8.6 (L) 12.0 - 15.9 g/dL    Hematocrit 27.5 (L) 34.0 - 46.6 %    MCV 82.1 79.0 - 97.0 fL    MCH 25.7 (L) 26.6 - 33.0 pg    MCHC 31.3 (L) 31.5 - 35.7 g/dL    RDW 14.8 12.3 - 15.4 %    RDW-SD 44.2 37.0 - 54.0 fl    MPV 10.0 6.0 - 12.0 fL    Platelets 345 140 - 450 10*3/mm3    Neutrophil % 87.1 (H) 42.7 - 76.0 %    Lymphocyte % 4.8 (L) 19.6 - 45.3 %    Monocyte % 5.4 5.0 - 12.0 %    Eosinophil % 0.0 (L) 0.3 - 6.2 %    Basophil % 0.1 0.0 - 1.5 %    Immature Grans % 2.6 (H) 0.0 - 0.5 %     Neutrophils, Absolute 18.18 (H) 1.70 - 7.00 10*3/mm3    Lymphocytes, Absolute 1.00 0.70 - 3.10 10*3/mm3    Monocytes, Absolute 1.13 (H) 0.10 - 0.90 10*3/mm3    Eosinophils, Absolute 0.01 0.00 - 0.40 10*3/mm3    Basophils, Absolute 0.03 0.00 - 0.20 10*3/mm3    Immature Grans, Absolute 0.55 (H) 0.00 - 0.05 10*3/mm3    nRBC 0.2 0.0 - 0.2 /100 WBC   POC Glucose Once    Collection Time: 04/19/20  7:10 PM   Result Value Ref Range    Glucose 132 (H) 70 - 130 mg/dL   aPTT    Collection Time: 04/19/20 10:23 PM   Result Value Ref Range    .5 (H) 22.7 - 35.4 seconds   POC Glucose Once    Collection Time: 04/19/20 10:57 PM   Result Value Ref Range    Glucose 117 70 - 130 mg/dL   POC Glucose Once    Collection Time: 04/20/20  3:10 AM   Result Value Ref Range    Glucose 114 70 - 130 mg/dL   aPTT    Collection Time: 04/20/20  6:14 AM   Result Value Ref Range    .6 (C) 22.7 - 35.4 seconds   Basic Metabolic Panel    Collection Time: 04/20/20  6:14 AM   Result Value Ref Range    Glucose 103 (H) 65 - 99 mg/dL    BUN 46 (H) 8 - 23 mg/dL    Creatinine 1.13 (H) 0.57 - 1.00 mg/dL    Sodium 144 136 - 145 mmol/L    Potassium 3.7 3.5 - 5.2 mmol/L    Chloride 107 98 - 107 mmol/L    CO2 21.8 (L) 22.0 - 29.0 mmol/L    Calcium 8.3 (L) 8.6 - 10.5 mg/dL    eGFR Non African Amer 47 (L) >60 mL/min/1.73    BUN/Creatinine Ratio 40.7 (H) 7.0 - 25.0    Anion Gap 15.2 (H) 5.0 - 15.0 mmol/L   Troponin    Collection Time: 04/20/20  6:14 AM   Result Value Ref Range    Troponin T 0.130 (C) 0.000 - 0.030 ng/mL   TSH    Collection Time: 04/20/20  6:14 AM   Result Value Ref Range    TSH 0.076 (L) 0.270 - 4.200 uIU/mL   T4, Free    Collection Time: 04/20/20  6:14 AM   Result Value Ref Range    Free T4 1.81 (H) 0.93 - 1.70 ng/dL   CBC Auto Differential    Collection Time: 04/20/20  6:14 AM   Result Value Ref Range    WBC 16.56 (H) 3.40 - 10.80 10*3/mm3    RBC 3.57 (L) 3.77 - 5.28 10*6/mm3    Hemoglobin 8.9 (L) 12.0 - 15.9 g/dL    Hematocrit 28.6 (L)  34.0 - 46.6 %    MCV 80.1 79.0 - 97.0 fL    MCH 24.9 (L) 26.6 - 33.0 pg    MCHC 31.1 (L) 31.5 - 35.7 g/dL    RDW 14.5 12.3 - 15.4 %    RDW-SD 41.6 37.0 - 54.0 fl    MPV 10.8 6.0 - 12.0 fL    Platelets 313 140 - 450 10*3/mm3    Neutrophil % 87.5 (H) 42.7 - 76.0 %    Lymphocyte % 5.3 (L) 19.6 - 45.3 %    Monocyte % 4.3 (L) 5.0 - 12.0 %    Eosinophil % 0.6 0.3 - 6.2 %    Basophil % 0.2 0.0 - 1.5 %    Immature Grans % 2.1 (H) 0.0 - 0.5 %    Neutrophils, Absolute 14.49 (H) 1.70 - 7.00 10*3/mm3    Lymphocytes, Absolute 0.88 0.70 - 3.10 10*3/mm3    Monocytes, Absolute 0.71 0.10 - 0.90 10*3/mm3    Eosinophils, Absolute 0.10 0.00 - 0.40 10*3/mm3    Basophils, Absolute 0.04 0.00 - 0.20 10*3/mm3    Immature Grans, Absolute 0.34 (H) 0.00 - 0.05 10*3/mm3    nRBC 0.1 0.0 - 0.2 /100 WBC   POC Glucose Once    Collection Time: 04/20/20  7:32 AM   Result Value Ref Range    Glucose 112 70 - 130 mg/dL   Duplex Venous Lower Extremity - Bilateral CAR    Collection Time: 04/20/20  1:59 PM   Result Value Ref Range    Right Common Femoral Spont Y     Right Common Femoral Phasic Y     Right Common Femoral Augment Y     Right Common Femoral Competent Y     Right Common Femoral Compress C     Right Saphenofemoral Junction Compress C     Right Profunda Femoral Compress C     Right Proximal Femoral Compress C     Right Mid Femoral Spont Y     Right Mid Femoral Phasic Y     Right Mid Femoral Augment Y     Right Mid Femoral Competent Y     Right Mid Femoral Compress C     Right Distal Femoral Compress C     Right Popliteal Spont Y     Right Popliteal Phasic Y     Right Popliteal Augment Y     Right Popliteal Competent Y     Right Popliteal Compress C     Right Posterior Tibial Compress C     Right Peroneal Compress C     Right GastronemiusSoleal Compress C     Right Greater Saph AK Compress C     Right Greater Saph BK Compress C     Left Common Femoral Spont Y     Left Common Femoral Phasic Y     Left Common Femoral Augment Y     Left Common  Femoral Competent Y     Left Common Femoral Compress C     Left Saphenofemoral Junction Compress C     Left Profunda Femoral Compress C     Left Proximal Femoral Compress C     Left Mid Femoral Spont Y     Left Mid Femoral Phasic Y     Left Mid Femoral Augment Y     Left Mid Femoral Competent Y     Left Mid Femoral Compress C     Left Distal Femoral Compress C     Left Popliteal Spont Y     Left Popliteal Phasic Y     Left Popliteal Augment Y     Left Popliteal Competent Y     Left Popliteal Compress C     Left Posterior Tibial Compress C     Left Peroneal Compress C     Left GastronemiusSoleal Compress C     Left Greater Saph AK Compress C     Left Greater Saph BK Compress C    aPTT    Collection Time: 04/20/20  3:14 PM   Result Value Ref Range    PTT 62.7 (H) 22.7 - 35.4 seconds   aPTT    Collection Time: 04/20/20  7:09 PM   Result Value Ref Range    .7 (H) 22.7 - 35.4 seconds   aPTT    Collection Time: 04/21/20  5:12 AM   Result Value Ref Range    PTT 28.5 22.7 - 35.4 seconds   Basic Metabolic Panel    Collection Time: 04/21/20  5:12 AM   Result Value Ref Range    Glucose 142 (H) 65 - 99 mg/dL    BUN 46 (H) 8 - 23 mg/dL    Creatinine 1.18 (H) 0.57 - 1.00 mg/dL    Sodium 141 136 - 145 mmol/L    Potassium 4.2 3.5 - 5.2 mmol/L    Chloride 106 98 - 107 mmol/L    CO2 20.8 (L) 22.0 - 29.0 mmol/L    Calcium 8.7 8.6 - 10.5 mg/dL    eGFR Non African Amer 45 (L) >60 mL/min/1.73    BUN/Creatinine Ratio 39.0 (H) 7.0 - 25.0    Anion Gap 14.2 5.0 - 15.0 mmol/L   Troponin    Collection Time: 04/21/20  5:12 AM   Result Value Ref Range    Troponin T 0.136 (C) 0.000 - 0.030 ng/mL   CBC Auto Differential    Collection Time: 04/21/20  5:12 AM   Result Value Ref Range    WBC 17.78 (H) 3.40 - 10.80 10*3/mm3    RBC 3.29 (L) 3.77 - 5.28 10*6/mm3    Hemoglobin 9.5 (L) 12.0 - 15.9 g/dL    Hematocrit 27.9 (L) 34.0 - 46.6 %    MCV 84.8 79.0 - 97.0 fL    MCH 28.9 26.6 - 33.0 pg    MCHC 34.1 31.5 - 35.7 g/dL    RDW 15.6 (H) 12.3 - 15.4  %    RDW-SD 43.3 37.0 - 54.0 fl    MPV 11.0 6.0 - 12.0 fL    Platelets 183 140 - 450 10*3/mm3    Neutrophil % 83.6 (H) 42.7 - 76.0 %    Lymphocyte % 6.1 (L) 19.6 - 45.3 %    Monocyte % 4.8 (L) 5.0 - 12.0 %    Eosinophil % 2.0 0.3 - 6.2 %    Basophil % 0.6 0.0 - 1.5 %    Immature Grans % 2.9 (H) 0.0 - 0.5 %    Neutrophils, Absolute 14.86 (H) 1.70 - 7.00 10*3/mm3    Lymphocytes, Absolute 1.08 0.70 - 3.10 10*3/mm3    Monocytes, Absolute 0.85 0.10 - 0.90 10*3/mm3    Eosinophils, Absolute 0.36 0.00 - 0.40 10*3/mm3    Basophils, Absolute 0.11 0.00 - 0.20 10*3/mm3    Immature Grans, Absolute 0.52 (H) 0.00 - 0.05 10*3/mm3    nRBC 0.1 0.0 - 0.2 /100 WBC   aPTT    Collection Time: 04/21/20  3:08 PM   Result Value Ref Range    .9 (H) 22.7 - 35.4 seconds   aPTT    Collection Time: 04/21/20 11:19 PM   Result Value Ref Range    PTT 37.7 (H) 22.7 - 35.4 seconds   Basic Metabolic Panel    Collection Time: 04/22/20  7:29 AM   Result Value Ref Range    Glucose 139 (H) 65 - 99 mg/dL    BUN 34 (H) 8 - 23 mg/dL    Creatinine 1.02 (H) 0.57 - 1.00 mg/dL    Sodium 143 136 - 145 mmol/L    Potassium 3.8 3.5 - 5.2 mmol/L    Chloride 106 98 - 107 mmol/L    CO2 25.4 22.0 - 29.0 mmol/L    Calcium 9.1 8.6 - 10.5 mg/dL    eGFR Non African Amer 53 (L) >60 mL/min/1.73    BUN/Creatinine Ratio 33.3 (H) 7.0 - 25.0    Anion Gap 11.6 5.0 - 15.0 mmol/L   CBC Auto Differential    Collection Time: 04/22/20  7:29 AM   Result Value Ref Range    WBC 19.89 (H) 3.40 - 10.80 10*3/mm3    RBC 3.73 (L) 3.77 - 5.28 10*6/mm3    Hemoglobin 9.9 (L) 12.0 - 15.9 g/dL    Hematocrit 31.1 (L) 34.0 - 46.6 %    MCV 83.4 79.0 - 97.0 fL    MCH 26.5 (L) 26.6 - 33.0 pg    MCHC 31.8 31.5 - 35.7 g/dL    RDW 15.0 12.3 - 15.4 %    RDW-SD 44.0 37.0 - 54.0 fl    MPV 10.5 6.0 - 12.0 fL    Platelets 410 140 - 450 10*3/mm3    Neutrophil % 80.4 (H) 42.7 - 76.0 %    Lymphocyte % 8.6 (L) 19.6 - 45.3 %    Monocyte % 4.9 (L) 5.0 - 12.0 %    Eosinophil % 1.8 0.3 - 6.2 %    Basophil % 0.3  0.0 - 1.5 %    Immature Grans % 4.0 (H) 0.0 - 0.5 %    Neutrophils, Absolute 15.99 (H) 1.70 - 7.00 10*3/mm3    Lymphocytes, Absolute 1.72 0.70 - 3.10 10*3/mm3    Monocytes, Absolute 0.98 (H) 0.10 - 0.90 10*3/mm3    Eosinophils, Absolute 0.36 0.00 - 0.40 10*3/mm3    Basophils, Absolute 0.05 0.00 - 0.20 10*3/mm3    Immature Grans, Absolute 0.79 (H) 0.00 - 0.05 10*3/mm3    nRBC 0.1 0.0 - 0.2 /100 WBC   aPTT    Collection Time: 04/22/20  7:29 AM   Result Value Ref Range    PTT 35.6 (H) 22.7 - 35.4 seconds   aPTT    Collection Time: 04/23/20  3:07 AM   Result Value Ref Range    PTT 38.4 (H) 22.7 - 35.4 seconds   Basic Metabolic Panel    Collection Time: 04/23/20  3:07 AM   Result Value Ref Range    Glucose 145 (H) 65 - 99 mg/dL    BUN 35 (H) 8 - 23 mg/dL    Creatinine 1.08 (H) 0.57 - 1.00 mg/dL    Sodium 143 136 - 145 mmol/L    Potassium 4.1 3.5 - 5.2 mmol/L    Chloride 105 98 - 107 mmol/L    CO2 24.1 22.0 - 29.0 mmol/L    Calcium 9.2 8.6 - 10.5 mg/dL    eGFR Non African Amer 49 (L) >60 mL/min/1.73    BUN/Creatinine Ratio 32.4 (H) 7.0 - 25.0    Anion Gap 13.9 5.0 - 15.0 mmol/L   CBC Auto Differential    Collection Time: 04/23/20  3:07 AM   Result Value Ref Range    WBC 18.96 (H) 3.40 - 10.80 10*3/mm3    RBC 3.73 (L) 3.77 - 5.28 10*6/mm3    Hemoglobin 9.4 (L) 12.0 - 15.9 g/dL    Hematocrit 30.4 (L) 34.0 - 46.6 %    MCV 81.5 79.0 - 97.0 fL    MCH 25.2 (L) 26.6 - 33.0 pg    MCHC 30.9 (L) 31.5 - 35.7 g/dL    RDW 14.7 12.3 - 15.4 %    RDW-SD 43.5 37.0 - 54.0 fl    MPV 9.9 6.0 - 12.0 fL    Platelets 400 140 - 450 10*3/mm3    Neutrophil % 83.0 (H) 42.7 - 76.0 %    Lymphocyte % 7.5 (L) 19.6 - 45.3 %    Monocyte % 4.6 (L) 5.0 - 12.0 %    Eosinophil % 1.0 0.3 - 6.2 %    Basophil % 0.4 0.0 - 1.5 %    Immature Grans % 3.5 (H) 0.0 - 0.5 %    Neutrophils, Absolute 15.71 (H) 1.70 - 7.00 10*3/mm3    Lymphocytes, Absolute 1.43 0.70 - 3.10 10*3/mm3    Monocytes, Absolute 0.88 0.10 - 0.90 10*3/mm3    Eosinophils, Absolute 0.19 0.00 - 0.40  10*3/mm3    Basophils, Absolute 0.08 0.00 - 0.20 10*3/mm3    Immature Grans, Absolute 0.67 (H) 0.00 - 0.05 10*3/mm3    nRBC 0.1 0.0 - 0.2 /100 WBC   aPTT    Collection Time: 04/23/20 10:15 AM   Result Value Ref Range    PTT 33.6 22.7 - 35.4 seconds   Adult Transesophageal Echo 3D ([facility] C) W/ Cont If Necessary Per Protocol    Collection Time: 04/23/20 12:10 PM   Result Value Ref Range    BSA 1.9 m^2    asc Aorta Diam 3.5 cm    MR max betsy 490.0 cm/sec    MR max PG 96.0 mmHg    MR mean betsy 363.0 cm/sec    MR mean PG 59.0 mmHg    MR .0 cm     CV ECHO SCOTTY - BZI_BMI 24.2 kilograms/m^2     CV ECHO SCOTTY - BSA([facility]AY[place]OCK) 1.9 m^2     CV ECHO SCOTTY - BZI_METRIC_WEIGHT 72.1 kg     CV ECHO SCOTTY - BZI_METRIC_HEIGHT 172.7 cm     CV VAS BP RIGHT /84 mmHg    Ascending aorta 3.50 cm    PISA ALIASING BETSY 3.70 m/s    Radius 0.7 cm    MR PISA EROA 23.00 cm2    MV regurgitant volume 33 cc   aPTT    Collection Time: 04/24/20  7:08 AM   Result Value Ref Range    PTT 35.1 22.7 - 35.4 seconds   Basic Metabolic Panel    Collection Time: 04/24/20  7:08 AM   Result Value Ref Range    Glucose 136 (H) 65 - 99 mg/dL    BUN 33 (H) 8 - 23 mg/dL    Creatinine 0.98 0.57 - 1.00 mg/dL    Sodium 140 136 - 145 mmol/L    Potassium 3.7 3.5 - 5.2 mmol/L    Chloride 101 98 - 107 mmol/L    CO2 27.8 22.0 - 29.0 mmol/L    Calcium 9.1 8.6 - 10.5 mg/dL    eGFR Non African Amer 55 (L) >60 mL/min/1.73    BUN/Creatinine Ratio 33.7 (H) 7.0 - 25.0    Anion Gap 11.2 5.0 - 15.0 mmol/L   CBC Auto Differential    Collection Time: 04/24/20  7:08 AM   Result Value Ref Range    WBC 17.94 (H) 3.40 - 10.80 10*3/mm3    RBC 3.78 3.77 - 5.28 10*6/mm3    Hemoglobin 9.3 (L) 12.0 - 15.9 g/dL    Hematocrit 30.3 (L) 34.0 - 46.6 %    MCV 80.2 79.0 - 97.0 fL    MCH 24.6 (L) 26.6 - 33.0 pg    MCHC 30.7 (L) 31.5 - 35.7 g/dL    RDW 15.0 12.3 - 15.4 %    RDW-SD 43.1 37.0 - 54.0 fl    MPV 10.4 6.0 - 12.0 fL    Platelets 380 140 - 450 10*3/mm3    Neutrophil % 82.8 (H) 42.7 -  76.0 %    Lymphocyte % 7.1 (L) 19.6 - 45.3 %    Monocyte % 5.5 5.0 - 12.0 %    Eosinophil % 1.7 0.3 - 6.2 %    Basophil % 0.2 0.0 - 1.5 %    Immature Grans % 2.7 (H) 0.0 - 0.5 %    Neutrophils, Absolute 14.85 (H) 1.70 - 7.00 10*3/mm3    Lymphocytes, Absolute 1.27 0.70 - 3.10 10*3/mm3    Monocytes, Absolute 0.99 (H) 0.10 - 0.90 10*3/mm3    Eosinophils, Absolute 0.30 0.00 - 0.40 10*3/mm3    Basophils, Absolute 0.04 0.00 - 0.20 10*3/mm3    Immature Grans, Absolute 0.49 (H) 0.00 - 0.05 10*3/mm3    nRBC 0.1 0.0 - 0.2 /100 WBC   aPTT    Collection Time: 04/24/20 11:47 AM   Result Value Ref Range    PTT 33.9 22.7 - 35.4 seconds   Protime-INR    Collection Time: 04/24/20 11:47 AM   Result Value Ref Range    Protime 14.2 11.7 - 14.2 Seconds    INR 1.13 (H) 0.90 - 1.10   aPTT    Collection Time: 04/24/20  7:57 PM   Result Value Ref Range    .4 (C) 22.7 - 35.4 seconds   aPTT    Collection Time: 04/25/20  5:28 AM   Result Value Ref Range    .7 (H) 22.7 - 35.4 seconds   Basic Metabolic Panel    Collection Time: 04/25/20  5:28 AM   Result Value Ref Range    Glucose 134 (H) 65 - 99 mg/dL    BUN 27 (H) 8 - 23 mg/dL    Creatinine 0.85 0.57 - 1.00 mg/dL    Sodium 139 136 - 145 mmol/L    Potassium 3.3 (L) 3.5 - 5.2 mmol/L    Chloride 102 98 - 107 mmol/L    CO2 27.4 22.0 - 29.0 mmol/L    Calcium 8.8 8.6 - 10.5 mg/dL    eGFR Non African Amer 65 >60 mL/min/1.73    BUN/Creatinine Ratio 31.8 (H) 7.0 - 25.0    Anion Gap 9.6 5.0 - 15.0 mmol/L   CBC Auto Differential    Collection Time: 04/25/20  5:28 AM   Result Value Ref Range    WBC 18.83 (H) 3.40 - 10.80 10*3/mm3    RBC 3.65 (L) 3.77 - 5.28 10*6/mm3    Hemoglobin 8.9 (L) 12.0 - 15.9 g/dL    Hematocrit 29.1 (L) 34.0 - 46.6 %    MCV 79.7 79.0 - 97.0 fL    MCH 24.4 (L) 26.6 - 33.0 pg    MCHC 30.6 (L) 31.5 - 35.7 g/dL    RDW 14.7 12.3 - 15.4 %    RDW-SD 42.9 37.0 - 54.0 fl    MPV 10.9 6.0 - 12.0 fL    Platelets 305 140 - 450 10*3/mm3    Neutrophil % 81.8 (H) 42.7 - 76.0 %     Lymphocyte % 7.0 (L) 19.6 - 45.3 %    Monocyte % 5.5 5.0 - 12.0 %    Eosinophil % 2.7 0.3 - 6.2 %    Basophil % 0.3 0.0 - 1.5 %    Immature Grans % 2.7 (H) 0.0 - 0.5 %    Neutrophils, Absolute 15.42 (H) 1.70 - 7.00 10*3/mm3    Lymphocytes, Absolute 1.32 0.70 - 3.10 10*3/mm3    Monocytes, Absolute 1.03 (H) 0.10 - 0.90 10*3/mm3    Eosinophils, Absolute 0.51 (H) 0.00 - 0.40 10*3/mm3    Basophils, Absolute 0.05 0.00 - 0.20 10*3/mm3    Immature Grans, Absolute 0.50 (H) 0.00 - 0.05 10*3/mm3    nRBC 0.1 0.0 - 0.2 /100 WBC   aPTT    Collection Time: 04/25/20  3:03 PM   Result Value Ref Range    PTT 84.1 (H) 22.7 - 35.4 seconds   Protime-INR    Collection Time: 04/25/20  3:03 PM   Result Value Ref Range    Protime 14.5 (H) 11.7 - 14.2 Seconds    INR 1.16 (H) 0.90 - 1.10   aPTT    Collection Time: 04/26/20  6:52 AM   Result Value Ref Range    PTT 99.9 (H) 22.7 - 35.4 seconds   Basic Metabolic Panel    Collection Time: 04/26/20  6:52 AM   Result Value Ref Range    Glucose 117 (H) 65 - 99 mg/dL    BUN 24 (H) 8 - 23 mg/dL    Creatinine 1.02 (H) 0.57 - 1.00 mg/dL    Sodium 139 136 - 145 mmol/L    Potassium 3.0 (L) 3.5 - 5.2 mmol/L    Chloride 100 98 - 107 mmol/L    CO2 25.9 22.0 - 29.0 mmol/L    Calcium 8.5 (L) 8.6 - 10.5 mg/dL    eGFR Non African Amer 53 (L) >60 mL/min/1.73    BUN/Creatinine Ratio 23.5 7.0 - 25.0    Anion Gap 13.1 5.0 - 15.0 mmol/L   Protime-INR    Collection Time: 04/26/20  6:52 AM   Result Value Ref Range    Protime 15.6 (H) 11.7 - 14.2 Seconds    INR 1.27 (H) 0.90 - 1.10   BNP    Collection Time: 04/26/20  6:52 AM   Result Value Ref Range    proBNP 1,417.0 5.0-1,800.0 pg/mL   CBC Auto Differential    Collection Time: 04/26/20  6:52 AM   Result Value Ref Range    WBC 21.61 (H) 3.40 - 10.80 10*3/mm3    RBC 3.53 (L) 3.77 - 5.28 10*6/mm3    Hemoglobin 8.8 (L) 12.0 - 15.9 g/dL    Hematocrit 28.2 (L) 34.0 - 46.6 %    MCV 79.9 79.0 - 97.0 fL    MCH 24.9 (L) 26.6 - 33.0 pg    MCHC 31.2 (L) 31.5 - 35.7 g/dL    RDW  14.8 12.3 - 15.4 %    RDW-SD 42.3 37.0 - 54.0 fl    MPV 11.5 6.0 - 12.0 fL    Platelets 323 140 - 450 10*3/mm3    Neutrophil % 84.0 (H) 42.7 - 76.0 %    Lymphocyte % 6.4 (L) 19.6 - 45.3 %    Monocyte % 4.4 (L) 5.0 - 12.0 %    Eosinophil % 2.1 0.3 - 6.2 %    Basophil % 0.2 0.0 - 1.5 %    Immature Grans % 2.9 (H) 0.0 - 0.5 %    Neutrophils, Absolute 18.16 (H) 1.70 - 7.00 10*3/mm3    Lymphocytes, Absolute 1.38 0.70 - 3.10 10*3/mm3    Monocytes, Absolute 0.95 (H) 0.10 - 0.90 10*3/mm3    Eosinophils, Absolute 0.46 (H) 0.00 - 0.40 10*3/mm3    Basophils, Absolute 0.04 0.00 - 0.20 10*3/mm3    Immature Grans, Absolute 0.62 (H) 0.00 - 0.05 10*3/mm3    nRBC 0.0 0.0 - 0.2 /100 WBC   aPTT    Collection Time: 04/26/20  2:13 PM   Result Value Ref Range    PTT 50.9 (H) 22.7 - 35.4 seconds   Procalcitonin    Collection Time: 04/26/20  4:58 PM   Result Value Ref Range    Procalcitonin 0.13 0.10 - 0.25 ng/mL   aPTT    Collection Time: 04/26/20 10:32 PM   Result Value Ref Range    PTT 91.7 (H) 22.7 - 35.4 seconds   Blood Culture - Blood, Arm, Left    Collection Time: 04/26/20 10:32 PM   Result Value Ref Range    Blood Culture No growth at 5 days    Blood Culture - Blood, Hand, Left    Collection Time: 04/26/20 10:32 PM   Result Value Ref Range    Blood Culture No growth at 5 days    aPTT    Collection Time: 04/27/20  7:06 AM   Result Value Ref Range    PTT 96.9 (H) 22.7 - 35.4 seconds   Protime-INR    Collection Time: 04/27/20  7:06 AM   Result Value Ref Range    Protime 21.5 (H) 11.7 - 14.2 Seconds    INR 1.90 (H) 0.90 - 1.10   CBC Auto Differential    Collection Time: 04/27/20  7:06 AM   Result Value Ref Range    WBC 19.78 (H) 3.40 - 10.80 10*3/mm3    RBC 3.83 3.77 - 5.28 10*6/mm3    Hemoglobin 9.5 (L) 12.0 - 15.9 g/dL    Hematocrit 31.7 (L) 34.0 - 46.6 %    MCV 82.8 79.0 - 97.0 fL    MCH 24.8 (L) 26.6 - 33.0 pg    MCHC 30.0 (L) 31.5 - 35.7 g/dL    RDW 15.1 12.3 - 15.4 %    RDW-SD 44.7 37.0 - 54.0 fl    MPV 10.2 6.0 - 12.0 fL     Platelets 323 140 - 450 10*3/mm3    Neutrophil % 86.1 (H) 42.7 - 76.0 %    Lymphocyte % 5.7 (L) 19.6 - 45.3 %    Monocyte % 4.1 (L) 5.0 - 12.0 %    Eosinophil % 1.0 0.3 - 6.2 %    Basophil % 0.3 0.0 - 1.5 %    Immature Grans % 2.8 (H) 0.0 - 0.5 %    Neutrophils, Absolute 17.04 (H) 1.70 - 7.00 10*3/mm3    Lymphocytes, Absolute 1.12 0.70 - 3.10 10*3/mm3    Monocytes, Absolute 0.82 0.10 - 0.90 10*3/mm3    Eosinophils, Absolute 0.19 0.00 - 0.40 10*3/mm3    Basophils, Absolute 0.06 0.00 - 0.20 10*3/mm3    Immature Grans, Absolute 0.55 (H) 0.00 - 0.05 10*3/mm3    nRBC 0.1 0.0 - 0.2 /100 WBC   Basic Metabolic Panel    Collection Time: 04/27/20  9:16 AM   Result Value Ref Range    Glucose 138 (H) 65 - 99 mg/dL    BUN 28 (H) 8 - 23 mg/dL    Creatinine 1.23 (H) 0.57 - 1.00 mg/dL    Sodium 141 136 - 145 mmol/L    Potassium 5.4 (H) 3.5 - 5.2 mmol/L    Chloride 105 98 - 107 mmol/L    CO2 23.1 22.0 - 29.0 mmol/L    Calcium 9.1 8.6 - 10.5 mg/dL    eGFR Non African Amer 42 (L) >60 mL/min/1.73    BUN/Creatinine Ratio 22.8 7.0 - 25.0    Anion Gap 12.9 5.0 - 15.0 mmol/L   aPTT    Collection Time: 04/27/20  2:03 PM   Result Value Ref Range    PTT 96.3 (H) 22.7 - 35.4 seconds   aPTT    Collection Time: 04/27/20  7:55 PM   Result Value Ref Range    .3 (H) 22.7 - 35.4 seconds   aPTT    Collection Time: 04/28/20  5:02 AM   Result Value Ref Range    PTT 75.5 (H) 22.7 - 35.4 seconds   Protime-INR    Collection Time: 04/28/20  5:02 AM   Result Value Ref Range    Protime 27.3 (H) 11.7 - 14.2 Seconds    INR 2.57 (H) 0.90 - 1.10   Basic Metabolic Panel    Collection Time: 04/28/20  7:06 AM   Result Value Ref Range    Glucose 105 (H) 65 - 99 mg/dL    BUN 26 (H) 8 - 23 mg/dL    Creatinine 0.98 0.57 - 1.00 mg/dL    Sodium 133 (L) 136 - 145 mmol/L    Potassium 4.0 3.5 - 5.2 mmol/L    Chloride 97 (L) 98 - 107 mmol/L    CO2 25.2 22.0 - 29.0 mmol/L    Calcium 9.1 8.6 - 10.5 mg/dL    eGFR Non African Amer 55 (L) >60 mL/min/1.73    BUN/Creatinine  Ratio 26.5 (H) 7.0 - 25.0    Anion Gap 10.8 5.0 - 15.0 mmol/L   CBC Auto Differential    Collection Time: 04/28/20  7:06 AM   Result Value Ref Range    WBC 16.30 (H) 3.40 - 10.80 10*3/mm3    RBC 3.60 (L) 3.77 - 5.28 10*6/mm3    Hemoglobin 8.8 (L) 12.0 - 15.9 g/dL    Hematocrit 28.1 (L) 34.0 - 46.6 %    MCV 78.1 (L) 79.0 - 97.0 fL    MCH 24.4 (L) 26.6 - 33.0 pg    MCHC 31.3 (L) 31.5 - 35.7 g/dL    RDW 14.9 12.3 - 15.4 %    RDW-SD 41.0 37.0 - 54.0 fl    MPV 11.0 6.0 - 12.0 fL    Platelets 334 140 - 450 10*3/mm3    Neutrophil % 82.7 (H) 42.7 - 76.0 %    Lymphocyte % 7.0 (L) 19.6 - 45.3 %    Monocyte % 4.7 (L) 5.0 - 12.0 %    Eosinophil % 2.3 0.3 - 6.2 %    Basophil % 0.4 0.0 - 1.5 %    Immature Grans % 2.9 (H) 0.0 - 0.5 %    Neutrophils, Absolute 13.49 (H) 1.70 - 7.00 10*3/mm3    Lymphocytes, Absolute 1.14 0.70 - 3.10 10*3/mm3    Monocytes, Absolute 0.76 0.10 - 0.90 10*3/mm3    Eosinophils, Absolute 0.38 0.00 - 0.40 10*3/mm3    Basophils, Absolute 0.06 0.00 - 0.20 10*3/mm3    Immature Grans, Absolute 0.47 (H) 0.00 - 0.05 10*3/mm3    nRBC 0.0 0.0 - 0.2 /100 WBC   Procalcitonin    Collection Time: 04/28/20  2:31 PM   Result Value Ref Range    Procalcitonin 0.13 0.10 - 0.25 ng/mL   Calprotectin, Fecal - Stool, Per Rectum    Collection Time: 04/28/20  6:16 PM   Result Value Ref Range    Calprotectin, Fecal 272 (H) 0 - 120 ug/g   Basic Metabolic Panel    Collection Time: 04/29/20  7:13 AM   Result Value Ref Range    Glucose 117 (H) 65 - 99 mg/dL    BUN 24 (H) 8 - 23 mg/dL    Creatinine 0.98 0.57 - 1.00 mg/dL    Sodium 140 136 - 145 mmol/L    Potassium 3.7 3.5 - 5.2 mmol/L    Chloride 103 98 - 107 mmol/L    CO2 27.1 22.0 - 29.0 mmol/L    Calcium 8.9 8.6 - 10.5 mg/dL    eGFR Non African Amer 55 (L) >60 mL/min/1.73    BUN/Creatinine Ratio 24.5 7.0 - 25.0    Anion Gap 9.9 5.0 - 15.0 mmol/L   Protime-INR    Collection Time: 04/29/20  7:13 AM   Result Value Ref Range    Protime 26.0 (H) 11.7 - 14.2 Seconds    INR 2.41 (H) 0.90 -  1.10   CBC Auto Differential    Collection Time: 04/29/20  7:13 AM   Result Value Ref Range    WBC 16.95 (H) 3.40 - 10.80 10*3/mm3    RBC 3.57 (L) 3.77 - 5.28 10*6/mm3    Hemoglobin 8.7 (L) 12.0 - 15.9 g/dL    Hematocrit 27.9 (L) 34.0 - 46.6 %    MCV 78.2 (L) 79.0 - 97.0 fL    MCH 24.4 (L) 26.6 - 33.0 pg    MCHC 31.2 (L) 31.5 - 35.7 g/dL    RDW 15.0 12.3 - 15.4 %    RDW-SD 41.5 37.0 - 54.0 fl    MPV 10.2 6.0 - 12.0 fL    Platelets 312 140 - 450 10*3/mm3    Neutrophil % 83.3 (H) 42.7 - 76.0 %    Lymphocyte % 6.4 (L) 19.6 - 45.3 %    Monocyte % 5.0 5.0 - 12.0 %    Eosinophil % 2.4 0.3 - 6.2 %    Basophil % 0.2 0.0 - 1.5 %    Immature Grans % 2.7 (H) 0.0 - 0.5 %    Neutrophils, Absolute 14.12 (H) 1.70 - 7.00 10*3/mm3    Lymphocytes, Absolute 1.08 0.70 - 3.10 10*3/mm3    Monocytes, Absolute 0.85 0.10 - 0.90 10*3/mm3    Eosinophils, Absolute 0.40 0.00 - 0.40 10*3/mm3    Basophils, Absolute 0.04 0.00 - 0.20 10*3/mm3    Immature Grans, Absolute 0.46 (H) 0.00 - 0.05 10*3/mm3    nRBC 0.0 0.0 - 0.2 /100 WBC   Gastrointestinal Panel, PCR - Stool, Per Rectum    Collection Time: 04/29/20  9:01 AM   Result Value Ref Range    Campylobacter Not Detected Not Detected    Plesiomonas shigelloides Not Detected Not Detected    Salmonella Not Detected Not Detected    Vibrio Not Detected Not Detected    Vibrio cholerae Not Detected Not Detected    Yersinia enterocolitica Not Detected Not Detected    Enteroaggregative E. coli (EAEC) Not Detected Not Detected    Enteropathogenic E. coli (EPEC) Not Detected Not Detected    Enterotoxigenic E. coli (ETEC) lt/st Not Detected Not Detected    Shiga-like toxin-producing E. coli (STEC) stx1/stx2 Not Detected Not Detected    E. coli O157 Not Detected Not Detected    Shigella/Enteroinvasive E. coli (EIEC) Not Detected Not Detected    Cryptosporidium Not Detected Not Detected    Cyclospora cayetanensis Not Detected Not Detected    Entamoeba histolytica Not Detected Not Detected    Giardia lamblia Not  Detected Not Detected    Adenovirus F40/41 Not Detected Not Detected    Astrovirus Not Detected Not Detected    Norovirus GI/GII Not Detected Not Detected    Rotavirus A Not Detected Not Detected    Sapovirus (I, II, IV or V) Not Detected Not Detected   Fecal Lactoferrin - Stool, Per Rectum    Collection Time: 04/29/20 10:31 AM   Result Value Ref Range    Lactoferrin, Qual Positive (A) Negative   Fecal Fat, Qualitative - Stool, Per Rectum    Collection Time: 04/29/20 10:31 AM   Result Value Ref Range    Fecal Fats, Qualitative, Fatty Acids 0-100 fat droplets / hpf 0-100 fat droplets / hpf    Fecal Fat Qualitative, Neutral Fats 0-50 fat droplets / hpf 0-50 fat droplets / hpf   Clostridium Difficile Toxin, PCR - Stool, Per Rectum    Collection Time: 04/29/20 10:31 AM   Result Value Ref Range    C. Difficile Toxins by PCR Negative Negative   Basic Metabolic Panel    Collection Time: 04/30/20  5:02 AM   Result Value Ref Range    Glucose 112 (H) 65 - 99 mg/dL    BUN 23 8 - 23 mg/dL    Creatinine 0.91 0.57 - 1.00 mg/dL    Sodium 140 136 - 145 mmol/L    Potassium 3.4 (L) 3.5 - 5.2 mmol/L    Chloride 102 98 - 107 mmol/L    CO2 26.5 22.0 - 29.0 mmol/L    Calcium 8.8 8.6 - 10.5 mg/dL    eGFR Non African Amer 60 (L) >60 mL/min/1.73    BUN/Creatinine Ratio 25.3 (H) 7.0 - 25.0    Anion Gap 11.5 5.0 - 15.0 mmol/L   Protime-INR    Collection Time: 04/30/20  5:02 AM   Result Value Ref Range    Protime 25.1 (H) 11.7 - 14.2 Seconds    INR 2.31 (H) 0.90 - 1.10   CBC Auto Differential    Collection Time: 04/30/20  5:03 AM   Result Value Ref Range    WBC 13.81 (H) 3.40 - 10.80 10*3/mm3    RBC 3.37 (L) 3.77 - 5.28 10*6/mm3    Hemoglobin 8.3 (L) 12.0 - 15.9 g/dL    Hematocrit 26.3 (L) 34.0 - 46.6 %    MCV 78.0 (L) 79.0 - 97.0 fL    MCH 24.6 (L) 26.6 - 33.0 pg    MCHC 31.6 31.5 - 35.7 g/dL    RDW 15.2 12.3 - 15.4 %    RDW-SD 42.0 37.0 - 54.0 fl    MPV 10.9 6.0 - 12.0 fL    Platelets 279 140 - 450 10*3/mm3    Neutrophil % 79.0 (H) 42.7 -  76.0 %    Lymphocyte % 8.9 (L) 19.6 - 45.3 %    Monocyte % 5.5 5.0 - 12.0 %    Eosinophil % 3.4 0.3 - 6.2 %    Basophil % 0.4 0.0 - 1.5 %    Immature Grans % 2.8 (H) 0.0 - 0.5 %    Neutrophils, Absolute 10.91 (H) 1.70 - 7.00 10*3/mm3    Lymphocytes, Absolute 1.23 0.70 - 3.10 10*3/mm3    Monocytes, Absolute 0.76 0.10 - 0.90 10*3/mm3    Eosinophils, Absolute 0.47 (H) 0.00 - 0.40 10*3/mm3    Basophils, Absolute 0.05 0.00 - 0.20 10*3/mm3    Immature Grans, Absolute 0.39 (H) 0.00 - 0.05 10*3/mm3    nRBC 0.0 0.0 - 0.2 /100 WBC   Basic Metabolic Panel    Collection Time: 05/01/20  4:40 AM   Result Value Ref Range    Glucose 117 (H) 65 - 99 mg/dL    BUN 22 8 - 23 mg/dL    Creatinine 0.83 0.57 - 1.00 mg/dL    Sodium 141 136 - 145 mmol/L    Potassium 3.7 3.5 - 5.2 mmol/L    Chloride 104 98 - 107 mmol/L    CO2 26.4 22.0 - 29.0 mmol/L    Calcium 9.0 8.6 - 10.5 mg/dL    eGFR Non African Amer 67 >60 mL/min/1.73    BUN/Creatinine Ratio 26.5 (H) 7.0 - 25.0    Anion Gap 10.6 5.0 - 15.0 mmol/L   Protime-INR    Collection Time: 05/01/20  4:40 AM   Result Value Ref Range    Protime 25.9 (H) 11.7 - 14.2 Seconds    INR 2.41 (H) 0.90 - 1.10   CBC Auto Differential    Collection Time: 05/01/20  4:40 AM   Result Value Ref Range    WBC 14.11 (H) 3.40 - 10.80 10*3/mm3    RBC 3.64 (L) 3.77 - 5.28 10*6/mm3    Hemoglobin 8.9 (L) 12.0 - 15.9 g/dL    Hematocrit 28.7 (L) 34.0 - 46.6 %    MCV 78.8 (L) 79.0 - 97.0 fL    MCH 24.5 (L) 26.6 - 33.0 pg    MCHC 31.0 (L) 31.5 - 35.7 g/dL    RDW 15.2 12.3 - 15.4 %    RDW-SD 42.9 37.0 - 54.0 fl    MPV 10.7 6.0 - 12.0 fL    Platelets 293 140 - 450 10*3/mm3    Neutrophil % 77.7 (H) 42.7 - 76.0 %    Lymphocyte % 9.7 (L) 19.6 - 45.3 %    Monocyte % 5.5 5.0 - 12.0 %    Eosinophil % 3.9 0.3 - 6.2 %    Basophil % 0.6 0.0 - 1.5 %    Immature Grans % 2.6 (H) 0.0 - 0.5 %    Neutrophils, Absolute 10.95 (H) 1.70 - 7.00 10*3/mm3    Lymphocytes, Absolute 1.37 0.70 - 3.10 10*3/mm3    Monocytes, Absolute 0.78 0.10 - 0.90  10*3/mm3    Eosinophils, Absolute 0.55 (H) 0.00 - 0.40 10*3/mm3    Basophils, Absolute 0.09 0.00 - 0.20 10*3/mm3    Immature Grans, Absolute 0.37 (H) 0.00 - 0.05 10*3/mm3    nRBC 0.0 0.0 - 0.2 /100 WBC   POC Glucose Once    Collection Time: 05/01/20  6:26 AM   Result Value Ref Range    Glucose 121 70 - 130 mg/dL   Basic Metabolic Panel    Collection Time: 05/02/20  4:33 AM   Result Value Ref Range    Glucose 111 (H) 65 - 99 mg/dL    BUN 22 8 - 23 mg/dL    Creatinine 0.96 0.57 - 1.00 mg/dL    Sodium 141 136 - 145 mmol/L    Potassium 3.3 (L) 3.5 - 5.2 mmol/L    Chloride 104 98 - 107 mmol/L    CO2 26.9 22.0 - 29.0 mmol/L    Calcium 8.9 8.6 - 10.5 mg/dL    eGFR Non African Amer 57 (L) >60 mL/min/1.73    BUN/Creatinine Ratio 22.9 7.0 - 25.0    Anion Gap 10.1 5.0 - 15.0 mmol/L   Protime-INR    Collection Time: 05/02/20  4:33 AM   Result Value Ref Range    Protime 27.9 (H) 11.7 - 14.2 Seconds    INR 2.64 (H) 0.90 - 1.10   CBC Auto Differential    Collection Time: 05/02/20  4:33 AM   Result Value Ref Range    WBC 13.75 (H) 3.40 - 10.80 10*3/mm3    RBC 3.73 (L) 3.77 - 5.28 10*6/mm3    Hemoglobin 8.9 (L) 12.0 - 15.9 g/dL    Hematocrit 29.3 (L) 34.0 - 46.6 %    MCV 78.6 (L) 79.0 - 97.0 fL    MCH 23.9 (L) 26.6 - 33.0 pg    MCHC 30.4 (L) 31.5 - 35.7 g/dL    RDW 15.2 12.3 - 15.4 %    RDW-SD 43.4 37.0 - 54.0 fl    MPV 10.5 6.0 - 12.0 fL    Platelets 262 140 - 450 10*3/mm3    Neutrophil % 76.1 (H) 42.7 - 76.0 %    Lymphocyte % 11.3 (L) 19.6 - 45.3 %    Monocyte % 6.3 5.0 - 12.0 %    Eosinophil % 3.6 0.3 - 6.2 %    Basophil % 0.7 0.0 - 1.5 %    Immature Grans % 2.0 (H) 0.0 - 0.5 %    Neutrophils, Absolute 10.46 (H) 1.70 - 7.00 10*3/mm3    Lymphocytes, Absolute 1.56 0.70 - 3.10 10*3/mm3    Monocytes, Absolute 0.87 0.10 - 0.90 10*3/mm3    Eosinophils, Absolute 0.50 (H) 0.00 - 0.40 10*3/mm3    Basophils, Absolute 0.09 0.00 - 0.20 10*3/mm3    Immature Grans, Absolute 0.27 (H) 0.00 - 0.05 10*3/mm3    nRBC 0.0 0.0 - 0.2 /100 WBC   Basic  Metabolic Panel    Collection Time: 05/03/20  4:45 AM   Result Value Ref Range    Glucose 172 (H) 65 - 99 mg/dL    BUN 25 (H) 8 - 23 mg/dL    Creatinine 0.99 0.57 - 1.00 mg/dL    Sodium 141 136 - 145 mmol/L    Potassium 4.7 3.5 - 5.2 mmol/L    Chloride 104 98 - 107 mmol/L    CO2 25.4 22.0 - 29.0 mmol/L    Calcium 9.0 8.6 - 10.5 mg/dL    eGFR Non African Amer 55 (L) >60 mL/min/1.73    BUN/Creatinine Ratio 25.3 (H) 7.0 - 25.0    Anion Gap 11.6 5.0 - 15.0 mmol/L   Protime-INR    Collection Time: 05/03/20  4:45 AM   Result Value Ref Range    Protime 28.4 (H) 11.7 - 14.2 Seconds    INR 2.71 (H) 0.90 - 1.10   CBC Auto Differential    Collection Time: 05/03/20  4:45 AM   Result Value Ref Range    WBC 14.33 (H) 3.40 - 10.80 10*3/mm3    RBC 3.62 (L) 3.77 - 5.28 10*6/mm3    Hemoglobin 9.0 (L) 12.0 - 15.9 g/dL    Hematocrit 28.9 (L) 34.0 - 46.6 %    MCV 79.8 79.0 - 97.0 fL    MCH 24.9 (L) 26.6 - 33.0 pg    MCHC 31.1 (L) 31.5 - 35.7 g/dL    RDW 14.9 12.3 - 15.4 %    RDW-SD 42.0 37.0 - 54.0 fl    MPV 11.8 6.0 - 12.0 fL    Platelets 268 140 - 450 10*3/mm3    Neutrophil % 88.7 (H) 42.7 - 76.0 %    Lymphocyte % 5.9 (L) 19.6 - 45.3 %    Monocyte % 3.5 (L) 5.0 - 12.0 %    Eosinophil % 0.1 (L) 0.3 - 6.2 %    Basophil % 0.1 0.0 - 1.5 %    Immature Grans % 1.7 (H) 0.0 - 0.5 %    Neutrophils, Absolute 12.71 (H) 1.70 - 7.00 10*3/mm3    Lymphocytes, Absolute 0.85 0.70 - 3.10 10*3/mm3    Monocytes, Absolute 0.50 0.10 - 0.90 10*3/mm3    Eosinophils, Absolute 0.01 0.00 - 0.40 10*3/mm3    Basophils, Absolute 0.02 0.00 - 0.20 10*3/mm3    Immature Grans, Absolute 0.24 (H) 0.00 - 0.05 10*3/mm3    nRBC 0.1 0.0 - 0.2 /100 WBC   Basic Metabolic Panel    Collection Time: 05/04/20  4:58 AM   Result Value Ref Range    Glucose 113 (H) 65 - 99 mg/dL    BUN 26 (H) 8 - 23 mg/dL    Creatinine 1.12 (H) 0.57 - 1.00 mg/dL    Sodium 138 136 - 145 mmol/L    Potassium 4.1 3.5 - 5.2 mmol/L    Chloride 103 98 - 107 mmol/L    CO2 26.3 22.0 - 29.0 mmol/L    Calcium  9.1 8.6 - 10.5 mg/dL    eGFR Non African Amer 47 (L) >60 mL/min/1.73    BUN/Creatinine Ratio 23.2 7.0 - 25.0    Anion Gap 8.7 5.0 - 15.0 mmol/L   Protime-INR    Collection Time: 05/04/20  4:58 AM   Result Value Ref Range    Protime 27.7 (H) 11.7 - 14.2 Seconds    INR 2.62 (H) 0.90 - 1.10   CBC Auto Differential    Collection Time: 05/04/20  4:58 AM   Result Value Ref Range    WBC 15.60 (H) 3.40 - 10.80 10*3/mm3    RBC 3.66 (L) 3.77 - 5.28 10*6/mm3    Hemoglobin 8.8 (L) 12.0 - 15.9 g/dL    Hematocrit 29.2 (L) 34.0 - 46.6 %    MCV 79.8 79.0 - 97.0 fL    MCH 24.0 (L) 26.6 - 33.0 pg    MCHC 30.1 (L) 31.5 - 35.7 g/dL    RDW 15.1 12.3 - 15.4 %    RDW-SD 42.7 37.0 - 54.0 fl    MPV 10.3 6.0 - 12.0 fL    Platelets 255 140 - 450 10*3/mm3    Neutrophil % 82.3 (H) 42.7 - 76.0 %    Lymphocyte % 9.3 (L) 19.6 - 45.3 %    Monocyte % 6.4 5.0 - 12.0 %    Eosinophil % 0.1 (L) 0.3 - 6.2 %    Basophil % 0.3 0.0 - 1.5 %    Immature Grans % 1.6 (H) 0.0 - 0.5 %    Neutrophils, Absolute 12.84 (H) 1.70 - 7.00 10*3/mm3    Lymphocytes, Absolute 1.45 0.70 - 3.10 10*3/mm3    Monocytes, Absolute 1.00 (H) 0.10 - 0.90 10*3/mm3    Eosinophils, Absolute 0.02 0.00 - 0.40 10*3/mm3    Basophils, Absolute 0.04 0.00 - 0.20 10*3/mm3    Immature Grans, Absolute 0.25 (H) 0.00 - 0.05 10*3/mm3    nRBC 0.0 0.0 - 0.2 /100 WBC   Protime-INR    Collection Time: 05/05/20  5:59 AM   Result Value Ref Range    Protime 16.9 (H) 11.7 - 14.2 Seconds    INR 1.40 (H) 0.90 - 1.10   Basic Metabolic Panel    Collection Time: 05/05/20  6:00 AM   Result Value Ref Range    Glucose 107 (H) 65 - 99 mg/dL    BUN 28 (H) 8 - 23 mg/dL    Creatinine 0.85 0.57 - 1.00 mg/dL    Sodium 140 136 - 145 mmol/L    Potassium 4.1 3.5 - 5.2 mmol/L    Chloride 101 98 - 107 mmol/L    CO2 27.5 22.0 - 29.0 mmol/L    Calcium 9.3 8.6 - 10.5 mg/dL    eGFR Non African Amer 65 >60 mL/min/1.73    BUN/Creatinine Ratio 32.9 (H) 7.0 - 25.0    Anion Gap 11.5 5.0 - 15.0 mmol/L   CBC Auto Differential     Collection Time: 05/05/20  6:00 AM   Result Value Ref Range    WBC 15.72 (H) 3.40 - 10.80 10*3/mm3    RBC 3.64 (L) 3.77 - 5.28 10*6/mm3    Hemoglobin 8.9 (L) 12.0 - 15.9 g/dL    Hematocrit 28.7 (L) 34.0 - 46.6 %    MCV 78.8 (L) 79.0 - 97.0 fL    MCH 24.5 (L) 26.6 - 33.0 pg    MCHC 31.0 (L) 31.5 - 35.7 g/dL    RDW 15.2 12.3 - 15.4 %    RDW-SD 42.2 37.0 - 54.0 fl    MPV 11.4 6.0 - 12.0 fL    Platelets 278 140 - 450 10*3/mm3    Neutrophil % 80.6 (H) 42.7 - 76.0 %    Lymphocyte % 10.6 (L) 19.6 - 45.3 %    Monocyte % 7.1 5.0 - 12.0 %    Eosinophil % 0.4 0.3 - 6.2 %    Basophil % 0.2 0.0 - 1.5 %    Immature Grans % 1.1 (H) 0.0 - 0.5 %    Neutrophils, Absolute 12.67 (H) 1.70 - 7.00 10*3/mm3    Lymphocytes, Absolute 1.66 0.70 - 3.10 10*3/mm3    Monocytes, Absolute 1.12 (H) 0.10 - 0.90 10*3/mm3    Eosinophils, Absolute 0.06 0.00 - 0.40 10*3/mm3    Basophils, Absolute 0.03 0.00 - 0.20 10*3/mm3    Immature Grans, Absolute 0.18 (H) 0.00 - 0.05 10*3/mm3    nRBC 0.0 0.0 - 0.2 /100 WBC   Basic Metabolic Panel    Collection Time: 05/06/20  5:02 AM   Result Value Ref Range    Glucose 100 (H) 65 - 99 mg/dL    BUN 28 (H) 8 - 23 mg/dL    Creatinine 0.99 0.57 - 1.00 mg/dL    Sodium 137 136 - 145 mmol/L    Potassium 4.3 3.5 - 5.2 mmol/L    Chloride 98 98 - 107 mmol/L    CO2 27.0 22.0 - 29.0 mmol/L    Calcium 9.2 8.6 - 10.5 mg/dL    eGFR Non African Amer 55 (L) >60 mL/min/1.73    BUN/Creatinine Ratio 28.3 (H) 7.0 - 25.0    Anion Gap 12.0 5.0 - 15.0 mmol/L   CBC Auto Differential    Collection Time: 05/06/20  5:02 AM   Result Value Ref Range    WBC 16.91 (H) 3.40 - 10.80 10*3/mm3    RBC 3.75 (L) 3.77 - 5.28 10*6/mm3    Hemoglobin 9.3 (L) 12.0 - 15.9 g/dL    Hematocrit 29.9 (L) 34.0 - 46.6 %    MCV 79.7 79.0 - 97.0 fL    MCH 24.8 (L) 26.6 - 33.0 pg    MCHC 31.1 (L) 31.5 - 35.7 g/dL    RDW 15.3 12.3 - 15.4 %    RDW-SD 41.6 37.0 - 54.0 fl    MPV 11.0 6.0 - 12.0 fL    Platelets 256 140 - 450 10*3/mm3    Neutrophil % 80.0 (H) 42.7 - 76.0 %     Lymphocyte % 10.2 (L) 19.6 - 45.3 %    Monocyte % 7.7 5.0 - 12.0 %    Eosinophil % 0.5 0.3 - 6.2 %    Basophil % 0.2 0.0 - 1.5 %    Immature Grans % 1.4 (H) 0.0 - 0.5 %    Neutrophils, Absolute 13.50 (H) 1.70 - 7.00 10*3/mm3    Lymphocytes, Absolute 1.73 0.70 - 3.10 10*3/mm3    Monocytes, Absolute 1.31 (H) 0.10 - 0.90 10*3/mm3    Eosinophils, Absolute 0.09 0.00 - 0.40 10*3/mm3    Basophils, Absolute 0.04 0.00 - 0.20 10*3/mm3    Immature Grans, Absolute 0.24 (H) 0.00 - 0.05 10*3/mm3    nRBC 0.1 0.0 - 0.2 /100 WBC     *Note: Due to a large number of results and/or encounters for the requested time period, some results have not been displayed. A complete set of results can be found in Results Review.

## 2020-06-02 ENCOUNTER — APPOINTMENT (OUTPATIENT)
Dept: CT IMAGING | Facility: HOSPITAL | Age: 75
End: 2020-06-02

## 2020-06-02 ENCOUNTER — HOSPITAL ENCOUNTER (EMERGENCY)
Facility: HOSPITAL | Age: 75
Discharge: HOME | End: 2020-06-02
Attending: EMERGENCY MEDICINE | Admitting: EMERGENCY MEDICINE

## 2020-06-02 ENCOUNTER — APPOINTMENT (OUTPATIENT)
Dept: GENERAL RADIOLOGY | Facility: HOSPITAL | Age: 75
End: 2020-06-02

## 2020-06-02 ENCOUNTER — READMISSION MANAGEMENT (OUTPATIENT)
Dept: CALL CENTER | Facility: HOSPITAL | Age: 75
End: 2020-06-02

## 2020-06-02 ENCOUNTER — OFFICE VISIT (OUTPATIENT)
Dept: CARDIOLOGY | Facility: CLINIC | Age: 75
End: 2020-06-02

## 2020-06-02 VITALS
TEMPERATURE: 98 F | HEART RATE: 115 BPM | DIASTOLIC BLOOD PRESSURE: 70 MMHG | HEIGHT: 67 IN | RESPIRATION RATE: 22 BRPM | OXYGEN SATURATION: 96 % | WEIGHT: 144 LBS | SYSTOLIC BLOOD PRESSURE: 120 MMHG | BODY MASS INDEX: 22.6 KG/M2

## 2020-06-02 VITALS
TEMPERATURE: 98.3 F | OXYGEN SATURATION: 94 % | RESPIRATION RATE: 16 BRPM | HEART RATE: 97 BPM | SYSTOLIC BLOOD PRESSURE: 124 MMHG | DIASTOLIC BLOOD PRESSURE: 72 MMHG | HEIGHT: 67 IN | BODY MASS INDEX: 22.55 KG/M2

## 2020-06-02 DIAGNOSIS — S62.355A CLOSED NONDISPLACED FRACTURE OF SHAFT OF FOURTH METACARPAL BONE OF LEFT HAND, INITIAL ENCOUNTER: ICD-10-CM

## 2020-06-02 DIAGNOSIS — S50.312A ABRASION OF LEFT ELBOW, INITIAL ENCOUNTER: ICD-10-CM

## 2020-06-02 DIAGNOSIS — S50.311A ABRASION OF RIGHT ELBOW, INITIAL ENCOUNTER: ICD-10-CM

## 2020-06-02 DIAGNOSIS — S62.357A CLOSED NONDISPLACED FRACTURE OF SHAFT OF FIFTH METACARPAL BONE OF LEFT HAND, INITIAL ENCOUNTER: ICD-10-CM

## 2020-06-02 DIAGNOSIS — D59.12 COLD AGGLUTININ DISEASE (HCC): ICD-10-CM

## 2020-06-02 DIAGNOSIS — S00.93XA CONTUSION OF HEAD, UNSPECIFIED PART OF HEAD, INITIAL ENCOUNTER: Primary | ICD-10-CM

## 2020-06-02 DIAGNOSIS — R06.09 DYSPNEA ON EXERTION: ICD-10-CM

## 2020-06-02 DIAGNOSIS — Z79.01 LONG TERM CURRENT USE OF ANTICOAGULANT THERAPY: ICD-10-CM

## 2020-06-02 DIAGNOSIS — I10 ESSENTIAL HYPERTENSION: ICD-10-CM

## 2020-06-02 DIAGNOSIS — I50.32 CHRONIC DIASTOLIC CHF (CONGESTIVE HEART FAILURE) (HCC): ICD-10-CM

## 2020-06-02 DIAGNOSIS — R06.89 RESPIRATORY INSUFFICIENCY: Primary | ICD-10-CM

## 2020-06-02 DIAGNOSIS — I48.11 LONGSTANDING PERSISTENT ATRIAL FIBRILLATION (HCC): ICD-10-CM

## 2020-06-02 DIAGNOSIS — D68.61 ANTIPHOSPHOLIPID ANTIBODY SYNDROME (HCC): ICD-10-CM

## 2020-06-02 DIAGNOSIS — I27.20 PULMONARY HTN (HCC): ICD-10-CM

## 2020-06-02 DIAGNOSIS — K50.019 CROHN'S DISEASE OF SMALL INTESTINE WITH COMPLICATION (HCC): ICD-10-CM

## 2020-06-02 LAB
INR PPP: 2.28 (ref 0.9–1.1)
PROTHROMBIN TIME: 24.8 SECONDS (ref 11.7–14.2)

## 2020-06-02 PROCEDURE — 73070 X-RAY EXAM OF ELBOW: CPT

## 2020-06-02 PROCEDURE — 93000 ELECTROCARDIOGRAM COMPLETE: CPT | Performed by: INTERNAL MEDICINE

## 2020-06-02 PROCEDURE — 25010000002 TDAP 5-2.5-18.5 LF-MCG/0.5 SUSPENSION: Performed by: EMERGENCY MEDICINE

## 2020-06-02 PROCEDURE — 90715 TDAP VACCINE 7 YRS/> IM: CPT | Performed by: EMERGENCY MEDICINE

## 2020-06-02 PROCEDURE — 99205 OFFICE O/P NEW HI 60 MIN: CPT | Performed by: INTERNAL MEDICINE

## 2020-06-02 PROCEDURE — 36415 COLL VENOUS BLD VENIPUNCTURE: CPT

## 2020-06-02 PROCEDURE — 90471 IMMUNIZATION ADMIN: CPT | Performed by: EMERGENCY MEDICINE

## 2020-06-02 PROCEDURE — 85610 PROTHROMBIN TIME: CPT | Performed by: EMERGENCY MEDICINE

## 2020-06-02 PROCEDURE — 70450 CT HEAD/BRAIN W/O DYE: CPT

## 2020-06-02 PROCEDURE — 73130 X-RAY EXAM OF HAND: CPT

## 2020-06-02 PROCEDURE — 99283 EMERGENCY DEPT VISIT LOW MDM: CPT

## 2020-06-02 RX ORDER — DILTIAZEM HYDROCHLORIDE 120 MG/1
120 CAPSULE, EXTENDED RELEASE ORAL DAILY
Qty: 30 CAPSULE | Refills: 11 | Status: SHIPPED | OUTPATIENT
Start: 2020-06-02 | End: 2020-06-09

## 2020-06-02 RX ORDER — HYDROCODONE BITARTRATE AND ACETAMINOPHEN 5; 325 MG/1; MG/1
1 TABLET ORAL EVERY 6 HOURS PRN
Qty: 5 TABLET | Refills: 0 | Status: SHIPPED | OUTPATIENT
Start: 2020-06-02 | End: 2020-06-11 | Stop reason: SDUPTHER

## 2020-06-02 RX ADMIN — TETANUS TOXOID, REDUCED DIPHTHERIA TOXOID AND ACELLULAR PERTUSSIS VACCINE, ADSORBED 0.5 ML: 5; 2.5; 8; 8; 2.5 SUSPENSION INTRAMUSCULAR at 17:53

## 2020-06-02 NOTE — OUTREACH NOTE
"Medical Week 2 Survey      Responses   [facility]   COVID-19 Test Status  Negative   Does the patient have one of the following disease processes/diagnoses(primary or secondary)?  Other   Week 2 attempt successful?  Yes   Call start time  1312   General alerts for this patient   is a MD   Discharge diagnosis  AFIB with RVR   Call end time  1316   Meds reviewed with patient/caregiver?  Yes   Is the patient taking all medications as directed (includes completed medication regime)?  Yes   Has the patient kept scheduled appointments due by today?  Yes   Comments  Had telephone visits and freddy see cardiologist   What is the patient's perception of their health status since discharge?  Same   Is the patient/caregiver able to teach back signs and symptoms related to disease process for when to call PCP?  Yes   Is the patient/caregiver able to teach back signs and symptoms related to disease process for when to call 911?  Yes   Is the patient/caregiver able to teach back the hierarchy of who to call/visit for symptoms/problems? PCP, Specialist, Home health nurse, Urgent Care, ED, 911  Yes   Additional teach back comments  Pt having some SOA and \"Chrohn's is acting up\". She asked about taking immodium and I deferred this question to her GI specialist or PCP. Enc her to take fluids. No evidence of melena.   Week 2 Call Completed?  Yes          Elsy Wolf RN  "

## 2020-06-02 NOTE — ED TRIAGE NOTES
Pt's  tripped and fell on her.  She fell and has right elbow and hand pain.  Hit her head.  Is on coumadin.  Mask on at triage

## 2020-06-02 NOTE — ED PROVIDER NOTES
Splint - Cast - Strapping  Date/Time: 6/2/2020 7:10 PM  Performed by: Ashley Mike PA-C  Authorized by: Jameson Meyer MD     Consent:     Consent obtained:  Verbal    Consent given by:  Patient    Risks discussed:  Discoloration and pain  Pre-procedure details:     Sensation:  Normal  Procedure details:     Laterality:  Left    Location:  Arm    Arm:  L lower arm    Strapping: no      Cast type:  Short arm    Splint type:  Sugar tong    Supplies:  Ortho-Glass, elastic bandage, cotton padding and sling  Post-procedure details:     Pain:  Unchanged    Sensation:  Normal    Patient tolerance of procedure:  Tolerated well, no immediate complications           Ashley Mike PA-C  06/02/20 2336
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
## 2020-06-02 NOTE — ED NOTES
Bilateral elbows cleaned with NS, GERA applied and bandaids applied.     Kate Garcia, RN  06/02/20 9641

## 2020-06-03 PROBLEM — R06.89 RESPIRATORY INSUFFICIENCY: Status: ACTIVE | Noted: 2020-06-03

## 2020-06-03 PROBLEM — R06.09 DYSPNEA ON EXERTION: Status: ACTIVE | Noted: 2020-06-03

## 2020-06-03 NOTE — PROGRESS NOTES
[facility]        Subjective:     Encounter Date:06/02/2020      Patient ID: Joellen Dominguez is a 75 y.o. female.    Chief Complaint:  Chief Complaint   Patient presents with   • Congestive Heart Failure   • Atrial Fibrillation       HPI: I saw Joellen Dominguez today for cardiovascular evaluation.  She is a 75-year-old female who presents with respiratory insufficiency and significant dyspnea on exertion.  She has been observing the CDC guidelines for social distancing.  She does not report any fever or cough.  She does not report any change in her sense of smell or taste.  Ms. Dominguez has a history of Crohn's disease, antiphospholipid antibody syndrome, pulmonary hypertension, paroxysmal atrial fibrillation on  anticoagulation with Coumadin.  She reported respiratory insufficiency and was found to have atrial fibrillation with a rapid ventricular response, diastolic dysfunction elevated troponin. Coronary angiography right left heart catheterization was performed 4/22/2020 this demonstrated left ventricular end-diastolic pressure of 20 mmHg, PA pressure 46/20 wedge pressure 13.  Coronary angiography revealed normal epicardial coronary vessels.  Left ventriculogram revealed 1-2+ mitral insufficiency.  Echocardiogram from 4/19/2020 reveals left ventricular ejection fraction 62% mild to moderate concentric left ventricular hypertrophy, moderate mitral insufficiency, mild tricuspid insufficiency, right ventricular systolic pressure 39.2 mmHg.  She subsequently underwent electrical cardioversion to sinus rhythm on 4/25/2020.  Transesophageal echo on 4/23/2020 also demonstrated preserved left ventricular function with mild mitral insufficiency, severely dilated left atrium.She is reported persistent respiratory insufficiency and chest CT from 5/22/2020 demonstrated new groundglass opacities in the right upper and mid lobes and the left lung base.  Infectious infiltrates are suspected.  She continues to  report severe shortness of breath at rest as well as with exertion.  She is currently sleeping on 3 pillows.  She reports chest tightness and epigastric discomfort.  This can occur at rest or with exertion.  This discomfort is atypical and is of long duration lasting for the majority of the day.  It is improved with change in position.  She denies syncope she does have some dizziness upon standing she continues to have palpitations.    The following portions of the patient's history were reviewed and updated as appropriate: allergies, current medications, past family history, past medical history, past social history, past surgical history and problem list.    Problem List:  Patient Active Problem List   Diagnosis   • Incontinence   • Degeneration of lumbar intervertebral disc   • Other hyperlipidemia   • Essential hypertension   • Depression   • Antiphospholipid antibody syndrome (CMS/HCC)   • Lichen sclerosus et atrophicus   • Myocardial infarction type 2 (CMS/HCC)   • PAF (paroxysmal atrial fibrillation) (CMS/HCC)   • History of cardioversion   • Leukocytosis   • Pulmonary HTN (CMS/HCC)   • Crohn's disease of small intestine with complication (CMS/HCC)   • Cold agglutinin disease (CMS/HCC)   • Rapid atrial fibrillation (CMS/HCC)   • Chronic diastolic CHF (congestive heart failure) (CMS/HCC)   • MARY ANN (acute kidney injury) (CMS/HCC)   • Warfarin-induced coagulopathy (CMS/HCC)   • Rectal bleeding   • Respiratory insufficiency   • Dyspnea on exertion       Past Medical History:  Past Medical History:   Diagnosis Date   • Acute deep vein thrombosis of lower extremity (CMS/HCC)    • Antiphospholipid antibody syndrome (CMS/HCC)    • Antiphospholipid antibody syndrome (CMS/HCC)    • Arthritis     OSTEO   • Benign essential hypertension    • COPD (chronic obstructive pulmonary disease) (CMS/HCC)    • Coronary artery disease    • Crohn's disease (CMS/HCC)    • Cutaneous candidiasis    • Depression    • Disease of thyroid  gland    • Elevated cholesterol    • GERD (gastroesophageal reflux disease)    • History of echocardiogram 04/22/2020    mild-to-moderate concentric hypertrophy, EF 56 - 60%. LA severely dilated, Mild MAC, Mild MR with restrictive movement of the posterior leaflet   • Hyperlipidemia    • Hypertension    • Osteopenia    • Polyp, uterus corpus        Past Surgical History:  Past Surgical History:   Procedure Laterality Date   • ABDOMINAL HERNIA REPAIR     • AMPUTATION DIGIT Left     SECOND TOE    • BILATERAL SALPINGO OOPHORECTOMY     • BREAST BIOPSY Right     BENIGN   • CARDIAC CATHETERIZATION N/A 4/22/2020    Surgeon: Paulo Singleton MD;  nonsignificant coronary artery disease normal LV function nonsignificant mitral regurgitation probably shortness of breath due to the cardiac arrhythmias and diastolic dysfunction   • CARDIAC CATHETERIZATION N/A 4/22/2020    Procedure: Coronary angiography;  Surgeon: Paulo Singleton MD;  Location: [facility] CATH INVASIVE LOCATION;  Service: Cardiology;  Laterality: N/A;   • CARDIAC CATHETERIZATION N/A 4/22/2020    Procedure: Left ventriculography;  Surgeon: Paulo Singleton MD;  Location: [facility] CATH INVASIVE LOCATION;  Service: Cardiology;  Laterality: N/A;   • CARDIAC ELECTROPHYSIOLOGY PROCEDURE N/A 4/25/2020    Procedure: Cardioversion;  Surgeon: Paulo Singleton MD;  [facility];  Service: Cardiology;  Laterality: N/A;   • CHOLECYSTECTOMY     • COLONOSCOPY     • COSMETIC SURGERY     • D&C HYSTEROSCOPY N/A 10/13/2016    Procedure: DILATATION AND CURETTAGE HYSTEROSCOPY WITH MYOSURE;  Surgeon: Christopher Doll MD;  Location: [facility] OR;  Service:    • ENDOSCOPY     • EYE SURGERY Bilateral     cataract   • FACELIFT     • FEMORAL ARTERY - FEMORAL ARTERY BYPASS GRAFT Bilateral    • HEMORRHOIDECTOMY     • TONSILLECTOMY AND ADENOIDECTOMY     • VASCULAR SURGERY      femoral stents        Social History:  Social History      Socioeconomic History   • Marital status:      Spouse name: Not on file   • Number of children: Not on file   • Years of education: Not on file   • Highest education level: Not on file   Tobacco Use   • Smoking status: Former Smoker     Packs/day: 2.00     Years: 16.00     Pack years: 32.00     Types: Cigarettes     Last attempt to quit:      Years since quittin.4   • Smokeless tobacco: Never Used   Substance and Sexual Activity   • Alcohol use: No   • Drug use: No   • Sexual activity: Defer       Allergies:  Allergies   Allergen Reactions   • Sulfa Antibiotics Hives   • Infliximab Rash         ROS:  Review of Systems   Constitution: Negative for malaise/fatigue.   HENT: Negative for hearing loss and nosebleeds.    Eyes: Negative for double vision and visual disturbance.   Cardiovascular: Positive for dyspnea on exertion. Negative for chest pain, claudication, near-syncope, orthopnea, palpitations, paroxysmal nocturnal dyspnea and syncope.        Chest tightness   Respiratory: Positive for shortness of breath. Negative for cough, sleep disturbances due to breathing, snoring, sputum production and wheezing.    Endocrine: Negative for cold intolerance, heat intolerance, polydipsia and polyuria.   Hematologic/Lymphatic: Negative for adenopathy and bleeding problem. Does not bruise/bleed easily.   Skin: Negative for flushing and itching.   Musculoskeletal: Negative for back pain, falls, joint pain, joint swelling, muscle weakness and neck pain.   Gastrointestinal: Negative for abdominal pain, dysphagia, heartburn, nausea and vomiting.   Genitourinary: Negative for dysuria and frequency.   Neurological: Negative for disturbances in coordination, dizziness, light-headedness, loss of balance, numbness and weakness.   Psychiatric/Behavioral: Negative for altered mental status and memory loss. The patient is not nervous/anxious.    Allergic/Immunologic: Negative for hives and persistent infections.         "  Objective:         /70 (BP Location: Left arm, Patient Position: Sitting, Cuff Size: Large Adult)   Pulse 115   Temp 98 °F (36.7 °C)   Resp 22   Ht 170.2 cm (67\")   Wt 65.3 kg (144 lb)   SpO2 96%   BMI 22.55 kg/m²     Physical Exam   Constitutional: She is oriented to person, place, and time. She appears well-developed and well-nourished.   Dyspneic upon speaking   HENT:   Head: Normocephalic and atraumatic.   Eyes: Pupils are equal, round, and reactive to light. Conjunctivae and EOM are normal.   Neck: Neck supple. No thyromegaly present.   Cardiovascular: Normal rate, regular rhythm, S1 normal, S2 normal and intact distal pulses. PMI is not displaced. Exam reveals gallop and S4. Exam reveals no S3, no distant heart sounds, no friction rub, no midsystolic click and no opening snap.   No murmur heard.  Pulses:       Carotid pulses are 2+ on the right side, and 2+ on the left side.       Radial pulses are 2+ on the right side, and 2+ on the left side.        Femoral pulses are 2+ on the right side, and 2+ on the left side.       Dorsalis pedis pulses are 2+ on the right side, and 2+ on the left side.        Posterior tibial pulses are 2+ on the right side, and 2+ on the left side.   1/6 systolic murmur left sternal border   Pulmonary/Chest: Effort normal and breath sounds normal. No respiratory distress. She has no wheezes. She has no rales.   Diminished breath sounds bilaterally, bilateral crackles, wheezing was not noted   Abdominal: Soft. Bowel sounds are normal. She exhibits no distension and no mass. There is no tenderness.   Musculoskeletal: Normal range of motion. She exhibits no edema.   Neurological: She is alert and oriented to person, place, and time.   Skin: Skin is warm and dry. No erythema.   Psychiatric: She has a normal mood and affect.       In-Office Procedure(s):    ECG 12 Lead  Date/Time: 6/2/2020 3:27 PM  Performed by: Karthik Reyez MD  Authorized by: Karthik eRyez MD "   Comparison: compared with previous ECG from 5/20/2020  Comparison to previous ECG: Rate is slower  Rhythm: atrial flutter  BPM: 103  Other findings: non-specific ST-T wave changes and left ventricular hypertrophy    Clinical impression: abnormal EKG  Comments: Atrial flutter with a mildly increased ventricular response, voltage criteria for left ventricular hypertrophy, nonspecific ST-T wave changes            ASCVD RIsk Score::  The ASCVD Risk score (Rocio FAULKNER Jr., et al., 2013) failed to calculate for the following reasons:    The patient has a prior MI or stroke diagnosis    Recent Radiology:  Imaging Results (Most Recent)     None            Results from last 7 days   Lab Units 06/02/20  1750   INR  2.28*     Assessment/Plan:         1. Respiratory insufficiency  Multifactorial including age deconditioning hypertension with diastolic dysfunction prior tobacco use with possible infectious process currently.  In addition increase beta-blocker therapy and underlying atrial fibrillation I suspect are contributing    2. Pulmonary HTN (CMS/HCC)  Moderate  - Ambulatory Referral to Pulmonology    3. Dyspnea on exertion  Severe and limiting  - Ambulatory Referral to Pulmonology    4. Essential hypertension  Controlled  - dilTIAZem XR (DILACOR XR) 120 MG 24 hr capsule; Take 1 capsule by mouth Daily.  Dispense: 30 capsule; Refill: 11    5. Chronic diastolic CHF (congestive heart failure) (CMS/HCC)  Appears euvolemic    6. Crohn's disease of small intestine with complication (CMS/HCC)      7. Antiphospholipid antibody syndrome (CMS/HCC)      8. Cold agglutinin disease (CMS/HCC)      9. Longstanding persistent atrial fibrillation  Controlled ventricular response    10. Long term current use of anticoagulant therapy  Presently tolerated    I reviewed with Ms. Dominguez and her  who is a retired radiologist her previous cardiovascular evaluation including transthoracic and transesophageal echo and coronary angiography.  I  feel that her respiratory insufficiency is multifactorial as indicated above.  I certainly feel that there is a strong component of underlying pulmonary disease contributing to her respiratory status but I suspect her beta-blocker therapy and diastolic dysfunction are significantly contributing as well.  Have initiated diltiazem CD at 120 mg daily.  We will continue to monitor her blood pressure and heart rate and reduce her atenolol which is 100 mg twice daily by 50 mg increments.  I will arrange for pulmonary evaluation as soon as possible.  I have encouraged her to continue her anticoagulation at present.  I feel she would certainly benefit from sinus rhythm being restored if at all possible.  We can consider repeat cardioversion versus ablation.  She will also continue on amiodarone.  I will arrange for early follow-up within 1 week.   I spent 60 minutes with Ms. Dominguez today reviewing her prior hospitalization and work-up and counseling her on her contributing factors for respiratory insufficiency and outlined a treatment plan.    Karthik Reyez MD  06/03/20  .

## 2020-06-08 ENCOUNTER — OFFICE VISIT (OUTPATIENT)
Dept: SLEEP MEDICINE | Facility: HOSPITAL | Age: 75
End: 2020-06-08

## 2020-06-08 ENCOUNTER — READMISSION MANAGEMENT (OUTPATIENT)
Dept: CALL CENTER | Facility: HOSPITAL | Age: 75
End: 2020-06-08

## 2020-06-08 VITALS
WEIGHT: 135 LBS | DIASTOLIC BLOOD PRESSURE: 61 MMHG | SYSTOLIC BLOOD PRESSURE: 106 MMHG | HEART RATE: 56 BPM | HEIGHT: 67 IN | BODY MASS INDEX: 21.19 KG/M2 | OXYGEN SATURATION: 92 %

## 2020-06-08 DIAGNOSIS — G47.30 SLEEP APNEA, UNSPECIFIED TYPE: ICD-10-CM

## 2020-06-08 DIAGNOSIS — I27.20 PULMONARY HYPERTENSION (HCC): Primary | ICD-10-CM

## 2020-06-08 PROCEDURE — G0463 HOSPITAL OUTPT CLINIC VISIT: HCPCS

## 2020-06-08 NOTE — OUTREACH NOTE
Medical Week 3 Survey      Responses   [facility]   [facility] Test Status  Negative   Does the patient have one of the following disease processes/diagnoses(primary or secondary)?  Other   Week 3 attempt successful?  Yes   Call start time  1800   Call end time  1803   Discharge diagnosis  AFIB with [facility]   Person spoke with today (if not patient) and relationship  DR. DARREL KING, SPOUSE   Meds reviewed with patient/caregiver?  Yes   Is the patient having any side effects they believe may be caused by any medication additions or changes?  No   Does the patient have all medications ordered at discharge?  Yes   Is the patient taking all medications as directed (includes completed medication regime)?  Yes   Does the patient have a primary care provider?   Yes   Does the patient have an appointment with their PCP within 7 days of discharge?  Yes   Has the patient kept scheduled appointments due by today?  Yes   What is the Home health agency?   [facility]    Has home health visited the patient within 72 hours of discharge?  Yes   Psychosocial issues?  No   Comments   states pt able to advance to more ADL'swith help of HH OT and PT   Did the patient receive a copy of their discharge instructions?  Yes   Nursing interventions  Reviewed instructions with patient   What is the patient's perception of their health status since discharge?  Improving   Is the patient/caregiver able to teach back signs and symptoms related to disease process for when to call PCP?  Yes   Is the patient/caregiver able to teach back signs and symptoms related to disease process for when to call 911?  Yes   Is the patient/caregiver able to teach back the hierarchy of who to call/visit for symptoms/problems? PCP, Specialist, Home health nurse, Urgent Care, ED, 911  Yes   Week 3 Call Completed?  Yes          Cindi Boone RN

## 2020-06-08 NOTE — PROGRESS NOTES
CONSULT NOTE    Patient Identification:  Joellen Dominguez  75 y.o.  female  1945  [id number]            Requesting physician: Dr. Leyva    Reason for Consultation: Witnessed apnea    CC:     History of Present Illness:  Very pleasant 75-year-old female who was referred to me for evaluation of witnessed apnea.   who is a retired radiologist tells me that he is observed witnessed apnea.  He tells me that she has been going downhill with her breathing in the last 2 to 3 months.  She gets extremely short of breath with minimal exertion.  She is to the point that she is now using a wheelchair.  She has no diagnosed history of chronic lung disease as such.   tells me that she has panting in her sleep associated with witnessed apnea.  No heavy alcohol no parasomnias no restless leg symptoms as such reported.  Sleep schedule time to bed 10 gets up 4 in the morning and gets about 6 hours of sleep and feels tired unrested she is lost 20 pounds.  She was admitted recently with A. fib [facility] records were reviewed.  She has had a recent cardiac cath which is consistent with mild pulmonary hypertension elevated wedge.      Review of Systems  Positive for frequent urination sores in the mouth postnasal drainage painful joints irregular heartbeat chest pain anxiety depression problems swallowing heartburn diarrhea rash always feeling too warm rest of the 12 point review of system negative.  Cartwright was 8 out of 24 within normal limits  Past Medical History:  Past Medical History:   Diagnosis Date   • Acute deep vein thrombosis of lower extremity (CMS/HCC)    • Antiphospholipid antibody syndrome (CMS/HCC)    • Antiphospholipid antibody syndrome (CMS/HCC)    • Arthritis     OSTEO   • Benign essential hypertension    • COPD (chronic obstructive pulmonary disease) (CMS/HCC)    • Coronary artery disease    • Crohn's disease (CMS/HCC)    • Cutaneous candidiasis    • Depression    • Disease of thyroid gland    • Elevated  cholesterol    • GERD (gastroesophageal reflux disease)    • History of echocardiogram 04/22/2020    mild-to-moderate concentric hypertrophy, EF 56 - 60%. LA severely dilated, Mild MAC, Mild MR with restrictive movement of the posterior leaflet   • Hyperlipidemia    • Hypertension    • Osteopenia    • Polyp, uterus corpus        Past Surgical History:  Past Surgical History:   Procedure Laterality Date   • ABDOMINAL HERNIA REPAIR     • AMPUTATION DIGIT Left     SECOND TOE    • BILATERAL SALPINGO OOPHORECTOMY     • BREAST BIOPSY Right     BENIGN   • CARDIAC CATHETERIZATION N/A 4/22/2020    Surgeon: Paulo Singleton MD;  nonsignificant coronary artery disease normal LV function nonsignificant mitral regurgitation probably shortness of breath due to the cardiac arrhythmias and diastolic dysfunction   • CARDIAC CATHETERIZATION N/A 4/22/2020    Procedure: Coronary angiography;  Surgeon: Paulo Singleton MD;  [facility];  Service: Cardiology;  Laterality: N/A;   • CARDIAC CATHETERIZATION N/A 4/22/2020    Procedure: Left ventriculography;  Surgeon: Paulo Singleton MD;  [facility];  Service: Cardiology;  Laterality: N/A;   • CARDIAC ELECTROPHYSIOLOGY PROCEDURE N/A 4/25/2020    Procedure: Cardioversion;  Surgeon: Paulo Singleton MD;  Location:  [facility] CATH INVASIVE LOCATION;  Service: Cardiology;  Laterality: N/A;   • CHOLECYSTECTOMY     • COLONOSCOPY     • COSMETIC SURGERY     • D&C HYSTEROSCOPY N/A 10/13/2016    Procedure: DILATATION AND CURETTAGE HYSTEROSCOPY WITH MYOSURE;  Surgeon: Christopher Doll MD;  Location: [facility];  Service:    • ENDOSCOPY     • EYE SURGERY Bilateral     cataract   • FACELIFT     • FEMORAL ARTERY - FEMORAL ARTERY BYPASS GRAFT Bilateral    • HEMORRHOIDECTOMY     • TONSILLECTOMY AND ADENOIDECTOMY     • VASCULAR SURGERY      femoral stents         Home Meds:    (Not in a hospital  "admission)    Allergies:  Allergies   Allergen Reactions   • Sulfa Antibiotics Hives   • Infliximab Rash       Social History:   Social History     Socioeconomic History   • Marital status:      Spouse name: Not on file   • Number of children: Not on file   • Years of education: Not on file   • Highest education level: Not on file   Tobacco Use   • Smoking status: Former Smoker     Packs/day: 2.00     Years: 16.00     Pack years: 32.00     Types: Cigarettes     Last attempt to quit: 1967     Years since quittin.4   • Smokeless tobacco: Never Used   Substance and Sexual Activity   • Alcohol use: No   • Drug use: No   • Sexual activity: Defer       Family History:  Family History   Problem Relation Age of Onset   • Lung cancer Paternal Uncle    • Hypertension Mother    • Liver disease Mother    • Anemia Mother    • No Known Problems Father    • Autoimmune disease Brother        Physical Exam:  /61   Pulse 56   Ht 170.2 cm (67\")   Wt 61.2 kg (135 lb) Comment: patient reported  SpO2 92%   BMI 21.14 kg/m²  Body mass index is 21.14 kg/m². 92% 61.2 kg (135 lb)  Physical Exam  Patient is examined using the personal protective equipment as per guidelines from infection control for this particular patient as enacted.  Hand hygiene was performed before and after patient interaction.  Well-developed normal body habitus  Eyes normal conjunctive a pupils reactive to light  ENT Mallampati between 4 normal nasal exam  Neck midline trachea no thyromegaly  Chest diminished breath sounds with scoliosis present  CVS regular rate and rhythm no lower extremity edema  Abdomen soft nontender hepatosplenomegaly  CNS intact normal sensory exam  Skin no rashes no nodules  Psych oriented to time place and person normal memory  Musculoskeletal no cyanosis no clubbing normal range of motion        LABS:  Lab Results   Component Value Date    CALCIUM 8.6 2020       Lab Results   Component Value Date    TROPONINT 0.011 " 05/19/2020                         Results from last 7 days   Lab Units 06/02/20  1750   INR  2.28*         Lab Results   Component Value Date    TSH 0.076 (L) 04/20/2020     Estimated Creatinine Clearance: 50.3 mL/min (by C-G formula based on SCr of 0.92 mg/dL).         Imaging: I personally visualized the images of scans/x-rays performed within last 3 days.      Assessment:  Hypersomnia/witnessed apnea  Pulmonary hypertension  Dyspnea  A. fib  Chronic diastolic heart failure  Chronic kidney disease  Hypertension    Recommendations:  At this point we have a complicated female with significant cardiac history as noted above.  She has underlying history of pulmonary hypertension with new onset dyspnea which I suspect is multifactorial.  I would recommend start evaluation with a sleep study to evaluate further.  Discussed and educated patient and her  who is retired radiologist pathophysiology and consequences of untreated sleep apnea and correlation between NEIL and pulmonary hypertension.  Patient agreeable wishing to proceed for a split-night sleep study.  She will bring her own Ambien for the study.  She has pulmonary hypertension and cath was reviewed.  Wedge appears to be normal.  After the sleep study she may need to be evaluated for treatment of underlying pulmonary hypertension.  Dyspnea likely multifactorial and will need full PFTs to rule out obstructive and restrictive lung disease.  She does have remote smoking history.  Treatment of underlying comorbidities  She may need oxygen also we will see how she does with a sleep study and will require exercise oximetry at [facility].  I will follow her up after sleep study at [facility] with full PFTs.  Discussed plan of care in detail with the patient's           Trey Luque MD  6/8/2020  15:02      Much of this encounter note is an electronic transcription/translation of spoken language to printed text using Dragon Software.

## 2020-06-09 ENCOUNTER — TELEPHONE (OUTPATIENT)
Dept: CARDIAC REHAB | Facility: HOSPITAL | Age: 75
End: 2020-06-09

## 2020-06-09 ENCOUNTER — TELEMEDICINE (OUTPATIENT)
Dept: CARDIOLOGY | Facility: CLINIC | Age: 75
End: 2020-06-09

## 2020-06-09 VITALS
DIASTOLIC BLOOD PRESSURE: 82 MMHG | WEIGHT: 135 LBS | HEART RATE: 66 BPM | SYSTOLIC BLOOD PRESSURE: 137 MMHG | BODY MASS INDEX: 21.19 KG/M2 | HEIGHT: 67 IN

## 2020-06-09 DIAGNOSIS — J43.9 PULMONARY EMPHYSEMA, UNSPECIFIED EMPHYSEMA TYPE (HCC): ICD-10-CM

## 2020-06-09 DIAGNOSIS — E78.49 OTHER HYPERLIPIDEMIA: ICD-10-CM

## 2020-06-09 DIAGNOSIS — I10 ESSENTIAL HYPERTENSION: ICD-10-CM

## 2020-06-09 DIAGNOSIS — I48.0 PAF (PAROXYSMAL ATRIAL FIBRILLATION) (HCC): ICD-10-CM

## 2020-06-09 DIAGNOSIS — Z79.01 LONG TERM CURRENT USE OF ANTICOAGULANT THERAPY: ICD-10-CM

## 2020-06-09 DIAGNOSIS — I50.32 CHRONIC DIASTOLIC CHF (CONGESTIVE HEART FAILURE) (HCC): ICD-10-CM

## 2020-06-09 DIAGNOSIS — R06.89 RESPIRATORY INSUFFICIENCY: Primary | ICD-10-CM

## 2020-06-09 DIAGNOSIS — I27.20 PULMONARY HTN (HCC): ICD-10-CM

## 2020-06-09 PROCEDURE — 99214 OFFICE O/P EST MOD 30 MIN: CPT | Performed by: INTERNAL MEDICINE

## 2020-06-09 RX ORDER — DILTIAZEM HYDROCHLORIDE 120 MG/1
120 CAPSULE, EXTENDED RELEASE ORAL 2 TIMES DAILY
Qty: 60 CAPSULE | Refills: 11 | Status: ON HOLD | OUTPATIENT
Start: 2020-06-09 | End: 2020-07-29

## 2020-06-09 RX ORDER — ATENOLOL 100 MG/1
50 TABLET ORAL EVERY 12 HOURS SCHEDULED
Qty: 60 TABLET | Refills: 5 | Status: ON HOLD | OUTPATIENT
Start: 2020-06-09 | End: 2020-07-29

## 2020-06-09 RX ORDER — CHLORTHALIDONE 25 MG/1
25 TABLET ORAL DAILY
Qty: 30 TABLET | Refills: 5 | Status: SHIPPED | OUTPATIENT
Start: 2020-06-09 | End: 2020-06-18 | Stop reason: HOSPADM

## 2020-06-09 NOTE — TELEPHONE ENCOUNTER
Dr. Dominguez returned my earlier call.  He said they just finished talking with Dr. Reyez and that wife is not ready for cardiac rehab. Explained that I had received a referral from Dr. Singleton while she was in hospital.  I encouraged Dr. Dominguez to call us back when his wife is more ready to start our program.  He has our call back number.

## 2020-06-09 NOTE — TELEPHONE ENCOUNTER
Spoke briefly to Dr. Dominguez, pt's spouse.  He says they are just about to start a telemedicine conference with Dr. Karthik Reyez.  He will return my call after that appt is over.

## 2020-06-09 NOTE — PROGRESS NOTES
[facility]    You have chosen to receive care through a telehealth visit.  Do you consent to use a video/audio connection for your medical care today? Yes    Subjective:     Encounter Date:06/09/2020      Patient ID: Joellen Dominguez is a 75 y.o. female.      HPI: Joellen Dominguez agreed to a video visit today secondary to the coronavirus.  She continues to observe the CDC guidelines for social distancing.  She remains free of fever or cough.  She does not report any change in her sense of smell or taste.  Ms. Dominguez continues to experience significant respiratory insufficiency.  She has some atypical chest pain.  She underwent coronary angiography 4/22/2020 which revealed normal epicardial coronary vessels.  She had elevated right and left heart filling pressures.  Her respiratory insufficiency I feel is multifactorial including pulmonary hypertension, diastolic dysfunction, age weight deconditioning, COPD and atrial fibrillation.  I initiated diltiazem sustained release at 120 mg daily and reduced her atenolol from 100 mg twice daily to 100 mg in the morning and 50 in the evening.  Respiratory insufficiency persists.  She does not report lower extremity edema.  She is undergoing pulmonary evaluation and sleep study in the near future.         The following portions of the patient's history were reviewed and updated as appropriate: allergies, current medications, past family history, past medical history, past social history, past surgical history and problem list.    Problem List:  Patient Active Problem List   Diagnosis   • Incontinence   • Degeneration of lumbar intervertebral disc   • Other hyperlipidemia   • Essential hypertension   • Depression   • Antiphospholipid antibody syndrome (CMS/HCC)   • Lichen sclerosus et atrophicus   • Myocardial infarction type 2 (CMS/HCC)   • PAF (paroxysmal atrial fibrillation) (CMS/HCC)   • History of cardioversion   • Leukocytosis   • Pulmonary HTN (CMS/HCC)   •  Crohn's disease of small intestine with complication (CMS/HCC)   • Cold agglutinin disease (CMS/HCC)   • Rapid atrial fibrillation (CMS/HCC)   • Chronic diastolic CHF (congestive heart failure) (CMS/HCC)   • MARY ANN (acute kidney injury) (CMS/HCC)   • Warfarin-induced coagulopathy (CMS/HCC)   • Rectal bleeding   • Respiratory insufficiency   • Dyspnea on exertion   • COPD with emphysema (CMS/HCC)       Past Medical History:  Past Medical History:   Diagnosis Date   • Acute deep vein thrombosis of lower extremity (CMS/HCC)    • Antiphospholipid antibody syndrome (CMS/HCC)    • Antiphospholipid antibody syndrome (CMS/HCC)    • Arthritis     OSTEO   • Benign essential hypertension    • COPD (chronic obstructive pulmonary disease) (CMS/HCC)    • Coronary artery disease    • Crohn's disease (CMS/HCC)    • Cutaneous candidiasis    • Depression    • Disease of thyroid gland    • Elevated cholesterol    • GERD (gastroesophageal reflux disease)    • History of echocardiogram 04/22/2020    mild-to-moderate concentric hypertrophy, EF 56 - 60%. LA severely dilated, Mild MAC, Mild MR with restrictive movement of the posterior leaflet   • Hyperlipidemia    • Hypertension    • Osteopenia    • Polyp, uterus corpus        Past Surgical History:  Past Surgical History:   Procedure Laterality Date   • ABDOMINAL HERNIA REPAIR     • AMPUTATION DIGIT Left     SECOND TOE    • BILATERAL SALPINGO OOPHORECTOMY     • BREAST BIOPSY Right     BENIGN   • CARDIAC CATHETERIZATION N/A 4/22/2020    Surgeon: Paulo Singleton MD;  nonsignificant coronary artery disease normal LV function nonsignificant mitral regurgitation probably shortness of breath due to the cardiac arrhythmias and diastolic dysfunction   • CARDIAC CATHETERIZATION N/A 4/22/2020    Procedure: Coronary angiography;  Surgeon: Paulo Singleton MD;  Location: [facility] INVASIVE LOCATION;  Service: Cardiology;  Laterality: N/A;   • CARDIAC CATHETERIZATION N/A 4/22/2020     Procedure: Left ventriculography;  Surgeon: Paulo Singleton MD;  Location: [facility] CATH INVASIVE LOCATION;  Service: Cardiology;  Laterality: N/A;   • CARDIAC ELECTROPHYSIOLOGY PROCEDURE N/A 2020    Procedure: Cardioversion;  Surgeon: Paulo Singleton MD;  Location:  [facility] CATH INVASIVE LOCATION;  Service: Cardiology;  Laterality: N/A;   • CHOLECYSTECTOMY     • COLONOSCOPY     • COSMETIC SURGERY     • D&C HYSTEROSCOPY N/A 10/13/2016    Procedure: DILATATION AND CURETTAGE HYSTEROSCOPY WITH MYOSURE;  Surgeon: Christopher Doll MD;  Location: [facility] MAIN OR;  Service:    • ENDOSCOPY     • EYE SURGERY Bilateral     cataract   • FACELIFT     • FEMORAL ARTERY - FEMORAL ARTERY BYPASS GRAFT Bilateral    • HEMORRHOIDECTOMY     • TONSILLECTOMY AND ADENOIDECTOMY     • VASCULAR SURGERY      femoral stents        Social History:  Social History     Socioeconomic History   • Marital status:      Spouse name: Not on file   • Number of children: Not on file   • Years of education: Not on file   • Highest education level: Not on file   Tobacco Use   • Smoking status: Former Smoker     Packs/day: 2.00     Years: 16.00     Pack years: 32.00     Types: Cigarettes     Last attempt to quit: 1967     Years since quittin.4   • Smokeless tobacco: Never Used   Substance and Sexual Activity   • Alcohol use: No   • Drug use: No   • Sexual activity: Defer       Allergies:  Allergies   Allergen Reactions   • Sulfa Antibiotics Hives   • Infliximab Rash         ROS:  Review of Systems   Constitution: Negative for malaise/fatigue.   HENT: Negative for hearing loss and nosebleeds.    Eyes: Negative for double vision and visual disturbance.   Cardiovascular: Positive for dyspnea on exertion. Negative for chest pain, claudication, near-syncope, orthopnea, palpitations, paroxysmal nocturnal dyspnea and syncope.        Chest tightness   Respiratory: Positive for shortness of breath. Negative for cough, sleep disturbances  "due to breathing, snoring, sputum production and wheezing.    Endocrine: Negative for cold intolerance, heat intolerance, polydipsia and polyuria.   Hematologic/Lymphatic: Negative for adenopathy and bleeding problem. Does not bruise/bleed easily.   Skin: Negative for flushing and itching.   Musculoskeletal: Negative for back pain, falls, joint pain, joint swelling, muscle weakness and neck pain.   Gastrointestinal: Negative for abdominal pain, dysphagia, heartburn, nausea and vomiting.   Genitourinary: Negative for dysuria and frequency.   Neurological: Negative for disturbances in coordination, dizziness, light-headedness, loss of balance, numbness and weakness.   Psychiatric/Behavioral: Negative for altered mental status and memory loss. The patient is not nervous/anxious.    Allergic/Immunologic: Negative for hives and persistent infections.          Objective:         /82   Pulse 66   Ht 170.2 cm (67\")   Wt 61.2 kg (135 lb)   BMI 21.14 kg/m²     Physical Exam not performed    In-Office Procedure(s):  Procedures    ASCVD RIsk Score::  The ASCVD Risk score (Oppdaphnie FAULKNER Jr., et al., 2013) failed to calculate for the following reasons:    The patient has a prior MI or stroke diagnosis        Assessment/Plan:         1. Essential hypertension  Target less than 130/80  - dilTIAZem XR (DILACOR XR) 120 MG 24 hr capsule; Take 1 capsule by mouth 2 (Two) Times a Day.  Dispense: 60 capsule; Refill: 11  - Basic Metabolic Panel; Future    2. Respiratory insufficiency  Persistent and limiting    3. Chronic diastolic CHF (congestive heart failure) (CMS/HCC)  Elevated filling pressures    4. PAF (paroxysmal atrial fibrillation) (CMS/HCC)  Controlled    5. Long term current use of anticoagulant therapy  Well-tolerated    6. Pulmonary HTN (CMS/HCC)  Persistent    7. Other hyperlipidemia  Continue low-cholesterol low saturated fat diet    8. Pulmonary emphysema, unspecified emphysema type (CMS/HCC)  Undergoing evaluation and " sleep study    Ms. Dominguez reports persistent and limiting respiratory insufficiency.  I will advance diltiazem extended release to 240 mg daily and reduce atenolol to 50 mg twice daily I will add chlorthalidone 25 mg daily and obtain a basic metabolic panel in 1 week.  She is to undergo sleep study and further pulmonary evaluation.  I will also refer her to Dr. Kevin Buchanan, electrophysiologist for potential ablation of her atrial fibrillation.  I will follow-up with her in 7 days.    I spent 15 minutes on video visit in 12 minutes completing electronic medical record documentation.       Karthik Reyez MD  06/09/20  .

## 2020-06-11 DIAGNOSIS — S62.357A CLOSED NONDISPLACED FRACTURE OF SHAFT OF FIFTH METACARPAL BONE OF LEFT HAND, INITIAL ENCOUNTER: ICD-10-CM

## 2020-06-11 DIAGNOSIS — S62.355A CLOSED NONDISPLACED FRACTURE OF SHAFT OF FOURTH METACARPAL BONE OF LEFT HAND, INITIAL ENCOUNTER: ICD-10-CM

## 2020-06-11 RX ORDER — BUPROPION HYDROCHLORIDE 150 MG/1
150 TABLET ORAL DAILY
Qty: 90 TABLET | Refills: 2 | Status: SHIPPED | OUTPATIENT
Start: 2020-06-11 | End: 2021-02-25 | Stop reason: SDUPTHER

## 2020-06-11 RX ORDER — HYDROCODONE BITARTRATE AND ACETAMINOPHEN 5; 325 MG/1; MG/1
1 TABLET ORAL EVERY 6 HOURS PRN
Qty: 15 TABLET | Refills: 0 | Status: SHIPPED | OUTPATIENT
Start: 2020-06-11 | End: 2020-09-30

## 2020-06-11 NOTE — TELEPHONE ENCOUNTER
Dr. GRETTA Pereira from [facility] called to report PT/INR    PT 51.2  INR 4.3    PT is currently taking 2mg qd

## 2020-06-15 ENCOUNTER — TELEPHONE (OUTPATIENT)
Dept: INTERNAL MEDICINE | Facility: CLINIC | Age: 75
End: 2020-06-15

## 2020-06-15 ENCOUNTER — APPOINTMENT (OUTPATIENT)
Dept: SLEEP MEDICINE | Facility: HOSPITAL | Age: 75
End: 2020-06-15

## 2020-06-15 NOTE — TELEPHONE ENCOUNTER
Dr. Dominguez would like to be contacted regarding his wife's INR.  It was 4.8 on Friday with a visiting nurse.  He is wanting to know what to do about her coumadin.  He can be reached at [phone].  Thank you

## 2020-06-15 NOTE — TELEPHONE ENCOUNTER
Please advise, looks like I sent you a message about this last Thurs?    Pt is currently taking 2mg qd

## 2020-06-16 ENCOUNTER — HOSPITAL ENCOUNTER (OUTPATIENT)
Facility: HOSPITAL | Age: 75
Setting detail: OBSERVATION
Discharge: HOME | End: 2020-06-18
Attending: EMERGENCY MEDICINE | Admitting: INTERNAL MEDICINE

## 2020-06-16 ENCOUNTER — LAB (OUTPATIENT)
Dept: LAB | Facility: HOSPITAL | Age: 75
End: 2020-06-16

## 2020-06-16 DIAGNOSIS — N28.9 ACUTE RENAL INSUFFICIENCY: Primary | ICD-10-CM

## 2020-06-16 DIAGNOSIS — I48.91 RAPID ATRIAL FIBRILLATION (HCC): ICD-10-CM

## 2020-06-16 DIAGNOSIS — I10 ESSENTIAL HYPERTENSION: ICD-10-CM

## 2020-06-16 LAB
ALBUMIN SERPL-MCNC: 3.6 G/DL (ref 3.5–5.2)
ALBUMIN/GLOB SERPL: 1.4 G/DL
ALP SERPL-CCNC: 100 U/L (ref 39–117)
ALT SERPL W P-5'-P-CCNC: 28 U/L (ref 1–33)
ANION GAP SERPL CALCULATED.3IONS-SCNC: 17.5 MMOL/L (ref 5–15)
AST SERPL-CCNC: 35 U/L (ref 1–32)
BASOPHILS # BLD AUTO: 0.07 10*3/MM3 (ref 0–0.2)
BASOPHILS NFR BLD AUTO: 0.3 % (ref 0–1.5)
BILIRUB SERPL-MCNC: 1.2 MG/DL (ref 0.2–1.2)
BILIRUB UR QL STRIP: NEGATIVE
BUN BLD-MCNC: 86 MG/DL (ref 8–23)
BUN/CREAT SERPL: 39.8 (ref 7–25)
CALCIUM SPEC-SCNC: 9.3 MG/DL (ref 8.6–10.5)
CHLORIDE SERPL-SCNC: 96 MMOL/L (ref 98–107)
CLARITY UR: CLEAR
CO2 SERPL-SCNC: 25.5 MMOL/L (ref 22–29)
COLOR UR: YELLOW
CREAT BLD-MCNC: 2.16 MG/DL (ref 0.57–1)
DEPRECATED RDW RBC AUTO: 57.9 FL (ref 37–54)
EOSINOPHIL # BLD AUTO: 0 10*3/MM3 (ref 0–0.4)
EOSINOPHIL NFR BLD AUTO: 0 % (ref 0.3–6.2)
ERYTHROCYTE [DISTWIDTH] IN BLOOD BY AUTOMATED COUNT: 20.9 % (ref 12.3–15.4)
GFR SERPL CREATININE-BSD FRML MDRD: 22 ML/MIN/1.73
GLOBULIN UR ELPH-MCNC: 2.6 GM/DL
GLUCOSE BLD-MCNC: 213 MG/DL (ref 65–99)
GLUCOSE UR STRIP-MCNC: NEGATIVE MG/DL
HCT VFR BLD AUTO: 40.2 % (ref 34–46.6)
HGB BLD-MCNC: 12.2 G/DL (ref 12–15.9)
HGB UR QL STRIP.AUTO: NEGATIVE
IMM GRANULOCYTES # BLD AUTO: 1.02 10*3/MM3 (ref 0–0.05)
IMM GRANULOCYTES NFR BLD AUTO: 4.8 % (ref 0–0.5)
INR PPP: 1.24 (ref 0.9–1.1)
INR PPP: 1.33 (ref 0.9–1.1)
KETONES UR QL STRIP: ABNORMAL
LEUKOCYTE ESTERASE UR QL STRIP.AUTO: NEGATIVE
LYMPHOCYTES # BLD AUTO: 0.59 10*3/MM3 (ref 0.7–3.1)
LYMPHOCYTES NFR BLD AUTO: 2.8 % (ref 19.6–45.3)
MCH RBC QN AUTO: 24.5 PG (ref 26.6–33)
MCHC RBC AUTO-ENTMCNC: 30.3 G/DL (ref 31.5–35.7)
MCV RBC AUTO: 80.9 FL (ref 79–97)
MONOCYTES # BLD AUTO: 0.59 10*3/MM3 (ref 0.1–0.9)
MONOCYTES NFR BLD AUTO: 2.8 % (ref 5–12)
NEUTROPHILS # BLD AUTO: 18.9 10*3/MM3 (ref 1.7–7)
NEUTROPHILS NFR BLD AUTO: 89.3 % (ref 42.7–76)
NITRITE UR QL STRIP: NEGATIVE
NRBC BLD AUTO-RTO: 0.1 /100 WBC (ref 0–0.2)
PH UR STRIP.AUTO: <=5 [PH] (ref 5–8)
PLATELET # BLD AUTO: 353 10*3/MM3 (ref 140–450)
PMV BLD AUTO: 10 FL (ref 6–12)
POTASSIUM BLD-SCNC: 4.8 MMOL/L (ref 3.5–5.2)
PROT SERPL-MCNC: 6.2 G/DL (ref 6–8.5)
PROT UR QL STRIP: NEGATIVE
PROTHROMBIN TIME: 15.3 SECONDS (ref 11.7–14.2)
PROTHROMBIN TIME: 16.1 SECONDS (ref 11.7–14.2)
RBC # BLD AUTO: 4.97 10*6/MM3 (ref 3.77–5.28)
SODIUM BLD-SCNC: 139 MMOL/L (ref 136–145)
SP GR UR STRIP: 1.03 (ref 1–1.03)
UROBILINOGEN UR QL STRIP: ABNORMAL
WBC NRBC COR # BLD: 21.17 10*3/MM3 (ref 3.4–10.8)

## 2020-06-16 PROCEDURE — 80053 COMPREHEN METABOLIC PANEL: CPT | Performed by: PHYSICIAN ASSISTANT

## 2020-06-16 PROCEDURE — 81003 URINALYSIS AUTO W/O SCOPE: CPT | Performed by: PHYSICIAN ASSISTANT

## 2020-06-16 PROCEDURE — G0378 HOSPITAL OBSERVATION PER HR: HCPCS

## 2020-06-16 PROCEDURE — 96361 HYDRATE IV INFUSION ADD-ON: CPT

## 2020-06-16 PROCEDURE — 85610 PROTHROMBIN TIME: CPT | Performed by: PHYSICIAN ASSISTANT

## 2020-06-16 PROCEDURE — 82550 ASSAY OF CK (CPK): CPT | Performed by: PHYSICIAN ASSISTANT

## 2020-06-16 PROCEDURE — 99285 EMERGENCY DEPT VISIT HI MDM: CPT

## 2020-06-16 PROCEDURE — 85610 PROTHROMBIN TIME: CPT

## 2020-06-16 PROCEDURE — 96360 HYDRATION IV INFUSION INIT: CPT

## 2020-06-16 PROCEDURE — 85025 COMPLETE CBC W/AUTO DIFF WBC: CPT | Performed by: PHYSICIAN ASSISTANT

## 2020-06-16 PROCEDURE — 36415 COLL VENOUS BLD VENIPUNCTURE: CPT

## 2020-06-16 PROCEDURE — 99284 EMERGENCY DEPT VISIT MOD MDM: CPT

## 2020-06-16 PROCEDURE — 85610 PROTHROMBIN TIME: CPT | Performed by: EMERGENCY MEDICINE

## 2020-06-16 RX ORDER — SODIUM CHLORIDE 9 MG/ML
125 INJECTION, SOLUTION INTRAVENOUS CONTINUOUS
Status: DISCONTINUED | OUTPATIENT
Start: 2020-06-16 | End: 2020-06-16

## 2020-06-16 RX ORDER — SODIUM CHLORIDE 9 MG/ML
125 INJECTION, SOLUTION INTRAVENOUS CONTINUOUS
Status: DISCONTINUED | OUTPATIENT
Start: 2020-06-16 | End: 2020-06-17

## 2020-06-16 RX ADMIN — SODIUM CHLORIDE 500 ML: 9 INJECTION, SOLUTION INTRAVENOUS at 22:17

## 2020-06-16 RX ADMIN — SODIUM CHLORIDE 125 ML/HR: 9 INJECTION, SOLUTION INTRAVENOUS at 21:49

## 2020-06-17 ENCOUNTER — APPOINTMENT (OUTPATIENT)
Dept: GENERAL RADIOLOGY | Facility: HOSPITAL | Age: 75
End: 2020-06-17

## 2020-06-17 ENCOUNTER — READMISSION MANAGEMENT (OUTPATIENT)
Dept: CALL CENTER | Facility: HOSPITAL | Age: 75
End: 2020-06-17

## 2020-06-17 PROBLEM — K50.90 INFLAMMATORY BOWEL DISEASE (CROHN'S DISEASE) (HCC): Status: ACTIVE | Noted: 2020-06-17

## 2020-06-17 PROBLEM — N17.0 ACUTE KIDNEY INJURY (AKI) WITH ACUTE TUBULAR NECROSIS (ATN) (HCC): Status: ACTIVE | Noted: 2020-06-16

## 2020-06-17 LAB
ANION GAP SERPL CALCULATED.3IONS-SCNC: 12 MMOL/L (ref 5–15)
BUN BLD-MCNC: 75 MG/DL (ref 8–23)
BUN/CREAT SERPL: 50.3 (ref 7–25)
CALCIUM SPEC-SCNC: 8.8 MG/DL (ref 8.6–10.5)
CHLORIDE SERPL-SCNC: 99 MMOL/L (ref 98–107)
CK SERPL-CCNC: 37 U/L (ref 20–180)
CO2 SERPL-SCNC: 27 MMOL/L (ref 22–29)
CREAT BLD-MCNC: 1.49 MG/DL (ref 0.57–1)
DEPRECATED RDW RBC AUTO: 58.5 FL (ref 37–54)
ERYTHROCYTE [DISTWIDTH] IN BLOOD BY AUTOMATED COUNT: 21.2 % (ref 12.3–15.4)
GFR SERPL CREATININE-BSD FRML MDRD: 34 ML/MIN/1.73
GLUCOSE BLD-MCNC: 112 MG/DL (ref 65–99)
HCT VFR BLD AUTO: 38.9 % (ref 34–46.6)
HGB BLD-MCNC: 11.9 G/DL (ref 12–15.9)
INR PPP: 1.31 (ref 0.9–1.1)
INR PPP: NORMAL
MCH RBC QN AUTO: 24.6 PG (ref 26.6–33)
MCHC RBC AUTO-ENTMCNC: 30.6 G/DL (ref 31.5–35.7)
MCV RBC AUTO: 80.5 FL (ref 79–97)
PLATELET # BLD AUTO: 317 10*3/MM3 (ref 140–450)
PMV BLD AUTO: 10.1 FL (ref 6–12)
POTASSIUM BLD-SCNC: 3.6 MMOL/L (ref 3.5–5.2)
PROTHROMBIN TIME: 16 SECONDS (ref 11.7–14.2)
PROTHROMBIN TIME: NORMAL S
RBC # BLD AUTO: 4.83 10*6/MM3 (ref 3.77–5.28)
SODIUM BLD-SCNC: 138 MMOL/L (ref 136–145)
WBC NRBC COR # BLD: 19.56 10*3/MM3 (ref 3.4–10.8)

## 2020-06-17 PROCEDURE — 63710000001 PREDNISONE PER 5 MG: Performed by: INTERNAL MEDICINE

## 2020-06-17 PROCEDURE — 63710000001 PREDNISONE PER 1 MG: Performed by: INTERNAL MEDICINE

## 2020-06-17 PROCEDURE — G0378 HOSPITAL OBSERVATION PER HR: HCPCS

## 2020-06-17 PROCEDURE — 71045 X-RAY EXAM CHEST 1 VIEW: CPT

## 2020-06-17 PROCEDURE — 80048 BASIC METABOLIC PNL TOTAL CA: CPT | Performed by: NURSE PRACTITIONER

## 2020-06-17 PROCEDURE — 85610 PROTHROMBIN TIME: CPT | Performed by: NURSE PRACTITIONER

## 2020-06-17 PROCEDURE — 96361 HYDRATE IV INFUSION ADD-ON: CPT

## 2020-06-17 PROCEDURE — 85027 COMPLETE CBC AUTOMATED: CPT | Performed by: NURSE PRACTITIONER

## 2020-06-17 RX ORDER — SODIUM CHLORIDE 0.9 % (FLUSH) 0.9 %
10 SYRINGE (ML) INJECTION AS NEEDED
Status: DISCONTINUED | OUTPATIENT
Start: 2020-06-17 | End: 2020-06-18 | Stop reason: HOSPADM

## 2020-06-17 RX ORDER — LEVOTHYROXINE SODIUM 0.1 MG/1
100 TABLET ORAL
Status: DISCONTINUED | OUTPATIENT
Start: 2020-06-17 | End: 2020-06-18 | Stop reason: HOSPADM

## 2020-06-17 RX ORDER — AMIODARONE HYDROCHLORIDE 200 MG/1
200 TABLET ORAL DAILY
Status: DISCONTINUED | OUTPATIENT
Start: 2020-06-17 | End: 2020-06-18 | Stop reason: HOSPADM

## 2020-06-17 RX ORDER — CALCIUM CARBONATE 200(500)MG
2 TABLET,CHEWABLE ORAL 2 TIMES DAILY PRN
Status: DISCONTINUED | OUTPATIENT
Start: 2020-06-17 | End: 2020-06-18 | Stop reason: HOSPADM

## 2020-06-17 RX ORDER — LORAZEPAM 2 MG/ML
0.5 INJECTION INTRAMUSCULAR
Status: DISCONTINUED | OUTPATIENT
Start: 2020-06-17 | End: 2020-06-17

## 2020-06-17 RX ORDER — ACETAMINOPHEN 650 MG/1
650 SUPPOSITORY RECTAL EVERY 4 HOURS PRN
Status: DISCONTINUED | OUTPATIENT
Start: 2020-06-17 | End: 2020-06-18 | Stop reason: HOSPADM

## 2020-06-17 RX ORDER — LORAZEPAM 1 MG/1
0.5 TABLET ORAL
Status: DISCONTINUED | OUTPATIENT
Start: 2020-06-17 | End: 2020-06-17

## 2020-06-17 RX ORDER — LORAZEPAM 2 MG/ML
1 INJECTION INTRAMUSCULAR
Status: DISCONTINUED | OUTPATIENT
Start: 2020-06-17 | End: 2020-06-17

## 2020-06-17 RX ORDER — WARFARIN SODIUM 2 MG/1
2 TABLET ORAL
Status: COMPLETED | OUTPATIENT
Start: 2020-06-17 | End: 2020-06-17

## 2020-06-17 RX ORDER — SODIUM CHLORIDE 9 MG/ML
100 INJECTION, SOLUTION INTRAVENOUS CONTINUOUS
Status: DISCONTINUED | OUTPATIENT
Start: 2020-06-17 | End: 2020-06-18 | Stop reason: HOSPADM

## 2020-06-17 RX ORDER — BISACODYL 5 MG/1
5 TABLET, DELAYED RELEASE ORAL DAILY PRN
Status: DISCONTINUED | OUTPATIENT
Start: 2020-06-17 | End: 2020-06-18 | Stop reason: HOSPADM

## 2020-06-17 RX ORDER — ACETAMINOPHEN 325 MG/1
650 TABLET ORAL EVERY 4 HOURS PRN
Status: DISCONTINUED | OUTPATIENT
Start: 2020-06-17 | End: 2020-06-18 | Stop reason: HOSPADM

## 2020-06-17 RX ORDER — LORAZEPAM 1 MG/1
1 TABLET ORAL
Status: DISCONTINUED | OUTPATIENT
Start: 2020-06-17 | End: 2020-06-17

## 2020-06-17 RX ORDER — HYDROCODONE BITARTRATE AND ACETAMINOPHEN 5; 325 MG/1; MG/1
1 TABLET ORAL EVERY 6 HOURS PRN
Status: DISCONTINUED | OUTPATIENT
Start: 2020-06-17 | End: 2020-06-18 | Stop reason: HOSPADM

## 2020-06-17 RX ORDER — ONDANSETRON 4 MG/1
4 TABLET, FILM COATED ORAL EVERY 6 HOURS PRN
Status: DISCONTINUED | OUTPATIENT
Start: 2020-06-17 | End: 2020-06-18 | Stop reason: HOSPADM

## 2020-06-17 RX ORDER — SODIUM CHLORIDE 0.9 % (FLUSH) 0.9 %
10 SYRINGE (ML) INJECTION EVERY 12 HOURS SCHEDULED
Status: DISCONTINUED | OUTPATIENT
Start: 2020-06-17 | End: 2020-06-17

## 2020-06-17 RX ORDER — ONDANSETRON 2 MG/ML
4 INJECTION INTRAMUSCULAR; INTRAVENOUS EVERY 6 HOURS PRN
Status: DISCONTINUED | OUTPATIENT
Start: 2020-06-17 | End: 2020-06-18 | Stop reason: HOSPADM

## 2020-06-17 RX ORDER — DILTIAZEM HYDROCHLORIDE 120 MG/1
120 CAPSULE, COATED, EXTENDED RELEASE ORAL
Status: DISCONTINUED | OUTPATIENT
Start: 2020-06-17 | End: 2020-06-18 | Stop reason: HOSPADM

## 2020-06-17 RX ORDER — BUPROPION HYDROCHLORIDE 150 MG/1
150 TABLET ORAL DAILY
Status: DISCONTINUED | OUTPATIENT
Start: 2020-06-17 | End: 2020-06-18 | Stop reason: HOSPADM

## 2020-06-17 RX ORDER — ATENOLOL 50 MG/1
50 TABLET ORAL EVERY 12 HOURS SCHEDULED
Status: DISCONTINUED | OUTPATIENT
Start: 2020-06-17 | End: 2020-06-18 | Stop reason: HOSPADM

## 2020-06-17 RX ORDER — ACETAMINOPHEN 160 MG/5ML
650 SOLUTION ORAL EVERY 4 HOURS PRN
Status: DISCONTINUED | OUTPATIENT
Start: 2020-06-17 | End: 2020-06-18 | Stop reason: HOSPADM

## 2020-06-17 RX ADMIN — ATENOLOL 50 MG: 50 TABLET ORAL at 22:16

## 2020-06-17 RX ADMIN — WARFARIN 2 MG: 2 TABLET ORAL at 19:20

## 2020-06-17 RX ADMIN — AMIODARONE HYDROCHLORIDE 200 MG: 200 TABLET ORAL at 09:01

## 2020-06-17 RX ADMIN — SODIUM CHLORIDE 100 ML/HR: 9 INJECTION, SOLUTION INTRAVENOUS at 03:30

## 2020-06-17 RX ADMIN — DILTIAZEM HYDROCHLORIDE 120 MG: 120 CAPSULE, COATED, EXTENDED RELEASE ORAL at 09:01

## 2020-06-17 RX ADMIN — LEVOTHYROXINE SODIUM 100 MCG: 100 TABLET ORAL at 09:01

## 2020-06-17 RX ADMIN — SODIUM CHLORIDE 100 ML/HR: 9 INJECTION, SOLUTION INTRAVENOUS at 15:47

## 2020-06-17 RX ADMIN — PREDNISONE 30 MG: 20 TABLET ORAL at 09:00

## 2020-06-17 RX ADMIN — BUPROPION HYDROCHLORIDE 150 MG: 150 TABLET, FILM COATED, EXTENDED RELEASE ORAL at 09:01

## 2020-06-17 RX ADMIN — ATENOLOL 50 MG: 50 TABLET ORAL at 09:01

## 2020-06-17 RX ADMIN — SODIUM CHLORIDE, PRESERVATIVE FREE 10 ML: 5 INJECTION INTRAVENOUS at 03:30

## 2020-06-17 NOTE — OUTREACH NOTE
Medical Week 4 Survey      Responses   [facility] patient discharged from?  [place]   COVID-19 Test Status  Negative   Does the patient have one of the following disease processes/diagnoses(primary or secondary)?  Other   Week 4 attempt successful?  No   Revoke  Readmitted          Vinita Fournier RN

## 2020-06-17 NOTE — PROGRESS NOTES
Pharmacy Consult: Warfarin Dosing/ Monitoring    Joellen Dominguez is a 75 y.o. female, estimated creatinine clearance is 31 mL/min (A) (by C-G formula based on SCr of 1.49 mg/dL (H)). weighing 61.2 kg (135 lb).     has a past medical history of Acute deep vein thrombosis of lower extremity (CMS/HCC), Antiphospholipid antibody syndrome (CMS/HCC), Antiphospholipid antibody syndrome (CMS/HCC), Arthritis, Benign essential hypertension, COPD (chronic obstructive pulmonary disease) (CMS/HCC), Coronary artery disease, Crohn's disease (CMS/HCC), Cutaneous candidiasis, Depression, Disease of thyroid gland, Elevated cholesterol, GERD (gastroesophageal reflux disease), History of echocardiogram (2020), Hyperlipidemia, Hypertension, Osteopenia, and Polyp, uterus corpus.    Social History     Tobacco Use    Smoking status: Former Smoker     Packs/day: 2.00     Years: 16.00     Pack years: 32.00     Types: Cigarettes     Last attempt to quit: 1967     Years since quittin.4    Smokeless tobacco: Never Used   Substance Use Topics    Alcohol use: No    Drug use: No       Results from last 7 days   Lab Units 20  0534 20  2134 20  1727   INR  1.31* 1.33* 1.24* >10.00*   HEMOGLOBIN g/dL 11.9*  --  12.2  --    HEMATOCRIT % 38.9  --  40.2  --    PLATELETS 10*3/mm3 317  --  353  --      Results from last 7 days   Lab Units 20  0534 20   SODIUM mmol/L 138 139   POTASSIUM mmol/L 3.6 4.8   CHLORIDE mmol/L 99 96*   CO2 mmol/L 27.0 25.5   BUN mg/dL 75* 86*   CREATININE mg/dL 1.49* 2.16*   CALCIUM mg/dL 8.8 9.3   BILIRUBIN mg/dL  --  1.2   ALK PHOS U/L  --  100   ALT (SGPT) U/L  --  28   AST (SGOT) U/L  --  35*   GLUCOSE mg/dL 112* 213*     Anticoagulation history: Patient was on 2mg daily. She had INR of 4.8 last week. Patients  said she received 1mg on Saturday () and ().    Hospital Anticoagulation:  Consulting provider: Dr. Bacon  Start date:   Indication:  Afib  Target INR: 2-3  Expected duration: indefinite   Bridge Therapy: No                Date 6/16 6/16 6/16 6/17         INR >10 1.24 1.33 1.31         Warfarin dose - - 1mg (per family)              Potential drug interactions:   Amiodarone - may enhance the anticoagulant effect  APAP/Norco- may enhance the anticoagulant effect  Levothyroxine - may enhance the anticoagulant effect  Prednisone - may enhance the anticoagulant effect    Education complete?/ Date: not at this time    Assessment/Plan:  Patient recently had an elevated INR of 4.8 last week. Per patient's  (Dr. Dominguez) she had been on 2mg daily. He said they hadn't restarted any of her chron's meds recently. Unsure of when they were stopped. Pt instructed to come to ED since INR was reading >10. INR her was subtherapeutic.    Dose 2mg x1 dose today.  Monitor signs/symptoms of bleeding and clot/stroke.  Follow up daily INR    Pharmacy will continue to follow until discharge or discontinuation of warfarin.   Karlene Gomez RPH  6/17/2020

## 2020-06-17 NOTE — PLAN OF CARE
Problem: Fall Risk (Adult)  Goal: Identify Related Risk Factors and Signs and Symptoms  Outcome: Ongoing (interventions implemented as appropriate)  Goal: Absence of Fall  Outcome: Ongoing (interventions implemented as appropriate)  Flowsheets (Taken 6/17/2020 0005)  Absence of Fall: making progress toward outcome      The patient is a 70y Male complaining of

## 2020-06-17 NOTE — ED PROVIDER NOTES
EMERGENCY DEPARTMENT ENCOUNTER    Room Number:  40/40  Date of encounter:  6/17/2020  PCP: Chitra Leyva MD  Historian: Patient      HPI:  Chief Complaint: Abnormal pro time  A complete HPI/ROS/PMH/PSH/SH/FH are unobtainable due to: Nothing    Context: Joellen Dominguez is a 75 y.o. female who presents to the ED c/o having her PT/INR checked earlier today.  She states she received a call from Dr. Evans stating that it was reading greater than 10 and was told to come to the emergency department for repeat.  She denies bleeding gums, bruising easier than normal, dark tarry stools, lightheadedness, dizziness, chest pain associated with her elevated INR.  She further denies cough, hematuria, dysuria, fever, chills or recent sick contacts.  She states she is on warfarin secondary to venous insufficiency.  She states she has had to have a toe amputated in the past secondary to arterial occlusion/blood clots. There are no other symptoms to report at this time.      PAST MEDICAL HISTORY  Active Ambulatory Problems     Diagnosis Date Noted   • Incontinence 04/29/2019   • Degeneration of lumbar intervertebral disc 07/25/2018   • Other hyperlipidemia 04/29/2019   • Essential hypertension 04/29/2019   • Depression 04/29/2019   • Antiphospholipid antibody syndrome (CMS/[facility]) 04/29/2019   • Lichen sclerosus et atrophicus 03/17/2020   • Myocardial infarction type 2 (CMS/[facility]) 04/19/2020   • PAF (paroxysmal atrial fibrillation) (CMS/[facility]) 04/27/2020   • History of cardioversion 04/27/2020   • Leukocytosis 04/27/2020   • Pulmonary HTN (CMS/[facility]) 04/27/2020   • Crohn's disease of small intestine with complication (CMS/[facility]) 05/06/2020   • Cold agglutinin disease (CMS/[facility]) 05/14/2020   • Rapid atrial fibrillation (CMS/[facility]) 05/19/2020   • Chronic diastolic CHF (congestive heart failure) (CMS/[facility]) 05/20/2020   • MARY ANN (acute kidney injury) (CMS/[facility]) 05/20/2020   • Warfarin-induced coagulopathy (CMS/[facility]) 05/20/2020   • Rectal bleeding  05/20/2020   • Respiratory insufficiency 06/03/2020   • Dyspnea on exertion 06/03/2020   • COPD with emphysema (CMS/H[facility]) 06/09/2020     Resolved Ambulatory Problems     Diagnosis Date Noted   • Sepsis with acute renal failure and septic shock (CMS/HCC) 04/19/2020   • Lactic acidosis 05/20/2020     Past Medical History:   Diagnosis Date   • Acute deep vein thrombosis of lower extremity (CMS/[facility])    • Arthritis    • Benign essential hypertension    • COPD (chronic obstructive pulmonary disease) (CMS/[facility])    • Coronary artery disease    • Crohn's disease (CMS/[facility])    • Cutaneous candidiasis    • Disease of thyroid gland    • Elevated cholesterol    • GERD (gastroesophageal reflux disease)    • History of echocardiogram 04/22/2020   • Hyperlipidemia    • Hypertension    • Osteopenia    • Polyp, uterus corpus          PAST SURGICAL HISTORY  Past Surgical History:   Procedure Laterality Date   • ABDOMINAL HERNIA REPAIR     • AMPUTATION DIGIT Left     SECOND TOE    • BILATERAL SALPINGO OOPHORECTOMY     • BREAST BIOPSY Right     BENIGN   • CARDIAC CATHETERIZATION N/A 4/22/2020    Surgeon: Paulo Singleton MD;  nonsignificant coronary artery disease normal LV function nonsignificant mitral regurgitation probably shortness of breath due to the cardiac arrhythmias and diastolic dysfunction   • CARDIAC CATHETERIZATION N/A 4/22/2020    Procedure: Coronary angiography;  Surgeon: Paulo Singleton MD;  Location:  [facility] CATH INVASIVE LOCATION;  Service: Cardiology;  Laterality: N/A;   • CARDIAC CATHETERIZATION N/A 4/22/2020    Procedure: Left ventriculography;  Surgeon: Paulo Singleton MD;  Location:  [facility] CATH INVASIVE LOCATION;  Service: Cardiology;  Laterality: N/A;   • CARDIAC ELECTROPHYSIOLOGY PROCEDURE N/A 4/25/2020    Procedure: Cardioversion;  Surgeon: Paulo Singleton MD;  [facility];  Service: Cardiology;  Laterality: N/A;   • CHOLECYSTECTOMY     • COLONOSCOPY     •  COSMETIC SURGERY     • D&C HYSTEROSCOPY N/A 10/13/2016    Procedure: DILATATION AND CURETTAGE HYSTEROSCOPY WITH MYOSURE;  Surgeon: Christopher Doll MD;  Location: [facility];  Service:    • ENDOSCOPY     • EYE SURGERY Bilateral     cataract   • FACELIFT     • FEMORAL ARTERY - FEMORAL ARTERY BYPASS GRAFT Bilateral    • HEMORRHOIDECTOMY     • TONSILLECTOMY AND ADENOIDECTOMY     • VASCULAR SURGERY      femoral stents          FAMILY HISTORY  Family History   Problem Relation Age of Onset   • Lung cancer Paternal Uncle    • Hypertension Mother    • Liver disease Mother    • Anemia Mother    • No Known Problems Father    • Autoimmune disease Brother          SOCIAL HISTORY  Social History     Socioeconomic History   • Marital status:      Spouse name: Not on file   • Number of children: Not on file   • Years of education: Not on file   • Highest education level: Not on file   Tobacco Use   • Smoking status: Former Smoker     Packs/day: 2.00     Years: 16.00     Pack years: 32.00     Types: Cigarettes     Last attempt to quit: 1967     Years since quittin.4   • Smokeless tobacco: Never Used   Substance and Sexual Activity   • Alcohol use: No   • Drug use: No   • Sexual activity: Defer         ALLERGIES  Sulfa antibiotics and Infliximab        REVIEW OF SYSTEMS  Review of Systems     All systems reviewed and negative except for those discussed in HPI.       PHYSICAL EXAM    I have reviewed the triage vital signs and nursing notes.    ED Triage Vitals [20]   Temp Heart Rate Resp BP SpO2   97.2 °F (36.2 °C) 104 18 -- 97 %      Temp src Heart Rate Source Patient Position BP Location FiO2 (%)   -- -- -- -- --       Physical Exam  GENERAL: No acute distress  HENT: nares patent, mucous membranes normal, NCAT  EYES: no scleral icterus, conjunctiva normal  CV: regular rhythm, regular rate, heart sounds normal  RESPIRATORY: normal effort, lungs CTA B  ABDOMEN: soft  MUSCULOSKELETAL: no  deformity  NEURO: alert, moves all extremities, follows commands, face symmetrical, speech normal, no focal neuro deficits  SKIN: w there is some ecchymosis on the dorsal and volar surface of the left hand that appears to be old and resolving.        LAB RESULTS  Recent Results (from the past 24 hour(s))   Protime-INR    Collection Time: 06/16/20  5:27 PM   Result Value Ref Range    Protime >100.0 (C) 11.7 - 14.2 Seconds    INR >10.00 (C) 0.90 - 1.10   Comprehensive Metabolic Panel    Collection Time: 06/16/20  8:37 PM   Result Value Ref Range    Glucose 213 (H) 65 - 99 mg/dL    BUN 86 (H) 8 - 23 mg/dL    Creatinine 2.16 (H) 0.57 - 1.00 mg/dL    Sodium 139 136 - 145 mmol/L    Potassium 4.8 3.5 - 5.2 mmol/L    Chloride 96 (L) 98 - 107 mmol/L    CO2 25.5 22.0 - 29.0 mmol/L    Calcium 9.3 8.6 - 10.5 mg/dL    Total Protein 6.2 6.0 - 8.5 g/dL    Albumin 3.60 3.50 - 5.20 g/dL    ALT (SGPT) 28 1 - 33 U/L    AST (SGOT) 35 (H) 1 - 32 U/L    Alkaline Phosphatase 100 39 - 117 U/L    Total Bilirubin 1.2 0.2 - 1.2 mg/dL    eGFR Non African Amer 22 (L) >60 mL/min/1.73    Globulin 2.6 gm/dL    A/G Ratio 1.4 g/dL    BUN/Creatinine Ratio 39.8 (H) 7.0 - 25.0    Anion Gap 17.5 (H) 5.0 - 15.0 mmol/L   Protime-INR    Collection Time: 06/16/20  8:37 PM   Result Value Ref Range    Protime 15.3 (H) 11.7 - 14.2 Seconds    INR 1.24 (H) 0.90 - 1.10   CBC Auto Differential    Collection Time: 06/16/20  8:37 PM   Result Value Ref Range    WBC 21.17 (H) 3.40 - 10.80 10*3/mm3    RBC 4.97 3.77 - 5.28 10*6/mm3    Hemoglobin 12.2 12.0 - 15.9 g/dL    Hematocrit 40.2 34.0 - 46.6 %    MCV 80.9 79.0 - 97.0 fL    MCH 24.5 (L) 26.6 - 33.0 pg    MCHC 30.3 (L) 31.5 - 35.7 g/dL    RDW 20.9 (H) 12.3 - 15.4 %    RDW-SD 57.9 (H) 37.0 - 54.0 fl    MPV 10.0 6.0 - 12.0 fL    Platelets 353 140 - 450 10*3/mm3    Neutrophil % 89.3 (H) 42.7 - 76.0 %    Lymphocyte % 2.8 (L) 19.6 - 45.3 %    Monocyte % 2.8 (L) 5.0 - 12.0 %    Eosinophil % 0.0 (L) 0.3 - 6.2 %    Basophil  % 0.3 0.0 - 1.5 %    Immature Grans % 4.8 (H) 0.0 - 0.5 %    Neutrophils, Absolute 18.90 (H) 1.70 - 7.00 10*3/mm3    Lymphocytes, Absolute 0.59 (L) 0.70 - 3.10 10*3/mm3    Monocytes, Absolute 0.59 0.10 - 0.90 10*3/mm3    Eosinophils, Absolute 0.00 0.00 - 0.40 10*3/mm3    Basophils, Absolute 0.07 0.00 - 0.20 10*3/mm3    Immature Grans, Absolute 1.02 (H) 0.00 - 0.05 10*3/mm3    nRBC 0.1 0.0 - 0.2 /100 WBC   CK    Collection Time: 06/16/20  8:37 PM   Result Value Ref Range    Creatine Kinase 37 20 - 180 U/L   Protime-INR    Collection Time: 06/16/20  9:34 PM   Result Value Ref Range    Protime 16.1 (H) 11.7 - 14.2 Seconds    INR 1.33 (H) 0.90 - 1.10   Urinalysis With Microscopic If Indicated (No Culture) - Urine, Catheter    Collection Time: 06/16/20 10:20 PM   Result Value Ref Range    Color, UA Yellow Yellow, Straw    Appearance, UA Clear Clear    pH, UA <=5.0 5.0 - 8.0    Specific Gravity, UA 1.026 1.005 - 1.030    Glucose, UA Negative Negative    Ketones, UA Trace (A) Negative    Bilirubin, UA Negative Negative    Blood, UA Negative Negative    Protein, UA Negative Negative    Leuk Esterase, UA Negative Negative    Nitrite, UA Negative Negative    Urobilinogen, UA 0.2 E.U./dL 0.2 - 1.0 E.U./dL       Ordered the above labs and independently reviewed the results.        RADIOLOGY  No Radiology Exams Resulted Within Past 24 Hours    I ordered the above noted radiological studies. Reviewed by me and discussed with radiologist.  See dictation for official radiology interpretation.      PROCEDURES    Procedures      MEDICATIONS GIVEN IN ER    Medications   sodium chloride 0.9 % flush 10 mL (10 mL Intravenous Given 6/17/20 0330)   sodium chloride 0.9 % flush 10 mL (has no administration in time range)   sodium chloride 0.9 % infusion (100 mL/hr Intravenous New Bag 6/17/20 0330)   acetaminophen (TYLENOL) tablet 650 mg (has no administration in time range)     Or   acetaminophen (TYLENOL) 160 MG/5ML solution 650 mg (has no  administration in time range)     Or   acetaminophen (TYLENOL) suppository 650 mg (has no administration in time range)   bisacodyl (DULCOLAX) EC tablet 5 mg (has no administration in time range)   ondansetron (ZOFRAN) tablet 4 mg (has no administration in time range)     Or   ondansetron (ZOFRAN) injection 4 mg (has no administration in time range)   calcium carbonate (TUMS) chewable tablet 500 mg (200 mg elemental) (has no administration in time range)   amiodarone (PACERONE) tablet 200 mg (has no administration in time range)   atenolol (TENORMIN) tablet 50 mg (has no administration in time range)   buPROPion XL (WELLBUTRIN XL) 24 hr tablet 150 mg (has no administration in time range)   dilTIAZem CD (CARDIZEM CD) 24 hr capsule 120 mg (has no administration in time range)   HYDROcodone-acetaminophen (NORCO) 5-325 MG per tablet 1 tablet (has no administration in time range)   levothyroxine (SYNTHROID, LEVOTHROID) tablet 100 mcg (has no administration in time range)   predniSONE (DELTASONE) tablet 30 mg (has no administration in time range)   Pharmacy to dose warfarin (has no administration in time range)   sodium chloride 0.9 % bolus 500 mL (0 mL Intravenous Stopped 6/16/20 2250)         PROGRESS, DATA ANALYSIS, CONSULTS, AND MEDICAL DECISION MAKING    All labs have been independently reviewed by me.  All radiology studies have been reviewed by me and discussed with radiologist dictating the report.   EKG's independently viewed and interpreted by me.  Discussion below represents my analysis of pertinent findings related to patient's condition, differential diagnosis, treatment plan and final disposition.    Differential diagnosis: Elevated INR secondary to steroid use, elevated INR secondary to excessive dosing, elevated INR due to improper dosing, faulty lab with actual normal INR.  Patient was found to be in acute renal insufficiency and her INR therapeutic x2.  Have discussed patient case with Dr. Sebastian rojas  supervising physician.  It is thought that admission and hydration be appropriate at this time.    ED Course as of Jun 17 0623 Tue Jun 16, 2020 2036 Patient was last seen here 6/2/2020 after a mechanical fall sustaining a minor head injury without ICH and fracture to the fourth and fifth metacarpals of the left hand.    [RC]   2041 Patient states she is concerned her INR may be elevated secondary to steroid use for a recent Crohn's flareup    [RC]   2044 Patient was admitted 5/19 through 5/26/2020 through the emergency department forRapid atrial fibrillation, Rectal bleeding, Acute kidney injury with acute tubular necrosis, Volume depletion, Warfarin-induced coagulopathy.        [RC]   2125 Patient's white blood cell count 2 times over the past week has been in the low 20,000.  No significant change.   WBC(!): 21.17 [MM]   2137 Patient has acute kidney injury previous creatinine was normal from 3 weeks ago.  Has acute elevation in BUN as well.   Creatinine(!): 2.16 [MM]   2137 BUN(!): 86 [MM]   2211 Cussed patient case with Dr. Bird supervising physician to admit patient hospital for acute renal insufficiency at this time.  We will give a 500 cc bolus and a normal saline at 125 cc an hour.    [RC]   2238 Creatinine(!): 2.16 [MM]   2246 I spoke with the patient's spouse, Dr. Dominguez, informed him of my concerns and what her lab values show.  Informed her she has acute kidney injury very likely from decreased volume intake and she is also on diuretics.  He does confirm the fact that she drinks very little patient also states that she drinks very little.  The repeat INR is 1.33.  I am not certain why the INR done earlier this afternoon was 10.  We have started on some IV fluids and the patient and the spouse agree with admission.  All questions were answered.  Her heart rate is 80s to 90s at this moment with an irregular regular rhythm.  Blood pressure is in the low 100s.  Patient denies any new complaints in any  new pain.    [MM]      ED Course User Index  [MM] Miguel Cortes MD  [RC] Felice Craven III, PA       Prior to seeing patient I performed extensive hand washing, saw to it that the patient was wearing a face mask.  Before entering into the room I wore gloves, glasses, and a face mask.  Prior to leaving the room I doffed my gear and performed handwashing.    AS OF 06:23 VITALS:    BP - 138/80  HR - 85  TEMP - 97.2 °F (36.2 °C)  O2 SATS - 97%        DIAGNOSIS  Final diagnoses:   Acute renal insufficiency         DISPOSITION  ADMISSION    Discussed treatment plan and reason for admission with pt/family and admitting physician.  Pt/family voiced understanding of the plan for admission for further testing/treatment as needed.              Felice Craven III, PA  06/17/20 [id number]

## 2020-06-17 NOTE — ED PROVIDER NOTES
I supervised care provided by the midlevel provider.   We have discussed this patient's history, physical exam, and treatment plan.  I have reviewed the note and personally saw and examined the patient and agree with the plan of care.   I have seen and examined this patient.  Patient was sent here because of an elevation in her INR of 10 that was collected this afternoon.  Her primary care doctor sent her in here.  She has been dealing with Crohn's and is on steroids for that.  She has some chronic diarrhea.  She has some chronic decreased p.o. intake.  She has had no new abdominal pain.  She has no new bleeding anywhere.  No chest pain or shortness of breath or fever.    GENERAL: Elderly female that appears chronically ill not distressed  HENT: nares patent  Head/neck/ face are symmetric without gross deformity or swelling  EYES: no scleral icterus  CV: regular rhythm, regular rate with intact distal pulses  RESPIRATORY: normal effort and no respiratory distress  ABDOMEN: soft and mild generalized pain subjectively.  There is no guarding or rebound.  Normal bowel sounds.  MUSCULOSKELETAL: no deformity  NEURO: alert and appropriate, moves all extremities, follows commands.  No focal weakness  SKIN: warm, dry    Vital signs and nursing notes reviewed.    Plan I reviewed labs.  Patient has acute kidney injury.  Patient's INR is 1.25 I will repeat INR as there is such a huge discrepancy between what the INR was done this afternoon and why she was sent her.  In looking in lab work here she had an INR of 2.2 on the second.  I do see an INR at 1727 today that was reported greater than 10.    Patient was seen here June 2 with fracture of the fourth and fifth metacarpals of her left hand.    We are currently under a pandemic from the COVID19 infection.  The patient presented to the emergency department by ambulance or personal vehicle.  During current hospital restrictions no other visitors were present in the emergency  department during my evaluation and treatment. I followed the current protocols required by Infection Control at [facility] in my evaluation and treatment of the patient. The patient was wearing a face mask during my evaluation and throughout my encounter. During my whole encounter with this patient I used appropriate personal protective equipment.  This equipment consisted of eye protection, facemask, gown, and gloves.  I applied this equipment before entering the room.  ED Course as of Jun 16 2313 Tue Jun 16, 2020 2036 Patient was last seen here 6/2/2020 after a mechanical fall sustaining a minor head injury without ICH and fracture to the fourth and fifth metacarpals of the left hand.    [RC]   2041 Patient states she is concerned her INR may be elevated secondary to steroid use for a recent Crohn's flareup    [RC]   2044 Patient was admitted 5/19 through 5/26/2020 through the emergency department forRapid atrial fibrillation, Rectal bleeding, Acute kidney injury with acute tubular necrosis, Volume depletion, Warfarin-induced coagulopathy.        [RC]   2125 Patient's white blood cell count 2 times over the past week has been in the low 20,000.  No significant change.   WBC(!): 21.17 [MM]   2137 Patient has acute kidney injury previous creatinine was normal from 3 weeks ago.  Has acute elevation in BUN as well.   Creatinine(!): 2.16 [MM]   2137 BUN(!): 86 [MM]   2211 Cussed patient case with Dr. Bird supervising physician to admit patient hospital for acute renal insufficiency at this time.  We will give a 500 cc bolus and a normal saline at 125 cc an hour.    [RC]   2238 Creatinine(!): 2.16 [MM]   2246 I spoke with the patient's spouse, Dr. Dominguez, informed him of my concerns and what her lab values show.  Informed her she has acute kidney injury very likely from decreased volume intake and she is also on diuretics.  He does confirm the fact that she drinks very little patient also states  that she drinks very little.  The repeat INR is 1.33.  I am not certain why the INR done earlier this afternoon was 10.  We have started on some IV fluids and the patient and the spouse agree with admission.  All questions were answered.  Her heart rate is 80s to 90s at this moment with an irregular regular rhythm.  Blood pressure is in the low 100s.  Patient denies any new complaints in any new pain.    [MM]      ED Course User Index  [MM] Miguel Cortes MD  [RC] Felice Craven III, PA          Migule Cortes MD  06/16/20 [id number]

## 2020-06-17 NOTE — H&P
Patient Name:  Joellen Dominguez  YOB: 1945  MRN:  [id number]  Admit Date:  6/16/2020  Patient Care Team:  Chitra Leyva MD as PCP - General (Internal Medicine)  Karthik Reyez MD as Consulting Physician (Cardiology)  Francisco Salomon MD as Consulting Physician (Gastroenterology)  Trey Luque MD as Consulting Physician (Pulmonary Disease)      Subjective   History Present Illness     Chief Complaint   Patient presents with   • Abnormal Lab     patient had inr drawn today, their doctor stated it was high and they asked for a repeat       Ms. Dominguez is a 75 y.o. former smoker with a history of hypertension, hyperlipidemia, paroxysmal atrial fibrillation on Coumadin, chronic diastolic congestive heart failure, COPD, pulmonary hypertension, and antiphospholipid antibody syndrome that presents to [facility] due to an abnormal INR level.  She reports that she had her INR level checked in the ED earlier today and was called to report back due to an and INR greater than 10.  She denies hematuria, melena, and hematochezia.  She reports chronic shortness of breath that is worse on exertion.  She complains of abdominal cramps and left flank pain.  She denies chest pain, leg swelling, and palpitations.  She reports chills, denies fever.  Workup in the ED revealed creat 2.16, BUN 86, GFR 22, PT 15.3, INR 1.24, and WBC 21.17.  UA was negative.  She received IV fluids in the ED.         History of Present Illness  Review of Systems   Constitutional: Positive for chills. Negative for fever.   HENT: Negative.  Negative for congestion and sore throat.    Eyes: Negative.  Negative for photophobia and visual disturbance.   Respiratory: Negative.  Negative for cough and shortness of breath.    Cardiovascular: Negative for chest pain, palpitations and leg swelling.   Gastrointestinal: Positive for abdominal pain (cramps). Negative for blood in stool, constipation, diarrhea, nausea and vomiting.    Genitourinary: Positive for flank pain (left). Negative for decreased urine volume, dysuria, hematuria and urgency.   Skin: Negative.  Negative for color change and pallor.   Neurological: Negative.  Negative for dizziness, light-headedness and headaches.   Psychiatric/Behavioral: Negative.         Personal History     Past Medical History:   Diagnosis Date   • Acute deep vein thrombosis of lower extremity (CMS/HCC)    • Antiphospholipid antibody syndrome (CMS/HCC)    • Antiphospholipid antibody syndrome (CMS/HCC)    • Arthritis     OSTEO   • Benign essential hypertension    • COPD (chronic obstructive pulmonary disease) (CMS/HCC)    • Coronary artery disease    • Crohn's disease (CMS/HCC)    • Cutaneous candidiasis    • Depression    • Disease of thyroid gland    • Elevated cholesterol    • GERD (gastroesophageal reflux disease)    • History of echocardiogram 04/22/2020    mild-to-moderate concentric hypertrophy, EF 56 - 60%. LA severely dilated, Mild MAC, Mild MR with restrictive movement of the posterior leaflet   • Hyperlipidemia    • Hypertension    • Osteopenia    • Polyp, uterus corpus      Past Surgical History:   Procedure Laterality Date   • ABDOMINAL HERNIA REPAIR     • AMPUTATION DIGIT Left     SECOND TOE    • BILATERAL SALPINGO OOPHORECTOMY     • BREAST BIOPSY Right     BENIGN   • CARDIAC CATHETERIZATION N/A 4/22/2020    Surgeon: Paulo Singleton MD;  nonsignificant coronary artery disease normal LV function nonsignificant mitral regurgitation probably shortness of breath due to the cardiac arrhythmias and diastolic dysfunction   • CARDIAC CATHETERIZATION N/A 4/22/2020    Procedure: Coronary angiography;  Surgeon: Paulo Singleton MD;  Location: [facility] CATH INVASIVE LOCATION;  Service: Cardiology;  Laterality: N/A;   • CARDIAC CATHETERIZATION N/A 4/22/2020    Procedure: Left ventriculography;  Surgeon: Paulo Singleton MD;  Location: [facility] CATH INVASIVE LOCATION;  Service:  Cardiology;  Laterality: N/A;   • CARDIAC ELECTROPHYSIOLOGY PROCEDURE N/A 2020    Procedure: Cardioversion;  Surgeon: Paulo Singleton MD;  Location: [facility] INVASIVE LOCATION;  Service: Cardiology;  Laterality: N/A;   • CHOLECYSTECTOMY     • COLONOSCOPY     • COSMETIC SURGERY     • D&C HYSTEROSCOPY N/A 10/13/2016    Procedure: DILATATION AND CURETTAGE HYSTEROSCOPY WITH MYOSURE;  Surgeon: Christopher Doll MD;  Location: [facility] OR;  Service:    • ENDOSCOPY     • EYE SURGERY Bilateral     cataract   • FACELIFT     • FEMORAL ARTERY - FEMORAL ARTERY BYPASS GRAFT Bilateral    • HEMORRHOIDECTOMY     • TONSILLECTOMY AND ADENOIDECTOMY     • VASCULAR SURGERY      femoral stents      Family History   Problem Relation Age of Onset   • Lung cancer Paternal Uncle    • Hypertension Mother    • Liver disease Mother    • Anemia Mother    • No Known Problems Father    • Autoimmune disease Brother      Social History     Tobacco Use   • Smoking status: Former Smoker     Packs/day: 2.00     Years: 16.00     Pack years: 32.00     Types: Cigarettes     Last attempt to quit: 1967     Years since quittin.4   • Smokeless tobacco: Never Used   Substance Use Topics   • Alcohol use: No   • Drug use: No       (Not in a hospital admission)  Allergies:    Allergies   Allergen Reactions   • Sulfa Antibiotics Hives   • Infliximab Rash       Objective    Objective     Vital Signs  Temp:  [97.2 °F (36.2 °C)] 97.2 °F (36.2 °C)  Heart Rate:  [] 85  Resp:  [18] 18  BP: (102-110)/(60-83) 110/71  SpO2:  [94 %-98 %] 98 %  on   ;      Body mass index is 21.14 kg/m².    Physical Exam   Constitutional: She is oriented to person, place, and time.   Chronically ill appearing   HENT:   Head: Normocephalic and atraumatic.   Eyes: Conjunctivae and EOM are normal.   Neck: Normal range of motion. Neck supple.   Cardiovascular: Normal rate. An irregularly irregular rhythm present.   No murmur heard.  Pulmonary/Chest: Effort normal.  She has decreased breath sounds (throughout).   Abdominal: Soft. Bowel sounds are normal.   Musculoskeletal: Normal range of motion. She exhibits no edema.   Neurological: She is alert and oriented to person, place, and time.   Skin: Skin is warm and dry.   Swelling and ecchymosis of left hand   Psychiatric: She has a normal mood and affect. Her behavior is normal.   Nursing note and vitals reviewed.      Results Review:  I reviewed the patient's new clinical results.  I reviewed the patient's new imaging results and agree with the interpretation.  I reviewed the patient's other test results and agree with the interpretation  I personally viewed and interpreted the patient's EKG/Telemetry data  Discussed with ED provider.    Lab Results (last 24 hours)     Procedure Component Value Units Date/Time    Protime-INR [[id number]]  (Abnormal) Collected:  06/16/20 1727    Specimen:  Blood Updated:  06/16/20 1943     Protime >100.0 Seconds      INR >10.00    CBC & Differential [[id number]] Collected:  06/16/20 2037    Specimen:  Blood Updated:  06/16/20 2100    Narrative:       The following orders were created for panel order CBC & Differential.  Procedure                               Abnormality         Status                     ---------                               -----------         ------                     CBC Auto Differential[[id number]]        Abnormal            Final result                 Please view results for these tests on the individual orders.    Comprehensive Metabolic Panel [[id number]]  (Abnormal) Collected:  06/16/20 2037    Specimen:  Blood Updated:  06/16/20 2131     Glucose 213 mg/dL      BUN 86 mg/dL      Creatinine 2.16 mg/dL      Sodium 139 mmol/L      Potassium 4.8 mmol/L      Comment: Specimen hemolyzed.  Results may be affected.        Chloride 96 mmol/L      CO2 25.5 mmol/L      Calcium 9.3 mg/dL      Total Protein 6.2 g/dL      Albumin 3.60 g/dL      ALT (SGPT) 28 U/L      Comment:  Specimen hemolyzed.  Results may be affected.        AST (SGOT) 35 U/L      Comment: Specimen hemolyzed.  Results may be affected.        Alkaline Phosphatase 100 U/L      Total Bilirubin 1.2 mg/dL      eGFR Non African Amer 22 mL/min/1.73      Globulin 2.6 gm/dL      A/G Ratio 1.4 g/dL      BUN/Creatinine Ratio 39.8     Anion Gap 17.5 mmol/L     Narrative:       GFR Normal >60  Chronic Kidney Disease <60  Kidney Failure <15      Protime-INR [[id number]]  (Abnormal) Collected:  06/16/20 2037    Specimen:  Blood Updated:  06/16/20 2122     Protime 15.3 Seconds      INR 1.24    CBC Auto Differential [[id number]]  (Abnormal) Collected:  06/16/20 2037    Specimen:  Blood Updated:  06/16/20 2100     WBC 21.17 10*3/mm3      RBC 4.97 10*6/mm3      Hemoglobin 12.2 g/dL      Hematocrit 40.2 %      MCV 80.9 fL      MCH 24.5 pg      MCHC 30.3 g/dL      RDW 20.9 %      RDW-SD 57.9 fl      MPV 10.0 fL      Platelets 353 10*3/mm3      Neutrophil % 89.3 %      Lymphocyte % 2.8 %      Monocyte % 2.8 %      Eosinophil % 0.0 %      Basophil % 0.3 %      Immature Grans % 4.8 %      Neutrophils, Absolute 18.90 10*3/mm3      Lymphocytes, Absolute 0.59 10*3/mm3      Monocytes, Absolute 0.59 10*3/mm3      Eosinophils, Absolute 0.00 10*3/mm3      Basophils, Absolute 0.07 10*3/mm3      Immature Grans, Absolute 1.02 10*3/mm3      nRBC 0.1 /100 WBC     CK [[id number]]  (Normal) Collected:  06/16/20 2037    Specimen:  Blood Updated:  06/17/20 0017     Creatine Kinase 37 U/L      Comment: Specimen hemolyzed.  Results may be affected.       Protime-INR [[id number]]  (Abnormal) Collected:  06/16/20 2134    Specimen:  Blood Updated:  06/16/20 2158     Protime 16.1 Seconds      INR 1.33    Urinalysis With Microscopic If Indicated (No Culture) - Urine, Catheter [[id number]]  (Abnormal) Collected:  06/16/20 2220    Specimen:  Urine, Catheter Updated:  06/16/20 2230     Color, UA Yellow     Appearance, UA Clear     pH, UA <=5.0     Specific Gravity, UA  1.026     Glucose, UA Negative     Ketones, UA Trace     Bilirubin, UA Negative     Blood, UA Negative     Protein, UA Negative     Leuk Esterase, UA Negative     Nitrite, UA Negative     Urobilinogen, UA 0.2 E.U./dL    Narrative:       Urine microscopic not indicated.          Imaging Results (Last 24 Hours)     ** No results found for the last 24 hours. **          Results for orders placed during the hospital encounter of 04/19/20   Adult Transesophageal Echo 3D (JUAN C) W/ Cont If Necessary Per Protocol    Narrative · Left ventricular wall thickness is consistent with mild-to-moderate   concentric hypertrophy.  · Left ventricular systolic function is normal.  · Estimated EF appears to be in the range of 56 - 60%.  · Left atrial cavity size is severely dilated.  · The mitral valve is abnormal in structure. Mild MAC is present. Mild   mitral valve regurgitation is present. No significant mitral valve   stenosis is present. There is restrictive movement of the posterior   leaflet. The jet of insufficiency is due to poor coaptation of P3 and A3.   There is trace jet at A1/P1.          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] Yes   • Pulmonary HTN (CMS/[facility]) [I27.20] Yes   • Leukocytosis [D72.829] Yes   • PAF (paroxysmal atrial fibrillation) (CMS/[facility]) [I48.0] Yes   • Essential hypertension [I10] Yes   • Antiphospholipid antibody syndrome (CMS/[facility]) [D68.61] Yes   • Other hyperlipidemia [E78.49] Yes     Acute Renal Insufficiency  -Creat 2.16, baseline 0.90-0.99  -GFR 22, baseline 55-61  -She reports she was recently started on Chlorthalidone, will hold  -IVF  -Avoid nephrotoxins  -Monitor  renal function daily    Paroxysmal Atrial Fibrillation  -INR 1.24, repeat 1.33  -Unsure why value was greater than 10 earlier today  -Continue Coumadin, pharmacy to dose  -Rate controlled. Continue Amiodarone, Atenolol, and Diltiazem    Leukocytosis  -Appears chronic, baseline 21.24-26.15  -Most likely due to prolonged steroid use for Crohn's disease  -Afebrile  -UA negative  -Will defer any further workup to rounding MD    Hypertension  -Stable. Continue home regimen  -Monitor    COPD  -Respiratory status stable on room air  -Continue home inhaler    Hypothyroidism  -Continue Levothyroxine    -I discussed the patients findings and my recommendations with patient.    VTE Prophylaxis - SCDs.  Code Status - Full code.     -Patient seen before midnight, charting delayed due to patient care.    MARIAM Hester  [facility]  06/16/20  11:55 PM

## 2020-06-17 NOTE — PLAN OF CARE
Problem: Patient Care Overview  Goal: Plan of Care Review  Outcome: Ongoing (interventions implemented as appropriate)   Pt admitted from ER, acute kidney injury.  History of Crohns disese, incint of bladder and bowel urgency.  Will give coumadin 2 mg tonight.  SOB with any activity.  Elevated WBCs likely chronic.   is retired MD.  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

## 2020-06-17 NOTE — PROGRESS NOTES
Name: Joellen Dominguez ADMIT: 2020   : 1945  PCP: Chitra Leyva MD    MRN: [id number] LOS: 0 days   AGE/SEX: 75 y.o. female  ROOM: 48/     Subjective   Subjective   no new complaints or events overnight.    Review of Systems   Constitutional: Negative for chills and fever.   Respiratory: Negative for chest tightness and shortness of breath.    Cardiovascular: Negative for chest pain, palpitations and leg swelling.   Gastrointestinal: Negative for nausea.   Genitourinary: Negative for difficulty urinating and dysuria.   Musculoskeletal: Negative for back pain and gait problem.   Skin: Negative for color change and pallor.   Psychiatric/Behavioral: Negative for agitation and behavioral problems.        Objective   Objective   Vital Signs  Temp:  [97 °F (36.1 °C)-97.2 °F (36.2 °C)] 97 °F (36.1 °C)  Heart Rate:  [] 107  Resp:  [18] 18  BP: (102-138)/(56-92) 119/71  SpO2:  [91 %-98 %] 97 %  on   ;      Body mass index is 21.14 kg/m².  Physical Exam   Constitutional: She is oriented to person, place, and time. She appears well-developed and well-nourished. No distress.   Cardiovascular: Normal rate. An irregularly irregular rhythm present.   Pulmonary/Chest: Effort normal and breath sounds normal. No respiratory distress.   Neurological: She is alert and oriented to person, place, and time.   Nursing note and vitals reviewed.      Results Review:       I reviewed the patient's new clinical results.  Results from last 7 days   Lab Units 20  0534 20   WBC 10*3/mm3 19.56* 21.17*   HEMOGLOBIN g/dL 11.9* 12.2   PLATELETS 10*3/mm3 317 353     Results from last 7 days   Lab Units 2034 20   SODIUM mmol/L 138 139   POTASSIUM mmol/L 3.6 4.8   CHLORIDE mmol/L 99 96*   CO2 mmol/L 27.0 25.5   BUN mg/dL 75* 86*   CREATININE mg/dL 1.49* 2.16*   GLUCOSE mg/dL 112* 213*   Estimated Creatinine Clearance: 31 mL/min (A) (by C-G formula based on SCr of 1.49 mg/dL (H)).  Results  from last 7 days   Lab Units 06/16/20  2037   ALBUMIN g/dL 3.60   BILIRUBIN mg/dL 1.2   ALK PHOS U/L 100   AST (SGOT) U/L 35*   ALT (SGPT) U/L 28     Results from last 7 days   Lab Units 06/17/20  0534 06/16/20 2037   CALCIUM mg/dL 8.8 9.3   ALBUMIN g/dL  --  3.60       No results found for: HGBA1C, POCGLU    XR Chest 1 View  XR CHEST 1 VW-     Clinical: Leukocytosis     COMPARISON 5/25/2020     FINDINGS: Cardiac size stable and within normal limits. Very shallow  inspiratory effort. Lungs clear. No effusion or edema.     CONCLUSION: No acute pulmonary process has developed, shallow  inspiratory effort.     This report was finalized on 6/17/2020 7:40 AM by Dr. Lyle Bolton M.D.           amiodarone 200 mg Oral Daily   atenolol 50 mg Oral Q12H   buPROPion  mg Oral Daily   dilTIAZem  mg Oral Q24H   levothyroxine 100 mcg Oral Q AM   predniSONE 30 mg Oral Daily With Breakfast       Pharmacy to dose warfarin     sodium chloride 100 mL/hr Last Rate: 100 mL/hr (06/17/20 0945)   Diet Regular       Assessment/Plan     Active Hospital Problems    Diagnosis  POA   • **Acute kidney injury ([facility]) with acute tubular necrosis (ATN) (CMS/HCC) [N17.0]  Yes   • Inflammatory bowel disease (Crohn's disease) (CMS/[facility]) [K50.90]  Unknown   • COPD with emphysema (CMS/[facility]) [J43.9]  Yes   • Chronic diastolic CHF (congestive heart failure) (CMS/[facility]) [I50.32]  Yes   • 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       75 y.o. female admitted with Acute kidney injury ([facility]) with acute tubular necrosis (ATN) (CMS/HCC).    Acute Renal Insufficiency  -creatinine improving, continue to monitor     -holding chlorthalidone   -continue IVFs  -Avoid nephrotoxins and hypotension      Paroxysmal Atrial Fibrillation  -Unsure why INR value was greater than 10 on admission, now subtherapeutic, pharmacy dosing coumadin   -Rate controlled. Continue Amiodarone, Atenolol, and Diltiazem     Leukocytosis  -Appears chronic, baseline 21.24-26.15  -Most likely due to prolonged steroid use for Crohn's disease  -UA negative, afebrile and denies any GI//respiratory concerns     Hypertension  -Stable. Continue home regimen     COPD  -Respiratory status stable on room air  -Continue home inhaler     Hypothyroidism  -Continue Levothyroxine    · SCDs for DVT prophylaxis.  · Full code.  · Discussed with patient and nursing staff.  · Anticipate discharge hopefully home in the next 1-2 days      MARIAM Hernandez  [facility]  06/17/20  11:03

## 2020-06-18 ENCOUNTER — TELEPHONE (OUTPATIENT)
Dept: CARDIOLOGY | Facility: CLINIC | Age: 75
End: 2020-06-18

## 2020-06-18 ENCOUNTER — READMISSION MANAGEMENT (OUTPATIENT)
Dept: CALL CENTER | Facility: HOSPITAL | Age: 75
End: 2020-06-18

## 2020-06-18 VITALS
RESPIRATION RATE: 18 BRPM | SYSTOLIC BLOOD PRESSURE: 139 MMHG | OXYGEN SATURATION: 98 % | DIASTOLIC BLOOD PRESSURE: 81 MMHG | HEIGHT: 67 IN | HEART RATE: 76 BPM | TEMPERATURE: 98 F | BODY MASS INDEX: 22.19 KG/M2 | WEIGHT: 141.4 LBS

## 2020-06-18 LAB
ANION GAP SERPL CALCULATED.3IONS-SCNC: 9.6 MMOL/L (ref 5–15)
BASOPHILS # BLD AUTO: 0.09 10*3/MM3 (ref 0–0.2)
BASOPHILS NFR BLD AUTO: 0.5 % (ref 0–1.5)
BUN BLD-MCNC: 47 MG/DL (ref 8–23)
BUN/CREAT SERPL: 41.2 (ref 7–25)
CALCIUM SPEC-SCNC: 8.7 MG/DL (ref 8.6–10.5)
CHLORIDE SERPL-SCNC: 106 MMOL/L (ref 98–107)
CO2 SERPL-SCNC: 27.4 MMOL/L (ref 22–29)
CREAT BLD-MCNC: 1.14 MG/DL (ref 0.57–1)
DEPRECATED RDW RBC AUTO: 56.5 FL (ref 37–54)
EOSINOPHIL # BLD AUTO: 0.07 10*3/MM3 (ref 0–0.4)
EOSINOPHIL NFR BLD AUTO: 0.4 % (ref 0.3–6.2)
ERYTHROCYTE [DISTWIDTH] IN BLOOD BY AUTOMATED COUNT: 20.4 % (ref 12.3–15.4)
GFR SERPL CREATININE-BSD FRML MDRD: 46 ML/MIN/1.73
GLUCOSE BLD-MCNC: 103 MG/DL (ref 65–99)
HCT VFR BLD AUTO: 34.7 % (ref 34–46.6)
HGB BLD-MCNC: 10.7 G/DL (ref 12–15.9)
IMM GRANULOCYTES # BLD AUTO: 0.9 10*3/MM3 (ref 0–0.05)
IMM GRANULOCYTES NFR BLD AUTO: 4.8 % (ref 0–0.5)
INR PPP: 1.27 (ref 0.9–1.1)
LYMPHOCYTES # BLD AUTO: 1.16 10*3/MM3 (ref 0.7–3.1)
LYMPHOCYTES NFR BLD AUTO: 6.2 % (ref 19.6–45.3)
MCH RBC QN AUTO: 24.7 PG (ref 26.6–33)
MCHC RBC AUTO-ENTMCNC: 30.8 G/DL (ref 31.5–35.7)
MCV RBC AUTO: 80 FL (ref 79–97)
MONOCYTES # BLD AUTO: 0.96 10*3/MM3 (ref 0.1–0.9)
MONOCYTES NFR BLD AUTO: 5.1 % (ref 5–12)
NEUTROPHILS # BLD AUTO: 15.67 10*3/MM3 (ref 1.7–7)
NEUTROPHILS NFR BLD AUTO: 83 % (ref 42.7–76)
NRBC BLD AUTO-RTO: 0 /100 WBC (ref 0–0.2)
PLATELET # BLD AUTO: 288 10*3/MM3 (ref 140–450)
PMV BLD AUTO: 9.4 FL (ref 6–12)
POTASSIUM BLD-SCNC: 3.6 MMOL/L (ref 3.5–5.2)
PROTHROMBIN TIME: 15.6 SECONDS (ref 11.7–14.2)
RBC # BLD AUTO: 4.34 10*6/MM3 (ref 3.77–5.28)
SODIUM BLD-SCNC: 143 MMOL/L (ref 136–145)
WBC NRBC COR # BLD: 18.85 10*3/MM3 (ref 3.4–10.8)

## 2020-06-18 PROCEDURE — 85610 PROTHROMBIN TIME: CPT | Performed by: INTERNAL MEDICINE

## 2020-06-18 PROCEDURE — 96361 HYDRATE IV INFUSION ADD-ON: CPT

## 2020-06-18 PROCEDURE — 63710000001 PREDNISONE PER 5 MG: Performed by: INTERNAL MEDICINE

## 2020-06-18 PROCEDURE — 85025 COMPLETE CBC W/AUTO DIFF WBC: CPT | Performed by: NURSE PRACTITIONER

## 2020-06-18 PROCEDURE — G0378 HOSPITAL OBSERVATION PER HR: HCPCS

## 2020-06-18 PROCEDURE — 36415 COLL VENOUS BLD VENIPUNCTURE: CPT | Performed by: NURSE PRACTITIONER

## 2020-06-18 PROCEDURE — 63710000001 PREDNISONE PER 1 MG: Performed by: INTERNAL MEDICINE

## 2020-06-18 PROCEDURE — 80048 BASIC METABOLIC PNL TOTAL CA: CPT | Performed by: NURSE PRACTITIONER

## 2020-06-18 RX ORDER — WARFARIN SODIUM 4 MG/1
4 TABLET ORAL
Status: DISCONTINUED | OUTPATIENT
Start: 2020-06-18 | End: 2020-06-18 | Stop reason: HOSPADM

## 2020-06-18 RX ADMIN — LEVOTHYROXINE SODIUM 100 MCG: 100 TABLET ORAL at 06:50

## 2020-06-18 RX ADMIN — ATENOLOL 50 MG: 50 TABLET ORAL at 08:16

## 2020-06-18 RX ADMIN — SODIUM CHLORIDE 100 ML/HR: 9 INJECTION, SOLUTION INTRAVENOUS at 01:56

## 2020-06-18 RX ADMIN — AMIODARONE HYDROCHLORIDE 200 MG: 200 TABLET ORAL at 08:16

## 2020-06-18 RX ADMIN — PREDNISONE 30 MG: 20 TABLET ORAL at 08:16

## 2020-06-18 RX ADMIN — DILTIAZEM HYDROCHLORIDE 120 MG: 120 CAPSULE, COATED, EXTENDED RELEASE ORAL at 08:16

## 2020-06-18 RX ADMIN — BUPROPION HYDROCHLORIDE 150 MG: 150 TABLET, FILM COATED, EXTENDED RELEASE ORAL at 08:16

## 2020-06-18 NOTE — DISCHARGE SUMMARY
Patient Name: Joellen Dominguez  : 1945  MRN: [id number]    Date of Admission: 2020  Date of Discharge:  2020  Primary Care Physician: Chitra Leyva MD      Chief Complaint:   Abnormal Lab (patient had inr drawn today, their doctor stated it was high and they asked for a repeat)      Discharge Diagnoses     Active Hospital Problems    Diagnosis  POA   • **Acute kidney injury (MARY ANN) with acute tubular necrosis (ATN) (CMS/[facility]) [N17.0]  Yes   • Inflammatory bowel disease (Crohn's disease) (CMS/[facility]) [K50.90]  Yes   • COPD with emphysema (CMS/[facility]) [J43.9]  Yes   • Chronic diastolic CHF (congestive heart failure) (CMS/[facility]) [I50.32]  Yes   • Cold agglutinin disease (CMS/[facility]) [D59.1]  Yes   • Pulmonary HTN (CMS/[facility]) [I27.20]  Yes   • Leukocytosis [D72.829]  Yes   • PAF (paroxysmal atrial fibrillation) (CMS/[facility]) [I48.0]  Yes   • Essential hypertension [I10]  Yes   • Antiphospholipid antibody syndrome (CMS/[facility]) [D68.61]  Yes   • Other hyperlipidemia [E78.49]  Yes      Resolved Hospital Problems   No resolved problems to display.        Hospital Course     Ms. Dominguez is a 75 y.o. female with a history of COPD, diabetes, chronic diastolic CHF, pulmonary hypertension, PAF, hypertension and hyperlipidemia who presented to [facility] at the request of her PCP after INR came back supratherapeutic >10.  Please see the admitting history and physical for further details.  She was found to have MARY ANN and was admitted to the hospital for further evaluation and treatment. On admission her INR was found to be 1.24 but she was noted to be in MARY ANN with creatinine 2.16 from a baseline of 0.9. She was recently started on chlorthalidone, which likely caused her MARY ANN. This was stopped and she was started on IVFs with great response. Labs are stable and she is okay for discharge home today. The patient has been instructed to discontinue chlorthalidone. Unclear etiology why INR was >10, could have just been lab  error. She will go home on normal dose of coumadin and will need to follow up with her PCP for INR recheck in no more than a week.      Day of Discharge     no new complaints or events overnight. patient more than ready to go home.    denies chest pain, palpitations, SOA, fever, chills, edema, nausea and vomiting.     Physical Exam:  Temp:  [98 °F (36.7 °C)-98.2 °F (36.8 °C)] 98 °F (36.7 °C)  Heart Rate:  [76-91] 76  Resp:  [18] 18  BP: (105-139)/(65-81) 139/81  Body mass index is 22.15 kg/m².  Physical Exam   Constitutional: She is oriented to person, place, and time. She appears well-developed and well-nourished. No distress.   HENT:   Head: Normocephalic and atraumatic.   Eyes: Conjunctivae and EOM are normal.   Neck: Normal range of motion. Neck supple.   Cardiovascular: Normal rate and regular rhythm.   Pulmonary/Chest: Effort normal and breath sounds normal. No respiratory distress.   Abdominal: Soft. Bowel sounds are normal. She exhibits no distension. There is no tenderness.   Musculoskeletal: Normal range of motion. She exhibits no edema.   Neurological: She is alert and oriented to person, place, and time.   Skin: Skin is warm and dry.   Psychiatric: She has a normal mood and affect. Her behavior is normal.   Nursing note and vitals reviewed.      Consultants   none       Ordered              Procedures     Imaging Results (All)     Procedure Component Value Units Date/Time    XR Chest 1 View [[id number]] Collected:  06/17/20 0740     Updated:  06/17/20 0744    Narrative:       XR CHEST 1 VW-     Clinical: Leukocytosis     COMPARISON 5/25/2020     FINDINGS: Cardiac size stable and within normal limits. Very shallow  inspiratory effort. Lungs clear. No effusion or edema.     CONCLUSION: No acute pulmonary process has developed, shallow  inspiratory effort.     This report was finalized on 6/17/2020 7:40 AM by Dr. Lyle Bolton M.D.             Pertinent Labs     Results from last 7 days   Lab Units  "06/18/20  0613 06/17/20  0534 06/16/20 2037   WBC 10*3/mm3 18.85* 19.56* 21.17*   HEMOGLOBIN g/dL 10.7* 11.9* 12.2   PLATELETS 10*3/mm3 288 317 353     Results from last 7 days   Lab Units 06/18/20  0613 06/17/20  0534 06/16/20 2037   SODIUM mmol/L 143 138 139   POTASSIUM mmol/L 3.6 3.6 4.8   CHLORIDE mmol/L 106 99 96*   CO2 mmol/L 27.4 27.0 25.5   BUN mg/dL 47* 75* 86*   CREATININE mg/dL 1.14* 1.49* 2.16*   GLUCOSE mg/dL 103* 112* 213*   Estimated Creatinine Clearance: 42.5 mL/min (A) (by C-G formula based on SCr of 1.14 mg/dL (H)).  Results from last 7 days   Lab Units 06/16/20 2037   ALBUMIN g/dL 3.60   BILIRUBIN mg/dL 1.2   ALK PHOS U/L 100   AST (SGOT) U/L 35*   ALT (SGPT) U/L 28     Results from last 7 days   Lab Units 06/18/20 0613 06/17/20  0534 06/16/20 2037   CALCIUM mg/dL 8.7 8.8 9.3   ALBUMIN g/dL  --   --  3.60       Results from last 7 days   Lab Units 06/16/20 2037   CK TOTAL U/L 37           Invalid input(s): LDLCALC        Test Results Pending at Discharge       Discharge Details        Discharge Medications      Changes to Medications      Instructions Start Date   predniSONE 10 MG tablet  Commonly known as:  DELTASONE  What changed:  additional instructions   30 mg, Oral, Daily With Breakfast      warfarin 2 MG tablet  Commonly known as:  COUMADIN  What changed:    · how much to take  · how to take this  · when to take this  · additional instructions   INR 2 - 3.         Continue These Medications      Instructions Start Date   amiodarone 200 MG tablet  Commonly known as:  PACERONE   200 mg, Oral, Daily      atenolol 100 MG tablet  Commonly known as:  TENORMIN   50 mg, Oral, Every 12 Hours Scheduled      B-D 3CC LUER-CELESTE SYR 25GX5/8\" 25G X 5/8\" 3 ML misc  Generic drug:  Syringe/Needle (Disp)   USE AS DIRECTED WITH B12      buPROPion  MG 24 hr tablet  Commonly known as:  Wellbutrin XL   150 mg, Oral, Daily      Cyanocobalamin 1000 MCG/ML kit   1 each, Injection, Every 30 Days      "   dilTIAZem  MG 24 hr capsule  Commonly known as:  DILACOR XR   120 mg, Oral, 2 Times Daily      HYDROcodone-acetaminophen 5-325 MG per tablet  Commonly known as:  NORCO   1 tablet, Oral, Every 6 Hours PRN      levothyroxine 100 MCG tablet  Commonly known as:  SYNTHROID, LEVOTHROID   100 mcg, Oral, Daily         Stop These Medications    chlorthalidone 25 MG tablet  Commonly known as:  HYGROTON            Allergies   Allergen Reactions   • Sulfa Antibiotics Hives   • Infliximab Rash         Discharge Disposition:  Home or Self Care    Discharge Diet:  Diet Order   Procedures   • Diet Regular       Discharge Activity:   Activity Instructions     Activity as Tolerated            CODE STATUS:    Code Status and Medical Interventions:   Ordered at: 06/17/20 0155     Code Status:    CPR     Medical Interventions (Level of Support Prior to Arrest):    Full       Future Appointments   Date Time Provider Department Center   7/7/2020  7:30 PM [facility] SLEEP ROOMS [facility]EP [facility]   7/8/2020  2:30 PM Francisco Salomon MD [facility]     Additional Instructions for the Follow-ups that You Need to Schedule     Discharge Follow-up with PCP   As directed       Currently Documented PCP:    Chitra Leyva MD    PCP Phone Number:    [phone]     Follow Up Details:  1 week with INR checked            Contact information for follow-up providers     Chitra Leyva MD .    Specialty:  Internal Medicine  Why:  1 week with INR checked  Contact information:  [address] [facility] [address]  [facility] [address]  [phone]                   Contact information for after-discharge care     Home Medical Care     [facility] .    Service:  Home Health Services  Contact information:  [address]  [facility] [id number] [phone]                             Additional Instructions for the Follow-ups that You Need to Schedule     Discharge Follow-up with PCP   As directed       Currently Documented  PCP:    Chitra Leyva MD    PCP Phone Number:    [phone]     Follow Up Details:  1 week with INR checked           Time Spent on Discharge:  Greater than 30 minutes      MARIAM Hernandez  [facility]  06/18/20  12:21 PM

## 2020-06-18 NOTE — PROGRESS NOTES
Pharmacy Consult: Warfarin Dosing/ Monitoring    Joellen Dominguez is a 75 y.o. female, estimated creatinine clearance is 42.5 mL/min (A) (by C-G formula based on SCr of 1.14 mg/dL (H)). weighing 64.1 kg (141 lb 6.4 oz).     has a past medical history of Acute deep vein thrombosis of lower extremity (CMS/HCC), Antiphospholipid antibody syndrome (CMS/HCC), Antiphospholipid antibody syndrome (CMS/HCC), Arthritis, Benign essential hypertension, COPD (chronic obstructive pulmonary disease) (CMS/HCC), Coronary artery disease, Crohn's disease (CMS/HCC), Cutaneous candidiasis, Depression, Disease of thyroid gland, Elevated cholesterol, GERD (gastroesophageal reflux disease), History of echocardiogram (2020), Hyperlipidemia, Hypertension, Osteopenia, and Polyp, uterus corpus.    Social History     Tobacco Use    Smoking status: Former Smoker     Packs/day: 2.00     Years: 16.00     Pack years: 32.00     Types: Cigarettes     Last attempt to quit: 1967     Years since quittin.4    Smokeless tobacco: Never Used   Substance Use Topics    Alcohol use: No    Drug use: No       Results from last 7 days   Lab Units 20  0620  0534 20   INR  1.27* 1.31* 1.33* 1.24*   HEMOGLOBIN g/dL 10.7* 11.9*  --  12.2   HEMATOCRIT % 34.7 38.9  --  40.2   PLATELETS 10*3/mm3 288 317  --  353     Results from last 7 days   Lab Units 20  0534 20   SODIUM mmol/L 143 138 139   POTASSIUM mmol/L 3.6 3.6 4.8   CHLORIDE mmol/L 106 99 96*   CO2 mmol/L 27.4 27.0 25.5   BUN mg/dL 47* 75* 86*   CREATININE mg/dL 1.14* 1.49* 2.16*   CALCIUM mg/dL 8.7 8.8 9.3   BILIRUBIN mg/dL  --   --  1.2   ALK PHOS U/L  --   --  100   ALT (SGPT) U/L  --   --  28   AST (SGOT) U/L  --   --  35*   GLUCOSE mg/dL 103* 112* 213*     Anticoagulation history: Patient was on 2mg daily. She had INR of 4.8 last week. Patients  said she received 1mg on Saturday () and ().    Hospital  Anticoagulation:  Consulting provider: Dr. Bacon  Start date: 6/17  Indication: Afib  Target INR: 2-3  Expected duration: indefinite   Bridge Therapy: No                Date 6/16 6/16 6/16 6/17 6/18        INR >10 1.24 1.33 1.31 1.27        Warfarin dose - - 1mg (per family) 2mg 4mg            Potential drug interactions:   Amiodarone - may enhance the anticoagulant effect  APAP/Norco- may enhance the anticoagulant effect  Levothyroxine - may enhance the anticoagulant effect  Prednisone - may enhance the anticoagulant effect    Education complete?/ Date: not at this time    Assessment/Plan:  Patient recently had an elevated INR of 4.8 last week. Per patient's  (Dr. Dominguez) she had been on 2mg daily. He said they hadn't restarted any of her chron's meds recently. Unsure of when they were stopped. Pt instructed to come to ED since INR was reading >10. INR here was subtherapeutic.    Dose: INR at 1.27 today (below target) therefore will give 4mg x1 today  Monitor signs/symptoms of bleeding and clot/stroke.  Follow up daily INR    Pharmacy will continue to follow until discharge or discontinuation of warfarin.   Steven Sampson, [facility]  6/18/2020

## 2020-06-18 NOTE — DISCHARGE PLACEMENT REQUEST
"Presley Dominguez (75 y.o. Female)     Date of Birth Social Security Number Address Home Phone MRN    1945  [address] [address] [phone] [id number]    [employer]uther[employer] Marital Status          Uatsdin        Admission Date Admission Type Admitting Provider Attending Provider Department, Room/Bed    6/16/20 Emergency Jason Bacon MD Baumann, Patrick D, MD [address], [address]1    Discharge Date Discharge Disposition Discharge Destination                       Attending Provider:  Sathya Maciel MD    Allergies:  Sulfa Antibiotics, Infliximab    Isolation:  None   Infection:  None   Code Status:  CPR    Ht:  170.2 cm (67\")   Wt:  64.1 kg (141 lb 6.4 oz)    Admission Cmt:  None   Principal Problem:  Acute kidney injury (MARY ANN) with acute tubular necrosis (ATN) (CMS/[facility]) [N17.0]                 Active Insurance as of 6/16/2020     Primary Coverage     Payor Plan Insurance Group Employer/Plan Group    MEDICARE MEDICARE A & B      Payor Plan Address Payor Plan Phone Number Payor Plan Fax Number Effective Dates    [address] [phone]  9/1/2011 - None Entered    [facility] [address]       Subscriber Name Subscriber Birth Date Member ID       PRESLEY DOMINGUEZ 1945 [id number]           Secondary Coverage     Payor Plan Insurance Group Employer/Plan Group    AARP MC SUP AAR HEALTH CARE OPTIONS      Payor Plan Address Payor Plan Phone Number Payor Plan Fax Number Effective Dates    [facility] [phone]  1/1/2016 - None Entered    [address]       [facility] [address]       Subscriber Name Subscriber Birth Date Member ID       PRESLEY DOMINGUEZ 1945 [id number]                 Emergency Contacts      (Rel.) Home Phone Work Phone Mobile Phone    Dr.Richard Alberto (Spouse) [phone] -- [phone]    Alberto Andrade (Son) -- -- [phone]    Jacy Dominguez (Other) [phone] -- --            "

## 2020-06-18 NOTE — PLAN OF CARE
Pt without complaints of pain. Skin intact. No falls this shift. Voids without difficulty, up to bathroom with assist walker. Soft liquid like stool,per pt. Due to Crohns. Appetite good. No oxygen needs. Discharge to home with . Discharge instruction and education sheets provided.

## 2020-06-18 NOTE — PROGRESS NOTES
Discharge Planning Assessment  [facility]     Patient Name: Joellen Dominguez  MRN: [id number]  Today's Date: 6/18/2020    Admit Date: 6/16/2020    Discharge Needs Assessment     Row Name 06/18/20 1014       Living Environment    Lives With  spouse    Name(s) of Who Lives With Patient  Dr Wade darling     Current Living Arrangements  home/apartment/condo    Primary Care Provided by  self    Provides Primary Care For  no one    Family Caregiver if Needed  spouse    Family Caregiver Names  lisset, Dr Wade Dominguez     Quality of Family Relationships  helpful;involved;supportive       Resource/Environmental Concerns    Resource/Environmental Concerns  none    Transportation Concerns  car, none       Transition Planning    Patient/Family Anticipates Transition to  home with help/services;home with family    Patient/Family Anticipated Services at Transition  home health care    Transportation Anticipated  family or friend will provide       Discharge Needs Assessment    Readmission Within the Last 30 Days  no previous admission in last 30 days    Equipment Currently Used at Home  rollator    Anticipated Changes Related to Illness  none        Discharge Plan     Row Name 06/18/20 1009       Plan    Plan  return home with [facility]    Provided Post Acute Provider List?  Refused    Refused Provider List Comment  current with [facility] and would like to continue services with them    Patient/Family in Agreement with Plan  yes    Plan Comments  Spoke with patient at bedside.  Facesheet, PCP, and pharmacy verified.  Patient lives with her , Dr Wade Valera ( [phone]).  She uses a rollator for ambulation, but is otherwise IADLS.  She reports that both she and her  drive.  She is current with [facility] and would like to have them continue following.  Referral sent to [facility].  CCP will continue to follow. Kathleen Ward RN        Destination      Coordination has not been started for this encounter.       Durable Medical Equipment      Coordination has not been started for this encounter.      Dialysis/Infusion      Coordination has not been started for this encounter.      Home Medical Care      Service Provider Request Status Selected Services Address Phone Number Fax Number    [facility] Pending - Request Sent N/A [address] [id number] [phone]      Therapy      Coordination has not been started for this encounter.      Community Resources      Coordination has not been started for this encounter.          Demographic Summary     Row Name 06/18/20 1013       General Information    Admission Type  observation    Arrived From  emergency department    Required Notices Provided  Observation Status Notice    Referral Source  admission list    Reason for Consult  discharge planning    Preferred Language  English        Functional Status     Row Name 06/18/20 1014       Functional Status    Usual Activity Tolerance  moderate    Current Activity Tolerance  fair       Functional Status, IADL    Medications  assistive person    Meal Preparation  assistive person    Housekeeping  assistive person    Laundry  assistive person    Shopping  assistive person       Mental Status    General Appearance WDL  WDL       Mental Status Summary    Recent Changes in Mental Status/Cognitive Functioning  no changes        Psychosocial    No documentation.       Abuse/Neglect    No documentation.       Legal    No documentation.       Substance Abuse    No documentation.       Patient Forms    No documentation.           Kathleen Ward RN

## 2020-06-18 NOTE — DISCHARGE PLACEMENT REQUEST
"Presley Dominguez (75 y.o. Female)     Date of Birth Social Security Number Address Home Phone MRN    1945  [address] [address] [phone] [id number]    Gnosticist Marital Status          Quaker        Admission Date Admission Type Admitting Provider Attending Provider Department, Room/Bed    6/16/20 Emergency Jason Bacon MD Baumann, Patrick D, MD [address], [address]/1    Discharge Date Discharge Disposition Discharge Destination                       Attending Provider:  Sathya Maciel MD    Allergies:  Sulfa Antibiotics, Infliximab    Isolation:  None   Infection:  None   Code Status:  CPR    Ht:  170.2 cm (67\")   Wt:  64.1 kg (141 lb 6.4 oz)    Admission Cmt:  None   Principal Problem:  Acute kidney injury (MARY ANN) with acute tubular necrosis (ATN) (CMS/[facility]) [N17.0]                 Active Insurance as of 6/16/2020     Primary Coverage     Payor Plan Insurance Group Employer/Plan Group    MEDICARE MEDICARE A & B      Payor Plan Address Payor Plan Phone Number Payor Plan Fax Number Effective Dates    [address] [phone]  9/1/2011 - None Entered    [facility] [address]       Subscriber Name Subscriber Birth Date Member ID       PRESLEY DOMINGUEZ 1945 [id number]           Secondary Coverage     Payor Plan Insurance Group Employer/Plan Group    AARP MC SUP AAR HEALTH CARE OPTIONS      Payor Plan Address Payor Plan Phone Number Payor Plan Fax Number Effective Dates    [facility] [phone]  1/1/2016 - None Entered    [address]       [facility] [address]       Subscriber Name Subscriber Birth Date Member ID       PRESLEY DOMINGUEZ 1945 [id number]                 Emergency Contacts      (Rel.) Home Phone Work Phone Mobile Phone    Dr.Richard Alberto (Spouse) [phone] -- [phone]    Alberto Andrade (Son) -- -- [phone]    Jacy Dominguez (Other) [phone] -- --              "

## 2020-06-18 NOTE — PLAN OF CARE
Problem: Fall Risk (Adult)  Goal: Absence of Fall  Outcome: Ongoing (interventions implemented as appropriate)  Note:   Patient had an episode of SOB. Sats remaining 96% but pt felt like she couldn't breathe. 1L of O2 put on pt she stated it helped. VSS. Will continue to monitor.

## 2020-06-18 NOTE — OUTREACH NOTE
Prep Survey      Responses   [facility] patient discharged from?  [place]   Is LACE score < 7 ?  No   Eligibility  [facility]   Date of Admission  06/16/20   Date of Discharge  06/18/20   Discharge Disposition  Home or Self Care   Discharge diagnosis   INR came back supratherapeutic >10, NORMAL WHEN ADMITTED  MARY ANN  chlorthalidone, which likely caused her MARY ANN.    Does the patient have one of the following disease processes/diagnoses(primary or secondary)?  Other   Prep survey completed?  Yes          Denice Buchanan RN

## 2020-06-18 NOTE — TELEPHONE ENCOUNTER
Pt's appt with Dr Angeles 6/17/20 was cancelled due to pt being in hospital.  Pt's  called back to reschedule. Next available appt is end of July. Can you please review for a sooner appt as this is a 1wk f/u on med changes?  CB - Dr Dominguez [phone]

## 2020-06-19 ENCOUNTER — TRANSITIONAL CARE MANAGEMENT TELEPHONE ENCOUNTER (OUTPATIENT)
Dept: CALL CENTER | Facility: HOSPITAL | Age: 75
End: 2020-06-19

## 2020-06-19 ENCOUNTER — TELEMEDICINE (OUTPATIENT)
Dept: CARDIOLOGY | Facility: CLINIC | Age: 75
End: 2020-06-19

## 2020-06-19 VITALS
DIASTOLIC BLOOD PRESSURE: 72 MMHG | WEIGHT: 141 LBS | SYSTOLIC BLOOD PRESSURE: 107 MMHG | BODY MASS INDEX: 22.13 KG/M2 | HEART RATE: 85 BPM | HEIGHT: 67 IN

## 2020-06-19 DIAGNOSIS — I48.0 PAF (PAROXYSMAL ATRIAL FIBRILLATION) (HCC): ICD-10-CM

## 2020-06-19 DIAGNOSIS — I10 ESSENTIAL HYPERTENSION: ICD-10-CM

## 2020-06-19 DIAGNOSIS — R06.89 RESPIRATORY INSUFFICIENCY: Primary | ICD-10-CM

## 2020-06-19 DIAGNOSIS — I27.20 PULMONARY HTN (HCC): ICD-10-CM

## 2020-06-19 DIAGNOSIS — I48.91 RAPID ATRIAL FIBRILLATION (HCC): Chronic | ICD-10-CM

## 2020-06-19 DIAGNOSIS — I50.32 CHRONIC DIASTOLIC CHF (CONGESTIVE HEART FAILURE) (HCC): ICD-10-CM

## 2020-06-19 DIAGNOSIS — Z79.01 LONG TERM CURRENT USE OF ANTICOAGULANT THERAPY: ICD-10-CM

## 2020-06-19 DIAGNOSIS — J43.9 PULMONARY EMPHYSEMA, UNSPECIFIED EMPHYSEMA TYPE (HCC): ICD-10-CM

## 2020-06-19 PROCEDURE — 99214 OFFICE O/P EST MOD 30 MIN: CPT | Performed by: INTERNAL MEDICINE

## 2020-06-19 NOTE — PROGRESS NOTES
Case Management Discharge Note      Final Note: DC'd home with [facility] . Kathleen Ward RN    Provided Post Acute Provider List?: Refused  Refused Provider List Comment: [facility] with [facility]  and would like to continue services with them    Destination      No service has been selected for the patient.      Durable Medical Equipment      No service has been selected for the patient.      Dialysis/Infusion      No service has been selected for the patient.      Home Medical Care - Selection Complete      Service Provider Request Status Selected Services Address Phone Number Fax Number    [facility] Selected Home Health Services [address] [place] [address] [phone]      Therapy      No service has been selected for the patient.      Community Resources      No service has been selected for the patient.        Transportation Services  Private: Car    Final Discharge Disposition Code: 06 - home with home health care

## 2020-06-19 NOTE — PROGRESS NOTES
[facility]    You have chosen to receive care through a telehealth visit.  Do you consent to use a video/audio connection for your medical care today? Yes      Subjective:     Encounter Date:06/19/2020      Patient ID: Joellen Dominguez is a 75 y.o. female.      HPI: Joellen Fuentes agreed to a video visit today secondary to the coronavirus pandemic.  She continues to observe CDC guidelines for social distancing.  She remains free of fever or cough and her respiratory status is actually improved.  She denies any change in her sense of smell or taste.    Ms. Dominguez states that overall she feels better.  She states that her dyspnea on exertion has decreased and she is free of lower extremity edema.  She denies chest pain pressure or tightness.  She is free of orthopnea or PND no syncope near syncope or significant palpitations.  Her blood pressures controlled at 107/72.  She has underlying atrial fibrillation with a controlled ventricular response at 85 bpm.  She remains on anticoagulation with Coumadin and does not report any significant bruising or bleeding.  She was previously on high-dose beta-blocker therapy we have gradually reduced today while we have advanced diltiazem sustained release to 240 mg daily.      The following portions of the patient's history were reviewed and updated as appropriate: allergies, current medications, past family history, past medical history, past social history, past surgical history and problem list.    Problem List:  Patient Active Problem List   Diagnosis   • Incontinence   • Degeneration of lumbar intervertebral disc   • Other hyperlipidemia   • Essential hypertension   • Depression   • Antiphospholipid antibody syndrome (CMS/HCC)   • Lichen sclerosus et atrophicus   • Myocardial infarction type 2 (CMS/HCC)   • PAF (paroxysmal atrial fibrillation) (CMS/HCC)   • History of cardioversion   • Leukocytosis   • Pulmonary HTN (CMS/HCC)   • Crohn's disease of small  intestine with complication (CMS/HCC)   • Cold agglutinin disease (CMS/HCC)   • Rapid atrial fibrillation (CMS/HCC)   • Chronic diastolic CHF (congestive heart failure) (CMS/HCC)   • MARY ANN (acute kidney injury) (CMS/HCC)   • Warfarin-induced coagulopathy (CMS/HCC)   • Rectal bleeding   • Respiratory insufficiency   • Dyspnea on exertion   • COPD with emphysema (CMS/HCC)   • Acute kidney injury (MARY ANN) with acute tubular necrosis (ATN) (CMS/HCC)   • Inflammatory bowel disease (Crohn's disease) (CMS/HCC)   • Long term current use of anticoagulant therapy       Past Medical History:  Past Medical History:   Diagnosis Date   • Acute deep vein thrombosis of lower extremity (CMS/HCC)    • Antiphospholipid antibody syndrome (CMS/HCC)    • Antiphospholipid antibody syndrome (CMS/HCC)    • Arthritis     OSTEO   • Benign essential hypertension    • COPD (chronic obstructive pulmonary disease) (CMS/HCC)    • Coronary artery disease    • Crohn's disease (CMS/HCC)    • Cutaneous candidiasis    • Depression    • Disease of thyroid gland    • Elevated cholesterol    • GERD (gastroesophageal reflux disease)    • History of echocardiogram 04/22/2020    mild-to-moderate concentric hypertrophy, EF 56 - 60%. LA severely dilated, Mild MAC, Mild MR with restrictive movement of the posterior leaflet   • Hyperlipidemia    • Hypertension    • Osteopenia    • Polyp, uterus corpus        Past Surgical History:  Past Surgical History:   Procedure Laterality Date   • ABDOMINAL HERNIA REPAIR     • AMPUTATION DIGIT Left     SECOND TOE    • BILATERAL SALPINGO OOPHORECTOMY     • BREAST BIOPSY Right     BENIGN   • CARDIAC CATHETERIZATION N/A 4/22/2020    Surgeon: Paulo Singleton MD;  nonsignificant coronary artery disease normal LV function nonsignificant mitral regurgitation probably shortness of breath due to the cardiac arrhythmias and diastolic dysfunction   • CARDIAC CATHETERIZATION N/A 4/22/2020    Procedure: Coronary angiography;  Surgeon:  Paulo Singleton MD;  Location: [facility] CATH INVASIVE LOCATION;  Service: Cardiology;  Laterality: N/A;   • CARDIAC CATHETERIZATION N/A 2020    Procedure: Left ventriculography;  Surgeon: Paulo Singleton MD;  [facility];  Service: Cardiology;  Laterality: N/A;   • CARDIAC ELECTROPHYSIOLOGY PROCEDURE N/A 2020    Procedure: Cardioversion;  Surgeon: Paulo Singleton MD;  [facility];  Service: Cardiology;  Laterality: N/A;   • CHOLECYSTECTOMY     • COLONOSCOPY     • COSMETIC SURGERY     • D&C HYSTEROSCOPY N/A 10/13/2016    Procedure: DILATATION AND CURETTAGE HYSTEROSCOPY WITH MYOSURE;  Surgeon: Christopher Doll MD;  Location: [facility] MAIN OR;  Service:    • ENDOSCOPY     • EYE SURGERY Bilateral     cataract   • FACELIFT     • FEMORAL ARTERY - FEMORAL ARTERY BYPASS GRAFT Bilateral    • HEMORRHOIDECTOMY     • TONSILLECTOMY AND ADENOIDECTOMY     • VASCULAR SURGERY      femoral stents        Social History:  Social History     Socioeconomic History   • Marital status:      Spouse name: Not on file   • Number of children: Not on file   • Years of education: Not on file   • Highest education level: Not on file   Tobacco Use   • Smoking status: Former Smoker     Packs/day: 2.00     Years: 16.00     Pack years: 32.00     Types: Cigarettes     Last attempt to quit: 1967     Years since quittin.5   • Smokeless tobacco: Never Used   Substance and Sexual Activity   • Alcohol use: No   • Drug use: No   • Sexual activity: Defer       Allergies:  Allergies   Allergen Reactions   • Sulfa Antibiotics Hives   • Infliximab Rash         ROS:  Review of Systems   Constitution: Negative for malaise/fatigue.   HENT: Negative for hearing loss and nosebleeds.    Eyes: Negative for double vision and visual disturbance.   Cardiovascular: Positive for dyspnea on exertion. Negative for chest pain, claudication, near-syncope, orthopnea, palpitations,  "paroxysmal nocturnal dyspnea and syncope.   Respiratory: Negative for cough, shortness of breath, sleep disturbances due to breathing, snoring, sputum production and wheezing.    Endocrine: Negative for cold intolerance, heat intolerance, polydipsia and polyuria.   Hematologic/Lymphatic: Negative for adenopathy and bleeding problem. Does not bruise/bleed easily.   Skin: Negative for flushing and itching.   Musculoskeletal: Negative for back pain, falls, joint pain, joint swelling, muscle weakness and neck pain.   Gastrointestinal: Negative for abdominal pain, dysphagia, heartburn, nausea and vomiting.   Genitourinary: Negative for dysuria and frequency.   Neurological: Negative for disturbances in coordination, dizziness, light-headedness, loss of balance, numbness and weakness.   Psychiatric/Behavioral: Negative for altered mental status and memory loss. The patient is not nervous/anxious.    Allergic/Immunologic: Negative for hives and persistent infections.          Objective:         /72   Pulse 85   Ht 170.2 cm (67\")   Wt 64 kg (141 lb)   BMI 22.08 kg/m²     Physical Exam not performed    In-Office Procedure(s):  Procedures    ASCVD RIsk Score::  The ASCVD Risk score (Rocio DC Jr., et al., 2013) failed to calculate for the following reasons:    The patient has a prior MI or stroke diagnosis        Assessment/Plan:         1. Respiratory insufficiency  Improved    2. Chronic diastolic CHF (congestive heart failure) (CMS/HCC)  Stable and improved    3. Essential hypertension  Controlled    4. Rapid atrial fibrillation (CMS/HCC)  Controlled    5. PAF (paroxysmal atrial fibrillation) (CMS/HCC)  Stable    6. Long term current use of anticoagulant therapy  Well-tolerated    7. Pulmonary HTN (CMS/HCC)  Stable    8. Pulmonary emphysema, unspecified emphysema type (CMS/HCC)  Persistent but stable    Ms. Dominguez reports improved respiratory status.  She denies chest pain pressure or tightness.  Her volume status " is stable as well.  I will further reduce atenolol to 50 mg once daily.  Given her blood pressure 107/72.  She is currently not taking chlorthalidone.  She is to see electrophysiologist Dr. Kevin Buchanan on 7/2/2020 to discuss ablation.  She continues to undergo pulmonary evaluation and treatment for possible sleep apnea.    I spent 14 minutes on video visit and additional 11 minutes completing electronic medical record documentation.       Karthik Reyez MD  06/19/20  .

## 2020-06-19 NOTE — OUTREACH NOTE
Call Center TCM Note      Responses   [facility]   [facility] Test Status  Negative   Does the patient have one of the following disease processes/diagnoses(primary or secondary)?  Other   TCM attempt successful?  Yes   Call start time  1119   Call end time  1126   General alerts for this patient   is a MD   Discharge diagnosis   INR came back supratherapeutic >10, NORMAL WHEN ADMITTED  MARY ANN  chlorthalidone, which likely caused her MARY ANN.    Is patient permission given to speak with other caregiver?  Yes   List who call center can speak with  Wade   Person spoke with today (if not patient) and relationship  , Wade   Meds reviewed with patient/caregiver?  Yes   Is the patient having any side effects they believe may be caused by any medication additions or changes?  No   Does the patient have all medications ordered at discharge?  Yes   Medication comments   has questions about Coumadin and prednisone   Does the patient have a primary care provider?   Yes   Does the patient have an appointment with their PCP within 7 days of discharge?  Yes   Has the patient kept scheduled appointments due by today?  N/A   Has home health visited the patient within 72 hours of discharge?  Call prior to 72 hours   Did the patient receive a copy of their discharge instructions?  Yes   Nursing interventions  Reviewed instructions with patient   What is the patient's perception of their health status since discharge?  Improving   Is the patient/caregiver able to teach back signs and symptoms related to disease process for when to call PCP?  Yes   Is the patient/caregiver able to teach back signs and symptoms related to disease process for when to call 911?  Yes   Is the patient/caregiver able to teach back the hierarchy of who to call/visit for symptoms/problems? PCP, Specialist, Home health nurse, Urgent Care, ED, 911  Yes   TCM call completed?  Yes   Wrap up  additional comments  doing better,  has some questions bout prednisole and coumadin.           Ashley Jewell RN    6/19/2020, 11:27

## 2020-06-23 ENCOUNTER — OFFICE VISIT (OUTPATIENT)
Dept: INTERNAL MEDICINE | Facility: CLINIC | Age: 75
End: 2020-06-23

## 2020-06-23 VITALS — BODY MASS INDEX: 22.44 KG/M2 | HEIGHT: 67 IN | TEMPERATURE: 98.4 F | WEIGHT: 143 LBS

## 2020-06-23 DIAGNOSIS — D68.61 ANTIPHOSPHOLIPID ANTIBODY SYNDROME (HCC): Primary | ICD-10-CM

## 2020-06-23 DIAGNOSIS — L97.519 ISCHEMIC TOE ULCER, RIGHT, WITH UNSPECIFIED SEVERITY (HCC): ICD-10-CM

## 2020-06-23 DIAGNOSIS — K50.911 CROHN'S DISEASE WITH RECTAL BLEEDING, UNSPECIFIED GASTROINTESTINAL TRACT LOCATION (HCC): ICD-10-CM

## 2020-06-23 PROBLEM — I48.91 RAPID ATRIAL FIBRILLATION: Chronic | Status: RESOLVED | Noted: 2020-05-19 | Resolved: 2020-06-23

## 2020-06-23 LAB — INR PPP: 1.5 (ref 0.9–1.1)

## 2020-06-23 PROCEDURE — 99214 OFFICE O/P EST MOD 30 MIN: CPT | Performed by: INTERNAL MEDICINE

## 2020-06-23 PROCEDURE — 36415 COLL VENOUS BLD VENIPUNCTURE: CPT | Performed by: INTERNAL MEDICINE

## 2020-06-23 PROCEDURE — 85610 PROTHROMBIN TIME: CPT | Performed by: INTERNAL MEDICINE

## 2020-06-23 RX ORDER — LEVOTHYROXINE SODIUM 0.1 MG/1
100 TABLET ORAL DAILY
Qty: 30 TABLET | Refills: 1 | Status: SHIPPED | OUTPATIENT
Start: 2020-06-23 | End: 2020-07-27 | Stop reason: SDUPTHER

## 2020-06-23 NOTE — PROGRESS NOTES
Assessment and Plan  Joellen was seen today for antiphospholipid antibody syndome and fatigue.    Diagnoses and all orders for this visit:    Antiphospholipid antibody syndrome (CMS/HCC)  Comments:  vchange warfarin to 1 mg MWF and 2 mg T, Th, SS- have home health recheck next week or come in here.  Orders:  -     Ambulatory Referral to Vascular Surgery    Ischemic toe ulcer, right, with unspecified severity (CMS/HCC)  Comments:  to vascular ASAP- call if worsens, etc. before she gets in.  Orders:  -     Ambulatory Referral to Vascular Surgery    Crohn's disease with rectal bleeding, unspecified gastrointestinal tract location (CMS/HCC)  Comments:  seeing Dr. Salomon tomorrow- they will wean her prednisone.     Other orders  -     POC INR      F/U and Patient Instructions    No follow-ups on file.  There are no Patient Instructions on file for this visit.    Subjective    Joellen Dominguez is a 75 y.o. female being seen in our office today for antiphospholipid antibody syndome and Fatigue     History of the Present Illness  HPI  Here to f/u- recently hospitalized again- [facility] and elevated INR- this was better when she got to ER. She spoke to Dr. Reyez last week, he cut back the atenolol and increased diltiazem- she does feel better since.  Having pains in her feet/toes.  Burning pain.   Wonders what to do about prednisone- has been on 20 mg for 3+ weeks now.    Patient History        Significant Past History  The following portions of the patient's history were reviewed and updated as appropriate:PMHroutine: Social history , Allergies, Current Medications, Active Problem List and Health Maintenance              Social History  She  reports that she quit smoking about 53 years ago. Her smoking use included cigarettes. She has a 32.00 pack-year smoking history. She has never used smokeless tobacco. She reports that she does not drink alcohol or use drugs.                         Review of Symptoms  Review of Systems    Constitution: Negative.   Cardiovascular: Positive for dyspnea on exertion.   Respiratory: Positive for sleep disturbances due to breathing (has sleep study scheduled).    Musculoskeletal: Positive for arthritis.   Gastrointestinal: Negative.    Psychiatric/Behavioral: Negative.      Objective  Vital Signs         BP Readings from Last 1 Encounters:   06/19/20 107/72     Wt Readings from Last 3 Encounters:   06/23/20 64.9 kg (143 lb)   06/19/20 64 kg (141 lb)   06/17/20 64.1 kg (141 lb 6.4 oz)   Body mass index is 22.4 kg/m².          Physical Exam   Physical Exam   Constitutional: No distress.   Cardiovascular: Normal rate.   Pulmonary/Chest: Effort normal and breath sounds normal.   Abdominal: Soft.   Musculoskeletal: She exhibits no edema.   Feet/toes are cool, there are some dusky areas, only tissue breakdown is top of R toe.       Vascular Status -  Her right foot exhibits normal foot vasculature . Her left foot exhibits normal foot vasculature .     Psychiatric: She has a normal mood and affect. Her behavior is normal. Judgment and thought content normal.     Data Reviewed    Recent Results (from the past 2016 hour(s))   POC INR    Collection Time: 04/07/20 11:04 AM   Result Value Ref Range    INR 3.00 (A) 0.9 - 1.1   Protime-INR    Collection Time: 04/19/20 10:13 AM   Result Value Ref Range    Protime 14.0 11.7 - 14.2 Seconds    INR 1.11 (H) 0.90 - 1.10   Comprehensive Metabolic Panel    Collection Time: 04/19/20 10:13 AM   Result Value Ref Range    Glucose 180 (H) 65 - 99 mg/dL    BUN 53 (H) 8 - 23 mg/dL    Creatinine 1.70 (H) 0.57 - 1.00 mg/dL    Sodium 142 136 - 145 mmol/L    Potassium 4.5 3.5 - 5.2 mmol/L    Chloride 106 98 - 107 mmol/L    CO2 17.9 (L) 22.0 - 29.0 mmol/L    Calcium 9.4 8.6 - 10.5 mg/dL    Total Protein 7.0 6.0 - 8.5 g/dL    Albumin 3.70 3.50 - 5.20 g/dL    ALT (SGPT) 47 (H) 1 - 33 U/L    AST (SGOT) 37 (H) 1 - 32 U/L    Alkaline Phosphatase 98 39 - 117 U/L    Total Bilirubin 0.6 0.2 -  1.2 mg/dL    eGFR Non African Amer 29 (L) >60 mL/min/1.73    Globulin 3.3 gm/dL    A/G Ratio 1.1 g/dL    BUN/Creatinine Ratio 31.2 (H) 7.0 - 25.0    Anion Gap 18.1 (H) 5.0 - 15.0 mmol/L   BNP    Collection Time: 04/19/20 10:13 AM   Result Value Ref Range    proBNP 6,770.0 (H) 5.0-1,800.0 pg/mL   D-dimer, Quantitative    Collection Time: 04/19/20 10:13 AM   Result Value Ref Range    D-Dimer, Quantitative 1.13 (H) 0.00 - 0.49 MCGFEU/mL   Troponin    Collection Time: 04/19/20 10:13 AM   Result Value Ref Range    Troponin T 0.201 (C) 0.000 - 0.030 ng/mL   Lactic Acid, Plasma    Collection Time: 04/19/20 10:13 AM   Result Value Ref Range    Lactate 2.7 (C) 0.5 - 2.0 mmol/L   Procalcitonin    Collection Time: 04/19/20 10:13 AM   Result Value Ref Range    Procalcitonin 0.10 0.10 - 0.25 ng/mL   CBC Auto Differential    Collection Time: 04/19/20 10:13 AM   Result Value Ref Range    WBC 23.04 (H) 3.40 - 10.80 10*3/mm3    RBC 3.89 3.77 - 5.28 10*6/mm3    Hemoglobin 10.5 (L) 12.0 - 15.9 g/dL    Hematocrit 33.0 (L) 34.0 - 46.6 %    MCV 84.8 79.0 - 97.0 fL    MCH 27.0 26.6 - 33.0 pg    MCHC 31.8 31.5 - 35.7 g/dL    RDW 15.2 12.3 - 15.4 %    RDW-SD 44.0 37.0 - 54.0 fl    MPV 9.9 6.0 - 12.0 fL    Platelets 477 (H) 140 - 450 10*3/mm3    Neutrophil % 85.5 (H) 42.7 - 76.0 %    Lymphocyte % 7.3 (L) 19.6 - 45.3 %    Monocyte % 4.3 (L) 5.0 - 12.0 %    Eosinophil % 0.0 (L) 0.3 - 6.2 %    Basophil % 0.4 0.0 - 1.5 %    Immature Grans % 2.5 (H) 0.0 - 0.5 %    Neutrophils, Absolute 19.67 (H) 1.70 - 7.00 10*3/mm3    Lymphocytes, Absolute 1.69 0.70 - 3.10 10*3/mm3    Monocytes, Absolute 0.99 (H) 0.10 - 0.90 10*3/mm3    Eosinophils, Absolute 0.01 0.00 - 0.40 10*3/mm3    Basophils, Absolute 0.10 0.00 - 0.20 10*3/mm3    Immature Grans, Absolute 0.58 (H) 0.00 - 0.05 10*3/mm3    nRBC 0.2 0.0 - 0.2 /100 WBC   TSH    Collection Time: 04/19/20 10:13 AM   Result Value Ref Range    TSH 0.222 (L) 0.270 - 4.200 uIU/mL   Magnesium    Collection Time:  04/19/20 10:13 AM   Result Value Ref Range    Magnesium 2.0 1.6 - 2.4 mg/dL   Lactic Acid, Reflex Timer (This will reflex a repeat order 3-3:15 hours after ordered.)    Collection Time: 04/19/20 10:13 AM   Result Value Ref Range    Hold Tube Hold for add-ons.    Respiratory Panel, PCR - Swab, Nasopharynx    Collection Time: 04/19/20 10:27 AM   Result Value Ref Range    ADENOVIRUS, PCR Not Detected Not Detected    Coronavirus 229E Not Detected Not Detected    Coronavirus HKU1 Not Detected Not Detected    Coronavirus NL63 Not Detected Not Detected    Coronavirus OC43 Not Detected Not Detected    Human Metapneumovirus Not Detected Not Detected    Human Rhinovirus/Enterovirus Not Detected Not Detected    Influenza B PCR Not Detected Not Detected    Parainfluenza Virus 1 Not Detected Not Detected    Parainfluenza Virus 2 Not Detected Not Detected    Parainfluenza Virus 3 Not Detected Not Detected    Parainfluenza Virus 4 Not Detected Not Detected    Bordetella pertussis pcr Not Detected Not Detected    Influenza A H1 2009 PCR Not Detected Not Detected    Chlamydophila pneumoniae PCR Not Detected Not Detected    Mycoplasma pneumo by PCR Not Detected Not Detected    Influenza A PCR Not Detected Not Detected    Influenza A H3 Not Detected Not Detected    Influenza A H1 Not Detected Not Detected    RSV, PCR Not Detected Not Detected    Bordetella parapertussis PCR Not Detected Not Detected   SARS-CoV-2, PCR (IN-HOUSE), NP Swab in Transport Media - Swab, Nasopharynx    Collection Time: 04/19/20 10:27 AM   Result Value Ref Range    COVID19 Not Detected Not Detected   Blood Culture - Blood, Arm, Right    Collection Time: 04/19/20 11:36 AM   Result Value Ref Range    Blood Culture No growth at 5 days    Blood Culture - Blood, Arm, Left    Collection Time: 04/19/20 11:36 AM   Result Value Ref Range    Blood Culture No growth at 5 days    Urinalysis With Microscopic If Indicated (No Culture) - Urine, Catheter    Collection Time:  04/19/20 11:44 AM   Result Value Ref Range    Color, UA Yellow Yellow, Straw    Appearance, UA Clear Clear    pH, UA <=5.0 5.0 - 8.0    Specific Gravity, UA 1.025 1.005 - 1.030    Glucose, UA Negative Negative    Ketones, UA Trace (A) Negative    Bilirubin, UA Negative Negative    Blood, UA Negative Negative    Protein, UA 30 mg/dL (1+) (A) Negative    Leuk Esterase, UA Negative Negative    Nitrite, UA Negative Negative    Urobilinogen, UA 0.2 E.U./dL 0.2 - 1.0 E.U./dL   Urinalysis, Microscopic Only - Urine, Catheter    Collection Time: 04/19/20 11:44 AM   Result Value Ref Range    RBC, UA 0-2 None Seen, 0-2 /HPF    WBC, UA 0-2 None Seen, 0-2 /HPF    Bacteria, UA Trace (A) None Seen /HPF    Squamous Epithelial Cells, UA 0-2 None Seen, 0-2 /HPF    Hyaline Casts, UA 31-50 None Seen /LPF    WBC Casts 0-2 None Seen /LPF    Coarse Granular Casts, UA 0-2 None Seen /LPF    Amorphous Crystals, UA Small/1+ None Seen /HPF    Methodology Manual Light Microscopy    Blood Gas, Arterial    Collection Time: 04/19/20  1:40 PM   Result Value Ref Range    Site Arterial: right radial     Chong's Test Positive     pH, Arterial 7.212 (C) 7.350 - 7.450 pH units    pCO2, Arterial 49.7 (H) 35.0 - 45.0 mm Hg    pO2, Arterial 63.6 (L) 80.0 - 100.0 mm Hg    HCO3, Arterial 20.0 (L) 22.0 - 28.0 mmol/L    Base Excess, Arterial -7.7 (L) 0.0 - 2.0 mmol/L    O2 Saturation Calculated 86.7 (L) 92.0 - 99.0 %    Barometric Pressure for Blood Gas 743.8 mmHg    Modality Cannula     Flow Rate 1 lpm    Rate 28 Breaths/minute   STAT Adult Transthoracic Echo Complete W/ Cont if Necessary Per Protocol    Collection Time: 04/19/20  2:31 PM   Result Value Ref Range    BSA 1.8 m^2    IVSd 1.4 cm    LVIDd 2.7 cm    LVIDs 2.2 cm    LVPWd 1.5 cm    IVS/LVPW 0.93     FS 18.5 %    EDV(Teich) 27.0 ml    ESV(Teich) 16.2 ml    EF(Teich) 40.0 %    EDV(cubed) 19.7 ml    ESV(cubed) 10.6 ml    EF(cubed) 45.9 %    LV mass(C)d 130.3 grams    LV mass(C)dI 72.1 grams/m^2     SV(Teich) 10.8 ml    SI(Teich) 6.0 ml/m^2    SV(cubed) 9.0 ml    SI(cubed) 5.0 ml/m^2    asc Aorta Diam 3.1 cm    LVOT diam 2.2 cm    LVOT area 3.8 cm^2    LVOT area(traced) 3.8 cm^2    RVOT diam 2.2 cm    RVOT area 3.8 cm^2    LVLd ap4 6.3 cm    EDV(MOD-sp4) 59.0 ml    LVLs ap4 6.0 cm    ESV(MOD-sp4) 22.0 ml    EF(MOD-sp4) 62.7 %    LVLd ap2 6.5 cm    EDV(MOD-sp2) 61.0 ml    LVLs ap2 6.0 cm    ESV(MOD-sp2) 24.0 ml    EF(MOD-sp2) 60.7 %    SV(MOD-sp4) 37.0 ml    SI(MOD-sp4) 20.5 ml/m^2    SV(MOD-sp2) 37.0 ml    SI(MOD-sp2) 20.5 ml/m^2    LV Dueñas Vol (BSA corrected) 32.6 ml/m^2    LV Sys Vol (BSA corrected) 12.2 ml/m^2    MV E max mary 120.0 cm/sec    MV V2 max 121.0 cm/sec    MV max PG 5.9 mmHg    MV V2 mean 59.7 cm/sec    MV mean PG 2.0 mmHg    MV V2 VTI 25.0 cm    MVA(VTI) 2.7 cm^2    MV P1/2t max mary 115.0 cm/sec    MV P1/2t 88.2 msec    MVA(P1/2t) 2.5 cm^2    MV dec slope 382.0 cm/sec^2    MV dec time 0.13 sec    Ao pk mary 108.0 cm/sec    Ao max PG 4.7 mmHg    Ao max PG (full) 0.98 mmHg    Ao V2 mean 71.7 cm/sec    Ao mean PG 2.0 mmHg    Ao mean PG (full) 0 mmHg    Ao V2 VTI 19.6 cm    JOSE(I,A) 3.4 cm^2    JOSE(I,D) 3.4 cm^2    JOSE(V,A) 3.4 cm^2    JOSE(V,D) 3.4 cm^2    LV V1 max PG 3.7 mmHg    LV V1 mean PG 2.0 mmHg    LV V1 max 96.0 cm/sec    LV V1 mean 61.6 cm/sec    LV V1 VTI 17.5 cm    SV(LVOT) 66.5 ml    SV(RVOT) 70.7 ml    SI(LVOT) 36.8 ml/m^2    PA V2 max 67.9 cm/sec    PA max PG 1.8 mmHg    PA max PG (full) -1.2 mmHg     CV ECHO SCOTTY - PVA(V,A) 4.9 cm^2     CV ECHO SCOTTY - PVA(V,D) 4.9 cm^2    PA acc time 0.11 sec    RV V1 max PG 3.0 mmHg    RV V1 mean PG 2.0 mmHg    RV V1 max 86.9 cm/sec    RV V1 mean 63.8 cm/sec    RV V1 VTI 18.6 cm    TR max mary 301.0 cm/sec    RVSP(TR) 39.2 mmHg    RAP systole 3.0 mmHg    PA pr(Accel) 27.7 mmHg    Qp/Qs 1.1     MVA P1/2T LCG 1.9 cm^2     CV ECHO SCOTTY - BZI_BMI 22.8 kilograms/m^2     CV ECHO SCOTTY - BSA(HAYCOCK) 1.8 m^2     CV ECHO SCOTTY - BZI_METRIC_WEIGHT 68.0  kg    BH CV ECHO SCOTTY - BZI_METRIC_HEIGHT 172.7 cm    Target HR (85%) 122 bpm    Max. Pred. HR (100%) 144 bpm    RV S' 12.80 cm/sec    RV Base 2.90 cm    RV Length 5.60 cm    RV Mid 2.30 cm    LA Volume Index 41.0 mL/m2    Avg E/e' ratio 8.42     EF(MOD-bp) 62 %    Lat Peak E' Darrion 14.6 cm/sec    Med Peak E' Drarion 13.90 cm/sec   POC Glucose Once    Collection Time: 04/19/20  4:09 PM   Result Value Ref Range    Glucose 123 70 - 130 mg/dL   Troponin    Collection Time: 04/19/20  4:22 PM   Result Value Ref Range    Troponin T 0.196 (C) 0.000 - 0.030 ng/mL   Basic Metabolic Panel    Collection Time: 04/19/20  4:22 PM   Result Value Ref Range    Glucose 107 (H) 65 - 99 mg/dL    BUN 48 (H) 8 - 23 mg/dL    Creatinine 1.47 (H) 0.57 - 1.00 mg/dL    Sodium 144 136 - 145 mmol/L    Potassium 4.7 3.5 - 5.2 mmol/L    Chloride 114 (H) 98 - 107 mmol/L    CO2 18.6 (L) 22.0 - 29.0 mmol/L    Calcium 8.1 (L) 8.6 - 10.5 mg/dL    eGFR Non African Amer 35 (L) >60 mL/min/1.73    BUN/Creatinine Ratio 32.7 (H) 7.0 - 25.0    Anion Gap 11.4 5.0 - 15.0 mmol/L   Lactic Acid, Reflex    Collection Time: 04/19/20  4:28 PM   Result Value Ref Range    Lactate 1.5 0.5 - 2.0 mmol/L   CBC Auto Differential    Collection Time: 04/19/20  5:36 PM   Result Value Ref Range    WBC 20.90 (H) 3.40 - 10.80 10*3/mm3    RBC 3.35 (L) 3.77 - 5.28 10*6/mm3    Hemoglobin 8.6 (L) 12.0 - 15.9 g/dL    Hematocrit 27.5 (L) 34.0 - 46.6 %    MCV 82.1 79.0 - 97.0 fL    MCH 25.7 (L) 26.6 - 33.0 pg    MCHC 31.3 (L) 31.5 - 35.7 g/dL    RDW 14.8 12.3 - 15.4 %    RDW-SD 44.2 37.0 - 54.0 fl    MPV 10.0 6.0 - 12.0 fL    Platelets 345 140 - 450 10*3/mm3    Neutrophil % 87.1 (H) 42.7 - 76.0 %    Lymphocyte % 4.8 (L) 19.6 - 45.3 %    Monocyte % 5.4 5.0 - 12.0 %    Eosinophil % 0.0 (L) 0.3 - 6.2 %    Basophil % 0.1 0.0 - 1.5 %    Immature Grans % 2.6 (H) 0.0 - 0.5 %    Neutrophils, Absolute 18.18 (H) 1.70 - 7.00 10*3/mm3    Lymphocytes, Absolute 1.00 0.70 - 3.10 10*3/mm3    Monocytes,  Absolute 1.13 (H) 0.10 - 0.90 10*3/mm3    Eosinophils, Absolute 0.01 0.00 - 0.40 10*3/mm3    Basophils, Absolute 0.03 0.00 - 0.20 10*3/mm3    Immature Grans, Absolute 0.55 (H) 0.00 - 0.05 10*3/mm3    nRBC 0.2 0.0 - 0.2 /100 WBC   POC Glucose Once    Collection Time: 04/19/20  7:10 PM   Result Value Ref Range    Glucose 132 (H) 70 - 130 mg/dL   aPTT    Collection Time: 04/19/20 10:23 PM   Result Value Ref Range    .5 (H) 22.7 - 35.4 seconds   POC Glucose Once    Collection Time: 04/19/20 10:57 PM   Result Value Ref Range    Glucose 117 70 - 130 mg/dL   POC Glucose Once    Collection Time: 04/20/20  3:10 AM   Result Value Ref Range    Glucose 114 70 - 130 mg/dL   aPTT    Collection Time: 04/20/20  6:14 AM   Result Value Ref Range    .6 (C) 22.7 - 35.4 seconds   Basic Metabolic Panel    Collection Time: 04/20/20  6:14 AM   Result Value Ref Range    Glucose 103 (H) 65 - 99 mg/dL    BUN 46 (H) 8 - 23 mg/dL    Creatinine 1.13 (H) 0.57 - 1.00 mg/dL    Sodium 144 136 - 145 mmol/L    Potassium 3.7 3.5 - 5.2 mmol/L    Chloride 107 98 - 107 mmol/L    CO2 21.8 (L) 22.0 - 29.0 mmol/L    Calcium 8.3 (L) 8.6 - 10.5 mg/dL    eGFR Non African Amer 47 (L) >60 mL/min/1.73    BUN/Creatinine Ratio 40.7 (H) 7.0 - 25.0    Anion Gap 15.2 (H) 5.0 - 15.0 mmol/L   Troponin    Collection Time: 04/20/20  6:14 AM   Result Value Ref Range    Troponin T 0.130 (C) 0.000 - 0.030 ng/mL   TSH    Collection Time: 04/20/20  6:14 AM   Result Value Ref Range    TSH 0.076 (L) 0.270 - 4.200 uIU/mL   T4, Free    Collection Time: 04/20/20  6:14 AM   Result Value Ref Range    Free T4 1.81 (H) 0.93 - 1.70 ng/dL   CBC Auto Differential    Collection Time: 04/20/20  6:14 AM   Result Value Ref Range    WBC 16.56 (H) 3.40 - 10.80 10*3/mm3    RBC 3.57 (L) 3.77 - 5.28 10*6/mm3    Hemoglobin 8.9 (L) 12.0 - 15.9 g/dL    Hematocrit 28.6 (L) 34.0 - 46.6 %    MCV 80.1 79.0 - 97.0 fL    MCH 24.9 (L) 26.6 - 33.0 pg    MCHC 31.1 (L) 31.5 - 35.7 g/dL    RDW 14.5  12.3 - 15.4 %    RDW-SD 41.6 37.0 - 54.0 fl    MPV 10.8 6.0 - 12.0 fL    Platelets 313 140 - 450 10*3/mm3    Neutrophil % 87.5 (H) 42.7 - 76.0 %    Lymphocyte % 5.3 (L) 19.6 - 45.3 %    Monocyte % 4.3 (L) 5.0 - 12.0 %    Eosinophil % 0.6 0.3 - 6.2 %    Basophil % 0.2 0.0 - 1.5 %    Immature Grans % 2.1 (H) 0.0 - 0.5 %    Neutrophils, Absolute 14.49 (H) 1.70 - 7.00 10*3/mm3    Lymphocytes, Absolute 0.88 0.70 - 3.10 10*3/mm3    Monocytes, Absolute 0.71 0.10 - 0.90 10*3/mm3    Eosinophils, Absolute 0.10 0.00 - 0.40 10*3/mm3    Basophils, Absolute 0.04 0.00 - 0.20 10*3/mm3    Immature Grans, Absolute 0.34 (H) 0.00 - 0.05 10*3/mm3    nRBC 0.1 0.0 - 0.2 /100 WBC   POC Glucose Once    Collection Time: 04/20/20  7:32 AM   Result Value Ref Range    Glucose 112 70 - 130 mg/dL   Duplex Venous Lower Extremity - Bilateral CAR    Collection Time: 04/20/20  1:59 PM   Result Value Ref Range    Right Common Femoral Spont Y     Right Common Femoral Phasic Y     Right Common Femoral Augment Y     Right Common Femoral Competent Y     Right Common Femoral Compress C     Right Saphenofemoral Junction Compress C     Right Profunda Femoral Compress C     Right Proximal Femoral Compress C     Right Mid Femoral Spont Y     Right Mid Femoral Phasic Y     Right Mid Femoral Augment Y     Right Mid Femoral Competent Y     Right Mid Femoral Compress C     Right Distal Femoral Compress C     Right Popliteal Spont Y     Right Popliteal Phasic Y     Right Popliteal Augment Y     Right Popliteal Competent Y     Right Popliteal Compress C     Right Posterior Tibial Compress C     Right Peroneal Compress C     Right GastronemiusSoleal Compress C     Right Greater Saph AK Compress C     Right Greater Saph BK Compress C     Left Common Femoral Spont Y     Left Common Femoral Phasic Y     Left Common Femoral Augment Y     Left Common Femoral Competent Y     Left Common Femoral Compress C     Left Saphenofemoral Junction Compress C     Left Profunda  Femoral Compress C     Left Proximal Femoral Compress C     Left Mid Femoral Spont Y     Left Mid Femoral Phasic Y     Left Mid Femoral Augment Y     Left Mid Femoral Competent Y     Left Mid Femoral Compress C     Left Distal Femoral Compress C     Left Popliteal Spont Y     Left Popliteal Phasic Y     Left Popliteal Augment Y     Left Popliteal Competent Y     Left Popliteal Compress C     Left Posterior Tibial Compress C     Left Peroneal Compress C     Left GastronemiusSoleal Compress C     Left Greater Saph AK Compress C     Left Greater Saph BK Compress C    aPTT    Collection Time: 04/20/20  3:14 PM   Result Value Ref Range    PTT 62.7 (H) 22.7 - 35.4 seconds   aPTT    Collection Time: 04/20/20  7:09 PM   Result Value Ref Range    .7 (H) 22.7 - 35.4 seconds   aPTT    Collection Time: 04/21/20  5:12 AM   Result Value Ref Range    PTT 28.5 22.7 - 35.4 seconds   Basic Metabolic Panel    Collection Time: 04/21/20  5:12 AM   Result Value Ref Range    Glucose 142 (H) 65 - 99 mg/dL    BUN 46 (H) 8 - 23 mg/dL    Creatinine 1.18 (H) 0.57 - 1.00 mg/dL    Sodium 141 136 - 145 mmol/L    Potassium 4.2 3.5 - 5.2 mmol/L    Chloride 106 98 - 107 mmol/L    CO2 20.8 (L) 22.0 - 29.0 mmol/L    Calcium 8.7 8.6 - 10.5 mg/dL    eGFR Non African Amer 45 (L) >60 mL/min/1.73    BUN/Creatinine Ratio 39.0 (H) 7.0 - 25.0    Anion Gap 14.2 5.0 - 15.0 mmol/L   Troponin    Collection Time: 04/21/20  5:12 AM   Result Value Ref Range    Troponin T 0.136 (C) 0.000 - 0.030 ng/mL   CBC Auto Differential    Collection Time: 04/21/20  5:12 AM   Result Value Ref Range    WBC 17.78 (H) 3.40 - 10.80 10*3/mm3    RBC 3.29 (L) 3.77 - 5.28 10*6/mm3    Hemoglobin 9.5 (L) 12.0 - 15.9 g/dL    Hematocrit 27.9 (L) 34.0 - 46.6 %    MCV 84.8 79.0 - 97.0 fL    MCH 28.9 26.6 - 33.0 pg    MCHC 34.1 31.5 - 35.7 g/dL    RDW 15.6 (H) 12.3 - 15.4 %    RDW-SD 43.3 37.0 - 54.0 fl    MPV 11.0 6.0 - 12.0 fL    Platelets 183 140 - 450 10*3/mm3    Neutrophil % 83.6  (H) 42.7 - 76.0 %    Lymphocyte % 6.1 (L) 19.6 - 45.3 %    Monocyte % 4.8 (L) 5.0 - 12.0 %    Eosinophil % 2.0 0.3 - 6.2 %    Basophil % 0.6 0.0 - 1.5 %    Immature Grans % 2.9 (H) 0.0 - 0.5 %    Neutrophils, Absolute 14.86 (H) 1.70 - 7.00 10*3/mm3    Lymphocytes, Absolute 1.08 0.70 - 3.10 10*3/mm3    Monocytes, Absolute 0.85 0.10 - 0.90 10*3/mm3    Eosinophils, Absolute 0.36 0.00 - 0.40 10*3/mm3    Basophils, Absolute 0.11 0.00 - 0.20 10*3/mm3    Immature Grans, Absolute 0.52 (H) 0.00 - 0.05 10*3/mm3    nRBC 0.1 0.0 - 0.2 /100 WBC   aPTT    Collection Time: 04/21/20  3:08 PM   Result Value Ref Range    .9 (H) 22.7 - 35.4 seconds   aPTT    Collection Time: 04/21/20 11:19 PM   Result Value Ref Range    PTT 37.7 (H) 22.7 - 35.4 seconds   Basic Metabolic Panel    Collection Time: 04/22/20  7:29 AM   Result Value Ref Range    Glucose 139 (H) 65 - 99 mg/dL    BUN 34 (H) 8 - 23 mg/dL    Creatinine 1.02 (H) 0.57 - 1.00 mg/dL    Sodium 143 136 - 145 mmol/L    Potassium 3.8 3.5 - 5.2 mmol/L    Chloride 106 98 - 107 mmol/L    CO2 25.4 22.0 - 29.0 mmol/L    Calcium 9.1 8.6 - 10.5 mg/dL    eGFR Non African Amer 53 (L) >60 mL/min/1.73    BUN/Creatinine Ratio 33.3 (H) 7.0 - 25.0    Anion Gap 11.6 5.0 - 15.0 mmol/L   CBC Auto Differential    Collection Time: 04/22/20  7:29 AM   Result Value Ref Range    WBC 19.89 (H) 3.40 - 10.80 10*3/mm3    RBC 3.73 (L) 3.77 - 5.28 10*6/mm3    Hemoglobin 9.9 (L) 12.0 - 15.9 g/dL    Hematocrit 31.1 (L) 34.0 - 46.6 %    MCV 83.4 79.0 - 97.0 fL    MCH 26.5 (L) 26.6 - 33.0 pg    MCHC 31.8 31.5 - 35.7 g/dL    RDW 15.0 12.3 - 15.4 %    RDW-SD 44.0 37.0 - 54.0 fl    MPV 10.5 6.0 - 12.0 fL    Platelets 410 140 - 450 10*3/mm3    Neutrophil % 80.4 (H) 42.7 - 76.0 %    Lymphocyte % 8.6 (L) 19.6 - 45.3 %    Monocyte % 4.9 (L) 5.0 - 12.0 %    Eosinophil % 1.8 0.3 - 6.2 %    Basophil % 0.3 0.0 - 1.5 %    Immature Grans % 4.0 (H) 0.0 - 0.5 %    Neutrophils, Absolute 15.99 (H) 1.70 - 7.00 10*3/mm3     Lymphocytes, Absolute 1.72 0.70 - 3.10 10*3/mm3    Monocytes, Absolute 0.98 (H) 0.10 - 0.90 10*3/mm3    Eosinophils, Absolute 0.36 0.00 - 0.40 10*3/mm3    Basophils, Absolute 0.05 0.00 - 0.20 10*3/mm3    Immature Grans, Absolute 0.79 (H) 0.00 - 0.05 10*3/mm3    nRBC 0.1 0.0 - 0.2 /100 WBC   aPTT    Collection Time: 04/22/20  7:29 AM   Result Value Ref Range    PTT 35.6 (H) 22.7 - 35.4 seconds   aPTT    Collection Time: 04/23/20  3:07 AM   Result Value Ref Range    PTT 38.4 (H) 22.7 - 35.4 seconds   Basic Metabolic Panel    Collection Time: 04/23/20  3:07 AM   Result Value Ref Range    Glucose 145 (H) 65 - 99 mg/dL    BUN 35 (H) 8 - 23 mg/dL    Creatinine 1.08 (H) 0.57 - 1.00 mg/dL    Sodium 143 136 - 145 mmol/L    Potassium 4.1 3.5 - 5.2 mmol/L    Chloride 105 98 - 107 mmol/L    CO2 24.1 22.0 - 29.0 mmol/L    Calcium 9.2 8.6 - 10.5 mg/dL    eGFR Non African Amer 49 (L) >60 mL/min/1.73    BUN/Creatinine Ratio 32.4 (H) 7.0 - 25.0    Anion Gap 13.9 5.0 - 15.0 mmol/L   CBC Auto Differential    Collection Time: 04/23/20  3:07 AM   Result Value Ref Range    WBC 18.96 (H) 3.40 - 10.80 10*3/mm3    RBC 3.73 (L) 3.77 - 5.28 10*6/mm3    Hemoglobin 9.4 (L) 12.0 - 15.9 g/dL    Hematocrit 30.4 (L) 34.0 - 46.6 %    MCV 81.5 79.0 - 97.0 fL    MCH 25.2 (L) 26.6 - 33.0 pg    MCHC 30.9 (L) 31.5 - 35.7 g/dL    RDW 14.7 12.3 - 15.4 %    RDW-SD 43.5 37.0 - 54.0 fl    MPV 9.9 6.0 - 12.0 fL    Platelets 400 140 - 450 10*3/mm3    Neutrophil % 83.0 (H) 42.7 - 76.0 %    Lymphocyte % 7.5 (L) 19.6 - 45.3 %    Monocyte % 4.6 (L) 5.0 - 12.0 %    Eosinophil % 1.0 0.3 - 6.2 %    Basophil % 0.4 0.0 - 1.5 %    Immature Grans % 3.5 (H) 0.0 - 0.5 %    Neutrophils, Absolute 15.71 (H) 1.70 - 7.00 10*3/mm3    Lymphocytes, Absolute 1.43 0.70 - 3.10 10*3/mm3    Monocytes, Absolute 0.88 0.10 - 0.90 10*3/mm3    Eosinophils, Absolute 0.19 0.00 - 0.40 10*3/mm3    Basophils, Absolute 0.08 0.00 - 0.20 10*3/mm3    Immature Grans, Absolute 0.67 (H) 0.00 - 0.05  10*3/mm3    nRBC 0.1 0.0 - 0.2 /100 WBC   aPTT    Collection Time: 04/23/20 10:15 AM   Result Value Ref Range    PTT 33.6 22.7 - 35.4 seconds   Adult Transesophageal Echo 3D (JUAN C) W/ Cont If Necessary Per Protocol    Collection Time: 04/23/20 12:10 PM   Result Value Ref Range    BSA 1.9 m^2    asc Aorta Diam 3.5 cm    MR max betsy 490.0 cm/sec    MR max PG 96.0 mmHg    MR mean betsy 363.0 cm/sec    MR mean PG 59.0 mmHg    MR .0 cm     CV ECHO SCOTTY - BZI_BMI 24.2 kilograms/m^2     CV ECHO SCOTTY - BSA(HAYCOCK) 1.9 m^2     CV ECHO SCOTTY - BZI_METRIC_WEIGHT 72.1 kg     CV ECHO SCOTTY - BZI_METRIC_HEIGHT 172.7 cm     CV VAS BP RIGHT /84 mmHg    Ascending aorta 3.50 cm    PISA ALIASING BETSY 3.70 m/s    Radius 0.7 cm    MR PISA EROA 23.00 cm2    MV regurgitant volume 33 cc   aPTT    Collection Time: 04/24/20  7:08 AM   Result Value Ref Range    PTT 35.1 22.7 - 35.4 seconds   Basic Metabolic Panel    Collection Time: 04/24/20  7:08 AM   Result Value Ref Range    Glucose 136 (H) 65 - 99 mg/dL    BUN 33 (H) 8 - 23 mg/dL    Creatinine 0.98 0.57 - 1.00 mg/dL    Sodium 140 136 - 145 mmol/L    Potassium 3.7 3.5 - 5.2 mmol/L    Chloride 101 98 - 107 mmol/L    CO2 27.8 22.0 - 29.0 mmol/L    Calcium 9.1 8.6 - 10.5 mg/dL    eGFR Non African Amer 55 (L) >60 mL/min/1.73    BUN/Creatinine Ratio 33.7 (H) 7.0 - 25.0    Anion Gap 11.2 5.0 - 15.0 mmol/L   CBC Auto Differential    Collection Time: 04/24/20  7:08 AM   Result Value Ref Range    WBC 17.94 (H) 3.40 - 10.80 10*3/mm3    RBC 3.78 3.77 - 5.28 10*6/mm3    Hemoglobin 9.3 (L) 12.0 - 15.9 g/dL    Hematocrit 30.3 (L) 34.0 - 46.6 %    MCV 80.2 79.0 - 97.0 fL    MCH 24.6 (L) 26.6 - 33.0 pg    MCHC 30.7 (L) 31.5 - 35.7 g/dL    RDW 15.0 12.3 - 15.4 %    RDW-SD 43.1 37.0 - 54.0 fl    MPV 10.4 6.0 - 12.0 fL    Platelets 380 140 - 450 10*3/mm3    Neutrophil % 82.8 (H) 42.7 - 76.0 %    Lymphocyte % 7.1 (L) 19.6 - 45.3 %    Monocyte % 5.5 5.0 - 12.0 %    Eosinophil % 1.7 0.3 - 6.2  %    Basophil % 0.2 0.0 - 1.5 %    Immature Grans % 2.7 (H) 0.0 - 0.5 %    Neutrophils, Absolute 14.85 (H) 1.70 - 7.00 10*3/mm3    Lymphocytes, Absolute 1.27 0.70 - 3.10 10*3/mm3    Monocytes, Absolute 0.99 (H) 0.10 - 0.90 10*3/mm3    Eosinophils, Absolute 0.30 0.00 - 0.40 10*3/mm3    Basophils, Absolute 0.04 0.00 - 0.20 10*3/mm3    Immature Grans, Absolute 0.49 (H) 0.00 - 0.05 10*3/mm3    nRBC 0.1 0.0 - 0.2 /100 WBC   aPTT    Collection Time: 04/24/20 11:47 AM   Result Value Ref Range    PTT 33.9 22.7 - 35.4 seconds   Protime-INR    Collection Time: 04/24/20 11:47 AM   Result Value Ref Range    Protime 14.2 11.7 - 14.2 Seconds    INR 1.13 (H) 0.90 - 1.10   aPTT    Collection Time: 04/24/20  7:57 PM   Result Value Ref Range    .4 (C) 22.7 - 35.4 seconds   aPTT    Collection Time: 04/25/20  5:28 AM   Result Value Ref Range    .7 (H) 22.7 - 35.4 seconds   Basic Metabolic Panel    Collection Time: 04/25/20  5:28 AM   Result Value Ref Range    Glucose 134 (H) 65 - 99 mg/dL    BUN 27 (H) 8 - 23 mg/dL    Creatinine 0.85 0.57 - 1.00 mg/dL    Sodium 139 136 - 145 mmol/L    Potassium 3.3 (L) 3.5 - 5.2 mmol/L    Chloride 102 98 - 107 mmol/L    CO2 27.4 22.0 - 29.0 mmol/L    Calcium 8.8 8.6 - 10.5 mg/dL    eGFR Non African Amer 65 >60 mL/min/1.73    BUN/Creatinine Ratio 31.8 (H) 7.0 - 25.0    Anion Gap 9.6 5.0 - 15.0 mmol/L   CBC Auto Differential    Collection Time: 04/25/20  5:28 AM   Result Value Ref Range    WBC 18.83 (H) 3.40 - 10.80 10*3/mm3    RBC 3.65 (L) 3.77 - 5.28 10*6/mm3    Hemoglobin 8.9 (L) 12.0 - 15.9 g/dL    Hematocrit 29.1 (L) 34.0 - 46.6 %    MCV 79.7 79.0 - 97.0 fL    MCH 24.4 (L) 26.6 - 33.0 pg    MCHC 30.6 (L) 31.5 - 35.7 g/dL    RDW 14.7 12.3 - 15.4 %    RDW-SD 42.9 37.0 - 54.0 fl    MPV 10.9 6.0 - 12.0 fL    Platelets 305 140 - 450 10*3/mm3    Neutrophil % 81.8 (H) 42.7 - 76.0 %    Lymphocyte % 7.0 (L) 19.6 - 45.3 %    Monocyte % 5.5 5.0 - 12.0 %    Eosinophil % 2.7 0.3 - 6.2 %    Basophil  % 0.3 0.0 - 1.5 %    Immature Grans % 2.7 (H) 0.0 - 0.5 %    Neutrophils, Absolute 15.42 (H) 1.70 - 7.00 10*3/mm3    Lymphocytes, Absolute 1.32 0.70 - 3.10 10*3/mm3    Monocytes, Absolute 1.03 (H) 0.10 - 0.90 10*3/mm3    Eosinophils, Absolute 0.51 (H) 0.00 - 0.40 10*3/mm3    Basophils, Absolute 0.05 0.00 - 0.20 10*3/mm3    Immature Grans, Absolute 0.50 (H) 0.00 - 0.05 10*3/mm3    nRBC 0.1 0.0 - 0.2 /100 WBC   aPTT    Collection Time: 04/25/20  3:03 PM   Result Value Ref Range    PTT 84.1 (H) 22.7 - 35.4 seconds   Protime-INR    Collection Time: 04/25/20  3:03 PM   Result Value Ref Range    Protime 14.5 (H) 11.7 - 14.2 Seconds    INR 1.16 (H) 0.90 - 1.10   aPTT    Collection Time: 04/26/20  6:52 AM   Result Value Ref Range    PTT 99.9 (H) 22.7 - 35.4 seconds   Basic Metabolic Panel    Collection Time: 04/26/20  6:52 AM   Result Value Ref Range    Glucose 117 (H) 65 - 99 mg/dL    BUN 24 (H) 8 - 23 mg/dL    Creatinine 1.02 (H) 0.57 - 1.00 mg/dL    Sodium 139 136 - 145 mmol/L    Potassium 3.0 (L) 3.5 - 5.2 mmol/L    Chloride 100 98 - 107 mmol/L    CO2 25.9 22.0 - 29.0 mmol/L    Calcium 8.5 (L) 8.6 - 10.5 mg/dL    eGFR Non African Amer 53 (L) >60 mL/min/1.73    BUN/Creatinine Ratio 23.5 7.0 - 25.0    Anion Gap 13.1 5.0 - 15.0 mmol/L   Protime-INR    Collection Time: 04/26/20  6:52 AM   Result Value Ref Range    Protime 15.6 (H) 11.7 - 14.2 Seconds    INR 1.27 (H) 0.90 - 1.10   BNP    Collection Time: 04/26/20  6:52 AM   Result Value Ref Range    proBNP 1,417.0 5.0-1,800.0 pg/mL   CBC Auto Differential    Collection Time: 04/26/20  6:52 AM   Result Value Ref Range    WBC 21.61 (H) 3.40 - 10.80 10*3/mm3    RBC 3.53 (L) 3.77 - 5.28 10*6/mm3    Hemoglobin 8.8 (L) 12.0 - 15.9 g/dL    Hematocrit 28.2 (L) 34.0 - 46.6 %    MCV 79.9 79.0 - 97.0 fL    MCH 24.9 (L) 26.6 - 33.0 pg    MCHC 31.2 (L) 31.5 - 35.7 g/dL    RDW 14.8 12.3 - 15.4 %    RDW-SD 42.3 37.0 - 54.0 fl    MPV 11.5 6.0 - 12.0 fL    Platelets 323 140 - 450 10*3/mm3     Neutrophil % 84.0 (H) 42.7 - 76.0 %    Lymphocyte % 6.4 (L) 19.6 - 45.3 %    Monocyte % 4.4 (L) 5.0 - 12.0 %    Eosinophil % 2.1 0.3 - 6.2 %    Basophil % 0.2 0.0 - 1.5 %    Immature Grans % 2.9 (H) 0.0 - 0.5 %    Neutrophils, Absolute 18.16 (H) 1.70 - 7.00 10*3/mm3    Lymphocytes, Absolute 1.38 0.70 - 3.10 10*3/mm3    Monocytes, Absolute 0.95 (H) 0.10 - 0.90 10*3/mm3    Eosinophils, Absolute 0.46 (H) 0.00 - 0.40 10*3/mm3    Basophils, Absolute 0.04 0.00 - 0.20 10*3/mm3    Immature Grans, Absolute 0.62 (H) 0.00 - 0.05 10*3/mm3    nRBC 0.0 0.0 - 0.2 /100 WBC   aPTT    Collection Time: 04/26/20  2:13 PM   Result Value Ref Range    PTT 50.9 (H) 22.7 - 35.4 seconds   Procalcitonin    Collection Time: 04/26/20  4:58 PM   Result Value Ref Range    Procalcitonin 0.13 0.10 - 0.25 ng/mL   aPTT    Collection Time: 04/26/20 10:32 PM   Result Value Ref Range    PTT 91.7 (H) 22.7 - 35.4 seconds   Blood Culture - Blood, Arm, Left    Collection Time: 04/26/20 10:32 PM   Result Value Ref Range    Blood Culture No growth at 5 days    Blood Culture - Blood, Hand, Left    Collection Time: 04/26/20 10:32 PM   Result Value Ref Range    Blood Culture No growth at 5 days    aPTT    Collection Time: 04/27/20  7:06 AM   Result Value Ref Range    PTT 96.9 (H) 22.7 - 35.4 seconds   Protime-INR    Collection Time: 04/27/20  7:06 AM   Result Value Ref Range    Protime 21.5 (H) 11.7 - 14.2 Seconds    INR 1.90 (H) 0.90 - 1.10   CBC Auto Differential    Collection Time: 04/27/20  7:06 AM   Result Value Ref Range    WBC 19.78 (H) 3.40 - 10.80 10*3/mm3    RBC 3.83 3.77 - 5.28 10*6/mm3    Hemoglobin 9.5 (L) 12.0 - 15.9 g/dL    Hematocrit 31.7 (L) 34.0 - 46.6 %    MCV 82.8 79.0 - 97.0 fL    MCH 24.8 (L) 26.6 - 33.0 pg    MCHC 30.0 (L) 31.5 - 35.7 g/dL    RDW 15.1 12.3 - 15.4 %    RDW-SD 44.7 37.0 - 54.0 fl    MPV 10.2 6.0 - 12.0 fL    Platelets 323 140 - 450 10*3/mm3    Neutrophil % 86.1 (H) 42.7 - 76.0 %    Lymphocyte % 5.7 (L) 19.6 - 45.3 %     Monocyte % 4.1 (L) 5.0 - 12.0 %    Eosinophil % 1.0 0.3 - 6.2 %    Basophil % 0.3 0.0 - 1.5 %    Immature Grans % 2.8 (H) 0.0 - 0.5 %    Neutrophils, Absolute 17.04 (H) 1.70 - 7.00 10*3/mm3    Lymphocytes, Absolute 1.12 0.70 - 3.10 10*3/mm3    Monocytes, Absolute 0.82 0.10 - 0.90 10*3/mm3    Eosinophils, Absolute 0.19 0.00 - 0.40 10*3/mm3    Basophils, Absolute 0.06 0.00 - 0.20 10*3/mm3    Immature Grans, Absolute 0.55 (H) 0.00 - 0.05 10*3/mm3    nRBC 0.1 0.0 - 0.2 /100 WBC   Basic Metabolic Panel    Collection Time: 04/27/20  9:16 AM   Result Value Ref Range    Glucose 138 (H) 65 - 99 mg/dL    BUN 28 (H) 8 - 23 mg/dL    Creatinine 1.23 (H) 0.57 - 1.00 mg/dL    Sodium 141 136 - 145 mmol/L    Potassium 5.4 (H) 3.5 - 5.2 mmol/L    Chloride 105 98 - 107 mmol/L    CO2 23.1 22.0 - 29.0 mmol/L    Calcium 9.1 8.6 - 10.5 mg/dL    eGFR Non African Amer 42 (L) >60 mL/min/1.73    BUN/Creatinine Ratio 22.8 7.0 - 25.0    Anion Gap 12.9 5.0 - 15.0 mmol/L   aPTT    Collection Time: 04/27/20  2:03 PM   Result Value Ref Range    PTT 96.3 (H) 22.7 - 35.4 seconds   aPTT    Collection Time: 04/27/20  7:55 PM   Result Value Ref Range    .3 (H) 22.7 - 35.4 seconds   aPTT    Collection Time: 04/28/20  5:02 AM   Result Value Ref Range    PTT 75.5 (H) 22.7 - 35.4 seconds   Protime-INR    Collection Time: 04/28/20  5:02 AM   Result Value Ref Range    Protime 27.3 (H) 11.7 - 14.2 Seconds    INR 2.57 (H) 0.90 - 1.10   Basic Metabolic Panel    Collection Time: 04/28/20  7:06 AM   Result Value Ref Range    Glucose 105 (H) 65 - 99 mg/dL    BUN 26 (H) 8 - 23 mg/dL    Creatinine 0.98 0.57 - 1.00 mg/dL    Sodium 133 (L) 136 - 145 mmol/L    Potassium 4.0 3.5 - 5.2 mmol/L    Chloride 97 (L) 98 - 107 mmol/L    CO2 25.2 22.0 - 29.0 mmol/L    Calcium 9.1 8.6 - 10.5 mg/dL    eGFR Non African Amer 55 (L) >60 mL/min/1.73    BUN/Creatinine Ratio 26.5 (H) 7.0 - 25.0    Anion Gap 10.8 5.0 - 15.0 mmol/L   CBC Auto Differential    Collection Time:  04/28/20  7:06 AM   Result Value Ref Range    WBC 16.30 (H) 3.40 - 10.80 10*3/mm3    RBC 3.60 (L) 3.77 - 5.28 10*6/mm3    Hemoglobin 8.8 (L) 12.0 - 15.9 g/dL    Hematocrit 28.1 (L) 34.0 - 46.6 %    MCV 78.1 (L) 79.0 - 97.0 fL    MCH 24.4 (L) 26.6 - 33.0 pg    MCHC 31.3 (L) 31.5 - 35.7 g/dL    RDW 14.9 12.3 - 15.4 %    RDW-SD 41.0 37.0 - 54.0 fl    MPV 11.0 6.0 - 12.0 fL    Platelets 334 140 - 450 10*3/mm3    Neutrophil % 82.7 (H) 42.7 - 76.0 %    Lymphocyte % 7.0 (L) 19.6 - 45.3 %    Monocyte % 4.7 (L) 5.0 - 12.0 %    Eosinophil % 2.3 0.3 - 6.2 %    Basophil % 0.4 0.0 - 1.5 %    Immature Grans % 2.9 (H) 0.0 - 0.5 %    Neutrophils, Absolute 13.49 (H) 1.70 - 7.00 10*3/mm3    Lymphocytes, Absolute 1.14 0.70 - 3.10 10*3/mm3    Monocytes, Absolute 0.76 0.10 - 0.90 10*3/mm3    Eosinophils, Absolute 0.38 0.00 - 0.40 10*3/mm3    Basophils, Absolute 0.06 0.00 - 0.20 10*3/mm3    Immature Grans, Absolute 0.47 (H) 0.00 - 0.05 10*3/mm3    nRBC 0.0 0.0 - 0.2 /100 WBC   Procalcitonin    Collection Time: 04/28/20  2:31 PM   Result Value Ref Range    Procalcitonin 0.13 0.10 - 0.25 ng/mL   Calprotectin, Fecal - Stool, Per Rectum    Collection Time: 04/28/20  6:16 PM   Result Value Ref Range    Calprotectin, Fecal 272 (H) 0 - 120 ug/g   Basic Metabolic Panel    Collection Time: 04/29/20  7:13 AM   Result Value Ref Range    Glucose 117 (H) 65 - 99 mg/dL    BUN 24 (H) 8 - 23 mg/dL    Creatinine 0.98 0.57 - 1.00 mg/dL    Sodium 140 136 - 145 mmol/L    Potassium 3.7 3.5 - 5.2 mmol/L    Chloride 103 98 - 107 mmol/L    CO2 27.1 22.0 - 29.0 mmol/L    Calcium 8.9 8.6 - 10.5 mg/dL    eGFR Non African Amer 55 (L) >60 mL/min/1.73    BUN/Creatinine Ratio 24.5 7.0 - 25.0    Anion Gap 9.9 5.0 - 15.0 mmol/L   Protime-INR    Collection Time: 04/29/20  7:13 AM   Result Value Ref Range    Protime 26.0 (H) 11.7 - 14.2 Seconds    INR 2.41 (H) 0.90 - 1.10   CBC Auto Differential    Collection Time: 04/29/20  7:13 AM   Result Value Ref Range    WBC 16.95  (H) 3.40 - 10.80 10*3/mm3    RBC 3.57 (L) 3.77 - 5.28 10*6/mm3    Hemoglobin 8.7 (L) 12.0 - 15.9 g/dL    Hematocrit 27.9 (L) 34.0 - 46.6 %    MCV 78.2 (L) 79.0 - 97.0 fL    MCH 24.4 (L) 26.6 - 33.0 pg    MCHC 31.2 (L) 31.5 - 35.7 g/dL    RDW 15.0 12.3 - 15.4 %    RDW-SD 41.5 37.0 - 54.0 fl    MPV 10.2 6.0 - 12.0 fL    Platelets 312 140 - 450 10*3/mm3    Neutrophil % 83.3 (H) 42.7 - 76.0 %    Lymphocyte % 6.4 (L) 19.6 - 45.3 %    Monocyte % 5.0 5.0 - 12.0 %    Eosinophil % 2.4 0.3 - 6.2 %    Basophil % 0.2 0.0 - 1.5 %    Immature Grans % 2.7 (H) 0.0 - 0.5 %    Neutrophils, Absolute 14.12 (H) 1.70 - 7.00 10*3/mm3    Lymphocytes, Absolute 1.08 0.70 - 3.10 10*3/mm3    Monocytes, Absolute 0.85 0.10 - 0.90 10*3/mm3    Eosinophils, Absolute 0.40 0.00 - 0.40 10*3/mm3    Basophils, Absolute 0.04 0.00 - 0.20 10*3/mm3    Immature Grans, Absolute 0.46 (H) 0.00 - 0.05 10*3/mm3    nRBC 0.0 0.0 - 0.2 /100 WBC   Gastrointestinal Panel, PCR - Stool, Per Rectum    Collection Time: 04/29/20  9:01 AM   Result Value Ref Range    Campylobacter Not Detected Not Detected    Plesiomonas shigelloides Not Detected Not Detected    Salmonella Not Detected Not Detected    Vibrio Not Detected Not Detected    Vibrio cholerae Not Detected Not Detected    Yersinia enterocolitica Not Detected Not Detected    Enteroaggregative E. coli (EAEC) Not Detected Not Detected    Enteropathogenic E. coli (EPEC) Not Detected Not Detected    Enterotoxigenic E. coli (ETEC) lt/st Not Detected Not Detected    Shiga-like toxin-producing E. coli (STEC) stx1/stx2 Not Detected Not Detected    E. coli O157 Not Detected Not Detected    Shigella/Enteroinvasive E. coli (EIEC) Not Detected Not Detected    Cryptosporidium Not Detected Not Detected    Cyclospora cayetanensis Not Detected Not Detected    Entamoeba histolytica Not Detected Not Detected    Giardia lamblia Not Detected Not Detected    Adenovirus F40/41 Not Detected Not Detected    Astrovirus Not Detected Not  Detected    Norovirus GI/GII Not Detected Not Detected    Rotavirus A Not Detected Not Detected    Sapovirus (I, II, IV or V) Not Detected Not Detected   Fecal Lactoferrin - Stool, Per Rectum    Collection Time: 04/29/20 10:31 AM   Result Value Ref Range    Lactoferrin, Qual Positive (A) Negative   Fecal Fat, Qualitative - Stool, Per Rectum    Collection Time: 04/29/20 10:31 AM   Result Value Ref Range    Fecal Fats, Qualitative, Fatty Acids 0-100 fat droplets / hpf 0-100 fat droplets / hpf    Fecal Fat Qualitative, Neutral Fats 0-50 fat droplets / hpf 0-50 fat droplets / hpf   Clostridium Difficile Toxin, PCR - Stool, Per Rectum    Collection Time: 04/29/20 10:31 AM   Result Value Ref Range    C. Difficile Toxins by PCR Negative Negative   Basic Metabolic Panel    Collection Time: 04/30/20  5:02 AM   Result Value Ref Range    Glucose 112 (H) 65 - 99 mg/dL    BUN 23 8 - 23 mg/dL    Creatinine 0.91 0.57 - 1.00 mg/dL    Sodium 140 136 - 145 mmol/L    Potassium 3.4 (L) 3.5 - 5.2 mmol/L    Chloride 102 98 - 107 mmol/L    CO2 26.5 22.0 - 29.0 mmol/L    Calcium 8.8 8.6 - 10.5 mg/dL    eGFR Non African Amer 60 (L) >60 mL/min/1.73    BUN/Creatinine Ratio 25.3 (H) 7.0 - 25.0    Anion Gap 11.5 5.0 - 15.0 mmol/L   Protime-INR    Collection Time: 04/30/20  5:02 AM   Result Value Ref Range    Protime 25.1 (H) 11.7 - 14.2 Seconds    INR 2.31 (H) 0.90 - 1.10   CBC Auto Differential    Collection Time: 04/30/20  5:03 AM   Result Value Ref Range    WBC 13.81 (H) 3.40 - 10.80 10*3/mm3    RBC 3.37 (L) 3.77 - 5.28 10*6/mm3    Hemoglobin 8.3 (L) 12.0 - 15.9 g/dL    Hematocrit 26.3 (L) 34.0 - 46.6 %    MCV 78.0 (L) 79.0 - 97.0 fL    MCH 24.6 (L) 26.6 - 33.0 pg    MCHC 31.6 31.5 - 35.7 g/dL    RDW 15.2 12.3 - 15.4 %    RDW-SD 42.0 37.0 - 54.0 fl    MPV 10.9 6.0 - 12.0 fL    Platelets 279 140 - 450 10*3/mm3    Neutrophil % 79.0 (H) 42.7 - 76.0 %    Lymphocyte % 8.9 (L) 19.6 - 45.3 %    Monocyte % 5.5 5.0 - 12.0 %    Eosinophil % 3.4 0.3 -  6.2 %    Basophil % 0.4 0.0 - 1.5 %    Immature Grans % 2.8 (H) 0.0 - 0.5 %    Neutrophils, Absolute 10.91 (H) 1.70 - 7.00 10*3/mm3    Lymphocytes, Absolute 1.23 0.70 - 3.10 10*3/mm3    Monocytes, Absolute 0.76 0.10 - 0.90 10*3/mm3    Eosinophils, Absolute 0.47 (H) 0.00 - 0.40 10*3/mm3    Basophils, Absolute 0.05 0.00 - 0.20 10*3/mm3    Immature Grans, Absolute 0.39 (H) 0.00 - 0.05 10*3/mm3    nRBC 0.0 0.0 - 0.2 /100 WBC   Basic Metabolic Panel    Collection Time: 05/01/20  4:40 AM   Result Value Ref Range    Glucose 117 (H) 65 - 99 mg/dL    BUN 22 8 - 23 mg/dL    Creatinine 0.83 0.57 - 1.00 mg/dL    Sodium 141 136 - 145 mmol/L    Potassium 3.7 3.5 - 5.2 mmol/L    Chloride 104 98 - 107 mmol/L    CO2 26.4 22.0 - 29.0 mmol/L    Calcium 9.0 8.6 - 10.5 mg/dL    eGFR Non African Amer 67 >60 mL/min/1.73    BUN/Creatinine Ratio 26.5 (H) 7.0 - 25.0    Anion Gap 10.6 5.0 - 15.0 mmol/L   Protime-INR    Collection Time: 05/01/20  4:40 AM   Result Value Ref Range    Protime 25.9 (H) 11.7 - 14.2 Seconds    INR 2.41 (H) 0.90 - 1.10   CBC Auto Differential    Collection Time: 05/01/20  4:40 AM   Result Value Ref Range    WBC 14.11 (H) 3.40 - 10.80 10*3/mm3    RBC 3.64 (L) 3.77 - 5.28 10*6/mm3    Hemoglobin 8.9 (L) 12.0 - 15.9 g/dL    Hematocrit 28.7 (L) 34.0 - 46.6 %    MCV 78.8 (L) 79.0 - 97.0 fL    MCH 24.5 (L) 26.6 - 33.0 pg    MCHC 31.0 (L) 31.5 - 35.7 g/dL    RDW 15.2 12.3 - 15.4 %    RDW-SD 42.9 37.0 - 54.0 fl    MPV 10.7 6.0 - 12.0 fL    Platelets 293 140 - 450 10*3/mm3    Neutrophil % 77.7 (H) 42.7 - 76.0 %    Lymphocyte % 9.7 (L) 19.6 - 45.3 %    Monocyte % 5.5 5.0 - 12.0 %    Eosinophil % 3.9 0.3 - 6.2 %    Basophil % 0.6 0.0 - 1.5 %    Immature Grans % 2.6 (H) 0.0 - 0.5 %    Neutrophils, Absolute 10.95 (H) 1.70 - 7.00 10*3/mm3    Lymphocytes, Absolute 1.37 0.70 - 3.10 10*3/mm3    Monocytes, Absolute 0.78 0.10 - 0.90 10*3/mm3    Eosinophils, Absolute 0.55 (H) 0.00 - 0.40 10*3/mm3    Basophils, Absolute 0.09 0.00 - 0.20  10*3/mm3    Immature Grans, Absolute 0.37 (H) 0.00 - 0.05 10*3/mm3    nRBC 0.0 0.0 - 0.2 /100 WBC   POC Glucose Once    Collection Time: 05/01/20  6:26 AM   Result Value Ref Range    Glucose 121 70 - 130 mg/dL   Basic Metabolic Panel    Collection Time: 05/02/20  4:33 AM   Result Value Ref Range    Glucose 111 (H) 65 - 99 mg/dL    BUN 22 8 - 23 mg/dL    Creatinine 0.96 0.57 - 1.00 mg/dL    Sodium 141 136 - 145 mmol/L    Potassium 3.3 (L) 3.5 - 5.2 mmol/L    Chloride 104 98 - 107 mmol/L    CO2 26.9 22.0 - 29.0 mmol/L    Calcium 8.9 8.6 - 10.5 mg/dL    eGFR Non African Amer 57 (L) >60 mL/min/1.73    BUN/Creatinine Ratio 22.9 7.0 - 25.0    Anion Gap 10.1 5.0 - 15.0 mmol/L   Protime-INR    Collection Time: 05/02/20  4:33 AM   Result Value Ref Range    Protime 27.9 (H) 11.7 - 14.2 Seconds    INR 2.64 (H) 0.90 - 1.10   CBC Auto Differential    Collection Time: 05/02/20  4:33 AM   Result Value Ref Range    WBC 13.75 (H) 3.40 - 10.80 10*3/mm3    RBC 3.73 (L) 3.77 - 5.28 10*6/mm3    Hemoglobin 8.9 (L) 12.0 - 15.9 g/dL    Hematocrit 29.3 (L) 34.0 - 46.6 %    MCV 78.6 (L) 79.0 - 97.0 fL    MCH 23.9 (L) 26.6 - 33.0 pg    MCHC 30.4 (L) 31.5 - 35.7 g/dL    RDW 15.2 12.3 - 15.4 %    RDW-SD 43.4 37.0 - 54.0 fl    MPV 10.5 6.0 - 12.0 fL    Platelets 262 140 - 450 10*3/mm3    Neutrophil % 76.1 (H) 42.7 - 76.0 %    Lymphocyte % 11.3 (L) 19.6 - 45.3 %    Monocyte % 6.3 5.0 - 12.0 %    Eosinophil % 3.6 0.3 - 6.2 %    Basophil % 0.7 0.0 - 1.5 %    Immature Grans % 2.0 (H) 0.0 - 0.5 %    Neutrophils, Absolute 10.46 (H) 1.70 - 7.00 10*3/mm3    Lymphocytes, Absolute 1.56 0.70 - 3.10 10*3/mm3    Monocytes, Absolute 0.87 0.10 - 0.90 10*3/mm3    Eosinophils, Absolute 0.50 (H) 0.00 - 0.40 10*3/mm3    Basophils, Absolute 0.09 0.00 - 0.20 10*3/mm3    Immature Grans, Absolute 0.27 (H) 0.00 - 0.05 10*3/mm3    nRBC 0.0 0.0 - 0.2 /100 WBC   Basic Metabolic Panel    Collection Time: 05/03/20  4:45 AM   Result Value Ref Range    Glucose 172 (H) 65 - 99  mg/dL    BUN 25 (H) 8 - 23 mg/dL    Creatinine 0.99 0.57 - 1.00 mg/dL    Sodium 141 136 - 145 mmol/L    Potassium 4.7 3.5 - 5.2 mmol/L    Chloride 104 98 - 107 mmol/L    CO2 25.4 22.0 - 29.0 mmol/L    Calcium 9.0 8.6 - 10.5 mg/dL    eGFR Non African Amer 55 (L) >60 mL/min/1.73    BUN/Creatinine Ratio 25.3 (H) 7.0 - 25.0    Anion Gap 11.6 5.0 - 15.0 mmol/L   Protime-INR    Collection Time: 05/03/20  4:45 AM   Result Value Ref Range    Protime 28.4 (H) 11.7 - 14.2 Seconds    INR 2.71 (H) 0.90 - 1.10   CBC Auto Differential    Collection Time: 05/03/20  4:45 AM   Result Value Ref Range    WBC 14.33 (H) 3.40 - 10.80 10*3/mm3    RBC 3.62 (L) 3.77 - 5.28 10*6/mm3    Hemoglobin 9.0 (L) 12.0 - 15.9 g/dL    Hematocrit 28.9 (L) 34.0 - 46.6 %    MCV 79.8 79.0 - 97.0 fL    MCH 24.9 (L) 26.6 - 33.0 pg    MCHC 31.1 (L) 31.5 - 35.7 g/dL    RDW 14.9 12.3 - 15.4 %    RDW-SD 42.0 37.0 - 54.0 fl    MPV 11.8 6.0 - 12.0 fL    Platelets 268 140 - 450 10*3/mm3    Neutrophil % 88.7 (H) 42.7 - 76.0 %    Lymphocyte % 5.9 (L) 19.6 - 45.3 %    Monocyte % 3.5 (L) 5.0 - 12.0 %    Eosinophil % 0.1 (L) 0.3 - 6.2 %    Basophil % 0.1 0.0 - 1.5 %    Immature Grans % 1.7 (H) 0.0 - 0.5 %    Neutrophils, Absolute 12.71 (H) 1.70 - 7.00 10*3/mm3    Lymphocytes, Absolute 0.85 0.70 - 3.10 10*3/mm3    Monocytes, Absolute 0.50 0.10 - 0.90 10*3/mm3    Eosinophils, Absolute 0.01 0.00 - 0.40 10*3/mm3    Basophils, Absolute 0.02 0.00 - 0.20 10*3/mm3    Immature Grans, Absolute 0.24 (H) 0.00 - 0.05 10*3/mm3    nRBC 0.1 0.0 - 0.2 /100 WBC   Basic Metabolic Panel    Collection Time: 05/04/20  4:58 AM   Result Value Ref Range    Glucose 113 (H) 65 - 99 mg/dL    BUN 26 (H) 8 - 23 mg/dL    Creatinine 1.12 (H) 0.57 - 1.00 mg/dL    Sodium 138 136 - 145 mmol/L    Potassium 4.1 3.5 - 5.2 mmol/L    Chloride 103 98 - 107 mmol/L    CO2 26.3 22.0 - 29.0 mmol/L    Calcium 9.1 8.6 - 10.5 mg/dL    eGFR Non African Amer 47 (L) >60 mL/min/1.73    BUN/Creatinine Ratio 23.2 7.0 -  25.0    Anion Gap 8.7 5.0 - 15.0 mmol/L   Protime-INR    Collection Time: 05/04/20  4:58 AM   Result Value Ref Range    Protime 27.7 (H) 11.7 - 14.2 Seconds    INR 2.62 (H) 0.90 - 1.10   CBC Auto Differential    Collection Time: 05/04/20  4:58 AM   Result Value Ref Range    WBC 15.60 (H) 3.40 - 10.80 10*3/mm3    RBC 3.66 (L) 3.77 - 5.28 10*6/mm3    Hemoglobin 8.8 (L) 12.0 - 15.9 g/dL    Hematocrit 29.2 (L) 34.0 - 46.6 %    MCV 79.8 79.0 - 97.0 fL    MCH 24.0 (L) 26.6 - 33.0 pg    MCHC 30.1 (L) 31.5 - 35.7 g/dL    RDW 15.1 12.3 - 15.4 %    RDW-SD 42.7 37.0 - 54.0 fl    MPV 10.3 6.0 - 12.0 fL    Platelets 255 140 - 450 10*3/mm3    Neutrophil % 82.3 (H) 42.7 - 76.0 %    Lymphocyte % 9.3 (L) 19.6 - 45.3 %    Monocyte % 6.4 5.0 - 12.0 %    Eosinophil % 0.1 (L) 0.3 - 6.2 %    Basophil % 0.3 0.0 - 1.5 %    Immature Grans % 1.6 (H) 0.0 - 0.5 %    Neutrophils, Absolute 12.84 (H) 1.70 - 7.00 10*3/mm3    Lymphocytes, Absolute 1.45 0.70 - 3.10 10*3/mm3    Monocytes, Absolute 1.00 (H) 0.10 - 0.90 10*3/mm3    Eosinophils, Absolute 0.02 0.00 - 0.40 10*3/mm3    Basophils, Absolute 0.04 0.00 - 0.20 10*3/mm3    Immature Grans, Absolute 0.25 (H) 0.00 - 0.05 10*3/mm3    nRBC 0.0 0.0 - 0.2 /100 WBC   Protime-INR    Collection Time: 05/05/20  5:59 AM   Result Value Ref Range    Protime 16.9 (H) 11.7 - 14.2 Seconds    INR 1.40 (H) 0.90 - 1.10   Basic Metabolic Panel    Collection Time: 05/05/20  6:00 AM   Result Value Ref Range    Glucose 107 (H) 65 - 99 mg/dL    BUN 28 (H) 8 - 23 mg/dL    Creatinine 0.85 0.57 - 1.00 mg/dL    Sodium 140 136 - 145 mmol/L    Potassium 4.1 3.5 - 5.2 mmol/L    Chloride 101 98 - 107 mmol/L    CO2 27.5 22.0 - 29.0 mmol/L    Calcium 9.3 8.6 - 10.5 mg/dL    eGFR Non African Amer 65 >60 mL/min/1.73    BUN/Creatinine Ratio 32.9 (H) 7.0 - 25.0    Anion Gap 11.5 5.0 - 15.0 mmol/L   CBC Auto Differential    Collection Time: 05/05/20  6:00 AM   Result Value Ref Range    WBC 15.72 (H) 3.40 - 10.80 10*3/mm3    RBC 3.64  (L) 3.77 - 5.28 10*6/mm3    Hemoglobin 8.9 (L) 12.0 - 15.9 g/dL    Hematocrit 28.7 (L) 34.0 - 46.6 %    MCV 78.8 (L) 79.0 - 97.0 fL    MCH 24.5 (L) 26.6 - 33.0 pg    MCHC 31.0 (L) 31.5 - 35.7 g/dL    RDW 15.2 12.3 - 15.4 %    RDW-SD 42.2 37.0 - 54.0 fl    MPV 11.4 6.0 - 12.0 fL    Platelets 278 140 - 450 10*3/mm3    Neutrophil % 80.6 (H) 42.7 - 76.0 %    Lymphocyte % 10.6 (L) 19.6 - 45.3 %    Monocyte % 7.1 5.0 - 12.0 %    Eosinophil % 0.4 0.3 - 6.2 %    Basophil % 0.2 0.0 - 1.5 %    Immature Grans % 1.1 (H) 0.0 - 0.5 %    Neutrophils, Absolute 12.67 (H) 1.70 - 7.00 10*3/mm3    Lymphocytes, Absolute 1.66 0.70 - 3.10 10*3/mm3    Monocytes, Absolute 1.12 (H) 0.10 - 0.90 10*3/mm3    Eosinophils, Absolute 0.06 0.00 - 0.40 10*3/mm3    Basophils, Absolute 0.03 0.00 - 0.20 10*3/mm3    Immature Grans, Absolute 0.18 (H) 0.00 - 0.05 10*3/mm3    nRBC 0.0 0.0 - 0.2 /100 WBC   Basic Metabolic Panel    Collection Time: 05/06/20  5:02 AM   Result Value Ref Range    Glucose 100 (H) 65 - 99 mg/dL    BUN 28 (H) 8 - 23 mg/dL    Creatinine 0.99 0.57 - 1.00 mg/dL    Sodium 137 136 - 145 mmol/L    Potassium 4.3 3.5 - 5.2 mmol/L    Chloride 98 98 - 107 mmol/L    CO2 27.0 22.0 - 29.0 mmol/L    Calcium 9.2 8.6 - 10.5 mg/dL    eGFR Non African Amer 55 (L) >60 mL/min/1.73    BUN/Creatinine Ratio 28.3 (H) 7.0 - 25.0    Anion Gap 12.0 5.0 - 15.0 mmol/L   CBC Auto Differential    Collection Time: 05/06/20  5:02 AM   Result Value Ref Range    WBC 16.91 (H) 3.40 - 10.80 10*3/mm3    RBC 3.75 (L) 3.77 - 5.28 10*6/mm3    Hemoglobin 9.3 (L) 12.0 - 15.9 g/dL    Hematocrit 29.9 (L) 34.0 - 46.6 %    MCV 79.7 79.0 - 97.0 fL    MCH 24.8 (L) 26.6 - 33.0 pg    MCHC 31.1 (L) 31.5 - 35.7 g/dL    RDW 15.3 12.3 - 15.4 %    RDW-SD 41.6 37.0 - 54.0 fl    MPV 11.0 6.0 - 12.0 fL    Platelets 256 140 - 450 10*3/mm3    Neutrophil % 80.0 (H) 42.7 - 76.0 %    Lymphocyte % 10.2 (L) 19.6 - 45.3 %    Monocyte % 7.7 5.0 - 12.0 %    Eosinophil % 0.5 0.3 - 6.2 %    Basophil %  0.2 0.0 - 1.5 %    Immature Grans % 1.4 (H) 0.0 - 0.5 %    Neutrophils, Absolute 13.50 (H) 1.70 - 7.00 10*3/mm3    Lymphocytes, Absolute 1.73 0.70 - 3.10 10*3/mm3    Monocytes, Absolute 1.31 (H) 0.10 - 0.90 10*3/mm3    Eosinophils, Absolute 0.09 0.00 - 0.40 10*3/mm3    Basophils, Absolute 0.04 0.00 - 0.20 10*3/mm3    Immature Grans, Absolute 0.24 (H) 0.00 - 0.05 10*3/mm3    nRBC 0.1 0.0 - 0.2 /100 WBC   Protime-INR    Collection Time: 05/06/20  5:23 AM   Result Value Ref Range    Protime 16.2 (H) 11.7 - 14.2 Seconds    INR 1.33 (H) 0.90 - 1.10     *Note: Due to a large number of results and/or encounters for the requested time period, some results have not been displayed. A complete set of results can be found in Results Review.

## 2020-06-24 ENCOUNTER — OFFICE VISIT (OUTPATIENT)
Dept: GASTROENTEROLOGY | Facility: CLINIC | Age: 75
End: 2020-06-24

## 2020-06-24 VITALS
TEMPERATURE: 98 F | SYSTOLIC BLOOD PRESSURE: 112 MMHG | BODY MASS INDEX: 22.49 KG/M2 | DIASTOLIC BLOOD PRESSURE: 80 MMHG | HEART RATE: 82 BPM | WEIGHT: 143.3 LBS | HEIGHT: 67 IN | OXYGEN SATURATION: 92 %

## 2020-06-24 DIAGNOSIS — K59.00 CONSTIPATION, UNSPECIFIED CONSTIPATION TYPE: ICD-10-CM

## 2020-06-24 DIAGNOSIS — K50.811 CROHN'S DISEASE OF BOTH SMALL AND LARGE INTESTINE WITH RECTAL BLEEDING (HCC): Primary | ICD-10-CM

## 2020-06-24 DIAGNOSIS — K21.9 GASTROESOPHAGEAL REFLUX DISEASE, ESOPHAGITIS PRESENCE NOT SPECIFIED: ICD-10-CM

## 2020-06-24 DIAGNOSIS — Z79.899 HIGH RISK MEDICATION USE: ICD-10-CM

## 2020-06-24 PROCEDURE — 99214 OFFICE O/P EST MOD 30 MIN: CPT | Performed by: NURSE PRACTITIONER

## 2020-06-24 NOTE — PATIENT INSTRUCTIONS
1.  For Crohn's disease, we will have you resume Pentasa 500 mg, take 4 capsules twice daily.    2.  Additionally for Crohn's disease with loose stool, we will have you restart WelChol.  We recommend starting off with 1 tablet daily to see how your bowel habits respond, then may increase to 2 tablets daily if needed.  The overall goal is to help to regulate your bowel habits so that you are not experiencing constipation or diarrhea.    3.  Continue current dose of prednisone at 30 mg.    4.  We will discuss biologic therapy options with Dr. Salomon today and contact you with recommendations.

## 2020-06-24 NOTE — PROGRESS NOTES
Crohn's Disease      HPI  75-year-old female presents the office today as a new patient with a history of Crohn's ileocolitis, diagnosed in 1978.  She has been accompanied by her , Dr. Dominguez, who is here to help with her history. She was a former patient of Dr. Flores and was last seen in office March 2020; she has treated with Dr. Flores since 1992. Previously was treated in [place].  Her Crohn's disease has been refractory to biologic therapy and previous regimens have included Pentasa, sulfasalazine, Humira, WelChol, Remicade, Entyvio, and Stelara. She has an allergy to Remicade and developed psoriasis secondary to use.  She has a history of a terminal ileal stricture, which has not required surgery.    Her most recent EGD and colonoscopy were performed on 8/24/2018.  EGD revealed gastritis.  Colonoscopy revealed ulcers in the terminal ileum as well as congestion and erythema in the ascending colon, cecum, and hepatic flexure, consistent with her history of Crohn's colitis.  EGD pathology was unremarkable.  Terminal ileum biopsy was unremarkable.  Right colon biopsy revealed mild superficial acute inflammation.  Transverse colon and left colon biopsies were unremarkable.  Rectum biopsy revealed focal erosion, negative for increased inflammation.      CT enterography performed on 2/13/2020 demonstrated a thickened gastric antrum, with no mucosal hyperenhancement to suggest acute inflammation.  Acute exacerbation of Crohn's disease noted at long segments of distal ileum which were  by short segments of non-thickened ileum.  There was a thickened short segment of transverse colon without evidence for acute inflammation.  This could be related to contraction or spasm, but possible stricture as well; no evidence of obstruction.  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    She was admitted to [facility] on 5/19/2020 with complaints of shortness of breath and 2 to 3 days of bloody stools.  GI was consulted.  CT scan of the abdomen and pelvis on 5/19/2020 which demonstrated potential wall thickening involving the rectum, likely secondary to proctitis.  Fatty infiltration of the terminal ileum wall was noted, consistent with her diagnosis of Crohn's.  Stomach was distended, filled with fluid and debris.  This was similar to previous exam on 2/13/2020.  She was then seen in the ER June 2020 for elevated INR greater than 10 and dehydration. No rectal bleeding at that time. INR was repeated, and revealed a level of 1.24 on 6/16/2020.     She was noted to have some straining with BMs and was placed on Colace and dulcolax to manage her bowel function.  Rectal bleeding subsided during her hospitalization, which could be have multifactorial due to proctitis and anticoagulation therapy for atrial fibrillation with highly elevated INR at 8 just prior to admission.      She reports that she has not taken any medication for Crohn's disease for the past 2 months. She reports having 2 BMs that are between loose and watery daily. She reports making some dietary changes and has eliminated dairy until today. She reports eating soups and soft foods. She is not eating anything spicy. She reports the presence of mucous in her stools, but no blood. She reports a tightness/constant cramp that is mild and occurs intermittently and comes and goes. It occurs daily. She reports taking metscopalamine that has helped some per her 's report. She is not taking any dicyclomine or Levsin.     She reports having a Stelara infusion in March 2020. She did not notice any change in symptoms with use. She was then placed on prednisone due to poor symptom control and rectal bleeding. She reports joint pain and back pain, due to her history of scoliosis and spondolithesis.     She also has a history of oral ulcers, several times in the  past that resolved on their own.     She was previously taking Pentasa 500 mg, 4 tabs twice daily and Welchol 625 mg 3 x daily. She is currently taking prednisone 30 mg daily, tapering down from 40 mg daily.     She also has a history of antiphospholipid antibody syndrome.     She has a history of GERD and previously was taken pantoprazole 40 mg once daily. She has not resumed pantoprazole since her hospitalization. She denies having any recent heartburn since she stopped taking the PPI. She is sleeping more upright. When she would experience heartburn in the past, it occurred more often in the afternoon or evening. She denies any nausea or vomiting. She has some difficulty swallowing. Food will always pass, and does not require a water wash. She denies any regurgitation of food.   Review of Systems   Constitutional: Negative for appetite change, chills, diaphoresis, fatigue, fever and unexpected weight change.   HENT: Negative for dental problem, ear pain, mouth sores, rhinorrhea, sore throat and voice change.    Eyes: Negative for pain, redness and visual disturbance.   Respiratory: Negative for cough, chest tightness and wheezing.    Cardiovascular: Negative for chest pain, palpitations and leg swelling.   Endocrine: Negative for cold intolerance, heat intolerance, polydipsia, polyphagia and polyuria.   Genitourinary: Negative for dysuria, frequency, hematuria and urgency.   Musculoskeletal: Positive for arthralgias and back pain. Negative for joint swelling, myalgias and neck pain.   Skin: Positive for rash.   Allergic/Immunologic: Negative for environmental allergies, food allergies and immunocompromised state.   Neurological: Negative for dizziness, seizures, weakness, numbness and headaches.   Hematological: Bruises/bleeds easily.   Psychiatric/Behavioral: Negative for sleep disturbance. The patient is not nervous/anxious.             Problem List:    Patient Active Problem List   Diagnosis   • Incontinence    • Degeneration of lumbar intervertebral disc   • Other hyperlipidemia   • Essential hypertension   • Depression   • Antiphospholipid antibody syndrome (CMS/HCC)   • Lichen sclerosus et atrophicus   • Myocardial infarction type 2 (CMS/HCC)   • PAF (paroxysmal atrial fibrillation) (CMS/HCC)   • History of cardioversion   • Leukocytosis   • Pulmonary HTN (CMS/HCC)   • Crohn's disease of small intestine with complication (CMS/HCC)   • Cold agglutinin disease (CMS/HCC)   • Chronic diastolic CHF (congestive heart failure) (CMS/HCC)   • Warfarin-induced coagulopathy (CMS/HCC)   • Respiratory insufficiency   • COPD with emphysema (CMS/HCC)   • Acute kidney injury (MARY ANN) with acute tubular necrosis (ATN) (CMS/HCC)   • Inflammatory bowel disease (Crohn's disease) (CMS/HCC)   • Long term current use of anticoagulant therapy       Medical History:    Past Medical History:   Diagnosis Date   • Acute deep vein thrombosis of lower extremity (CMS/HCC)    • Antiphospholipid antibody syndrome (CMS/HCC)    • Antiphospholipid antibody syndrome (CMS/HCC)    • Arthritis     OSTEO   • Benign essential hypertension    • COPD (chronic obstructive pulmonary disease) (CMS/HCC)    • Coronary artery disease    • Crohn's disease (CMS/HCC)    • Cutaneous candidiasis    • Depression    • Disease of thyroid gland    • Elevated cholesterol    • GERD (gastroesophageal reflux disease)    • History of echocardiogram 04/22/2020    mild-to-moderate concentric hypertrophy, EF 56 - 60%. LA severely dilated, Mild MAC, Mild MR with restrictive movement of the posterior leaflet   • Hyperlipidemia    • Hypertension    • Osteopenia    • Polyp, uterus corpus         Social History:    Social History     Socioeconomic History   • Marital status:      Spouse name: Not on file   • Number of children: Not on file   • Years of education: Not on file   • Highest education level: Not on file   Tobacco Use   • Smoking status: Former Smoker     Packs/day: 2.00      Years: 16.00     Pack years: 32.00     Types: Cigarettes     Last attempt to quit: 1967     Years since quittin.5   • Smokeless tobacco: Never Used   Substance and Sexual Activity   • Alcohol use: No   • Drug use: No   • Sexual activity: Defer       Family History:   Family History   Problem Relation Age of Onset   • Lung cancer Paternal Uncle    • Hypertension Mother    • Liver disease Mother    • Anemia Mother    • No Known Problems Father    • Autoimmune disease Brother    • Colon cancer Neg Hx    • Colon polyps Neg Hx        Surgical History:   Past Surgical History:   Procedure Laterality Date   • ABDOMINAL HERNIA REPAIR     • AMPUTATION DIGIT Left     SECOND TOE    • BILATERAL SALPINGO OOPHORECTOMY     • BREAST BIOPSY Right     BENIGN   • CARDIAC CATHETERIZATION N/A 2020    Surgeon: Paulo Singleton MD;  nonsignificant coronary artery disease normal LV function nonsignificant mitral regurgitation probably shortness of breath due to the cardiac arrhythmias and diastolic dysfunction   • CARDIAC CATHETERIZATION N/A 2020    Procedure: Coronary angiography;  Surgeon: Paulo Singleton MD;  [facility] LOC[facility];  Service: Cardiology;  Laterality: N/A;   • CARDIAC CATHETERIZATION N/A 2020    Procedure: Left ventriculography;  Surgeon: Paulo Singleton MD;  Location: [facility] CATH INVASIVE LOCATION;  Service: Cardiology;  Laterality: N/A;   • CARDIAC ELECTROPHYSIOLOGY PROCEDURE N/A 2020    Procedure: Cardioversion;  Surgeon: Paulo Singleton MD;  Location: [facility] CATH INVASIVE LOCATION;  Service: Cardiology;  Laterality: N/A;   • CHOLECYSTECTOMY     • COLONOSCOPY     • COSMETIC SURGERY     • D&C HYSTEROSCOPY N/A 10/13/2016    Procedure: DILATATION AND CURETTAGE HYSTEROSCOPY WITH MYOSURE;  Surgeon: Christopher Doll MD;  Location: [facility] OR;  Service:    • ENDOSCOPY     • EYE SURGERY Bilateral     cataract   • FACELIFT     • FEMORAL ARTERY -  "FEMORAL ARTERY BYPASS GRAFT Bilateral    • HEMORRHOIDECTOMY     • TONSILLECTOMY AND ADENOIDECTOMY     • UPPER GASTROINTESTINAL ENDOSCOPY     • VASCULAR SURGERY      femoral stents          Current Outpatient Medications:   •  amiodarone (PACERONE) 200 MG tablet, Take 200 mg by mouth Daily., Disp: , Rfl:   •  atenolol (TENORMIN) 100 MG tablet, Take 0.5 tablets by mouth Every 12 (Twelve) Hours., Disp: 60 tablet, Rfl: 5  •  B-D 3CC LUER-CELESTE SYR 25GX5/8\" 25G X 5/8\" 3 ML misc, USE AS DIRECTED WITH B12, Disp: , Rfl: 1  •  buPROPion XL (Wellbutrin XL) 150 MG 24 hr tablet, Take 1 tablet by mouth Daily., Disp: 90 tablet, Rfl: 2  •  Cyanocobalamin 1000 MCG/ML kit, Inject 1 each as directed Every 30 (Thirty) Days., Disp: , Rfl:   •  dilTIAZem XR (DILACOR XR) 120 MG 24 hr capsule, Take 1 capsule by mouth 2 (Two) Times a Day., Disp: 60 capsule, Rfl: 11  •  HYDROcodone-acetaminophen (NORCO) 5-325 MG per tablet, Take 1 tablet by mouth Every 6 (Six) Hours As Needed for Moderate Pain ., Disp: 15 tablet, Rfl: 0  •  levothyroxine (SYNTHROID, LEVOTHROID) 100 MCG tablet, Take 1 tablet by mouth Daily., Disp: 30 tablet, Rfl: 1  •  predniSONE (DELTASONE) 10 MG tablet, Take 3 tablets by mouth Daily With Breakfast. (Patient taking differently: Take 30 mg by mouth Daily With Breakfast. Patient  said this is the second week of patient's taper of prednisone.), Disp: 90 tablet, Rfl: 0  •  warfarin (COUMADIN) 2 MG tablet, INR 2 - 3.  Indications: Atrial Fibrillation (Patient taking differently: Take 2 mg by mouth Daily. INR 2 - 3.  Patient had two days of 1mg (Saturday 6/13 and Tuesday 6/16)  Indications: Atrial Fibrillation), Disp: 30 tablet, Rfl: 0    Allergies:   Allergies   Allergen Reactions   • Sulfa Antibiotics Hives   • Infliximab Rash        The following portions of the patient's history were reviewed and updated as appropriate: allergies, current medications, past family history, past medical history, past social history, past " surgical history and problem list.    Vitals:    06/24/20 1120   BP: 112/80   Pulse: 82   Temp: 98 °F (36.7 °C)   SpO2: 92%         06/24/20  1120   Weight: 65 kg (143 lb 4.8 oz)     Body mass index is 22.44 kg/m².      PHYSICAL EXAM:  Physical Exam   Constitutional: She is oriented to person, place, and time. She appears well-developed and well-nourished.   Cardiovascular: Normal rate.   Irregular heart sounds noted, due to history of atrial fibrillation   Pulmonary/Chest: Effort normal.   Mild shortness of breath noted with activity to and from the restroom   Musculoskeletal: She exhibits edema.   Patient ambulates via wheelchair.  Edema noted in left hand secondary to recent injury   Neurological: She is alert and oriented to person, place, and time.   Skin: Skin is warm and dry.   Psychiatric: She has a normal mood and affect. Her behavior is normal. Judgment and thought content normal.   Vitals reviewed.        Assessment/ Plan  Joellen was seen today for crohn's disease.    Diagnoses and all orders for this visit:    Crohn's disease of both small and large intestine with rectal bleeding (CMS/[facility])    High risk medication use    Constipation, unspecified constipation type    Gastroesophageal reflux disease, esophagitis presence not specified         Return for pending course and initiation date of biologic therapy.    Patient Instructions   1.  For Crohn's disease, we will have you resume Pentasa 500 mg, take 4 capsules twice daily.    2.  Additionally for Crohn's disease with loose stool, we will have you restart WelChol.  We recommend starting off with 1 tablet daily to see how your bowel habits respond, then may increase to 2 tablets daily if needed.  The overall goal is to help to regulate your bowel habits so that you are not experiencing constipation or diarrhea.    3.  Continue current dose of prednisone at 30 mg.    4.  We will discuss biologic therapy options with Dr. Salomon today and contact you with  recommendations.        Discussion:  Extensive record review was performed prior to patient's office visit due to her longstanding history of Crohn's disease.  Previous hospitalization records over the past 3 months were also reviewed.    At this time, the patient has not been taking any maintenance medication for Crohn's disease in 2 months.  She underwent her initial Stelara infusion in March 2020, then due to COVID as well as issues with her anticoagulants, she has not received any follow up injections. She had previously taken Entyvio, but based upon her report of symptom control, it is unclear as to how well the medication worked to manage her Crohn's disease.     Ideally, endoscopic evaluation would be very helpful for further evaluation of the patient's Crohn's disease.  However, due to her multiple co-morbidities and anticoagulation use, this may not be the best option at this time. APRN to discuss patient's case with Dr. Salomon to help determine if we need to restart Stelara with an infusion or move straight to injection therapy. Could also consider restarting Entyvio, although it is unclear as to how well the patient responded.     For now, we will have the patient continue her current dose of prednisone at 30 mg.  We will restart Pentasa at 500 mg, 4 capsules twice daily as well as WelChol 1 tablet daily, with the option of increasing to 1 tablet twice daily based upon how her bowel habits respond.  Overall, her symptoms are not severe.  She is not experiencing any blood in the stool and only reports very mild abdominal tenderness on palpation.    Additional recommendations to be provided after discussion with Dr. Salomon.  Patient and her  both verbalized understanding of plan of care and are in agreement.  We will plan to schedule next follow-up appointment based upon choice of biologic therapy and completion of induction dosing if needed.  All questions answered and support provided.    MARIAM  reviewed case with Dr. Salomon.  We will plan to restart Stelara.  No need for repeat infusion.  We will plan to start paperwork to reinitiate therapy.  Patient to continue prednisone 30 mg daily until she has received her first injection, then we will plan to taper prednisone slowly.  MARIAM contacted patient and discussed plan of care with her , Dr. Dominguez (HIPAA form). We will then plan for office follow up about 4 weeks after she has received her injection for reassessment of symptoms. We will cancel her July appointment, as we anticipate that her next office follow-up will not be needed until early August.  MARIAM Stern

## 2020-06-25 ENCOUNTER — TELEPHONE (OUTPATIENT)
Dept: GASTROENTEROLOGY | Facility: CLINIC | Age: 75
End: 2020-06-25

## 2020-06-25 ENCOUNTER — READMISSION MANAGEMENT (OUTPATIENT)
Dept: CALL CENTER | Facility: HOSPITAL | Age: 75
End: 2020-06-25

## 2020-06-25 NOTE — TELEPHONE ENCOUNTER
----- Message from MARIAM Lynn sent at 6/24/2020  5:12 PM EDT -----  Hi Deborah,    I saw this patient today, long standing history of Crohns disease. Former patient of Dr. Flores. I discussed her case in depth with Dr. Salomon.  We are going to plan to reinitiate her Stelara therapy.  She has received her infusion March 2020, but never received any follow-up injections.  We will plan to start Stelara 90 mg SQ every 8 weeks.  She is currently working with [facility].  I am not for sure of Stelara sets up some sort of teaching session through [facility], or if they have nurse navigators that assist patients with the injection process.  She has given herself Humira injections in the past, and is familiar.    Please initiate paperwork for Stelara as listed above, and let me know if you have any questions.      Spoke with [facility].  Will refer for in home or outpatient Stelara injections.

## 2020-06-25 NOTE — OUTREACH NOTE
Medical Week 2 Survey      Responses   [facility] patient discharged from?  [place]   COVID-19 Test Status  Negative   Does the patient have one of the following disease processes/diagnoses(primary or secondary)?  Other   Week 2 attempt successful?  No   Unsuccessful attempts  Attempt 1          Leta Vizcarra RN

## 2020-06-26 ENCOUNTER — TELEPHONE (OUTPATIENT)
Dept: GASTROENTEROLOGY | Facility: CLINIC | Age: 75
End: 2020-06-26

## 2020-06-26 NOTE — TELEPHONE ENCOUNTER
----- Message from MARIAM Lynn sent at 6/24/2020  5:12 PM EDT -----  Hi Deborah,    I saw this patient today, long standing history of Crohns disease. Former patient of Dr. Flores. I discussed her case in depth with Dr. Salomon.  We are going to plan to reinitiate her Stelara therapy.  She has received her infusion March 2020, but never received any follow-up injections.  We will plan to start Stelara 90 mg SQ every 8 weeks.  She is currently working with [facility].  I am not for sure of Stelara sets up some sort of teaching session through [facility] [employer], or if they have nurse navigators that assist patients with the injection process.  She has given herself Humira injections in the past, and is familiar.    Please initiate paperwork for Stelara as listed above, and let me know if you have any questions.  Sent in rx to [facility] to administer.  The rx was approved. pk

## 2020-06-29 ENCOUNTER — TRANSCRIBE ORDERS (OUTPATIENT)
Dept: ADMINISTRATIVE | Facility: HOSPITAL | Age: 75
End: 2020-06-29

## 2020-06-29 ENCOUNTER — READMISSION MANAGEMENT (OUTPATIENT)
Dept: CALL CENTER | Facility: HOSPITAL | Age: 75
End: 2020-06-29

## 2020-06-29 ENCOUNTER — TELEPHONE (OUTPATIENT)
Dept: INTERNAL MEDICINE | Facility: CLINIC | Age: 75
End: 2020-06-29

## 2020-06-29 DIAGNOSIS — Z01.818 OTHER SPECIFIED PRE-OPERATIVE EXAMINATION: Primary | ICD-10-CM

## 2020-06-29 NOTE — TELEPHONE ENCOUNTER
[facility]/ [facility] patients PT-35.2/INR-2.9, alternating 1mg Saturday, Tuesday, Thursday, Saturday & 2MG Monday, Wednesday, Friday.

## 2020-06-29 NOTE — OUTREACH NOTE
Medical Week 2 Survey      Responses   [facility] patient discharged from?  [place]   Does the patient have one of the following disease processes/diagnoses(primary or secondary)?  Other   Week 2 attempt successful?  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

## 2020-06-30 ENCOUNTER — TRANSCRIBE ORDERS (OUTPATIENT)
Dept: ADMINISTRATIVE | Facility: HOSPITAL | Age: 75
End: 2020-06-30

## 2020-06-30 DIAGNOSIS — I73.9 PAD (PERIPHERAL ARTERY DISEASE) (HCC): Primary | ICD-10-CM

## 2020-07-02 ENCOUNTER — OFFICE VISIT (OUTPATIENT)
Dept: CARDIOLOGY | Facility: CLINIC | Age: 75
End: 2020-07-02

## 2020-07-02 VITALS
DIASTOLIC BLOOD PRESSURE: 60 MMHG | HEIGHT: 67 IN | SYSTOLIC BLOOD PRESSURE: 110 MMHG | BODY MASS INDEX: 22.76 KG/M2 | HEART RATE: 88 BPM | WEIGHT: 145 LBS | RESPIRATION RATE: 20 BRPM

## 2020-07-02 DIAGNOSIS — I48.0 PAF (PAROXYSMAL ATRIAL FIBRILLATION) (HCC): Primary | ICD-10-CM

## 2020-07-02 DIAGNOSIS — I50.32 CHRONIC DIASTOLIC CHF (CONGESTIVE HEART FAILURE) (HCC): ICD-10-CM

## 2020-07-02 DIAGNOSIS — I27.20 PULMONARY HTN (HCC): ICD-10-CM

## 2020-07-02 PROCEDURE — 99203 OFFICE O/P NEW LOW 30 MIN: CPT | Performed by: INTERNAL MEDICINE

## 2020-07-02 PROCEDURE — 93000 ELECTROCARDIOGRAM COMPLETE: CPT | Performed by: INTERNAL MEDICINE

## 2020-07-02 RX ORDER — MESALAMINE 500 MG/1
2000 CAPSULE, EXTENDED RELEASE ORAL 2 TIMES DAILY
COMMUNITY
End: 2020-07-06 | Stop reason: SDUPTHER

## 2020-07-02 RX ORDER — COLESEVELAM 180 1/1
625 TABLET ORAL 2 TIMES DAILY
COMMUNITY
End: 2020-11-16 | Stop reason: SDUPTHER

## 2020-07-02 NOTE — PROGRESS NOTES
Subjective:     Encounter Date:07/02/2020      Patient ID: Joellen Dominguez is a 75 y.o. female.    Chief Complaint:  Atrial fibrillation    HPI:    Ms Dominguez is a 74 yo patient of Dr. Karthik Reyez who is seen as a new patient referral for atrial fibrillation.  Her past medial history is significant for HTN , HLD, COPD, antiphospholipid antibody syndrome on coumadin.  She began having trouble with atrial fibrillation earlier this year.   She was admitted to [facility] 4/2020 with an episode of AF with rates in the 160's.  She was rate controlled and placed on amiodarone.  She had a JUAN C cardioversion and the JUAN C showed an EF of 55% with moderate concentric LVH, mild MR and severe LAE.  She remained in sinus rhythm only  A short time according to her .  She was admitted again 5/2020 with AF with RVR and her meds were adjusted and discharged.  Her  states that her pulse rate is usually in the 120 range at home.  Her symptoms include fatigue, dyspnea and some ankle edema.  She also has palpitations on a daily basis.    Her cardiac evaluation has included a cath in 4/2020 which showed nonobstructive disease, EF 50% and PA pressure of 46/20mmHg.      The following portions of the patient's history were reviewed and updated as appropriate: allergies, current medications, past family history, past medical history, past social history, past surgical history and problem list.    Problem List:  Patient Active Problem List   Diagnosis   • Incontinence   • Degeneration of lumbar intervertebral disc   • Other hyperlipidemia   • Essential hypertension   • Depression   • Antiphospholipid antibody syndrome (CMS/HCC)   • Lichen sclerosus et atrophicus   • Myocardial infarction type 2 (CMS/HCC)   • PAF (paroxysmal atrial fibrillation) (CMS/HCC)   • History of cardioversion   • Leukocytosis   • Pulmonary HTN (CMS/HCC)   • Crohn's disease of small intestine with complication (CMS/HCC)   • Cold agglutinin disease  (CMS/HCC)   • Chronic diastolic CHF (congestive heart failure) (CMS/HCC)   • Warfarin-induced coagulopathy (CMS/HCC)   • Respiratory insufficiency   • COPD with emphysema (CMS/HCC)   • Acute kidney injury (MARY ANN) with acute tubular necrosis (ATN) (CMS/HCC)   • Inflammatory bowel disease (Crohn's disease) (CMS/HCC)   • Long term current use of anticoagulant therapy       Past Medical History:  Past Medical History:   Diagnosis Date   • Acute deep vein thrombosis of lower extremity (CMS/HCC)    • Antiphospholipid antibody syndrome (CMS/HCC)    • Antiphospholipid antibody syndrome (CMS/HCC)    • Arthritis     OSTEO   • Benign essential hypertension    • COPD (chronic obstructive pulmonary disease) (CMS/HCC)    • Coronary artery disease    • Crohn's disease (CMS/HCC)    • Cutaneous candidiasis    • Depression    • Disease of thyroid gland    • Elevated cholesterol    • GERD (gastroesophageal reflux disease)    • History of echocardiogram 04/22/2020    mild-to-moderate concentric hypertrophy, EF 56 - 60%. LA severely dilated, Mild MAC, Mild MR with restrictive movement of the posterior leaflet   • Hyperlipidemia    • Hypertension    • Osteopenia    • Polyp, uterus corpus        Past Surgical History:  Past Surgical History:   Procedure Laterality Date   • ABDOMINAL HERNIA REPAIR     • AMPUTATION DIGIT Left     SECOND TOE    • BILATERAL SALPINGO OOPHORECTOMY     • BREAST BIOPSY Right     BENIGN   • CARDIAC CATHETERIZATION N/A 4/22/2020    Surgeon: Paulo Singleton MD;  nonsignificant coronary artery disease normal LV function nonsignificant mitral regurgitation probably shortness of breath due to the cardiac arrhythmias and diastolic dysfunction   • CARDIAC CATHETERIZATION N/A 4/22/2020    Procedure: Coronary angiography;  Surgeon: Paulo Singleton MD;  Location: [facility] INVAS[facility] LOC[facility];  Service: Cardiology;  Laterality: N/A;   • CARDIAC CATHETERIZATION N/A 4/22/2020    Procedure: Left ventriculography;   Surgeon: Paulo Singleton MD;  Location:  [facility] CATH INVASIVE LOCATION;  Service: Cardiology;  Laterality: N/A;   • CARDIAC ELECTROPHYSIOLOGY PROCEDURE N/A 2020    Procedure: Cardioversion;  Surgeon: Paulo Singleton MD;  Location:  [facility] CATH INVASIVE LOCATION;  Service: Cardiology;  Laterality: N/A;   • CHOLECYSTECTOMY     • COLONOSCOPY     • COSMETIC SURGERY     • D&C HYSTEROSCOPY N/A 10/13/2016    Procedure: DILATATION AND CURETTAGE HYSTEROSCOPY WITH MYOSURE;  Surgeon: Christopher Doll MD;  Location: [facility] MAIN OR;  Service:    • ENDOSCOPY     • EYE SURGERY Bilateral     cataract   • FACELIFT     • FEMORAL ARTERY - FEMORAL ARTERY BYPASS GRAFT Bilateral    • HEMORRHOIDECTOMY     • TONSILLECTOMY AND ADENOIDECTOMY     • UPPER GASTROINTESTINAL ENDOSCOPY     • VASCULAR SURGERY      femoral stents        Social History:  Social History     Socioeconomic History   • Marital status:      Spouse name: Not on file   • Number of children: Not on file   • Years of education: Not on file   • Highest education level: Not on file   Tobacco Use   • Smoking status: Former Smoker     Packs/day: 2.00     Years: 16.00     Pack years: 32.00     Types: Cigarettes     Last attempt to quit: 1967     Years since quittin.5   • Smokeless tobacco: Never Used   Substance and Sexual Activity   • Alcohol use: No   • Drug use: No   • Sexual activity: Defer       Allergies:  Allergies   Allergen Reactions   • Sulfa Antibiotics Hives   • Infliximab Rash       Immunizations:  Immunization History   Administered Date(s) Administered   • FLUAD TRI 65YR+ 10/07/2019   • Fluzone High Dose =>65 Years ([employer] ONLY) 2018, 10/07/2019   • Tdap 2020       ROS:  Review of Systems   Constitution: Positive for malaise/fatigue.   Cardiovascular: Positive for dyspnea on exertion, irregular heartbeat, leg swelling and palpitations. Negative for chest pain, near-syncope and syncope.   Respiratory: Positive for  "shortness of breath.    All other systems reviewed and are negative.         Objective:         /60   Pulse 88   Resp 20   Ht 170.2 cm (67\")   Wt 65.8 kg (145 lb)   BMI 22.71 kg/m²     Physical Exam   Constitutional: She is oriented to person, place, and time. She appears well-developed and well-nourished. No distress.   HENT:   Head: Normocephalic and atraumatic.   Eyes: Pupils are equal, round, and reactive to light. Conjunctivae and EOM are normal. No scleral icterus.   Neck: Normal range of motion. No thyromegaly present.   Cardiovascular:   Irregularly irregular   Pulmonary/Chest: Effort normal and breath sounds normal.   Abdominal: Soft. Bowel sounds are normal.   Musculoskeletal: Normal range of motion.   Neurological: She is alert and oriented to person, place, and time.   Skin: Skin is warm and dry.   Psychiatric: She has a normal mood and affect.       In-Office Procedure(s):    ECG 12 Lead  Date/Time: 7/2/2020 8:10 AM  Performed by: Chong Buchanan MD  Authorized by: Chong Buchanan MD   Comparison: compared with previous ECG from 6/2/2020  Comparison to previous ECG: AF, LVH with repol  Rhythm: atrial fibrillation  Rate: normal  QRS axis: normal  Other findings: left ventricular hypertrophy with strain    Clinical impression: abnormal EKG            ASCVD RIsk Score::  The ASCVD Risk score (Grafton KAUSHIK Neal., et al., 2013) failed to calculate for the following reasons:    The patient has a prior MI or stroke diagnosis    Recent Radiology:  Imaging Results (Most Recent)     None          Lab Review:   Office Visit on 06/23/2020   Component Date Value   • INR 06/23/2020 1.50*   Admission on 06/16/2020, Discharged on 06/18/2020   Component Date Value   • Glucose 06/16/2020 213*   • BUN 06/16/2020 86*   • Creatinine 06/16/2020 2.16*   • Sodium 06/16/2020 139    • Potassium 06/16/2020 4.8    • Chloride 06/16/2020 96*   • CO2 06/16/2020 25.5    • Calcium 06/16/2020 9.3    • Total Protein " 06/16/2020 6.2    • Albumin 06/16/2020 3.60    • ALT (SGPT) 06/16/2020 28    • AST (SGOT) 06/16/2020 35*   • Alkaline Phosphatase 06/16/2020 100    • Total Bilirubin 06/16/2020 1.2    • eGFR Non African Amer 06/16/2020 22*   • Globulin 06/16/2020 2.6    • A/G Ratio 06/16/2020 1.4    • BUN/Creatinine Ratio 06/16/2020 39.8*   • Anion Gap 06/16/2020 17.5*   • Protime 06/16/2020 15.3*   • INR 06/16/2020 1.24*   • WBC 06/16/2020 21.17*   • RBC 06/16/2020 4.97    • Hemoglobin 06/16/2020 12.2    • Hematocrit 06/16/2020 40.2    • MCV 06/16/2020 80.9    • MCH 06/16/2020 24.5*   • MCHC 06/16/2020 30.3*   • RDW 06/16/2020 20.9*   • RDW-SD 06/16/2020 57.9*   • MPV 06/16/2020 10.0    • Platelets 06/16/2020 353    • Neutrophil % 06/16/2020 89.3*   • Lymphocyte % 06/16/2020 2.8*   • Monocyte % 06/16/2020 2.8*   • Eosinophil % 06/16/2020 0.0*   • Basophil % 06/16/2020 0.3    • Immature Grans % 06/16/2020 4.8*   • Neutrophils, Absolute 06/16/2020 18.90*   • Lymphocytes, Absolute 06/16/2020 0.59*   • Monocytes, Absolute 06/16/2020 0.59    • Eosinophils, Absolute 06/16/2020 0.00    • Basophils, Absolute 06/16/2020 0.07    • Immature Grans, Absolute 06/16/2020 1.02*   • nRBC 06/16/2020 0.1    • Protime 06/16/2020 16.1*   • INR 06/16/2020 1.33*   • Creatine Kinase 06/16/2020 37    • Color, UA 06/16/2020 Yellow    • Appearance, UA 06/16/2020 Clear    • pH, UA 06/16/2020 <=5.0    • Specific Gravity, UA 06/16/2020 1.026    • Glucose, UA 06/16/2020 Negative    • Ketones, UA 06/16/2020 Trace*   • Bilirubin, UA 06/16/2020 Negative    • Blood, UA 06/16/2020 Negative    • Protein, UA 06/16/2020 Negative    • Leuk Esterase, UA 06/16/2020 Negative    • Nitrite, UA 06/16/2020 Negative    • Urobilinogen, UA 06/16/2020 0.2 E.U./dL    • Glucose 06/17/2020 112*   • BUN 06/17/2020 75*   • Creatinine 06/17/2020 1.49*   • Sodium 06/17/2020 138    • Potassium 06/17/2020 3.6    • Chloride 06/17/2020 99    • CO2 06/17/2020 27.0    • Calcium 06/17/2020 8.8     • eGFR Non African Amer 06/17/2020 34*   • BUN/Creatinine Ratio 06/17/2020 50.3*   • Anion Gap 06/17/2020 12.0    • WBC 06/17/2020 19.56*   • RBC 06/17/2020 4.83    • Hemoglobin 06/17/2020 11.9*   • Hematocrit 06/17/2020 38.9    • MCV 06/17/2020 80.5    • MCH 06/17/2020 24.6*   • MCHC 06/17/2020 30.6*   • RDW 06/17/2020 21.2*   • RDW-SD 06/17/2020 58.5*   • MPV 06/17/2020 10.1    • Platelets 06/17/2020 317    • Protime 06/17/2020 16.0*   • INR 06/17/2020 1.31*   • Glucose 06/18/2020 103*   • BUN 06/18/2020 47*   • Creatinine 06/18/2020 1.14*   • Sodium 06/18/2020 143    • Potassium 06/18/2020 3.6    • Chloride 06/18/2020 106    • CO2 06/18/2020 27.4    • Calcium 06/18/2020 8.7    • eGFR Non  Amer 06/18/2020 46*   • BUN/Creatinine Ratio 06/18/2020 41.2*   • Anion Gap 06/18/2020 9.6    • Protime 06/18/2020 15.6*   • INR 06/18/2020 1.27*   • WBC 06/18/2020 18.85*   • RBC 06/18/2020 4.34    • Hemoglobin 06/18/2020 10.7*   • Hematocrit 06/18/2020 34.7    • MCV 06/18/2020 80.0    • MCH 06/18/2020 24.7*   • MCHC 06/18/2020 30.8*   • RDW 06/18/2020 20.4*   • RDW-SD 06/18/2020 56.5*   • MPV 06/18/2020 9.4    • Platelets 06/18/2020 288    • Neutrophil % 06/18/2020 83.0*   • Lymphocyte % 06/18/2020 6.2*   • Monocyte % 06/18/2020 5.1    • Eosinophil % 06/18/2020 0.4    • Basophil % 06/18/2020 0.5    • Immature Grans % 06/18/2020 4.8*   • Neutrophils, Absolute 06/18/2020 15.67*   • Lymphocytes, Absolute 06/18/2020 1.16    • Monocytes, Absolute 06/18/2020 0.96*   • Eosinophils, Absolute 06/18/2020 0.07    • Basophils, Absolute 06/18/2020 0.09    • Immature Grans, Absolute 06/18/2020 0.90*   • nRBC 06/18/2020 0.0    Lab on 06/16/2020   Component Date Value   • Protime 06/16/2020     • INR 06/16/2020     Admission on 06/02/2020, Discharged on 06/02/2020   Component Date Value   • Protime 06/02/2020 24.8*   • INR 06/02/2020 2.28*   No results displayed because visit has over 200 results.      No results displayed because  visit has over 200 results.                   Assessment:          Diagnosis Plan   1. PAF (paroxysmal atrial fibrillation) (CMS/[facility])  Case Request Cath Lab: AV node ablation & Pacemaker implant ([facility])    CBC & Differential    Comprehensive Metabolic Panel    Protime-INR   2. Pulmonary HTN (CMS/[facility])     3. Chronic diastolic CHF (congestive heart failure) (CMS/[facility])            Plan:      1. Persistent Afib - rates are uncontrolled, frequently in the 120 range despite B blockers, diltiazem and amiodarone. She is quite symptomatic with palpitations, dyspnea, fatigue and some heart failure.  Amiodarone has not been effective in maintaining sinus rhythm.  Her echo shows severe LA enlargement.  We discussed option of ablation with pulmonary vein isolation and substrate modification to try to restore sinus rhythm although the success rate for this would be relatively low.  I have recommended that she have an AV node ablation with implantation of a pacemaker.  We discussed the indications, risks and benefits of this with her and her  and they would like to proceed.       Level of Care:                 Chong Buchanan MD  07/02/20  .  Answers for HPI/ROS submitted by the patient on 6/25/2020   What is the primary reason for your visit?: Other  Please describe your symptoms.: pro time test for coumadin level, Atrial fibrillation  Have you had these symptoms before?: Yes  How long have you been having these symptoms?: Greater than 2 weeks  Please list any medications you are currently taking for this condition.: Atenalol, Amiodorone, Prednisone, Cardiazem, Coumadin  Please describe any probable cause for these symptoms. : A Fib

## 2020-07-04 ENCOUNTER — LAB (OUTPATIENT)
Dept: LAB | Facility: HOSPITAL | Age: 75
End: 2020-07-04

## 2020-07-04 DIAGNOSIS — Z01.818 OTHER SPECIFIED PRE-OPERATIVE EXAMINATION: ICD-10-CM

## 2020-07-04 PROCEDURE — U0004 COV-19 TEST NON-CDC HGH THRU: HCPCS

## 2020-07-04 PROCEDURE — C9803 HOPD COVID-19 SPEC COLLECT: HCPCS

## 2020-07-04 PROCEDURE — U0002 COVID-19 LAB TEST NON-CDC: HCPCS

## 2020-07-06 ENCOUNTER — TELEPHONE (OUTPATIENT)
Dept: GASTROENTEROLOGY | Facility: CLINIC | Age: 75
End: 2020-07-06

## 2020-07-06 ENCOUNTER — TELEPHONE (OUTPATIENT)
Dept: INTERNAL MEDICINE | Facility: CLINIC | Age: 75
End: 2020-07-06

## 2020-07-06 LAB
REF LAB TEST METHOD: NORMAL
SARS-COV-2 RNA RESP QL NAA+PROBE: NOT DETECTED

## 2020-07-06 RX ORDER — MESALAMINE 500 MG/1
1000 CAPSULE, EXTENDED RELEASE ORAL 4 TIMES DAILY
Qty: 500 CAPSULE | Refills: 3 | Status: SHIPPED | OUTPATIENT
Start: 2020-07-06

## 2020-07-06 NOTE — TELEPHONE ENCOUNTER
Pt , Dr. Dominguez called and states they have questions concerning Stelara infusion. They are being told there is a $3500.00/mo co-pay. Please contact /pt to advise.

## 2020-07-06 NOTE — TELEPHONE ENCOUNTER
Dr. GRETTA APONTE called with PT's INR results    INR 2.2  PT 25.8    PT currently taking 2mg M,W,F  1mg the other days     Please advise

## 2020-07-07 ENCOUNTER — HOSPITAL ENCOUNTER (OUTPATIENT)
Facility: HOSPITAL | Age: 75
Setting detail: HOSPITAL OUTPATIENT SURGERY
End: 2020-07-07
Attending: INTERNAL MEDICINE | Admitting: INTERNAL MEDICINE

## 2020-07-07 ENCOUNTER — HOSPITAL ENCOUNTER (OUTPATIENT)
Dept: SLEEP MEDICINE | Facility: HOSPITAL | Age: 75
Discharge: HOME | End: 2020-07-07
Admitting: INTERNAL MEDICINE

## 2020-07-07 ENCOUNTER — TELEPHONE (OUTPATIENT)
Dept: INTERNAL MEDICINE | Facility: CLINIC | Age: 75
End: 2020-07-07

## 2020-07-07 DIAGNOSIS — I48.0 PAF (PAROXYSMAL ATRIAL FIBRILLATION) (HCC): ICD-10-CM

## 2020-07-07 DIAGNOSIS — I27.20 PULMONARY HYPERTENSION (HCC): ICD-10-CM

## 2020-07-07 DIAGNOSIS — G47.30 SLEEP APNEA, UNSPECIFIED TYPE: ICD-10-CM

## 2020-07-07 PROCEDURE — 95811 POLYSOM 6/>YRS CPAP 4/> PARM: CPT

## 2020-07-07 NOTE — TELEPHONE ENCOUNTER
Saranya/visiting nurse-Occupational Therapist [phone],states patient fell last evening, knees buckled/gave out as she was attempting to go up 2 steps. No injuries noted, all vitals are normal.

## 2020-07-07 NOTE — TELEPHONE ENCOUNTER
Spoke with Dr Dominguez.  The Stelara injections are too expensive.    I spoke with Suzanna at [facility] and called [place] with [facility].  Waiting for a return call.  [phone]

## 2020-07-08 ENCOUNTER — TELEPHONE (OUTPATIENT)
Dept: CARDIOLOGY | Facility: CLINIC | Age: 75
End: 2020-07-08

## 2020-07-08 DIAGNOSIS — I48.0 PAF (PAROXYSMAL ATRIAL FIBRILLATION) (HCC): Primary | ICD-10-CM

## 2020-07-08 RX ORDER — AMIODARONE HYDROCHLORIDE 200 MG/1
200 TABLET ORAL DAILY
Qty: 90 TABLET | Refills: 1 | Status: ON HOLD | OUTPATIENT
Start: 2020-07-08 | End: 2020-07-29

## 2020-07-08 NOTE — TELEPHONE ENCOUNTER
Karthik Reyez patient    Patient requesting refill on amiodarone (PACERONE) 200 MG tablet sent to [facility]).

## 2020-07-10 ENCOUNTER — HOSPITAL ENCOUNTER (OUTPATIENT)
Dept: CARDIOLOGY | Facility: HOSPITAL | Age: 75
Discharge: HOME | End: 2020-07-10
Admitting: SURGERY

## 2020-07-10 DIAGNOSIS — I73.9 PAD (PERIPHERAL ARTERY DISEASE) (HCC): ICD-10-CM

## 2020-07-10 LAB
BH CV LOWER ARTERIAL LEFT ABI RATIO: 1.32
BH CV LOWER ARTERIAL LEFT DORSALIS PEDIS SYS MAX: 120 MMHG
BH CV LOWER ARTERIAL LEFT GREAT TOE SYS MAX: 152 MMHG
BH CV LOWER ARTERIAL LEFT POST TIBIAL SYS MAX: 178 MMHG
BH CV LOWER ARTERIAL LEFT TBI RATIO: 1.13
BH CV LOWER ARTERIAL RIGHT 2ND DIGIT SYS MAX: 75 MMHG
BH CV LOWER ARTERIAL RIGHT ABI RATIO: 1.32
BH CV LOWER ARTERIAL RIGHT DORSALIS PEDIS SYS MAX: 178 MMHG
BH CV LOWER ARTERIAL RIGHT GREAT TOE SYS MAX: 69 MMHG
BH CV LOWER ARTERIAL RIGHT POST TIBIAL SYS MAX: 174 MMHG
BH CV LOWER ARTERIAL RIGHT TBI RATIO: 0.51
UPPER ARTERIAL LEFT ARM BRACHIAL SYS MAX: 134 MMHG
UPPER ARTERIAL RIGHT ARM BRACHIAL SYS MAX: 135 MMHG

## 2020-07-10 PROCEDURE — 93922 UPR/L XTREMITY ART 2 LEVELS: CPT

## 2020-07-14 NOTE — TELEPHONE ENCOUNTER
Patient can't afford the $3500 cost for Stelara injection.  I spoke with [facility] as they can get approval to administer in some cases.  The charge would be $3500.    Please advise.

## 2020-07-15 NOTE — TELEPHONE ENCOUNTER
MARIAM contacted patient today at 4:30 PM to discuss coverage issues with Stelara.  Patient stated that she was able to receive her medication through [facility] at no charge and already has her scheduled for her September injection.  She received her first injection today.  She reports that she is taking WelChol 1 tablet in the morning and 1 tablet in the evening, and denies having any further diarrhea.  She will experience a mild amount of cramping usually once per day, but states that it is short-lived.  She denies any blood in the stool.  She continues prednisone 30 mg once daily.  At this time we will continue her current prednisone dosing for the next week to give her biologic time to start working to manage her Crohns disease. If her symptoms are well controlled in 1 week, we will plan to slowly taper prednisone by 5 mg/week until weaned off.  Patient verbalized understanding of above.  All questions answered and support provided.  MARIAM Stern

## 2020-07-17 ENCOUNTER — TRANSCRIBE ORDERS (OUTPATIENT)
Dept: SLEEP MEDICINE | Facility: HOSPITAL | Age: 75
End: 2020-07-17

## 2020-07-17 DIAGNOSIS — Z01.818 OTHER SPECIFIED PRE-OPERATIVE EXAMINATION: Primary | ICD-10-CM

## 2020-07-22 ENCOUNTER — LAB (OUTPATIENT)
Dept: LAB | Facility: HOSPITAL | Age: 75
End: 2020-07-22

## 2020-07-22 ENCOUNTER — TELEMEDICINE (OUTPATIENT)
Dept: CARDIOLOGY | Facility: CLINIC | Age: 75
End: 2020-07-22

## 2020-07-22 VITALS
BODY MASS INDEX: 21.82 KG/M2 | DIASTOLIC BLOOD PRESSURE: 91 MMHG | SYSTOLIC BLOOD PRESSURE: 122 MMHG | WEIGHT: 139 LBS | HEIGHT: 67 IN | HEART RATE: 113 BPM

## 2020-07-22 DIAGNOSIS — I48.19 ATRIAL FIBRILLATION, PERSISTENT (HCC): Primary | ICD-10-CM

## 2020-07-22 DIAGNOSIS — I10 ESSENTIAL HYPERTENSION: ICD-10-CM

## 2020-07-22 DIAGNOSIS — R53.82 CHRONIC FATIGUE: ICD-10-CM

## 2020-07-22 DIAGNOSIS — Z01.818 OTHER SPECIFIED PRE-OPERATIVE EXAMINATION: ICD-10-CM

## 2020-07-22 DIAGNOSIS — I27.20 PULMONARY HTN (HCC): ICD-10-CM

## 2020-07-22 DIAGNOSIS — R06.89 RESPIRATORY INSUFFICIENCY: ICD-10-CM

## 2020-07-22 DIAGNOSIS — E78.49 OTHER HYPERLIPIDEMIA: ICD-10-CM

## 2020-07-22 DIAGNOSIS — I50.32 CHRONIC DIASTOLIC CHF (CONGESTIVE HEART FAILURE) (HCC): ICD-10-CM

## 2020-07-22 DIAGNOSIS — E87.70 EDEMA DUE TO HYPERVOLEMIA: ICD-10-CM

## 2020-07-22 DIAGNOSIS — J43.9 PULMONARY EMPHYSEMA, UNSPECIFIED EMPHYSEMA TYPE (HCC): ICD-10-CM

## 2020-07-22 DIAGNOSIS — Z79.01 LONG TERM CURRENT USE OF ANTICOAGULANT THERAPY: ICD-10-CM

## 2020-07-22 PROBLEM — I48.0 PAF (PAROXYSMAL ATRIAL FIBRILLATION): Status: RESOLVED | Noted: 2020-04-27 | Resolved: 2020-07-22

## 2020-07-22 PROCEDURE — U0004 COV-19 TEST NON-CDC HGH THRU: HCPCS

## 2020-07-22 PROCEDURE — C9803 HOPD COVID-19 SPEC COLLECT: HCPCS

## 2020-07-22 PROCEDURE — 99214 OFFICE O/P EST MOD 30 MIN: CPT | Performed by: INTERNAL MEDICINE

## 2020-07-22 RX ORDER — ATORVASTATIN CALCIUM 10 MG/1
10 TABLET, FILM COATED ORAL DAILY
COMMUNITY
End: 2020-08-03 | Stop reason: SDUPTHER

## 2020-07-22 RX ORDER — PANTOPRAZOLE SODIUM 40 MG/1
40 TABLET, DELAYED RELEASE ORAL DAILY
Status: ON HOLD | COMMUNITY
End: 2020-12-22

## 2020-07-22 RX ORDER — CHLORTHALIDONE 25 MG/1
25 TABLET ORAL DAILY
Status: ON HOLD | COMMUNITY
End: 2020-12-22

## 2020-07-22 NOTE — PROGRESS NOTES
[facility]    You have chosen to receive care through a telehealth visit.  Do you consent to use a video/audio connection for your medical care today? Yes    Subjective:     Encounter Date:07/22/2020      Patient ID: Joellen Dominguez is a 75 y.o. female.      HPI: Joellen Dominguez agreed to a video visit today secondary to the coronavirus pandemic.  She remains free of fever or cough and no change in her sense of smell or taste.  She has had chronic respiratory insufficiency but feels that her dyspnea on exertion is improving.  She does report generalized fatigue and weakness.  She has had lower extremity edema which persists but is improved as well.  She has symptomatic atrial fibrillation with a markedly increased ventricular response in the past.  Her ventricular response is improved but still exceeds 100 bpm despite amiodarone, beta-blocker and diltiazem.  She is scheduled to undergo AV node ablation and pacemaker implant 7/24/2020 by Dr. Kevin Buchanan.  She denies chest pain pressure or tightness.  She does not experience orthopnea or PND no syncope or near syncope.  She continues to tolerate her anticoagulation without significant bleeding.      The following portions of the patient's history were reviewed and updated as appropriate: allergies, current medications, past family history, past medical history, past social history, past surgical history and problem list.    Problem List:  Patient Active Problem List   Diagnosis   • Incontinence   • Degeneration of lumbar intervertebral disc   • Other hyperlipidemia   • Essential hypertension   • Depression   • Antiphospholipid antibody syndrome (CMS/HCC)   • Lichen sclerosus et atrophicus   • Myocardial infarction type 2 (CMS/HCC)   • History of cardioversion   • Leukocytosis   • Pulmonary HTN (CMS/HCC)   • Crohn's disease of small intestine with complication (CMS/HCC)   • Cold agglutinin disease (CMS/HCC)   • Chronic diastolic CHF (congestive heart  failure) (CMS/HCC)   • Warfarin-induced coagulopathy (CMS/HCC)   • Respiratory insufficiency   • COPD with emphysema (CMS/HCC)   • Acute kidney injury (MARY ANN) with acute tubular necrosis (ATN) (CMS/HCC)   • Inflammatory bowel disease (Crohn's disease) (CMS/HCC)   • Long term current use of anticoagulant therapy   • Atrial fibrillation, persistent (CMS/HCC)   • Edema due to hypervolemia   • Chronic fatigue       Past Medical History:  Past Medical History:   Diagnosis Date   • Acute deep vein thrombosis of lower extremity (CMS/HCC)    • Antiphospholipid antibody syndrome (CMS/HCC)    • Antiphospholipid antibody syndrome (CMS/HCC)    • Arthritis     OSTEO   • Benign essential hypertension    • COPD (chronic obstructive pulmonary disease) (CMS/HCC)    • Coronary artery disease    • Crohn's disease (CMS/HCC)    • Cutaneous candidiasis    • Depression    • Disease of thyroid gland    • Elevated cholesterol    • GERD (gastroesophageal reflux disease)    • History of echocardiogram 04/22/2020    mild-to-moderate concentric hypertrophy, EF 56 - 60%. LA severely dilated, Mild MAC, Mild MR with restrictive movement of the posterior leaflet   • Hyperlipidemia    • Hypertension    • Osteopenia    • Polyp, uterus corpus        Past Surgical History:  Past Surgical History:   Procedure Laterality Date   • ABDOMINAL HERNIA REPAIR     • AMPUTATION DIGIT Left     SECOND TOE    • BILATERAL SALPINGO OOPHORECTOMY     • BREAST BIOPSY Right     BENIGN   • CARDIAC CATHETERIZATION N/A 4/22/2020    Surgeon: Paulo Singleton MD;  nonsignificant coronary artery disease normal LV function nonsignificant mitral regurgitation probably shortness of breath due to the cardiac arrhythmias and diastolic dysfunction   • CARDIAC CATHETERIZATION N/A 4/22/2020    Procedure: Coronary angiography;  Surgeon: Paulo Singleton MD;  Location: [facility] INVASIVE LOCATION;  Service: Cardiology;  Laterality: N/A;   • CARDIAC CATHETERIZATION N/A  2020    Procedure: Left ventriculography;  Surgeon: Paulo Singleton MD;  Location:  [facility] CATH INVASIVE LOCATION;  Service: Cardiology;  Laterality: N/A;   • CARDIAC ELECTROPHYSIOLOGY PROCEDURE N/A 2020    Procedure: Cardioversion;  Surgeon: Paulo Singleton MD;  Location:  [facility] CATH INVASIVE LOCATION;  Service: Cardiology;  Laterality: N/A;   • CHOLECYSTECTOMY     • COLONOSCOPY     • COSMETIC SURGERY     • D&C HYSTEROSCOPY N/A 10/13/2016    Procedure: DILATATION AND CURETTAGE HYSTEROSCOPY WITH MYOSURE;  Surgeon: Christopher Doll MD;  Location: [facility] MAIN OR;  Service:    • ENDOSCOPY     • EYE SURGERY Bilateral     cataract   • FACELIFT     • FEMORAL ARTERY - FEMORAL ARTERY BYPASS GRAFT Bilateral    • HEMORRHOIDECTOMY     • TONSILLECTOMY AND ADENOIDECTOMY     • UPPER GASTROINTESTINAL ENDOSCOPY     • VASCULAR SURGERY      femoral stents        Social History:  Social History     Socioeconomic History   • Marital status:      Spouse name: Not on file   • Number of children: Not on file   • Years of education: Not on file   • Highest education level: Not on file   Tobacco Use   • Smoking status: Former Smoker     Packs/day: 2.00     Years: 16.00     Pack years: 32.00     Types: Cigarettes     Last attempt to quit: 1967     Years since quittin.5   • Smokeless tobacco: Never Used   Substance and Sexual Activity   • Alcohol use: No   • Drug use: No   • Sexual activity: Defer       Allergies:  Allergies   Allergen Reactions   • Sulfa Antibiotics Hives   • Infliximab Rash         ROS:  Review of Systems   Constitution: Positive for malaise/fatigue.   HENT: Negative for hearing loss and nosebleeds.    Eyes: Negative for double vision and visual disturbance.   Cardiovascular: Positive for dyspnea on exertion and leg swelling. Negative for chest pain, claudication, near-syncope, orthopnea, palpitations, paroxysmal nocturnal dyspnea and syncope.   Respiratory: Negative for cough,  "shortness of breath, sleep disturbances due to breathing, snoring, sputum production and wheezing.    Endocrine: Negative for cold intolerance, heat intolerance, polydipsia and polyuria.   Hematologic/Lymphatic: Negative for adenopathy and bleeding problem. Does not bruise/bleed easily.   Skin: Negative for flushing and itching.   Musculoskeletal: Negative for back pain, falls, joint pain, joint swelling, muscle weakness and neck pain.   Gastrointestinal: Negative for abdominal pain, dysphagia, heartburn, nausea and vomiting.   Genitourinary: Negative for dysuria and frequency.   Neurological: Negative for disturbances in coordination, dizziness, light-headedness, loss of balance, numbness and weakness.   Psychiatric/Behavioral: Negative for altered mental status and memory loss. The patient is not nervous/anxious.    Allergic/Immunologic: Negative for hives and persistent infections.          Objective:         /91   Pulse 113   Ht 170.2 cm (67\")   Wt 63 kg (139 lb)   BMI 21.77 kg/m²     Physical Exam not performed    In-Office Procedure(s):  Procedures    ASCVD RIsk Score::  The ASCVD Risk score (Custer KAUSHIK Jr., et al., 2013) failed to calculate for the following reasons:    The patient has a prior MI or stroke diagnosis        Assessment/Plan:         1. Atrial fibrillation, persistent (CMS/HCC)  Continued increased ventricular response, symptomatic    2. Long term current use of anticoagulant therapy  Tolerated    3. Respiratory insufficiency  Multifactorial, age and deconditioning as well as items listed below    4. Edema due to hypervolemia  Improved    5. Chronic fatigue  Persistent    6. Chronic diastolic CHF (congestive heart failure) (CMS/HCC)  Chronic    7. Essential hypertension  Controlled    8. Pulmonary HTN (CMS/HCC)  Persistent    9. Pulmonary emphysema, unspecified emphysema type (CMS/HCC)  Persistent    10. Other hyperlipidemia  Stable    Although Ms. Dominguez is improved she still remains " symptomatic with generalized fatigue edema and dyspnea on exertion.  In addition her ventricular response atrial fibrillation remains difficult to control she will proceed on 7/24/2020 for AV node ablation and permanent pacemaker placement.  We will maintain long-term anticoagulation post procedure we will try to optimize her volume status and increase her activity level.  I will plan to see her in follow-up in 4 weeks sooner if needed.    I spent 14 minutes on video visit and 12 minutes completing electronic medical record documentation           Karthik Reyez MD  07/22/20  .

## 2020-07-23 LAB
REF LAB TEST METHOD: NORMAL
SARS-COV-2 RNA RESP QL NAA+PROBE: NOT DETECTED

## 2020-07-24 ENCOUNTER — TRANSCRIBE ORDERS (OUTPATIENT)
Dept: SLEEP MEDICINE | Facility: HOSPITAL | Age: 75
End: 2020-07-24

## 2020-07-24 DIAGNOSIS — Z01.818 OTHER SPECIFIED PRE-OPERATIVE EXAMINATION: Primary | ICD-10-CM

## 2020-07-24 PROBLEM — I48.0 PAF (PAROXYSMAL ATRIAL FIBRILLATION): Status: ACTIVE | Noted: 2020-07-07

## 2020-07-24 RX ORDER — SODIUM CHLORIDE 0.9 % (FLUSH) 0.9 %
10 SYRINGE (ML) INJECTION AS NEEDED
Status: CANCELLED | OUTPATIENT
Start: 2020-07-27

## 2020-07-24 RX ORDER — SODIUM CHLORIDE 0.9 % (FLUSH) 0.9 %
3 SYRINGE (ML) INJECTION EVERY 12 HOURS SCHEDULED
Status: CANCELLED | OUTPATIENT
Start: 2020-07-27

## 2020-07-24 RX ORDER — CEFAZOLIN SODIUM 2 G/100ML
2 INJECTION, SOLUTION INTRAVENOUS ONCE
Status: CANCELLED | OUTPATIENT
Start: 2020-07-27 | End: 2020-07-24

## 2020-07-24 NOTE — H&P
Patient Care Team:  Chitra Leyva MD as PCP - General (Internal Medicine)  Karthik Reyez MD as Consulting Physician (Cardiology)  Francisco Salomon MD as Consulting Physician (Gastroenterology)  Trey Luque MD as Consulting Physician (Pulmonary Disease)    Chief complaint Presents for AV node ablation pacemaker        History of Present Illness   Ms Dominguez is a 76 yo patient of Dr. Karthik Reyez who is seen as a new patient referral for atrial fibrillation.  Her past medial history is significant for HTN , HLD, COPD, antiphospholipid antibody syndrome on coumadin.  She began having trouble with atrial fibrillation earlier this year.   She was admitted to [facility] 4/2020 with an episode of AF with rates in the 160's.  She was rate controlled and placed on amiodarone.  She had a JUAN C cardioversion and the JUAN C showed an EF of 55% with moderate concentric LVH, mild MR and severe LAE.  She remained in sinus rhythm only  A short time according to her .  She was admitted again 5/2020 with AF with RVR and her meds were adjusted and discharged.  Her  states that her pulse rate is usually in the 120 range at home.  Her symptoms include fatigue, dyspnea and some ankle edema.  She also has palpitations on a daily basis.     Her cardiac evaluation has included a cath in 4/2020 which showed nonobstructive disease, EF 50% and PA pressure of 46/20mmHg.    Review of Systems   Constitutional: Positive for fatigue.   Respiratory: Positive for shortness of breath.    Cardiovascular: Positive for palpitations. Negative for chest pain.   All other systems reviewed and are negative.         Past Medical History:   Diagnosis Date   • Acute deep vein thrombosis of lower extremity (CMS/HCC)    • Antiphospholipid antibody syndrome (CMS/HCC)    • Antiphospholipid antibody syndrome (CMS/HCC)    • Arrhythmia     ATRIAL FIBRILLATION   • Arthritis     OSTEO   • Benign essential hypertension    • COPD (chronic  obstructive pulmonary disease) (CMS/HCC)    • Coronary artery disease    • Crohn's disease (CMS/HCC)    • Cutaneous candidiasis    • Depression    • Disease of thyroid gland    • Elevated cholesterol    • GERD (gastroesophageal reflux disease)    • History of echocardiogram 04/22/2020    mild-to-moderate concentric hypertrophy, EF 56 - 60%. LA severely dilated, Mild MAC, Mild MR with restrictive movement of the posterior leaflet   • Hyperlipidemia    • Hypertension    • Myocardial infarction (CMS/HCC)    • Osteopenia    • Polyp, uterus corpus    • Pulmonary hypertension (CMS/HCC)        Objective      Vital Signs       Physical Exam   Constitutional: She is oriented to person, place, and time. She appears well-developed and well-nourished.   HENT:   Head: Normocephalic and atraumatic.   Eyes: Pupils are equal, round, and reactive to light. EOM are normal.   Neck: Normal range of motion. Neck supple.   Cardiovascular:   Irregularly irregular   Pulmonary/Chest: Effort normal and breath sounds normal.   Abdominal: Soft. Bowel sounds are normal.   Musculoskeletal: Normal range of motion.   Neurological: She is alert and oriented to person, place, and time.   Skin: Skin is warm and dry.   Psychiatric: She has a normal mood and affect.       Results Review:    I reviewed the patient's new clinical results.      Assessment/Plan       * No active hospital problems. *      Assessment & Plan   1. Persistent Afib - rates are uncontrolled, frequently in the 120 range despite B blockers, diltiazem and amiodarone. She is quite symptomatic with palpitations, dyspnea, fatigue and some heart failure.  Amiodarone has not been effective in maintaining sinus rhythm.  Her echo shows severe LA enlargement.  We discussed option of ablation with pulmonary vein isolation and substrate modification to try to restore sinus rhythm although the success rate for this would be relatively low.  I have recommended that she have an AV node ablation  with implantation of a pacemaker.  We discussed the indications, risks and benefits of this with her and her  and they would like to proceed.        I discussed the patients findings and my recommendations with patient    Chong Buchanan MD  07/24/20  05:11

## 2020-07-27 ENCOUNTER — LAB (OUTPATIENT)
Dept: LAB | Facility: HOSPITAL | Age: 75
End: 2020-07-27

## 2020-07-27 DIAGNOSIS — I48.0 PAF (PAROXYSMAL ATRIAL FIBRILLATION) (HCC): ICD-10-CM

## 2020-07-27 DIAGNOSIS — Z01.818 OTHER SPECIFIED PRE-OPERATIVE EXAMINATION: ICD-10-CM

## 2020-07-27 LAB
ALBUMIN SERPL-MCNC: 3.6 G/DL (ref 3.5–5.2)
ALBUMIN/GLOB SERPL: 1.4 G/DL
ALP SERPL-CCNC: 71 U/L (ref 39–117)
ALT SERPL W P-5'-P-CCNC: 32 U/L (ref 1–33)
ANION GAP SERPL CALCULATED.3IONS-SCNC: 11.7 MMOL/L (ref 5–15)
AST SERPL-CCNC: 18 U/L (ref 1–32)
BILIRUB SERPL-MCNC: 0.6 MG/DL (ref 0–1.2)
BUN SERPL-MCNC: 48 MG/DL (ref 8–23)
BUN/CREAT SERPL: 41 (ref 7–25)
CALCIUM SPEC-SCNC: 9.1 MG/DL (ref 8.6–10.5)
CHLORIDE SERPL-SCNC: 98 MMOL/L (ref 98–107)
CO2 SERPL-SCNC: 27.3 MMOL/L (ref 22–29)
CREAT SERPL-MCNC: 1.17 MG/DL (ref 0.57–1)
DEPRECATED RDW RBC AUTO: 59.2 FL (ref 37–54)
ERYTHROCYTE [DISTWIDTH] IN BLOOD BY AUTOMATED COUNT: 21.1 % (ref 12.3–15.4)
GFR SERPL CREATININE-BSD FRML MDRD: 45 ML/MIN/1.73
GLOBULIN UR ELPH-MCNC: 2.5 GM/DL
GLUCOSE SERPL-MCNC: 140 MG/DL (ref 65–99)
HCT VFR BLD AUTO: 38.3 % (ref 34–46.6)
HGB BLD-MCNC: 12.3 G/DL (ref 12–15.9)
INR PPP: 1.71 (ref 0.9–1.1)
LYMPHOCYTES # BLD MANUAL: 1.05 10*3/MM3 (ref 0.7–3.1)
LYMPHOCYTES NFR BLD MANUAL: 3 % (ref 5–12)
LYMPHOCYTES NFR BLD MANUAL: 5 % (ref 19.6–45.3)
MCH RBC QN AUTO: 26.7 PG (ref 26.6–33)
MCHC RBC AUTO-ENTMCNC: 32.1 G/DL (ref 31.5–35.7)
MCV RBC AUTO: 83.3 FL (ref 79–97)
MONOCYTES # BLD AUTO: 0.63 10*3/MM3 (ref 0.1–0.9)
NEUTROPHILS # BLD AUTO: 19.39 10*3/MM3 (ref 1.7–7)
NEUTROPHILS NFR BLD MANUAL: 92 % (ref 42.7–76)
NRBC SPEC MANUAL: 1 /100 WBC (ref 0–0.2)
PLAT MORPH BLD: NORMAL
PLATELET # BLD AUTO: 477 10*3/MM3 (ref 140–450)
PMV BLD AUTO: 9.8 FL (ref 6–12)
POTASSIUM SERPL-SCNC: 3.7 MMOL/L (ref 3.5–5.2)
PROT SERPL-MCNC: 6.1 G/DL (ref 6–8.5)
PROTHROMBIN TIME: 19.7 SECONDS (ref 11.7–14.2)
RBC # BLD AUTO: 4.6 10*6/MM3 (ref 3.77–5.28)
RBC MORPH BLD: NORMAL
SODIUM SERPL-SCNC: 137 MMOL/L (ref 136–145)
WBC # BLD AUTO: 21.08 10*3/MM3 (ref 3.4–10.8)
WBC MORPH BLD: NORMAL

## 2020-07-27 PROCEDURE — 80053 COMPREHEN METABOLIC PANEL: CPT

## 2020-07-27 PROCEDURE — 85025 COMPLETE CBC W/AUTO DIFF WBC: CPT

## 2020-07-27 PROCEDURE — U0004 COV-19 TEST NON-CDC HGH THRU: HCPCS

## 2020-07-27 PROCEDURE — C9803 HOPD COVID-19 SPEC COLLECT: HCPCS

## 2020-07-27 PROCEDURE — 85610 PROTHROMBIN TIME: CPT

## 2020-07-27 PROCEDURE — 36415 COLL VENOUS BLD VENIPUNCTURE: CPT

## 2020-07-27 PROCEDURE — 85007 BL SMEAR W/DIFF WBC COUNT: CPT

## 2020-07-27 RX ORDER — LEVOTHYROXINE SODIUM 0.1 MG/1
100 TABLET ORAL DAILY
Qty: 30 TABLET | Refills: 1 | Status: SHIPPED | OUTPATIENT
Start: 2020-07-27 | End: 2020-11-09

## 2020-07-28 LAB
REF LAB TEST METHOD: NORMAL
SARS-COV-2 RNA RESP QL NAA+PROBE: NOT DETECTED

## 2020-07-29 ENCOUNTER — APPOINTMENT (OUTPATIENT)
Dept: GENERAL RADIOLOGY | Facility: HOSPITAL | Age: 75
End: 2020-07-29

## 2020-07-29 ENCOUNTER — HOSPITAL ENCOUNTER (INPATIENT)
Facility: HOSPITAL | Age: 75
LOS: 1 days | Discharge: HOME HEALTH | End: 2020-07-31
Attending: INTERNAL MEDICINE | Admitting: INTERNAL MEDICINE

## 2020-07-29 ENCOUNTER — TRANSCRIBE ORDERS (OUTPATIENT)
Dept: ADMINISTRATIVE | Facility: HOSPITAL | Age: 75
End: 2020-07-29

## 2020-07-29 DIAGNOSIS — R91.8 PULMONARY INFILTRATE: Primary | ICD-10-CM

## 2020-07-29 DIAGNOSIS — I48.0 PAROXYSMAL ATRIAL FIBRILLATION (HCC): ICD-10-CM

## 2020-07-29 DIAGNOSIS — J18.9 PNEUMONIA DUE TO INFECTIOUS ORGANISM, UNSPECIFIED LATERALITY, UNSPECIFIED PART OF LUNG: ICD-10-CM

## 2020-07-29 LAB
INR PPP: 1.3 (ref 0.8–1.2)
INR PPP: 1.3 (ref 0.9–1.1)
INR PPP: 1.49 (ref 0.9–1.1)
PROTHROMBIN TIME: 15 SECONDS
PROTHROMBIN TIME: 15 SECONDS (ref 12.8–15.2)
PROTHROMBIN TIME: 17.7 SECONDS (ref 11.7–14.2)

## 2020-07-29 PROCEDURE — 33207 INSERT HEART PM VENTRICULAR: CPT | Performed by: INTERNAL MEDICINE

## 2020-07-29 PROCEDURE — C1894 INTRO/SHEATH, NON-LASER: HCPCS | Performed by: INTERNAL MEDICINE

## 2020-07-29 PROCEDURE — G0378 HOSPITAL OBSERVATION PER HR: HCPCS

## 2020-07-29 PROCEDURE — C1898 LEAD, PMKR, OTHER THAN TRANS: HCPCS | Performed by: INTERNAL MEDICINE

## 2020-07-29 PROCEDURE — 99152 MOD SED SAME PHYS/QHP 5/>YRS: CPT | Performed by: INTERNAL MEDICINE

## 2020-07-29 PROCEDURE — 25010000003 CEFAZOLIN 1-4 GM/50ML-% SOLUTION: Performed by: INTERNAL MEDICINE

## 2020-07-29 PROCEDURE — C1733 CATH, EP, OTHR THAN COOL-TIP: HCPCS | Performed by: INTERNAL MEDICINE

## 2020-07-29 PROCEDURE — 02583ZZ DESTRUCTION OF CONDUCTION MECHANISM, PERCUTANEOUS APPROACH: ICD-10-PCS | Performed by: INTERNAL MEDICINE

## 2020-07-29 PROCEDURE — 25010000002 MIDAZOLAM PER 1 MG: Performed by: INTERNAL MEDICINE

## 2020-07-29 PROCEDURE — 25010000003 LIDOCAINE 1 % SOLUTION: Performed by: INTERNAL MEDICINE

## 2020-07-29 PROCEDURE — 93650 ICAR CATH ABLTJ AV NODE FUNC: CPT | Performed by: INTERNAL MEDICINE

## 2020-07-29 PROCEDURE — 0JH604Z INSERTION OF PACEMAKER, SINGLE CHAMBER INTO CHEST SUBCUTANEOUS TISSUE AND FASCIA, OPEN APPROACH: ICD-10-PCS | Performed by: INTERNAL MEDICINE

## 2020-07-29 PROCEDURE — 99153 MOD SED SAME PHYS/QHP EA: CPT | Performed by: INTERNAL MEDICINE

## 2020-07-29 PROCEDURE — C1786 PMKR, SINGLE, RATE-RESP: HCPCS | Performed by: INTERNAL MEDICINE

## 2020-07-29 PROCEDURE — 93005 ELECTROCARDIOGRAM TRACING: CPT | Performed by: INTERNAL MEDICINE

## 2020-07-29 PROCEDURE — 02HK3JZ INSERTION OF PACEMAKER LEAD INTO RIGHT VENTRICLE, PERCUTANEOUS APPROACH: ICD-10-PCS | Performed by: INTERNAL MEDICINE

## 2020-07-29 PROCEDURE — 93010 ELECTROCARDIOGRAM REPORT: CPT | Performed by: INTERNAL MEDICINE

## 2020-07-29 PROCEDURE — 85610 PROTHROMBIN TIME: CPT

## 2020-07-29 PROCEDURE — 25010000002 FENTANYL CITRATE (PF) 100 MCG/2ML SOLUTION: Performed by: INTERNAL MEDICINE

## 2020-07-29 PROCEDURE — 85610 PROTHROMBIN TIME: CPT | Performed by: INTERNAL MEDICINE

## 2020-07-29 PROCEDURE — 25010000003 CEFAZOLIN IN DEXTROSE 2-4 GM/100ML-% SOLUTION: Performed by: INTERNAL MEDICINE

## 2020-07-29 PROCEDURE — 71045 X-RAY EXAM CHEST 1 VIEW: CPT

## 2020-07-29 DEVICE — LD PM TENDRIL STS 6F52CM 2088TC52: Type: IMPLANTABLE DEVICE | Status: FUNCTIONAL

## 2020-07-29 DEVICE — GEN PM ASSURITY SR RF PM1272: Type: IMPLANTABLE DEVICE | Status: FUNCTIONAL

## 2020-07-29 DEVICE — FLOSEAL HEMOSTATIC MATRIX, 10ML
Type: IMPLANTABLE DEVICE | Status: FUNCTIONAL
Brand: FLOSEAL HEMOSTATIC MATRIX

## 2020-07-29 RX ORDER — PANTOPRAZOLE SODIUM 40 MG/1
40 TABLET, DELAYED RELEASE ORAL DAILY
Status: DISCONTINUED | OUTPATIENT
Start: 2020-07-29 | End: 2020-07-31 | Stop reason: HOSPADM

## 2020-07-29 RX ORDER — SODIUM CHLORIDE 0.9 % (FLUSH) 0.9 %
10 SYRINGE (ML) INJECTION AS NEEDED
Status: CANCELLED | OUTPATIENT
Start: 2020-07-29

## 2020-07-29 RX ORDER — ATORVASTATIN CALCIUM 20 MG/1
10 TABLET, FILM COATED ORAL DAILY
Status: DISCONTINUED | OUTPATIENT
Start: 2020-07-29 | End: 2020-07-31 | Stop reason: HOSPADM

## 2020-07-29 RX ORDER — SODIUM CHLORIDE 0.9 % (FLUSH) 0.9 %
3 SYRINGE (ML) INJECTION EVERY 12 HOURS SCHEDULED
Status: DISCONTINUED | OUTPATIENT
Start: 2020-07-29 | End: 2020-07-31 | Stop reason: HOSPADM

## 2020-07-29 RX ORDER — MIDAZOLAM HYDROCHLORIDE 1 MG/ML
INJECTION INTRAMUSCULAR; INTRAVENOUS AS NEEDED
Status: DISCONTINUED | OUTPATIENT
Start: 2020-07-29 | End: 2020-07-29 | Stop reason: HOSPADM

## 2020-07-29 RX ORDER — CHLORTHALIDONE 25 MG/1
25 TABLET ORAL DAILY
Status: DISCONTINUED | OUTPATIENT
Start: 2020-07-29 | End: 2020-07-31 | Stop reason: HOSPADM

## 2020-07-29 RX ORDER — LEVOTHYROXINE SODIUM 0.1 MG/1
100 TABLET ORAL DAILY
Status: DISCONTINUED | OUTPATIENT
Start: 2020-07-29 | End: 2020-07-31 | Stop reason: HOSPADM

## 2020-07-29 RX ORDER — WARFARIN SODIUM 2 MG/1
2 TABLET ORAL
Status: DISCONTINUED | OUTPATIENT
Start: 2020-07-29 | End: 2020-07-31 | Stop reason: HOSPADM

## 2020-07-29 RX ORDER — FENTANYL CITRATE 50 UG/ML
INJECTION, SOLUTION INTRAMUSCULAR; INTRAVENOUS AS NEEDED
Status: DISCONTINUED | OUTPATIENT
Start: 2020-07-29 | End: 2020-07-29 | Stop reason: HOSPADM

## 2020-07-29 RX ORDER — FEXOFENADINE HCL 180 MG/1
180 TABLET ORAL DAILY
COMMUNITY

## 2020-07-29 RX ORDER — SODIUM CHLORIDE 0.9 % (FLUSH) 0.9 %
3 SYRINGE (ML) INJECTION EVERY 12 HOURS SCHEDULED
Status: DISCONTINUED | OUTPATIENT
Start: 2020-07-29 | End: 2020-07-29 | Stop reason: HOSPADM

## 2020-07-29 RX ORDER — CEFAZOLIN SODIUM 2 G/100ML
2 INJECTION, SOLUTION INTRAVENOUS ONCE
Status: CANCELLED | OUTPATIENT
Start: 2020-07-29

## 2020-07-29 RX ORDER — SODIUM CHLORIDE 9 MG/ML
75 INJECTION, SOLUTION INTRAVENOUS CONTINUOUS
Status: DISCONTINUED | OUTPATIENT
Start: 2020-07-29 | End: 2020-07-31 | Stop reason: HOSPADM

## 2020-07-29 RX ORDER — SODIUM CHLORIDE 0.9 % (FLUSH) 0.9 %
3 SYRINGE (ML) INJECTION EVERY 12 HOURS SCHEDULED
Status: CANCELLED | OUTPATIENT
Start: 2020-07-29

## 2020-07-29 RX ORDER — LIDOCAINE HYDROCHLORIDE 10 MG/ML
INJECTION, SOLUTION INFILTRATION; PERINEURAL AS NEEDED
Status: DISCONTINUED | OUTPATIENT
Start: 2020-07-29 | End: 2020-07-29 | Stop reason: HOSPADM

## 2020-07-29 RX ORDER — ONDANSETRON 2 MG/ML
4 INJECTION INTRAMUSCULAR; INTRAVENOUS EVERY 6 HOURS PRN
Status: DISCONTINUED | OUTPATIENT
Start: 2020-07-29 | End: 2020-07-31 | Stop reason: HOSPADM

## 2020-07-29 RX ORDER — CEFAZOLIN SODIUM 1 G/50ML
INJECTION, SOLUTION INTRAVENOUS CONTINUOUS PRN
Status: COMPLETED | OUTPATIENT
Start: 2020-07-29 | End: 2020-07-29

## 2020-07-29 RX ORDER — BUPROPION HYDROCHLORIDE 150 MG/1
150 TABLET ORAL DAILY
Status: DISCONTINUED | OUTPATIENT
Start: 2020-07-29 | End: 2020-07-31 | Stop reason: HOSPADM

## 2020-07-29 RX ORDER — SODIUM PHOSPHATE,MONO-DIBASIC 19G-7G/118
ENEMA (ML) RECTAL DAILY
COMMUNITY
End: 2021-08-12

## 2020-07-29 RX ORDER — SODIUM CHLORIDE 0.9 % (FLUSH) 0.9 %
10 SYRINGE (ML) INJECTION AS NEEDED
Status: DISCONTINUED | OUTPATIENT
Start: 2020-07-29 | End: 2020-07-29 | Stop reason: HOSPADM

## 2020-07-29 RX ORDER — CEFAZOLIN SODIUM 2 G/100ML
2 INJECTION, SOLUTION INTRAVENOUS ONCE
Status: DISCONTINUED | OUTPATIENT
Start: 2020-07-29 | End: 2020-07-29 | Stop reason: HOSPADM

## 2020-07-29 RX ORDER — CEFAZOLIN SODIUM 2 G/100ML
2 INJECTION, SOLUTION INTRAVENOUS EVERY 8 HOURS
Status: COMPLETED | OUTPATIENT
Start: 2020-07-29 | End: 2020-07-30

## 2020-07-29 RX ORDER — SODIUM CHLORIDE 0.9 % (FLUSH) 0.9 %
10 SYRINGE (ML) INJECTION AS NEEDED
Status: DISCONTINUED | OUTPATIENT
Start: 2020-07-29 | End: 2020-07-31 | Stop reason: HOSPADM

## 2020-07-29 RX ORDER — LANOLIN ALCOHOL/MO/W.PET/CERES
400 CREAM (GRAM) TOPICAL DAILY
COMMUNITY

## 2020-07-29 RX ORDER — HYDROCODONE BITARTRATE AND ACETAMINOPHEN 5; 325 MG/1; MG/1
1 TABLET ORAL EVERY 6 HOURS PRN
Status: DISCONTINUED | OUTPATIENT
Start: 2020-07-29 | End: 2020-07-31 | Stop reason: HOSPADM

## 2020-07-29 RX ORDER — ASCORBIC ACID 500 MG
500 TABLET ORAL DAILY
COMMUNITY
End: 2022-03-15 | Stop reason: ALTCHOICE

## 2020-07-29 RX ADMIN — PANTOPRAZOLE SODIUM 40 MG: 40 TABLET, DELAYED RELEASE ORAL at 21:55

## 2020-07-29 RX ADMIN — CHLORTHALIDONE 25 MG: 25 TABLET ORAL at 23:14

## 2020-07-29 RX ADMIN — BUPROPION HYDROCHLORIDE 150 MG: 150 TABLET, FILM COATED, EXTENDED RELEASE ORAL at 23:15

## 2020-07-29 RX ADMIN — ATORVASTATIN CALCIUM 10 MG: 20 TABLET, FILM COATED ORAL at 21:56

## 2020-07-29 RX ADMIN — MESALAMINE 1000 MG: 250 CAPSULE ORAL at 23:14

## 2020-07-29 RX ADMIN — CEFAZOLIN SODIUM 2 G: 2 INJECTION, SOLUTION INTRAVENOUS at 21:55

## 2020-07-29 RX ADMIN — WARFARIN 2 MG: 2 TABLET ORAL at 23:14

## 2020-07-29 RX ADMIN — SODIUM CHLORIDE 75 ML/HR: 9 INJECTION, SOLUTION INTRAVENOUS at 14:30

## 2020-07-29 RX ADMIN — SODIUM CHLORIDE, PRESERVATIVE FREE 3 ML: 5 INJECTION INTRAVENOUS at 22:35

## 2020-07-30 LAB
ANION GAP SERPL CALCULATED.3IONS-SCNC: 13.4 MMOL/L (ref 5–15)
BUN SERPL-MCNC: 50 MG/DL (ref 8–23)
BUN/CREAT SERPL: 40 (ref 7–25)
CALCIUM SPEC-SCNC: 8.8 MG/DL (ref 8.6–10.5)
CHLORIDE SERPL-SCNC: 97 MMOL/L (ref 98–107)
CO2 SERPL-SCNC: 26.6 MMOL/L (ref 22–29)
CREAT SERPL-MCNC: 1.25 MG/DL (ref 0.57–1)
DEPRECATED RDW RBC AUTO: 57.7 FL (ref 37–54)
ERYTHROCYTE [DISTWIDTH] IN BLOOD BY AUTOMATED COUNT: 19.8 % (ref 12.3–15.4)
GFR SERPL CREATININE-BSD FRML MDRD: 42 ML/MIN/1.73
GLUCOSE SERPL-MCNC: 125 MG/DL (ref 65–99)
HCT VFR BLD AUTO: 36.5 % (ref 34–46.6)
HGB BLD-MCNC: 11.6 G/DL (ref 12–15.9)
INR PPP: 1.41 (ref 0.9–1.1)
MCH RBC QN AUTO: 26 PG (ref 26.6–33)
MCHC RBC AUTO-ENTMCNC: 31.8 G/DL (ref 31.5–35.7)
MCV RBC AUTO: 81.8 FL (ref 79–97)
PLATELET # BLD AUTO: 308 10*3/MM3 (ref 140–450)
PMV BLD AUTO: 9.5 FL (ref 6–12)
POTASSIUM SERPL-SCNC: 2.9 MMOL/L (ref 3.5–5.2)
PROTHROMBIN TIME: 17 SECONDS (ref 11.7–14.2)
RBC # BLD AUTO: 4.46 10*6/MM3 (ref 3.77–5.28)
SODIUM SERPL-SCNC: 137 MMOL/L (ref 136–145)
WBC # BLD AUTO: 19.93 10*3/MM3 (ref 3.4–10.8)

## 2020-07-30 PROCEDURE — 25010000002 FENTANYL CITRATE (PF) 100 MCG/2ML SOLUTION: Performed by: INTERNAL MEDICINE

## 2020-07-30 PROCEDURE — C1894 INTRO/SHEATH, NON-LASER: HCPCS | Performed by: INTERNAL MEDICINE

## 2020-07-30 PROCEDURE — 99152 MOD SED SAME PHYS/QHP 5/>YRS: CPT | Performed by: INTERNAL MEDICINE

## 2020-07-30 PROCEDURE — 93650 ICAR CATH ABLTJ AV NODE FUNC: CPT | Performed by: INTERNAL MEDICINE

## 2020-07-30 PROCEDURE — C1769 GUIDE WIRE: HCPCS | Performed by: INTERNAL MEDICINE

## 2020-07-30 PROCEDURE — 99226 PR SBSQ OBSERVATION CARE/DAY 35 MINUTES: CPT | Performed by: INTERNAL MEDICINE

## 2020-07-30 PROCEDURE — 25010000002 MIDAZOLAM PER 1 MG: Performed by: INTERNAL MEDICINE

## 2020-07-30 PROCEDURE — 85610 PROTHROMBIN TIME: CPT | Performed by: INTERNAL MEDICINE

## 2020-07-30 PROCEDURE — 25010000003 CEFAZOLIN IN DEXTROSE 2-4 GM/100ML-% SOLUTION: Performed by: INTERNAL MEDICINE

## 2020-07-30 PROCEDURE — 93005 ELECTROCARDIOGRAM TRACING: CPT | Performed by: INTERNAL MEDICINE

## 2020-07-30 PROCEDURE — C1733 CATH, EP, OTHR THAN COOL-TIP: HCPCS | Performed by: INTERNAL MEDICINE

## 2020-07-30 PROCEDURE — 63710000001 PREDNISONE PER 1 MG: Performed by: INTERNAL MEDICINE

## 2020-07-30 PROCEDURE — 02583ZZ DESTRUCTION OF CONDUCTION MECHANISM, PERCUTANEOUS APPROACH: ICD-10-PCS | Performed by: INTERNAL MEDICINE

## 2020-07-30 PROCEDURE — 63710000001 PREDNISONE PER 5 MG: Performed by: INTERNAL MEDICINE

## 2020-07-30 PROCEDURE — 25010000003 LIDOCAINE 1 % SOLUTION: Performed by: INTERNAL MEDICINE

## 2020-07-30 PROCEDURE — 93286 PERI-PX EVAL PM/LDLS PM IP: CPT | Performed by: INTERNAL MEDICINE

## 2020-07-30 PROCEDURE — 0 IOPAMIDOL PER 1 ML: Performed by: INTERNAL MEDICINE

## 2020-07-30 PROCEDURE — 80048 BASIC METABOLIC PNL TOTAL CA: CPT | Performed by: INTERNAL MEDICINE

## 2020-07-30 PROCEDURE — 85027 COMPLETE CBC AUTOMATED: CPT | Performed by: INTERNAL MEDICINE

## 2020-07-30 PROCEDURE — 93010 ELECTROCARDIOGRAM REPORT: CPT | Performed by: INTERNAL MEDICINE

## 2020-07-30 RX ORDER — SODIUM CHLORIDE 9 MG/ML
INJECTION, SOLUTION INTRAVENOUS CONTINUOUS PRN
Status: COMPLETED | OUTPATIENT
Start: 2020-07-30 | End: 2020-07-30

## 2020-07-30 RX ORDER — POTASSIUM CHLORIDE 750 MG/1
40 CAPSULE, EXTENDED RELEASE ORAL AS NEEDED
Status: DISCONTINUED | OUTPATIENT
Start: 2020-07-30 | End: 2020-07-31 | Stop reason: HOSPADM

## 2020-07-30 RX ORDER — POTASSIUM CHLORIDE 1.5 G/1.77G
40 POWDER, FOR SOLUTION ORAL AS NEEDED
Status: DISCONTINUED | OUTPATIENT
Start: 2020-07-30 | End: 2020-07-31 | Stop reason: HOSPADM

## 2020-07-30 RX ORDER — LIDOCAINE HYDROCHLORIDE 10 MG/ML
INJECTION, SOLUTION INFILTRATION; PERINEURAL AS NEEDED
Status: DISCONTINUED | OUTPATIENT
Start: 2020-07-30 | End: 2020-07-30 | Stop reason: HOSPADM

## 2020-07-30 RX ORDER — FENTANYL CITRATE 50 UG/ML
INJECTION, SOLUTION INTRAMUSCULAR; INTRAVENOUS AS NEEDED
Status: DISCONTINUED | OUTPATIENT
Start: 2020-07-30 | End: 2020-07-30 | Stop reason: HOSPADM

## 2020-07-30 RX ORDER — MIDAZOLAM HYDROCHLORIDE 1 MG/ML
INJECTION INTRAMUSCULAR; INTRAVENOUS AS NEEDED
Status: DISCONTINUED | OUTPATIENT
Start: 2020-07-30 | End: 2020-07-30 | Stop reason: HOSPADM

## 2020-07-30 RX ORDER — POTASSIUM CHLORIDE 7.45 MG/ML
10 INJECTION INTRAVENOUS
Status: DISCONTINUED | OUTPATIENT
Start: 2020-07-30 | End: 2020-07-31 | Stop reason: HOSPADM

## 2020-07-30 RX ADMIN — LEVOTHYROXINE SODIUM 100 MCG: 100 TABLET ORAL at 08:48

## 2020-07-30 RX ADMIN — POTASSIUM CHLORIDE 40 MEQ: 750 CAPSULE, EXTENDED RELEASE ORAL at 07:49

## 2020-07-30 RX ADMIN — MESALAMINE 1000 MG: 250 CAPSULE ORAL at 20:43

## 2020-07-30 RX ADMIN — ATORVASTATIN CALCIUM 10 MG: 20 TABLET, FILM COATED ORAL at 20:43

## 2020-07-30 RX ADMIN — SODIUM CHLORIDE, PRESERVATIVE FREE 3 ML: 5 INJECTION INTRAVENOUS at 20:43

## 2020-07-30 RX ADMIN — BUPROPION HYDROCHLORIDE 150 MG: 150 TABLET, FILM COATED, EXTENDED RELEASE ORAL at 20:43

## 2020-07-30 RX ADMIN — PANTOPRAZOLE SODIUM 40 MG: 40 TABLET, DELAYED RELEASE ORAL at 08:48

## 2020-07-30 RX ADMIN — POTASSIUM CHLORIDE 40 MEQ: 750 CAPSULE, EXTENDED RELEASE ORAL at 15:04

## 2020-07-30 RX ADMIN — CEFAZOLIN SODIUM 2 G: 2 INJECTION, SOLUTION INTRAVENOUS at 06:55

## 2020-07-30 RX ADMIN — SODIUM CHLORIDE, PRESERVATIVE FREE 3 ML: 5 INJECTION INTRAVENOUS at 08:51

## 2020-07-30 RX ADMIN — WARFARIN 2 MG: 2 TABLET ORAL at 20:43

## 2020-07-30 RX ADMIN — PREDNISONE 30 MG: 20 TABLET ORAL at 08:48

## 2020-07-30 RX ADMIN — MESALAMINE 1000 MG: 250 CAPSULE ORAL at 11:00

## 2020-07-30 RX ADMIN — CHLORTHALIDONE 25 MG: 25 TABLET ORAL at 08:50

## 2020-07-30 RX ADMIN — MESALAMINE 1000 MG: 250 CAPSULE ORAL at 08:48

## 2020-07-30 RX ADMIN — POTASSIUM CHLORIDE 40 MEQ: 750 CAPSULE, EXTENDED RELEASE ORAL at 10:58

## 2020-07-31 ENCOUNTER — READMISSION MANAGEMENT (OUTPATIENT)
Dept: CALL CENTER | Facility: HOSPITAL | Age: 75
End: 2020-07-31

## 2020-07-31 VITALS
HEART RATE: 80 BPM | BODY MASS INDEX: 22.66 KG/M2 | OXYGEN SATURATION: 94 % | RESPIRATION RATE: 17 BRPM | HEIGHT: 67 IN | TEMPERATURE: 98 F | WEIGHT: 144.4 LBS | DIASTOLIC BLOOD PRESSURE: 73 MMHG | SYSTOLIC BLOOD PRESSURE: 129 MMHG

## 2020-07-31 LAB
ANION GAP SERPL CALCULATED.3IONS-SCNC: 12.1 MMOL/L (ref 5–15)
BUN SERPL-MCNC: 41 MG/DL (ref 8–23)
BUN/CREAT SERPL: 36.6 (ref 7–25)
CALCIUM SPEC-SCNC: 8.7 MG/DL (ref 8.6–10.5)
CHLORIDE SERPL-SCNC: 103 MMOL/L (ref 98–107)
CO2 SERPL-SCNC: 25.9 MMOL/L (ref 22–29)
CREAT SERPL-MCNC: 1.12 MG/DL (ref 0.57–1)
GFR SERPL CREATININE-BSD FRML MDRD: 47 ML/MIN/1.73
GLUCOSE SERPL-MCNC: 129 MG/DL (ref 65–99)
INR PPP: 1.53 (ref 0.9–1.1)
POTASSIUM SERPL-SCNC: 4.4 MMOL/L (ref 3.5–5.2)
PROTHROMBIN TIME: 18.1 SECONDS (ref 11.7–14.2)
SODIUM SERPL-SCNC: 141 MMOL/L (ref 136–145)

## 2020-07-31 PROCEDURE — 93005 ELECTROCARDIOGRAM TRACING: CPT | Performed by: INTERNAL MEDICINE

## 2020-07-31 PROCEDURE — 63710000001 PREDNISONE PER 1 MG: Performed by: INTERNAL MEDICINE

## 2020-07-31 PROCEDURE — 63710000001 PREDNISONE PER 5 MG: Performed by: INTERNAL MEDICINE

## 2020-07-31 PROCEDURE — 85610 PROTHROMBIN TIME: CPT | Performed by: INTERNAL MEDICINE

## 2020-07-31 PROCEDURE — 99024 POSTOP FOLLOW-UP VISIT: CPT | Performed by: INTERNAL MEDICINE

## 2020-07-31 PROCEDURE — 80048 BASIC METABOLIC PNL TOTAL CA: CPT | Performed by: INTERNAL MEDICINE

## 2020-07-31 PROCEDURE — 93010 ELECTROCARDIOGRAM REPORT: CPT | Performed by: INTERNAL MEDICINE

## 2020-07-31 RX ORDER — CEPHALEXIN 500 MG/1
500 CAPSULE ORAL 3 TIMES DAILY
Qty: 9 CAPSULE | Refills: 0 | Status: SHIPPED | OUTPATIENT
Start: 2020-07-31 | End: 2020-08-03

## 2020-07-31 RX ADMIN — ATORVASTATIN CALCIUM 10 MG: 20 TABLET, FILM COATED ORAL at 08:35

## 2020-07-31 RX ADMIN — SODIUM CHLORIDE, PRESERVATIVE FREE 3 ML: 5 INJECTION INTRAVENOUS at 08:35

## 2020-07-31 RX ADMIN — MESALAMINE 1000 MG: 250 CAPSULE ORAL at 08:35

## 2020-07-31 RX ADMIN — PANTOPRAZOLE SODIUM 40 MG: 40 TABLET, DELAYED RELEASE ORAL at 08:35

## 2020-07-31 RX ADMIN — BUPROPION HYDROCHLORIDE 150 MG: 150 TABLET, FILM COATED, EXTENDED RELEASE ORAL at 08:35

## 2020-07-31 RX ADMIN — CHLORTHALIDONE 25 MG: 25 TABLET ORAL at 08:35

## 2020-07-31 RX ADMIN — PREDNISONE 30 MG: 20 TABLET ORAL at 08:35

## 2020-07-31 RX ADMIN — LEVOTHYROXINE SODIUM 100 MCG: 100 TABLET ORAL at 08:35

## 2020-07-31 NOTE — OUTREACH NOTE
Prep Survey      Responses   [facility] facility patient discharged from?  [place]   Is LACE score < 7 ?  No   Eligibility  [facility]   Date of Admission  07/29/20   Date of Discharge  07/31/20   Discharge Disposition  Home or Self Care   Discharge diagnosis  Persistent A-fib, AV node ablation & Pacemaker placed   COVID-19 Test Status  Negative   Does the patient have one of the following disease processes/diagnoses(primary or secondary)?  General Surgery   Does the patient have Home health ordered?  Yes   What is the Home health agency?   [facility]   Is there a DME ordered?  No   Prep survey completed?  Yes          Tiffany Montez RN

## 2020-08-03 ENCOUNTER — TELEPHONE (OUTPATIENT)
Dept: CARDIOLOGY | Facility: CLINIC | Age: 75
End: 2020-08-03

## 2020-08-03 ENCOUNTER — TELEPHONE (OUTPATIENT)
Dept: INTERNAL MEDICINE | Facility: CLINIC | Age: 75
End: 2020-08-03

## 2020-08-03 ENCOUNTER — TRANSITIONAL CARE MANAGEMENT TELEPHONE ENCOUNTER (OUTPATIENT)
Dept: CALL CENTER | Facility: HOSPITAL | Age: 75
End: 2020-08-03

## 2020-08-03 RX ORDER — ATORVASTATIN CALCIUM 10 MG/1
10 TABLET, FILM COATED ORAL DAILY
Qty: 30 TABLET | Refills: 5 | Status: SHIPPED | OUTPATIENT
Start: 2020-08-03 | End: 2021-03-22

## 2020-08-03 NOTE — TELEPHONE ENCOUNTER
Dr Chanel Childers with [facility] is calling regarding Mrs Joellen Dominguez Medication. Patient discharge paperwork does not states if patient is supposed to take the following medication. Patient has not taken    Dilacor 120 mg BID  Amiodarone 200 mg Daily  Atenalol 50 mg Daily      Patient will also need a medication called in, Liseth does not know where to send the Rx to.  Atorvastatin 10 mg  30 day supply    Patient currently has 40 mg tablets but has been taking 1/2 tablet every other day til new rx comes in.    Please call Liseth @ [facility]  [phone]

## 2020-08-03 NOTE — TELEPHONE ENCOUNTER
Liseth with [facility] calling about patients warfarin (COUMADIN) 2 MG tablet. She came home from hospital after having pace maker placed on 7/31 and they need an order on when they want it checked again.    Liseth can be reached at [phone].

## 2020-08-03 NOTE — OUTREACH NOTE
Call Center TCM Note      Responses   [facility] [place] operated by [facility] patient discharged from?  [place]   Does the patient have one of the following disease processes/diagnoses(primary or secondary)?  General Surgery   TCM attempt successful?  Yes   Call start time  1534   Call end time  1535   Discharge diagnosis  Persistent A-fib, AV node ablation & Pacemaker placed   Meds reviewed with patient/caregiver?  Yes   Is the patient having any side effects they believe may be caused by any medication additions or changes?  No   Does the patient have all medications related to this admission filled (includes all antibiotics, pain medications, etc.)  Yes   Is the patient taking all medications as directed (includes completed medication regime)?  Yes   Does the patient have a follow up appointment scheduled with their surgeon?  Yes   Has the patient kept scheduled appointments due by today?  N/A   Comments  f/u with PCP on 8/10   What is the Home health agency?   [facility]    Has home health visited the patient within 72 hours of discharge?  Yes   Psychosocial issues?  No   Did the patient receive a copy of their discharge instructions?  Yes   Nursing interventions  Reviewed instructions with patient   What is the patient's perception of their health status since discharge?  Improving   Nursing interventions  Nurse provided patient education   Is the patient/caregiver able to teach back signs and symptoms of incisional infection?  Fever   TCM call completed?  Yes   Wrap up additional comments  Patient says she is doing well and following her discharge instructions closely, no questions or concerns at this time.          Jonna Colon RN    8/3/2020, 15:36

## 2020-08-03 NOTE — TELEPHONE ENCOUNTER
It does not appear pt was discharged on the three meds listed. I communicated with Dr Buchanan, it is intentional that pt is not on those three meds.  Refill authorization sent to [facility] pharmacy for atorvastatin.   I spoke with pt's , informed him. RTRN

## 2020-08-04 ENCOUNTER — TELEPHONE (OUTPATIENT)
Dept: INTERNAL MEDICINE | Facility: CLINIC | Age: 75
End: 2020-08-04

## 2020-08-04 NOTE — TELEPHONE ENCOUNTER
Karolina from [facility] called with INR results    PT 22.3  INR 1.9    PT is currently taking 2mg M,W, F  1mg other days

## 2020-08-07 ENCOUNTER — READMISSION MANAGEMENT (OUTPATIENT)
Dept: CALL CENTER | Facility: HOSPITAL | Age: 75
End: 2020-08-07

## 2020-08-07 NOTE — OUTREACH NOTE
General Surgery Week 2 Survey      Responses   [facility] patient discharged from?  [place]   Does the patient have one of the following disease processes/diagnoses(primary or secondary)?  General Surgery   Week 2 attempt successful?  Yes   Call start time  1328   Call end time  1332   Discharge diagnosis  Persistent A-fib, AV node ablation & Pacemaker placed   Is patient permission given to speak with other caregiver?  Yes   Person spoke with today (if not patient) and relationship  Wade Guzman reviewed with patient/caregiver?  Yes   Is the patient having any side effects they believe may be caused by any medication additions or changes?  No   Does the patient have all medications related to this admission filled (includes all antibiotics, pain medications, etc.)  Yes   Is the patient taking all medications as directed (includes completed medication regime)?  Yes   Does the patient have a follow up appointment scheduled with their surgeon?  Yes   Has the patient kept scheduled appointments due by today?  N/A   What is the Home health agency?   [facility]   Has home health visited the patient within 72 hours of discharge?  Yes   Psychosocial issues?  No   Did the patient receive a copy of their discharge instructions?  Yes   Nursing interventions  Reviewed instructions with patient   What is the patient's perception of their health status since discharge?  Improving   Nursing interventions  Nurse provided patient education   Is the patient /caregiver able to teach back basic post-op care?  Continue use of incentive spirometry at least 1 week post discharge, Practice 'cough and deep breath', Lifting as instructed by MD in discharge instructions, Keep incision areas clean,dry and protected, Do not remove steri-strips   Is the patient/caregiver able to teach back signs and symptoms of incisional infection?  Fever, Increased redness, swelling or pain at the incisonal site, Increased drainage or bleeding,  Incisional warmth, Pus or odor from incision   Is the patient/caregiver able to teach back steps to recovery at home?  Set small, achievable goals for return to baseline health, Rest and rebuild strength, gradually increase activity, Make a list of questions for surgeon's appointment, Eat a well-balance diet   Is the patient/caregiver able to teach back the hierarchy of who to call/visit for symptoms/problems? PCP, Specialist, Home health nurse, Urgent Care, ED, 911  Yes   Additional teach back comments  She is feeling better now, no fever, incision looks good. No constipation, eating well and sleeping well.   Week 2 call completed?  Yes   Wrap up additional comments  BIPap is working well for her.          Maura Galvez RN

## 2020-08-10 ENCOUNTER — OFFICE VISIT (OUTPATIENT)
Dept: INTERNAL MEDICINE | Facility: CLINIC | Age: 75
End: 2020-08-10

## 2020-08-10 VITALS
DIASTOLIC BLOOD PRESSURE: 66 MMHG | BODY MASS INDEX: 21.19 KG/M2 | WEIGHT: 135 LBS | HEIGHT: 67 IN | TEMPERATURE: 97.1 F | HEART RATE: 70 BPM | SYSTOLIC BLOOD PRESSURE: 130 MMHG

## 2020-08-10 DIAGNOSIS — R63.4 WEIGHT LOSS: ICD-10-CM

## 2020-08-10 DIAGNOSIS — I10 ESSENTIAL HYPERTENSION: ICD-10-CM

## 2020-08-10 DIAGNOSIS — E03.9 ACQUIRED HYPOTHYROIDISM: ICD-10-CM

## 2020-08-10 DIAGNOSIS — Z79.01 LONG TERM CURRENT USE OF ANTICOAGULANT THERAPY: Primary | ICD-10-CM

## 2020-08-10 DIAGNOSIS — E53.8 B12 DEFICIENCY: ICD-10-CM

## 2020-08-10 PROBLEM — N17.0 ACUTE KIDNEY INJURY (AKI) WITH ACUTE TUBULAR NECROSIS (ATN): Status: RESOLVED | Noted: 2020-06-16 | Resolved: 2020-08-10

## 2020-08-10 PROBLEM — E87.70 EDEMA DUE TO HYPERVOLEMIA: Status: RESOLVED | Noted: 2020-07-22 | Resolved: 2020-08-10

## 2020-08-10 PROBLEM — I48.0 PAF (PAROXYSMAL ATRIAL FIBRILLATION): Status: RESOLVED | Noted: 2020-07-07 | Resolved: 2020-08-10

## 2020-08-10 PROCEDURE — 99495 TRANSJ CARE MGMT MOD F2F 14D: CPT | Performed by: INTERNAL MEDICINE

## 2020-08-10 NOTE — PROGRESS NOTES
Assessment and Plan  Joellen was seen today for hypertension.    Diagnoses and all orders for this visit:    Long term current use of anticoagulant therapy  Comments:  addressed INR from Thursday- will recheck next week with home health.    Acquired hypothyroidism  Comments:  due to check TSH at f/u  Orders:  -     Comprehensive Metabolic Panel; Future  -     Lipid Panel With / Chol / HDL Ratio; Future  -     TSH; Future    B12 deficiency  Comments:  continue monthly inj  Orders:  -     CBC & Differential; Future    Essential hypertension  Comments:  doing fine,  watch with weight loss.    Weight loss  Comments:  Feels that it will stop now that she's feeling better and Crohn's is under control, no change for nw.      F/U and Patient Instructions    Return in about 6 months (around 2/10/2021) for Recheck, Lab Before FUP.  There are no Patient Instructions on file for this visit.    Subjective    Joellen Dominguez is a 75 y.o. female being seen in our office today for Hypertension (Hospital follow up )     History of the Present Illness  HPI  Here for f/u after having pacer placed and AV hyacinth ablation.  She is feeling better, did not tolerate the a fib.  Crohn's remains quiet as well.  She feels ready to start reducing the prednisone, with Dr. Salomon.  Walking more with walker, working on strength.    Patient History        Significant Past History  The following portions of the patient's history were reviewed and updated as appropriate:PMHroutine: Social history , Allergies, Current Medications, Active Problem List and Health Maintenance              Social History  She  reports that she quit smoking about 53 years ago. Her smoking use included cigarettes. She has a 32.00 pack-year smoking history. She has never used smokeless tobacco. She reports that she does not drink alcohol or use drugs.                         Review of Symptoms  Review of Systems   Constitution: Positive for weight loss.   Cardiovascular:  Negative.    Respiratory: Negative.    Musculoskeletal:        Saw Dr. Najera- ulcer has healed   Gastrointestinal: Negative.    Psychiatric/Behavioral: Negative.      Objective  Vital Signs         BP Readings from Last 1 Encounters:   08/10/20 130/66     Wt Readings from Last 3 Encounters:   08/10/20 61.2 kg (135 lb)   07/31/20 65.5 kg (144 lb 6.4 oz)   07/22/20 63 kg (139 lb)   Body mass index is 21.14 kg/m².          Physical Exam   Physical Exam   Constitutional: No distress.   Cardiovascular: Normal rate and regular rhythm.   Pulmonary/Chest: Effort normal and breath sounds normal.   Musculoskeletal: She exhibits no edema.   Skin: Skin is warm and dry.   Psychiatric: She has a normal mood and affect. Her behavior is normal.     Data Reviewed    Recent Results (from the past 2016 hour(s))   Comprehensive Metabolic Panel    Collection Time: 05/19/20  8:58 PM   Result Value Ref Range    Glucose 235 (H) 65 - 99 mg/dL    BUN 42 (H) 8 - 23 mg/dL    Creatinine 1.46 (H) 0.57 - 1.00 mg/dL    Sodium 143 136 - 145 mmol/L    Potassium 5.2 3.5 - 5.2 mmol/L    Chloride 105 98 - 107 mmol/L    CO2 23.7 22.0 - 29.0 mmol/L    Calcium 9.2 8.6 - 10.5 mg/dL    Total Protein 6.2 6.0 - 8.5 g/dL    Albumin 3.70 3.50 - 5.20 g/dL    ALT (SGPT) 22 1 - 33 U/L    AST (SGOT) 17 1 - 32 U/L    Alkaline Phosphatase 63 39 - 117 U/L    Total Bilirubin 0.8 0.2 - 1.2 mg/dL    eGFR Non African Amer 35 (L) >60 mL/min/1.73    Globulin 2.5 gm/dL    A/G Ratio 1.5 g/dL    BUN/Creatinine Ratio 28.8 (H) 7.0 - 25.0    Anion Gap 14.3 5.0 - 15.0 mmol/L   BNP    Collection Time: 05/19/20  8:58 PM   Result Value Ref Range    proBNP 2,270.0 (H) 5.0-1,800.0 pg/mL   Troponin    Collection Time: 05/19/20  8:58 PM   Result Value Ref Range    Troponin T 0.011 0.000 - 0.030 ng/mL   Protime-INR    Collection Time: 05/19/20  8:58 PM   Result Value Ref Range    Protime 46.8 (C) 11.7 - 14.2 Seconds    INR 5.07 (C) 0.90 - 1.10   CBC Auto Differential    Collection Time:  05/19/20  8:58 PM   Result Value Ref Range    WBC 27.51 (H) 3.40 - 10.80 10*3/mm3    RBC 4.83 3.77 - 5.28 10*6/mm3    Hemoglobin 11.5 (L) 12.0 - 15.9 g/dL    Hematocrit 38.2 34.0 - 46.6 %    MCV 79.1 79.0 - 97.0 fL    MCH 23.8 (L) 26.6 - 33.0 pg    MCHC 30.1 (L) 31.5 - 35.7 g/dL    RDW 16.1 (H) 12.3 - 15.4 %    RDW-SD 44.8 37.0 - 54.0 fl    MPV 10.2 6.0 - 12.0 fL    Platelets 454 (H) 140 - 450 10*3/mm3    Neutrophil % 91.5 (H) 42.7 - 76.0 %    Lymphocyte % 3.0 (L) 19.6 - 45.3 %    Monocyte % 1.7 (L) 5.0 - 12.0 %    Eosinophil % 0.0 (L) 0.3 - 6.2 %    Basophil % 0.1 0.0 - 1.5 %    Immature Grans % 3.7 (H) 0.0 - 0.5 %    Neutrophils, Absolute 25.16 (H) 1.70 - 7.00 10*3/mm3    Lymphocytes, Absolute 0.83 0.70 - 3.10 10*3/mm3    Monocytes, Absolute 0.46 0.10 - 0.90 10*3/mm3    Eosinophils, Absolute 0.00 0.00 - 0.40 10*3/mm3    Basophils, Absolute 0.04 0.00 - 0.20 10*3/mm3    Immature Grans, Absolute 1.02 (H) 0.00 - 0.05 10*3/mm3    nRBC 0.0 0.0 - 0.2 /100 WBC   Procalcitonin    Collection Time: 05/19/20  8:58 PM   Result Value Ref Range    Procalcitonin 0.11 0.10 - 0.25 ng/mL   Lactic Acid, Plasma    Collection Time: 05/19/20  9:48 PM   Result Value Ref Range    Lactate 3.1 (C) 0.5 - 2.0 mmol/L   Blood Culture - Blood, Arm, Left    Collection Time: 05/19/20  9:48 PM   Result Value Ref Range    Blood Culture No growth at 5 days    Lactic Acid, Reflex Timer (This will reflex a repeat order 3-3:15 hours after ordered.)    Collection Time: 05/19/20  9:48 PM   Result Value Ref Range    Hold Tube Hold for add-ons.    Blood Culture - Blood, Arm, Left    Collection Time: 05/19/20  9:55 PM   Result Value Ref Range    Blood Culture No growth at 5 days    Lactic Acid, Reflex    Collection Time: 05/20/20  1:49 AM   Result Value Ref Range    Lactate 2.3 (C) 0.5 - 2.0 mmol/L   CBC (No Diff)    Collection Time: 05/20/20  6:17 AM   Result Value Ref Range    WBC 25.19 (H) 3.40 - 10.80 10*3/mm3    RBC 4.01 3.77 - 5.28 10*6/mm3     Hemoglobin 9.6 (L) 12.0 - 15.9 g/dL    Hematocrit 31.0 (L) 34.0 - 46.6 %    MCV 77.3 (L) 79.0 - 97.0 fL    MCH 23.9 (L) 26.6 - 33.0 pg    MCHC 31.0 (L) 31.5 - 35.7 g/dL    RDW 16.2 (H) 12.3 - 15.4 %    RDW-SD 44.6 37.0 - 54.0 fl    MPV 9.7 6.0 - 12.0 fL    Platelets 365 140 - 450 10*3/mm3   Basic Metabolic Panel    Collection Time: 05/20/20  6:17 AM   Result Value Ref Range    Glucose 117 (H) 65 - 99 mg/dL    BUN 41 (H) 8 - 23 mg/dL    Creatinine 1.15 (H) 0.57 - 1.00 mg/dL    Sodium 142 136 - 145 mmol/L    Potassium 3.9 3.5 - 5.2 mmol/L    Chloride 114 (H) 98 - 107 mmol/L    CO2 18.4 (L) 22.0 - 29.0 mmol/L    Calcium 8.2 (L) 8.6 - 10.5 mg/dL    eGFR Non African Amer 46 (L) >60 mL/min/1.73    BUN/Creatinine Ratio 35.7 (H) 7.0 - 25.0    Anion Gap 9.6 5.0 - 15.0 mmol/L   Protime-INR    Collection Time: 05/20/20  6:17 AM   Result Value Ref Range    Protime 48.2 (C) 11.7 - 14.2 Seconds    INR 5.26 (C) 0.90 - 1.10   Basic Metabolic Panel    Collection Time: 05/21/20  5:03 AM   Result Value Ref Range    Glucose 105 (H) 65 - 99 mg/dL    BUN 35 (H) 8 - 23 mg/dL    Creatinine 0.90 0.57 - 1.00 mg/dL    Sodium 141 136 - 145 mmol/L    Potassium 3.9 3.5 - 5.2 mmol/L    Chloride 112 (H) 98 - 107 mmol/L    CO2 19.2 (L) 22.0 - 29.0 mmol/L    Calcium 8.5 (L) 8.6 - 10.5 mg/dL    eGFR Non African Amer 61 >60 mL/min/1.73    BUN/Creatinine Ratio 38.9 (H) 7.0 - 25.0    Anion Gap 9.8 5.0 - 15.0 mmol/L   CBC Auto Differential    Collection Time: 05/21/20  5:03 AM   Result Value Ref Range    WBC 24.27 (H) 3.40 - 10.80 10*3/mm3    RBC 4.03 3.77 - 5.28 10*6/mm3    Hemoglobin 9.4 (L) 12.0 - 15.9 g/dL    Hematocrit 31.0 (L) 34.0 - 46.6 %    MCV 76.9 (L) 79.0 - 97.0 fL    MCH 23.3 (L) 26.6 - 33.0 pg    MCHC 30.3 (L) 31.5 - 35.7 g/dL    RDW 16.1 (H) 12.3 - 15.4 %    RDW-SD 44.2 37.0 - 54.0 fl    MPV 9.6 6.0 - 12.0 fL    Platelets 391 140 - 450 10*3/mm3   Manual Differential    Collection Time: 05/21/20  5:03 AM   Result Value Ref Range     Neutrophil % 87.0 (H) 42.7 - 76.0 %    Lymphocyte % 6.0 (L) 19.6 - 45.3 %    Monocyte % 7.0 5.0 - 12.0 %    Neutrophils Absolute 21.11 (H) 1.70 - 7.00 10*3/mm3    Lymphocytes Absolute 1.46 0.70 - 3.10 10*3/mm3    Monocytes Absolute 1.70 (H) 0.10 - 0.90 10*3/mm3    nRBC 1.0 (H) 0.0 - 0.2 /100 WBC    Anisocytosis Mod/2+ None Seen    Microcytes Mod/2+ None Seen    Ovalocytes Slight/1+ None Seen    Poikilocytes Mod/2+ None Seen    Polychromasia Mod/2+ None Seen    WBC Morphology Normal Normal    Platelet Morphology Normal Normal   Protime-INR    Collection Time: 05/21/20 10:02 AM   Result Value Ref Range    Protime 22.2 (H) 11.7 - 14.2 Seconds    INR 1.98 (H) 0.90 - 1.10   Gastrointestinal Panel, PCR - Stool, Per Rectum    Collection Time: 05/21/20  6:22 PM   Result Value Ref Range    Campylobacter Not Detected Not Detected    Plesiomonas shigelloides Not Detected Not Detected    Salmonella Not Detected Not Detected    Vibrio Not Detected Not Detected    Vibrio cholerae Not Detected Not Detected    Yersinia enterocolitica Not Detected Not Detected    Enteroaggregative E. coli (EAEC) Not Detected Not Detected    Enteropathogenic E. coli (EPEC) Not Detected Not Detected    Enterotoxigenic E. coli (ETEC) lt/st Not Detected Not Detected    Shiga-like toxin-producing E. coli (STEC) stx1/stx2 Not Detected Not Detected    E. coli O157 Not Detected Not Detected    Shigella/Enteroinvasive E. coli (EIEC) Not Detected Not Detected    Cryptosporidium Not Detected Not Detected    Cyclospora cayetanensis Not Detected Not Detected    Entamoeba histolytica Not Detected Not Detected    Giardia lamblia Not Detected Not Detected    Adenovirus F40/41 Not Detected Not Detected    Astrovirus Not Detected Not Detected    Norovirus GI/GII Not Detected Not Detected    Rotavirus A Not Detected Not Detected    Sapovirus (I, II, IV or V) Not Detected Not Detected   Urinalysis With Culture If Indicated - Urine, Random Void    Collection Time:  05/21/20  6:23 PM   Result Value Ref Range    Color, UA Yellow Yellow, Straw    Appearance, UA Clear Clear    pH, UA <=5.0 5.0 - 8.0    Specific Gravity, UA 1.012 1.005 - 1.030    Glucose, UA Negative Negative    Ketones, UA Negative Negative    Bilirubin, UA Negative Negative    Blood, UA Negative Negative    Protein, UA Negative Negative    Leuk Esterase, UA Negative Negative    Nitrite, UA Negative Negative    Urobilinogen, UA 0.2 E.U./dL 0.2 - 1.0 E.U./dL   Protime-INR    Collection Time: 05/22/20  6:07 AM   Result Value Ref Range    Protime 18.7 (H) 11.7 - 14.2 Seconds    INR 1.60 (H) 0.90 - 1.10   CBC (No Diff)    Collection Time: 05/22/20  6:07 AM   Result Value Ref Range    WBC 22.77 (H) 3.40 - 10.80 10*3/mm3    RBC 4.23 3.77 - 5.28 10*6/mm3    Hemoglobin 10.1 (L) 12.0 - 15.9 g/dL    Hematocrit 33.0 (L) 34.0 - 46.6 %    MCV 78.0 (L) 79.0 - 97.0 fL    MCH 23.9 (L) 26.6 - 33.0 pg    MCHC 30.6 (L) 31.5 - 35.7 g/dL    RDW 16.2 (H) 12.3 - 15.4 %    RDW-SD 43.5 37.0 - 54.0 fl    MPV 10.4 6.0 - 12.0 fL    Platelets 384 140 - 450 10*3/mm3   Basic Metabolic Panel    Collection Time: 05/22/20  6:07 AM   Result Value Ref Range    Glucose 111 (H) 65 - 99 mg/dL    BUN 39 (H) 8 - 23 mg/dL    Creatinine 0.99 0.57 - 1.00 mg/dL    Sodium 143 136 - 145 mmol/L    Potassium 4.3 3.5 - 5.2 mmol/L    Chloride 106 98 - 107 mmol/L    CO2 24.2 22.0 - 29.0 mmol/L    Calcium 8.9 8.6 - 10.5 mg/dL    eGFR Non African Amer 55 (L) >60 mL/min/1.73    BUN/Creatinine Ratio 39.4 (H) 7.0 - 25.0    Anion Gap 12.8 5.0 - 15.0 mmol/L   BNP    Collection Time: 05/22/20  6:07 AM   Result Value Ref Range    proBNP 2,516.0 (H) 5.0-1,800.0 pg/mL   COVID-19, [facility] IN-HOUSE, NP SWAB IN TRANSPORT MEDIA 8-12 HR TAT - Swab, Nasopharynx    Collection Time: 05/22/20  4:34 PM   Result Value Ref Range    COVID19 Not Detected Not Detected - Ref. Range   Protime-INR    Collection Time: 05/23/20  4:57 AM   Result Value Ref Range    Protime 21.2 (H) 11.7 - 14.2  Seconds    INR 1.87 (H) 0.90 - 1.10   Basic Metabolic Panel    Collection Time: 05/23/20  4:57 AM   Result Value Ref Range    Glucose 119 (H) 65 - 99 mg/dL    BUN 41 (H) 8 - 23 mg/dL    Creatinine 0.90 0.57 - 1.00 mg/dL    Sodium 142 136 - 145 mmol/L    Potassium 4.0 3.5 - 5.2 mmol/L    Chloride 107 98 - 107 mmol/L    CO2 22.7 22.0 - 29.0 mmol/L    Calcium 8.6 8.6 - 10.5 mg/dL    eGFR Non African Amer 61 >60 mL/min/1.73    BUN/Creatinine Ratio 45.6 (H) 7.0 - 25.0    Anion Gap 12.3 5.0 - 15.0 mmol/L   CBC Auto Differential    Collection Time: 05/23/20  4:57 AM   Result Value Ref Range    WBC 26.15 (H) 3.40 - 10.80 10*3/mm3    RBC 4.08 3.77 - 5.28 10*6/mm3    Hemoglobin 9.9 (L) 12.0 - 15.9 g/dL    Hematocrit 32.2 (L) 34.0 - 46.6 %    MCV 78.9 (L) 79.0 - 97.0 fL    MCH 24.3 (L) 26.6 - 33.0 pg    MCHC 30.7 (L) 31.5 - 35.7 g/dL    RDW 16.2 (H) 12.3 - 15.4 %    RDW-SD 46.1 37.0 - 54.0 fl    MPV 9.8 6.0 - 12.0 fL    Platelets 325 140 - 450 10*3/mm3    Neutrophil % 87.4 (H) 42.7 - 76.0 %    Lymphocyte % 3.9 (L) 19.6 - 45.3 %    Monocyte % 3.7 (L) 5.0 - 12.0 %    Eosinophil % 0.1 (L) 0.3 - 6.2 %    Basophil % 0.2 0.0 - 1.5 %    Immature Grans % 4.7 (H) 0.0 - 0.5 %    Neutrophils, Absolute 22.83 (H) 1.70 - 7.00 10*3/mm3    Lymphocytes, Absolute 1.02 0.70 - 3.10 10*3/mm3    Monocytes, Absolute 0.98 (H) 0.10 - 0.90 10*3/mm3    Eosinophils, Absolute 0.02 0.00 - 0.40 10*3/mm3    Basophils, Absolute 0.06 0.00 - 0.20 10*3/mm3    Immature Grans, Absolute 1.24 (H) 0.00 - 0.05 10*3/mm3    nRBC 0.0 0.0 - 0.2 /100 WBC   Basic Metabolic Panel    Collection Time: 05/24/20  5:17 AM   Result Value Ref Range    Glucose 99 65 - 99 mg/dL    BUN 36 (H) 8 - 23 mg/dL    Creatinine 0.94 0.57 - 1.00 mg/dL    Sodium 140 136 - 145 mmol/L    Potassium 4.1 3.5 - 5.2 mmol/L    Chloride 107 98 - 107 mmol/L    CO2 22.6 22.0 - 29.0 mmol/L    Calcium 8.5 (L) 8.6 - 10.5 mg/dL    eGFR Non African Amer 58 (L) >60 mL/min/1.73    BUN/Creatinine Ratio 38.3 (H)  7.0 - 25.0    Anion Gap 10.4 5.0 - 15.0 mmol/L   Protime-INR    Collection Time: 05/24/20  8:25 AM   Result Value Ref Range    Protime 23.2 (H) 11.7 - 14.2 Seconds    INR 2.09 (H) 0.90 - 1.10   CBC Auto Differential    Collection Time: 05/24/20  8:26 AM   Result Value Ref Range    WBC 25.08 (H) 3.40 - 10.80 10*3/mm3    RBC 4.13 3.77 - 5.28 10*6/mm3    Hemoglobin 10.3 (L) 12.0 - 15.9 g/dL    Hematocrit 33.6 (L) 34.0 - 46.6 %    MCV 81.4 79.0 - 97.0 fL    MCH 24.9 (L) 26.6 - 33.0 pg    MCHC 30.7 (L) 31.5 - 35.7 g/dL    RDW 17.2 (H) 12.3 - 15.4 %    RDW-SD 48.0 37.0 - 54.0 fl    MPV 9.4 6.0 - 12.0 fL    Platelets 406 140 - 450 10*3/mm3    Neutrophil % 82.1 (H) 42.7 - 76.0 %    Lymphocyte % 7.2 (L) 19.6 - 45.3 %    Monocyte % 4.9 (L) 5.0 - 12.0 %    Eosinophil % 0.9 0.3 - 6.2 %    Basophil % 0.4 0.0 - 1.5 %    Immature Grans % 4.5 (H) 0.0 - 0.5 %    Neutrophils, Absolute 20.60 (H) 1.70 - 7.00 10*3/mm3    Lymphocytes, Absolute 1.81 0.70 - 3.10 10*3/mm3    Monocytes, Absolute 1.23 (H) 0.10 - 0.90 10*3/mm3    Eosinophils, Absolute 0.22 0.00 - 0.40 10*3/mm3    Basophils, Absolute 0.09 0.00 - 0.20 10*3/mm3    Immature Grans, Absolute 1.13 (H) 0.00 - 0.05 10*3/mm3    nRBC 0.0 0.0 - 0.2 /100 WBC   Protime-INR    Collection Time: 05/25/20  6:58 AM   Result Value Ref Range    Protime 22.5 (H) 11.7 - 14.2 Seconds    INR 2.02 (H) 0.90 - 1.10   Basic Metabolic Panel    Collection Time: 05/25/20  6:58 AM   Result Value Ref Range    Glucose 101 (H) 65 - 99 mg/dL    BUN 34 (H) 8 - 23 mg/dL    Creatinine 0.92 0.57 - 1.00 mg/dL    Sodium 142 136 - 145 mmol/L    Potassium 4.4 3.5 - 5.2 mmol/L    Chloride 107 98 - 107 mmol/L    CO2 27.6 22.0 - 29.0 mmol/L    Calcium 8.6 8.6 - 10.5 mg/dL    eGFR Non African Amer 59 (L) >60 mL/min/1.73    BUN/Creatinine Ratio 37.0 (H) 7.0 - 25.0    Anion Gap 7.4 5.0 - 15.0 mmol/L   CBC Auto Differential    Collection Time: 05/25/20  6:58 AM   Result Value Ref Range    WBC 23.55 (H) 3.40 - 10.80 10*3/mm3     RBC 3.89 3.77 - 5.28 10*6/mm3    Hemoglobin 9.5 (L) 12.0 - 15.9 g/dL    Hematocrit 31.0 (L) 34.0 - 46.6 %    MCV 79.7 79.0 - 97.0 fL    MCH 24.4 (L) 26.6 - 33.0 pg    MCHC 30.6 (L) 31.5 - 35.7 g/dL    RDW 16.8 (H) 12.3 - 15.4 %    RDW-SD 48.0 37.0 - 54.0 fl    MPV 10.3 6.0 - 12.0 fL    Platelets 385 140 - 450 10*3/mm3    Neutrophil % 82.0 (H) 42.7 - 76.0 %    Lymphocyte % 7.5 (L) 19.6 - 45.3 %    Monocyte % 4.7 (L) 5.0 - 12.0 %    Eosinophil % 0.6 0.3 - 6.2 %    Basophil % 0.2 0.0 - 1.5 %    Immature Grans % 5.0 (H) 0.0 - 0.5 %    Neutrophils, Absolute 19.32 (H) 1.70 - 7.00 10*3/mm3    Lymphocytes, Absolute 1.77 0.70 - 3.10 10*3/mm3    Monocytes, Absolute 1.10 (H) 0.10 - 0.90 10*3/mm3    Eosinophils, Absolute 0.14 0.00 - 0.40 10*3/mm3    Basophils, Absolute 0.05 0.00 - 0.20 10*3/mm3    Immature Grans, Absolute 1.17 (H) 0.00 - 0.05 10*3/mm3    nRBC 0.0 0.0 - 0.2 /100 WBC   Protime-INR    Collection Time: 05/26/20  6:14 AM   Result Value Ref Range    Protime 23.3 (H) 11.7 - 14.2 Seconds    INR 2.10 (H) 0.90 - 1.10   Reticulocytes    Collection Time: 05/26/20  6:14 AM   Result Value Ref Range    Reticulocyte % 2.26 (H) 0.70 - 1.90 %    Reticulocyte Absolute 0.0875 0.0200 - 0.1300 10*6/mm3   Iron Profile    Collection Time: 05/26/20  6:14 AM   Result Value Ref Range    Iron 23 (L) 37 - 145 mcg/dL    Iron Saturation 4 (L) 20 - 50 %    Transferrin 350 200 - 360 mg/dL    TIBC 522 298 - 536 mcg/dL   Vitamin B12    Collection Time: 05/26/20  6:14 AM   Result Value Ref Range    Vitamin B-12 229 211 - 946 pg/mL   Folate    Collection Time: 05/26/20  6:14 AM   Result Value Ref Range    Folate 9.50 4.78 - 24.20 ng/mL   Procalcitonin    Collection Time: 05/26/20  6:14 AM   Result Value Ref Range    Procalcitonin 0.09 (L) 0.10 - 0.25 ng/mL   CBC Auto Differential    Collection Time: 05/26/20  6:14 AM   Result Value Ref Range    WBC 21.24 (H) 3.40 - 10.80 10*3/mm3    RBC 3.87 3.77 - 5.28 10*6/mm3    Hemoglobin 9.6 (L) 12.0 - 15.9  g/dL    Hematocrit 31.2 (L) 34.0 - 46.6 %    MCV 80.6 79.0 - 97.0 fL    MCH 24.8 (L) 26.6 - 33.0 pg    MCHC 30.8 (L) 31.5 - 35.7 g/dL    RDW 17.2 (H) 12.3 - 15.4 %    RDW-SD 48.4 37.0 - 54.0 fl    MPV 10.1 6.0 - 12.0 fL    Platelets 368 140 - 450 10*3/mm3   Manual Differential    Collection Time: 05/26/20  6:14 AM   Result Value Ref Range    Neutrophil % 86.9 (H) 42.7 - 76.0 %    Lymphocyte % 5.1 (L) 19.6 - 45.3 %    Monocyte % 6.1 5.0 - 12.0 %    Eosinophil % 2.0 0.3 - 6.2 %    Neutrophils Absolute 18.46 (H) 1.70 - 7.00 10*3/mm3    Lymphocytes Absolute 1.08 0.70 - 3.10 10*3/mm3    Monocytes Absolute 1.30 (H) 0.10 - 0.90 10*3/mm3    Eosinophils Absolute 0.42 (H) 0.00 - 0.40 10*3/mm3    nRBC 1.0 (H) 0.0 - 0.2 /100 WBC    Anisocytosis Mod/2+ None Seen    Elliptocytes Mod/2+ None Seen    Ovalocytes Slight/1+ None Seen    Poikilocytes Large/3+ None Seen    Polychromasia Slight/1+ None Seen    WBC Morphology Normal Normal    Platelet Morphology Normal Normal   Protime-INR    Collection Time: 06/02/20  5:50 PM   Result Value Ref Range    Protime 24.8 (H) 11.7 - 14.2 Seconds    INR 2.28 (H) 0.90 - 1.10   Protime-INR    Collection Time: 06/16/20  5:27 PM   Result Value Ref Range    Protime      INR     Comprehensive Metabolic Panel    Collection Time: 06/16/20  8:37 PM   Result Value Ref Range    Glucose 213 (H) 65 - 99 mg/dL    BUN 86 (H) 8 - 23 mg/dL    Creatinine 2.16 (H) 0.57 - 1.00 mg/dL    Sodium 139 136 - 145 mmol/L    Potassium 4.8 3.5 - 5.2 mmol/L    Chloride 96 (L) 98 - 107 mmol/L    CO2 25.5 22.0 - 29.0 mmol/L    Calcium 9.3 8.6 - 10.5 mg/dL    Total Protein 6.2 6.0 - 8.5 g/dL    Albumin 3.60 3.50 - 5.20 g/dL    ALT (SGPT) 28 1 - 33 U/L    AST (SGOT) 35 (H) 1 - 32 U/L    Alkaline Phosphatase 100 39 - 117 U/L    Total Bilirubin 1.2 0.2 - 1.2 mg/dL    eGFR Non African Amer 22 (L) >60 mL/min/1.73    Globulin 2.6 gm/dL    A/G Ratio 1.4 g/dL    BUN/Creatinine Ratio 39.8 (H) 7.0 - 25.0    Anion Gap 17.5 (H) 5.0 - 15.0  mmol/L   Protime-INR    Collection Time: 06/16/20  8:37 PM   Result Value Ref Range    Protime 15.3 (H) 11.7 - 14.2 Seconds    INR 1.24 (H) 0.90 - 1.10   CBC Auto Differential    Collection Time: 06/16/20  8:37 PM   Result Value Ref Range    WBC 21.17 (H) 3.40 - 10.80 10*3/mm3    RBC 4.97 3.77 - 5.28 10*6/mm3    Hemoglobin 12.2 12.0 - 15.9 g/dL    Hematocrit 40.2 34.0 - 46.6 %    MCV 80.9 79.0 - 97.0 fL    MCH 24.5 (L) 26.6 - 33.0 pg    MCHC 30.3 (L) 31.5 - 35.7 g/dL    RDW 20.9 (H) 12.3 - 15.4 %    RDW-SD 57.9 (H) 37.0 - 54.0 fl    MPV 10.0 6.0 - 12.0 fL    Platelets 353 140 - 450 10*3/mm3    Neutrophil % 89.3 (H) 42.7 - 76.0 %    Lymphocyte % 2.8 (L) 19.6 - 45.3 %    Monocyte % 2.8 (L) 5.0 - 12.0 %    Eosinophil % 0.0 (L) 0.3 - 6.2 %    Basophil % 0.3 0.0 - 1.5 %    Immature Grans % 4.8 (H) 0.0 - 0.5 %    Neutrophils, Absolute 18.90 (H) 1.70 - 7.00 10*3/mm3    Lymphocytes, Absolute 0.59 (L) 0.70 - 3.10 10*3/mm3    Monocytes, Absolute 0.59 0.10 - 0.90 10*3/mm3    Eosinophils, Absolute 0.00 0.00 - 0.40 10*3/mm3    Basophils, Absolute 0.07 0.00 - 0.20 10*3/mm3    Immature Grans, Absolute 1.02 (H) 0.00 - 0.05 10*3/mm3    nRBC 0.1 0.0 - 0.2 /100 WBC   CK    Collection Time: 06/16/20  8:37 PM   Result Value Ref Range    Creatine Kinase 37 20 - 180 U/L   Protime-INR    Collection Time: 06/16/20  9:34 PM   Result Value Ref Range    Protime 16.1 (H) 11.7 - 14.2 Seconds    INR 1.33 (H) 0.90 - 1.10   Urinalysis With Microscopic If Indicated (No Culture) - Urine, Catheter    Collection Time: 06/16/20 10:20 PM   Result Value Ref Range    Color, UA Yellow Yellow, Straw    Appearance, UA Clear Clear    pH, UA <=5.0 5.0 - 8.0    Specific Gravity, UA 1.026 1.005 - 1.030    Glucose, UA Negative Negative    Ketones, UA Trace (A) Negative    Bilirubin, UA Negative Negative    Blood, UA Negative Negative    Protein, UA Negative Negative    Leuk Esterase, UA Negative Negative    Nitrite, UA Negative Negative    Urobilinogen, UA 0.2  E.U./dL 0.2 - 1.0 E.U./dL   Basic Metabolic Panel    Collection Time: 06/17/20  5:34 AM   Result Value Ref Range    Glucose 112 (H) 65 - 99 mg/dL    BUN 75 (H) 8 - 23 mg/dL    Creatinine 1.49 (H) 0.57 - 1.00 mg/dL    Sodium 138 136 - 145 mmol/L    Potassium 3.6 3.5 - 5.2 mmol/L    Chloride 99 98 - 107 mmol/L    CO2 27.0 22.0 - 29.0 mmol/L    Calcium 8.8 8.6 - 10.5 mg/dL    eGFR Non African Amer 34 (L) >60 mL/min/1.73    BUN/Creatinine Ratio 50.3 (H) 7.0 - 25.0    Anion Gap 12.0 5.0 - 15.0 mmol/L   CBC (No Diff)    Collection Time: 06/17/20  5:34 AM   Result Value Ref Range    WBC 19.56 (H) 3.40 - 10.80 10*3/mm3    RBC 4.83 3.77 - 5.28 10*6/mm3    Hemoglobin 11.9 (L) 12.0 - 15.9 g/dL    Hematocrit 38.9 34.0 - 46.6 %    MCV 80.5 79.0 - 97.0 fL    MCH 24.6 (L) 26.6 - 33.0 pg    MCHC 30.6 (L) 31.5 - 35.7 g/dL    RDW 21.2 (H) 12.3 - 15.4 %    RDW-SD 58.5 (H) 37.0 - 54.0 fl    MPV 10.1 6.0 - 12.0 fL    Platelets 317 140 - 450 10*3/mm3   Protime-INR    Collection Time: 06/17/20  5:34 AM   Result Value Ref Range    Protime 16.0 (H) 11.7 - 14.2 Seconds    INR 1.31 (H) 0.90 - 1.10   Basic Metabolic Panel    Collection Time: 06/18/20  6:13 AM   Result Value Ref Range    Glucose 103 (H) 65 - 99 mg/dL    BUN 47 (H) 8 - 23 mg/dL    Creatinine 1.14 (H) 0.57 - 1.00 mg/dL    Sodium 143 136 - 145 mmol/L    Potassium 3.6 3.5 - 5.2 mmol/L    Chloride 106 98 - 107 mmol/L    CO2 27.4 22.0 - 29.0 mmol/L    Calcium 8.7 8.6 - 10.5 mg/dL    eGFR Non African Amer 46 (L) >60 mL/min/1.73    BUN/Creatinine Ratio 41.2 (H) 7.0 - 25.0    Anion Gap 9.6 5.0 - 15.0 mmol/L   Protime-INR    Collection Time: 06/18/20  6:13 AM   Result Value Ref Range    Protime 15.6 (H) 11.7 - 14.2 Seconds    INR 1.27 (H) 0.90 - 1.10   CBC Auto Differential    Collection Time: 06/18/20  6:13 AM   Result Value Ref Range    WBC 18.85 (H) 3.40 - 10.80 10*3/mm3    RBC 4.34 3.77 - 5.28 10*6/mm3    Hemoglobin 10.7 (L) 12.0 - 15.9 g/dL    Hematocrit 34.7 34.0 - 46.6 %    MCV  80.0 79.0 - 97.0 fL    MCH 24.7 (L) 26.6 - 33.0 pg    MCHC 30.8 (L) 31.5 - 35.7 g/dL    RDW 20.4 (H) 12.3 - 15.4 %    RDW-SD 56.5 (H) 37.0 - 54.0 fl    MPV 9.4 6.0 - 12.0 fL    Platelets 288 140 - 450 10*3/mm3    Neutrophil % 83.0 (H) 42.7 - 76.0 %    Lymphocyte % 6.2 (L) 19.6 - 45.3 %    Monocyte % 5.1 5.0 - 12.0 %    Eosinophil % 0.4 0.3 - 6.2 %    Basophil % 0.5 0.0 - 1.5 %    Immature Grans % 4.8 (H) 0.0 - 0.5 %    Neutrophils, Absolute 15.67 (H) 1.70 - 7.00 10*3/mm3    Lymphocytes, Absolute 1.16 0.70 - 3.10 10*3/mm3    Monocytes, Absolute 0.96 (H) 0.10 - 0.90 10*3/mm3    Eosinophils, Absolute 0.07 0.00 - 0.40 10*3/mm3    Basophils, Absolute 0.09 0.00 - 0.20 10*3/mm3    Immature Grans, Absolute 0.90 (H) 0.00 - 0.05 10*3/mm3    nRBC 0.0 0.0 - 0.2 /100 WBC   POC INR    Collection Time: 06/23/20  2:51 PM   Result Value Ref Range    INR 1.50 (A) 0.9 - 1.1   COVID-19,[facility], NP SWAB IN [employer] [facility] MEDIA 24-30 HR TAT - Swab, Nasopharynx    Collection Time: 07/04/20 12:14 PM   Result Value Ref Range    Reference Lab Report      COVID19 Not Detected Not Detected - Ref. Range   Doppler Ankle Brachial Index Single Level CAR    Collection Time: 07/10/20  9:26 AM   Result Value Ref Range    RIGHT DORSALIS PEDIS SYS  mmHg    RIGHT POST TIBIAL SYS  mmHg    RIGHT GREAT TOE SYS MAX 69 mmHg    RIGHT 2ND DIGIT SYS MAX 75 mmHg    RIGHT TIFFANIE RATIO 1.32     RIGHT TBI RATIO 0.51     LEFT DORSALIS PEDIS SYS  mmHg    LEFT POST TIBIAL SYS  mmHg    LEFT GREAT TOE SYS  mmHg    LEFT TIFFANIE RATIO 1.32     LEFT TBI RATIO 1.13     Upper arterial right arm brachial sys max 135 mmHg    Upper arterial left arm brachial sys max 134 mmHg   COVID-19,[facility], NP/OP SWAB IN [employer]SPORT MEDIA OR SALINE 24-36 HR TAT - Swab, Nasopharynx    Collection Time: 07/22/20 10:47 AM   Result Value Ref Range    Reference Lab Report      COVID19 Not Detected Not Detected - Ref. Range   COVID-19,[facility], NP/OP SWAB IN TRANSPORT MEDIA  OR SALINE 24-36 HR TAT - Swab, Nasopharynx    Collection Time: 07/27/20 11:14 AM   Result Value Ref Range    Reference Lab Report      COVID19 Not Detected Not Detected - Ref. Range   Comprehensive Metabolic Panel    Collection Time: 07/27/20 12:08 PM   Result Value Ref Range    Glucose 140 (H) 65 - 99 mg/dL    BUN 48 (H) 8 - 23 mg/dL    Creatinine 1.17 (H) 0.57 - 1.00 mg/dL    Sodium 137 136 - 145 mmol/L    Potassium 3.7 3.5 - 5.2 mmol/L    Chloride 98 98 - 107 mmol/L    CO2 27.3 22.0 - 29.0 mmol/L    Calcium 9.1 8.6 - 10.5 mg/dL    Total Protein 6.1 6.0 - 8.5 g/dL    Albumin 3.60 3.50 - 5.20 g/dL    ALT (SGPT) 32 1 - 33 U/L    AST (SGOT) 18 1 - 32 U/L    Alkaline Phosphatase 71 39 - 117 U/L    Total Bilirubin 0.6 0.0 - 1.2 mg/dL    eGFR Non African Amer 45 (L) >60 mL/min/1.73    Globulin 2.5 gm/dL    A/G Ratio 1.4 g/dL    BUN/Creatinine Ratio 41.0 (H) 7.0 - 25.0    Anion Gap 11.7 5.0 - 15.0 mmol/L   Protime-INR    Collection Time: 07/27/20 12:08 PM   Result Value Ref Range    Protime 19.7 (H) 11.7 - 14.2 Seconds    INR 1.71 (H) 0.90 - 1.10   CBC Auto Differential    Collection Time: 07/27/20 12:08 PM   Result Value Ref Range    WBC 21.08 (H) 3.40 - 10.80 10*3/mm3    RBC 4.60 3.77 - 5.28 10*6/mm3    Hemoglobin 12.3 12.0 - 15.9 g/dL    Hematocrit 38.3 34.0 - 46.6 %    MCV 83.3 79.0 - 97.0 fL    MCH 26.7 26.6 - 33.0 pg    MCHC 32.1 31.5 - 35.7 g/dL    RDW 21.1 (H) 12.3 - 15.4 %    RDW-SD 59.2 (H) 37.0 - 54.0 fl    MPV 9.8 6.0 - 12.0 fL    Platelets 477 (H) 140 - 450 10*3/mm3   Manual Differential    Collection Time: 07/27/20 12:08 PM   Result Value Ref Range    Neutrophil % 92.0 (H) 42.7 - 76.0 %    Lymphocyte % 5.0 (L) 19.6 - 45.3 %    Monocyte % 3.0 (L) 5.0 - 12.0 %    Neutrophils Absolute 19.39 (H) 1.70 - 7.00 10*3/mm3    Lymphocytes Absolute 1.05 0.70 - 3.10 10*3/mm3    Monocytes Absolute 0.63 0.10 - 0.90 10*3/mm3    nRBC 1.0 (H) 0.0 - 0.2 /100 WBC    RBC Morphology Normal Normal    WBC Morphology Normal Normal     Platelet Morphology Normal Normal   POC Protime / INR    Collection Time: 07/29/20  2:23 PM   Result Value Ref Range    Protime 15.0 12.8 - 15.2 seconds    INR 1.3 (H) 0.8 - 1.2   POC Protime / INR    Collection Time: 07/29/20  2:24 PM   Result Value Ref Range    Protime 15.0 seconds    INR 1.3 (A) 0.9 - 1.1   Protime-INR    Collection Time: 07/29/20  7:53 PM   Result Value Ref Range    Protime 17.7 (H) 11.7 - 14.2 Seconds    INR 1.49 (H) 0.90 - 1.10   Protime-INR    Collection Time: 07/30/20  5:09 AM   Result Value Ref Range    Protime 17.0 (H) 11.7 - 14.2 Seconds    INR 1.41 (H) 0.90 - 1.10   Basic Metabolic Panel    Collection Time: 07/30/20  5:09 AM   Result Value Ref Range    Glucose 125 (H) 65 - 99 mg/dL    BUN 50 (H) 8 - 23 mg/dL    Creatinine 1.25 (H) 0.57 - 1.00 mg/dL    Sodium 137 136 - 145 mmol/L    Potassium 2.9 (L) 3.5 - 5.2 mmol/L    Chloride 97 (L) 98 - 107 mmol/L    CO2 26.6 22.0 - 29.0 mmol/L    Calcium 8.8 8.6 - 10.5 mg/dL    eGFR Non African Amer 42 (L) >60 mL/min/1.73    BUN/Creatinine Ratio 40.0 (H) 7.0 - 25.0    Anion Gap 13.4 5.0 - 15.0 mmol/L   CBC (No Diff)    Collection Time: 07/30/20  5:09 AM   Result Value Ref Range    WBC 19.93 (H) 3.40 - 10.80 10*3/mm3    RBC 4.46 3.77 - 5.28 10*6/mm3    Hemoglobin 11.6 (L) 12.0 - 15.9 g/dL    Hematocrit 36.5 34.0 - 46.6 %    MCV 81.8 79.0 - 97.0 fL    MCH 26.0 (L) 26.6 - 33.0 pg    MCHC 31.8 31.5 - 35.7 g/dL    RDW 19.8 (H) 12.3 - 15.4 %    RDW-SD 57.7 (H) 37.0 - 54.0 fl    MPV 9.5 6.0 - 12.0 fL    Platelets 308 140 - 450 10*3/mm3   Protime-INR    Collection Time: 07/31/20  4:44 AM   Result Value Ref Range    Protime 18.1 (H) 11.7 - 14.2 Seconds    INR 1.53 (H) 0.90 - 1.10   Basic Metabolic Panel    Collection Time: 07/31/20  4:44 AM   Result Value Ref Range    Glucose 129 (H) 65 - 99 mg/dL    BUN 41 (H) 8 - 23 mg/dL    Creatinine 1.12 (H) 0.57 - 1.00 mg/dL    Sodium 141 136 - 145 mmol/L    Potassium 4.4 3.5 - 5.2 mmol/L    Chloride 103 98 - 107  mmol/L    CO2 25.9 22.0 - 29.0 mmol/L    Calcium 8.7 8.6 - 10.5 mg/dL    eGFR Non African Amer 47 (L) >60 mL/min/1.73    BUN/Creatinine Ratio 36.6 (H) 7.0 - 25.0    Anion Gap 12.1 5.0 - 15.0 mmol/L

## 2020-08-13 ENCOUNTER — OFFICE VISIT (OUTPATIENT)
Dept: CARDIOLOGY | Facility: CLINIC | Age: 75
End: 2020-08-13

## 2020-08-13 VITALS
RESPIRATION RATE: 20 BRPM | HEIGHT: 67 IN | DIASTOLIC BLOOD PRESSURE: 70 MMHG | SYSTOLIC BLOOD PRESSURE: 136 MMHG | TEMPERATURE: 97.5 F | WEIGHT: 141 LBS | BODY MASS INDEX: 22.13 KG/M2 | HEART RATE: 80 BPM

## 2020-08-13 DIAGNOSIS — I48.19 ATRIAL FIBRILLATION, PERSISTENT (HCC): Primary | ICD-10-CM

## 2020-08-13 DIAGNOSIS — Z98.890 S/P ATRIOVENTRICULAR NODAL ABLATION: ICD-10-CM

## 2020-08-13 DIAGNOSIS — I10 ESSENTIAL HYPERTENSION: ICD-10-CM

## 2020-08-13 DIAGNOSIS — Z95.0 PRESENCE OF CARDIAC PACEMAKER: ICD-10-CM

## 2020-08-13 PROCEDURE — 99024 POSTOP FOLLOW-UP VISIT: CPT | Performed by: INTERNAL MEDICINE

## 2020-08-13 PROCEDURE — 93288 INTERROG EVL PM/LDLS PM IP: CPT | Performed by: INTERNAL MEDICINE

## 2020-08-13 NOTE — PROGRESS NOTES
Subjective:     Encounter Date:08/13/2020      Patient ID: Joellen Dominguez is a 75 y.o. female.    Chief Complaint:  Followup pacemaker    HPI:  Ms Dominguez is a 76 yo patient of Dr. Karthik Reyez who has a  past medial history is significant for HTN , HLD, COPD, antiphospholipid antibody syndrome on coumadin.  She began having trouble with atrial fibrillation earlier this year.   She was admitted to [facility] 4/2020 with an episode of AF with rates in the 160's.  She was rate controlled and placed on amiodarone.  She had a JUAN C cardioversion and the JUAN C showed an EF of 55% with moderate concentric LVH, mild MR and severe LAE.  She remained in sinus rhythm only  A short time according to her .  She was admitted again 5/2020 with AF with RVR and her meds were adjusted and discharged.  Her  states that her pulse rate is usually in the 120 range at home.  Her symptoms include fatigue, dyspnea and some ankle edema.  She also has palpitations on a daily basis.     Her cardiac evaluation has included a cath in 4/2020 which showed nonobstructive disease, EF 50% and PA pressure of 46/20mmHg.    She underwent AV node ablation and implantation of a pacemaker about 2 weeks ago.  She has noted significant improvement in her dyspnea. She was also just started on BIPAP for her NEIL.         The following portions of the patient's history were reviewed and updated as appropriate: allergies, current medications, past family history, past medical history, past social history, past surgical history and problem list.    Problem List:  Patient Active Problem List   Diagnosis   • Incontinence   • Degeneration of lumbar intervertebral disc   • Other hyperlipidemia   • Essential hypertension   • Depression   • Antiphospholipid antibody syndrome (CMS/HCC)   • Lichen sclerosus et atrophicus   • Myocardial infarction type 2 (CMS/HCC)   • History of cardioversion   • Leukocytosis   • Pulmonary HTN (CMS/HCC)   • Crohn's  disease of small intestine with complication (CMS/HCC)   • Cold agglutinin disease (CMS/HCC)   • Chronic diastolic CHF (congestive heart failure) (CMS/HCC)   • Warfarin-induced coagulopathy (CMS/HCC)   • Respiratory insufficiency   • COPD with emphysema (CMS/HCC)   • Inflammatory bowel disease (Crohn's disease) (CMS/HCC)   • Long term current use of anticoagulant therapy   • Atrial fibrillation, persistent (CMS/HCC)   • Chronic fatigue   • S/P atrioventricular hyacinth ablation   • Presence of cardiac pacemaker       Past Medical History:  Past Medical History:   Diagnosis Date   • Acute deep vein thrombosis of lower extremity (CMS/HCC)    • Antiphospholipid antibody syndrome (CMS/HCC)    • Antiphospholipid antibody syndrome (CMS/HCC)    • Arrhythmia     ATRIAL FIBRILLATION   • Arthritis     OSTEO   • Benign essential hypertension    • COPD (chronic obstructive pulmonary disease) (CMS/HCC)    • Coronary artery disease    • Crohn's disease (CMS/HCC)    • Cutaneous candidiasis    • Depression    • Disease of thyroid gland    • Elevated cholesterol    • GERD (gastroesophageal reflux disease)    • History of echocardiogram 04/22/2020    mild-to-moderate concentric hypertrophy, EF 56 - 60%. LA severely dilated, Mild MAC, Mild MR with restrictive movement of the posterior leaflet   • Hyperlipidemia    • Hypertension    • Myocardial infarction (CMS/HCC)    • Osteopenia    • Polyp, uterus corpus    • Pulmonary hypertension (CMS/HCC)        Past Surgical History:  Past Surgical History:   Procedure Laterality Date   • ABDOMINAL HERNIA REPAIR     • AMPUTATION DIGIT Left     SECOND TOE    • ARTERIOGRAM  7/30/2020    Procedure: Arteriogram;  Surgeon: Chong Buchanan MD;  Location: [facility] INVASIVE LOCATION;  Service: Cardiovascular;;  rt femoral   • BILATERAL SALPINGO OOPHORECTOMY     • BREAST BIOPSY Right     BENIGN   • CARDIAC CATHETERIZATION N/A 4/22/2020    Surgeon: Paulo Singleton MD;  nonsignificant coronary  artery disease normal LV function nonsignificant mitral regurgitation probably shortness of breath due to the cardiac arrhythmias and diastolic dysfunction   • CARDIAC CATHETERIZATION N/A 4/22/2020    Procedure: Coronary angiography;  Surgeon: Paulo Singleton MD;  Location: [facility] CATH INVASIVE LOCATION;  Service: Cardiology;  Laterality: N/A;   • CARDIAC CATHETERIZATION N/A 4/22/2020    Procedure: Left ventriculography;  Surgeon: Paulo Singleton MD;  Location: [facility] CATH INVASIVE LOCATION;  Service: Cardiology;  Laterality: N/A;   • CARDIAC ELECTROPHYSIOLOGY PROCEDURE N/A 4/25/2020    Procedure: Cardioversion;  Surgeon: Paulo Singleton MD;  Location: [facility] CATH INVASIVE LOCATION;  Service: Cardiology;  Laterality: N/A;   • CARDIAC ELECTROPHYSIOLOGY PROCEDURE N/A 7/29/2020    Procedure: PACEMAKER IMPLANTATION- DC;  Surgeon: Chong Buchanan MD;  Location: [facility] INVASIVE LOCATION;  Service: Cardiovascular;  Laterality: N/A;   • CARDIAC ELECTROPHYSIOLOGY PROCEDURE N/A 7/29/2020    Procedure: ABLATION AV NODE;  Surgeon: Chong Buchanan MD;  Location: [facility] INVASIVE LOCATION;  Service: Cardiovascular;  Laterality: N/A;   • CARDIAC ELECTROPHYSIOLOGY PROCEDURE N/A 7/30/2020    Procedure: ABLATION AV NODE PT HAS ST.BLESSING PPM;  Surgeon: Chong Buchanan MD;  Location: [facility] INVASIVE LOCATION;  Service: Cardiovascular;  Laterality: N/A;   • CHOLECYSTECTOMY     • COLONOSCOPY  2018   • COSMETIC SURGERY     • D&C HYSTEROSCOPY N/A 10/13/2016    Procedure: DILATATION AND CURETTAGE HYSTEROSCOPY WITH MYOSURE;  Surgeon: Christopher Doll MD;  Location: [facility];  Service:    • ENDOSCOPY     • EYE SURGERY Bilateral     cataract   • FACELIFT     • FEMORAL ARTERY - FEMORAL ARTERY BYPASS GRAFT Bilateral    • HEMORRHOIDECTOMY     • TONSILLECTOMY AND ADENOIDECTOMY     • UPPER GASTROINTESTINAL ENDOSCOPY     • VASCULAR SURGERY      femoral stents        Social History:  Social  "History     Socioeconomic History   • Marital status:      Spouse name: Not on file   • Number of children: Not on file   • Years of education: Not on file   • Highest education level: Not on file   Tobacco Use   • Smoking status: Former Smoker     Packs/day: 2.00     Years: 16.00     Pack years: 32.00     Types: Cigarettes     Last attempt to quit:      Years since quittin.6   • Smokeless tobacco: Never Used   Substance and Sexual Activity   • Alcohol use: No   • Drug use: No   • Sexual activity: Defer       Allergies:  Allergies   Allergen Reactions   • Sulfa Antibiotics Hives   • Infliximab Rash       Immunizations:  Immunization History   Administered Date(s) Administered   • FLUAD TRI 65YR+ 10/07/2019   • Fluzone High Dose =>65 Years ([employer] ONLY) 2018, 10/07/2019   • Tdap 2020       ROS:  Review of Systems   Constitution: Positive for malaise/fatigue.   Cardiovascular: Positive for dyspnea on exertion. Negative for chest pain, irregular heartbeat, leg swelling, near-syncope, orthopnea, palpitations, paroxysmal nocturnal dyspnea and syncope.   Respiratory: Negative for shortness of breath.    All other systems reviewed and are negative.         Objective:         /70   Pulse 80   Temp 97.5 °F (36.4 °C)   Resp 20   Ht 170.2 cm (67\")   Wt 64 kg (141 lb)   BMI 22.08 kg/m²     Physical Exam   Constitutional: She is oriented to person, place, and time. She appears well-developed and well-nourished. No distress.   HENT:   Head: Normocephalic and atraumatic.   Eyes: Pupils are equal, round, and reactive to light. Conjunctivae and EOM are normal. No scleral icterus.   Neck: Normal range of motion. No thyromegaly present.   Cardiovascular: Normal rate, regular rhythm and normal heart sounds.   Pulmonary/Chest: Effort normal and breath sounds normal.   Abdominal: Soft. Bowel sounds are normal.   Musculoskeletal: Normal range of motion.   Neurological: She is alert and oriented to " person, place, and time.   Skin: Skin is warm and dry.   Psychiatric: She has a normal mood and affect.       In-Office Procedure(s):  Procedures Pacemaker Eval interperted by [facility] [id number]  Battery YOLI    R wave paced  Threshold 0.5V  Impedance 630 ohms    Events  none    ASCVD RIsk Score::  The ASCVD Risk score (Rocio FAULKNER Jr., et al., 2013) failed to calculate for the following reasons:    The patient has a prior MI or stroke diagnosis    Recent Radiology:  Imaging Results (Most Recent)     None          Lab Review:   Admission on 07/29/2020, Discharged on 07/31/2020   Component Date Value   • Protime 07/29/2020 15.0    • INR 07/29/2020 1.3*   • Protime 07/29/2020 15.0    • INR 07/29/2020 1.3*   • Protime 07/29/2020 17.7*   • INR 07/29/2020 1.49*   • Protime 07/30/2020 17.0*   • INR 07/30/2020 1.41*   • Glucose 07/30/2020 125*   • BUN 07/30/2020 50*   • Creatinine 07/30/2020 1.25*   • Sodium 07/30/2020 137    • Potassium 07/30/2020 2.9*   • Chloride 07/30/2020 97*   • CO2 07/30/2020 26.6    • Calcium 07/30/2020 8.8    • eGFR Non African Amer 07/30/2020 42*   • BUN/Creatinine Ratio 07/30/2020 40.0*   • Anion Gap 07/30/2020 13.4    • WBC 07/30/2020 19.93*   • RBC 07/30/2020 4.46    • Hemoglobin 07/30/2020 11.6*   • Hematocrit 07/30/2020 36.5    • MCV 07/30/2020 81.8    • MCH 07/30/2020 26.0*   • MCHC 07/30/2020 31.8    • RDW 07/30/2020 19.8*   • RDW-SD 07/30/2020 57.7*   • MPV 07/30/2020 9.5    • Platelets 07/30/2020 308    • Protime 07/31/2020 18.1*   • INR 07/31/2020 1.53*   • Glucose 07/31/2020 129*   • BUN 07/31/2020 41*   • Creatinine 07/31/2020 1.12*   • Sodium 07/31/2020 141    • Potassium 07/31/2020 4.4    • Chloride 07/31/2020 103    • CO2 07/31/2020 25.9    • Calcium 07/31/2020 8.7    • eGFR Non African Amer 07/31/2020 47*   • BUN/Creatinine Ratio 07/31/2020 36.6*   • Anion Gap 07/31/2020 12.1    Lab on 07/27/2020   Component Date Value   • Glucose 07/27/2020 140*   • BUN 07/27/2020 48*   • Creatinine  07/27/2020 1.17*   • Sodium 07/27/2020 137    • Potassium 07/27/2020 3.7    • Chloride 07/27/2020 98    • CO2 07/27/2020 27.3    • Calcium 07/27/2020 9.1    • Total Protein 07/27/2020 6.1    • Albumin 07/27/2020 3.60    • ALT (SGPT) 07/27/2020 32    • AST (SGOT) 07/27/2020 18    • Alkaline Phosphatase 07/27/2020 71    • Total Bilirubin 07/27/2020 0.6    • eGFR Non African Amer 07/27/2020 45*   • Globulin 07/27/2020 2.5    • A/G Ratio 07/27/2020 1.4    • BUN/Creatinine Ratio 07/27/2020 41.0*   • Anion Gap 07/27/2020 11.7    • Protime 07/27/2020 19.7*   • INR 07/27/2020 1.71*   • WBC 07/27/2020 21.08*   • RBC 07/27/2020 4.60    • Hemoglobin 07/27/2020 12.3    • Hematocrit 07/27/2020 38.3    • MCV 07/27/2020 83.3    • MCH 07/27/2020 26.7    • MCHC 07/27/2020 32.1    • RDW 07/27/2020 21.1*   • RDW-SD 07/27/2020 59.2*   • MPV 07/27/2020 9.8    • Platelets 07/27/2020 477*   • Neutrophil % 07/27/2020 92.0*   • Lymphocyte % 07/27/2020 5.0*   • Monocyte % 07/27/2020 3.0*   • Neutrophils Absolute 07/27/2020 19.39*   • Lymphocytes Absolute 07/27/2020 1.05    • Monocytes Absolute 07/27/2020 0.63    • nRBC 07/27/2020 1.0*   • RBC Morphology 07/27/2020 Normal    • WBC Morphology 07/27/2020 Normal    • Platelet Morphology 07/27/2020 Normal    Lab on 07/27/2020   Component Date Value   • COVID19 07/27/2020 Not Detected    Lab on 07/22/2020   Component Date Value   • COVID19 07/22/2020 Not Detected    Hospital Outpatient Visit on 07/10/2020   Component Date Value   • RIGHT DORSALIS PEDIS SYS* 07/10/2020 178    • RIGHT POST TIBIAL SYS MAX 07/10/2020 174    • RIGHT GREAT TOE SYS MAX 07/10/2020 69    • RIGHT 2ND DIGIT SYS MAX 07/10/2020 75    • RIGHT TIFFANIE RATIO 07/10/2020 1.32    • RIGHT TBI RATIO 07/10/2020 0.51    • LEFT DORSALIS PEDIS SYS * 07/10/2020 120    • LEFT POST TIBIAL SYS MAX 07/10/2020 178    • LEFT GREAT TOE SYS MAX 07/10/2020 152    • LEFT TIFFANIE RATIO 07/10/2020 1.32    • LEFT TBI RATIO 07/10/2020 1.13    • Upper arterial  right arm* 07/10/2020 135    • Upper arterial left arm * 07/10/2020 134    Lab on 07/04/2020   Component Date Value   • COVID19 07/04/2020 Not Detected    Office Visit on 06/23/2020   Component Date Value   • INR 06/23/2020 1.50*   Admission on 06/16/2020, Discharged on 06/18/2020   Component Date Value   • Glucose 06/16/2020 213*   • BUN 06/16/2020 86*   • Creatinine 06/16/2020 2.16*   • Sodium 06/16/2020 139    • Potassium 06/16/2020 4.8    • Chloride 06/16/2020 96*   • CO2 06/16/2020 25.5    • Calcium 06/16/2020 9.3    • Total Protein 06/16/2020 6.2    • Albumin 06/16/2020 3.60    • ALT (SGPT) 06/16/2020 28    • AST (SGOT) 06/16/2020 35*   • Alkaline Phosphatase 06/16/2020 100    • Total Bilirubin 06/16/2020 1.2    • eGFR Non African Amer 06/16/2020 22*   • Globulin 06/16/2020 2.6    • A/G Ratio 06/16/2020 1.4    • BUN/Creatinine Ratio 06/16/2020 39.8*   • Anion Gap 06/16/2020 17.5*   • Protime 06/16/2020 15.3*   • INR 06/16/2020 1.24*   • WBC 06/16/2020 21.17*   • RBC 06/16/2020 4.97    • Hemoglobin 06/16/2020 12.2    • Hematocrit 06/16/2020 40.2    • MCV 06/16/2020 80.9    • MCH 06/16/2020 24.5*   • MCHC 06/16/2020 30.3*   • RDW 06/16/2020 20.9*   • RDW-SD 06/16/2020 57.9*   • MPV 06/16/2020 10.0    • Platelets 06/16/2020 353    • Neutrophil % 06/16/2020 89.3*   • Lymphocyte % 06/16/2020 2.8*   • Monocyte % 06/16/2020 2.8*   • Eosinophil % 06/16/2020 0.0*   • Basophil % 06/16/2020 0.3    • Immature Grans % 06/16/2020 4.8*   • Neutrophils, Absolute 06/16/2020 18.90*   • Lymphocytes, Absolute 06/16/2020 0.59*   • Monocytes, Absolute 06/16/2020 0.59    • Eosinophils, Absolute 06/16/2020 0.00    • Basophils, Absolute 06/16/2020 0.07    • Immature Grans, Absolute 06/16/2020 1.02*   • nRBC 06/16/2020 0.1    • Protime 06/16/2020 16.1*   • INR 06/16/2020 1.33*   • Creatine Kinase 06/16/2020 37    • Color, UA 06/16/2020 Yellow    • Appearance, UA 06/16/2020 Clear    • pH, UA 06/16/2020 <=5.0    • Specific [place], UA  06/16/2020 1.026    • Glucose, UA 06/16/2020 Negative    • Ketones, UA 06/16/2020 Trace*   • Bilirubin, UA 06/16/2020 Negative    • Blood, UA 06/16/2020 Negative    • Protein, UA 06/16/2020 Negative    • Leuk Esterase, UA 06/16/2020 Negative    • Nitrite, UA 06/16/2020 Negative    • Urobilinogen, UA 06/16/2020 0.2 E.U./dL    • Glucose 06/17/2020 112*   • BUN 06/17/2020 75*   • Creatinine 06/17/2020 1.49*   • Sodium 06/17/2020 138    • Potassium 06/17/2020 3.6    • Chloride 06/17/2020 99    • CO2 06/17/2020 27.0    • Calcium 06/17/2020 8.8    • eGFR Non  Amer 06/17/2020 34*   • BUN/Creatinine Ratio 06/17/2020 50.3*   • Anion Gap 06/17/2020 12.0    • WBC 06/17/2020 19.56*   • RBC 06/17/2020 4.83    • Hemoglobin 06/17/2020 11.9*   • Hematocrit 06/17/2020 38.9    • MCV 06/17/2020 80.5    • MCH 06/17/2020 24.6*   • MCHC 06/17/2020 30.6*   • RDW 06/17/2020 21.2*   • RDW-SD 06/17/2020 58.5*   • MPV 06/17/2020 10.1    • Platelets 06/17/2020 317    • Protime 06/17/2020 16.0*   • INR 06/17/2020 1.31*   • Glucose 06/18/2020 103*   • BUN 06/18/2020 47*   • Creatinine 06/18/2020 1.14*   • Sodium 06/18/2020 143    • Potassium 06/18/2020 3.6    • Chloride 06/18/2020 106    • CO2 06/18/2020 27.4    • Calcium 06/18/2020 8.7    • eGFR Non  Amer 06/18/2020 46*   • BUN/Creatinine Ratio 06/18/2020 41.2*   • Anion Gap 06/18/2020 9.6    • Protime 06/18/2020 15.6*   • INR 06/18/2020 1.27*   • WBC 06/18/2020 18.85*   • RBC 06/18/2020 4.34    • Hemoglobin 06/18/2020 10.7*   • Hematocrit 06/18/2020 34.7    • MCV 06/18/2020 80.0    • MCH 06/18/2020 24.7*   • MCHC 06/18/2020 30.8*   • RDW 06/18/2020 20.4*   • RDW-SD 06/18/2020 56.5*   • MPV 06/18/2020 9.4    • Platelets 06/18/2020 288    • Neutrophil % 06/18/2020 83.0*   • Lymphocyte % 06/18/2020 6.2*   • Monocyte % 06/18/2020 5.1    • Eosinophil % 06/18/2020 0.4    • Basophil % 06/18/2020 0.5    • Immature Grans % 06/18/2020 4.8*   • Neutrophils, Absolute 06/18/2020 15.67*   •  Lymphocytes, Absolute 06/18/2020 1.16    • Monocytes, Absolute 06/18/2020 0.96*   • Eosinophils, Absolute 06/18/2020 0.07    • Basophils, Absolute 06/18/2020 0.09    • Immature Grans, Absolute 06/18/2020 0.90*   • nRBC 06/18/2020 0.0    Lab on 06/16/2020   Component Date Value   • Protime 06/16/2020     • INR 06/16/2020                  Assessment:          Diagnosis Plan   1. Atrial fibrillation, persistent (CMS/HCC)     2. Essential hypertension     3. S/P atrioventricular hyacinth ablation     4. Presence of cardiac pacemaker            Plan:      1. Permanent AF - s/p AV node ablation, remains in complete heart block, on warfarin for CHADS of 3 (age, gender, HTN)  2. Pacemaker followup - normal device function    Enrolled in remote monitoring  RTC 1 year      Level of Care:                 Chong Buchanan MD  08/13/20  .

## 2020-08-14 ENCOUNTER — READMISSION MANAGEMENT (OUTPATIENT)
Dept: CALL CENTER | Facility: HOSPITAL | Age: 75
End: 2020-08-14

## 2020-08-14 PROBLEM — Z95.0 PRESENCE OF CARDIAC PACEMAKER: Status: ACTIVE | Noted: 2020-08-14

## 2020-08-14 PROBLEM — Z98.890 S/P ATRIOVENTRICULAR NODAL ABLATION: Status: ACTIVE | Noted: 2020-08-14

## 2020-08-14 NOTE — OUTREACH NOTE
General Surgery Week 3 Survey      Responses   [facility] patient discharged from?  [facility]   Does the patient have one of the following disease processes/diagnoses(primary or secondary)?  General Surgery   Week 3 attempt successful?  Yes   Call start time  1502   Call end time  1504   Discharge diagnosis  Persistent A-fib, AV node ablation & Pacemaker placed   Meds reviewed with patient/caregiver?  Yes   Is the patient taking all medications as directed (includes completed medication regime)?  Yes   Has the patient kept scheduled appointments due by today?  Yes   Psychosocial issues?  No   What is the patient's perception of their health status since discharge?  Returned to baseline/stable   Nursing interventions  Nurse provided patient education   Is the patient/caregiver able to teach back signs and symptoms of incisional infection?  Fever, Incisional warmth, Increased redness, swelling or pain at the incisonal site   Is the patient/caregiver able to teach back the hierarchy of who to call/visit for symptoms/problems? PCP, Specialist, Home health nurse, Urgent Care, ED, 911  Yes   Additional teach back comments  Pt doing well and has been for f/u appts. Incision looks good.   Week 3 call completed?  Yes   Revoked  No further contact(revokes)-requires comment   Wrap up additional comments  Pt back to baseline and doing well          Elsy Wolf, RN

## 2020-08-18 ENCOUNTER — TELEPHONE (OUTPATIENT)
Dept: INTERNAL MEDICINE | Facility: CLINIC | Age: 75
End: 2020-08-18

## 2020-08-18 NOTE — TELEPHONE ENCOUNTER
Please call Karolina regarding patient's INR and PT.  She can be reached at [phone].  Her INR was 2.4 and her PT was 28.5.  Please call her regarding her dosage. Thank you

## 2020-08-21 ENCOUNTER — TELEMEDICINE (OUTPATIENT)
Dept: CARDIOLOGY | Facility: CLINIC | Age: 75
End: 2020-08-21

## 2020-08-21 VITALS — DIASTOLIC BLOOD PRESSURE: 90 MMHG | HEART RATE: 72 BPM | SYSTOLIC BLOOD PRESSURE: 161 MMHG

## 2020-08-21 DIAGNOSIS — R53.82 CHRONIC FATIGUE: ICD-10-CM

## 2020-08-21 DIAGNOSIS — E78.49 OTHER HYPERLIPIDEMIA: ICD-10-CM

## 2020-08-21 DIAGNOSIS — R06.89 RESPIRATORY INSUFFICIENCY: Primary | ICD-10-CM

## 2020-08-21 DIAGNOSIS — I48.19 ATRIAL FIBRILLATION, PERSISTENT (HCC): ICD-10-CM

## 2020-08-21 DIAGNOSIS — I50.32 CHRONIC DIASTOLIC CHF (CONGESTIVE HEART FAILURE) (HCC): ICD-10-CM

## 2020-08-21 DIAGNOSIS — I10 ESSENTIAL HYPERTENSION: ICD-10-CM

## 2020-08-21 DIAGNOSIS — I27.20 PULMONARY HTN (HCC): ICD-10-CM

## 2020-08-21 DIAGNOSIS — Z79.01 LONG TERM CURRENT USE OF ANTICOAGULANT THERAPY: ICD-10-CM

## 2020-08-21 DIAGNOSIS — Z95.0 PRESENCE OF CARDIAC PACEMAKER: ICD-10-CM

## 2020-08-21 DIAGNOSIS — Z98.890 S/P ATRIOVENTRICULAR NODAL ABLATION: ICD-10-CM

## 2020-08-21 DIAGNOSIS — J43.9 PULMONARY EMPHYSEMA, UNSPECIFIED EMPHYSEMA TYPE (HCC): ICD-10-CM

## 2020-08-21 PROCEDURE — 99214 OFFICE O/P EST MOD 30 MIN: CPT | Performed by: INTERNAL MEDICINE

## 2020-08-21 RX ORDER — HYDRALAZINE HYDROCHLORIDE 25 MG/1
25 TABLET, FILM COATED ORAL 2 TIMES DAILY
Qty: 60 TABLET | Refills: 3 | Status: SHIPPED | OUTPATIENT
Start: 2020-08-21 | End: 2020-10-16

## 2020-08-21 NOTE — PROGRESS NOTES
[facility] [place] [facility]        Subjective:     Encounter Date:08/21/2020      Patient ID: Joellen Dominguez is a 75 y.o. female.      HPI: Joellen Dominguez agreed to a video visit secondary to coronavirus pandemic.  She continues to observe the CDC guidelines for social distancing.  She does not report any change in her sense of smell or taste.  She is free of fever or cough.  Ms. Dominguez reports generalized fatigue and weakness increased dyspnea on exertion and some lower extremity edema.  She denies chest pain pressure or tightness.  She is free of syncope or near syncope.  Her blood pressure is elevated at 161/90.  She underwent AV node ablation and pacemaker implantation 7/30/2020.  She admits to increased fluid intake.  She otherwise tolerates her medications well.  She is on long-term anticoagulation with Coumadin.  She denies any significant bruising or bleeding.  Echocardiogram obtained 4/19/2020 revealed left ventricular ejection fraction 62% mild to moderate concentric left ventricular hypertrophy, moderate mitral insufficiency mild tricuspid insufficiency right ventricular systolic pressure 39.2 mmHg.  Right left heart catheterization performed 4/22/2020 revealing preserved left ventricular function, mild mitral insufficiency non-obstructive coronary artery disease, pulmonary artery hypertension with a pressure 46/20.      The following portions of the patient's history were reviewed and updated as appropriate: allergies, current medications, past family history, past medical history, past social history, past surgical history and problem list.    Problem List:  Patient Active Problem List   Diagnosis   • Incontinence   • Degeneration of lumbar intervertebral disc   • Other hyperlipidemia   • Essential hypertension   • Depression   • Antiphospholipid antibody syndrome (CMS/HCC)   • Lichen sclerosus et atrophicus   • Myocardial infarction type 2 (CMS/HCC)   • History of cardioversion   • Leukocytosis   •  Pulmonary HTN (CMS/HCC)   • Crohn's disease of small intestine with complication (CMS/HCC)   • Cold agglutinin disease (CMS/HCC)   • Chronic diastolic CHF (congestive heart failure) (CMS/HCC)   • Warfarin-induced coagulopathy (CMS/HCC)   • Respiratory insufficiency   • COPD with emphysema (CMS/HCC)   • Inflammatory bowel disease (Crohn's disease) (CMS/HCC)   • Long term current use of anticoagulant therapy   • Atrial fibrillation, persistent (CMS/HCC)   • Chronic fatigue   • S/P atrioventricular hyacinth ablation   • Presence of cardiac pacemaker       Past Medical History:  Past Medical History:   Diagnosis Date   • Acute deep vein thrombosis of lower extremity (CMS/HCC)    • Antiphospholipid antibody syndrome (CMS/HCC)    • Antiphospholipid antibody syndrome (CMS/HCC)    • Arrhythmia     ATRIAL FIBRILLATION   • Arthritis     OSTEO   • Benign essential hypertension    • COPD (chronic obstructive pulmonary disease) (CMS/HCC)    • Coronary artery disease    • Crohn's disease (CMS/HCC)    • Cutaneous candidiasis    • Depression    • Disease of thyroid gland    • Elevated cholesterol    • GERD (gastroesophageal reflux disease)    • History of echocardiogram 04/22/2020    mild-to-moderate concentric hypertrophy, EF 56 - 60%. LA severely dilated, Mild MAC, Mild MR with restrictive movement of the posterior leaflet   • Hyperlipidemia    • Hypertension    • Myocardial infarction (CMS/HCC)    • Osteopenia    • Polyp, uterus corpus    • Pulmonary hypertension (CMS/HCC)        Past Surgical History:  Past Surgical History:   Procedure Laterality Date   • ABDOMINAL HERNIA REPAIR     • AMPUTATION DIGIT Left     SECOND TOE    • ARTERIOGRAM  7/30/2020    Procedure: Arteriogram;  Surgeon: Chong Buchanan MD;  Location: [facility] INVASIVE LOCATION;  Service: Cardiovascular;;  rt femoral   • BILATERAL SALPINGO OOPHORECTOMY     • BREAST BIOPSY Right     BENIGN   • CARDIAC CATHETERIZATION N/A 4/22/2020    Surgeon: Areli  MD Paulo;  nonsignificant coronary artery disease normal LV function nonsignificant mitral regurgitation probably shortness of breath due to the cardiac arrhythmias and diastolic dysfunction   • CARDIAC CATHETERIZATION N/A 4/22/2020    Procedure: Coronary angiography;  Surgeon: Paulo Singleton MD;  Location: [facility] CATH INVASIVE LOCATION;  Service: Cardiology;  Laterality: N/A;   • CARDIAC CATHETERIZATION N/A 4/22/2020    Procedure: Left ventriculography;  Surgeon: Paulo Singleton MD;  Location: [facility] CATH INVASIVE LOCATION;  Service: Cardiology;  Laterality: N/A;   • CARDIAC ELECTROPHYSIOLOGY PROCEDURE N/A 4/25/2020    Procedure: Cardioversion;  Surgeon: Paulo Singleton MD;  Location: [facility] CATH INVASIVE LOCATION;  Service: Cardiology;  Laterality: N/A;   • CARDIAC ELECTROPHYSIOLOGY PROCEDURE N/A 7/29/2020    Procedure: PACEMAKER IMPLANTATION- DC;  Surgeon: Chong Buchanan MD;  Location: [facility] INVASIVE LOCATION;  Service: Cardiovascular;  Laterality: N/A;   • CARDIAC ELECTROPHYSIOLOGY PROCEDURE N/A 7/29/2020    Procedure: ABLATION AV NODE;  Surgeon: Chong Buchanan MD;  Location: [facility] INVASIVE LOCATION;  Service: Cardiovascular;  Laterality: N/A;   • CARDIAC ELECTROPHYSIOLOGY PROCEDURE N/A 7/30/2020    Procedure: ABLATION AV NODE PT HAS ST.BLESSING PPM;  Surgeon: Chong Buchanan MD;  Location: [facility] INVASIVE LOCATION;  Service: Cardiovascular;  Laterality: N/A;   • CHOLECYSTECTOMY     • COLONOSCOPY  2018   • COSMETIC SURGERY     • D&C HYSTEROSCOPY N/A 10/13/2016    Procedure: DILATATION AND CURETTAGE HYSTEROSCOPY WITH MYOSURE;  Surgeon: Christopher Doll MD;  Location: [facility];  Service:    • ENDOSCOPY     • EYE SURGERY Bilateral     cataract   • FACELIFT     • FEMORAL ARTERY - FEMORAL ARTERY BYPASS GRAFT Bilateral    • HEMORRHOIDECTOMY     • TONSILLECTOMY AND ADENOIDECTOMY     • UPPER GASTROINTESTINAL ENDOSCOPY     • VASCULAR SURGERY      femoral  stents        Social History:  Social History     Socioeconomic History   • Marital status:      Spouse name: Not on file   • Number of children: Not on file   • Years of education: Not on file   • Highest education level: Not on file   Tobacco Use   • Smoking status: Former Smoker     Packs/day: 2.00     Years: 16.00     Pack years: 32.00     Types: Cigarettes     Last attempt to quit:      Years since quittin.6   • Smokeless tobacco: Never Used   Substance and Sexual Activity   • Alcohol use: No   • Drug use: No   • Sexual activity: Defer       Allergies:  Allergies   Allergen Reactions   • Sulfa Antibiotics Hives   • Infliximab Rash         ROS:  Review of Systems   Constitution: Positive for malaise/fatigue.   HENT: Negative for hearing loss and nosebleeds.    Eyes: Negative for double vision and visual disturbance.   Cardiovascular: Positive for dyspnea on exertion and leg swelling. Negative for chest pain, claudication, near-syncope, orthopnea, palpitations, paroxysmal nocturnal dyspnea and syncope.   Respiratory: Negative for cough, shortness of breath, sleep disturbances due to breathing, snoring, sputum production and wheezing.    Endocrine: Negative for cold intolerance, heat intolerance, polydipsia and polyuria.   Hematologic/Lymphatic: Negative for adenopathy and bleeding problem. Does not bruise/bleed easily.   Skin: Negative for flushing and itching.   Musculoskeletal: Negative for back pain, falls, joint pain, joint swelling, muscle weakness and neck pain.   Gastrointestinal: Negative for abdominal pain, dysphagia, heartburn, nausea and vomiting.   Genitourinary: Negative for dysuria and frequency.   Neurological: Negative for disturbances in coordination, dizziness, light-headedness, loss of balance, numbness and weakness.   Psychiatric/Behavioral: Negative for altered mental status and memory loss. The patient is not nervous/anxious.    Allergic/Immunologic: Negative for hives and  persistent infections.          Objective:         /90   Pulse 72     Physical Exam not performed    In-Office Procedure(s):  Procedures    ASCVD RIsk Score::  The ASCVD Risk score ([place] FAULKNER Jr., et al., 2013) failed to calculate for the following reasons:    The patient has a prior MI or stroke diagnosis        Assessment/Plan:         1. Respiratory insufficiency      2. Chronic fatigue      3. Atrial fibrillation, persistent (CMS/HCC)      4. S/P atrioventricular hyacinth ablation      5. Long term current use of anticoagulant therapy      6. Presence of cardiac pacemaker      7. Pulmonary HTN (CMS/HCC)      8. Essential hypertension      9. Chronic diastolic CHF (congestive heart failure) (CMS/HCC)      10. Other hyperlipidemia      11. Pulmonary emphysema, unspecified emphysema type (CMS/HCC)      Ms. Dominguez has progressive respiratory insufficiency.  This is multifactorial including her age weight deconditioning pulmonary hypertension uncontrolled essential hypertension and diastolic dysfunction.  I have encouraged her to restrict the salt in her diet is close to 2000 mg a day as possible. I recommend she reduce her fluid intake by 12 ounces daily.  I will initiate hydralazine 25 mg twice a day.  She will monitor her blood pressure on a daily basis as well as her respiratory status and lower extremity edema.  Should her symptoms not gradually improve she will communicate with me I will otherwise see her in follow-up in 1 week.    I spent 14 minutes on video visit in 12 minutes completing electronic medical record documentation       Karthik Reyez MD  08/21/20  .

## 2020-08-24 ENCOUNTER — PATIENT OUTREACH (OUTPATIENT)
Dept: CASE MANAGEMENT | Facility: OTHER | Age: 75
End: 2020-08-24

## 2020-08-24 NOTE — OUTREACH NOTE
Care Coordination Note    [facility] home [facility] called. No answer. Will attempt tomorrow to review patient's request of increasing the bath aide's frequency.     Chiara Shetty RN  Ambulatory     8/24/2020, 16:56

## 2020-08-24 NOTE — OUTREACH NOTE
Care Plan Note      Responses   Lifestyle Goals  Routine follow-up with doctor(s), Reduce blood pressure, Self monitor blood pressure, Record weight daily   Barriers  Disease education   Self Management  Medication Adherence, Other (See Comment) [decrease fluid consumption]   Suggested Appointments  Other (See Comment) [Patient scheduled for follow up on Friday 8/28/20. ]   Specific Disease Process Teaching  Heart Failure, Heart Disease [continue monitoring b/p daily and continue with medication adjustment and fluid adjustent. ]   Other Patient Education/Resources   24/7 [facility] Nurse Call Line   24/7 Nurse Call Line Education Method  Send Materials   Does patient have depression diagnosis?  Yes   Ed Visits past 12 months:  1   Hospitalizations past 12 months  3 or more [4]   Discharge destination:  Home   Medication Adherence  Medications understood   Goal Progress  Making Progress Toward Goal(s)   Readiness Scale  6   Confidence Scale  6   Health Literacy  Good        The main concerns and/or symptoms the patient would like to address are: Spoke with patient's  regarding patient's progress with her HTN management. Liberty still has an elevated B/P with the medication adjustent and decrease in fluids. Patient is scheduled for a follow up on Friday. Patient is to bring a summary of daily B/P readings for the week at the appointment. .The patient's main concern is that she would like the bath aide from [facility] to come twice weekly to assist with bathing.     Education/instruction provided by Care Coordinator: Introduced self, explained ACM RN role and provided contact information. Reviewed patient's B/P and medication adjustments and life style adjustments. Patient has follow up scheduled on Friday. Patient's  states there is only minimal change in her B/P at this time. Patient's B/P will continue to be monitored daily until the follow up Friday. Patient expressed concern of  increasing bath aide to twice weekly. ACM will continue to outreach.     Follow Up Outreach Due: 1 week    Chiara Shetty RN  Ambulatory     8/24/2020, 16:52

## 2020-08-25 ENCOUNTER — PATIENT OUTREACH (OUTPATIENT)
Dept: CASE MANAGEMENT | Facility: OTHER | Age: 75
End: 2020-08-25

## 2020-08-27 ENCOUNTER — TELEPHONE (OUTPATIENT)
Dept: CARDIOLOGY | Facility: CLINIC | Age: 75
End: 2020-08-27

## 2020-08-27 NOTE — TELEPHONE ENCOUNTER
Per Denice at Cardiac Rehab at [facility], pt would not qualify for rehab. Karolina notified. Kaylie

## 2020-08-27 NOTE — TELEPHONE ENCOUNTER
Karolina from Home Health wants to know if pt would qualify for cardiac rehab. She has not had a recent cardiac event other than an ablation and PPM placement in the past. I will call cardiac rehab and see what they think. Karolina can be reached back at  [phone]. Kaylie

## 2020-08-28 ENCOUNTER — TELEPHONE (OUTPATIENT)
Dept: CARDIOLOGY | Facility: CLINIC | Age: 75
End: 2020-08-28

## 2020-08-28 NOTE — TELEPHONE ENCOUNTER
RE: Home BP readings 8/21/20 - 8/28/20  Dr Karthik Reyez reviewed: continue current meds.  I spoke with pt's  & informed him of recommendations. RTRN

## 2020-09-08 ENCOUNTER — TELEPHONE (OUTPATIENT)
Dept: INTERNAL MEDICINE | Facility: CLINIC | Age: 75
End: 2020-09-08

## 2020-09-08 NOTE — TELEPHONE ENCOUNTER
Karolina Matos/[facility] [phone], patients: PT/INR results-25.5/2.1, 1MG M-W-F, 2MG all other days.

## 2020-09-09 ENCOUNTER — APPOINTMENT (OUTPATIENT)
Dept: CT IMAGING | Facility: HOSPITAL | Age: 75
End: 2020-09-09

## 2020-09-09 ENCOUNTER — HOSPITAL ENCOUNTER (EMERGENCY)
Facility: HOSPITAL | Age: 75
Discharge: HOME | End: 2020-09-09
Attending: EMERGENCY MEDICINE | Admitting: EMERGENCY MEDICINE

## 2020-09-09 VITALS
HEART RATE: 75 BPM | OXYGEN SATURATION: 95 % | BODY MASS INDEX: 21.97 KG/M2 | RESPIRATION RATE: 16 BRPM | HEIGHT: 67 IN | WEIGHT: 140 LBS | SYSTOLIC BLOOD PRESSURE: 146 MMHG | TEMPERATURE: 97.2 F | DIASTOLIC BLOOD PRESSURE: 70 MMHG

## 2020-09-09 DIAGNOSIS — S01.81XA LACERATION OF FOREHEAD, INITIAL ENCOUNTER: Primary | ICD-10-CM

## 2020-09-09 DIAGNOSIS — S09.90XA INJURY OF HEAD, INITIAL ENCOUNTER: ICD-10-CM

## 2020-09-09 LAB
INR PPP: 1.8 (ref 0.9–1.1)
PROTHROMBIN TIME: 20.4 SECONDS (ref 11.7–14.2)

## 2020-09-09 PROCEDURE — 99282 EMERGENCY DEPT VISIT SF MDM: CPT

## 2020-09-09 PROCEDURE — 85610 PROTHROMBIN TIME: CPT | Performed by: EMERGENCY MEDICINE

## 2020-09-09 PROCEDURE — 36415 COLL VENOUS BLD VENIPUNCTURE: CPT

## 2020-09-09 PROCEDURE — 70450 CT HEAD/BRAIN W/O DYE: CPT

## 2020-09-09 RX ORDER — LIDOCAINE HYDROCHLORIDE AND EPINEPHRINE 10; 10 MG/ML; UG/ML
10 INJECTION, SOLUTION INFILTRATION; PERINEURAL ONCE
Status: COMPLETED | OUTPATIENT
Start: 2020-09-09 | End: 2020-09-09

## 2020-09-09 RX ADMIN — LIDOCAINE HYDROCHLORIDE,EPINEPHRINE BITARTRATE 10 ML: 10; .01 INJECTION, SOLUTION INFILTRATION; PERINEURAL at 16:19

## 2020-09-09 NOTE — ED TRIAGE NOTES
Tripped and fell 30 minutes ago.  Hit head.  No loc.  Large forehead lac.  Is on coumadin.      Patient was placed in face mask during first look triage.  Patient was wearing a face mask throughout encounter.  I wore personal protective equipment throughout the encounter.  Hand hygiene was performed before and after patient encounter.

## 2020-09-09 NOTE — ED PROVIDER NOTES
Laceration Repair  Date/Time: 9/9/2020 3:10 PM  Performed by: Homero Lee PA  Authorized by: Jameson Meyer MD     Consent:     Consent obtained:  Verbal    Consent given by:  Patient  Anesthesia (see MAR for exact dosages):     Anesthesia method:  Local infiltration    Local anesthetic:  Lidocaine 1% WITH epi  Laceration details:     Location:  Face    Face location:  Forehead    Length (cm):  4.7  Repair type:     Repair type:  Intermediate  Pre-procedure details:     Preparation:  Patient was prepped and draped in usual sterile fashion  Exploration:     Hemostasis achieved with:  Epinephrine    Wound exploration: entire depth of wound probed and visualized      Contaminated: no    Treatment:     Area cleansed with:  Hibiclens    Amount of cleaning:  Standard    Irrigation solution:  Sterile saline    Irrigation method:  Syringe    Visualized foreign bodies/material removed: no    Subcutaneous repair:     Suture size:  5-0    Suture material:  Vicryl    Suture technique:  Simple interrupted    Number of sutures:  4  Skin repair:     Repair method:  Sutures    Suture size:  5-0    Suture material:  Nylon    Suture technique:  Running    Number of sutures:  13  Approximation:     Approximation:  Close  Post-procedure details:     Dressing:  Antibiotic ointment    Patient tolerance of procedure:  Tolerated well, no immediate complications           Homero Lee PA  09/09/20 [id number]

## 2020-09-09 NOTE — ED PROVIDER NOTES
EMERGENCY DEPARTMENT ENCOUNTER    Room Number:  WEBER/E  PCP: Chitra Leyva MD  Historian: Patient  History Limited By: Nothing      HPI  Chief Complaint: Fall head injury  Context: Joellen Dominguez is a 75 y.o. female who presents to the ED c/o fall head injury.  Patient states she was in her kitchen and tripped fell hitting forehead on furniture.  Patient has no neck pain.  Patient denies loss of consciousness.  Patient has had no vomiting.  Patient states she has had baseline shortness of breath.  Patient is on Coumadin but had her INR checked and it was below 3 yesterday.      Location: Head injury  Radiation: None  Character: Aching  Duration: 1 hour ago  Severity: Moderate  Progression: Not improved  Aggravating Factors: Nothing  Alleviating Factors: Nothing        MEDICAL RECORD REVIEW    Patient has history of atrial fibrillation chronic fatigue and pulmonary hypertension          PAST MEDICAL HISTORY  Active Ambulatory Problems     Diagnosis Date Noted   • Incontinence 04/29/2019   • Degeneration of lumbar intervertebral disc 07/25/2018   • Other hyperlipidemia 04/29/2019   • Essential hypertension 04/29/2019   • Depression 04/29/2019   • Antiphospholipid antibody syndrome (CMS/[facility]) 04/29/2019   • Lichen sclerosus et atrophicus 03/17/2020   • Myocardial infarction type 2 (CMS/[facility]) 04/19/2020   • History of cardioversion 04/27/2020   • Leukocytosis 04/27/2020   • Pulmonary HTN (CMS/[facility]) 04/27/2020   • Crohn's disease of small intestine with complication (CMS/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) 05/20/2020   • Warfarin-induced coagulopathy (CMS/[facility]) 05/20/2020   • Respiratory insufficiency 06/03/2020   • COPD with emphysema (CMS/[facility]) 06/09/2020   • Inflammatory bowel disease (Crohn's disease) (CMS/[facility]) 06/17/2020   • Long term current use of anticoagulant therapy 06/19/2020   • Atrial fibrillation, persistent (CMS/[facility])  07/22/2020   • Chronic fatigue 07/22/2020   • S/P atrioventricular hyacinth ablation 08/14/2020   • Presence of cardiac pacemaker 08/14/2020     Resolved Ambulatory Problems     Diagnosis Date Noted   • Sepsis with acute renal failure and septic shock (CMS/[facility]) 04/19/2020   • PAF (paroxysmal atrial fibrillation) (CMS/HCC) 04/27/2020   • Rapid atrial fibrillation (CMS/[facility]) 05/19/2020   • Lactic acidosis 05/20/2020   • Acute kidney injury ([facility]) with acute tubular necrosis (ATN) (CMS/HCC) 06/16/2020   • Edema due to hypervolemia 07/22/2020   • PAF (paroxysmal atrial fibrillation) (CMS/[facility]) 07/07/2020     Past Medical History:   Diagnosis Date   • Acute deep vein thrombosis of lower extremity (CMS/[facility])    • Arrhythmia    • Arthritis    • Benign essential hypertension    • COPD (chronic obstructive pulmonary disease) (CMS/[facility])    • Coronary artery disease    • Crohn's disease (CMS/[facility])    • Cutaneous candidiasis    • Disease of thyroid gland    • Elevated cholesterol    • GERD (gastroesophageal reflux disease)    • History of echocardiogram 04/22/2020   • Hyperlipidemia    • Hypertension    • Myocardial infarction (CMS/[facility])    • Osteopenia    • Polyp, uterus corpus    • Pulmonary hypertension (CMS/[facility])          PAST SURGICAL HISTORY  Past Surgical History:   Procedure Laterality Date   • ABDOMINAL HERNIA REPAIR     • AMPUTATION DIGIT Left     SECOND TOE    • ARTERIOGRAM  7/30/2020    Procedure: Arteriogram;  Surgeon: Chong Buchanan MD;  Location: [facility] INVASIVE LOCATION;  Service: Cardiovascular;;  rt femoral   • BILATERAL SALPINGO OOPHORECTOMY     • BREAST BIOPSY Right     BENIGN   • CARDIAC CATHETERIZATION N/A 4/22/2020    Surgeon: Paulo Singleton MD;  nonsignificant coronary artery disease normal LV function nonsignificant mitral regurgitation probably shortness of breath due to the cardiac arrhythmias and diastolic dysfunction   • CARDIAC CATHETERIZATION N/A 4/22/2020    Procedure: Coronary  angiography;  Surgeon: Paulo Singleton MD;  Location: [facility] CATH INVASIVE LOCATION;  Service: Cardiology;  Laterality: N/A;   • CARDIAC CATHETERIZATION N/A 4/22/2020    Procedure: Left ventriculography;  Surgeon: Paulo Singleton MD;  Location: [facility] CATH INVASIVE LOCATION;  Service: Cardiology;  Laterality: N/A;   • CARDIAC ELECTROPHYSIOLOGY PROCEDURE N/A 4/25/2020    Procedure: Cardioversion;  Surgeon: Paulo Singleton MD;  Location: [facility] CATH INVASIVE LOCATION;  Service: Cardiology;  Laterality: N/A;   • CARDIAC ELECTROPHYSIOLOGY PROCEDURE N/A 7/29/2020    Procedure: PACEMAKER IMPLANTATION- DC;  Surgeon: Chong Buchanan MD;  Location: [facility] CATH INVASIVE LOCATION;  Service: Cardiovascular;  Laterality: N/A;   • CARDIAC ELECTROPHYSIOLOGY PROCEDURE N/A 7/29/2020    Procedure: ABLATION AV NODE;  Surgeon: Chong Buchanan MD;  Location: [facility] CATH INVASIVE LOCATION;  Service: Cardiovascular;  Laterality: N/A;   • CARDIAC ELECTROPHYSIOLOGY PROCEDURE N/A 7/30/2020    Procedure: ABLATION AV NODE PT HAS ST.BLESSING PPM;  Surgeon: Chong Buchanan MD;  Location: [facility] CATH INVASIVE LOCATION;  Service: Cardiovascular;  Laterality: N/A;   • CHOLECYSTECTOMY     • COLONOSCOPY  2018   • COSMETIC SURGERY     • D&C HYSTEROSCOPY N/A 10/13/2016    Procedure: DILATATION AND CURETTAGE HYSTEROSCOPY WITH MYOSURE;  Surgeon: Christopher Doll MD;  Location: [facility];  Service:    • ENDOSCOPY     • EYE SURGERY Bilateral     cataract   • FACELIFT     • FEMORAL ARTERY - FEMORAL ARTERY BYPASS GRAFT Bilateral    • HEMORRHOIDECTOMY     • TONSILLECTOMY AND ADENOIDECTOMY     • UPPER GASTROINTESTINAL ENDOSCOPY     • VASCULAR SURGERY      femoral stents          FAMILY HISTORY  Family History   Problem Relation Age of Onset   • Lung cancer Paternal Uncle    • Hypertension Mother    • Liver disease Mother    • Anemia Mother    • No Known Problems Father    • Autoimmune disease Brother    • Colon cancer  Neg Hx    • Colon polyps Neg Hx          SOCIAL HISTORY  Social History     Socioeconomic History   • Marital status:      Spouse name: Not on file   • Number of children: Not on file   • Years of education: Not on file   • Highest education level: Not on file   Tobacco Use   • Smoking status: Former Smoker     Packs/day: 2.00     Years: 16.00     Pack years: 32.00     Types: Cigarettes     Last attempt to quit: 1967     Years since quittin.7   • Smokeless tobacco: Never Used   Substance and Sexual Activity   • Alcohol use: No   • Drug use: No   • Sexual activity: Defer         ALLERGIES  Sulfa antibiotics and Infliximab        REVIEW OF SYSTEMS  Review of Systems   Constitutional: Negative for fever.   HENT: Negative for sore throat.    Eyes: Negative.    Respiratory: Negative for cough and shortness of breath.    Cardiovascular: Negative for chest pain.   Gastrointestinal: Negative for abdominal pain, diarrhea and vomiting.   Genitourinary: Negative for dysuria.   Musculoskeletal: Negative for neck pain.   Skin: Positive for wound. Negative for rash.   Allergic/Immunologic: Negative.    Neurological: Positive for headaches. Negative for weakness and numbness.   Hematological: Negative.    Psychiatric/Behavioral: Negative.    All other systems reviewed and are negative.           PHYSICAL EXAM  ED Triage Vitals [20 1333]   Temp Heart Rate Resp BP SpO2   97.2 °F (36.2 °C) 75 16 -- 96 %      Temp src Heart Rate Source Patient Position BP Location FiO2 (%)   Tympanic Monitor -- -- --       Physical Exam   Constitutional: She is oriented to person, place, and time. No distress.   HENT:   Head: Normocephalic and atraumatic.   Eyes: Pupils are equal, round, and reactive to light. EOM are normal.   Neck: Normal range of motion. Neck supple.   Cardiovascular: Normal rate, regular rhythm and normal heart sounds.   Pulmonary/Chest: Effort normal and breath sounds normal. No respiratory distress.    Abdominal: Soft. There is no tenderness. There is no rebound and no guarding.   Musculoskeletal: Normal range of motion. She exhibits no edema.   Neurological: She is alert and oriented to person, place, and time. She has normal sensation and normal strength.   Skin: Skin is warm and dry. No rash noted.   5 cm laceration to forehead   Psychiatric: Mood and affect normal.   Nursing note and vitals reviewed.          LAB RESULTS  Recent Results (from the past 24 hour(s))   Protime-INR    Collection Time: 09/09/20  1:53 PM   Result Value Ref Range    Protime 20.4 (H) 11.7 - 14.2 Seconds    INR 1.80 (H) 0.90 - 1.10       Ordered the above labs and reviewed the results.        RADIOLOGY  CT Head Without Contrast   Preliminary Result   1. Motion artifact slightly limits evaluation. There is also   beam-hardening artifact extending over the extra-axial space over the   lateral left frontotemporal parietal region slightly limiting   evaluation. There is mild small vessel disease in the frontal   periventricular white matter.    2. There is a scalp hematoma and bubbles of air in the scalp from a   scalp laceration overlying the anterior lateral left frontal bone from   today's head trauma. The remainder of the head CT is normal with no   acute skull fracture or intracranial hemorrhage identified.       Radiation dose reduction techniques were utilized, including automated   exposure control and exposure modulation based on body size.                   Ordered the above noted radiological studies. Reviewed by me in PACS.  Discussed with Dr. Nice(radiologist) regarding CT/MRI scan results.          PROCEDURES  Procedures            MEDICATIONS GIVEN IN ER  Medications   lidocaine-EPINEPHrine (XYLOCAINE W/EPI) 1 %-1:554140 injection 10 mL (10 mL Injection Given 9/9/20 1619)             PROGRESS AND CONSULTS  ED Course as of Sep 09 1659   Wed Sep 09, 2020   1606 16:07  Patient here for fall and laceration.  Patient's  laceration has been repaired.  Patient is on Coumadin however head CT negative.  Patient is up-to-date on her tetanus.  Will be discharged home.    [SL]      ED Course User Index  [SL] Jameson Meyer MD           MEDICAL DECISION MAKING      MDM  Number of Diagnoses or Management Options  Injury of head, initial encounter:   Laceration of forehead, initial encounter:      Amount and/or Complexity of Data Reviewed  Clinical lab tests: reviewed  Tests in the radiology section of CPT®: reviewed and ordered (Head CT negative)               DIAGNOSIS  Final diagnoses:   Laceration of forehead, initial encounter   Injury of head, initial encounter           DISPOSITION  DISCHARGE    Patient discharged in stable condition.    Reviewed implications of results, diagnosis, meds, responsibility to follow up, warning signs and symptoms of possible worsening, potential complications and reasons to return to ER, including worsening symptoms.    Patient/Family voiced understanding of above instructions.    Discussed plan for discharge, as there is no emergent indication for admission. Patient referred to primary care provider for BP management due to today's BP. Pt/family is agreeable and understands need for follow up and repeat testing.  Pt is aware that discharge does not mean that nothing is wrong but it indicates no emergency is present that requires admission and they must continue care with follow-up as given below or physician of their choice.     FOLLOW-UP  Chitra Leyva MD  [address] [id number] [phone]    Schedule an appointment as soon as possible for a visit            Medication List      Changed    predniSONE 10 MG tablet  Commonly known as:  DELTASONE  Take 3 tablets by mouth Daily With Breakfast.  What changed:    when to take this  additional instructions     warfarin 2 MG tablet  Commonly known as:  COUMADIN  INR 2 - 3.  Indications: Atrial Fibrillation  What changed:    how  much to take  how to take this  when to take this  additional instructions                Latest Documented Vital Signs:  As of 16:59  BP- 146/70 HR- 75 Temp- 97.2 °F (36.2 °C) (Tympanic) O2 sat- 95%                       Jameson Meyer MD  09/09/20 1700

## 2020-09-10 ENCOUNTER — TELEPHONE (OUTPATIENT)
Dept: INTERNAL MEDICINE | Facility: CLINIC | Age: 75
End: 2020-09-10

## 2020-09-10 NOTE — TELEPHONE ENCOUNTER
PATIENTS  CALLED AND REQUESTED AN APPOINTMENT TO HAVE THE STICHES REMOVED FROM A FALL.  PATIENT WENT TO ER YESTERDAY.  STICHES NEED TO BE REMOVED IN ONE WEEK.  ER DID A CAT SCAN AND THERE WAS NO INTERNAL INJURIES.  PLEASE SCHEDULE    CALL BACK #: [phone]

## 2020-09-15 ENCOUNTER — TELEPHONE (OUTPATIENT)
Dept: CARDIOLOGY | Facility: CLINIC | Age: 75
End: 2020-09-15

## 2020-09-15 ENCOUNTER — OFFICE VISIT (OUTPATIENT)
Dept: INTERNAL MEDICINE | Facility: CLINIC | Age: 75
End: 2020-09-15

## 2020-09-15 VITALS — TEMPERATURE: 97.3 F

## 2020-09-15 DIAGNOSIS — Z23 NEED FOR INFLUENZA VACCINATION: Primary | ICD-10-CM

## 2020-09-15 DIAGNOSIS — L89.210: ICD-10-CM

## 2020-09-15 DIAGNOSIS — Z48.02 VISIT FOR SUTURE REMOVAL: ICD-10-CM

## 2020-09-15 PROCEDURE — 90694 VACC AIIV4 NO PRSRV 0.5ML IM: CPT | Performed by: INTERNAL MEDICINE

## 2020-09-15 PROCEDURE — G0008 ADMIN INFLUENZA VIRUS VAC: HCPCS | Performed by: INTERNAL MEDICINE

## 2020-09-15 PROCEDURE — 99214 OFFICE O/P EST MOD 30 MIN: CPT | Performed by: INTERNAL MEDICINE

## 2020-09-15 NOTE — PROGRESS NOTES
Assessment and Plan  Joellen was seen today for hospital follow up visit.    Diagnoses and all orders for this visit:    Need for influenza vaccination  -     Fluad Quad 65+ yrs ([id number])    Pressure ulcer of hip, right, unstageable (CMS/[facility])  Comments:  Looks like it is healing well- to contact us with changes, worseing, etc.     Visit for suture removal  Comments:  uneventful- call with problems.       F/U and Patient Instructions    No follow-ups on file.  There are no Patient Instructions on file for this visit.    Subjective    Joellen Dominguez is a 75 y.o. female being seen in our office today for Hospital Follow Up Visit (stitches out)     History of the Present Illness  HPItripped over her wheelchair- cut forehead- stitches removed easily- wound well approximated, no evidence of problems.   She also has an area on R hip to check - has been being followed by home health and - keeps it covered and she tries to lie on the opposite side.  There is no pain/tenderness.     Patient History        Significant Past History  The following portions of the patient's history were reviewed and updated as appropriate:PMHroutine: Social history , Allergies, Current Medications, Active Problem List and Health Maintenance              Social History  She  reports that she quit smoking about 53 years ago. Her smoking use included cigarettes. She has a 32.00 pack-year smoking history. She has never used smokeless tobacco. She reports that she does not drink alcohol or use drugs.                         Review of Symptoms  Review of Systems   Constitution: Positive for weight loss.   Cardiovascular: Positive for dyspnea on exertion. Negative for chest pain.   Respiratory: Negative for cough.    Endocrine: Negative.    Musculoskeletal: Positive for arthritis.   Gastrointestinal: Negative.    Genitourinary: Negative.    Psychiatric/Behavioral: Negative.      Objective  Vital Signs         BP Readings from Last 1  Encounters:   09/09/20 146/70     Wt Readings from Last 3 Encounters:   09/09/20 63.5 kg (140 lb)   08/13/20 64 kg (141 lb)   08/10/20 61.2 kg (135 lb)   There is no height or weight on file to calculate BMI.          Physical Exam   Physical Exam  Constitutional:       Appearance: Normal appearance.   Cardiovascular:      Rate and Rhythm: Normal rate.   Pulmonary:      Effort: Accessory muscle usage present.      Breath sounds: No wheezing.   Skin:            Comments: Wound R forehead- clean and dry       Data Reviewed    Recent Results (from the past 2016 hour(s))   COVID-19,[facility], NP SWAB IN [place] SALINE MEDIA 24-30 HR TAT - Swab, Nasopharynx    Collection Time: 07/04/20 12:14 PM    Specimen: Nasopharynx; Swab   Result Value Ref Range    Reference Lab Report      COVID19 Not Detected Not Detected - Ref. Range   Doppler Ankle Brachial Index Single Level CAR    Collection Time: 07/10/20  9:26 AM   Result Value Ref Range    RIGHT DORSALIS PEDIS SYS  mmHg    RIGHT POST TIBIAL SYS  mmHg    RIGHT GREAT TOE SYS MAX 69 mmHg    RIGHT 2ND DIGIT SYS MAX 75 mmHg    RIGHT TIFFANIE RATIO 1.32     RIGHT TBI RATIO 0.51     LEFT DORSALIS PEDIS SYS  mmHg    LEFT POST TIBIAL SYS  mmHg    LEFT GREAT TOE SYS  mmHg    LEFT TIFFANIE RATIO 1.32     LEFT TBI RATIO 1.13     Upper arterial right arm brachial sys max 135 mmHg    Upper arterial left arm brachial sys max 134 mmHg   COVID-19,[facility], NP/OP SWAB IN TRANSPORT MEDIA OR SALINE 24-36 HR TAT - Swab, Nasopharynx    Collection Time: 07/22/20 10:47 AM    Specimen: Nasopharynx; Swab   Result Value Ref Range    Reference Lab Report      COVID19 Not Detected Not Detected - Ref. Range   COVID-19,[facility], NP/OP SWAB IN TRANSPORT MEDIA OR SALINE 24-36 HR TAT - Swab, Nasopharynx    Collection Time: 07/27/20 11:14 AM    Specimen: Nasopharynx; Swab   Result Value Ref Range    Reference Lab Report      COVID19 Not Detected Not Detected - Ref. Range   Comprehensive  Metabolic Panel    Collection Time: 07/27/20 12:08 PM    Specimen: Blood   Result Value Ref Range    Glucose 140 (H) 65 - 99 mg/dL    BUN 48 (H) 8 - 23 mg/dL    Creatinine 1.17 (H) 0.57 - 1.00 mg/dL    Sodium 137 136 - 145 mmol/L    Potassium 3.7 3.5 - 5.2 mmol/L    Chloride 98 98 - 107 mmol/L    CO2 27.3 22.0 - 29.0 mmol/L    Calcium 9.1 8.6 - 10.5 mg/dL    Total Protein 6.1 6.0 - 8.5 g/dL    Albumin 3.60 3.50 - 5.20 g/dL    ALT (SGPT) 32 1 - 33 U/L    AST (SGOT) 18 1 - 32 U/L    Alkaline Phosphatase 71 39 - 117 U/L    Total Bilirubin 0.6 0.0 - 1.2 mg/dL    eGFR Non African Amer 45 (L) >60 mL/min/1.73    Globulin 2.5 gm/dL    A/G Ratio 1.4 g/dL    BUN/Creatinine Ratio 41.0 (H) 7.0 - 25.0    Anion Gap 11.7 5.0 - 15.0 mmol/L   Protime-INR    Collection Time: 07/27/20 12:08 PM    Specimen: Blood   Result Value Ref Range    Protime 19.7 (H) 11.7 - 14.2 Seconds    INR 1.71 (H) 0.90 - 1.10   CBC Auto Differential    Collection Time: 07/27/20 12:08 PM    Specimen: Blood   Result Value Ref Range    WBC 21.08 (H) 3.40 - 10.80 10*3/mm3    RBC 4.60 3.77 - 5.28 10*6/mm3    Hemoglobin 12.3 12.0 - 15.9 g/dL    Hematocrit 38.3 34.0 - 46.6 %    MCV 83.3 79.0 - 97.0 fL    MCH 26.7 26.6 - 33.0 pg    MCHC 32.1 31.5 - 35.7 g/dL    RDW 21.1 (H) 12.3 - 15.4 %    RDW-SD 59.2 (H) 37.0 - 54.0 fl    MPV 9.8 6.0 - 12.0 fL    Platelets 477 (H) 140 - 450 10*3/mm3   Manual Differential    Collection Time: 07/27/20 12:08 PM    Specimen: Blood   Result Value Ref Range    Neutrophil % 92.0 (H) 42.7 - 76.0 %    Lymphocyte % 5.0 (L) 19.6 - 45.3 %    Monocyte % 3.0 (L) 5.0 - 12.0 %    Neutrophils Absolute 19.39 (H) 1.70 - 7.00 10*3/mm3    Lymphocytes Absolute 1.05 0.70 - 3.10 10*3/mm3    Monocytes Absolute 0.63 0.10 - 0.90 10*3/mm3    nRBC 1.0 (H) 0.0 - 0.2 /100 WBC    RBC Morphology Normal Normal    WBC Morphology Normal Normal    Platelet Morphology Normal Normal   POC Protime / INR    Collection Time: 07/29/20  2:23 PM    Specimen: Blood   Result  Value Ref Range    Protime 15.0 12.8 - 15.2 seconds    INR 1.3 (H) 0.8 - 1.2   POC Protime / INR    Collection Time: 07/29/20  2:24 PM    Specimen: Blood   Result Value Ref Range    Protime 15.0 seconds    INR 1.3 (A) 0.9 - 1.1   Protime-INR    Collection Time: 07/29/20  7:53 PM    Specimen: Blood   Result Value Ref Range    Protime 17.7 (H) 11.7 - 14.2 Seconds    INR 1.49 (H) 0.90 - 1.10   Protime-INR    Collection Time: 07/30/20  5:09 AM    Specimen: Arm, Left; Blood   Result Value Ref Range    Protime 17.0 (H) 11.7 - 14.2 Seconds    INR 1.41 (H) 0.90 - 1.10   Basic Metabolic Panel    Collection Time: 07/30/20  5:09 AM    Specimen: Blood   Result Value Ref Range    Glucose 125 (H) 65 - 99 mg/dL    BUN 50 (H) 8 - 23 mg/dL    Creatinine 1.25 (H) 0.57 - 1.00 mg/dL    Sodium 137 136 - 145 mmol/L    Potassium 2.9 (L) 3.5 - 5.2 mmol/L    Chloride 97 (L) 98 - 107 mmol/L    CO2 26.6 22.0 - 29.0 mmol/L    Calcium 8.8 8.6 - 10.5 mg/dL    eGFR Non African Amer 42 (L) >60 mL/min/1.73    BUN/Creatinine Ratio 40.0 (H) 7.0 - 25.0    Anion Gap 13.4 5.0 - 15.0 mmol/L   CBC (No Diff)    Collection Time: 07/30/20  5:09 AM    Specimen: Blood   Result Value Ref Range    WBC 19.93 (H) 3.40 - 10.80 10*3/mm3    RBC 4.46 3.77 - 5.28 10*6/mm3    Hemoglobin 11.6 (L) 12.0 - 15.9 g/dL    Hematocrit 36.5 34.0 - 46.6 %    MCV 81.8 79.0 - 97.0 fL    MCH 26.0 (L) 26.6 - 33.0 pg    MCHC 31.8 31.5 - 35.7 g/dL    RDW 19.8 (H) 12.3 - 15.4 %    RDW-SD 57.7 (H) 37.0 - 54.0 fl    MPV 9.5 6.0 - 12.0 fL    Platelets 308 140 - 450 10*3/mm3   Protime-INR    Collection Time: 07/31/20  4:44 AM    Specimen: Arm, Left; Blood   Result Value Ref Range    Protime 18.1 (H) 11.7 - 14.2 Seconds    INR 1.53 (H) 0.90 - 1.10   Basic Metabolic Panel    Collection Time: 07/31/20  4:44 AM    Specimen: Blood   Result Value Ref Range    Glucose 129 (H) 65 - 99 mg/dL    BUN 41 (H) 8 - 23 mg/dL    Creatinine 1.12 (H) 0.57 - 1.00 mg/dL    Sodium 141 136 - 145 mmol/L     Potassium 4.4 3.5 - 5.2 mmol/L    Chloride 103 98 - 107 mmol/L    CO2 25.9 22.0 - 29.0 mmol/L    Calcium 8.7 8.6 - 10.5 mg/dL    eGFR Non African Amer 47 (L) >60 mL/min/1.73    BUN/Creatinine Ratio 36.6 (H) 7.0 - 25.0    Anion Gap 12.1 5.0 - 15.0 mmol/L   Protime-INR    Collection Time: 09/09/20  1:53 PM    Specimen: Blood   Result Value Ref Range    Protime 20.4 (H) 11.7 - 14.2 Seconds    INR 1.80 (H) 0.90 - 1.10

## 2020-09-15 NOTE — TELEPHONE ENCOUNTER
CNA PT    PT B/P has been running in the 100/50 range and yesterday she had trouble breathing B/P 120/70 so the  held her B/P meds and this morning she feels better her B/P is 109/41.

## 2020-09-16 PROBLEM — R53.82 CHRONIC FATIGUE: Status: RESOLVED | Noted: 2020-07-22 | Resolved: 2020-09-16

## 2020-09-16 PROBLEM — K50.019 CROHN'S DISEASE OF SMALL INTESTINE WITH COMPLICATION: Status: RESOLVED | Noted: 2020-05-06 | Resolved: 2020-09-16

## 2020-09-22 ENCOUNTER — HOSPITAL ENCOUNTER (OUTPATIENT)
Dept: CT IMAGING | Facility: HOSPITAL | Age: 75
Discharge: HOME | End: 2020-09-22
Admitting: INTERNAL MEDICINE

## 2020-09-22 DIAGNOSIS — R91.8 PULMONARY INFILTRATE: ICD-10-CM

## 2020-09-22 DIAGNOSIS — J18.9 PNEUMONIA DUE TO INFECTIOUS ORGANISM, UNSPECIFIED LATERALITY, UNSPECIFIED PART OF LUNG: ICD-10-CM

## 2020-09-22 PROCEDURE — 71250 CT THORAX DX C-: CPT

## 2020-09-24 ENCOUNTER — TELEPHONE (OUTPATIENT)
Dept: CARDIOLOGY | Facility: CLINIC | Age: 75
End: 2020-09-24

## 2020-09-24 NOTE — TELEPHONE ENCOUNTER
Spoke with . Pt's  says this is to better better PHTN and this is an experiment before starting other meds to control. Pulm will need to also monitor kidney functions. Pt does not have a nephroAj Lott

## 2020-09-24 NOTE — TELEPHONE ENCOUNTER
CNA PT    PT had a tele visit with her Pulm DR and he wants to put her on a medication but concerned cause her feet are swelling.  The PT  wants DR Angeles thoughts on putting her on Lasix from Chlorthalidone.

## 2020-09-25 ENCOUNTER — TRANSCRIBE ORDERS (OUTPATIENT)
Dept: ADMINISTRATIVE | Facility: HOSPITAL | Age: 75
End: 2020-09-25

## 2020-09-25 DIAGNOSIS — R91.8 PULMONARY INFILTRATE: Primary | ICD-10-CM

## 2020-09-28 NOTE — TELEPHONE ENCOUNTER
CNA  REQUESTING PRESCRIPTION AND HAS NOTICED EDEMA IN LEGS & FEET AND HARD TIME BREATHING.   SAYED STATED HE DIDN'T MONITOR DIURETICS PER DAUGHTER IN LAW JULISSA [phone]    Pt joceline 9/30 @1330 w/ [id number]

## 2020-09-30 ENCOUNTER — OFFICE VISIT (OUTPATIENT)
Dept: CARDIOLOGY | Facility: CLINIC | Age: 75
End: 2020-09-30

## 2020-09-30 ENCOUNTER — RESULTS ENCOUNTER (OUTPATIENT)
Dept: CARDIOLOGY | Facility: CLINIC | Age: 75
End: 2020-09-30

## 2020-09-30 VITALS
SYSTOLIC BLOOD PRESSURE: 122 MMHG | WEIGHT: 152 LBS | BODY MASS INDEX: 23.86 KG/M2 | DIASTOLIC BLOOD PRESSURE: 60 MMHG | HEIGHT: 67 IN | HEART RATE: 73 BPM | RESPIRATION RATE: 22 BRPM | OXYGEN SATURATION: 98 %

## 2020-09-30 DIAGNOSIS — R06.89 RESPIRATORY INSUFFICIENCY: Primary | ICD-10-CM

## 2020-09-30 DIAGNOSIS — I48.19 ATRIAL FIBRILLATION, PERSISTENT (HCC): ICD-10-CM

## 2020-09-30 DIAGNOSIS — I10 ESSENTIAL HYPERTENSION: ICD-10-CM

## 2020-09-30 DIAGNOSIS — I50.32 CHRONIC DIASTOLIC CHF (CONGESTIVE HEART FAILURE) (HCC): ICD-10-CM

## 2020-09-30 DIAGNOSIS — Z98.890 S/P ATRIOVENTRICULAR NODAL ABLATION: ICD-10-CM

## 2020-09-30 DIAGNOSIS — I27.20 PULMONARY HTN (HCC): ICD-10-CM

## 2020-09-30 DIAGNOSIS — R06.89 RESPIRATORY INSUFFICIENCY: ICD-10-CM

## 2020-09-30 DIAGNOSIS — Z79.01 LONG TERM CURRENT USE OF ANTICOAGULANT THERAPY: ICD-10-CM

## 2020-09-30 DIAGNOSIS — Z95.0 PRESENCE OF CARDIAC PACEMAKER: ICD-10-CM

## 2020-09-30 DIAGNOSIS — J43.9 PULMONARY EMPHYSEMA, UNSPECIFIED EMPHYSEMA TYPE (HCC): ICD-10-CM

## 2020-09-30 DIAGNOSIS — E78.5 DYSLIPIDEMIA: ICD-10-CM

## 2020-09-30 PROBLEM — E78.49 OTHER HYPERLIPIDEMIA: Status: RESOLVED | Noted: 2019-04-29 | Resolved: 2020-09-30

## 2020-09-30 PROCEDURE — 93288 INTERROG EVL PM/LDLS PM IP: CPT | Performed by: INTERNAL MEDICINE

## 2020-09-30 PROCEDURE — 99215 OFFICE O/P EST HI 40 MIN: CPT | Performed by: INTERNAL MEDICINE

## 2020-09-30 RX ORDER — FUROSEMIDE 20 MG/1
20 TABLET ORAL DAILY
Qty: 30 TABLET | Refills: 11 | Status: SHIPPED | OUTPATIENT
Start: 2020-09-30 | End: 2020-10-09

## 2020-09-30 NOTE — PROGRESS NOTES
[facility]        Subjective:     Encounter Date:09/30/2020      Patient ID: Joellen Dominguez is a 75 y.o. female.      HPI: I saw Joellen Dominguez today for continued cardiovascular care.  She is a 75-year-old female who observes the CDC guidelines for social distancing.  She denies fever or change in sense of smell or taste.  She has severe respiratory insufficiency and reports increased dyspnea on exertion and at rest.  She has an occasional cough.  She denies any chest pain pressure tightness consistent with angina but does report occasional pleuritic chest discomfort.  Her lower extremity edema has improved but not completely resolved.  She is able to sleep with 2 small pillows if she lies on her right side free of significant shortness of breath.  She denies syncope or near syncope and does not report any significant palpitations.  Her respiratory insufficiency has worsened and she is visibly short of breath with increased work of breathing.  She has atrial fibrillation with a rapid ventricular response and is on long-term anticoagulation and amiodarone.  She tolerates her anticoagulation well.  She underwent AV node ablation and permanent pacemaker placement 7/29/2020.  Pacemaker interrogation today reveals normal device function 10 years left on battery life.  She has a history of hypertension which is controlled.  She has known dyslipidemia and remains on atorvastatin 10 mg daily.  Echocardiogram obtained 4/19/2020 revealed preserved left ventricular contractility mild to moderate concentric left ventricular hypertrophy, moderate mitral insufficiency, right ventricular systolic pressure 39.2 mmHg.  She is currently undergoing pulmonary evaluation by Dr. Trey Luque.      The following portions of the patient's history were reviewed and updated as appropriate: allergies, current medications, past family history, past medical history, past social history, past surgical history and problem  list.    Problem List:  Patient Active Problem List   Diagnosis   • Incontinence   • Degeneration of lumbar intervertebral disc   • Essential hypertension   • Depression   • Antiphospholipid antibody syndrome (CMS/HCC)   • Lichen sclerosus et atrophicus   • Myocardial infarction type 2 (CMS/HCC)   • History of cardioversion   • Leukocytosis   • Pulmonary HTN (CMS/HCC)   • Cold agglutinin disease (CMS/HCC)   • Chronic diastolic CHF (congestive heart failure) (CMS/HCC)   • Warfarin-induced coagulopathy (CMS/HCC)   • COPD with emphysema (CMS/HCC)   • Inflammatory bowel disease (Crohn's disease) (CMS/HCC)   • Long term current use of anticoagulant therapy   • Atrial fibrillation, persistent (CMS/HCC)   • S/P atrioventricular hyacinth ablation   • Presence of cardiac pacemaker   • Respiratory insufficiency   • Dyslipidemia       Past Medical History:  Past Medical History:   Diagnosis Date   • Acute deep vein thrombosis of lower extremity (CMS/HCC)    • Antiphospholipid antibody syndrome (CMS/HCC)    • Antiphospholipid antibody syndrome (CMS/HCC)    • Arrhythmia     ATRIAL FIBRILLATION   • Arthritis     OSTEO   • Benign essential hypertension    • COPD (chronic obstructive pulmonary disease) (CMS/HCC)    • Coronary artery disease    • Crohn's disease (CMS/HCC)    • Cutaneous candidiasis    • Depression    • Disease of thyroid gland    • Elevated cholesterol    • GERD (gastroesophageal reflux disease)    • History of echocardiogram 04/22/2020    mild-to-moderate concentric hypertrophy, EF 56 - 60%. LA severely dilated, Mild MAC, Mild MR with restrictive movement of the posterior leaflet   • Hyperlipidemia    • Hypertension    • Myocardial infarction (CMS/HCC)    • Osteopenia    • Polyp, uterus corpus    • Pulmonary hypertension (CMS/HCC)        Past Surgical History:  Past Surgical History:   Procedure Laterality Date   • ABDOMINAL HERNIA REPAIR     • AMPUTATION DIGIT Left     SECOND TOE    • ARTERIOGRAM  7/30/2020     Procedure: Arteriogram;  Surgeon: Chong Buchanan MD;  Location: [facility] INVASIVE LOCATION;  Service: Cardiovascular;;  rt femoral   • BILATERAL SALPINGO OOPHORECTOMY     • BREAST BIOPSY Right     BENIGN   • CARDIAC CATHETERIZATION N/A 4/22/2020    Surgeon: Paulo Singleton MD;  nonsignificant coronary artery disease normal LV function nonsignificant mitral regurgitation probably shortness of breath due to the cardiac arrhythmias and diastolic dysfunction   • CARDIAC CATHETERIZATION N/A 4/22/2020    Procedure: Coronary angiography;  Surgeon: Paulo Singleton MD;  Location: [facility] INVASIVE LOCATION;  Service: Cardiology;  Laterality: N/A;   • CARDIAC CATHETERIZATION N/A 4/22/2020    Procedure: Left ventriculography;  Surgeon: Paulo Singleton MD;  Location: [facility] INVASIVE LOCATION;  Service: Cardiology;  Laterality: N/A;   • CARDIAC ELECTROPHYSIOLOGY PROCEDURE N/A 4/25/2020    Procedure: Cardioversion;  Surgeon: Paulo Singleton MD;  Location: [facility] INVASIVE LOCATION;  Service: Cardiology;  Laterality: N/A;   • CARDIAC ELECTROPHYSIOLOGY PROCEDURE N/A 7/29/2020    Procedure: PACEMAKER IMPLANTATION- DC;  Surgeon: Chong Buchanan MD;  Location: [facility] INVASIVE LOCATION;  Service: Cardiovascular;  Laterality: N/A;   • CARDIAC ELECTROPHYSIOLOGY PROCEDURE N/A 7/29/2020    Procedure: ABLATION AV NODE;  Surgeon: Chong Buchanan MD;  Location: [facility] INVASIVE LOCATION;  Service: Cardiovascular;  Laterality: N/A;   • CARDIAC ELECTROPHYSIOLOGY PROCEDURE N/A 7/30/2020    Procedure: ABLATION AV NODE PT HAS ST.BLESSING PPM;  Surgeon: Chong Buchanan MD;  Location: [facility] INVASIVE LOCATION;  Service: Cardiovascular;  Laterality: N/A;   • CHOLECYSTECTOMY     • COLONOSCOPY  2018   • COSMETIC SURGERY     • D&C HYSTEROSCOPY N/A 10/13/2016    Procedure: DILATATION AND CURETTAGE HYSTEROSCOPY WITH MYOSURE;  Surgeon: Christopher Doll MD;  Location: [facility]  OR;  Service:    • ENDOSCOPY     • EYE SURGERY Bilateral     cataract   • FACELIFT     • FEMORAL ARTERY - FEMORAL ARTERY BYPASS GRAFT Bilateral    • HEMORRHOIDECTOMY     • TONSILLECTOMY AND ADENOIDECTOMY     • UPPER GASTROINTESTINAL ENDOSCOPY     • VASCULAR SURGERY      femoral stents        Social History:  Social History     Socioeconomic History   • Marital status:      Spouse name: Not on file   • Number of children: Not on file   • Years of education: Not on file   • Highest education level: Not on file   Tobacco Use   • Smoking status: Former Smoker     Packs/day: 2.00     Years: 16.00     Pack years: 32.00     Types: Cigarettes     Quit date:      Years since quittin.7   • Smokeless tobacco: Never Used   Substance and Sexual Activity   • Alcohol use: No   • Drug use: No   • Sexual activity: Defer       Allergies:  Allergies   Allergen Reactions   • Sulfa Antibiotics Hives   • Infliximab Rash         ROS:  Review of Systems   Constitution: Negative for malaise/fatigue.   HENT: Negative for hearing loss and nosebleeds.    Eyes: Negative for double vision and visual disturbance.   Cardiovascular: Positive for dyspnea on exertion. Negative for chest pain, claudication, near-syncope, orthopnea, palpitations, paroxysmal nocturnal dyspnea and syncope.        Pleuritic chest pain   Respiratory: Positive for shortness of breath. Negative for cough, sleep disturbances due to breathing, snoring, sputum production and wheezing.    Endocrine: Negative for cold intolerance, heat intolerance, polydipsia and polyuria.   Hematologic/Lymphatic: Negative for adenopathy and bleeding problem. Does not bruise/bleed easily.   Skin: Negative for flushing and itching.   Musculoskeletal: Negative for back pain, falls, joint pain, joint swelling, muscle weakness and neck pain.   Gastrointestinal: Negative for abdominal pain, dysphagia, heartburn, nausea and vomiting.   Genitourinary: Negative for dysuria and frequency.  "  Neurological: Negative for disturbances in coordination, dizziness, light-headedness, loss of balance, numbness and weakness.   Psychiatric/Behavioral: Negative for altered mental status and memory loss. The patient is not nervous/anxious.    Allergic/Immunologic: Negative for hives and persistent infections.          Objective:         /60 (BP Location: Left arm, Patient Position: Sitting, Cuff Size: Large Adult)   Pulse 73   Resp 22   Ht 170.2 cm (67\")   Wt 68.9 kg (152 lb)   SpO2 98%   BMI 23.81 kg/m²     Constitutional:       Appearance: Well-developed. Chronically ill-appearing.   Eyes:      Conjunctiva/sclera: Conjunctivae normal.      Pupils: Pupils are equal, round, and reactive to light.   HENT:      Head: Normocephalic and atraumatic.   Neck:      Musculoskeletal: Neck supple.      Thyroid: No thyromegaly.   Pulmonary:      Effort: Increased respiratory effort. Tachypnea, accessory muscle usage and respiratory distress present.      Breath sounds: Decreased air movement present. No wheezing. Rhonchi present. No rales.   Cardiovascular:      Normal rate. Regular rhythm. S1. Distant S2. Distant       No gallop. No S3 and S4 gallop. Midsystolic click click.   Edema:     Peripheral edema present.     Pretibial: bilateral trace edema of the pretibial area.  Abdominal:      General: Bowel sounds are normal. There is no distension.      Palpations: Abdomen is soft. There is no abdominal mass.      Tenderness: There is no abdominal tenderness.   Musculoskeletal: Normal range of motion.   Skin:     General: Skin is warm and dry.      Findings: No erythema.   Neurological:      Mental Status: Alert and oriented to person, place, and time.         In-Office Procedure(s):  Procedures    ASCVD RIsk Score::  The ASCVD Risk score ([place] DC Jr., et al., 2013) failed to calculate for the following reasons:    The patient has a prior MI or stroke diagnosis        Assessment/Plan:         1. Respiratory " insufficiency  Chronic and progressive  - Basic Metabolic Panel; Future  - furosemide (LASIX) 20 MG tablet; Take 1 tablet by mouth Daily.  Dispense: 30 tablet; Refill: 11    2. Pulmonary emphysema, unspecified emphysema type (CMS/HCC)  Symptomatic    3. Pulmonary HTN (CMS/HCC)  Mild to moderate    4. Atrial fibrillation, persistent (CMS/HCC)  Stable    5. S/P atrioventricular hyacinth ablation  Stable    6. Long term current use of anticoagulant therapy  Stable    7. Presence of cardiac pacemaker  Normal device function    8. Essential hypertension  Controlled    9. Chronic diastolic CHF (congestive heart failure) (CMS/HCC)  Mild right-sided volume excess    10. Dyslipidemia  Continue atorvastatin 10 mg daily    Ms. Dominguez is exhibiting increased work of breathing accessory muscle use and shortness of breath at rest.  She demonstrates trace bilateral lower extremity edema.  She has normal left ventricular contractility.  I will obtain a basic metabolic panel today if she has demonstrated prerenal azotemia in the past.  We will likely initiate furosemide starting at 20 mg orally.  I have had a long discussion with the patient and her  today and have recommended that she consider hospitalization if her symptoms do not improve in the next 24 to 48 hours.  As an outpatient we will monitor her vital signs renal function and electrolytes closely.  Should there be any further progression of her respiratory insufficiency I counseled her to proceed with hospitalization.  I will directly discuss treatment plan with Dr. Luque.  I will tentatively plan to see her in follow-up in 1 week.  I spent over 45 minutes of face-to-face time with Ms. Dominguez today.         Karthik Reyez MD  09/30/20  .

## 2020-10-02 DIAGNOSIS — E87.6 HYPOKALEMIA: Primary | ICD-10-CM

## 2020-10-02 RX ORDER — POTASSIUM CHLORIDE 20 MEQ/1
20 TABLET, EXTENDED RELEASE ORAL 2 TIMES DAILY
Qty: 60 TABLET | Refills: 5 | Status: CANCELLED | OUTPATIENT
Start: 2020-10-02

## 2020-10-02 RX ORDER — POTASSIUM CHLORIDE 20 MEQ/1
20 TABLET, EXTENDED RELEASE ORAL 2 TIMES DAILY
Qty: 180 TABLET | Refills: 1 | Status: SHIPPED | OUTPATIENT
Start: 2020-10-02 | End: 2021-03-01

## 2020-10-02 NOTE — TELEPHONE ENCOUNTER
Spoke with pt per CNA's request. Labs showed decreased K and we need to start potassium supplement. Rx sent and we need to repeat labs Monday. Order placed and will be drawn at [facility] in our building. Pt does reports that her SOA is better and she has lost 5lb. Kaylie

## 2020-10-02 NOTE — PROGRESS NOTES
Spoke with pt per CNA's request. Labs showed decreased K and we need to start potassium supplement. Rx sent and we need to repeat labs Monday. Order placed and will be drawn at [facility] in our building. Pt does reports that her SOA is better and she has lost 5lb. Kaylie

## 2020-10-05 ENCOUNTER — RESULTS ENCOUNTER (OUTPATIENT)
Dept: CARDIOLOGY | Facility: CLINIC | Age: 75
End: 2020-10-05

## 2020-10-05 DIAGNOSIS — E87.6 HYPOKALEMIA: ICD-10-CM

## 2020-10-06 ENCOUNTER — TELEPHONE (OUTPATIENT)
Dept: INTERNAL MEDICINE | Facility: CLINIC | Age: 75
End: 2020-10-06

## 2020-10-06 NOTE — TELEPHONE ENCOUNTER
YO FROM [facility] CALLED PT/INR 16.0  INR 1.3  PT ON 2MG X4 DAYS THEN 1MG X3 DAYS PLEASE ADVISE OF FURTHER INSTRUCTIONS [phone]

## 2020-10-09 ENCOUNTER — OFFICE VISIT (OUTPATIENT)
Dept: CARDIOLOGY | Facility: CLINIC | Age: 75
End: 2020-10-09

## 2020-10-09 VITALS
HEIGHT: 67 IN | DIASTOLIC BLOOD PRESSURE: 72 MMHG | HEART RATE: 70 BPM | WEIGHT: 146 LBS | SYSTOLIC BLOOD PRESSURE: 138 MMHG | BODY MASS INDEX: 22.91 KG/M2

## 2020-10-09 DIAGNOSIS — Z98.890 S/P ATRIOVENTRICULAR NODAL ABLATION: ICD-10-CM

## 2020-10-09 DIAGNOSIS — Z79.01 LONG TERM CURRENT USE OF ANTICOAGULANT THERAPY: ICD-10-CM

## 2020-10-09 DIAGNOSIS — J43.9 PULMONARY EMPHYSEMA, UNSPECIFIED EMPHYSEMA TYPE (HCC): ICD-10-CM

## 2020-10-09 DIAGNOSIS — Z95.0 PRESENCE OF CARDIAC PACEMAKER: ICD-10-CM

## 2020-10-09 DIAGNOSIS — I10 ESSENTIAL HYPERTENSION: ICD-10-CM

## 2020-10-09 DIAGNOSIS — I50.32 CHRONIC DIASTOLIC CHF (CONGESTIVE HEART FAILURE) (HCC): ICD-10-CM

## 2020-10-09 DIAGNOSIS — I27.20 PULMONARY HTN (HCC): ICD-10-CM

## 2020-10-09 DIAGNOSIS — E78.5 DYSLIPIDEMIA: ICD-10-CM

## 2020-10-09 DIAGNOSIS — I48.19 ATRIAL FIBRILLATION, PERSISTENT (HCC): ICD-10-CM

## 2020-10-09 DIAGNOSIS — R06.89 RESPIRATORY INSUFFICIENCY: Primary | ICD-10-CM

## 2020-10-09 PROCEDURE — 99214 OFFICE O/P EST MOD 30 MIN: CPT | Performed by: INTERNAL MEDICINE

## 2020-10-09 RX ORDER — FUROSEMIDE 40 MG/1
40 TABLET ORAL DAILY
Qty: 90 TABLET | Refills: 3 | Status: SHIPPED | OUTPATIENT
Start: 2020-10-09 | End: 2021-08-30

## 2020-10-09 NOTE — PROGRESS NOTES
[facility]        Subjective:     Encounter Date:10/09/2020      Patient ID: Joellen Dominguez is a 75 y.o. female.      HPI: I saw Medardo Dominguez today for continued cardiovascular care.  She is a pleasant 75-year-old female who observes CDC guidelines for social distancing.  She remains free of fever cough or increased shortness of breath.  She COPD with chronic respiratory insufficiency.  When last seen 9/30/2020 she had worsening respiratory insufficiency and hypervolemia.  We initiated low-dose diuretic therapy with Lasix 20 mg daily and potassium supplementation.  We have monitored her electrolytes and renal function.  Her respiratory status is improved.  She continues however to have increased work of breathing while at rest.  She clearly has continued dyspnea on exertion but it is improved.  She has known atrial fibrillation remains on long-term anticoagulation as well as amiodarone.  She does not report any significant bleeding or bruising.  Pacemaker interrogation reveals normal device function no significant dysrhythmia.  Her blood pressure is controlled.  She remains on atorvastatin for control of her dyslipidemia.  Most recent basic metabolic panel obtained 10/5/2020 revealed a potassium of 3.7, BUN 42 and creatinine 1.52.      The following portions of the patient's history were reviewed and updated as appropriate: allergies, current medications, past family history, past medical history, past social history, past surgical history and problem list.    Problem List:  Patient Active Problem List   Diagnosis   • Incontinence   • Degeneration of lumbar intervertebral disc   • Essential hypertension   • Depression   • Antiphospholipid antibody syndrome (CMS/HCC)   • Lichen sclerosus et atrophicus   • Myocardial infarction type 2 (CMS/HCC)   • History of cardioversion   • Leukocytosis   • Pulmonary HTN (CMS/HCC)   • Cold agglutinin disease   • Chronic diastolic CHF (congestive heart failure)  (CMS/HCC)   • Warfarin-induced coagulopathy (CMS/HCC)   • COPD with emphysema (CMS/HCC)   • Inflammatory bowel disease (Crohn's disease) (CMS/HCC)   • Long term current use of anticoagulant therapy   • Atrial fibrillation, persistent (CMS/HCC)   • S/P atrioventricular hyacinth ablation   • Presence of cardiac pacemaker   • Respiratory insufficiency   • Dyslipidemia       Past Medical History:  Past Medical History:   Diagnosis Date   • Acute deep vein thrombosis of lower extremity (CMS/HCC)    • Antiphospholipid antibody syndrome (CMS/HCC)    • Antiphospholipid antibody syndrome (CMS/HCC)    • Arrhythmia     ATRIAL FIBRILLATION   • Arthritis     OSTEO   • Benign essential hypertension    • COPD (chronic obstructive pulmonary disease) (CMS/HCC)    • Coronary artery disease    • Crohn's disease (CMS/HCC)    • Cutaneous candidiasis    • Depression    • Disease of thyroid gland    • Elevated cholesterol    • GERD (gastroesophageal reflux disease)    • History of echocardiogram 04/22/2020    mild-to-moderate concentric hypertrophy, EF 56 - 60%. LA severely dilated, Mild MAC, Mild MR with restrictive movement of the posterior leaflet   • Hyperlipidemia    • Hypertension    • Myocardial infarction (CMS/HCC)    • Osteopenia    • Polyp, uterus corpus    • Pulmonary hypertension (CMS/HCC)        Past Surgical History:  Past Surgical History:   Procedure Laterality Date   • ABDOMINAL HERNIA REPAIR     • AMPUTATION DIGIT Left     SECOND TOE    • ARTERIOGRAM  7/30/2020    Procedure: Arteriogram;  Surgeon: Chong Buchanan MD;  Location: [facility] INVASIVE LOCATION;  Service: Cardiovascular;;  rt femoral   • BILATERAL SALPINGO OOPHORECTOMY     • BREAST BIOPSY Right     BENIGN   • CARDIAC CATHETERIZATION N/A 4/22/2020    Surgeon: Paulo Singleton MD;  nonsignificant coronary artery disease normal LV function nonsignificant mitral regurgitation probably shortness of breath due to the cardiac arrhythmias and diastolic  dysfunction   • CARDIAC CATHETERIZATION N/A 4/22/2020    Procedure: Coronary angiography;  Surgeon: Paulo Singleton MD;  Location: [facility] CATH INVASIVE LOCATION;  Service: Cardiology;  Laterality: N/A;   • CARDIAC CATHETERIZATION N/A 4/22/2020    Procedure: Left ventriculography;  Surgeon: Paulo Singleton MD;  Location[facility] CATH INVASIVE LOCATION;  Service: Cardiology;  Laterality: N/A;   • CARDIAC ELECTROPHYSIOLOGY PROCEDURE N/A 4/25/2020    Procedure: Cardioversion;  Surgeon: Paulo Singleton MD;  Location: [facility] CATH INVASIVE LOCATION;  Service: Cardiology;  Laterality: N/A;   • CARDIAC ELECTROPHYSIOLOGY PROCEDURE N/A 7/29/2020    Procedure: PACEMAKER IMPLANTATION- DC;  Surgeon: Chong Buchanan MD;  Location: [facility] CATH INVASIVE LOCATION;  Service: Cardiovascular;  Laterality: N/A;   • CARDIAC ELECTROPHYSIOLOGY PROCEDURE N/A 7/29/2020    Procedure: ABLATION AV NODE;  Surgeon: Chong Buchanan MD;  Location: [facility] CATH INVASIVE LOCATION;  Service: Cardiovascular;  Laterality: N/A;   • CARDIAC ELECTROPHYSIOLOGY PROCEDURE N/A 7/30/2020    Procedure: ABLATION AV NODE PT HAS ST.BLESSING PPM;  Surgeon: Chong Buchanan MD;  Location: [facility] CATH INVASIVE LOCATION;  Service: Cardiovascular;  Laterality: N/A;   • CHOLECYSTECTOMY     • COLONOSCOPY  2018   • COSMETIC SURGERY     • D&C HYSTEROSCOPY N/A 10/13/2016    Procedure: DILATATION AND CURETTAGE HYSTEROSCOPY WITH MYOSURE;  Surgeon: Christopher Doll MD;  Location: [facility];  Service:    • ENDOSCOPY     • EYE SURGERY Bilateral     cataract   • FACELIFT     • FEMORAL ARTERY - FEMORAL ARTERY BYPASS GRAFT Bilateral    • HEMORRHOIDECTOMY     • TONSILLECTOMY AND ADENOIDECTOMY     • UPPER GASTROINTESTINAL ENDOSCOPY     • VASCULAR SURGERY      femoral stents        Social History:  Social History     Socioeconomic History   • Marital status:      Spouse name: Not on file   • Number of children: Not on file   • Years of  "education: Not on file   • Highest education level: Not on file   Tobacco Use   • Smoking status: Former Smoker     Packs/day: 2.00     Years: 16.00     Pack years: 32.00     Types: Cigarettes     Quit date:      Years since quittin.8   • Smokeless tobacco: Never Used   Substance and Sexual Activity   • Alcohol use: No   • Drug use: No   • Sexual activity: Defer       Allergies:  Allergies   Allergen Reactions   • Sulfa Antibiotics Hives   • Infliximab Rash         ROS:  Review of Systems   Constitution: Negative for malaise/fatigue.   HENT: Negative for hearing loss and nosebleeds.    Eyes: Negative for double vision and visual disturbance.   Cardiovascular: Positive for dyspnea on exertion. Negative for chest pain, claudication, near-syncope, orthopnea, palpitations, paroxysmal nocturnal dyspnea and syncope.   Respiratory: Negative for cough, shortness of breath, sleep disturbances due to breathing, snoring, sputum production and wheezing.    Endocrine: Negative for cold intolerance, heat intolerance, polydipsia and polyuria.   Hematologic/Lymphatic: Negative for adenopathy and bleeding problem. Does not bruise/bleed easily.   Skin: Negative for flushing and itching.   Musculoskeletal: Negative for back pain, falls, joint pain, joint swelling, muscle weakness and neck pain.   Gastrointestinal: Negative for abdominal pain, dysphagia, heartburn, nausea and vomiting.   Genitourinary: Negative for dysuria and frequency.   Neurological: Negative for disturbances in coordination, dizziness, light-headedness, loss of balance, numbness and weakness.   Psychiatric/Behavioral: Negative for altered mental status and memory loss. The patient is not nervous/anxious.    Allergic/Immunologic: Negative for hives and persistent infections.          Objective:         /72   Pulse 70   Ht 170.2 cm (67\")   Wt 66.2 kg (146 lb)   BMI 22.87 kg/m²     Constitutional:       Appearance: Well-developed. Chronically " ill-appearing.   Eyes:      Conjunctiva/sclera: Conjunctivae normal.      Pupils: Pupils are equal, round, and reactive to light.   HENT:      Head: Normocephalic and atraumatic.   Neck:      Musculoskeletal: Neck supple.      Thyroid: No thyromegaly.   Pulmonary:      Effort: Increased respiratory effort. Tachypnea, accessory muscle usage and respiratory distress present.      Breath sounds: Decreased air movement present. No wheezing. Rhonchi present. No rales.   Cardiovascular:      Normal rate. Regular rhythm. S1. Distant S2. Distant       No gallop. No S3 and S4 gallop. Midsystolic click click.   Edema:     Peripheral edema present.     Pretibial: bilateral trace edema of the pretibial area.  Abdominal:      General: Bowel sounds are normal. There is no distension.      Palpations: Abdomen is soft. There is no abdominal mass.      Tenderness: There is no abdominal tenderness.   Musculoskeletal: Normal range of motion.   Skin:     General: Skin is warm and dry.      Findings: No erythema.   Neurological:      Mental Status: Alert and oriented to person, place, and time.     No significant change in today's physical exam    In-Office Procedure(s):  Procedures    ASCVD RIsk Score::  The ASCVD Risk score ([place] DC Jr., et al., 2013) failed to calculate for the following reasons:    The patient has a prior MI or stroke diagnosis        Assessment/Plan:         1. Respiratory insufficiency  Persistent but improved    2. Pulmonary emphysema, unspecified emphysema type (CMS/HCC)      3. Pulmonary HTN (CMS/HCC)    - Ambulatory Referral to Pulmonology    4. Chronic diastolic CHF (congestive heart failure) (CMS/HCC)      5. Atrial fibrillation, persistent (CMS/HCC)      6. Long term current use of anticoagulant therapy      7. S/P atrioventricular hyacinth ablation      8. Presence of cardiac pacemaker      9. Essential hypertension      10. Dyslipidemia    Ms. Mensah feels that her respiratory status is improved.  I will  advance furosemide to 40 mg daily continue potassium 20 mEq twice daily.  We will obtain a basic metabolic panel in 1 week.  Pacemaker interrogation today again revealed normal device function no significant dysrhythmias.  She has requested a second opinion pulmonary evaluation which I will assist her with.  I will tentatively plan to see her in follow-up in 4 weeks.         Karthik Reyez MD  10/09/20  .

## 2020-10-13 ENCOUNTER — TELEPHONE (OUTPATIENT)
Dept: INTERNAL MEDICINE | Facility: CLINIC | Age: 75
End: 2020-10-13

## 2020-10-13 NOTE — TELEPHONE ENCOUNTER
HOME HEALTH AIDE CALLED [facility] INR 1.3 AND NEEDED NEW ORDERS. SAYS SHE CAN BE REACHED -[phone] HER NAME IS JAVED.

## 2020-10-15 ENCOUNTER — PATIENT OUTREACH (OUTPATIENT)
Dept: CASE MANAGEMENT | Facility: OTHER | Age: 75
End: 2020-10-15

## 2020-10-15 NOTE — OUTREACH NOTE
Patient Outreach Note    Spoke with patient regarding health and wellness. Patient states she is having a little trouble breathing but she is working to improve with medications. Patient is still being seen by Home Health nurses. Patient updated her flu vaccine but does not take the pneumonia vaccine due to being on medication that cannot be taken with the pneumonia vaccine. Reviewed patient's advanced directives. Patient states they are up to date and she believes a copy is at [facility]. [facility] informed patient the Living Will is not currently on file, but she can discuss with Dr Leyva during her next visit. AWV due. Patient encouraged to schedule. Patient states she has an appointment with Dr. Leyva in a few months. No other needs or concerns expressed by patient at this time.       Care Plan Note      Responses   Annual Wellness Visit:   Patient Will Schedule   AWV Materials  Send Materials   Care Gaps Addressed  Flu Shot, Pneumonia Vaccine   Flu Shot Status  Up to Date   Pneumonia Vaccine Status  Refused   Advanced Directives:  Patient Has [Patient instructed to review with Dr. Leyva. No copy on file. ]        Follow up scheduled: 1 month    Chiara Shetty RN  Ambulatory     10/15/2020, 13:04 EDT

## 2020-10-16 ENCOUNTER — TELEPHONE (OUTPATIENT)
Dept: CARDIOLOGY | Facility: CLINIC | Age: 75
End: 2020-10-16

## 2020-10-16 ENCOUNTER — RESULTS ENCOUNTER (OUTPATIENT)
Dept: CARDIOLOGY | Facility: CLINIC | Age: 75
End: 2020-10-16

## 2020-10-16 DIAGNOSIS — I50.32 CHRONIC DIASTOLIC CHF (CONGESTIVE HEART FAILURE) (HCC): ICD-10-CM

## 2020-10-16 DIAGNOSIS — I10 ESSENTIAL HYPERTENSION: ICD-10-CM

## 2020-10-16 NOTE — TELEPHONE ENCOUNTER
2020--Dr. Karthik Reyez  Pt:Joellen Dominguez  :1945  Phone:[phone]  Reason for Call: Pt. Has questions regarding her hydralazinePlease call her at the number above

## 2020-10-16 NOTE — TELEPHONE ENCOUNTER
Spoke with pt. She misleadingly reported that she is taking Hydralazine when she is not. She has not been on it for several weeks. Rx taken off of med list. Kaylie

## 2020-10-22 ENCOUNTER — TELEPHONE (OUTPATIENT)
Dept: INTERNAL MEDICINE | Facility: CLINIC | Age: 75
End: 2020-10-22

## 2020-10-22 NOTE — TELEPHONE ENCOUNTER
[facility] called about patinet INR    PT 20.5  INR 1.7    PT is currently taking 2mg     Karolina [phone]

## 2020-10-26 RX ORDER — WARFARIN SODIUM 2 MG/1
TABLET ORAL
Qty: 36 TABLET | Refills: 2 | Status: SHIPPED | OUTPATIENT
Start: 2020-10-26 | End: 2021-01-08 | Stop reason: SDUPTHER

## 2020-10-27 ENCOUNTER — TELEPHONE (OUTPATIENT)
Dept: INTERNAL MEDICINE | Facility: CLINIC | Age: 75
End: 2020-10-27

## 2020-10-27 NOTE — TELEPHONE ENCOUNTER
Karolina from [facility] called with PT/INR results.    PT 38.4  INR 3.2    PT currently taking 4mg Tues/Sat  2mg other days

## 2020-10-27 NOTE — TELEPHONE ENCOUNTER
Annette with [facility] calling to follow up on orders(3 orders) sent back as far as 25 days ago. Please advise at [phone]. She refaxed today as well.

## 2020-11-03 ENCOUNTER — TELEPHONE (OUTPATIENT)
Dept: CARDIOLOGY | Facility: CLINIC | Age: 75
End: 2020-11-03

## 2020-11-03 NOTE — TELEPHONE ENCOUNTER
----- Message from Joellen Dominguez sent at 11/2/2020  2:09 PM EST -----  Regarding: Prescription Question  Contact: [phone]  I recently started using CBD drops to help with my back pain. It has been effective but I recently read in the [employer] bulletin that it could interact with heart rhythm medications, blood-clotting medicines and antidepressants. I have stopped using it until I hear from you.

## 2020-11-09 RX ORDER — LEVOTHYROXINE SODIUM 100 UG/1
TABLET ORAL
Qty: 30 TABLET | Refills: 0 | Status: SHIPPED | OUTPATIENT
Start: 2020-11-09 | End: 2020-12-08 | Stop reason: SDUPTHER

## 2020-11-10 ENCOUNTER — HOSPITAL ENCOUNTER (OUTPATIENT)
Dept: CT IMAGING | Facility: HOSPITAL | Age: 75
Discharge: HOME | End: 2020-11-10
Admitting: INTERNAL MEDICINE

## 2020-11-10 DIAGNOSIS — R91.8 PULMONARY INFILTRATE: ICD-10-CM

## 2020-11-10 PROCEDURE — 71250 CT THORAX DX C-: CPT

## 2020-11-16 RX ORDER — COLESEVELAM 180 1/1
625 TABLET ORAL 2 TIMES DAILY
Qty: 180 TABLET | Refills: 1 | Status: SHIPPED | OUTPATIENT
Start: 2020-11-16 | End: 2021-09-01

## 2020-11-16 NOTE — TELEPHONE ENCOUNTER
----- Message from Joellen Dominguez sent at 11/16/2020 10:04 AM EST -----  Regarding: Prescription Question  Contact: [phone]  I need a new prescription for welchol  625mg I've been taking 1 twice a day. Please send the rx to [facility] in [facility].

## 2020-11-17 ENCOUNTER — TELEPHONE (OUTPATIENT)
Dept: INTERNAL MEDICINE | Facility: CLINIC | Age: 75
End: 2020-11-17

## 2020-11-18 ENCOUNTER — TRANSCRIBE ORDERS (OUTPATIENT)
Dept: ADMINISTRATIVE | Facility: HOSPITAL | Age: 75
End: 2020-11-18

## 2020-11-18 DIAGNOSIS — R91.8 PULMONARY INFILTRATE: Primary | ICD-10-CM

## 2020-11-23 ENCOUNTER — TELEPHONE (OUTPATIENT)
Dept: CARDIOLOGY | Facility: CLINIC | Age: 75
End: 2020-11-23

## 2020-11-23 DIAGNOSIS — I10 ESSENTIAL HYPERTENSION: Primary | ICD-10-CM

## 2020-11-23 NOTE — TELEPHONE ENCOUNTER
CNA PT    I had to r/s apt to [facility] and  asked if DR Angeles wants to recheck kidney function and potassium.

## 2020-11-24 ENCOUNTER — LAB (OUTPATIENT)
Dept: LAB | Facility: HOSPITAL | Age: 75
End: 2020-11-24

## 2020-11-24 DIAGNOSIS — N18.4 CKD (CHRONIC KIDNEY DISEASE) STAGE 4, GFR 15-29 ML/MIN (HCC): Primary | ICD-10-CM

## 2020-11-24 DIAGNOSIS — I10 ESSENTIAL HYPERTENSION: ICD-10-CM

## 2020-11-24 LAB
ANION GAP SERPL CALCULATED.3IONS-SCNC: 11.2 MMOL/L (ref 5–15)
BUN SERPL-MCNC: 63 MG/DL (ref 8–23)
BUN/CREAT SERPL: 31.7 (ref 7–25)
CALCIUM SPEC-SCNC: 9.6 MG/DL (ref 8.6–10.5)
CHLORIDE SERPL-SCNC: 99 MMOL/L (ref 98–107)
CO2 SERPL-SCNC: 28.8 MMOL/L (ref 22–29)
CREAT SERPL-MCNC: 1.99 MG/DL (ref 0.57–1)
GFR SERPL CREATININE-BSD FRML MDRD: 24 ML/MIN/1.73
GLUCOSE SERPL-MCNC: 107 MG/DL (ref 65–99)
POTASSIUM SERPL-SCNC: 3 MMOL/L (ref 3.5–5.2)
SODIUM SERPL-SCNC: 139 MMOL/L (ref 136–145)

## 2020-11-24 PROCEDURE — 80048 BASIC METABOLIC PNL TOTAL CA: CPT

## 2020-11-24 PROCEDURE — 36415 COLL VENOUS BLD VENIPUNCTURE: CPT

## 2020-11-30 ENCOUNTER — LAB (OUTPATIENT)
Dept: LAB | Facility: HOSPITAL | Age: 75
End: 2020-11-30

## 2020-11-30 DIAGNOSIS — N18.4 CKD (CHRONIC KIDNEY DISEASE) STAGE 4, GFR 15-29 ML/MIN (HCC): ICD-10-CM

## 2020-11-30 DIAGNOSIS — E87.6 HYPOKALEMIA: Primary | ICD-10-CM

## 2020-11-30 LAB
ANION GAP SERPL CALCULATED.3IONS-SCNC: 12 MMOL/L (ref 5–15)
BUN SERPL-MCNC: 64 MG/DL (ref 8–23)
BUN/CREAT SERPL: 33.2 (ref 7–25)
CALCIUM SPEC-SCNC: 9.9 MG/DL (ref 8.6–10.5)
CHLORIDE SERPL-SCNC: 103 MMOL/L (ref 98–107)
CO2 SERPL-SCNC: 27 MMOL/L (ref 22–29)
CREAT SERPL-MCNC: 1.93 MG/DL (ref 0.57–1)
GFR SERPL CREATININE-BSD FRML MDRD: 25 ML/MIN/1.73
GLUCOSE SERPL-MCNC: 108 MG/DL (ref 65–99)
POTASSIUM SERPL-SCNC: 3.2 MMOL/L (ref 3.5–5.2)
SODIUM SERPL-SCNC: 142 MMOL/L (ref 136–145)

## 2020-11-30 PROCEDURE — 80048 BASIC METABOLIC PNL TOTAL CA: CPT

## 2020-11-30 PROCEDURE — 36415 COLL VENOUS BLD VENIPUNCTURE: CPT

## 2020-12-01 DIAGNOSIS — L65.9 HAIR LOSS: Primary | ICD-10-CM

## 2020-12-03 ENCOUNTER — PATIENT OUTREACH (OUTPATIENT)
Dept: CASE MANAGEMENT | Facility: OTHER | Age: 75
End: 2020-12-03

## 2020-12-03 NOTE — OUTREACH NOTE
Patient Outreach Note    Spoke with patient's . Patient is laying down resting and does not feel well. ACM to call back in a few days for follow up call. No needs expressed at this time.     Chiara Shetty RN  Ambulatory     12/3/2020, 12:34 EST

## 2020-12-07 ENCOUNTER — LAB (OUTPATIENT)
Dept: LAB | Facility: HOSPITAL | Age: 75
End: 2020-12-07

## 2020-12-07 DIAGNOSIS — E87.6 HYPOKALEMIA: ICD-10-CM

## 2020-12-07 LAB
ANION GAP SERPL CALCULATED.3IONS-SCNC: 9.9 MMOL/L (ref 5–15)
BUN SERPL-MCNC: 59 MG/DL (ref 8–23)
BUN/CREAT SERPL: 34.1 (ref 7–25)
CALCIUM SPEC-SCNC: 9.6 MG/DL (ref 8.6–10.5)
CHLORIDE SERPL-SCNC: 102 MMOL/L (ref 98–107)
CO2 SERPL-SCNC: 27.1 MMOL/L (ref 22–29)
CREAT SERPL-MCNC: 1.73 MG/DL (ref 0.57–1)
FERRITIN SERPL-MCNC: 19.4 NG/ML (ref 13–150)
GFR SERPL CREATININE-BSD FRML MDRD: 29 ML/MIN/1.73
GLUCOSE SERPL-MCNC: 110 MG/DL (ref 65–99)
POTASSIUM SERPL-SCNC: 3.4 MMOL/L (ref 3.5–5.2)
SODIUM SERPL-SCNC: 139 MMOL/L (ref 136–145)
TSH SERPL DL<=0.05 MIU/L-ACNC: 1.19 UIU/ML (ref 0.27–4.2)

## 2020-12-07 PROCEDURE — 36415 COLL VENOUS BLD VENIPUNCTURE: CPT

## 2020-12-07 PROCEDURE — 80048 BASIC METABOLIC PNL TOTAL CA: CPT

## 2020-12-07 PROCEDURE — 82728 ASSAY OF FERRITIN: CPT | Performed by: INTERNAL MEDICINE

## 2020-12-07 PROCEDURE — 84443 ASSAY THYROID STIM HORMONE: CPT | Performed by: INTERNAL MEDICINE

## 2020-12-08 ENCOUNTER — TELEPHONE (OUTPATIENT)
Dept: INTERNAL MEDICINE | Facility: CLINIC | Age: 75
End: 2020-12-08

## 2020-12-08 RX ORDER — LEVOTHYROXINE SODIUM 0.1 MG/1
100 TABLET ORAL DAILY
Qty: 90 TABLET | Refills: 2 | Status: SHIPPED | OUTPATIENT
Start: 2020-12-08 | End: 2021-09-21

## 2020-12-08 NOTE — TELEPHONE ENCOUNTER
Dr. GRETTA Turcios from [facility] called with PT/INR results    PT 21.4  INR 1.8    Currently taking 4mg x 2 days and 2mg other day     [phone]

## 2020-12-08 NOTE — TELEPHONE ENCOUNTER
AURELIANO FROM [facility] CALLED IN AND WANTED TO SEE IF THERE ARE ANY ACTIVE ORDERS.     PLEASE ADVISE.    CALLBACK NUMBER [phone]

## 2020-12-09 ENCOUNTER — PATIENT OUTREACH (OUTPATIENT)
Dept: CASE MANAGEMENT | Facility: OTHER | Age: 75
End: 2020-12-09

## 2020-12-09 NOTE — OUTREACH NOTE
Patient Outreach Note    Spoke with patient regarding her health and wellness. Patient states she is doing well. No concerns or needs. Patient reports her B/P is stable and her weight is also around 138-139. She no longer weighs daily due to her weight being stable. [facility] has transitioned patient to monitoring status and has scheduled a follow up review.     Chiara Shetty RN  Ambulatory     12/9/2020, 15:46 EST

## 2020-12-14 DIAGNOSIS — J98.6 DIAPHRAGM DYSFUNCTION: ICD-10-CM

## 2020-12-14 DIAGNOSIS — Z01.818 PRE-OP TESTING: ICD-10-CM

## 2020-12-14 DIAGNOSIS — Z79.01 LONG TERM CURRENT USE OF ANTICOAGULANT THERAPY: ICD-10-CM

## 2020-12-14 DIAGNOSIS — I27.20 PULMONARY HTN (HCC): Primary | ICD-10-CM

## 2020-12-15 ENCOUNTER — LAB (OUTPATIENT)
Dept: LAB | Facility: HOSPITAL | Age: 75
End: 2020-12-15

## 2020-12-15 ENCOUNTER — TELEPHONE (OUTPATIENT)
Dept: INTERNAL MEDICINE | Facility: CLINIC | Age: 75
End: 2020-12-15

## 2020-12-15 DIAGNOSIS — Z01.818 PRE-OP TESTING: ICD-10-CM

## 2020-12-15 DIAGNOSIS — I27.20 PULMONARY HTN (HCC): ICD-10-CM

## 2020-12-15 DIAGNOSIS — J98.6 DIAPHRAGM DYSFUNCTION: ICD-10-CM

## 2020-12-15 DIAGNOSIS — E87.6 HYPOKALEMIA: ICD-10-CM

## 2020-12-15 DIAGNOSIS — Z79.01 LONG TERM CURRENT USE OF ANTICOAGULANT THERAPY: ICD-10-CM

## 2020-12-15 LAB
ALBUMIN SERPL-MCNC: 4 G/DL (ref 3.5–5.2)
ALBUMIN/GLOB SERPL: 1.4 G/DL
ALP SERPL-CCNC: 99 U/L (ref 39–117)
ALT SERPL W P-5'-P-CCNC: 18 U/L (ref 1–33)
ANION GAP SERPL CALCULATED.3IONS-SCNC: 9.9 MMOL/L (ref 5–15)
AST SERPL-CCNC: 19 U/L (ref 1–32)
BASOPHILS # BLD AUTO: 0.07 10*3/MM3 (ref 0–0.2)
BASOPHILS NFR BLD AUTO: 0.7 % (ref 0–1.5)
BILIRUB SERPL-MCNC: 0.8 MG/DL (ref 0–1.2)
BUN SERPL-MCNC: 51 MG/DL (ref 8–23)
BUN/CREAT SERPL: 33.1 (ref 7–25)
CALCIUM SPEC-SCNC: 9.6 MG/DL (ref 8.6–10.5)
CHLORIDE SERPL-SCNC: 103 MMOL/L (ref 98–107)
CO2 SERPL-SCNC: 25.1 MMOL/L (ref 22–29)
CREAT SERPL-MCNC: 1.54 MG/DL (ref 0.57–1)
DEPRECATED RDW RBC AUTO: 43.6 FL (ref 37–54)
EOSINOPHIL # BLD AUTO: 0.31 10*3/MM3 (ref 0–0.4)
EOSINOPHIL NFR BLD AUTO: 3.1 % (ref 0.3–6.2)
ERYTHROCYTE [DISTWIDTH] IN BLOOD BY AUTOMATED COUNT: 18.9 % (ref 12.3–15.4)
GFR SERPL CREATININE-BSD FRML MDRD: 33 ML/MIN/1.73
GLOBULIN UR ELPH-MCNC: 2.9 GM/DL
GLUCOSE SERPL-MCNC: 119 MG/DL (ref 65–99)
HCT VFR BLD AUTO: 35.8 % (ref 34–46.6)
HGB BLD-MCNC: 10.5 G/DL (ref 12–15.9)
INR PPP: 2.16 (ref 0.9–1.1)
LYMPHOCYTES # BLD AUTO: 1.36 10*3/MM3 (ref 0.7–3.1)
LYMPHOCYTES NFR BLD AUTO: 13.8 % (ref 19.6–45.3)
MCH RBC QN AUTO: 20.8 PG (ref 26.6–33)
MCHC RBC AUTO-ENTMCNC: 29.3 G/DL (ref 31.5–35.7)
MCV RBC AUTO: 70.8 FL (ref 79–97)
MONOCYTES # BLD AUTO: 0.57 10*3/MM3 (ref 0.1–0.9)
MONOCYTES NFR BLD AUTO: 5.8 % (ref 5–12)
NEUTROPHILS NFR BLD AUTO: 7.48 10*3/MM3 (ref 1.7–7)
NEUTROPHILS NFR BLD AUTO: 76 % (ref 42.7–76)
PLATELET # BLD AUTO: 384 10*3/MM3 (ref 140–450)
PMV BLD AUTO: 9.9 FL (ref 6–12)
POTASSIUM SERPL-SCNC: 3.9 MMOL/L (ref 3.5–5.2)
PROT SERPL-MCNC: 6.9 G/DL (ref 6–8.5)
PROTHROMBIN TIME: 23.8 SECONDS (ref 11.7–14.2)
RBC # BLD AUTO: 5.06 10*6/MM3 (ref 3.77–5.28)
SODIUM SERPL-SCNC: 138 MMOL/L (ref 136–145)
WBC # BLD AUTO: 9.85 10*3/MM3 (ref 3.4–10.8)

## 2020-12-15 PROCEDURE — 85025 COMPLETE CBC W/AUTO DIFF WBC: CPT

## 2020-12-15 PROCEDURE — 80053 COMPREHEN METABOLIC PANEL: CPT

## 2020-12-15 PROCEDURE — 36415 COLL VENOUS BLD VENIPUNCTURE: CPT

## 2020-12-15 PROCEDURE — 85610 PROTHROMBIN TIME: CPT

## 2020-12-15 NOTE — TELEPHONE ENCOUNTER
[facility] [place] IS REQUESTING THE PHYSICIANS ORDER #[id number]  INR- DATE ORDER 10/27/20. [facility] NEEDS THIS INFORMATION ASAP AS IT IS HOLDING UP PATIENTS MEDICARE.  PLEASE ADVISE    CALL BACK #: [phone]

## 2020-12-16 ENCOUNTER — TRANSCRIBE ORDERS (OUTPATIENT)
Dept: SLEEP MEDICINE | Facility: HOSPITAL | Age: 75
End: 2020-12-16

## 2020-12-16 DIAGNOSIS — Z01.818 OTHER SPECIFIED PRE-OPERATIVE EXAMINATION: Primary | ICD-10-CM

## 2020-12-17 ENCOUNTER — TRANSCRIBE ORDERS (OUTPATIENT)
Dept: CARDIOLOGY | Facility: HOSPITAL | Age: 75
End: 2020-12-17

## 2020-12-17 DIAGNOSIS — I27.20 PROGRESSIVE PULMONARY HYPERTENSION (HCC): Primary | ICD-10-CM

## 2020-12-19 ENCOUNTER — LAB (OUTPATIENT)
Dept: LAB | Facility: HOSPITAL | Age: 75
End: 2020-12-19

## 2020-12-19 DIAGNOSIS — Z01.818 OTHER SPECIFIED PRE-OPERATIVE EXAMINATION: ICD-10-CM

## 2020-12-19 PROCEDURE — C9803 HOPD COVID-19 SPEC COLLECT: HCPCS

## 2020-12-19 PROCEDURE — U0004 COV-19 TEST NON-CDC HGH THRU: HCPCS

## 2020-12-21 LAB — SARS-COV-2 RNA RESP QL NAA+PROBE: NOT DETECTED

## 2020-12-22 ENCOUNTER — HOSPITAL ENCOUNTER (OUTPATIENT)
Dept: GENERAL RADIOLOGY | Facility: HOSPITAL | Age: 75
Discharge: HOME | End: 2020-12-22

## 2020-12-22 ENCOUNTER — HOSPITAL ENCOUNTER (OUTPATIENT)
Facility: HOSPITAL | Age: 75
Setting detail: HOSPITAL OUTPATIENT SURGERY
Discharge: HOME | End: 2020-12-22
Attending: INTERNAL MEDICINE | Admitting: INTERNAL MEDICINE

## 2020-12-22 ENCOUNTER — APPOINTMENT (OUTPATIENT)
Dept: GENERAL RADIOLOGY | Facility: HOSPITAL | Age: 75
End: 2020-12-22

## 2020-12-22 VITALS
HEART RATE: 66 BPM | DIASTOLIC BLOOD PRESSURE: 70 MMHG | BODY MASS INDEX: 22.27 KG/M2 | RESPIRATION RATE: 18 BRPM | HEIGHT: 67 IN | OXYGEN SATURATION: 94 % | TEMPERATURE: 98 F | WEIGHT: 141.9 LBS | SYSTOLIC BLOOD PRESSURE: 126 MMHG

## 2020-12-22 DIAGNOSIS — I27.20 PULMONARY HTN (HCC): ICD-10-CM

## 2020-12-22 DIAGNOSIS — J98.6 DIAPHRAGM DYSFUNCTION: ICD-10-CM

## 2020-12-22 LAB
HCT VFR BLDA CALC: 28 % (ref 38–51)
HGB BLDA-MCNC: 9.5 G/DL (ref 12–17)
INR PPP: 1.6 (ref 0.8–1.2)
INR PPP: 1.6 (ref 0.9–1.1)
PROTHROMBIN TIME: 19.3 SECONDS
PROTHROMBIN TIME: 19.3 SECONDS (ref 12.8–15.2)
SAO2 % BLDA: 71 % (ref 95–98)

## 2020-12-22 PROCEDURE — 93451 RIGHT HEART CATH: CPT | Performed by: INTERNAL MEDICINE

## 2020-12-22 PROCEDURE — C1894 INTRO/SHEATH, NON-LASER: HCPCS | Performed by: INTERNAL MEDICINE

## 2020-12-22 PROCEDURE — 76000 FLUOROSCOPY <1 HR PHYS/QHP: CPT

## 2020-12-22 PROCEDURE — 25010000002 BH (CUPID ONLY) ADENOSINE 6 MG/100ML MIXTURE: Performed by: INTERNAL MEDICINE

## 2020-12-22 PROCEDURE — 25010000002 MIDAZOLAM PER 1 MG: Performed by: INTERNAL MEDICINE

## 2020-12-22 PROCEDURE — 85014 HEMATOCRIT: CPT

## 2020-12-22 PROCEDURE — 25010000002 FENTANYL CITRATE (PF) 100 MCG/2ML SOLUTION: Performed by: INTERNAL MEDICINE

## 2020-12-22 PROCEDURE — 85018 HEMOGLOBIN: CPT

## 2020-12-22 PROCEDURE — 93463 DRUG ADMIN & HEMODYNMIC MEAS: CPT | Performed by: INTERNAL MEDICINE

## 2020-12-22 PROCEDURE — 85610 PROTHROMBIN TIME: CPT

## 2020-12-22 PROCEDURE — 99152 MOD SED SAME PHYS/QHP 5/>YRS: CPT | Performed by: INTERNAL MEDICINE

## 2020-12-22 PROCEDURE — C1769 GUIDE WIRE: HCPCS | Performed by: INTERNAL MEDICINE

## 2020-12-22 RX ORDER — ACETAMINOPHEN 325 MG/1
650 TABLET ORAL EVERY 4 HOURS PRN
Status: DISCONTINUED | OUTPATIENT
Start: 2020-12-22 | End: 2020-12-22 | Stop reason: HOSPADM

## 2020-12-22 RX ORDER — SODIUM CHLORIDE 9 MG/ML
100 INJECTION, SOLUTION INTRAVENOUS CONTINUOUS
Status: DISCONTINUED | OUTPATIENT
Start: 2020-12-22 | End: 2020-12-22 | Stop reason: HOSPADM

## 2020-12-22 RX ORDER — NITROGLYCERIN 5 MG/ML
INJECTION, SOLUTION INTRAVENOUS AS NEEDED
Status: DISCONTINUED | OUTPATIENT
Start: 2020-12-22 | End: 2020-12-22 | Stop reason: HOSPADM

## 2020-12-22 RX ORDER — LIDOCAINE HYDROCHLORIDE 10 MG/ML
0.1 INJECTION, SOLUTION EPIDURAL; INFILTRATION; INTRACAUDAL; PERINEURAL ONCE AS NEEDED
Status: DISCONTINUED | OUTPATIENT
Start: 2020-12-22 | End: 2020-12-22 | Stop reason: HOSPADM

## 2020-12-22 RX ORDER — LIDOCAINE HYDROCHLORIDE 20 MG/ML
INJECTION, SOLUTION INFILTRATION; PERINEURAL AS NEEDED
Status: DISCONTINUED | OUTPATIENT
Start: 2020-12-22 | End: 2020-12-22 | Stop reason: HOSPADM

## 2020-12-22 RX ORDER — MIDAZOLAM HYDROCHLORIDE 1 MG/ML
INJECTION INTRAMUSCULAR; INTRAVENOUS AS NEEDED
Status: DISCONTINUED | OUTPATIENT
Start: 2020-12-22 | End: 2020-12-22 | Stop reason: HOSPADM

## 2020-12-22 RX ORDER — SODIUM CHLORIDE 9 MG/ML
75 INJECTION, SOLUTION INTRAVENOUS CONTINUOUS
Status: DISCONTINUED | OUTPATIENT
Start: 2020-12-22 | End: 2020-12-22 | Stop reason: HOSPADM

## 2020-12-22 RX ORDER — SODIUM CHLORIDE 0.9 % (FLUSH) 0.9 %
10 SYRINGE (ML) INJECTION AS NEEDED
Status: DISCONTINUED | OUTPATIENT
Start: 2020-12-22 | End: 2020-12-22 | Stop reason: HOSPADM

## 2020-12-22 RX ORDER — FENTANYL CITRATE 50 UG/ML
INJECTION, SOLUTION INTRAMUSCULAR; INTRAVENOUS AS NEEDED
Status: DISCONTINUED | OUTPATIENT
Start: 2020-12-22 | End: 2020-12-22 | Stop reason: HOSPADM

## 2020-12-22 RX ORDER — SODIUM CHLORIDE 0.9 % (FLUSH) 0.9 %
3 SYRINGE (ML) INJECTION EVERY 12 HOURS SCHEDULED
Status: DISCONTINUED | OUTPATIENT
Start: 2020-12-22 | End: 2020-12-22 | Stop reason: HOSPADM

## 2020-12-22 RX ORDER — ESZOPICLONE 3 MG/1
3 TABLET, FILM COATED ORAL NIGHTLY PRN
COMMUNITY
End: 2021-01-08

## 2020-12-22 RX ADMIN — SODIUM CHLORIDE 75 ML/HR: 9 INJECTION, SOLUTION INTRAVENOUS at 09:10

## 2020-12-22 NOTE — H&P
Encounter Date:09/30/2020        Subjective     Patient ID: Joellen Dominguez is a 75 y.o. female.        HPI: I saw Joellen Dominguez today for continued cardiovascular care.  She is a 75-year-old female who observes the CDC guidelines for social distancing.  She denies fever or change in sense of smell or taste.  She has severe respiratory insufficiency and reports increased dyspnea on exertion and at rest.  She has an occasional cough.  She denies any chest pain pressure tightness consistent with angina but does report occasional pleuritic chest discomfort.  Her lower extremity edema has improved but not completely resolved.  She is able to sleep with 2 small pillows if she lies on her right side free of significant shortness of breath.  She denies syncope or near syncope and does not report any significant palpitations.  Her respiratory insufficiency has worsened and she is visibly short of breath with increased work of breathing.  She has atrial fibrillation with a rapid ventricular response and is on long-term anticoagulation and amiodarone.  She tolerates her anticoagulation well.  She underwent AV node ablation and permanent pacemaker placement 7/29/2020.  Pacemaker interrogation today reveals normal device function 10 years left on battery life.  She has a history of hypertension which is controlled.  She has known dyslipidemia and remains on atorvastatin 10 mg daily.  Echocardiogram obtained 4/19/2020 revealed preserved left ventricular contractility mild to moderate concentric left ventricular hypertrophy, moderate mitral insufficiency, right ventricular systolic pressure 39.2 mmHg.  She is currently undergoing pulmonary evaluation by Dr. Trey Luque.        The following portions of the patient's history were reviewed and updated as appropriate: allergies, current medications, past family history, past medical history, past social history, past surgical history and problem list.     Problem List:       Patient Active Problem List   Diagnosis   • Incontinence   • Degeneration of lumbar intervertebral disc   • Essential hypertension   • Depression   • Antiphospholipid antibody syndrome (CMS/HCC)   • Lichen sclerosus et atrophicus   • Myocardial infarction type 2 (CMS/HCC)   • History of cardioversion   • Leukocytosis   • Pulmonary HTN (CMS/HCC)   • Cold agglutinin disease (CMS/HCC)   • Chronic diastolic CHF (congestive heart failure) (CMS/HCC)   • Warfarin-induced coagulopathy (CMS/HCC)   • COPD with emphysema (CMS/HCC)   • Inflammatory bowel disease (Crohn's disease) (CMS/HCC)   • Long term current use of anticoagulant therapy   • Atrial fibrillation, persistent (CMS/HCC)   • S/P atrioventricular hyacinth ablation   • Presence of cardiac pacemaker   • Respiratory insufficiency   • Dyslipidemia         Past Medical History:  Medical History        Past Medical History:   Diagnosis Date   • Acute deep vein thrombosis of lower extremity (CMS/HCC)     • Antiphospholipid antibody syndrome (CMS/HCC)     • Antiphospholipid antibody syndrome (CMS/HCC)     • Arrhythmia       ATRIAL FIBRILLATION   • Arthritis       OSTEO   • Benign essential hypertension     • COPD (chronic obstructive pulmonary disease) (CMS/HCC)     • Coronary artery disease     • Crohn's disease (CMS/HCC)     • Cutaneous candidiasis     • Depression     • Disease of thyroid gland     • Elevated cholesterol     • GERD (gastroesophageal reflux disease)     • History of echocardiogram 04/22/2020     mild-to-moderate concentric hypertrophy, EF 56 - 60%. LA severely dilated, Mild MAC, Mild MR with restrictive movement of the posterior leaflet   • Hyperlipidemia     • Hypertension     • Myocardial infarction (CMS/HCC)     • Osteopenia     • Polyp, uterus corpus     • Pulmonary hypertension (CMS/HCC)             Past Surgical History:  Surgical History         Past Surgical History:   Procedure Laterality Date   • ABDOMINAL HERNIA REPAIR       • AMPUTATION DIGIT  Left       SECOND TOE    • ARTERIOGRAM   7/30/2020     Procedure: Arteriogram;  Surgeon: Chong Buchanan MD;  Location: [facility] CATH INVASIVE LOCATION;  Service: Cardiovascular;;  rt femoral   • BILATERAL SALPINGO OOPHORECTOMY       • BREAST BIOPSY Right       BENIGN   • CARDIAC CATHETERIZATION N/A 4/22/2020     Surgeon: Paulo Singleton MD;  nonsignificant coronary artery disease normal LV function nonsignificant mitral regurgitation probably shortness of breath due to the cardiac arrhythmias and diastolic dysfunction   • CARDIAC CATHETERIZATION N/A 4/22/2020     Procedure: Coronary angiography;  Surgeon: Paulo Singleton MD;  Location: [facility] CATH INVASIVE LOCATION;  Service: Cardiology;  Laterality: N/A;   • CARDIAC CATHETERIZATION N/A 4/22/2020     Procedure: Left ventriculography;  Surgeon: Paulo Singleton MD;  Location: [facility] CATH INVASIVE LOCATION;  Service: Cardiology;  Laterality: N/A;   • CARDIAC ELECTROPHYSIOLOGY PROCEDURE N/A 4/25/2020     Procedure: Cardioversion;  Surgeon: Paulo Singleton MD;  Location: [facility] CATH INVASIVE LOCATION;  Service: Cardiology;  Laterality: N/A;   • CARDIAC ELECTROPHYSIOLOGY PROCEDURE N/A 7/29/2020     Procedure: PACEMAKER IMPLANTATION- DC;  Surgeon: Chong Buchanan MD;  Location: [facility] CATH INVASIVE LOCATION;  Service: Cardiovascular;  Laterality: N/A;   • CARDIAC ELECTROPHYSIOLOGY PROCEDURE N/A 7/29/2020     Procedure: ABLATION AV NODE;  Surgeon: Chong Buchanan MD;  Location: [facility] CATH INVASIVE LOCATION;  Service: Cardiovascular;  Laterality: N/A;   • CARDIAC ELECTROPHYSIOLOGY PROCEDURE N/A 7/30/2020     Procedure: ABLATION AV NODE PT HAS ST.BLESSING PPM;  Surgeon: Chong Buchanan MD;  Location: [facility] CATH INVASIVE LOCATION;  Service: Cardiovascular;  Laterality: N/A;   • CHOLECYSTECTOMY       • COLONOSCOPY   2018   • COSMETIC SURGERY       • D&C HYSTEROSCOPY N/A 10/13/2016     Procedure: DILATATION AND CURETTAGE HYSTEROSCOPY  WITH MYOSURE;  Surgeon: Christopher Doll MD;  Location: [facility] OR;  Service:    • ENDOSCOPY       • EYE SURGERY Bilateral       cataract   • FACELIFT       • FEMORAL ARTERY - FEMORAL ARTERY BYPASS GRAFT Bilateral     • HEMORRHOIDECTOMY       • TONSILLECTOMY AND ADENOIDECTOMY       • UPPER GASTROINTESTINAL ENDOSCOPY       • VASCULAR SURGERY         femoral stents            Social History:  Social History   Social History            Socioeconomic History   • Marital status:        Spouse name: Not on file   • Number of children: Not on file   • Years of education: Not on file   • Highest education level: Not on file   Tobacco Use   • Smoking status: Former Smoker       Packs/day: 2.00       Years: 16.00       Pack years: 32.00       Types: Cigarettes       Quit date:        Years since quittin.7   • Smokeless tobacco: Never Used   Substance and Sexual Activity   • Alcohol use: No   • Drug use: No   • Sexual activity: Defer           Allergies:       Allergies   Allergen Reactions   • Sulfa Antibiotics Hives   • Infliximab Rash            ROS:  Review of Systems   Constitution: Negative for malaise/fatigue.   HENT: Negative for hearing loss and nosebleeds.    Eyes: Negative for double vision and visual disturbance.   Cardiovascular: Positive for dyspnea on exertion. Negative for chest pain, claudication, near-syncope, orthopnea, palpitations, paroxysmal nocturnal dyspnea and syncope.        Pleuritic chest pain   Respiratory: Positive for shortness of breath. Negative for cough, sleep disturbances due to breathing, snoring, sputum production and wheezing.    Endocrine: Negative for cold intolerance, heat intolerance, polydipsia and polyuria.   Hematologic/Lymphatic: Negative for adenopathy and bleeding problem. Does not bruise/bleed easily.   Skin: Negative for flushing and itching.   Musculoskeletal: Negative for back pain, falls, joint pain, joint swelling, muscle weakness and neck pain.  "  Gastrointestinal: Negative for abdominal pain, dysphagia, heartburn, nausea and vomiting.   Genitourinary: Negative for dysuria and frequency.   Neurological: Negative for disturbances in coordination, dizziness, light-headedness, loss of balance, numbness and weakness.   Psychiatric/Behavioral: Negative for altered mental status and memory loss. The patient is not nervous/anxious.    Allergic/Immunologic: Negative for hives and persistent infections.            Objective:      Objective          /60 (BP Location: Left arm, Patient Position: Sitting, Cuff Size: Large Adult)   Pulse 73   Resp 22   Ht 170.2 cm (67\")   Wt 68.9 kg (152 lb)   SpO2 98%   BMI 23.81 kg/m²      Constitutional:       Appearance: Well-developed. Chronically ill-appearing.   Eyes:      Conjunctiva/sclera: Conjunctivae normal.      Pupils: Pupils are equal, round, and reactive to light.   HENT:      Head: Normocephalic and atraumatic.   Neck:      Musculoskeletal: Neck supple.      Thyroid: No thyromegaly.   Pulmonary:      Effort: Increased respiratory effort. Tachypnea, accessory muscle usage and respiratory distress present.      Breath sounds: Decreased air movement present. No wheezing. Rhonchi present. No rales.   Cardiovascular:      Normal rate. Regular rhythm. S1. Distant S2. Distant       No gallop. No S3 and S4 gallop. Midsystolic click click.   Edema:     Peripheral edema present.     Pretibial: bilateral trace edema of the pretibial area.  Abdominal:      General: Bowel sounds are normal. There is no distension.      Palpations: Abdomen is soft. There is no abdominal mass.      Tenderness: There is no abdominal tenderness.   Musculoskeletal: Normal range of motion.   Skin:     General: Skin is warm and dry.      Findings: No erythema.   Neurological:      Mental Status: Alert and oriented to person, place, and time.          In-Office Procedure(s):  Procedures     ASCVD RIsk Score::  The ASCVD Risk score ([place] KAUSHIK Jr., " et al., 2013) failed to calculate for the following reasons:    The patient has a prior MI or stroke diagnosis           Assessment/Plan:      Assessment          1. Respiratory insufficiency  Chronic and progressive  - Basic Metabolic Panel; Future  - furosemide (LASIX) 20 MG tablet; Take 1 tablet by mouth Daily.  Dispense: 30 tablet; Refill: 11     2. Pulmonary emphysema, unspecified emphysema type (CMS/HCC)  Symptomatic     3. Pulmonary HTN (CMS/HCC)  Mild to moderate     4. Atrial fibrillation, persistent (CMS/HCC)  Stable     5. S/P atrioventricular hyacinth ablation  Stable     6. Long term current use of anticoagulant therapy  Stable     7. Presence of cardiac pacemaker  Normal device function     8. Essential hypertension  Controlled     9. Chronic diastolic CHF (congestive heart failure) (CMS/HCC)  Mild right-sided volume excess     10. Dyslipidemia  Continue atorvastatin 10 mg daily     Ms. Dominguez is exhibiting increased work of breathing accessory muscle use and shortness of breath at rest.  She demonstrates trace bilateral lower extremity edema.  She has normal left ventricular contractility.  I will obtain a basic metabolic panel today if she has demonstrated prerenal azotemia in the past.  We will likely initiate furosemide starting at 20 mg orally.  I have had a long discussion with the patient and her  today and have recommended that she consider hospitalization if her symptoms do not improve in the next 24 to 48 hours.  As an outpatient we will monitor her vital signs renal function and electrolytes closely.  Should there be any further progression of her respiratory insufficiency I counseled her to proceed with hospitalization.  I will directly discuss treatment plan with Dr. Luque.  I will tentatively plan to see her in follow-up in 1 week.  I spent over 45 minutes of face-to-face time with Ms. Dominguez today.           Karthik Reyez MD  09/30/20      Interim update:    Worsening pulmonary  hypertension.  The patient's come to the authorization lab for right heart catheterization with vasodilator challenge in search of reversible pulmonary hypertension.

## 2020-12-22 NOTE — DISCHARGE INSTRUCTIONS
[facility]  [address] [place] [address]    Coronary Angiogram (brachial Approach) After Care    Refer to this sheet in the next few weeks. These instructions provide you with information on caring for yourself after your procedure. Your caregiver may also give you more specific instructions. Your treatment has been planned according to current medical practices, but problems sometimes occur. Call your caregiver if you have any problems or questions after your procedure.    Home Care Instructions:  · You may shower the day after the procedure. Remove the bandage (dressing) and gently wash the site with plain soap and water. Gently pat the site dry. You may apply a band aid daily for 2 days if desired.    · Do not apply powder or lotion to the site.  · Do not submerge the affected site in water for 3 to 5 days or until the site is completely healed.   · Do not lift, push or pull anything over 5 pounds for 5 days after your procedure. As a reference, a gallon of milk weighs 8 pounds.   · Inspect the site at least twice daily. You may notice some bruising at the site and it may be tender for 1 to 2 weeks.     · Increase your fluid intake for the next 2 days.    · Keep arm elevated for 24 hours. For the remainder of the day, keep your arm in “Pledge of Allegiance” position when up and about.     · You may drive 24 hours after the procedure unless otherwise instructed by your caregiver.  · Do not operate machinery or power tools for 24 hours.  · A responsible adult should be with you for the first 24 hours after you arrive home. Do not make any important legal decisions or sign legal papers for 24 hours.  Do not drink alcohol for 24 hours.    · Metformin or any medications containing Metformin should not be taken for 48 hours after your procedure.      Call Your Doctor if:   · You have unusual pain at the radial/ulnar (wrist) site.  · You have redness, warmth, swelling, or pain at the radial/ulnar  (wrist) site.  · You have drainage (other than a small amount of blood on the dressing).  · You have chills or a fever > 101.  · Your arm becomes pale or dark, cool, tingly, or numb.  · You have heavy bleeding from the site, hold pressure on the site for 20 minutes.  If the bleeding stops, apply a fresh bandage and call your cardiologist.  However, if you continue to have bleeding, call 911.

## 2020-12-23 LAB
HCT VFR BLDA CALC: 28 % (ref 38–51)
HGB BLDA-MCNC: 9.5 G/DL (ref 12–17)
SAO2 % BLDA: 66 % (ref 95–98)

## 2020-12-28 ENCOUNTER — TELEPHONE (OUTPATIENT)
Dept: INTERNAL MEDICINE | Facility: CLINIC | Age: 75
End: 2020-12-28

## 2020-12-28 NOTE — TELEPHONE ENCOUNTER
Patient is being discharged today from  Occupational medicine and just wanted you to be aware.  Thank you

## 2021-01-07 PROBLEM — R06.89 RESPIRATORY INSUFFICIENCY: Chronic | Status: ACTIVE | Noted: 2020-09-30

## 2021-01-07 PROBLEM — Z95.0 PRESENCE OF CARDIAC PACEMAKER: Chronic | Status: ACTIVE | Noted: 2020-08-14

## 2021-01-07 PROBLEM — I50.32 CHRONIC DIASTOLIC CHF (CONGESTIVE HEART FAILURE): Chronic | Status: ACTIVE | Noted: 2020-05-20

## 2021-01-07 PROBLEM — I48.19 ATRIAL FIBRILLATION, PERSISTENT: Chronic | Status: ACTIVE | Noted: 2020-07-22

## 2021-01-07 PROBLEM — Z98.890 S/P ATRIOVENTRICULAR NODAL ABLATION: Chronic | Status: ACTIVE | Noted: 2020-08-14

## 2021-01-07 PROBLEM — I27.20 PULMONARY HTN: Chronic | Status: ACTIVE | Noted: 2020-04-27

## 2021-01-07 PROBLEM — I10 ESSENTIAL HYPERTENSION: Chronic | Status: ACTIVE | Noted: 2019-04-29

## 2021-01-07 PROBLEM — J43.9 COPD WITH EMPHYSEMA: Chronic | Status: ACTIVE | Noted: 2020-06-09

## 2021-01-07 PROBLEM — E78.5 DYSLIPIDEMIA: Chronic | Status: ACTIVE | Noted: 2020-09-30

## 2021-01-07 PROBLEM — Z79.01 LONG TERM CURRENT USE OF ANTICOAGULANT THERAPY: Chronic | Status: ACTIVE | Noted: 2020-06-19

## 2021-01-08 ENCOUNTER — OFFICE VISIT (OUTPATIENT)
Dept: CARDIOLOGY | Facility: CLINIC | Age: 76
End: 2021-01-08

## 2021-01-08 VITALS
RESPIRATION RATE: 18 BRPM | HEIGHT: 67 IN | WEIGHT: 146.6 LBS | BODY MASS INDEX: 23.01 KG/M2 | SYSTOLIC BLOOD PRESSURE: 136 MMHG | HEART RATE: 70 BPM | DIASTOLIC BLOOD PRESSURE: 72 MMHG

## 2021-01-08 DIAGNOSIS — Z79.01 LONG TERM CURRENT USE OF ANTICOAGULANT THERAPY: Chronic | ICD-10-CM

## 2021-01-08 DIAGNOSIS — J43.9 PULMONARY EMPHYSEMA, UNSPECIFIED EMPHYSEMA TYPE (HCC): Chronic | ICD-10-CM

## 2021-01-08 DIAGNOSIS — Z95.0 PRESENCE OF CARDIAC PACEMAKER: Chronic | ICD-10-CM

## 2021-01-08 DIAGNOSIS — I27.20 PULMONARY HTN (HCC): Chronic | ICD-10-CM

## 2021-01-08 DIAGNOSIS — I10 ESSENTIAL HYPERTENSION: Chronic | ICD-10-CM

## 2021-01-08 DIAGNOSIS — R06.89 RESPIRATORY INSUFFICIENCY: Chronic | ICD-10-CM

## 2021-01-08 DIAGNOSIS — I34.0 MODERATE MITRAL INSUFFICIENCY: Chronic | ICD-10-CM

## 2021-01-08 DIAGNOSIS — E78.5 DYSLIPIDEMIA: Chronic | ICD-10-CM

## 2021-01-08 DIAGNOSIS — Z98.890 S/P ATRIOVENTRICULAR NODAL ABLATION: Chronic | ICD-10-CM

## 2021-01-08 DIAGNOSIS — I48.19 ATRIAL FIBRILLATION, PERSISTENT (HCC): Primary | Chronic | ICD-10-CM

## 2021-01-08 DIAGNOSIS — I50.32 CHRONIC DIASTOLIC CHF (CONGESTIVE HEART FAILURE) (HCC): Chronic | ICD-10-CM

## 2021-01-08 PROCEDURE — 99214 OFFICE O/P EST MOD 30 MIN: CPT | Performed by: INTERNAL MEDICINE

## 2021-01-08 PROCEDURE — 93288 INTERROG EVL PM/LDLS PM IP: CPT | Performed by: INTERNAL MEDICINE

## 2021-01-08 RX ORDER — WARFARIN SODIUM 2 MG/1
TABLET ORAL
Qty: 60 TABLET | Refills: 2 | Status: SHIPPED | OUTPATIENT
Start: 2021-01-08 | End: 2021-04-16 | Stop reason: SDUPTHER

## 2021-01-08 NOTE — PROGRESS NOTES
"Chief Complaint  Follow-up    Subjective    History of Present Illness      Joellen Dominguez presents to [facility] CARDIOLOGY for continued cardiovascular care.  She is a pleasant 75-year-old female who continues to observe the CDC guidelines during the coronavirus pandemic.  She does not report any fever cough or increased shortness of breath.  She remains free of any change in her sense of smell or taste.  Ms. Dominguez denies any current chest pain pressure or tightness.  She has chronic respiratory insufficiency with evidence of reversible pulmonary hypertension.  She feels that her respiratory status although compromised is improved.  She is increased her activity and states that she catches her breath with rest and the shorter period of time.  She is free of orthopnea or PND and no lower extremity edema.  She denies syncope near syncope or palpitations.  She has a history of atrial fibrillation remains on long-term anticoagulation which she tolerates well.  She has a history of hypertension which is controlled.  She has known hyperlipidemia and observes a low-cholesterol low saturated fat diet as well as is on atorvastatin 10 mg daily.  She underwent AV node ablation with placement of a permanent pacemaker on 7/29/2020.  Interrogation of pacemaker reveals normal device function 10 years remaining on generator no significant dysrhythmias.    Objective   Vital Signs:   /72 (BP Location: Left arm, Patient Position: Sitting)   Pulse 70   Resp 18   Ht 170.2 cm (67\")   Wt 66.5 kg (146 lb 9.6 oz)   BMI 22.96 kg/m²     Constitutional:       Appearance: Well-developed.   Eyes:      Conjunctiva/sclera: Conjunctivae normal.      Pupils: Pupils are equal, round, and reactive to light.   HENT:      Head: Normocephalic and atraumatic.   Neck:      Musculoskeletal: Neck supple.      Thyroid: No thyromegaly.   Pulmonary:      Effort: Pulmonary effort is normal. No respiratory distress.      Breath " sounds: Normal breath sounds. Decreased air movement present. No wheezing. No rales.   Cardiovascular:      Normal rate. Regular rhythm. distant S1. distant S2.      No gallop. No S3 and S4 gallop. Midsystolic click click.   Edema:     Peripheral edema absent.   Abdominal:      General: Bowel sounds are normal. There is no distension.      Palpations: Abdomen is soft. There is no abdominal mass.      Tenderness: There is no abdominal tenderness.   Musculoskeletal: Normal range of motion.   Skin:     General: Skin is warm and dry.      Findings: No erythema.   Neurological:      Mental Status: Alert and oriented to person, place, and time.         Result Review :   The following data was reviewed by: Karthik Reyez MD on 01/08/2021:  Common labs    Common Labsle 12/7/20 12/15/20 12/15/20 12/22/20 12/22/20     1001 1001 1135 1137   BUN 59 (A) 51 (A)      Creatinine 1.73 (A) 1.54 (A)      eGFR Non  Am 29 (A) 33 (A)      Sodium 139 138      Potassium 3.4 (A) 3.9      Chloride 102 103      Calcium 9.6 9.6      Albumin  4.00      Total Bilirubin  0.8      Alkaline Phosphatase  99      AST (SGOT)  19      ALT (SGPT)  18      WBC   9.85     Hemoglobin   10.5 (A) 9.5 (A) 9.5 (A)   Hematocrit   35.8 28 (A) 28 (A)   Platelets   384     (A) Abnormal value            Data reviewed: Cardiology studies Thank I reviewed right heart catheterization with vasodilator challenge from 12/22/2020.  This revealed reversible pulmonary hypertension.  Initial PA pressure was 52/23 mmHg.  PA pressure during vasodilator challenge decreased to 28/10 mmHg.  Review of echocardiogram from 4/19/2020 reveals left ventricular ejection fraction 62% moderate mitral insufficiency, mild tricuspid insufficiency with right ventricular systolic pressure 39.2 mmHg.  I reviewed sniff test requested by Dr. Cordero pulmonary medicine and obtained on 12/22/2020.  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         Assessment and Plan    1. Atrial  fibrillation, persistent (CMS/HCC)  Stable    2. Long term current use of anticoagulant therapy  Well-tolerated    3. Essential hypertension  Controlled    4. Chronic diastolic CHF (congestive heart failure) (CMS/HCC)  Stable    5. Pulmonary HTN (CMS/HCC)  Reversible    6. S/P atrioventricular hyacinth ablation  Status post pacemaker    7. Presence of cardiac pacemaker  Normal device function    8. Dyslipidemia  Continue low-cholesterol low saturated fat diet and atorvastatin 10 mg daily    9. Respiratory insufficiency  Chronic but improved    10. Pulmonary emphysema, unspecified emphysema type (CMS/HCC)  Stable    11. Moderate mitral insufficiency  Compensated    Ms. Dominguez overall is improved.  She denies chest pain pressure tightness and her dyspnea on exertion is decreased.  Her activity level has increased as well.  Made no changes in her current medical regimen.  Ms. Dominguez has evidence of moderate reversible pulmonary hypertension as well as right hemidiaphragm palsy.  She will follow with pulmonary medicine Dr. Cordero.  I will see her in follow-up in 3 months.  She will monitor her respiratory status her weight and lower extremity edema.  Should she see any progression of her symptoms increased weight or edema she will contact me we will adjust her diuretics and monitor her electrolytes and renal function.        Follow Up   No follow-ups on file.  Patient was given instructions and counseling regarding her condition or for health maintenance advice. Please see specific information pulled into the AVS if appropriate.

## 2021-01-17 NOTE — OUTREACH NOTE
Care Coordination Note    Spoke with Sandy at the [facility] regarding increasing the bath aide to twice weekly. At this time it is not possible. Sandy stated she will let her team know the patient would prefer twice weekly if possible in the future if staffing permits.     Chiara Shetty RN  Ambulatory     8/25/2020, 14:20      
Patient Outreach Note    Left message for Wade stating the bath aide request has been communicated to [facility] and is unable to be accomadated at this time.     Chiara Shetty RN  Ambulatory     8/25/2020, 14:21      
ANA PAULA Duncan, NP

## 2021-01-18 ENCOUNTER — TELEPHONE (OUTPATIENT)
Dept: INTERNAL MEDICINE | Facility: CLINIC | Age: 76
End: 2021-01-18

## 2021-01-18 NOTE — TELEPHONE ENCOUNTER
Caller: Joellen Dominguez    Relationship to patient: Self    Best call back number: [phone]    Chief complaint: NONE    Type of visit: PROTIME CHECK    Requested date: 01/19    If rescheduling, when is the original appointment: N/A    Additional notes:PATIENT WOULD LIKE TO COME IN AT 10:30 AM ON JAN 19 TO HAVE HER PROTIME CHECK DONE.

## 2021-01-19 ENCOUNTER — CLINICAL SUPPORT (OUTPATIENT)
Dept: INTERNAL MEDICINE | Facility: CLINIC | Age: 76
End: 2021-01-19

## 2021-01-19 ENCOUNTER — ANTICOAGULATION VISIT (OUTPATIENT)
Dept: INTERNAL MEDICINE | Facility: CLINIC | Age: 76
End: 2021-01-19

## 2021-01-19 DIAGNOSIS — I48.19 ATRIAL FIBRILLATION, PERSISTENT (HCC): Primary | Chronic | ICD-10-CM

## 2021-01-19 LAB
INR PPP: 3.3 (ref 0.9–1.1)
INR PPP: 3.3 (ref 0.9–1.1)

## 2021-01-19 PROCEDURE — 36416 COLLJ CAPILLARY BLOOD SPEC: CPT | Performed by: INTERNAL MEDICINE

## 2021-01-19 PROCEDURE — 85610 PROTHROMBIN TIME: CPT | Performed by: INTERNAL MEDICINE

## 2021-02-03 DIAGNOSIS — E53.8 B12 DEFICIENCY: ICD-10-CM

## 2021-02-03 DIAGNOSIS — E03.9 ACQUIRED HYPOTHYROIDISM: ICD-10-CM

## 2021-02-04 ENCOUNTER — ANTICOAGULATION VISIT (OUTPATIENT)
Dept: INTERNAL MEDICINE | Facility: CLINIC | Age: 76
End: 2021-02-04

## 2021-02-04 ENCOUNTER — TELEPHONE (OUTPATIENT)
Dept: INTERNAL MEDICINE | Facility: CLINIC | Age: 76
End: 2021-02-04

## 2021-02-04 LAB
ALBUMIN SERPL-MCNC: 3.9 G/DL (ref 3.5–5.2)
ALBUMIN/GLOB SERPL: 1.7 G/DL
ALP SERPL-CCNC: 104 U/L (ref 39–117)
ALT SERPL-CCNC: 12 U/L (ref 1–33)
AST SERPL-CCNC: 17 U/L (ref 1–32)
BASOPHILS # BLD AUTO: ABNORMAL 10*3/UL
BILIRUB SERPL-MCNC: 1.1 MG/DL (ref 0–1.2)
BUN SERPL-MCNC: 36 MG/DL (ref 8–23)
BUN/CREAT SERPL: 26.9 (ref 7–25)
CALCIUM SERPL-MCNC: 9.9 MG/DL (ref 8.6–10.5)
CHLORIDE SERPL-SCNC: 109 MMOL/L (ref 98–107)
CHOLEST SERPL-MCNC: 112 MG/DL (ref 0–200)
CHOLEST/HDLC SERPL: 1.78 {RATIO}
CO2 SERPL-SCNC: 27 MMOL/L (ref 22–29)
CREAT SERPL-MCNC: 1.34 MG/DL (ref 0.57–1)
DIFFERENTIAL COMMENT: ABNORMAL
EOSINOPHIL # BLD AUTO: ABNORMAL 10*3/UL
EOSINOPHIL # BLD MANUAL: 0.31 10*3/MM3 (ref 0–0.4)
EOSINOPHIL NFR BLD AUTO: ABNORMAL %
EOSINOPHIL NFR BLD MANUAL: 4 % (ref 0.3–6.2)
ERYTHROCYTE [DISTWIDTH] IN BLOOD BY AUTOMATED COUNT: 24.6 % (ref 12.3–15.4)
GLOBULIN SER CALC-MCNC: 2.3 GM/DL
GLUCOSE SERPL-MCNC: 98 MG/DL (ref 65–99)
HCT VFR BLD AUTO: 37 % (ref 34–46.6)
HDLC SERPL-MCNC: 63 MG/DL (ref 40–60)
HGB BLD-MCNC: 12.6 G/DL (ref 12–15.9)
INR PPP: 3.4
LDLC SERPL CALC-MCNC: 24 MG/DL (ref 0–100)
LYMPHOCYTES # BLD AUTO: ABNORMAL 10*3/UL
LYMPHOCYTES # BLD MANUAL: 1.1 10*3/MM3 (ref 0.7–3.1)
LYMPHOCYTES NFR BLD AUTO: ABNORMAL %
LYMPHOCYTES NFR BLD MANUAL: 14 % (ref 19.6–45.3)
MCH RBC QN AUTO: 28.1 PG (ref 26.6–33)
MCHC RBC AUTO-ENTMCNC: 34.1 G/DL (ref 31.5–35.7)
MCV RBC AUTO: 82.4 FL (ref 79–97)
MONOCYTES # BLD MANUAL: 0.47 10*3/MM3 (ref 0.1–0.9)
MONOCYTES NFR BLD AUTO: ABNORMAL %
MONOCYTES NFR BLD MANUAL: 6 % (ref 5–12)
NEUTROPHILS # BLD MANUAL: 5.98 10*3/MM3 (ref 1.7–7)
NEUTROPHILS NFR BLD AUTO: ABNORMAL %
NEUTROPHILS NFR BLD MANUAL: 76 % (ref 42.7–76)
PLATELET # BLD AUTO: 305 10*3/MM3 (ref 140–450)
PLATELET BLD QL SMEAR: ABNORMAL
POTASSIUM SERPL-SCNC: 4.1 MMOL/L (ref 3.5–5.2)
PROT SERPL-MCNC: 6.2 G/DL (ref 6–8.5)
RBC # BLD AUTO: 4.49 10*6/MM3 (ref 3.77–5.28)
RBC MORPH BLD: ABNORMAL
SODIUM SERPL-SCNC: 144 MMOL/L (ref 136–145)
TRIGL SERPL-MCNC: 153 MG/DL (ref 0–150)
TSH SERPL DL<=0.005 MIU/L-ACNC: 0.93 UIU/ML (ref 0.27–4.2)
VLDLC SERPL CALC-MCNC: 25 MG/DL (ref 5–40)
WBC # BLD AUTO: 7.87 10*3/MM3 (ref 3.4–10.8)

## 2021-02-04 NOTE — TELEPHONE ENCOUNTER
"Ms Mendes INR was 3.4, also wants you to send a copy of her labs to Dr Cordero \"Yordan\" pulmonologist.  "

## 2021-02-08 ENCOUNTER — IMMUNIZATION (OUTPATIENT)
Dept: VACCINE CLINIC | Facility: HOSPITAL | Age: 76
End: 2021-02-08

## 2021-02-08 PROCEDURE — 0001A: CPT | Performed by: INTERNAL MEDICINE

## 2021-02-08 PROCEDURE — 91300 HC SARSCOV02 VAC 30MCG/0.3ML IM: CPT | Performed by: INTERNAL MEDICINE

## 2021-02-15 ENCOUNTER — ANTICOAGULATION VISIT (OUTPATIENT)
Dept: INTERNAL MEDICINE | Facility: CLINIC | Age: 76
End: 2021-02-15

## 2021-02-15 RX ORDER — AMLODIPINE BESYLATE 10 MG/1
1 TABLET ORAL DAILY
Status: ON HOLD | COMMUNITY
Start: 2021-01-25 | End: 2022-06-15

## 2021-02-19 ENCOUNTER — TELEPHONE (OUTPATIENT)
Dept: INTERNAL MEDICINE | Facility: CLINIC | Age: 76
End: 2021-02-19

## 2021-02-19 NOTE — TELEPHONE ENCOUNTER
Patient returned Chitra's call and was wanting the results of her pro-time.  She can be reached at [phone].  Thank you

## 2021-02-25 ENCOUNTER — OFFICE VISIT (OUTPATIENT)
Dept: INTERNAL MEDICINE | Facility: CLINIC | Age: 76
End: 2021-02-25

## 2021-02-25 VITALS — TEMPERATURE: 97.7 F | HEIGHT: 67 IN | BODY MASS INDEX: 22.76 KG/M2 | WEIGHT: 145 LBS

## 2021-02-25 DIAGNOSIS — I27.20 PULMONARY HTN (HCC): Chronic | ICD-10-CM

## 2021-02-25 DIAGNOSIS — I10 ESSENTIAL HYPERTENSION: Chronic | ICD-10-CM

## 2021-02-25 DIAGNOSIS — F33.1 MODERATE EPISODE OF RECURRENT MAJOR DEPRESSIVE DISORDER (HCC): ICD-10-CM

## 2021-02-25 DIAGNOSIS — Z79.01 LONG TERM CURRENT USE OF ANTICOAGULANT THERAPY: Chronic | ICD-10-CM

## 2021-02-25 DIAGNOSIS — M54.2 NECK PAIN, CHRONIC: Primary | ICD-10-CM

## 2021-02-25 DIAGNOSIS — G89.29 NECK PAIN, CHRONIC: Primary | ICD-10-CM

## 2021-02-25 PROCEDURE — 99214 OFFICE O/P EST MOD 30 MIN: CPT | Performed by: INTERNAL MEDICINE

## 2021-02-25 RX ORDER — BUPROPION HYDROCHLORIDE 300 MG/1
300 TABLET ORAL DAILY
Qty: 90 TABLET | Refills: 1 | Status: SHIPPED | OUTPATIENT
Start: 2021-02-25 | End: 2021-09-01

## 2021-02-25 NOTE — PROGRESS NOTES
"Chief Complaint  Hypertension    Subjective          Joellen Dominguez presents to [facility] PRIMARY CARE  History of Present Illness  Having more neck pain- some degree of discomfort all of the time, gets worse with increased physical activity.  She is using her walker less often at home but does when she's out.  Cane sometimes.    Having surgery to L hand at end of March for some tendon issues related to her fall.    Saw derm about her hair loss- thought stress related and put her on biotin.   Seeing Dr. Cordero for pulm htn- stable, reviewed notes and he is adjusting meds to get her some better relief.  Goes a few times a week to pulmonary rehab.           Objective   Vital Signs:   Temp 97.7 °F (36.5 °C)   Ht 170.2 cm (67\")   Wt 65.8 kg (145 lb)   BMI 22.71 kg/m²     Physical Exam  Constitutional:       General: She is not in acute distress.     Appearance: Normal appearance.   Neck:      Musculoskeletal: Decreased range of motion. Muscular tenderness present. No injury or spinous process tenderness.   Cardiovascular:      Rate and Rhythm: Normal rate and regular rhythm.   Pulmonary:      Effort: Pulmonary effort is normal.   Musculoskeletal:      Right lower leg: No edema.      Left lower leg: No edema.   Lymphadenopathy:      Cervical: No cervical adenopathy.        Result Review :   The following data was reviewed by: Chitra Leyva MD on 02/25/2021:  CMP    CMP 12/7/20 12/15/20 2/4/21   Glucose 110 (A) 119 (A)    Glucose   98   BUN 59 (A) 51 (A) 36 (A)   Creatinine 1.73 (A) 1.54 (A) 1.34 (A)   eGFR Non  Am 29 (A) 33 (A) 38 (A)   eGFR  Am   47 (A)   Sodium 139 138 144   Potassium 3.4 (A) 3.9 4.1   Chloride 102 103 109 (A)   Calcium 9.6 9.6 9.9   Total Protein   6.2   Albumin  4.00 3.90   Globulin   2.3   Total Bilirubin  0.8 1.1   Alkaline Phosphatase  99 104   AST (SGOT)  19 17   ALT (SGPT)  18 12   (A) Abnormal value       Comments are available for some flowsheets but " are not being displayed.           CBC    CBC 12/15/20 12/22/20 12/22/20 2/4/21     1135 1137    WBC 9.85   7.87   RBC 5.06   4.49   Hemoglobin 10.5 (A) 9.5 (A) 9.5 (A) 12.6   Hematocrit 35.8 28 (A) 28 (A) 37.0   MCV 70.8 (A)   82.4   MCH 20.8 (A)   28.1   MCHC 29.3 (A)   34.1   RDW 18.9 (A)   24.6 (A)   Platelets 384   305   (A) Abnormal value            Data reviewed: Consultant notes Consuelo          Assessment and Plan    Diagnoses and all orders for this visit:    1. Neck pain, chronic (Primary)  Comments:  agrees to trial of PT before further investigation is taken.    Orders:  -     Ambulatory Referral to Physical Therapy Evaluate and treat    2. Long term current use of anticoagulant therapy  Comments:  sends me INR at home    3. Essential hypertension  Comments:  controlled, no change.     4. Pulmonary HTN (CMS/HCC)  Comments:  reviewed records of Dr. Cordero - doing well, continue pulmonary rehab.     5. Moderate episode of recurrent major depressive disorder (CMS/HCC)  Comments:  consider increasing Wellbutrin to 300 mg daily        Follow Up   Return in about 6 months (around 8/25/2021).  Patient was given instructions and counseling regarding her condition or for health maintenance advice. Please see specific information pulled into the AVS if appropriate.

## 2021-02-26 ENCOUNTER — TELEPHONE (OUTPATIENT)
Dept: GASTROENTEROLOGY | Facility: CLINIC | Age: 76
End: 2021-02-26

## 2021-02-26 DIAGNOSIS — E53.8 VITAMIN B12 DEFICIENCY: Primary | ICD-10-CM

## 2021-02-26 NOTE — TELEPHONE ENCOUNTER
----- Message from Estefani Chamberlain MA sent at 2/26/2021 10:52 AM EST -----  Regarding: Prescription Question  Contact: [phone]      ----- Message -----  From: Joellen Dominguez  Sent: 2/26/2021  10:41 AM EST  To: [facility]  Subject: Prescription Question                            I just received my Stelara injection yesterday (every 8 weeks) and wondered if we can shorten the time between injections. It seems to be working but I am finding that towards the end of the cycle I start to have Crohn's episodes. I currently get the shots free of charge at [facility], [address] [place] [address], tel. [phone].    Also, several of my prescriptions previously taken care of by Dr. Flores are expiring and I need new prescriptions for them.    I need a new Rx for my monthly vitamin injections my  gives me. It needs to include syringes also. Please send it to [facility] in [place].    I also need a new Rx for Pentasa. If you would send me the RX I can email to the [place] [facility] I use.    Sent in new rx for Vitamin B-12 and syringes.  Mailed rx for Pentasa to patient as she gets it from a pharmacy in [place].  Scheduled f/u appt to discuss Stelara rx. pk

## 2021-03-01 ENCOUNTER — IMMUNIZATION (OUTPATIENT)
Dept: VACCINE CLINIC | Facility: HOSPITAL | Age: 76
End: 2021-03-01

## 2021-03-01 DIAGNOSIS — E87.6 HYPOKALEMIA: ICD-10-CM

## 2021-03-01 PROCEDURE — 0002A: CPT | Performed by: INTERNAL MEDICINE

## 2021-03-01 PROCEDURE — 91300 HC SARSCOV02 VAC 30MCG/0.3ML IM: CPT | Performed by: INTERNAL MEDICINE

## 2021-03-01 RX ORDER — POTASSIUM CHLORIDE 20 MEQ/1
TABLET, EXTENDED RELEASE ORAL
Qty: 180 TABLET | Refills: 3 | Status: SHIPPED | OUTPATIENT
Start: 2021-03-01 | End: 2021-11-22

## 2021-03-19 ENCOUNTER — TELEPHONE (OUTPATIENT)
Dept: GASTROENTEROLOGY | Facility: CLINIC | Age: 76
End: 2021-03-19

## 2021-03-20 DIAGNOSIS — E78.5 DYSLIPIDEMIA: Primary | ICD-10-CM

## 2021-03-22 RX ORDER — ATORVASTATIN CALCIUM 10 MG/1
TABLET, FILM COATED ORAL
Qty: 90 TABLET | Refills: 0 | Status: SHIPPED | OUTPATIENT
Start: 2021-03-22 | End: 2021-06-21

## 2021-03-23 ENCOUNTER — OFFICE VISIT (OUTPATIENT)
Dept: GASTROENTEROLOGY | Facility: CLINIC | Age: 76
End: 2021-03-23

## 2021-03-23 DIAGNOSIS — Z79.899 HIGH RISK MEDICATION USE: ICD-10-CM

## 2021-03-23 DIAGNOSIS — K50.80 CROHN'S DISEASE OF BOTH SMALL AND LARGE INTESTINE WITHOUT COMPLICATION (HCC): Primary | ICD-10-CM

## 2021-03-23 PROCEDURE — 99443 PR PHYS/QHP TELEPHONE EVALUATION 21-30 MIN: CPT | Performed by: NURSE PRACTITIONER

## 2021-03-23 NOTE — PROGRESS NOTES
Chief Complaint   Patient presents with   • Crohn's Disease   • Med Management         History of Present Illness  75-year-old female presents today for follow-up. She has a history of Crohn's ileocolitis diagnosed in 1978. She was a former patient of Dr. Flores's and establish care with our office June 24, 2020.    She presents today for follow-up.  She has noticed worsening diarrhea, fecal incontinence and urgency 1 week before her Stelara injection.  This will last for 3 to 5 days after she takes her Stelara injection and then improves until week 7 and she has a return of diarrhea, urgency and incontinence.  There is no blood or hematochezia.    1 week after her injection until week 7 she has typically 1-2 formed bowel movements per day.  No urgency or incontinence during that timeframe.    She is currently taking WelChol 1 in the morning and 1 in the evening as well as Pentasa 4 in the morning and 4 in the evening.    She denies fever or chills.  She denies abdominal pain, cramping or bloating.    Overall she thinks that Stelara has worked the best as her typical uncontrollable symptom has been diarrhea but she is concerned about a return of symptoms at week 7.    She has a history of terminal ileal stricture which has not required surgery.     Most recent EGD and colonoscopy August 24, 2018. EGD revealed gastritis. Colonoscopy revealed ulcers in the terminal ileum as well as congestion and erythema in the ascending colon, cecum and hepatic flexure consistent with history of Crohn's colitis.     CT scan of the abdomen may 19th 2020 and CT enterography February 2020.    Previous treatments include Pentasa, sulfasalazine, Humira, Remicade, Entyvio.     You have chosen to receive care through a telephone visit.   Do you consent to use a telephone visit for your medical care today? Yes    Review of Systems   Constitutional: Negative for fever and unexpected weight change.   Gastrointestinal: Positive for diarrhea.      Physical Exam - TELEPHONE VISIT      Assessment and Plan    Diagnoses and all orders for this visit:    1. Crohn's disease of both small and large intestine without complication (CMS/HCC) (Primary)    2. High risk medication use       Crohn's disease with diarrhea, chronic, worsening.    Patient has a noticeable change in symptoms at week 7.  This persists until her next Stelara injection.  Otherwise she has 1-2 formed bowel movements a day without incontinence or urgency.  She feels the best she has felt in regards to Crohn's disease while on Stelara.    Reviewed today's visit with Dr. Salomon.  Patient is at high risk for endoscopic evaluation given comorbidities.  Given significant and repeated change in symptoms 7 weeks after her Stelara injection, he recommends changing Stelara to every 7 weeks.  Will place orders accordingly.    Diarrhea and fecal incontinence, worsening.  We will continue WelChol, Pentasa for Crohn's disease as well.    For B12 deficiency, will continue B12 injections.      Patient verbalized agreement and understanding with the above plan.  All questions answered and support provided.    This visit has been rescheduled as a phone visit to comply with patient safety concerns in accordance with CDC recommendations. Total time of discussion was 23 minutes.

## 2021-04-01 ENCOUNTER — TELEPHONE (OUTPATIENT)
Dept: INTERNAL MEDICINE | Facility: CLINIC | Age: 76
End: 2021-04-01

## 2021-04-01 NOTE — TELEPHONE ENCOUNTER
Henny with [employer] called regarding out of range INR 1.1 dated 3-31-21.  Any questions please call Henny [phone]

## 2021-04-14 ENCOUNTER — OFFICE VISIT (OUTPATIENT)
Dept: CARDIOLOGY | Facility: CLINIC | Age: 76
End: 2021-04-14

## 2021-04-14 VITALS
BODY MASS INDEX: 23.23 KG/M2 | RESPIRATION RATE: 20 BRPM | HEART RATE: 70 BPM | HEIGHT: 67 IN | SYSTOLIC BLOOD PRESSURE: 122 MMHG | OXYGEN SATURATION: 98 % | DIASTOLIC BLOOD PRESSURE: 58 MMHG | WEIGHT: 148 LBS

## 2021-04-14 DIAGNOSIS — I27.20 PULMONARY HTN (HCC): ICD-10-CM

## 2021-04-14 DIAGNOSIS — Z98.890 S/P ATRIOVENTRICULAR NODAL ABLATION: Chronic | ICD-10-CM

## 2021-04-14 DIAGNOSIS — I10 ESSENTIAL HYPERTENSION: Primary | ICD-10-CM

## 2021-04-14 DIAGNOSIS — I50.32 CHRONIC DIASTOLIC CHF (CONGESTIVE HEART FAILURE) (HCC): ICD-10-CM

## 2021-04-14 DIAGNOSIS — J43.9 PULMONARY EMPHYSEMA, UNSPECIFIED EMPHYSEMA TYPE (HCC): Chronic | ICD-10-CM

## 2021-04-14 DIAGNOSIS — Z79.01 LONG TERM CURRENT USE OF ANTICOAGULANT THERAPY: Chronic | ICD-10-CM

## 2021-04-14 DIAGNOSIS — I48.19 ATRIAL FIBRILLATION, PERSISTENT (HCC): ICD-10-CM

## 2021-04-14 DIAGNOSIS — E78.5 DYSLIPIDEMIA: Chronic | ICD-10-CM

## 2021-04-14 DIAGNOSIS — D68.61 ANTIPHOSPHOLIPID ANTIBODY SYNDROME (HCC): ICD-10-CM

## 2021-04-14 DIAGNOSIS — Z95.0 PRESENCE OF CARDIAC PACEMAKER: Chronic | ICD-10-CM

## 2021-04-14 DIAGNOSIS — I34.0 MODERATE MITRAL INSUFFICIENCY: Chronic | ICD-10-CM

## 2021-04-14 PROCEDURE — 99214 OFFICE O/P EST MOD 30 MIN: CPT | Performed by: INTERNAL MEDICINE

## 2021-04-14 NOTE — PROGRESS NOTES
"Chief Complaint  Hypertension and Atrial Fibrillation    Subjective    History of Present Illness      I saw Joellen Dominguez today for continued cardiovascular care.  She is a very pleasant 76-year-old female accompanied by her .  Ms. Dominguez observes the CDC guidelines during the coronavirus pandemic.  She has known moderate to severe reversible pulmonary hypertension followed by Dr. Jeancarlos Cordero of pulmonary medicine.  Her respiratory status is compromised but stable.  She does not report chest pain pressure tightness.  She has very mild lower extremity edema.  She sleeps with 1-2 pillows free of orthopnea or PND.  She denies syncope near syncope or any significant palpitations.  Review of her last echocardiogram from 4/19/2020 reveals left ventricular ejection fraction 62%, moderate mitral insufficiency, Mild tricuspid insufficiency the estimated right ventricular systolic pressure 39.2 mmHg.  She has atrial fibrillation and remains on long-term anticoagulation for stroke risk reduction with warfarin.  She tolerates her anticoagulation well without significant bruising or bleeding.  She is status post AV node ablation with permanent pacemaker placement 7/29/2020.  Pacemaker interrogation today reveals normal device function with greater than 10 years remaining on battery life and no significant dysrhythmias or ventricular high rates.    Objective   Vital Signs:   /58 (BP Location: Right arm, Patient Position: Sitting, Cuff Size: Adult)   Pulse 70   Resp 20   Ht 170.2 cm (67\")   Wt 67.1 kg (148 lb)   SpO2 98%   BMI 23.18 kg/m²     Constitutional:       Appearance: Well-developed.   Eyes:      Conjunctiva/sclera: Conjunctivae normal.      Pupils: Pupils are equal, round, and reactive to light.   HENT:      Head: Normocephalic and atraumatic.   Neck:      Thyroid: No thyromegaly.   Pulmonary:      Effort: Pulmonary effort is normal. No respiratory distress.      Breath sounds: Normal breath sounds. " Decreased air movement present. No wheezing. No rales.   Cardiovascular:      Normal rate. Regular rhythm.      Murmurs: There is a grade 2/6 high frequency blowing holosystolic murmur at the apex.      No gallop. No S3 and S4 gallop. Midsystolic click click.   Edema:     Peripheral edema present.     Pretibial: bilateral trace edema of the pretibial area.  Abdominal:      General: Bowel sounds are normal. There is no distension.      Palpations: Abdomen is soft. There is no abdominal mass.      Tenderness: There is no abdominal tenderness.   Musculoskeletal: Normal range of motion.      Cervical back: Neck supple. Skin:     General: Skin is warm and dry.      Findings: No erythema.   Neurological:      Mental Status: Alert and oriented to person, place, and time.         Result Review :     Common labs    Common Labsle 12/22/20 12/22/20 2/4/21 2/4/21 2/4/21 3/1/21    1135 1137 0954 0954 0954    Glucose     98    BUN     36 (A)    Creatinine     1.34 (A)    eGFR Non  Am     38 (A)    eGFR  Am     47 (A)    Sodium     144    Potassium     4.1    Chloride     109 (A)    Calcium     9.9    Total Protein     6.2    Albumin     3.90    Total Bilirubin     1.1    Alkaline Phosphatase     104    AST (SGOT)     17    ALT (SGPT)     12    WBC    7.87  9.56   Hemoglobin 9.5 (A) 9.5 (A)  12.6  13.4   Hematocrit 28 (A) 28 (A)  37.0  40.1   Platelets    305  287   Total Cholesterol   112      Triglycerides   153 (A)      HDL Cholesterol   63 (A)      LDL Cholesterol    24      (A) Abnormal value       Comments are available for some flowsheets but are not being displayed.           Data reviewed: Cardiology studies Echocardiogram 4/19/2020          Assessment and Plan    1. Essential hypertension  Controlled    2. Chronic diastolic CHF (congestive heart failure) (CMS/HCC)  Stable    3. Dyslipidemia  Stable    4. Pulmonary HTN (CMS/HCC)  Stable, reversible    5. Atrial fibrillation, persistent (CMS/HCC)  Status post  AV node ablation    6. Long term current use of anticoagulant therapy  Well-tolerated    7. S/P atrioventricular hyacinth ablation  Status post pacemaker    8. Presence of cardiac pacemaker  Normal device function    9. Moderate mitral insufficiency  Stable    10. Antiphospholipid antibody syndrome (CMS/HCC)  Stable    11. Pulmonary emphysema, unspecified emphysema type (CMS/HCC)  Presently stable    Ms. Dominguez is in good spirits and overall feels well.  Her respiratory status as indicated above is compromised but presently stable.  She is free of chest discomfort no significant tachycardia or palpitations.  Of encouraged her to restrict her salt intake, keep her lower extremities elevated while seated and use compression stockings.  We will see him in follow-up in 6 months sooner if needed        Follow Up   No follow-ups on file.  Patient was given instructions and counseling regarding her condition or for health maintenance advice. Please see specific information pulled into the AVS if appropriate.

## 2021-04-16 ENCOUNTER — TELEPHONE (OUTPATIENT)
Dept: INTERNAL MEDICINE | Facility: CLINIC | Age: 76
End: 2021-04-16

## 2021-04-16 DIAGNOSIS — I48.19 ATRIAL FIBRILLATION, PERSISTENT (HCC): Chronic | ICD-10-CM

## 2021-04-16 RX ORDER — WARFARIN SODIUM 2 MG/1
TABLET ORAL
Qty: 60 TABLET | Refills: 2 | Status: SHIPPED | OUTPATIENT
Start: 2021-04-16 | End: 2021-08-30 | Stop reason: SDUPTHER

## 2021-04-16 NOTE — TELEPHONE ENCOUNTER
Patients  called and said she gave us the wrong doseage. 2mg 3x week and 4mg 4x a week is what she is on

## 2021-04-16 NOTE — TELEPHONE ENCOUNTER
She take 5mg on M, W, and F. 10mg all other days. She had not been on any ABX and does eat greens but no more then usual.

## 2021-04-16 NOTE — TELEPHONE ENCOUNTER
Please find out her dosing for me, we are going to need to make adjustments. Any abx recently or more greens?

## 2021-05-03 LAB — INR PPP: 4.2

## 2021-05-04 ENCOUNTER — TELEPHONE (OUTPATIENT)
Dept: INTERNAL MEDICINE | Facility: CLINIC | Age: 76
End: 2021-05-04

## 2021-05-04 NOTE — TELEPHONE ENCOUNTER
Caller: APRIL    Relationship: [facility]    Best call back number: [phone]    Who are you requesting to speak with (clinical staff, provider,  specific staff member): PROVIDER    What was the call regarding: April WITH [facility] CALLED ON BEHALF OF THE PATIENT AND REPORTED THAT THE PATIENT TESTED THEIR INR TODAY WITH A 4 POINT RESULT AND SHE STATED THAT THEY WILL FAX OVER A COPY OF THE RESULTS TO THE OFFICE    Do you require a callback: NO

## 2021-05-05 ENCOUNTER — ANTICOAGULATION VISIT (OUTPATIENT)
Dept: INTERNAL MEDICINE | Facility: CLINIC | Age: 76
End: 2021-05-05

## 2021-05-17 ENCOUNTER — APPOINTMENT (OUTPATIENT)
Dept: CT IMAGING | Facility: HOSPITAL | Age: 76
End: 2021-05-17

## 2021-05-20 ENCOUNTER — TELEPHONE (OUTPATIENT)
Dept: INTERNAL MEDICINE | Facility: CLINIC | Age: 76
End: 2021-05-20

## 2021-05-20 ENCOUNTER — ANTICOAGULATION VISIT (OUTPATIENT)
Dept: INTERNAL MEDICINE | Facility: CLINIC | Age: 76
End: 2021-05-20

## 2021-05-20 LAB — INR PPP: 1.9

## 2021-06-10 ENCOUNTER — TELEPHONE (OUTPATIENT)
Dept: INTERNAL MEDICINE | Facility: CLINIC | Age: 76
End: 2021-06-10

## 2021-06-10 ENCOUNTER — ANTICOAGULATION VISIT (OUTPATIENT)
Dept: INTERNAL MEDICINE | Facility: CLINIC | Age: 76
End: 2021-06-10

## 2021-06-10 LAB — INR PPP: 1.7

## 2021-06-10 NOTE — TELEPHONE ENCOUNTER
I put this in computer for you but just incase you still can't see 4mg M,W,Thurs and 2mg other day

## 2021-06-10 NOTE — TELEPHONE ENCOUNTER
Where does it tell me in her chart what her current dose is?  Needs to be bumped up a little bit.

## 2021-06-18 PROCEDURE — 93294 REM INTERROG EVL PM/LDLS PM: CPT | Performed by: INTERNAL MEDICINE

## 2021-06-18 PROCEDURE — 93296 REM INTERROG EVL PM/IDS: CPT | Performed by: INTERNAL MEDICINE

## 2021-06-20 DIAGNOSIS — E78.5 DYSLIPIDEMIA: ICD-10-CM

## 2021-06-21 RX ORDER — ATORVASTATIN CALCIUM 10 MG/1
TABLET, FILM COATED ORAL
Qty: 90 TABLET | Refills: 0 | Status: SHIPPED | OUTPATIENT
Start: 2021-06-21 | End: 2021-09-03

## 2021-06-30 ENCOUNTER — CLINICAL SUPPORT (OUTPATIENT)
Dept: INTERNAL MEDICINE | Facility: CLINIC | Age: 76
End: 2021-06-30

## 2021-06-30 ENCOUNTER — ANTICOAGULATION VISIT (OUTPATIENT)
Dept: INTERNAL MEDICINE | Facility: CLINIC | Age: 76
End: 2021-06-30

## 2021-06-30 DIAGNOSIS — D68.61 ANTIPHOSPHOLIPID ANTIBODY SYNDROME (HCC): Primary | ICD-10-CM

## 2021-06-30 LAB — INR PPP: 1.4 (ref 0.9–1.1)

## 2021-06-30 PROCEDURE — 36416 COLLJ CAPILLARY BLOOD SPEC: CPT | Performed by: PHYSICIAN ASSISTANT

## 2021-06-30 PROCEDURE — 85610 PROTHROMBIN TIME: CPT | Performed by: PHYSICIAN ASSISTANT

## 2021-07-06 ENCOUNTER — PATIENT MESSAGE (OUTPATIENT)
Dept: GASTROENTEROLOGY | Facility: CLINIC | Age: 76
End: 2021-07-06

## 2021-07-06 DIAGNOSIS — R93.5 ABNORMAL ABDOMINAL CT SCAN: Primary | ICD-10-CM

## 2021-07-06 NOTE — TELEPHONE ENCOUNTER
Spoke with patient via telephone.    She is waiting for CT scan abdomen pelvis to be performed that was ordered by her pulmonologist.    We will look for results first thing in the morning and patient was encouraged to follow a liquid bland diet and proceed to ER if symptoms change in the meantime.  Alexia

## 2021-07-07 RX ORDER — METRONIDAZOLE 250 MG/1
250 TABLET ORAL 3 TIMES DAILY
Qty: 21 TABLET | Refills: 0 | Status: SHIPPED | OUTPATIENT
Start: 2021-07-07 | End: 2021-07-14

## 2021-07-07 NOTE — TELEPHONE ENCOUNTER
Reviewed CT scan with patient via telephone.  Concern for enteritis secondary to foodborne illness.  Will start metronidazole x7 days.  If symptoms do not improve, would treat with short course of prednisone given history of Crohn's disease.  No evidence of bowel obstruction on imaging.  Recommend soft bland low residue diet advancing slowly.  Also patient to follow-up with primary care provider and nephrologist regarding other CT scan findings.  Patient verbalized agreement and understanding with the above plan.  All questions answered and support provided.  [facility]    [facility] Abdomen & Pelvis Wo IV Contrast With Oral Contrast (NO IV contrast. )    Narrative    REVIEWING YOUR TEST RESULTS IN [facility] IS NOT A SUBSTITUTE FOR DISCUSSING THOSE RESULTS WITH YOUR HEALTH CARE PROVIDER.   PLEASE CONTACT YOUR PROVIDER VIA Psychiatric TO DISCUSS ANY QUESTIONS OR CONCERNS YOU MAY HAVE REGARDING THESE TEST RESULTS.     RADIOLOGY REPORT     FACILITY:  [facility]   UNIT/AGE/GENDER: N.CT  OP      AGE:76 Y          SEX:F   PATIENT NAME/:  PRESLEY KING S    1945   UNIT NUMBER:  [id number]   ACCOUNT NUMBER:  [id number]   ACCESSION NUMBER:  [id number]     CT ABDOMEN AND PELVIS WO IV CONTRAST     DATE: 2021     EXAM: CT ABDOMEN /PELVIS WITHOUT INTRAVENOUS CONTRAST     COMPARISON: None.     INDICATION: Abdominal distension   Nausea/vomiting   Abdominal pain, weight loss.     PROCEDURE: Contiguous axial images of the abdomen and pelvis were obtained without intravenous contrast. This technique limits evaluation of soft tissue and intraperitoneal structures. Dose reduction techniques were employed per ALARA protocol.     FINDINGS:     Chosen noncontrast technique limits evaluation of abdominal organs, soft tissue and vascular structures. 8 mm nodular opacity which is contiguous with linear opacity in the lingula is a nonspecific finding. There is additional linear and dependent   airspace opacity  within the lung bases, primarily within right lower and middle lobes that is favored represent atelectasis over pneumonia.     Trace pericardial effusion is noted. Calcific coronary artery disease is present. Transvenous ICD is present.     Small hiatal hernia present with dense central contrast noted in the lower half of the esophagus.     Gallbladder is surgically absent. Grossly negative noncontrast appearances of the liver, spleen, and pancreas. No pneumoperitoneum or significant free fluid. There are postoperative changes of prior ventral hernia repair with mesh.     There is a 5.5 cm predominantly fat density left adrenal mass centered on image 113 that is consistent with an adrenal myelolipoma. Left adrenal gland is unremarkable. Subcentimeter hyperdense bilateral renal nodules may represent hyperdense cysts. 1.8   cm simple appearing cyst arises from medial margin of mid polar region of right kidney on image 128. Central small bilateral renal calcifications could represent nonobstructing calyceal stones. No hydronephrosis.     Multiple diverticula are seen throughout the colon with no definite adjacent inflammatory change. There is mild air and fluid distention of the colon which measures up to 5.7 cm in diameter. Distal ileal bowel loops are fluid-filled and mildly distended.    There is a gradual transition to more decompressed left colon. No convincing evidence of obstruction. A normal appendix is identified.     There are postoperative changes of prior aorto bifemoral bypass. Advanced multilevel degenerative changes are seen throughout the lumbosacral spine setting of moderate S-shaped scoliosis of the thoracolumbar spine. A few small calcified fibroids are   noted in the uterus. Grossly normal urinary bladder.     IMPRESSION:   1. Mild-to-moderate air and fluid distention of the right colon and to a lesser extent the distal small bowel with no evidence of high-grade bowel obstruction. The pattern is  nonspecific and could reflect sequela of nonspecific enteritis. No gross bowel   wall thickening.   2. Colonic diverticulosis with no CT evidence of acute diverticulitis.   3. 5.5 cm left adrenal myolipoma.   4. Small hiatal hernia with gastroesophageal reflux.   5. Additional incidental findings as above.     Dictated by: Vasile Luo M.D.     Images and Report reviewed and interpreted by: Vasile Luo M.D.     <PS><Electronically signed by: Vasile Luo M.D.>   07/07/2021 0739     D: 07/07/2021 0712   T: 07/07/2021 0712  Other Result Text

## 2021-07-08 ENCOUNTER — TELEPHONE (OUTPATIENT)
Dept: INTERNAL MEDICINE | Facility: CLINIC | Age: 76
End: 2021-07-08

## 2021-07-08 NOTE — TELEPHONE ENCOUNTER
Caller: [facility]    Best call back number: [phone]    What test was performed: INR    When was the test performed: 7/8/21    Additional notes: PATIENT'S INR WAS 1.3

## 2021-07-08 NOTE — TELEPHONE ENCOUNTER
Last week she was in and her INR was 1.8 so we changed her to 4mg x 4 days and 2mg x 2     Changes?

## 2021-07-08 NOTE — TELEPHONE ENCOUNTER
4 mg 5 days and 2 mg 2 days is current dose  Change to 4 mg daily.  Needs to watch her diet, it may be really altering her INR  Recheck 1 week

## 2021-07-12 ENCOUNTER — PATIENT MESSAGE (OUTPATIENT)
Dept: GASTROENTEROLOGY | Facility: CLINIC | Age: 76
End: 2021-07-12

## 2021-07-14 ENCOUNTER — TELEPHONE (OUTPATIENT)
Dept: INTERNAL MEDICINE | Facility: CLINIC | Age: 76
End: 2021-07-14

## 2021-07-14 NOTE — TELEPHONE ENCOUNTER
Caller: VIVIAN    Relationship: [facility]    Best call back number: [phone]    What was the call regarding: PATIENTS INR IS OUT OF RANGE AT 3.8     Do you require a callback: PLEASE CALL PATIENT

## 2021-07-16 RX ORDER — PREDNISONE 10 MG/1
TABLET ORAL
Qty: 36 TABLET | Refills: 0 | Status: SHIPPED | OUTPATIENT
Start: 2021-07-16 | End: 2021-08-12

## 2021-07-22 ENCOUNTER — TELEPHONE (OUTPATIENT)
Dept: INTERNAL MEDICINE | Facility: CLINIC | Age: 76
End: 2021-07-22

## 2021-07-22 NOTE — TELEPHONE ENCOUNTER
LMTC    Per Tanisha pt is to hold coumadin until Monday July 26th and retest same day.    If Pt has any abnormal bleeding, fall before then she will need to go to ER ASAP

## 2021-07-26 ENCOUNTER — PATIENT MESSAGE (OUTPATIENT)
Dept: GASTROENTEROLOGY | Facility: CLINIC | Age: 76
End: 2021-07-26

## 2021-08-10 ENCOUNTER — TELEPHONE (OUTPATIENT)
Dept: INTERNAL MEDICINE | Facility: CLINIC | Age: 76
End: 2021-08-10

## 2021-08-10 NOTE — TELEPHONE ENCOUNTER
INÉS FROM [facility] IS CALLING IN WITH A OUT OF RANGE READING FOR THE PATIENT.  THE READING WAS TAKEN YESTERDAY 08/09/2021 AT 2:09 PM AND THE READING WAS 1.9

## 2021-08-12 ENCOUNTER — OFFICE VISIT (OUTPATIENT)
Dept: CARDIOLOGY | Facility: CLINIC | Age: 76
End: 2021-08-12

## 2021-08-12 VITALS
HEIGHT: 67 IN | RESPIRATION RATE: 18 BRPM | DIASTOLIC BLOOD PRESSURE: 66 MMHG | BODY MASS INDEX: 22.44 KG/M2 | WEIGHT: 143 LBS | HEART RATE: 68 BPM | SYSTOLIC BLOOD PRESSURE: 108 MMHG

## 2021-08-12 DIAGNOSIS — I48.21 ATRIAL FIBRILLATION, PERMANENT (HCC): Primary | ICD-10-CM

## 2021-08-12 DIAGNOSIS — Z98.890 S/P ATRIOVENTRICULAR NODAL ABLATION: Chronic | ICD-10-CM

## 2021-08-12 DIAGNOSIS — Z95.0 PRESENCE OF CARDIAC PACEMAKER: Chronic | ICD-10-CM

## 2021-08-12 PROCEDURE — 99212 OFFICE O/P EST SF 10 MIN: CPT | Performed by: INTERNAL MEDICINE

## 2021-08-12 NOTE — PROGRESS NOTES
Subjective:     Encounter Date:08/12/2021      Patient ID: Joellen Dominguez is a 76 y.o. female.    Chief Complaint:  Chief Complaint   Patient presents with   • Follow-up   • Pacemaker Check       HPI:  Ms Dominguez is a 75 yo patient of Dr. Karthik Reyez who has a  past medial history is significant for HTN , HLD, COPD, antiphospholipid antibody syndrome on coumadin.  She began having trouble with atrial fibrillation earlier this year.   She was admitted to [facility] 4/2020 with an episode of AF with rates in the 160's.  She was rate controlled and placed on amiodarone.  She had a JUAN C cardioversion and the JUAN C showed an EF of 55% with moderate concentric LVH, mild MR and severe LAE.  She remained in sinus rhythm only  A short time according to her .  She was admitted again 5/2020 with AF with RVR and her meds were adjusted and discharged.  Her  states that her pulse rate is usually in the 120 range at home.  Her symptoms include fatigue, dyspnea and some ankle edema.  She also has palpitations on a daily basis.     Her cardiac evaluation has included a cath in 4/2020 which showed nonobstructive disease, EF 50% and PA pressure of 46/20mmHg.     She underwent AV node ablation and implantation of a pacemaker in 2020.  Since she was last seen she as been doing well without palpitations or dyspnea.    The following portions of the patient's history were reviewed and updated as appropriate: allergies, current medications, past family history, past medical history, past social history, past surgical history and problem list.    Problem List:  Patient Active Problem List   Diagnosis   • Incontinence   • Degeneration of lumbar intervertebral disc   • Essential hypertension   • Depression   • Antiphospholipid antibody syndrome (CMS/HCC)   • Lichen sclerosus et atrophicus   • Myocardial infarction type 2 (CMS/HCC)   • Leukocytosis   • Pulmonary HTN (CMS/HCC)   • Cold agglutinin disease (CMS/HCC)   •  Chronic diastolic CHF (congestive heart failure) (CMS/HCC)   • Warfarin-induced coagulopathy (CMS/HCC)   • COPD with emphysema (CMS/HCC)   • Inflammatory bowel disease (Crohn's disease) (CMS/HCC)   • Long term current use of anticoagulant therapy   • Atrial fibrillation, permanent (CMS/HCC)   • S/P atrioventricular hyacinth ablation   • Presence of cardiac pacemaker   • Dyslipidemia   • Moderate mitral insufficiency       Past Medical History:  Past Medical History:   Diagnosis Date   • Acute deep vein thrombosis of lower extremity (CMS/HCC)    • Antiphospholipid antibody syndrome (CMS/HCC)    • Antiphospholipid antibody syndrome (CMS/HCC)    • Arrhythmia     ATRIAL FIBRILLATION   • Arthritis     OSTEO   • Benign essential hypertension    • COPD (chronic obstructive pulmonary disease) (CMS/HCC)    • Coronary artery disease    • Crohn's disease (CMS/HCC)    • Cutaneous candidiasis    • Depression    • Disease of thyroid gland    • Elevated cholesterol    • GERD (gastroesophageal reflux disease)    • History of echocardiogram 04/22/2020    mild-to-moderate concentric hypertrophy, EF 56 - 60%. LA severely dilated, Mild MAC, Mild MR with restrictive movement of the posterior leaflet   • Hyperlipidemia    • Hypertension    • Myocardial infarction (CMS/HCC)    • Osteopenia    • Polyp, uterus corpus    • Pulmonary hypertension (CMS/HCC)        Past Surgical History:  Past Surgical History:   Procedure Laterality Date   • ABDOMINAL HERNIA REPAIR     • AMPUTATION DIGIT Left     SECOND TOE    • ARTERIOGRAM  7/30/2020    Procedure: Arteriogram;  Surgeon: Chong Buchanan MD;  Location: [facility] INVASIVE LOCATION;  Service: Cardiovascular;;  rt femoral   • BILATERAL SALPINGO OOPHORECTOMY     • BREAST BIOPSY Right     BENIGN   • CARDIAC CATHETERIZATION N/A 4/22/2020    Surgeon: Paulo Singleton MD;  nonsignificant coronary artery disease normal LV function nonsignificant mitral regurgitation probably shortness of  breath due to the cardiac arrhythmias and diastolic dysfunction   • CARDIAC CATHETERIZATION N/A 4/22/2020    Procedure: Coronary angiography;  Surgeon: Paulo Singleton MD;  Location: [facility] CATH INVASIVE LOCATION;  Service: Cardiology;  Laterality: N/A;   • CARDIAC CATHETERIZATION N/A 4/22/2020    Procedure: Left ventriculography;  Surgeon: Paulo Singleton MD;  Location: [facility] CATH INVASIVE LOCATION;  Service: Cardiology;  Laterality: N/A;   • CARDIAC CATHETERIZATION N/A 12/22/2020    Procedure: Right Heart Cath w/ hemodynamic challenge;  Surgeon: Mario Bay MD;  Location: [facility] CATH INVASIVE LOCATION;  Service: Cardiology;  Laterality: N/A;   • CARDIAC ELECTROPHYSIOLOGY PROCEDURE N/A 4/25/2020    Procedure: Cardioversion;  Surgeon: Paulo Singleton MD;  Location: [facility] CATH INVASIVE LOCATION;  Service: Cardiology;  Laterality: N/A;   • CARDIAC ELECTROPHYSIOLOGY PROCEDURE N/A 7/29/2020    Procedure: PACEMAKER IMPLANTATION- DC;  Surgeon: Chong Buchanan MD;  Location: [facility] CATH INVASIVE LOCATION;  Service: Cardiovascular;  Laterality: N/A;   • CARDIAC ELECTROPHYSIOLOGY PROCEDURE N/A 7/29/2020    Procedure: ABLATION AV NODE;  Surgeon: Chong Buchanan MD;  Location: [facility] CATH INVASIVE LOCATION;  Service: Cardiovascular;  Laterality: N/A;   • CARDIAC ELECTROPHYSIOLOGY PROCEDURE N/A 7/30/2020    Procedure: ABLATION AV NODE PT HAS ST.BLESSING PPM;  Surgeon: Chong Buchanan MD;  Location: [facility] CATH INVASIVE LOCATION;  Service: Cardiovascular;  Laterality: N/A;   • CHOLECYSTECTOMY     • COLONOSCOPY  2018   • COSMETIC SURGERY     • D & C HYSTEROSCOPY N/A 10/13/2016    Procedure: DILATATION AND CURETTAGE HYSTEROSCOPY WITH MYOSURE;  Surgeon: Christopher Doll MD;  Location: [facility] OR;  Service:    • ENDOSCOPY     • EYE SURGERY Bilateral     cataract   • FACELIFT     • FEMORAL ARTERY - FEMORAL ARTERY BYPASS GRAFT Bilateral    • HEMORRHOIDECTOMY     • TONSILLECTOMY  "AND ADENOIDECTOMY     • UPPER GASTROINTESTINAL ENDOSCOPY     • VASCULAR SURGERY      femoral stents        Social History:  Social History     Socioeconomic History   • Marital status:      Spouse name: Not on file   • Number of children: Not on file   • Years of education: Not on file   • Highest education level: Not on file   Tobacco Use   • Smoking status: Former Smoker     Packs/day: 2.00     Years: 16.00     Pack years: 32.00     Types: Cigarettes     Quit date:      Years since quittin.6   • Smokeless tobacco: Never Used   Vaping Use   • Vaping Use: Never used   Substance and Sexual Activity   • Alcohol use: No   • Drug use: No   • Sexual activity: Defer       Allergies:  Allergies   Allergen Reactions   • Infliximab Rash     Other reaction(s): Psoriasis   • Sulfa Antibiotics Hives       Immunizations:  Immunization History   Administered Date(s) Administered   • COVID-19 (PFIZER) 2021, 2021   • FLUAD TRI 65YR+ 10/07/2019   • Fluad Quad 65+ 09/15/2020   • Fluzone High Dose =>65 Years ([employer] ONLY) 2018, 10/07/2019   • Pneumococcal Conjugate 13-Valent (PCV13) 01/10/2015   • Tdap 2020       ROS:  Review of Systems   Constitutional: Negative for malaise/fatigue.   Cardiovascular: Negative for chest pain, dyspnea on exertion, irregular heartbeat, leg swelling, near-syncope, orthopnea, palpitations, paroxysmal nocturnal dyspnea and syncope.   Respiratory: Negative for shortness of breath.    All other systems reviewed and are negative.         Objective:         /66 (BP Location: Left arm, Patient Position: Sitting)   Pulse 68   Resp 18   Ht 170.2 cm (67\")   Wt 64.9 kg (143 lb)   BMI 22.40 kg/m²     Constitutional:       General: Not in acute distress.     Appearance: Well-developed.   Eyes:      General: No scleral icterus.     Conjunctiva/sclera: Conjunctivae normal.      Pupils: Pupils are equal, round, and reactive to light.   HENT:      Head: Normocephalic " and atraumatic.   Neck:      Thyroid: No thyromegaly.   Pulmonary:      Effort: Pulmonary effort is normal.      Breath sounds: Normal breath sounds.   Cardiovascular:      Normal rate. Regular rhythm.   Abdominal:      General: Bowel sounds are normal.      Palpations: Abdomen is soft.   Musculoskeletal: Normal range of motion.      Cervical back: Normal range of motion. Skin:     General: Skin is warm and dry.   Neurological:      Mental Status: Alert and oriented to person, place, and time.         In-Office Procedure(s):  Procedures  ICD eval interpreted by [facility] [id number]  Battery YOLI    R wave 11mv  Threhsold 0.5V  Impedance 450 ohms    Events - none    ASCVD RIsk Score::  The ASCVD Risk score (Rocio KAUSHIK Jr., et al., 2013) failed to calculate for the following reasons:    The patient has a prior MI or stroke diagnosis    Recent Radiology:  Imaging Results (Most Recent)     None          Lab Review:   Clinical Support on 06/30/2021   Component Date Value   • INR 06/30/2021 1.4*                Assessment:          Diagnosis Plan   1. Atrial fibrillation, permanent (CMS/HCC)     2. S/P atrioventricular hyacinth ablation     3. Presence of cardiac pacemaker            Plan:      1. Permanent AF - s/p AV node ablation, remains in complete heart block, on warfarin for CHADS of 3 (age, gender, HTN)  2. Pacemaker followup - normal device function     Enrolled in remote monitoring  RTC 1 year         Level of Care:                 Chong Buchanan MD  08/12/21  .

## 2021-08-13 PROBLEM — I48.21 ATRIAL FIBRILLATION, PERMANENT: Status: ACTIVE | Noted: 2020-07-22

## 2021-08-30 ENCOUNTER — OFFICE VISIT (OUTPATIENT)
Dept: INTERNAL MEDICINE | Facility: CLINIC | Age: 76
End: 2021-08-30

## 2021-08-30 VITALS — BODY MASS INDEX: 22.91 KG/M2 | HEIGHT: 67 IN | WEIGHT: 146 LBS | TEMPERATURE: 97.3 F

## 2021-08-30 DIAGNOSIS — D68.61 ANTIPHOSPHOLIPID ANTIBODY SYNDROME (HCC): Primary | ICD-10-CM

## 2021-08-30 DIAGNOSIS — K50.80 CROHN'S DISEASE OF BOTH SMALL AND LARGE INTESTINE WITHOUT COMPLICATION (HCC): ICD-10-CM

## 2021-08-30 DIAGNOSIS — I10 ESSENTIAL HYPERTENSION: Chronic | ICD-10-CM

## 2021-08-30 DIAGNOSIS — I27.20 PULMONARY HTN (HCC): Chronic | ICD-10-CM

## 2021-08-30 PROBLEM — G47.33 OBSTRUCTIVE SLEEP APNEA SYNDROME: Status: ACTIVE | Noted: 2021-08-30

## 2021-08-30 PROCEDURE — 99214 OFFICE O/P EST MOD 30 MIN: CPT | Performed by: INTERNAL MEDICINE

## 2021-08-30 RX ORDER — WARFARIN SODIUM 2 MG/1
TABLET ORAL
Qty: 180 TABLET | Refills: 1 | Status: SHIPPED | OUTPATIENT
Start: 2021-08-30 | End: 2022-01-24 | Stop reason: SDUPTHER

## 2021-08-30 RX ORDER — FUROSEMIDE 40 MG/1
40 TABLET ORAL 2 TIMES DAILY
Qty: 180 TABLET | Refills: 0 | OUTPATIENT
Start: 2021-08-30 | End: 2022-03-15 | Stop reason: ALTCHOICE

## 2021-08-30 NOTE — PROGRESS NOTES
"Chief Complaint  Hypertension    Subjective          Joellen Dominguez presents to [facility]  History of Present Illness  Had a dilated colon- thought it was a side effect of the medication she takes for IPF- CT revealed it to be a dilated colon- uncertain cause although assume Crohns related.   ivelisse treated her with steroids. At the same time she gross hematuria- saw DR. Faviola montanez with some irritations, plans f/u in Oct.    She is seeing pain MD later this month due to back issues- she has been able to be more active so now having more problems.   INR this am 1.5- taking 4 mg 5x week and 2 mg 2x week.   She feels that she is almost back to baseline after the events of last year.     Objective   Vital Signs:   Temp 97.3 °F (36.3 °C)   Ht 170.2 cm (67\")   Wt 66.2 kg (146 lb)   BMI 22.87 kg/m²     Physical Exam  Constitutional:       Appearance: Normal appearance.   Cardiovascular:      Rate and Rhythm: Normal rate and regular rhythm.   Pulmonary:      Effort: Pulmonary effort is normal.   Musculoskeletal:      Right lower leg: No edema.      Left lower leg: No edema.        Result Review :                 Assessment and Plan    Diagnoses and all orders for this visit:    1. Antiphospholipid antibody syndrome (CMS/HCC) (Primary)  Comments:  change warfarin to 4 mg 6 days and 2 mg 1 day per week. Check INR at home in 2-3 weeks.   Orders:  -     warfarin (COUMADIN) 2 MG tablet; Take 2 tablets (4mg) 5 days a week and 2mg one day a week.  Indications: Atrial Fibrillation  Dispense: 180 tablet; Refill: 1    2. Essential hypertension  Comments:  Controlled, no change.  Orders:  -     Comprehensive Metabolic Panel; Future  -     Lipid Panel With / Chol / HDL Ratio; Future  -     TSH; Future    3. Pulmonary HTN (CMS/HCC)  Comments:  doing well- completed pulm rehab- feels she can be more active.     4. Crohn's disease of both small and large intestine without complication " (CMS/[facility])  Comments:  stable encouraged regular f/u with Dr. Salomon  Orders:  -     CBC & Differential; Future    Other orders  -     furosemide (LASIX) 40 MG tablet; Take 1 tablet by mouth 2 (Two) Times a Day.  Dispense: 180 tablet; Refill: 0        Follow Up   Return in about 6 months (around 2/28/2022) for Medicare Wellness, Lab Before FUP.  Patient was given instructions and counseling regarding her condition or for health maintenance advice. Please see specific information pulled into the AVS if appropriate.

## 2021-08-31 ENCOUNTER — ANTICOAGULATION VISIT (OUTPATIENT)
Dept: INTERNAL MEDICINE | Facility: CLINIC | Age: 76
End: 2021-08-31

## 2021-08-31 ENCOUNTER — TELEPHONE (OUTPATIENT)
Dept: INTERNAL MEDICINE | Facility: CLINIC | Age: 76
End: 2021-08-31

## 2021-08-31 LAB — INR PPP: 1.5

## 2021-09-01 RX ORDER — BUPROPION HYDROCHLORIDE 300 MG/1
TABLET ORAL
Qty: 90 TABLET | Refills: 2 | Status: SHIPPED | OUTPATIENT
Start: 2021-09-01 | End: 2022-02-07 | Stop reason: SDUPTHER

## 2021-09-01 RX ORDER — COLESEVELAM 180 1/1
TABLET ORAL
Qty: 180 TABLET | Refills: 0 | Status: SHIPPED | OUTPATIENT
Start: 2021-09-01 | End: 2021-11-22

## 2021-09-03 DIAGNOSIS — E78.5 DYSLIPIDEMIA: ICD-10-CM

## 2021-09-03 RX ORDER — ATORVASTATIN CALCIUM 10 MG/1
TABLET, FILM COATED ORAL
Qty: 90 TABLET | Refills: 1 | Status: SHIPPED | OUTPATIENT
Start: 2021-09-03 | End: 2022-01-17 | Stop reason: SDUPTHER

## 2021-09-17 PROCEDURE — 93296 REM INTERROG EVL PM/IDS: CPT | Performed by: INTERNAL MEDICINE

## 2021-09-17 PROCEDURE — 93294 REM INTERROG EVL PM/LDLS PM: CPT | Performed by: INTERNAL MEDICINE

## 2021-09-21 RX ORDER — LEVOTHYROXINE SODIUM 100 UG/1
TABLET ORAL
Qty: 90 TABLET | Refills: 1 | Status: SHIPPED | OUTPATIENT
Start: 2021-09-21 | End: 2022-01-24 | Stop reason: SDUPTHER

## 2021-09-23 ENCOUNTER — HOSPITAL ENCOUNTER (OUTPATIENT)
Dept: GENERAL RADIOLOGY | Facility: HOSPITAL | Age: 76
Discharge: HOME | End: 2021-09-23
Admitting: NURSE PRACTITIONER

## 2021-09-23 DIAGNOSIS — M54.2 CERVICALGIA: ICD-10-CM

## 2021-09-23 PROCEDURE — 72040 X-RAY EXAM NECK SPINE 2-3 VW: CPT

## 2021-10-04 ENCOUNTER — TELEPHONE (OUTPATIENT)
Dept: INTERNAL MEDICINE | Facility: CLINIC | Age: 76
End: 2021-10-04

## 2021-10-06 ENCOUNTER — TELEPHONE (OUTPATIENT)
Dept: INTERNAL MEDICINE | Facility: CLINIC | Age: 76
End: 2021-10-06

## 2021-10-06 NOTE — TELEPHONE ENCOUNTER
Dr. GLYNN spoke with PT about INR results (1.4) she has not stop taking for any reason.      Current dose is 4mg x 6/ 2mg x 1

## 2021-10-12 NOTE — PROGRESS NOTES
Chief Complaint  No chief complaint on file.    Subjective    History of Present Illness      I saw Joellen Dominguez today for cardiovascular care.  She is a very pleasant 76-year-old female with moderate to severe reversible pulmonary hypertension.  She is followed by Dr. Jeancarlos Cordero of pulmonary medicine.  Her respiratory status is significantly improved.  She does not report chest pain pressure tightness.  She is free of orthopnea or PND no lower extremity edema.  She denies syncope near syncope or palpitations.  She is demonstrated preserved left ventricular contractility by echocardiogram 4/19/2020, moderate mitral regurgitation, mild tricuspid regurgitation with right ventricular systolic pressure estimated at 39 mmHg.  She has a history of atrial fibrillation remains on long-term anticoagulation which she tolerates well.  She is undergone previous AV node ablation and is status post permanent pacemaker placement.  Pacemaker last interrogated 8/12/2021 normal device function.    Medardo reported increased volume and was placed on metolazone 5 mg for 2 days.  She had greater than 5 pound weight reduction.  She is monitored her blood pressure from September 18 through today.  Her blood pressure has decreased.  In the office today is 100/52.  She is monitored at home and it is as low as 81/48.  Again she denies any orthostatic dizziness.    Objective   Vital Signs:   There were no vitals taken for this visit.    Constitutional:       Appearance: Well-developed.   Eyes:      Conjunctiva/sclera: Conjunctivae normal.      Pupils: Pupils are equal, round, and reactive to light.   HENT:      Head: Normocephalic and atraumatic.   Neck:      Thyroid: No thyromegaly.   Pulmonary:      Effort: Pulmonary effort is normal. No respiratory distress.      Breath sounds: Normal breath sounds. Decreased air movement present. No wheezing. No rales.   Cardiovascular:      Normal rate. Regular rhythm.      Murmurs: There is a  grade 2/6 high frequency blowing holo murmur at the apex.      No gallop. No S3 and S4 gallop. Midsystolic click click. No rub.   Edema:     Peripheral edema absent.   Abdominal:      General: Bowel sounds are normal. There is no distension.      Palpations: Abdomen is soft. There is no abdominal mass.      Tenderness: There is no abdominal tenderness.   Musculoskeletal: Normal range of motion.      Cervical back: Neck supple. Skin:     General: Skin is warm and dry.      Findings: No erythema.   Neurological:      Mental Status: Alert and oriented to person, place, and time.     No change in today's physical exam  Result Review :     Common labs    Common Labsle 2/4/21 2/4/21 2/4/21 3/1/21 9/9/21    [id number]     Glucose   98     BUN   36 (A)     Creatinine   1.34 (A)     eGFR Non  Am   38 (A)     eGFR  Am   47 (A)     Sodium   144     Potassium   4.1     Chloride   109 (A)     Calcium   9.9     Total Protein   6.2     Albumin   3.90     Total Bilirubin   1.1     Alkaline Phosphatase   104     AST (SGOT)   17     ALT (SGPT)   12     WBC  7.87  9.56 9.46   Hemoglobin  12.6  13.4 13.7   Hematocrit  37.0  40.1 38.0   Platelets  305  287 283   Total Cholesterol 112       Triglycerides 153 (A)       HDL Cholesterol 63 (A)       LDL Cholesterol  24       (A) Abnormal value       Comments are available for some flowsheets but are not being displayed.                     Assessment and Plan    1. Essential hypertension  Now with relative hypotension    2. Dyslipidemia      3. Chronic diastolic CHF (congestive heart failure) (HCC)      4. Atrial fibrillation, permanent (HCC)      5. Long term current use of anticoagulant therapy      6. S/P atrioventricular hyacinth ablation      7. Presence of cardiac pacemaker      8. Pulmonary HTN (HCC)      9. Moderate mitral insufficiency      10. Pulmonary emphysema, unspecified emphysema type (HCC)    Joellen is free of angina and has improved and stable respiratory  status.  She is concerned given her decreasing blood pressure.  I suspect this is secondary to mild volume depletion.  I have asked her to reduce her furosemide from 40 mg twice daily to once daily until she gains 2 pounds and then to resume 40 mg twice daily.  Should she develop any orthostatic dizziness, lower extremity edema or increased respiratory insufficiency she will contact me.  I will otherwise plan to see her in follow-up in 3 months sooner if needed.        Follow Up   No follow-ups on file.  Patient was given instructions and counseling regarding her condition or for health maintenance advice. Please see specific information pulled into the AVS if appropriate.

## 2021-10-13 ENCOUNTER — OFFICE VISIT (OUTPATIENT)
Dept: CARDIOLOGY | Facility: CLINIC | Age: 76
End: 2021-10-13

## 2021-10-13 VITALS
SYSTOLIC BLOOD PRESSURE: 100 MMHG | BODY MASS INDEX: 22.91 KG/M2 | DIASTOLIC BLOOD PRESSURE: 52 MMHG | WEIGHT: 146 LBS | HEIGHT: 67 IN | HEART RATE: 68 BPM

## 2021-10-13 DIAGNOSIS — Z95.0 PRESENCE OF CARDIAC PACEMAKER: ICD-10-CM

## 2021-10-13 DIAGNOSIS — I48.21 ATRIAL FIBRILLATION, PERMANENT (HCC): ICD-10-CM

## 2021-10-13 DIAGNOSIS — Z79.01 LONG TERM CURRENT USE OF ANTICOAGULANT THERAPY: ICD-10-CM

## 2021-10-13 DIAGNOSIS — I27.20 PULMONARY HTN (HCC): ICD-10-CM

## 2021-10-13 DIAGNOSIS — I10 ESSENTIAL HYPERTENSION: Primary | ICD-10-CM

## 2021-10-13 DIAGNOSIS — E78.5 DYSLIPIDEMIA: ICD-10-CM

## 2021-10-13 DIAGNOSIS — J43.9 PULMONARY EMPHYSEMA, UNSPECIFIED EMPHYSEMA TYPE (HCC): ICD-10-CM

## 2021-10-13 DIAGNOSIS — I50.32 CHRONIC DIASTOLIC CHF (CONGESTIVE HEART FAILURE) (HCC): ICD-10-CM

## 2021-10-13 DIAGNOSIS — Z98.890 S/P ATRIOVENTRICULAR NODAL ABLATION: ICD-10-CM

## 2021-10-13 DIAGNOSIS — I34.0 MODERATE MITRAL INSUFFICIENCY: ICD-10-CM

## 2021-10-13 PROCEDURE — 99214 OFFICE O/P EST MOD 30 MIN: CPT | Performed by: INTERNAL MEDICINE

## 2021-10-13 RX ORDER — CLOBETASOL PROPIONATE 0.5 MG/G
1 CREAM TOPICAL 2 TIMES DAILY PRN
COMMUNITY

## 2021-10-13 RX ORDER — RIOCIGUAT 2.5 MG/1
2.5 TABLET, FILM COATED ORAL 3 TIMES DAILY
COMMUNITY

## 2021-10-13 RX ORDER — ESOMEPRAZOLE MAGNESIUM 40 MG/1
40 CAPSULE, DELAYED RELEASE ORAL
COMMUNITY
End: 2022-03-15 | Stop reason: ALTCHOICE

## 2021-10-13 RX ORDER — MACITENTAN 10 MG/1
10 TABLET, FILM COATED ORAL DAILY
COMMUNITY

## 2021-10-13 RX ORDER — ERYTHROMYCIN 5 MG/G
OINTMENT OPHTHALMIC DAILY PRN
COMMUNITY

## 2021-10-13 RX ORDER — METOLAZONE 5 MG/1
5 TABLET ORAL DAILY
COMMUNITY
End: 2022-03-15 | Stop reason: SDUPTHER

## 2021-11-22 DIAGNOSIS — E87.6 HYPOKALEMIA: ICD-10-CM

## 2021-11-22 RX ORDER — COLESEVELAM 180 1/1
TABLET ORAL
Qty: 180 TABLET | Refills: 0 | Status: SHIPPED | OUTPATIENT
Start: 2021-11-22 | End: 2022-01-24 | Stop reason: SDUPTHER

## 2021-11-22 RX ORDER — POTASSIUM CHLORIDE 20 MEQ/1
TABLET, EXTENDED RELEASE ORAL
Qty: 180 TABLET | Refills: 1 | Status: SHIPPED | OUTPATIENT
Start: 2021-11-22 | End: 2022-01-17 | Stop reason: SDUPTHER

## 2021-12-16 ENCOUNTER — TELEPHONE (OUTPATIENT)
Dept: INTERNAL MEDICINE | Facility: CLINIC | Age: 76
End: 2021-12-16

## 2021-12-16 NOTE — TELEPHONE ENCOUNTER
Caller: [facility]    Relationship to patient: Other    Best call back number: [phone]    Patient is needing: TANVRI WITH [facility] CALLING TO REPORT OUT OF RANGE INR FOR PATIENT. PATIENT'S INR AS OF TODAY 12/16/2021 WAS 1.9

## 2021-12-17 PROCEDURE — 93294 REM INTERROG EVL PM/LDLS PM: CPT | Performed by: INTERNAL MEDICINE

## 2021-12-17 PROCEDURE — 93296 REM INTERROG EVL PM/IDS: CPT | Performed by: INTERNAL MEDICINE

## 2022-01-13 ENCOUNTER — TELEPHONE (OUTPATIENT)
Dept: GASTROENTEROLOGY | Facility: CLINIC | Age: 77
End: 2022-01-13

## 2022-01-13 RX ORDER — CYANOCOBALAMIN 1000 UG/ML
1000 INJECTION, SOLUTION INTRAMUSCULAR; SUBCUTANEOUS
Qty: 3 ML | Refills: 3 | Status: SHIPPED | OUTPATIENT
Start: 2022-01-13 | End: 2022-01-18 | Stop reason: SDUPTHER

## 2022-01-17 DIAGNOSIS — E78.5 DYSLIPIDEMIA: ICD-10-CM

## 2022-01-17 DIAGNOSIS — E87.6 HYPOKALEMIA: ICD-10-CM

## 2022-01-17 RX ORDER — POTASSIUM CHLORIDE 20 MEQ/1
20 TABLET, EXTENDED RELEASE ORAL 2 TIMES DAILY
Qty: 180 TABLET | Refills: 1 | Status: SHIPPED | OUTPATIENT
Start: 2022-01-17 | End: 2022-07-28

## 2022-01-17 RX ORDER — ATORVASTATIN CALCIUM 10 MG/1
10 TABLET, FILM COATED ORAL DAILY
Qty: 90 TABLET | Refills: 3 | Status: SHIPPED | OUTPATIENT
Start: 2022-01-17

## 2022-01-18 ENCOUNTER — TELEPHONE (OUTPATIENT)
Dept: GASTROENTEROLOGY | Facility: CLINIC | Age: 77
End: 2022-01-18

## 2022-01-18 RX ORDER — CYANOCOBALAMIN 1000 UG/ML
1000 INJECTION, SOLUTION INTRAMUSCULAR; SUBCUTANEOUS
Qty: 3 ML | Refills: 3 | Status: SHIPPED | OUTPATIENT
Start: 2022-01-18 | End: 2022-06-21 | Stop reason: HOSPADM

## 2022-01-23 NOTE — PROGRESS NOTES
"Chief Complaint  No chief complaint on file.    Subjective    History of Present Illness      I saw Joellen Howard today for cardiovascular care.  She is a pleasant 76-year-old female with moderate to severe reversible pulmonary hypertension.  She is followed by Dr. Jeancarlos Cordero, pulmonary medicine.  Her respiratory status is overall improved and stable.  She does not experience any chest pain pressure tightness.  She is free of orthopnea or PND no significant lower extremity edema.  She denies syncope near syncope or any significant palpitations.  She remains on long-term anticoagulation secondary to atrial fibrillation.  She is status post AV node ablation with permanent pacemaker placement.  Pacemaker interrogation has demonstrated normal function 1/27/2022, greater than 10 years on battery life, no significant dysrhythmias.    Objective   Vital Signs:   /60   Pulse 68   Ht 170.2 cm (67\")   Wt 68 kg (150 lb)   BMI 23.49 kg/m²     Constitutional:       Appearance: Well-developed.   Eyes:      Conjunctiva/sclera: Conjunctivae normal.      Pupils: Pupils are equal, round, and reactive to light.   HENT:      Head: Normocephalic and atraumatic.   Neck:      Thyroid: No thyromegaly.   Pulmonary:      Effort: Pulmonary effort is normal. No respiratory distress.      Breath sounds: Normal breath sounds. Decreased air movement present. No wheezing. No rales.   Cardiovascular:      Normal rate. Regular rhythm.      Murmurs: There is a grade 2/6 high frequency blowing holosystolic murmur at the apex.      No gallop. No S3 and S4 gallop. No rub.   Edema:     Peripheral edema absent.   Abdominal:      General: Bowel sounds are normal. There is no distension.      Palpations: Abdomen is soft. There is no abdominal mass.      Tenderness: There is no abdominal tenderness.   Musculoskeletal: Normal range of motion.      Cervical back: Neck supple. Skin:     General: Skin is warm and dry.      Findings: No erythema. "   Neurological:      Mental Status: Alert and oriented to person, place, and time.         Result Review :     Common labs    Common Labsle 9/9/21 11/16/21 1/25/22   WBC 9.46 6.55 7.69   Hemoglobin 13.7 12.8 12.8   Hematocrit 38.0 34.1 (A) 40.0   Platelets 283 308 288   (A) Abnormal value                      Assessment and Plan    1. Essential hypertension  Stable    2. Chronic diastolic CHF (congestive heart failure) ([facility])  Compensated    3. Pulmonary HTN ([facility])  Stable and improved    4. Moderate mitral insufficiency  Compensated    5. Dyslipidemia  Controlled    6. Atrial fibrillation, permanent ([facility])  Stable    7. Long term current use of anticoagulant therapy  Well-tolerated    8. S/P atrioventricular hyacinth ablation  Status post pacemaker    9. Presence of cardiac pacemaker  Normal device function    10. Obstructive sleep apnea syndrome  Stable    11. Pulmonary emphysema, unspecified emphysema type ([facility])  Stable    12. Antiphospholipid antibody syndrome ([facility])  Stable    Joellen reports stable activity level, free of angina and euvolemic on exam.  Her respiratory status is stable.  She tolerates her medications well.  Have encouraged her to follow her current medical regimen follow-up with Dr. Cordero and remain as active as possible.  I will see her in follow-up 6 months sooner as needed        Follow Up   No follow-ups on file.  Patient was given instructions and counseling regarding her condition or for health maintenance advice. Please see specific information pulled into the AVS if appropriate.

## 2022-01-24 ENCOUNTER — PATIENT MESSAGE (OUTPATIENT)
Dept: GASTROENTEROLOGY | Facility: CLINIC | Age: 77
End: 2022-01-24

## 2022-01-24 DIAGNOSIS — D68.61 ANTIPHOSPHOLIPID ANTIBODY SYNDROME: ICD-10-CM

## 2022-01-24 RX ORDER — LEVOTHYROXINE SODIUM 0.1 MG/1
100 TABLET ORAL DAILY
Qty: 90 TABLET | Refills: 1 | Status: SHIPPED | OUTPATIENT
Start: 2022-01-24 | End: 2022-07-18

## 2022-01-24 RX ORDER — WARFARIN SODIUM 2 MG/1
TABLET ORAL
Qty: 180 TABLET | Refills: 1 | Status: ON HOLD | OUTPATIENT
Start: 2022-01-24 | End: 2022-06-21 | Stop reason: SDUPTHER

## 2022-01-24 RX ORDER — COLESEVELAM 180 1/1
625 TABLET ORAL 2 TIMES DAILY
Qty: 180 TABLET | Refills: 0 | Status: SHIPPED | OUTPATIENT
Start: 2022-01-24 | End: 2022-04-04

## 2022-01-24 NOTE — TELEPHONE ENCOUNTER
From: Joellen Dominguez  To: Francisco Salomon MD  Sent: 1/24/2022 12:41 PM EST  Subject: new rx    I need a new prescription for colesevelam, 625 mg 1 2x a day. I am now using [facility] mail order.

## 2022-01-31 ENCOUNTER — OFFICE VISIT (OUTPATIENT)
Dept: CARDIOLOGY | Facility: CLINIC | Age: 77
End: 2022-01-31

## 2022-01-31 VITALS
SYSTOLIC BLOOD PRESSURE: 112 MMHG | WEIGHT: 150 LBS | HEART RATE: 68 BPM | BODY MASS INDEX: 23.54 KG/M2 | HEIGHT: 67 IN | DIASTOLIC BLOOD PRESSURE: 60 MMHG

## 2022-01-31 DIAGNOSIS — Z95.0 PRESENCE OF CARDIAC PACEMAKER: ICD-10-CM

## 2022-01-31 DIAGNOSIS — Z79.01 LONG TERM CURRENT USE OF ANTICOAGULANT THERAPY: ICD-10-CM

## 2022-01-31 DIAGNOSIS — I48.21 ATRIAL FIBRILLATION, PERMANENT: ICD-10-CM

## 2022-01-31 DIAGNOSIS — J43.9 PULMONARY EMPHYSEMA, UNSPECIFIED EMPHYSEMA TYPE: ICD-10-CM

## 2022-01-31 DIAGNOSIS — D68.61 ANTIPHOSPHOLIPID ANTIBODY SYNDROME: ICD-10-CM

## 2022-01-31 DIAGNOSIS — I10 ESSENTIAL HYPERTENSION: Primary | ICD-10-CM

## 2022-01-31 DIAGNOSIS — I27.20 PULMONARY HTN: ICD-10-CM

## 2022-01-31 DIAGNOSIS — G47.33 OBSTRUCTIVE SLEEP APNEA SYNDROME: ICD-10-CM

## 2022-01-31 DIAGNOSIS — I34.0 MODERATE MITRAL INSUFFICIENCY: ICD-10-CM

## 2022-01-31 DIAGNOSIS — E78.5 DYSLIPIDEMIA: ICD-10-CM

## 2022-01-31 DIAGNOSIS — I50.32 CHRONIC DIASTOLIC CHF (CONGESTIVE HEART FAILURE): ICD-10-CM

## 2022-01-31 DIAGNOSIS — Z98.890 S/P ATRIOVENTRICULAR NODAL ABLATION: ICD-10-CM

## 2022-01-31 PROCEDURE — 99214 OFFICE O/P EST MOD 30 MIN: CPT | Performed by: INTERNAL MEDICINE

## 2022-02-07 RX ORDER — BUPROPION HYDROCHLORIDE 300 MG/1
300 TABLET ORAL DAILY
Qty: 90 TABLET | Refills: 2 | Status: SHIPPED | OUTPATIENT
Start: 2022-02-07 | End: 2022-10-06

## 2022-02-15 ENCOUNTER — TELEPHONE (OUTPATIENT)
Dept: INTERNAL MEDICINE | Facility: CLINIC | Age: 77
End: 2022-02-15

## 2022-02-15 NOTE — TELEPHONE ENCOUNTER
Renetta from [employer] remote INR called with her reading. The INR reading from yesterday was  1.6

## 2022-03-15 ENCOUNTER — OFFICE VISIT (OUTPATIENT)
Dept: INTERNAL MEDICINE | Facility: CLINIC | Age: 77
End: 2022-03-15

## 2022-03-15 VITALS — WEIGHT: 145 LBS | BODY MASS INDEX: 22.76 KG/M2 | TEMPERATURE: 97.3 F | HEIGHT: 67 IN

## 2022-03-15 DIAGNOSIS — I27.20 PULMONARY HTN: Chronic | ICD-10-CM

## 2022-03-15 DIAGNOSIS — D17.24 LIPOMA OF LEFT THIGH: Primary | ICD-10-CM

## 2022-03-15 DIAGNOSIS — D68.61 ANTIPHOSPHOLIPID ANTIBODY SYNDROME: ICD-10-CM

## 2022-03-15 DIAGNOSIS — I10 ESSENTIAL HYPERTENSION: Chronic | ICD-10-CM

## 2022-03-15 DIAGNOSIS — E78.5 DYSLIPIDEMIA: Chronic | ICD-10-CM

## 2022-03-15 DIAGNOSIS — Z00.00 MEDICARE ANNUAL WELLNESS VISIT, SUBSEQUENT: ICD-10-CM

## 2022-03-15 PROCEDURE — G0439 PPPS, SUBSEQ VISIT: HCPCS | Performed by: INTERNAL MEDICINE

## 2022-03-15 PROCEDURE — 1159F MED LIST DOCD IN RCRD: CPT | Performed by: INTERNAL MEDICINE

## 2022-03-15 RX ORDER — TORSEMIDE 20 MG/1
100 TABLET ORAL DAILY PRN
COMMUNITY
End: 2022-06-21 | Stop reason: HOSPADM

## 2022-03-15 NOTE — PROGRESS NOTES
"The ABCs of the Annual Wellness Visit  Subsequent Medicare Wellness Visit    Chief Complaint   Patient presents with   • Medicare Wellness-subsequent      Subjective    History of Present Illness:  Joellen Dominguez is a 76 y.o. female who presents for a Subsequent Medicare Wellness Visit.  Working on getting diuretics adjusted- she is now with Stage 3 kidney disease and pulmonary hypertension. She is doing better overall- breathing is biggest issue.    Also getting an epidural tomorrow for some back issues.     The following portions of the patient's history were reviewed and   updated as appropriate: allergies, current medications, past family history, past medical history, past social history, past surgical history and problem list.    Compared to one year ago, the patient feels her physical   health is better.    Compared to one year ago, the patient feels her mental   health is the same.    Recent Hospitalizations:  She was not admitted to the hospital during the last year.       Current Medical Providers:  Patient Care Team:  Chitra Leyva MD as PCP - General (Internal Medicine)  Karthik Reyez MD as Consulting Physician (Cardiology)  Francisco Salomon MD as Consulting Physician (Gastroenterology)  Trey Luque MD as Consulting Physician (Pulmonary Disease)  Jose C Cordero MD as Consulting Physician (Pulmonary Disease)  Lillian Azevedo MD as Consulting Physician (Nephrology)  Thiago Perez MD as Consulting Physician (Urology)    Outpatient Medications Prior to Visit   Medication Sig Dispense Refill   • atorvastatin (LIPITOR) 10 MG tablet Take 1 tablet by mouth Daily. 90 tablet 3   • B-D 3CC LUER-CELESTE SYR 25GX5/8\" 25G X 5/8\" 3 ML misc For use with monthly B-12 injections. 12 each 1   • buPROPion XL (WELLBUTRIN XL) 300 MG 24 hr tablet Take 1 tablet by mouth Daily. 90 tablet 2   • Calcium Carbonate-Vit D-Min (CALCIUM 1200 PO) Take  by mouth Daily.     • CBD oil (cannabidiol) capsule Take  " by mouth Daily.     • clobetasol (TEMOVATE) 0.05 % cream Apply 1 application topically to the appropriate area as directed 2 (Two) Times a Day.     • colesevelam (WELCHOL) 625 MG tablet Take 1 tablet by mouth 2 (Two) Times a Day. 180 tablet 0   • cyanocobalamin 1000 MCG/ML injection Inject 1 mL into the appropriate muscle as directed by prescriber Every 28 (Twenty-Eight) Days. 3 mL 3   • erythromycin (ROMYCIN) 5 MG/GM ophthalmic ointment Every Night.     • fexofenadine (ALLEGRA) 180 MG tablet Take 180 mg by mouth Daily.     • folic acid (FOLVITE) 400 MCG tablet Take 400 mcg by mouth Daily.     • levothyroxine (Euthyrox) 100 MCG tablet Take 1 tablet by mouth Daily. 90 tablet 1   • Macitentan (Opsumit) 10 MG tablet Take 10 mg by mouth Daily.     • mesalamine (PENTASA) 500 MG CR capsule Take 2 capsules by mouth 4 (Four) Times a Day. (Patient taking differently: Take 1,000 mg by mouth 4 (Four) Times a Day. Take 4 tabs twice daily.) 500 capsule 3   • Methscopolamine Bromide (PAMINE FORTE PO) Take 1 tablet by mouth 2 (Two) Times a Day As Needed.     • Multiple Vitamins-Minerals (OCUVITE ADULT 50+ PO) Take 1 tablet by mouth Daily.     • potassium chloride (K-DUR,KLOR-CON) 20 MEQ CR tablet Take 1 tablet by mouth 2 (Two) Times a Day. (Patient taking differently: Take 20 mEq by mouth 3 (Three) Times a Day.) 180 tablet 1   • Riociguat (Adempas) 2.5 MG tablet Take 2.5 mg by mouth 3 (Three) Times a Day.     • torsemide (DEMADEX) 20 MG tablet Take 20 mg by mouth Daily.     • Ustekinumab (STELARA SC) Inject 1 syringe under the skin into the appropriate area as directed. One syringe every seven weeks.  Option care.  pk     • warfarin (COUMADIN) 2 MG tablet Take 2 tablets (4mg) 5 days a week and 2mg one day a week.  Indications: Atrial Fibrillation 180 tablet 1   • Zinc 50 MG capsule Take  by mouth.     • amLODIPine (NORVASC) 10 MG tablet Take 1 tablet by mouth Daily.     • esomeprazole (nexIUM) 40 MG capsule Take 40 mg by mouth  Every Morning Before Breakfast.     • Ferrous Sulfate (IRON PO) Take  by mouth.     • furosemide (LASIX) 40 MG tablet Take 1 tablet by mouth 2 (Two) Times a Day. 180 tablet 0   • metOLazone (ZAROXOLYN) 5 MG tablet Take 5 mg by mouth Daily. As needed     • vitamin C (ASCORBIC ACID) 500 MG tablet Take 500 mg by mouth Daily.       No facility-administered medications prior to visit.       No opioid medication identified on active medication list. I have reviewed chart for other potential  high risk medication/s and harmful drug interactions in the elderly.          Aspirin is not on active medication list.  Aspirin use is not indicated based on review of current medical condition/s. Risk of harm outweighs potential benefits.  .    Patient Active Problem List   Diagnosis   • Incontinence   • Degeneration of lumbar intervertebral disc   • Essential hypertension   • Depression   • Antiphospholipid antibody syndrome (HCC)   • Lichen sclerosus et atrophicus   • Myocardial infarction type 2 (HCC)   • Leukocytosis   • Pulmonary HTN (HCC)   • Cold agglutinin disease (HCC)   • Chronic diastolic CHF (congestive heart failure) (HCC)   • COPD with emphysema (HCC)   • Inflammatory bowel disease (Crohn's disease) (HCC)   • Long term current use of anticoagulant therapy   • Atrial fibrillation, permanent (HCC)   • S/P atrioventricular hyacinth ablation   • Presence of cardiac pacemaker   • Dyslipidemia   • Moderate mitral insufficiency   • Obstructive sleep apnea syndrome     Advance Care Planning  Advance Directive is not on file.  ACP discussion was held with the patient during this visit. Patient has an advance directive (not in EMR), copy requested.    Review of Systems   Eyes: Negative for visual disturbance.   Respiratory: Positive for shortness of breath. Negative for cough.    Cardiovascular: Negative for chest pain and leg swelling.   Gastrointestinal: Negative for abdominal pain.   Genitourinary: Negative for difficulty  "urinating.   Musculoskeletal: Negative for arthralgias and back pain.   Neurological: Negative for numbness.   Psychiatric/Behavioral: Negative for dysphoric mood.        Objective    Vitals:    03/15/22 1324   Temp: 97.3 °F (36.3 °C)   Weight: 65.8 kg (145 lb)   Height: 170.2 cm (67\")     BMI Readings from Last 1 Encounters:   03/15/22 22.71 kg/m²   BMI is within normal parameters. No follow-up required.    Does the patient have evidence of cognitive impairment? No    Physical Exam  Constitutional:       General: She is not in acute distress.     Appearance: Normal appearance.   Cardiovascular:      Rate and Rhythm: Normal rate and regular rhythm.   Skin:     General: Skin is warm and dry.      Comments: L upper thigh, smooth, mobile mass in skin       Lab Results   Component Value Date    CHLPL 90 (L) 2022    TRIG 107 2022    HDL 46 2022    LDL 24 2022    VLDL 20 2022            HEALTH RISK ASSESSMENT    Smoking Status:  Social History     Tobacco Use   Smoking Status Former Smoker   • Packs/day: 2.00   • Years: 16.00   • Pack years: 32.00   • Types: Cigarettes   • Quit date:    • Years since quittin.2   Smokeless Tobacco Never Used     Alcohol Consumption:  Social History     Substance and Sexual Activity   Alcohol Use No     Fall Risk Screen:    NARGISADI Fall Risk Assessment was completed, and patient is at LOW risk for falls.Assessment completed on:3/15/2022    Depression Screening:  PHQ-2/PHQ-9 Depression Screening 3/15/2022   Retired Total Score -   Little Interest or Pleasure in Doing Things 0-->not at all   Feeling Down, Depressed or Hopeless 0-->not at all   PHQ-9: Brief Depression Severity Measure Score 0       Health Habits and Functional and Cognitive Screening:  Functional & Cognitive Status 3/15/2022   Do you have difficulty preparing food and eating? No   Do you have difficulty bathing yourself, getting dressed or grooming yourself? No   Do you have difficulty " using the toilet? No   Do you have difficulty moving around from place to place? No   Do you have trouble with steps or getting out of a bed or a chair? No   Current Diet Well Balanced Diet   Dental Exam Up to date   Eye Exam Up to date   Exercise (times per week) 0 times per week   Current Exercises Include No Regular Exercise   Do you need help using the phone?  No   Are you deaf or do you have serious difficulty hearing?  No   Do you need help with transportation? No   Do you need help shopping? No   Do you need help preparing meals?  No   Do you need help with housework?  No   Do you need help with laundry? No   Do you need help taking your medications? No   Do you need help managing money? No   Do you ever drive or ride in a car without wearing a seat belt? No   Have you felt unusual stress, anger or loneliness in the last month? No   Who do you live with? Spouse   If you need help, do you have trouble finding someone available to you? No   Have you been bothered in the last four weeks by sexual problems? No   Do you have difficulty concentrating, remembering or making decisions? No       Age-appropriate Screening Schedule:  Refer to the list below for future screening recommendations based on patient's age, sex and/or medical conditions. Orders for these recommended tests are listed in the plan section. The patient has been provided with a written plan.    Health Maintenance   Topic Date Due   • ZOSTER VACCINE (1 of 2) Never done   • DXA SCAN  01/27/2022   • LIPID PANEL  03/08/2023   • MAMMOGRAM  01/03/2024   • TDAP/TD VACCINES (2 - Td or Tdap) 06/02/2030   • INFLUENZA VACCINE  Completed              Assessment/Plan   CMS Preventative Services Quick Reference  Risk Factors Identified During Encounter  Immunizations Discussed/Encouraged (specific Immunizations; Shingrix  The above risks/problems have been discussed with the patient.  Follow up actions/plans if indicated are seen below in the Assessment/Plan  Section.  Pertinent information has been shared with the patient in the After Visit Summary.    Diagnoses and all orders for this visit:    1. Lipoma of left thigh (Primary)  Comments:  reassured, to follow    2. Essential hypertension  Comments:  controlled, no change in meds.     3. Dyslipidemia  Comments:  at goal    4. Antiphospholipid antibody syndrome (HCC)  Comments:  INR has been followed    5. Pulmonary HTN (HCC)  Comments:  Reviewed Dr. Cordero's notes, will follow along, she is actually doing great.     6. Medicare annual wellness visit, subsequent  Comments:  reviewed chart - doing great, multiple specialists have their hands in =- freddy continue following INR, etc.  Recheck as needed.         Follow Up:   Return in about 6 months (around 9/15/2022) for Recheck.     An After Visit Summary and PPPS were made available to the patient.

## 2022-03-18 PROCEDURE — 93296 REM INTERROG EVL PM/IDS: CPT | Performed by: INTERNAL MEDICINE

## 2022-03-18 PROCEDURE — 93294 REM INTERROG EVL PM/LDLS PM: CPT | Performed by: INTERNAL MEDICINE

## 2022-03-28 ENCOUNTER — TELEPHONE (OUTPATIENT)
Dept: CARDIOLOGY | Facility: CLINIC | Age: 77
End: 2022-03-28

## 2022-03-28 DIAGNOSIS — I27.20 PULMONARY HTN: Primary | ICD-10-CM

## 2022-03-29 NOTE — TELEPHONE ENCOUNTER
Please go ahead and schedule a echocardiogram and make a note for estimate of right ventricular systolic pressure

## 2022-04-04 RX ORDER — COLESEVELAM 180 1/1
TABLET ORAL
Qty: 180 TABLET | Refills: 0 | Status: SHIPPED | OUTPATIENT
Start: 2022-04-04 | End: 2022-07-08

## 2022-04-07 ENCOUNTER — TRANSCRIBE ORDERS (OUTPATIENT)
Dept: ADMINISTRATIVE | Facility: HOSPITAL | Age: 77
End: 2022-04-07

## 2022-04-07 DIAGNOSIS — I27.20 PULMONARY HYPERTENSION: Primary | ICD-10-CM

## 2022-04-11 ENCOUNTER — HOSPITAL ENCOUNTER (OUTPATIENT)
Dept: CARDIOLOGY | Facility: HOSPITAL | Age: 77
Discharge: HOME | End: 2022-04-11
Admitting: INTERNAL MEDICINE

## 2022-04-11 DIAGNOSIS — I27.20 PULMONARY HTN: ICD-10-CM

## 2022-04-11 LAB
AORTIC DIMENSIONLESS INDEX: 0.6 (DI)
BH CV ECHO MEAS - ACS: 2.15 CM
BH CV ECHO MEAS - AO MAX PG: 6 MMHG
BH CV ECHO MEAS - AO MEAN PG: 2.9 MMHG
BH CV ECHO MEAS - AO ROOT DIAM: 3.6 CM
BH CV ECHO MEAS - AO V2 MAX: 119 CM/SEC
BH CV ECHO MEAS - AO V2 VTI: 24.8 CM
BH CV ECHO MEAS - AVA(I,D): 2.07 CM2
BH CV ECHO MEAS - EDV(CUBED): 111.6 ML
BH CV ECHO MEAS - EDV(MOD-SP2): 65.6 ML
BH CV ECHO MEAS - EDV(MOD-SP4): 70.2 ML
BH CV ECHO MEAS - EF(MOD-BP): 58 %
BH CV ECHO MEAS - EF(MOD-SP2): 61 %
BH CV ECHO MEAS - EF(MOD-SP4): 53 %
BH CV ECHO MEAS - ESV(CUBED): 26.1 ML
BH CV ECHO MEAS - ESV(MOD-SP2): 25.6 ML
BH CV ECHO MEAS - ESV(MOD-SP4): 33 ML
BH CV ECHO MEAS - FS: 38.4 %
BH CV ECHO MEAS - IVS/LVPW: 0.76 CM
BH CV ECHO MEAS - IVSD: 0.99 CM
BH CV ECHO MEAS - LV MASS(C)D: 207.2 GRAMS
BH CV ECHO MEAS - LV MAX PG: 2.9 MMHG
BH CV ECHO MEAS - LV MEAN PG: 1.5 MMHG
BH CV ECHO MEAS - LV V1 MAX: 84.5 CM/SEC
BH CV ECHO MEAS - LV V1 VTI: 15.3 CM
BH CV ECHO MEAS - LVIDD: 4.8 CM
BH CV ECHO MEAS - LVIDS: 3 CM
BH CV ECHO MEAS - LVOT AREA: 3.4 CM2
BH CV ECHO MEAS - LVOT DIAM: 2.07 CM
BH CV ECHO MEAS - LVPWD: 1.31 CM
BH CV ECHO MEAS - MR MAX PG: 55.1 MMHG
BH CV ECHO MEAS - MR MAX VEL: 370.9 CM/SEC
BH CV ECHO MEAS - MV A MAX VEL: 66 CM/SEC
BH CV ECHO MEAS - MV DEC SLOPE: 363.5 CM/SEC2
BH CV ECHO MEAS - MV DEC TIME: 0.25 MSEC
BH CV ECHO MEAS - MV E MAX VEL: 99.8 CM/SEC
BH CV ECHO MEAS - MV E/A: 1.51
BH CV ECHO MEAS - MV MEAN PG: 1.93 MMHG
BH CV ECHO MEAS - MV V2 VTI: 24.9 CM
BH CV ECHO MEAS - MVA(VTI): 2.07 CM2
BH CV ECHO MEAS - PA V2 MAX: 115.4 CM/SEC
BH CV ECHO MEAS - RAP SYSTOLE: 8 MMHG
BH CV ECHO MEAS - RV V1 VTI: 17.6 CM
BH CV ECHO MEAS - RVOT DIAM: 2.22 CM
BH CV ECHO MEAS - RVSP: 28 MMHG
BH CV ECHO MEAS - SV(LVOT): 51.5 ML
BH CV ECHO MEAS - SV(MOD-SP2): 40 ML
BH CV ECHO MEAS - SV(MOD-SP4): 37.2 ML
BH CV ECHO MEAS - SV(RVOT): 68.5 ML
BH CV ECHO MEAS - TAPSE (>1.6): 2.33 CM
BH CV ECHO MEAS - TR MAX PG: 19.7 MMHG
BH CV ECHO MEAS - TR MAX VEL: 222.2 CM/SEC
LEFT ATRIUM VOLUME INDEX: 36 ML/M2
MAXIMAL PREDICTED HEART RATE: 143 BPM
SINUS: 3.4 CM
STJ: 3 CM
STRESS TARGET HR: 122 BPM

## 2022-04-11 PROCEDURE — 93306 TTE W/DOPPLER COMPLETE: CPT

## 2022-04-11 PROCEDURE — 93306 TTE W/DOPPLER COMPLETE: CPT | Performed by: INTERNAL MEDICINE

## 2022-04-12 NOTE — PROGRESS NOTES
Attempted to call pt with results. Primary phone was pt's 's cell and he will have pt call me as there was no answer at the home number. Kaylie

## 2022-04-13 ENCOUNTER — IMMUNIZATION (OUTPATIENT)
Dept: VACCINE CLINIC | Facility: HOSPITAL | Age: 77
End: 2022-04-13

## 2022-04-13 DIAGNOSIS — Z23 NEED FOR VACCINATION: Primary | ICD-10-CM

## 2022-04-13 PROCEDURE — 0054A HC ADM SARSCV2 30MCG TRS-SUCR BOOSTER: CPT | Performed by: INTERNAL MEDICINE

## 2022-04-13 PROCEDURE — 91305 HC SARSCOV2 VAC 30 MCG TRS-SUCR PFIZER: CPT | Performed by: INTERNAL MEDICINE

## 2022-05-13 ENCOUNTER — TELEPHONE (OUTPATIENT)
Dept: INTERNAL MEDICINE | Facility: CLINIC | Age: 77
End: 2022-05-13

## 2022-05-13 NOTE — TELEPHONE ENCOUNTER
Spoke with patient she is currently taking 4 mg 5 days per week and 6 mg 2 days a week. She is going to adjust to 4 mg 6 days a week and 6 mg 1 day a week. Recheck INR 1 week. Pt verbalized understanding.

## 2022-05-13 NOTE — TELEPHONE ENCOUNTER
Hanna with [employer] [phone] called to let Dr. Leyva know that the patient has an out of range INR of 4.1 please advise?

## 2022-05-17 ENCOUNTER — OFFICE VISIT (OUTPATIENT)
Dept: GASTROENTEROLOGY | Facility: CLINIC | Age: 77
End: 2022-05-17

## 2022-05-17 VITALS
SYSTOLIC BLOOD PRESSURE: 140 MMHG | HEART RATE: 70 BPM | HEIGHT: 67 IN | BODY MASS INDEX: 22.9 KG/M2 | TEMPERATURE: 98.2 F | WEIGHT: 145.9 LBS | DIASTOLIC BLOOD PRESSURE: 80 MMHG | OXYGEN SATURATION: 96 %

## 2022-05-17 DIAGNOSIS — E53.8 VITAMIN B12 DEFICIENCY: ICD-10-CM

## 2022-05-17 DIAGNOSIS — K50.80 CROHN'S DISEASE OF BOTH SMALL AND LARGE INTESTINE WITHOUT COMPLICATION: ICD-10-CM

## 2022-05-17 DIAGNOSIS — R13.19 ESOPHAGEAL DYSPHAGIA: Primary | ICD-10-CM

## 2022-05-17 DIAGNOSIS — Z79.899 HIGH RISK MEDICATION USE: ICD-10-CM

## 2022-05-17 PROCEDURE — 99214 OFFICE O/P EST MOD 30 MIN: CPT | Performed by: NURSE PRACTITIONER

## 2022-05-17 NOTE — PATIENT INSTRUCTIONS
Some modifications to help while you are having trouble swallowing include:   Avoiding highly textured foods such as meats and bulky foods such as bagels and breads.   Cutting food into smaller pieces.   Extensively chew foods.   Washing foods down with liquids and eating more slowly.  Also purposefully eating which includes avoiding distractions or talking while focusing on chewing completely and purposefully.    Schedule esophagram    Continue current treatment for Crohns     Keep follow up as scheduled with Dr Salomon    Further recommendations will be made pending the results of the above work up and clinical course.

## 2022-05-17 NOTE — PROGRESS NOTES
"Chief Complaint   Patient presents with   • Difficulty Swallowing           History of Present Illness  77-year-old female presents today for follow-up.  She has a history of Crohn's ileitis diagnosed in 1978.  She was a former patient of Dr. Juarez and has establish care with our office June 24, 2020.  He has a history of terminal ileum stricture which has not required surgery.  Last EGD and colonoscopy April 24, 2018.    She presents today for trouble swallowing. She has difficulty with bulky foods such as chicken and when she takes multiple pills at once. One episode of forced regurgitation. Symptoms are occurring more often.     She was on Nexium in the past but discontinued this 6 months ago.    She alternates between diarrhea and constipation. She will use miralax and colace as needed.     She continues on Stelara subcu injections every 7 weeks.  She has noticed improvement with 7-week interval.  Her episodes are less severe and less frequent.  She does not notice a dramatic change in symptoms prior to her next injection.    She will use pamineforte as needed for urgency and diarrhea.     She continues on WelChol one in am and one in pm.     She continues on Pentasa 4 in the morning and 4 in the evening.    Review his treatments include Pentasa, sulfasalazine, adalimumab, infliximab and vedolizumab.    Review of Systems      Result Review :       COMPREHENSIVE METABOLIC PANEL (03/28/2022 15:23)   CBC AND DIFFERENTIAL (03/28/2022 15:23)     Vital Signs:   /80   Pulse 70   Temp 98.2 °F (36.8 °C)   Ht 170.2 cm (67\")   Wt 66.2 kg (145 lb 14.4 oz)   SpO2 96%   BMI 22.85 kg/m²     Body mass index is 22.85 kg/m².     Physical Exam  Vitals reviewed.   Constitutional:       General: She is not in acute distress.     Appearance: Normal appearance. She is normal weight. She is not ill-appearing.   Abdominal:      General: Bowel sounds are normal. There is no distension.      Palpations: Abdomen is soft. " Abdomen is not rigid. There is no mass or pulsatile mass.      Tenderness: There is no abdominal tenderness. There is no guarding or rebound.   Neurological:      Mental Status: She is alert.           Assessment and Plan    Diagnoses and all orders for this visit:    1. Esophageal dysphagia (Primary)  -     FL Esophagram Complete Double-Contrast; Future    2. Crohn's disease of both small and large intestine without complication (HCC)    3. High risk medication use    4. Vitamin B12 deficiency    Patient with longstanding trouble swallowing however this has worsened and is more frequent and more severe.  She has had 1 episode of forced regurgitation.  We will proceed with esophagram to delineate esophageal anatomy and look for any alternative causes for trouble swallowing.  She has a follow-up scheduled with Dr. Salomon next month anyway.    She has been off of Nexium for approximately 6 months as she was not having any symptoms of acid reflux.  She has not noticed any symptoms of reflux since she stopped the medicine but has had worsening swallowing over the past 6 months.    We will hold off on starting acid medication at this time, once x-ray esophagram results, will review with Dr. Salomon and make additional recommendations.    Longstanding Crohn's disease, stable on Stelara every 7 weeks.  She is also on WelChol and as needed Pamine Forte.    Will continue current treatments and proceed with esophagram.  She is up-to-date with blood work and medication monitoring.  This has been reviewed as well.            Patient Instructions   Some modifications to help while you are having trouble swallowing include:   Avoiding highly textured foods such as meats and bulky foods such as bagels and breads.   Cutting food into smaller pieces.   Extensively chew foods.   Washing foods down with liquids and eating more slowly.  Also purposefully eating which includes avoiding distractions or talking while focusing on chewing  completely and purposefully.    Schedule esophagram    Continue current treatment for Crohns     Keep follow up as scheduled with Dr Salomon    Further recommendations will be made pending the results of the above work up and clinical course.             EMR Dragon/Transcription Disclaimer:  This document has been Dictated utilizing Dragon dictation.

## 2022-05-20 ENCOUNTER — HOSPITAL ENCOUNTER (OUTPATIENT)
Dept: GENERAL RADIOLOGY | Facility: HOSPITAL | Age: 77
Discharge: HOME | End: 2022-05-20
Admitting: NURSE PRACTITIONER

## 2022-05-20 DIAGNOSIS — R13.19 ESOPHAGEAL DYSPHAGIA: ICD-10-CM

## 2022-05-20 PROCEDURE — 63710000001 BARIUM SULFATE 700 MG TABLET: Performed by: NURSE PRACTITIONER

## 2022-05-20 PROCEDURE — 63710000001 BARIUM SULFATE 96 % RECONSTITUTED SUSPENSION: Performed by: NURSE PRACTITIONER

## 2022-05-20 PROCEDURE — A9270 NON-COVERED ITEM OR SERVICE: HCPCS | Performed by: NURSE PRACTITIONER

## 2022-05-20 PROCEDURE — 74221 X-RAY XM ESOPHAGUS 2CNTRST: CPT

## 2022-05-20 PROCEDURE — 63710000001 SOD BICARB-CITRIC ACID-SIMETHICONE 2.21-1.53-0.04 G PACK: Performed by: NURSE PRACTITIONER

## 2022-05-20 PROCEDURE — 63710000001 BARIUM SULFATE 98 % RECONSTITUTED SUSPENSION: Performed by: NURSE PRACTITIONER

## 2022-05-20 RX ADMIN — BARIUM SULFATE 700 MG: 700 TABLET ORAL at 11:04

## 2022-05-20 RX ADMIN — BARIUM SULFATE 183 ML: 960 POWDER, FOR SUSPENSION ORAL at 11:04

## 2022-05-20 RX ADMIN — ANTACID/ANTIFLATULENT 1 PACKET: 380; 550; 10; 10 GRANULE, EFFERVESCENT ORAL at 11:05

## 2022-05-20 RX ADMIN — BARIUM SULFATE 135 ML: 980 POWDER, FOR SUSPENSION ORAL at 11:05

## 2022-05-24 ENCOUNTER — TELEPHONE (OUTPATIENT)
Dept: GASTROENTEROLOGY | Facility: CLINIC | Age: 77
End: 2022-05-24

## 2022-05-25 NOTE — TELEPHONE ENCOUNTER
I spoke with Dr. Salomon, I spoke with Dr. Ca, Dr. Ca's office has scheduled a consult to discuss EGD with possible dilation and further evaluation of abnormal esophagram with patient.  Patient aware of appointment,  I Confirmed this with her via telephone.    Renny

## 2022-05-31 ENCOUNTER — PREP FOR SURGERY (OUTPATIENT)
Dept: OTHER | Facility: HOSPITAL | Age: 77
End: 2022-05-31

## 2022-05-31 ENCOUNTER — OFFICE VISIT (OUTPATIENT)
Dept: SURGERY | Facility: CLINIC | Age: 77
End: 2022-05-31

## 2022-05-31 VITALS — HEART RATE: 60 BPM | DIASTOLIC BLOOD PRESSURE: 60 MMHG | OXYGEN SATURATION: 100 % | SYSTOLIC BLOOD PRESSURE: 90 MMHG

## 2022-05-31 DIAGNOSIS — R13.19 ESOPHAGEAL DYSPHAGIA: Primary | ICD-10-CM

## 2022-05-31 DIAGNOSIS — R79.1 ABNORMAL COAGULATION PROFILE: ICD-10-CM

## 2022-05-31 DIAGNOSIS — K22.5 DIVERTICULUM OF ESOPHAGUS, ACQUIRED: ICD-10-CM

## 2022-05-31 PROCEDURE — 99202 OFFICE O/P NEW SF 15 MIN: CPT | Performed by: THORACIC SURGERY (CARDIOTHORACIC VASCULAR SURGERY)

## 2022-05-31 RX ORDER — SODIUM CHLORIDE 0.9 % (FLUSH) 0.9 %
3 SYRINGE (ML) INJECTION EVERY 12 HOURS SCHEDULED
Status: CANCELLED | OUTPATIENT
Start: 2022-05-31

## 2022-05-31 RX ORDER — SODIUM CHLORIDE 0.9 % (FLUSH) 0.9 %
3-10 SYRINGE (ML) INJECTION AS NEEDED
Status: CANCELLED | OUTPATIENT
Start: 2022-05-31

## 2022-05-31 NOTE — PROGRESS NOTES
Chief Complaint  Dysphagia    Subjective        Joellen S Alberto presents to [facility] THORACIC SURGERY  History of Present Illness     Ms. Mensah is a 77-year-old  female.  Her  is a retired radiologist from [employer].  She has been experiencing difficulty swallowing for the last 6 months.  She reports no weight loss during this period of time.  She has had reflux and heartburn for many years.  She took Nexium for many years but stopped taking it because her heartburn went away.  She has the most difficulty swallowing pills solid foods like chicken and bread but no difficulty swallowing liquids.  She has had no hematemesis.  She has had no change in her bowel habits.  She reports no crampy abdominal pain or bloating.  She has had no history of hepatitis or jaundice.    She stopped smoking 54 years ago.  She smoked approximately 8 years and smoked as much is 2 packs of cigarettes per day.  She has no personal history of cancer but does have a family history of cancer.  She has a history of atrial fibrillation and has a pacemaker.  She has been diagnosed with pulmonary hypertension and gets short of breath with exertion.    Objective   Vital Signs:  BP 90/60 (BP Location: Right arm, Patient Position: Sitting, Cuff Size: Adult)   Pulse 60   SpO2 100%     BMI has not been calculated during today's encounter.       Physical Exam     Neck: No masses.  No cervical or supraclavicular adenopathy.    Pulmonary: Lungs are clear to auscultation with equal breath sounds bilaterally    Cardiac: Regular rate with irregular rhythm.  No murmurs or gallops.  Trace of dependent edema.    Abdomen: Soft and nontender.  No masses organomegaly.  Good bowel sounds.    Result Review :  The following data was reviewed by: Bry Ca III, MD on 05/31/2022:    Fluoroscopy esophagram performed May 20, 2022 was independently reviewed.  At the level of the GE junction there is a smooth  fixed luminal compromise with a maximum diameter of about 11 mm.  Just above this is a 10 mm diverticulum with mucosal irregularity.  There is generalized distention of the esophagus particularly in the upper portion.  There are extensive tertiary wave formation throughout the esophagus.  No hiatal hernia is visualized and no gastroesophageal reflux.         Assessment and Plan   Diagnoses and all orders for this visit:    1. Esophageal dysphagia (Primary)  -     Case Request    2. Diverticulum of esophagus, acquired  -     Case Request      It appears that this patient has stricture at the GE junction most likely secondary to her longstanding gastroesophageal reflux.  Reflux is also resulted in tertiary contractions of the esophagus with the diverticulum.  I suspect that the dysmotility and the narrowing of the esophagus at the GE junction are the major cause of her dysphagia.  I have recommended esophagogastroduodenoscopy with dilation over guidewire under fluoroscopic control and possible biopsy.  I explained all this to the patient and her  in detail.  We have reviewed the esophagram with the patient and her .  All of her questions have been answered to her satisfaction.  Patient has requested that we proceed.    Case request and preoperative orders have been placed in [facility].  Patient will be scheduled for EGD with dilation at [facility] in the near future.  I will keep you informed of her progress.       I spent 27 minutes caring for Joellen on this date of service. This time includes time spent by me in the following activities:preparing for the visit, reviewing tests, obtaining and/or reviewing a separately obtained history, performing a medically appropriate examination and/or evaluation , counseling and educating the patient/family/caregiver, ordering medications, tests, or procedures, referring and communicating with other health care professionals , documenting information in the  medical record, independently interpreting results and communicating that information with the patient/family/caregiver and care coordination  Follow Up   Return for Return to office following procedure.  Patient was given instructions and counseling regarding her condition or for health maintenance advice. Please see specific information pulled into the AVS if appropriate.

## 2022-06-14 ENCOUNTER — LAB (OUTPATIENT)
Dept: LAB | Facility: HOSPITAL | Age: 77
End: 2022-06-14

## 2022-06-14 DIAGNOSIS — R13.19 ESOPHAGEAL DYSPHAGIA: ICD-10-CM

## 2022-06-14 DIAGNOSIS — K22.5 DIVERTICULUM OF ESOPHAGUS, ACQUIRED: ICD-10-CM

## 2022-06-14 LAB — SARS-COV-2 ORF1AB RESP QL NAA+PROBE: NOT DETECTED

## 2022-06-14 PROCEDURE — U0004 COV-19 TEST NON-CDC HGH THRU: HCPCS

## 2022-06-14 PROCEDURE — U0005 INFEC AGEN DETEC AMPLI PROBE: HCPCS

## 2022-06-14 PROCEDURE — C9803 HOPD COVID-19 SPEC COLLECT: HCPCS

## 2022-06-15 ENCOUNTER — HOSPITAL ENCOUNTER (INPATIENT)
Facility: HOSPITAL | Age: 77
LOS: 6 days | Discharge: HOME | End: 2022-06-21
Attending: EMERGENCY MEDICINE | Admitting: HOSPITALIST

## 2022-06-15 ENCOUNTER — APPOINTMENT (OUTPATIENT)
Dept: GENERAL RADIOLOGY | Facility: HOSPITAL | Age: 77
End: 2022-06-15

## 2022-06-15 DIAGNOSIS — R09.02 HYPOXIA: Primary | ICD-10-CM

## 2022-06-15 DIAGNOSIS — B34.8 INFECTION DUE TO HUMAN METAPNEUMOVIRUS (HMPV): ICD-10-CM

## 2022-06-15 DIAGNOSIS — R13.19 ESOPHAGEAL DYSPHAGIA: ICD-10-CM

## 2022-06-15 DIAGNOSIS — R77.8 ELEVATED TROPONIN: ICD-10-CM

## 2022-06-15 DIAGNOSIS — D68.61 ANTIPHOSPHOLIPID ANTIBODY SYNDROME: ICD-10-CM

## 2022-06-15 DIAGNOSIS — K22.5 DIVERTICULUM OF ESOPHAGUS, ACQUIRED: ICD-10-CM

## 2022-06-15 PROBLEM — K50.90 INFLAMMATORY BOWEL DISEASE (CROHN'S DISEASE): Status: ACTIVE | Noted: 2022-06-15

## 2022-06-15 PROBLEM — J20.9 ACUTE BRONCHITIS: Status: ACTIVE | Noted: 2022-06-15

## 2022-06-15 PROBLEM — R06.03 ACUTE RESPIRATORY DISTRESS: Status: ACTIVE | Noted: 2022-06-15

## 2022-06-15 LAB
ALBUMIN SERPL-MCNC: 3.8 G/DL (ref 3.5–5.2)
ALBUMIN/GLOB SERPL: 1.7 G/DL
ALP SERPL-CCNC: 96 U/L (ref 39–117)
ALT SERPL W P-5'-P-CCNC: 10 U/L (ref 1–33)
ANION GAP SERPL CALCULATED.3IONS-SCNC: 11.7 MMOL/L (ref 5–15)
ANISOCYTOSIS BLD QL: ABNORMAL
AST SERPL-CCNC: 16 U/L (ref 1–32)
B PARAPERT DNA SPEC QL NAA+PROBE: NOT DETECTED
B PERT DNA SPEC QL NAA+PROBE: NOT DETECTED
BASOPHILS # BLD MANUAL: 0.09 10*3/MM3 (ref 0–0.2)
BASOPHILS NFR BLD MANUAL: 1 % (ref 0–1.5)
BILIRUB SERPL-MCNC: 1.2 MG/DL (ref 0–1.2)
BUN SERPL-MCNC: 30 MG/DL (ref 8–23)
BUN/CREAT SERPL: 19.6 (ref 7–25)
C PNEUM DNA NPH QL NAA+NON-PROBE: NOT DETECTED
CALCIUM SPEC-SCNC: 8.6 MG/DL (ref 8.6–10.5)
CHLORIDE SERPL-SCNC: 107 MMOL/L (ref 98–107)
CO2 SERPL-SCNC: 20.3 MMOL/L (ref 22–29)
CREAT SERPL-MCNC: 1.53 MG/DL (ref 0.57–1)
D-LACTATE SERPL-SCNC: 1 MMOL/L (ref 0.5–2)
D-LACTATE SERPL-SCNC: 2.5 MMOL/L (ref 0.5–2)
DEPRECATED RDW RBC AUTO: 40.1 FL (ref 37–54)
EGFRCR SERPLBLD CKD-EPI 2021: 34.7 ML/MIN/1.73
EOSINOPHIL # BLD MANUAL: 0.61 10*3/MM3 (ref 0–0.4)
EOSINOPHIL NFR BLD MANUAL: 7.1 % (ref 0.3–6.2)
ERYTHROCYTE [DISTWIDTH] IN BLOOD BY AUTOMATED COUNT: 13 % (ref 12.3–15.4)
FLUAV SUBTYP SPEC NAA+PROBE: NOT DETECTED
FLUBV RNA ISLT QL NAA+PROBE: NOT DETECTED
GLOBULIN UR ELPH-MCNC: 2.2 GM/DL
GLUCOSE SERPL-MCNC: 118 MG/DL (ref 65–99)
HADV DNA SPEC NAA+PROBE: NOT DETECTED
HCOV 229E RNA SPEC QL NAA+PROBE: NOT DETECTED
HCOV HKU1 RNA SPEC QL NAA+PROBE: NOT DETECTED
HCOV NL63 RNA SPEC QL NAA+PROBE: NOT DETECTED
HCOV OC43 RNA SPEC QL NAA+PROBE: NOT DETECTED
HCT VFR BLD AUTO: 36.3 % (ref 34–46.6)
HGB BLD-MCNC: 12.1 G/DL (ref 12–15.9)
HMPV RNA NPH QL NAA+NON-PROBE: DETECTED
HOLD SPECIMEN: NORMAL
HPIV1 RNA ISLT QL NAA+PROBE: NOT DETECTED
HPIV2 RNA SPEC QL NAA+PROBE: NOT DETECTED
HPIV3 RNA NPH QL NAA+PROBE: NOT DETECTED
HPIV4 P GENE NPH QL NAA+PROBE: NOT DETECTED
INR PPP: 1.19 (ref 0.9–1.1)
LYMPHOCYTES # BLD MANUAL: 2.1 10*3/MM3 (ref 0.7–3.1)
LYMPHOCYTES NFR BLD MANUAL: 9.2 % (ref 5–12)
M PNEUMO IGG SER IA-ACNC: NOT DETECTED
MCH RBC QN AUTO: 29.1 PG (ref 26.6–33)
MCHC RBC AUTO-ENTMCNC: 33.3 G/DL (ref 31.5–35.7)
MCV RBC AUTO: 87.3 FL (ref 79–97)
MONOCYTES # BLD: 0.79 10*3/MM3 (ref 0.1–0.9)
NEUTROPHILS # BLD AUTO: 4.99 10*3/MM3 (ref 1.7–7)
NEUTROPHILS NFR BLD MANUAL: 58.2 % (ref 42.7–76)
NT-PROBNP SERPL-MCNC: 978 PG/ML (ref 0–1800)
PLAT MORPH BLD: NORMAL
PLATELET # BLD AUTO: 293 10*3/MM3 (ref 140–450)
PMV BLD AUTO: 9.2 FL (ref 6–12)
POTASSIUM SERPL-SCNC: 4.2 MMOL/L (ref 3.5–5.2)
PROCALCITONIN SERPL-MCNC: 0.24 NG/ML (ref 0–0.25)
PROT SERPL-MCNC: 6 G/DL (ref 6–8.5)
PROTHROMBIN TIME: 15 SECONDS (ref 11.7–14.2)
QT INTERVAL: 464 MS
RBC # BLD AUTO: 4.16 10*6/MM3 (ref 3.77–5.28)
RHINOVIRUS RNA SPEC NAA+PROBE: NOT DETECTED
RSV RNA NPH QL NAA+NON-PROBE: NOT DETECTED
SARS-COV-2 RNA NPH QL NAA+NON-PROBE: NOT DETECTED
SODIUM SERPL-SCNC: 139 MMOL/L (ref 136–145)
TROPONIN T SERPL-MCNC: 0.03 NG/ML (ref 0–0.03)
TROPONIN T SERPL-MCNC: <0.01 NG/ML (ref 0–0.03)
VARIANT LYMPHS NFR BLD MANUAL: 24.5 % (ref 19.6–45.3)
WBC MORPH BLD: NORMAL
WBC NRBC COR # BLD: 8.58 10*3/MM3 (ref 3.4–10.8)
WHOLE BLOOD HOLD COAG: NORMAL
WHOLE BLOOD HOLD SPECIMEN: NORMAL

## 2022-06-15 PROCEDURE — 84484 ASSAY OF TROPONIN QUANT: CPT | Performed by: EMERGENCY MEDICINE

## 2022-06-15 PROCEDURE — 85610 PROTHROMBIN TIME: CPT | Performed by: EMERGENCY MEDICINE

## 2022-06-15 PROCEDURE — 94664 DEMO&/EVAL PT USE INHALER: CPT

## 2022-06-15 PROCEDURE — 83605 ASSAY OF LACTIC ACID: CPT | Performed by: EMERGENCY MEDICINE

## 2022-06-15 PROCEDURE — 85025 COMPLETE CBC W/AUTO DIFF WBC: CPT | Performed by: EMERGENCY MEDICINE

## 2022-06-15 PROCEDURE — 94799 UNLISTED PULMONARY SVC/PX: CPT

## 2022-06-15 PROCEDURE — 93010 ELECTROCARDIOGRAM REPORT: CPT | Performed by: INTERNAL MEDICINE

## 2022-06-15 PROCEDURE — 71045 X-RAY EXAM CHEST 1 VIEW: CPT

## 2022-06-15 PROCEDURE — 83880 ASSAY OF NATRIURETIC PEPTIDE: CPT | Performed by: EMERGENCY MEDICINE

## 2022-06-15 PROCEDURE — 93005 ELECTROCARDIOGRAM TRACING: CPT | Performed by: EMERGENCY MEDICINE

## 2022-06-15 PROCEDURE — 85007 BL SMEAR W/DIFF WBC COUNT: CPT | Performed by: EMERGENCY MEDICINE

## 2022-06-15 PROCEDURE — 84484 ASSAY OF TROPONIN QUANT: CPT | Performed by: NURSE PRACTITIONER

## 2022-06-15 PROCEDURE — 25010000002 ENOXAPARIN PER 10 MG: Performed by: EMERGENCY MEDICINE

## 2022-06-15 PROCEDURE — 84145 PROCALCITONIN (PCT): CPT | Performed by: EMERGENCY MEDICINE

## 2022-06-15 PROCEDURE — 94761 N-INVAS EAR/PLS OXIMETRY MLT: CPT

## 2022-06-15 PROCEDURE — 0202U NFCT DS 22 TRGT SARS-COV-2: CPT | Performed by: EMERGENCY MEDICINE

## 2022-06-15 PROCEDURE — 94640 AIRWAY INHALATION TREATMENT: CPT

## 2022-06-15 PROCEDURE — 36415 COLL VENOUS BLD VENIPUNCTURE: CPT | Performed by: EMERGENCY MEDICINE

## 2022-06-15 PROCEDURE — 99284 EMERGENCY DEPT VISIT MOD MDM: CPT

## 2022-06-15 PROCEDURE — 25010000002 METHYLPREDNISOLONE PER 40 MG: Performed by: HOSPITALIST

## 2022-06-15 PROCEDURE — 80053 COMPREHEN METABOLIC PANEL: CPT | Performed by: EMERGENCY MEDICINE

## 2022-06-15 RX ORDER — ENOXAPARIN SODIUM 100 MG/ML
1 INJECTION SUBCUTANEOUS EVERY 12 HOURS
Status: COMPLETED | OUTPATIENT
Start: 2022-06-16 | End: 2022-06-19

## 2022-06-15 RX ORDER — SODIUM CHLORIDE 0.9 % (FLUSH) 0.9 %
10 SYRINGE (ML) INJECTION EVERY 12 HOURS SCHEDULED
Status: DISCONTINUED | OUTPATIENT
Start: 2022-06-15 | End: 2022-06-21 | Stop reason: HOSPADM

## 2022-06-15 RX ORDER — ENOXAPARIN SODIUM 100 MG/ML
1 INJECTION SUBCUTANEOUS ONCE
Status: COMPLETED | OUTPATIENT
Start: 2022-06-15 | End: 2022-06-15

## 2022-06-15 RX ORDER — ACETAMINOPHEN 325 MG/1
650 TABLET ORAL EVERY 4 HOURS PRN
Status: DISCONTINUED | OUTPATIENT
Start: 2022-06-15 | End: 2022-06-21 | Stop reason: HOSPADM

## 2022-06-15 RX ORDER — IPRATROPIUM BROMIDE AND ALBUTEROL SULFATE 2.5; .5 MG/3ML; MG/3ML
3 SOLUTION RESPIRATORY (INHALATION) ONCE
Status: COMPLETED | OUTPATIENT
Start: 2022-06-15 | End: 2022-06-15

## 2022-06-15 RX ORDER — METHYLPREDNISOLONE SODIUM SUCCINATE 40 MG/ML
40 INJECTION, POWDER, LYOPHILIZED, FOR SOLUTION INTRAMUSCULAR; INTRAVENOUS EVERY 12 HOURS
Status: DISCONTINUED | OUTPATIENT
Start: 2022-06-15 | End: 2022-06-15

## 2022-06-15 RX ORDER — CHOLESTYRAMINE LIGHT 4 G/5.7G
1 POWDER, FOR SUSPENSION ORAL DAILY
Refills: 0 | Status: DISCONTINUED | OUTPATIENT
Start: 2022-06-16 | End: 2022-06-21 | Stop reason: HOSPADM

## 2022-06-15 RX ORDER — ATORVASTATIN CALCIUM 20 MG/1
10 TABLET, FILM COATED ORAL DAILY
Status: DISCONTINUED | OUTPATIENT
Start: 2022-06-16 | End: 2022-06-20

## 2022-06-15 RX ORDER — SODIUM CHLORIDE 0.9 % (FLUSH) 0.9 %
10 SYRINGE (ML) INJECTION AS NEEDED
Status: DISCONTINUED | OUTPATIENT
Start: 2022-06-15 | End: 2022-06-21 | Stop reason: HOSPADM

## 2022-06-15 RX ORDER — FAMOTIDINE 20 MG/1
20 TABLET, FILM COATED ORAL
Status: DISCONTINUED | OUTPATIENT
Start: 2022-06-15 | End: 2022-06-21 | Stop reason: HOSPADM

## 2022-06-15 RX ORDER — IPRATROPIUM BROMIDE AND ALBUTEROL SULFATE 2.5; .5 MG/3ML; MG/3ML
3 SOLUTION RESPIRATORY (INHALATION)
Status: DISCONTINUED | OUTPATIENT
Start: 2022-06-15 | End: 2022-06-21 | Stop reason: HOSPADM

## 2022-06-15 RX ORDER — CHOLECALCIFEROL (VITAMIN D3) 125 MCG
5 CAPSULE ORAL NIGHTLY PRN
Status: DISCONTINUED | OUTPATIENT
Start: 2022-06-15 | End: 2022-06-21 | Stop reason: HOSPADM

## 2022-06-15 RX ORDER — PREDNISONE 20 MG/1
20 TABLET ORAL 2 TIMES DAILY WITH MEALS
Status: DISCONTINUED | OUTPATIENT
Start: 2022-06-16 | End: 2022-06-19

## 2022-06-15 RX ORDER — LEVOTHYROXINE SODIUM 0.1 MG/1
100 TABLET ORAL
Status: DISCONTINUED | OUTPATIENT
Start: 2022-06-16 | End: 2022-06-21 | Stop reason: HOSPADM

## 2022-06-15 RX ORDER — ALBUTEROL SULFATE 2.5 MG/3ML
2.5 SOLUTION RESPIRATORY (INHALATION) ONCE
Status: COMPLETED | OUTPATIENT
Start: 2022-06-15 | End: 2022-06-15

## 2022-06-15 RX ORDER — ACETAMINOPHEN 650 MG/1
650 SUPPOSITORY RECTAL EVERY 4 HOURS PRN
Status: DISCONTINUED | OUTPATIENT
Start: 2022-06-15 | End: 2022-06-21 | Stop reason: HOSPADM

## 2022-06-15 RX ORDER — ACETAMINOPHEN 160 MG/5ML
650 SOLUTION ORAL EVERY 4 HOURS PRN
Status: DISCONTINUED | OUTPATIENT
Start: 2022-06-15 | End: 2022-06-21 | Stop reason: HOSPADM

## 2022-06-15 RX ORDER — BUPROPION HYDROCHLORIDE 300 MG/1
300 TABLET ORAL DAILY
Status: DISCONTINUED | OUTPATIENT
Start: 2022-06-15 | End: 2022-06-21 | Stop reason: HOSPADM

## 2022-06-15 RX ORDER — ALBUTEROL SULFATE 2.5 MG/3ML
2.5 SOLUTION RESPIRATORY (INHALATION)
Status: DISCONTINUED | OUTPATIENT
Start: 2022-06-15 | End: 2022-06-15

## 2022-06-15 RX ADMIN — IPRATROPIUM BROMIDE AND ALBUTEROL SULFATE 3 ML: .5; 3 SOLUTION RESPIRATORY (INHALATION) at 10:54

## 2022-06-15 RX ADMIN — ENOXAPARIN SODIUM 70 MG: 100 INJECTION SUBCUTANEOUS at 13:46

## 2022-06-15 RX ADMIN — FAMOTIDINE 20 MG: 20 TABLET ORAL at 16:58

## 2022-06-15 RX ADMIN — Medication 10 ML: at 16:59

## 2022-06-15 RX ADMIN — IPRATROPIUM BROMIDE AND ALBUTEROL SULFATE 3 ML: 2.5; .5 SOLUTION RESPIRATORY (INHALATION) at 19:33

## 2022-06-15 RX ADMIN — METHYLPREDNISOLONE SODIUM SUCCINATE 40 MG: 40 INJECTION, POWDER, FOR SOLUTION INTRAMUSCULAR; INTRAVENOUS at 16:59

## 2022-06-15 RX ADMIN — BUPROPION HYDROCHLORIDE 300 MG: 300 TABLET, EXTENDED RELEASE ORAL at 20:25

## 2022-06-15 RX ADMIN — Medication 10 ML: at 20:26

## 2022-06-15 RX ADMIN — MESALAMINE 1000 MG: 250 CAPSULE ORAL at 20:25

## 2022-06-15 RX ADMIN — ALBUTEROL SULFATE 2.5 MG: 2.5 SOLUTION RESPIRATORY (INHALATION) at 13:44

## 2022-06-16 ENCOUNTER — APPOINTMENT (OUTPATIENT)
Dept: CT IMAGING | Facility: HOSPITAL | Age: 77
End: 2022-06-16

## 2022-06-16 LAB
ANION GAP SERPL CALCULATED.3IONS-SCNC: 10.7 MMOL/L (ref 5–15)
BUN SERPL-MCNC: 27 MG/DL (ref 8–23)
BUN/CREAT SERPL: 22.1 (ref 7–25)
CALCIUM SPEC-SCNC: 8.5 MG/DL (ref 8.6–10.5)
CHLORIDE SERPL-SCNC: 111 MMOL/L (ref 98–107)
CO2 SERPL-SCNC: 19.3 MMOL/L (ref 22–29)
CREAT SERPL-MCNC: 1.22 MG/DL (ref 0.57–1)
DEPRECATED RDW RBC AUTO: 41.6 FL (ref 37–54)
EGFRCR SERPLBLD CKD-EPI 2021: 45.5 ML/MIN/1.73
ERYTHROCYTE [DISTWIDTH] IN BLOOD BY AUTOMATED COUNT: 12.9 % (ref 12.3–15.4)
GLUCOSE SERPL-MCNC: 108 MG/DL (ref 65–99)
HCT VFR BLD AUTO: 34.4 % (ref 34–46.6)
HGB BLD-MCNC: 11.2 G/DL (ref 12–15.9)
LYMPHOCYTES # BLD MANUAL: 0.7 10*3/MM3 (ref 0.7–3.1)
LYMPHOCYTES NFR BLD MANUAL: 8.2 % (ref 5–12)
MCH RBC QN AUTO: 29.3 PG (ref 26.6–33)
MCHC RBC AUTO-ENTMCNC: 32.6 G/DL (ref 31.5–35.7)
MCV RBC AUTO: 90.1 FL (ref 79–97)
MONOCYTES # BLD: 0.7 10*3/MM3 (ref 0.1–0.9)
NEUTROPHILS # BLD AUTO: 7.12 10*3/MM3 (ref 1.7–7)
NEUTROPHILS NFR BLD MANUAL: 83.7 % (ref 42.7–76)
OVALOCYTES BLD QL SMEAR: ABNORMAL
PLAT MORPH BLD: NORMAL
PLATELET # BLD AUTO: 261 10*3/MM3 (ref 140–450)
PMV BLD AUTO: 10 FL (ref 6–12)
POIKILOCYTOSIS BLD QL SMEAR: ABNORMAL
POTASSIUM SERPL-SCNC: 4.6 MMOL/L (ref 3.5–5.2)
RBC # BLD AUTO: 3.82 10*6/MM3 (ref 3.77–5.28)
SODIUM SERPL-SCNC: 141 MMOL/L (ref 136–145)
VARIANT LYMPHS NFR BLD MANUAL: 8.2 % (ref 19.6–45.3)
WBC MORPH BLD: NORMAL
WBC NRBC COR # BLD: 8.51 10*3/MM3 (ref 3.4–10.8)

## 2022-06-16 PROCEDURE — 85007 BL SMEAR W/DIFF WBC COUNT: CPT | Performed by: HOSPITALIST

## 2022-06-16 PROCEDURE — 63710000001 PREDNISONE PER 1 MG: Performed by: HOSPITALIST

## 2022-06-16 PROCEDURE — 80048 BASIC METABOLIC PNL TOTAL CA: CPT | Performed by: HOSPITALIST

## 2022-06-16 PROCEDURE — 94799 UNLISTED PULMONARY SVC/PX: CPT

## 2022-06-16 PROCEDURE — 94761 N-INVAS EAR/PLS OXIMETRY MLT: CPT

## 2022-06-16 PROCEDURE — 71250 CT THORAX DX C-: CPT

## 2022-06-16 PROCEDURE — 25010000002 ENOXAPARIN PER 10 MG: Performed by: HOSPITALIST

## 2022-06-16 PROCEDURE — 99232 SBSQ HOSP IP/OBS MODERATE 35: CPT | Performed by: INTERNAL MEDICINE

## 2022-06-16 PROCEDURE — 94760 N-INVAS EAR/PLS OXIMETRY 1: CPT

## 2022-06-16 PROCEDURE — 99222 1ST HOSP IP/OBS MODERATE 55: CPT | Performed by: INTERNAL MEDICINE

## 2022-06-16 PROCEDURE — 94664 DEMO&/EVAL PT USE INHALER: CPT

## 2022-06-16 PROCEDURE — 85025 COMPLETE CBC W/AUTO DIFF WBC: CPT | Performed by: HOSPITALIST

## 2022-06-16 RX ORDER — TORSEMIDE 20 MG/1
40 TABLET ORAL DAILY
Status: DISCONTINUED | OUTPATIENT
Start: 2022-06-16 | End: 2022-06-16 | Stop reason: SDUPTHER

## 2022-06-16 RX ORDER — TORSEMIDE 20 MG/1
40 TABLET ORAL DAILY
Status: DISCONTINUED | OUTPATIENT
Start: 2022-06-16 | End: 2022-06-19

## 2022-06-16 RX ORDER — DOCUSATE SODIUM 100 MG/1
100 CAPSULE, LIQUID FILLED ORAL DAILY
Status: DISCONTINUED | OUTPATIENT
Start: 2022-06-16 | End: 2022-06-21 | Stop reason: HOSPADM

## 2022-06-16 RX ADMIN — IPRATROPIUM BROMIDE AND ALBUTEROL SULFATE 3 ML: 2.5; .5 SOLUTION RESPIRATORY (INHALATION) at 15:34

## 2022-06-16 RX ADMIN — MESALAMINE 1000 MG: 250 CAPSULE ORAL at 17:00

## 2022-06-16 RX ADMIN — ATORVASTATIN CALCIUM 10 MG: 20 TABLET, FILM COATED ORAL at 08:21

## 2022-06-16 RX ADMIN — ENOXAPARIN SODIUM 70 MG: 100 INJECTION SUBCUTANEOUS at 01:18

## 2022-06-16 RX ADMIN — MESALAMINE 1000 MG: 250 CAPSULE ORAL at 08:21

## 2022-06-16 RX ADMIN — DOCUSATE SODIUM 100 MG: 100 CAPSULE, LIQUID FILLED ORAL at 13:19

## 2022-06-16 RX ADMIN — BUPROPION HYDROCHLORIDE 300 MG: 300 TABLET, EXTENDED RELEASE ORAL at 08:21

## 2022-06-16 RX ADMIN — IPRATROPIUM BROMIDE AND ALBUTEROL SULFATE 3 ML: 2.5; .5 SOLUTION RESPIRATORY (INHALATION) at 11:42

## 2022-06-16 RX ADMIN — PREDNISONE 20 MG: 20 TABLET ORAL at 17:00

## 2022-06-16 RX ADMIN — IPRATROPIUM BROMIDE AND ALBUTEROL SULFATE 3 ML: 2.5; .5 SOLUTION RESPIRATORY (INHALATION) at 07:50

## 2022-06-16 RX ADMIN — Medication 10 ML: at 08:20

## 2022-06-16 RX ADMIN — FAMOTIDINE 20 MG: 20 TABLET ORAL at 16:52

## 2022-06-16 RX ADMIN — PREDNISONE 20 MG: 20 TABLET ORAL at 08:21

## 2022-06-16 RX ADMIN — CHOLESTYRAMINE 4 G: 4 POWDER, FOR SUSPENSION ORAL at 08:21

## 2022-06-16 RX ADMIN — Medication 10 ML: at 20:37

## 2022-06-16 RX ADMIN — RIOCIGUAT 2.5 MG: 2.5 TABLET, FILM COATED ORAL at 08:22

## 2022-06-16 RX ADMIN — TORSEMIDE 40 MG: 20 TABLET ORAL at 12:18

## 2022-06-16 RX ADMIN — MACITENTAN 10 MG: 10 TABLET, FILM COATED ORAL at 08:22

## 2022-06-16 RX ADMIN — ENOXAPARIN SODIUM 70 MG: 100 INJECTION SUBCUTANEOUS at 13:19

## 2022-06-16 RX ADMIN — LEVOTHYROXINE SODIUM 100 MCG: 0.1 TABLET ORAL at 06:11

## 2022-06-16 RX ADMIN — MESALAMINE 1000 MG: 250 CAPSULE ORAL at 20:49

## 2022-06-16 RX ADMIN — FAMOTIDINE 20 MG: 20 TABLET ORAL at 06:10

## 2022-06-16 RX ADMIN — IPRATROPIUM BROMIDE AND ALBUTEROL SULFATE 3 ML: 2.5; .5 SOLUTION RESPIRATORY (INHALATION) at 19:38

## 2022-06-17 LAB
25(OH)D3 SERPL-MCNC: 40.3 NG/ML (ref 30–100)
PTH-INTACT SERPL-MCNC: 137 PG/ML (ref 15–65)

## 2022-06-17 PROCEDURE — 94799 UNLISTED PULMONARY SVC/PX: CPT

## 2022-06-17 PROCEDURE — 99231 SBSQ HOSP IP/OBS SF/LOW 25: CPT | Performed by: INTERNAL MEDICINE

## 2022-06-17 PROCEDURE — 63710000001 ONDANSETRON PER 8 MG: Performed by: HOSPITALIST

## 2022-06-17 PROCEDURE — 99223 1ST HOSP IP/OBS HIGH 75: CPT | Performed by: NURSE PRACTITIONER

## 2022-06-17 PROCEDURE — 94761 N-INVAS EAR/PLS OXIMETRY MLT: CPT

## 2022-06-17 PROCEDURE — 63710000001 PREDNISONE PER 1 MG: Performed by: HOSPITALIST

## 2022-06-17 PROCEDURE — 83970 ASSAY OF PARATHORMONE: CPT | Performed by: INTERNAL MEDICINE

## 2022-06-17 PROCEDURE — 25010000002 ENOXAPARIN PER 10 MG: Performed by: HOSPITALIST

## 2022-06-17 PROCEDURE — 94760 N-INVAS EAR/PLS OXIMETRY 1: CPT

## 2022-06-17 PROCEDURE — 94664 DEMO&/EVAL PT USE INHALER: CPT

## 2022-06-17 PROCEDURE — 99232 SBSQ HOSP IP/OBS MODERATE 35: CPT | Performed by: NURSE PRACTITIONER

## 2022-06-17 PROCEDURE — 82306 VITAMIN D 25 HYDROXY: CPT | Performed by: INTERNAL MEDICINE

## 2022-06-17 RX ORDER — ONDANSETRON 4 MG/1
4 TABLET, FILM COATED ORAL EVERY 6 HOURS PRN
Status: DISCONTINUED | OUTPATIENT
Start: 2022-06-17 | End: 2022-06-21 | Stop reason: HOSPADM

## 2022-06-17 RX ORDER — ONDANSETRON 2 MG/ML
4 INJECTION INTRAMUSCULAR; INTRAVENOUS EVERY 6 HOURS PRN
Status: DISCONTINUED | OUTPATIENT
Start: 2022-06-17 | End: 2022-06-21 | Stop reason: HOSPADM

## 2022-06-17 RX ADMIN — IPRATROPIUM BROMIDE AND ALBUTEROL SULFATE 3 ML: 2.5; .5 SOLUTION RESPIRATORY (INHALATION) at 07:42

## 2022-06-17 RX ADMIN — BUPROPION HYDROCHLORIDE 300 MG: 300 TABLET, EXTENDED RELEASE ORAL at 08:54

## 2022-06-17 RX ADMIN — FAMOTIDINE 20 MG: 20 TABLET ORAL at 19:59

## 2022-06-17 RX ADMIN — ATORVASTATIN CALCIUM 10 MG: 20 TABLET, FILM COATED ORAL at 08:53

## 2022-06-17 RX ADMIN — ONDANSETRON HYDROCHLORIDE 4 MG: 4 TABLET, FILM COATED ORAL at 15:12

## 2022-06-17 RX ADMIN — RIOCIGUAT 2.5 MG: 2.5 TABLET, FILM COATED ORAL at 14:51

## 2022-06-17 RX ADMIN — MESALAMINE 1000 MG: 250 CAPSULE ORAL at 08:53

## 2022-06-17 RX ADMIN — IPRATROPIUM BROMIDE AND ALBUTEROL SULFATE 3 ML: 2.5; .5 SOLUTION RESPIRATORY (INHALATION) at 20:51

## 2022-06-17 RX ADMIN — CHOLESTYRAMINE 4 G: 4 POWDER, FOR SUSPENSION ORAL at 08:54

## 2022-06-17 RX ADMIN — IPRATROPIUM BROMIDE AND ALBUTEROL SULFATE 3 ML: 2.5; .5 SOLUTION RESPIRATORY (INHALATION) at 11:55

## 2022-06-17 RX ADMIN — FAMOTIDINE 20 MG: 20 TABLET ORAL at 06:06

## 2022-06-17 RX ADMIN — TORSEMIDE 40 MG: 20 TABLET ORAL at 08:53

## 2022-06-17 RX ADMIN — RIOCIGUAT 2.5 MG: 2.5 TABLET, FILM COATED ORAL at 08:54

## 2022-06-17 RX ADMIN — PREDNISONE 20 MG: 20 TABLET ORAL at 17:25

## 2022-06-17 RX ADMIN — IPRATROPIUM BROMIDE AND ALBUTEROL SULFATE 3 ML: 2.5; .5 SOLUTION RESPIRATORY (INHALATION) at 15:49

## 2022-06-17 RX ADMIN — ENOXAPARIN SODIUM 70 MG: 100 INJECTION SUBCUTANEOUS at 14:51

## 2022-06-17 RX ADMIN — LEVOTHYROXINE SODIUM 100 MCG: 0.1 TABLET ORAL at 06:07

## 2022-06-17 RX ADMIN — RIOCIGUAT 2.5 MG: 2.5 TABLET, FILM COATED ORAL at 20:44

## 2022-06-17 RX ADMIN — ENOXAPARIN SODIUM 70 MG: 100 INJECTION SUBCUTANEOUS at 02:06

## 2022-06-17 RX ADMIN — MACITENTAN 10 MG: 10 TABLET, FILM COATED ORAL at 08:54

## 2022-06-17 RX ADMIN — Medication 10 ML: at 20:45

## 2022-06-17 RX ADMIN — DOCUSATE SODIUM 100 MG: 100 CAPSULE, LIQUID FILLED ORAL at 08:54

## 2022-06-17 RX ADMIN — Medication 10 ML: at 09:00

## 2022-06-17 RX ADMIN — PREDNISONE 20 MG: 20 TABLET ORAL at 08:53

## 2022-06-17 RX ADMIN — MESALAMINE 1000 MG: 250 CAPSULE ORAL at 12:00

## 2022-06-18 PROCEDURE — 99231 SBSQ HOSP IP/OBS SF/LOW 25: CPT | Performed by: INTERNAL MEDICINE

## 2022-06-18 PROCEDURE — 25010000002 ENOXAPARIN PER 10 MG: Performed by: NURSE PRACTITIONER

## 2022-06-18 PROCEDURE — 94799 UNLISTED PULMONARY SVC/PX: CPT

## 2022-06-18 PROCEDURE — 99232 SBSQ HOSP IP/OBS MODERATE 35: CPT | Performed by: INTERNAL MEDICINE

## 2022-06-18 PROCEDURE — 94761 N-INVAS EAR/PLS OXIMETRY MLT: CPT

## 2022-06-18 PROCEDURE — 94664 DEMO&/EVAL PT USE INHALER: CPT

## 2022-06-18 PROCEDURE — 63710000001 PREDNISONE PER 1 MG: Performed by: HOSPITALIST

## 2022-06-18 PROCEDURE — 94760 N-INVAS EAR/PLS OXIMETRY 1: CPT

## 2022-06-18 RX ORDER — POLYETHYLENE GLYCOL 3350 17 G/17G
17 POWDER, FOR SOLUTION ORAL DAILY PRN
Status: DISCONTINUED | OUTPATIENT
Start: 2022-06-18 | End: 2022-06-21 | Stop reason: HOSPADM

## 2022-06-18 RX ADMIN — CHOLESTYRAMINE 4 G: 4 POWDER, FOR SUSPENSION ORAL at 08:18

## 2022-06-18 RX ADMIN — Medication 10 ML: at 08:18

## 2022-06-18 RX ADMIN — RIOCIGUAT 2.5 MG: 2.5 TABLET, FILM COATED ORAL at 08:19

## 2022-06-18 RX ADMIN — DOCUSATE SODIUM 100 MG: 100 CAPSULE, LIQUID FILLED ORAL at 08:18

## 2022-06-18 RX ADMIN — MACITENTAN 10 MG: 10 TABLET, FILM COATED ORAL at 08:18

## 2022-06-18 RX ADMIN — Medication 10 ML: at 21:41

## 2022-06-18 RX ADMIN — ENOXAPARIN SODIUM 70 MG: 100 INJECTION SUBCUTANEOUS at 14:21

## 2022-06-18 RX ADMIN — RIOCIGUAT 2.5 MG: 2.5 TABLET, FILM COATED ORAL at 21:41

## 2022-06-18 RX ADMIN — IPRATROPIUM BROMIDE AND ALBUTEROL SULFATE 3 ML: 2.5; .5 SOLUTION RESPIRATORY (INHALATION) at 11:48

## 2022-06-18 RX ADMIN — TORSEMIDE 40 MG: 20 TABLET ORAL at 08:17

## 2022-06-18 RX ADMIN — FAMOTIDINE 20 MG: 20 TABLET ORAL at 17:14

## 2022-06-18 RX ADMIN — POLYETHYLENE GLYCOL 3350 17 G: 17 POWDER, FOR SOLUTION ORAL at 21:51

## 2022-06-18 RX ADMIN — BUPROPION HYDROCHLORIDE 300 MG: 300 TABLET, EXTENDED RELEASE ORAL at 08:18

## 2022-06-18 RX ADMIN — MESALAMINE 1000 MG: 250 CAPSULE ORAL at 06:51

## 2022-06-18 RX ADMIN — RIOCIGUAT 2.5 MG: 2.5 TABLET, FILM COATED ORAL at 16:26

## 2022-06-18 RX ADMIN — ATORVASTATIN CALCIUM 10 MG: 20 TABLET, FILM COATED ORAL at 08:17

## 2022-06-18 RX ADMIN — IPRATROPIUM BROMIDE AND ALBUTEROL SULFATE 3 ML: 2.5; .5 SOLUTION RESPIRATORY (INHALATION) at 07:27

## 2022-06-18 RX ADMIN — MESALAMINE 1000 MG: 250 CAPSULE ORAL at 17:14

## 2022-06-18 RX ADMIN — IPRATROPIUM BROMIDE AND ALBUTEROL SULFATE 3 ML: 2.5; .5 SOLUTION RESPIRATORY (INHALATION) at 16:09

## 2022-06-18 RX ADMIN — PREDNISONE 20 MG: 20 TABLET ORAL at 17:14

## 2022-06-18 RX ADMIN — ENOXAPARIN SODIUM 70 MG: 100 INJECTION SUBCUTANEOUS at 01:53

## 2022-06-18 RX ADMIN — FAMOTIDINE 20 MG: 20 TABLET ORAL at 06:51

## 2022-06-18 RX ADMIN — IPRATROPIUM BROMIDE AND ALBUTEROL SULFATE 3 ML: 2.5; .5 SOLUTION RESPIRATORY (INHALATION) at 22:17

## 2022-06-18 RX ADMIN — LEVOTHYROXINE SODIUM 100 MCG: 0.1 TABLET ORAL at 06:51

## 2022-06-18 RX ADMIN — PREDNISONE 20 MG: 20 TABLET ORAL at 08:18

## 2022-06-19 LAB
ALBUMIN SERPL-MCNC: 3.4 G/DL (ref 3.5–5.2)
ANION GAP SERPL CALCULATED.3IONS-SCNC: 11.7 MMOL/L (ref 5–15)
BUN SERPL-MCNC: 36 MG/DL (ref 8–23)
BUN/CREAT SERPL: 25 (ref 7–25)
CALCIUM SPEC-SCNC: 8.8 MG/DL (ref 8.6–10.5)
CHLORIDE SERPL-SCNC: 104 MMOL/L (ref 98–107)
CO2 SERPL-SCNC: 24.3 MMOL/L (ref 22–29)
CREAT SERPL-MCNC: 1.44 MG/DL (ref 0.57–1)
EGFRCR SERPLBLD CKD-EPI 2021: 37.3 ML/MIN/1.73
GLUCOSE SERPL-MCNC: 120 MG/DL (ref 65–99)
NT-PROBNP SERPL-MCNC: 986 PG/ML (ref 0–1800)
PHOSPHATE SERPL-MCNC: 3.4 MG/DL (ref 2.5–4.5)
POTASSIUM SERPL-SCNC: 3.6 MMOL/L (ref 3.5–5.2)
SODIUM SERPL-SCNC: 140 MMOL/L (ref 136–145)

## 2022-06-19 PROCEDURE — 94799 UNLISTED PULMONARY SVC/PX: CPT

## 2022-06-19 PROCEDURE — 94664 DEMO&/EVAL PT USE INHALER: CPT

## 2022-06-19 PROCEDURE — 83880 ASSAY OF NATRIURETIC PEPTIDE: CPT | Performed by: NURSE PRACTITIONER

## 2022-06-19 PROCEDURE — 25010000002 ENOXAPARIN PER 10 MG: Performed by: NURSE PRACTITIONER

## 2022-06-19 PROCEDURE — 99232 SBSQ HOSP IP/OBS MODERATE 35: CPT | Performed by: INTERNAL MEDICINE

## 2022-06-19 PROCEDURE — 63710000001 PREDNISONE PER 1 MG: Performed by: HOSPITALIST

## 2022-06-19 PROCEDURE — 99232 SBSQ HOSP IP/OBS MODERATE 35: CPT | Performed by: THORACIC SURGERY (CARDIOTHORACIC VASCULAR SURGERY)

## 2022-06-19 PROCEDURE — 99231 SBSQ HOSP IP/OBS SF/LOW 25: CPT | Performed by: INTERNAL MEDICINE

## 2022-06-19 PROCEDURE — 80069 RENAL FUNCTION PANEL: CPT | Performed by: INTERNAL MEDICINE

## 2022-06-19 PROCEDURE — 94760 N-INVAS EAR/PLS OXIMETRY 1: CPT

## 2022-06-19 PROCEDURE — 94761 N-INVAS EAR/PLS OXIMETRY MLT: CPT

## 2022-06-19 RX ORDER — PREDNISONE 20 MG/1
20 TABLET ORAL DAILY
Status: DISCONTINUED | OUTPATIENT
Start: 2022-06-20 | End: 2022-06-21 | Stop reason: HOSPADM

## 2022-06-19 RX ORDER — TORSEMIDE 20 MG/1
20 TABLET ORAL DAILY
Status: DISCONTINUED | OUTPATIENT
Start: 2022-06-20 | End: 2022-06-21 | Stop reason: HOSPADM

## 2022-06-19 RX ADMIN — Medication 10 ML: at 20:41

## 2022-06-19 RX ADMIN — RIOCIGUAT 2.5 MG: 2.5 TABLET, FILM COATED ORAL at 08:10

## 2022-06-19 RX ADMIN — PREDNISONE 20 MG: 20 TABLET ORAL at 08:09

## 2022-06-19 RX ADMIN — FAMOTIDINE 20 MG: 20 TABLET ORAL at 16:35

## 2022-06-19 RX ADMIN — FAMOTIDINE 20 MG: 20 TABLET ORAL at 07:14

## 2022-06-19 RX ADMIN — MESALAMINE 1000 MG: 250 CAPSULE ORAL at 16:35

## 2022-06-19 RX ADMIN — LEVOTHYROXINE SODIUM 100 MCG: 0.1 TABLET ORAL at 07:14

## 2022-06-19 RX ADMIN — ENOXAPARIN SODIUM 70 MG: 100 INJECTION SUBCUTANEOUS at 13:47

## 2022-06-19 RX ADMIN — MESALAMINE 1000 MG: 250 CAPSULE ORAL at 07:14

## 2022-06-19 RX ADMIN — ATORVASTATIN CALCIUM 10 MG: 20 TABLET, FILM COATED ORAL at 08:09

## 2022-06-19 RX ADMIN — Medication 10 ML: at 08:10

## 2022-06-19 RX ADMIN — TORSEMIDE 40 MG: 20 TABLET ORAL at 08:09

## 2022-06-19 RX ADMIN — IPRATROPIUM BROMIDE AND ALBUTEROL SULFATE 3 ML: 2.5; .5 SOLUTION RESPIRATORY (INHALATION) at 07:07

## 2022-06-19 RX ADMIN — RIOCIGUAT 2.5 MG: 2.5 TABLET, FILM COATED ORAL at 20:40

## 2022-06-19 RX ADMIN — CHOLESTYRAMINE 4 G: 4 POWDER, FOR SUSPENSION ORAL at 08:09

## 2022-06-19 RX ADMIN — IPRATROPIUM BROMIDE AND ALBUTEROL SULFATE 3 ML: 2.5; .5 SOLUTION RESPIRATORY (INHALATION) at 15:12

## 2022-06-19 RX ADMIN — DOCUSATE SODIUM 100 MG: 100 CAPSULE, LIQUID FILLED ORAL at 08:09

## 2022-06-19 RX ADMIN — RIOCIGUAT 2.5 MG: 2.5 TABLET, FILM COATED ORAL at 16:35

## 2022-06-19 RX ADMIN — IPRATROPIUM BROMIDE AND ALBUTEROL SULFATE 3 ML: 2.5; .5 SOLUTION RESPIRATORY (INHALATION) at 11:20

## 2022-06-19 RX ADMIN — BUPROPION HYDROCHLORIDE 300 MG: 300 TABLET, EXTENDED RELEASE ORAL at 08:09

## 2022-06-19 RX ADMIN — IPRATROPIUM BROMIDE AND ALBUTEROL SULFATE 3 ML: 2.5; .5 SOLUTION RESPIRATORY (INHALATION) at 19:41

## 2022-06-19 RX ADMIN — ENOXAPARIN SODIUM 70 MG: 100 INJECTION SUBCUTANEOUS at 02:05

## 2022-06-19 RX ADMIN — MACITENTAN 10 MG: 10 TABLET, FILM COATED ORAL at 08:10

## 2022-06-20 ENCOUNTER — APPOINTMENT (OUTPATIENT)
Dept: GENERAL RADIOLOGY | Facility: HOSPITAL | Age: 77
End: 2022-06-20

## 2022-06-20 ENCOUNTER — ANESTHESIA EVENT (OUTPATIENT)
Dept: GASTROENTEROLOGY | Facility: HOSPITAL | Age: 77
End: 2022-06-20

## 2022-06-20 ENCOUNTER — ANESTHESIA (OUTPATIENT)
Dept: GASTROENTEROLOGY | Facility: HOSPITAL | Age: 77
End: 2022-06-20

## 2022-06-20 LAB
ALBUMIN SERPL-MCNC: 3.4 G/DL (ref 3.5–5.2)
ANION GAP SERPL CALCULATED.3IONS-SCNC: 10 MMOL/L (ref 5–15)
BUN SERPL-MCNC: 33 MG/DL (ref 8–23)
BUN/CREAT SERPL: 23.7 (ref 7–25)
CALCIUM SPEC-SCNC: 8.7 MG/DL (ref 8.6–10.5)
CHLORIDE SERPL-SCNC: 104 MMOL/L (ref 98–107)
CO2 SERPL-SCNC: 25 MMOL/L (ref 22–29)
CREAT SERPL-MCNC: 1.39 MG/DL (ref 0.57–1)
EGFRCR SERPLBLD CKD-EPI 2021: 38.9 ML/MIN/1.73
GLUCOSE SERPL-MCNC: 92 MG/DL (ref 65–99)
PHOSPHATE SERPL-MCNC: 2.8 MG/DL (ref 2.5–4.5)
POTASSIUM SERPL-SCNC: 3.1 MMOL/L (ref 3.5–5.2)
SARS-COV-2 RNA PNL SPEC NAA+PROBE: NOT DETECTED
SODIUM SERPL-SCNC: 139 MMOL/L (ref 136–145)

## 2022-06-20 PROCEDURE — 63710000001 PREDNISONE PER 1 MG: Performed by: HOSPITALIST

## 2022-06-20 PROCEDURE — 43249 ESOPH EGD DILATION <30 MM: CPT | Performed by: THORACIC SURGERY (CARDIOTHORACIC VASCULAR SURGERY)

## 2022-06-20 PROCEDURE — 80069 RENAL FUNCTION PANEL: CPT | Performed by: INTERNAL MEDICINE

## 2022-06-20 PROCEDURE — 25010000002 PROPOFOL 10 MG/ML EMULSION: Performed by: ANESTHESIOLOGY

## 2022-06-20 PROCEDURE — 87635 SARS-COV-2 COVID-19 AMP PRB: CPT | Performed by: THORACIC SURGERY (CARDIOTHORACIC VASCULAR SURGERY)

## 2022-06-20 PROCEDURE — 94761 N-INVAS EAR/PLS OXIMETRY MLT: CPT

## 2022-06-20 PROCEDURE — 0DB48ZX EXCISION OF ESOPHAGOGASTRIC JUNCTION, VIA NATURAL OR ARTIFICIAL OPENING ENDOSCOPIC, DIAGNOSTIC: ICD-10-PCS | Performed by: HOSPITALIST

## 2022-06-20 PROCEDURE — 88342 IMHCHEM/IMCYTCHM 1ST ANTB: CPT | Performed by: THORACIC SURGERY (CARDIOTHORACIC VASCULAR SURGERY)

## 2022-06-20 PROCEDURE — 0D748ZZ DILATION OF ESOPHAGOGASTRIC JUNCTION, VIA NATURAL OR ARTIFICIAL OPENING ENDOSCOPIC: ICD-10-PCS | Performed by: HOSPITALIST

## 2022-06-20 PROCEDURE — 94664 DEMO&/EVAL PT USE INHALER: CPT

## 2022-06-20 PROCEDURE — 94799 UNLISTED PULMONARY SVC/PX: CPT

## 2022-06-20 PROCEDURE — 88305 TISSUE EXAM BY PATHOLOGIST: CPT | Performed by: THORACIC SURGERY (CARDIOTHORACIC VASCULAR SURGERY)

## 2022-06-20 PROCEDURE — 74360 X-RAY GUIDE GI DILATION: CPT

## 2022-06-20 RX ORDER — SODIUM CHLORIDE 0.9 % (FLUSH) 0.9 %
10 SYRINGE (ML) INJECTION AS NEEDED
Status: DISCONTINUED | OUTPATIENT
Start: 2022-06-20 | End: 2022-06-20 | Stop reason: HOSPADM

## 2022-06-20 RX ORDER — LIDOCAINE HYDROCHLORIDE 20 MG/ML
INJECTION, SOLUTION INFILTRATION; PERINEURAL AS NEEDED
Status: DISCONTINUED | OUTPATIENT
Start: 2022-06-20 | End: 2022-06-20 | Stop reason: SURG

## 2022-06-20 RX ORDER — SODIUM CHLORIDE, SODIUM LACTATE, POTASSIUM CHLORIDE, CALCIUM CHLORIDE 600; 310; 30; 20 MG/100ML; MG/100ML; MG/100ML; MG/100ML
INJECTION, SOLUTION INTRAVENOUS CONTINUOUS PRN
Status: DISCONTINUED | OUTPATIENT
Start: 2022-06-20 | End: 2022-06-20 | Stop reason: SURG

## 2022-06-20 RX ORDER — SODIUM CHLORIDE 0.9 % (FLUSH) 0.9 %
3 SYRINGE (ML) INJECTION EVERY 12 HOURS SCHEDULED
Status: DISCONTINUED | OUTPATIENT
Start: 2022-06-20 | End: 2022-06-20 | Stop reason: HOSPADM

## 2022-06-20 RX ORDER — ATORVASTATIN CALCIUM 20 MG/1
10 TABLET, FILM COATED ORAL NIGHTLY
Status: DISCONTINUED | OUTPATIENT
Start: 2022-06-20 | End: 2022-06-21 | Stop reason: HOSPADM

## 2022-06-20 RX ORDER — PROPOFOL 10 MG/ML
VIAL (ML) INTRAVENOUS AS NEEDED
Status: DISCONTINUED | OUTPATIENT
Start: 2022-06-20 | End: 2022-06-20 | Stop reason: SURG

## 2022-06-20 RX ORDER — LIDOCAINE HYDROCHLORIDE 10 MG/ML
0.5 INJECTION, SOLUTION INFILTRATION; PERINEURAL ONCE AS NEEDED
Status: DISCONTINUED | OUTPATIENT
Start: 2022-06-20 | End: 2022-06-20 | Stop reason: HOSPADM

## 2022-06-20 RX ORDER — PROMETHAZINE HYDROCHLORIDE 25 MG/1
25 TABLET ORAL ONCE AS NEEDED
Status: DISCONTINUED | OUTPATIENT
Start: 2022-06-20 | End: 2022-06-20 | Stop reason: HOSPADM

## 2022-06-20 RX ORDER — SODIUM CHLORIDE 0.9 % (FLUSH) 0.9 %
3-10 SYRINGE (ML) INJECTION AS NEEDED
Status: DISCONTINUED | OUTPATIENT
Start: 2022-06-20 | End: 2022-06-20 | Stop reason: HOSPADM

## 2022-06-20 RX ORDER — PROMETHAZINE HYDROCHLORIDE 25 MG/1
25 SUPPOSITORY RECTAL ONCE AS NEEDED
Status: DISCONTINUED | OUTPATIENT
Start: 2022-06-20 | End: 2022-06-20 | Stop reason: HOSPADM

## 2022-06-20 RX ORDER — POTASSIUM CHLORIDE 1.5 G/1.77G
40 POWDER, FOR SOLUTION ORAL ONCE
Status: COMPLETED | OUTPATIENT
Start: 2022-06-20 | End: 2022-06-20

## 2022-06-20 RX ORDER — SODIUM CHLORIDE 9 MG/ML
1000 INJECTION, SOLUTION INTRAVENOUS CONTINUOUS
Status: DISCONTINUED | OUTPATIENT
Start: 2022-06-20 | End: 2022-06-21 | Stop reason: HOSPADM

## 2022-06-20 RX ADMIN — MACITENTAN 10 MG: 10 TABLET, FILM COATED ORAL at 08:16

## 2022-06-20 RX ADMIN — PROPOFOL 10 MG: 10 INJECTION, EMULSION INTRAVENOUS at 17:22

## 2022-06-20 RX ADMIN — PROPOFOL 20 MG: 10 INJECTION, EMULSION INTRAVENOUS at 17:09

## 2022-06-20 RX ADMIN — BUPROPION HYDROCHLORIDE 300 MG: 300 TABLET, EXTENDED RELEASE ORAL at 18:07

## 2022-06-20 RX ADMIN — PROPOFOL 20 MG: 10 INJECTION, EMULSION INTRAVENOUS at 17:08

## 2022-06-20 RX ADMIN — PREDNISONE 20 MG: 20 TABLET ORAL at 08:16

## 2022-06-20 RX ADMIN — PROPOFOL 10 MG: 10 INJECTION, EMULSION INTRAVENOUS at 17:18

## 2022-06-20 RX ADMIN — POLYETHYLENE GLYCOL 3350 17 G: 17 POWDER, FOR SOLUTION ORAL at 21:41

## 2022-06-20 RX ADMIN — PROPOFOL 10 MG: 10 INJECTION, EMULSION INTRAVENOUS at 17:24

## 2022-06-20 RX ADMIN — SODIUM CHLORIDE 1000 ML: 9 INJECTION, SOLUTION INTRAVENOUS at 12:45

## 2022-06-20 RX ADMIN — PROPOFOL 70 MG: 10 INJECTION, EMULSION INTRAVENOUS at 17:07

## 2022-06-20 RX ADMIN — ATORVASTATIN CALCIUM 10 MG: 20 TABLET, FILM COATED ORAL at 20:22

## 2022-06-20 RX ADMIN — SODIUM CHLORIDE, POTASSIUM CHLORIDE, SODIUM LACTATE AND CALCIUM CHLORIDE: 600; 310; 30; 20 INJECTION, SOLUTION INTRAVENOUS at 13:32

## 2022-06-20 RX ADMIN — RIOCIGUAT 2.5 MG: 2.5 TABLET, FILM COATED ORAL at 08:18

## 2022-06-20 RX ADMIN — IPRATROPIUM BROMIDE AND ALBUTEROL SULFATE 3 ML: 2.5; .5 SOLUTION RESPIRATORY (INHALATION) at 07:09

## 2022-06-20 RX ADMIN — Medication 10 ML: at 08:17

## 2022-06-20 RX ADMIN — POTASSIUM CHLORIDE 40 MEQ: 1.5 POWDER, FOR SOLUTION ORAL at 18:07

## 2022-06-20 RX ADMIN — PROPOFOL 10 MG: 10 INJECTION, EMULSION INTRAVENOUS at 17:16

## 2022-06-20 RX ADMIN — CHOLESTYRAMINE 4 G: 4 POWDER, FOR SUSPENSION ORAL at 18:07

## 2022-06-20 RX ADMIN — RIOCIGUAT 2.5 MG: 2.5 TABLET, FILM COATED ORAL at 18:06

## 2022-06-20 RX ADMIN — PROPOFOL 10 MG: 10 INJECTION, EMULSION INTRAVENOUS at 17:20

## 2022-06-20 RX ADMIN — PROPOFOL 20 MG: 10 INJECTION, EMULSION INTRAVENOUS at 17:10

## 2022-06-20 RX ADMIN — MESALAMINE 1000 MG: 250 CAPSULE ORAL at 18:07

## 2022-06-20 RX ADMIN — IPRATROPIUM BROMIDE AND ALBUTEROL SULFATE 3 ML: 2.5; .5 SOLUTION RESPIRATORY (INHALATION) at 10:43

## 2022-06-20 RX ADMIN — MESALAMINE 1000 MG: 250 CAPSULE ORAL at 08:16

## 2022-06-20 RX ADMIN — IPRATROPIUM BROMIDE AND ALBUTEROL SULFATE 3 ML: 2.5; .5 SOLUTION RESPIRATORY (INHALATION) at 19:58

## 2022-06-20 RX ADMIN — TORSEMIDE 20 MG: 20 TABLET ORAL at 08:16

## 2022-06-20 RX ADMIN — PROPOFOL 10 MG: 10 INJECTION, EMULSION INTRAVENOUS at 17:13

## 2022-06-20 RX ADMIN — LIDOCAINE HYDROCHLORIDE 60 MG: 20 INJECTION, SOLUTION INFILTRATION; PERINEURAL at 17:07

## 2022-06-20 RX ADMIN — FAMOTIDINE 20 MG: 20 TABLET ORAL at 18:07

## 2022-06-20 RX ADMIN — Medication 10 ML: at 12:45

## 2022-06-20 RX ADMIN — RIOCIGUAT 2.5 MG: 2.5 TABLET, FILM COATED ORAL at 21:41

## 2022-06-20 NOTE — ANESTHESIA PREPROCEDURE EVALUATION
Anesthesia Evaluation     no history of anesthetic complications:               Airway   Mallampati: II  TM distance: >3 FB  Narrow palate  Dental      Comment: Cap upper front tooth    Pulmonary    (+) a smoker Former, COPD, sleep apnea on CPAP,     ROS comment: Pulmonary hypertension  Cardiovascular     (+) pacemaker, hypertension, valvular problems/murmurs MR, past MI , CAD, dysrhythmias Atrial Fib, CHF , PVD (fem/ fem bypass), DVT, hyperlipidemia,     ROS comment: EF 56%    Neuro/Psych  (+) psychiatric history,    GI/Hepatic/Renal/Endo    (+)  GERD, GI bleeding , renal disease CRI, thyroid problem     Musculoskeletal     Abdominal    Substance History      OB/GYN          Other                        Anesthesia Plan    ASA 3     MAC     intravenous induction     Anesthetic plan, risks, benefits, and alternatives have been provided, discussed and informed consent has been obtained with: patient.        CODE STATUS:    Code Status (Patient has no pulse and is not breathing): CPR (Attempt to Resuscitate)  Medical Interventions (Patient has pulse or is breathing): Full Support

## 2022-06-21 ENCOUNTER — READMISSION MANAGEMENT (OUTPATIENT)
Dept: CALL CENTER | Facility: HOSPITAL | Age: 77
End: 2022-06-21

## 2022-06-21 VITALS
DIASTOLIC BLOOD PRESSURE: 57 MMHG | HEIGHT: 67 IN | RESPIRATION RATE: 20 BRPM | TEMPERATURE: 98.4 F | SYSTOLIC BLOOD PRESSURE: 116 MMHG | OXYGEN SATURATION: 94 % | BODY MASS INDEX: 22.47 KG/M2 | WEIGHT: 143.2 LBS | HEART RATE: 72 BPM

## 2022-06-21 LAB
ALBUMIN SERPL-MCNC: 3.1 G/DL (ref 3.5–5.2)
ANION GAP SERPL CALCULATED.3IONS-SCNC: 8.7 MMOL/L (ref 5–15)
BASOPHILS # BLD AUTO: 0.06 10*3/MM3 (ref 0–0.2)
BASOPHILS NFR BLD AUTO: 0.5 % (ref 0–1.5)
BUN SERPL-MCNC: 28 MG/DL (ref 8–23)
BUN/CREAT SERPL: 23.7 (ref 7–25)
CALCIUM SPEC-SCNC: 8.7 MG/DL (ref 8.6–10.5)
CHLORIDE SERPL-SCNC: 106 MMOL/L (ref 98–107)
CO2 SERPL-SCNC: 25.3 MMOL/L (ref 22–29)
CREAT SERPL-MCNC: 1.18 MG/DL (ref 0.57–1)
DEPRECATED RDW RBC AUTO: 42.5 FL (ref 37–54)
EGFRCR SERPLBLD CKD-EPI 2021: 47.4 ML/MIN/1.73
EOSINOPHIL # BLD AUTO: 0.11 10*3/MM3 (ref 0–0.4)
EOSINOPHIL NFR BLD AUTO: 0.9 % (ref 0.3–6.2)
ERYTHROCYTE [DISTWIDTH] IN BLOOD BY AUTOMATED COUNT: 13 % (ref 12.3–15.4)
GLUCOSE SERPL-MCNC: 85 MG/DL (ref 65–99)
HCT VFR BLD AUTO: 35.8 % (ref 34–46.6)
HGB BLD-MCNC: 11.8 G/DL (ref 12–15.9)
IMM GRANULOCYTES # BLD AUTO: 0.4 10*3/MM3 (ref 0–0.05)
IMM GRANULOCYTES NFR BLD AUTO: 3.3 % (ref 0–0.5)
LYMPHOCYTES # BLD AUTO: 1.73 10*3/MM3 (ref 0.7–3.1)
LYMPHOCYTES NFR BLD AUTO: 14.5 % (ref 19.6–45.3)
MCH RBC QN AUTO: 29.8 PG (ref 26.6–33)
MCHC RBC AUTO-ENTMCNC: 33 G/DL (ref 31.5–35.7)
MCV RBC AUTO: 90.4 FL (ref 79–97)
MONOCYTES # BLD AUTO: 0.66 10*3/MM3 (ref 0.1–0.9)
MONOCYTES NFR BLD AUTO: 5.5 % (ref 5–12)
NEUTROPHILS NFR BLD AUTO: 75.3 % (ref 42.7–76)
NEUTROPHILS NFR BLD AUTO: 9 10*3/MM3 (ref 1.7–7)
NRBC BLD AUTO-RTO: 0 /100 WBC (ref 0–0.2)
PHOSPHATE SERPL-MCNC: 3 MG/DL (ref 2.5–4.5)
PLATELET # BLD AUTO: 286 10*3/MM3 (ref 140–450)
PMV BLD AUTO: 9.9 FL (ref 6–12)
POTASSIUM SERPL-SCNC: 3.5 MMOL/L (ref 3.5–5.2)
RBC # BLD AUTO: 3.96 10*6/MM3 (ref 3.77–5.28)
SODIUM SERPL-SCNC: 140 MMOL/L (ref 136–145)
WBC NRBC COR # BLD: 11.96 10*3/MM3 (ref 3.4–10.8)

## 2022-06-21 PROCEDURE — 63710000001 PREDNISONE PER 1 MG: Performed by: THORACIC SURGERY (CARDIOTHORACIC VASCULAR SURGERY)

## 2022-06-21 PROCEDURE — 94761 N-INVAS EAR/PLS OXIMETRY MLT: CPT

## 2022-06-21 PROCEDURE — 94799 UNLISTED PULMONARY SVC/PX: CPT

## 2022-06-21 PROCEDURE — 80069 RENAL FUNCTION PANEL: CPT | Performed by: THORACIC SURGERY (CARDIOTHORACIC VASCULAR SURGERY)

## 2022-06-21 PROCEDURE — 94664 DEMO&/EVAL PT USE INHALER: CPT

## 2022-06-21 PROCEDURE — 99232 SBSQ HOSP IP/OBS MODERATE 35: CPT | Performed by: INTERNAL MEDICINE

## 2022-06-21 PROCEDURE — 99231 SBSQ HOSP IP/OBS SF/LOW 25: CPT | Performed by: NURSE PRACTITIONER

## 2022-06-21 PROCEDURE — 85025 COMPLETE CBC W/AUTO DIFF WBC: CPT | Performed by: THORACIC SURGERY (CARDIOTHORACIC VASCULAR SURGERY)

## 2022-06-21 RX ORDER — WARFARIN SODIUM 2 MG/1
TABLET ORAL
Start: 2022-06-21 | End: 2022-07-18

## 2022-06-21 RX ORDER — TORSEMIDE 20 MG/1
20 TABLET ORAL DAILY
Qty: 30 TABLET | Refills: 0 | Status: SHIPPED | OUTPATIENT
Start: 2022-06-21

## 2022-06-21 RX ORDER — FAMOTIDINE 20 MG/1
20 TABLET, FILM COATED ORAL
Qty: 60 TABLET | Refills: 0 | Status: SHIPPED | OUTPATIENT
Start: 2022-06-21 | End: 2022-07-20 | Stop reason: SDUPTHER

## 2022-06-21 RX ORDER — FLUCONAZOLE 200 MG/1
400 TABLET ORAL ONCE
Status: DISCONTINUED | OUTPATIENT
Start: 2022-06-21 | End: 2022-06-21 | Stop reason: HOSPADM

## 2022-06-21 RX ORDER — FLUCONAZOLE 100 MG/1
200 TABLET ORAL DAILY
Qty: 40 TABLET | Refills: 0 | Status: SHIPPED | OUTPATIENT
Start: 2022-06-22 | End: 2022-07-12

## 2022-06-21 RX ADMIN — LEVOTHYROXINE SODIUM 100 MCG: 0.1 TABLET ORAL at 06:45

## 2022-06-21 RX ADMIN — PREDNISONE 20 MG: 20 TABLET ORAL at 08:56

## 2022-06-21 RX ADMIN — FAMOTIDINE 20 MG: 20 TABLET ORAL at 06:45

## 2022-06-21 RX ADMIN — IPRATROPIUM BROMIDE AND ALBUTEROL SULFATE 3 ML: 2.5; .5 SOLUTION RESPIRATORY (INHALATION) at 11:19

## 2022-06-21 RX ADMIN — DOCUSATE SODIUM 100 MG: 100 CAPSULE, LIQUID FILLED ORAL at 08:56

## 2022-06-21 RX ADMIN — RIOCIGUAT 2.5 MG: 2.5 TABLET, FILM COATED ORAL at 08:57

## 2022-06-21 RX ADMIN — MACITENTAN 10 MG: 10 TABLET, FILM COATED ORAL at 08:56

## 2022-06-21 RX ADMIN — CHOLESTYRAMINE 4 G: 4 POWDER, FOR SUSPENSION ORAL at 08:56

## 2022-06-21 RX ADMIN — BUPROPION HYDROCHLORIDE 300 MG: 300 TABLET, EXTENDED RELEASE ORAL at 08:56

## 2022-06-21 RX ADMIN — TORSEMIDE 20 MG: 20 TABLET ORAL at 08:56

## 2022-06-21 RX ADMIN — MESALAMINE 1000 MG: 250 CAPSULE ORAL at 06:45

## 2022-06-21 RX ADMIN — Medication 10 ML: at 08:57

## 2022-06-21 RX ADMIN — IPRATROPIUM BROMIDE AND ALBUTEROL SULFATE 3 ML: 2.5; .5 SOLUTION RESPIRATORY (INHALATION) at 08:10

## 2022-06-21 NOTE — OUTREACH NOTE
Prep Survey    Flowsheet Row Responses   [facility] patient discharged from? [place]   Is LACE score < 7 ? No   Emergency Room discharge w/ pulse ox? No   Eligibility [facility]   Date of Admission 06/15/22   Date of Discharge 06/21/22   Discharge Disposition Home or Self Care   Discharge diagnosis Hypoxia,  Infection due to human metapneumovirus,  EGD,  Pneumonia   Does the patient have one of the following disease processes/diagnoses(primary or secondary)? Other   Does the patient have Home health ordered? No   Is there a DME ordered? No   Prep survey completed? Yes          MARCIN MARTÍNEZ - Registered Nurse

## 2022-06-22 ENCOUNTER — TRANSITIONAL CARE MANAGEMENT TELEPHONE ENCOUNTER (OUTPATIENT)
Dept: CALL CENTER | Facility: HOSPITAL | Age: 77
End: 2022-06-22

## 2022-06-22 NOTE — ANESTHESIA POSTPROCEDURE EVALUATION
"Patient: Joellen Dominguez    Procedure Summary     Date: 06/20/22 Room / Location:  [facility] /  [facility]    Anesthesia Start: 1701 Anesthesia Stop: 1730    Procedure: ESOPHAGOGASTRODUODENOSCOPY WITH  SAVORY DILATATION WITH FLUOROSCOPY AND COLD BIOPSIES (N/A Esophagus) Diagnosis:       Esophageal dysphagia      (Esophageal dysphagia [R13.19])    Surgeons: Bry Ca III, MD Provider: Jose C Renee MD    Anesthesia Type: MAC ASA Status: 3          Anesthesia Type: MAC    Vitals  Vitals Value Taken Time   /70 06/20/22 1743   Temp     Pulse 72 06/20/22 1743   Resp 16 06/20/22 1743   SpO2 93 % 06/20/22 1743           Post Anesthesia Care and Evaluation    Patient location during evaluation: bedside  Patient participation: complete - patient participated  Level of consciousness: awake and alert  Pain management: adequate    Airway patency: patent  Anesthetic complications: No anesthetic complications  PONV Status: none  Cardiovascular status: acceptable  Respiratory status: acceptable  Hydration status: acceptable    Comments: /57 (BP Location: Right arm, Patient Position: Lying)   Pulse 72   Temp 36.9 °C (98.4 °F) (Oral)   Resp 20   Ht 170.2 cm (67\")   Wt 65 kg (143 lb 3.2 oz)   SpO2 94%   BMI 22.43 kg/m²         "

## 2022-06-22 NOTE — OUTREACH NOTE
Call Center TCM Note    Flowsheet Row Responses   [facility] patient discharged from? [place]   Does the patient have one of the following disease processes/diagnoses(primary or secondary)? Other   TCM attempt successful? Yes   Call start time 1142   Call end time 1145   Discharge diagnosis Hypoxia,  Infection due to human metapneumovirus,  EGD,  Pneumonia   Person spoke with today (if not patient) and relationship Patient   Meds reviewed with patient/caregiver? Yes   Is the patient having any side effects they believe may be caused by any medication additions or changes? No   Does the patient have all medications ordered at discharge? Yes   Is the patient taking all medications as directed (includes completed medication regime)? Yes   Does the patient have a primary care provider?  Yes   Does the patient have an appointment with their PCP within 7 days of discharge? Greater than 7 days   Comments regarding PCP hospital fu appt on 6/30/22 at 10:30 AM   Nursing Interventions Verified appointment date/time/provider   Has the patient kept scheduled appointments due by today? N/A   Psychosocial issues? No   Did the patient receive a copy of their discharge instructions? Yes   Nursing interventions Reviewed instructions with patient   What is the patient's perception of their health status since discharge? Improving   Is the patient/caregiver able to teach back signs and symptoms related to disease process for when to call PCP? Yes   Is the patient/caregiver able to teach back signs and symptoms related to disease process for when to call 911? Yes   Is the patient/caregiver able to teach back the hierarchy of who to call/visit for symptoms/problems? PCP, Specialist, Home health nurse, Urgent Care, ED, 911 Yes   If the patient is a current smoker, are they able to teach back resources for cessation? Not a smoker   TCM call completed? Yes   Wrap up additional comments Pt states she is doing better. Pt verified  [facility]          Mitzi Lozano RN    6/22/2022, 11:47 EDT

## 2022-06-23 LAB
LAB AP CASE REPORT: NORMAL
LAB AP DIAGNOSIS COMMENT: NORMAL
LAB AP SPECIAL STAINS: NORMAL
PATH REPORT.FINAL DX SPEC: NORMAL
PATH REPORT.GROSS SPEC: NORMAL

## 2022-06-27 ENCOUNTER — TELEPHONE (OUTPATIENT)
Dept: INTERNAL MEDICINE | Facility: CLINIC | Age: 77
End: 2022-06-27

## 2022-06-27 NOTE — TELEPHONE ENCOUNTER
Caller: hossein     Relationship: Other    Best call back number:   [phone]    What is the best time to reach you: ANYTIME     Who are you requesting to speak with (clinical staff, provider,  specific staff member): CLINICAL STAFF     Do you know the name of the person who called: [facility]     What was the call regarding: HOSSEIN IS WANTING TO LET DR DOTY KNOW THE PATIENTS INR IS 4.9 TAKEN 6/29/22    Do you require a callback: YES

## 2022-06-28 NOTE — TELEPHONE ENCOUNTER
PATIENT IS CALLING IN TO RETURN KORINA YUSUF WOULD LIKE CALLBACK.      CALLBACK NUMBER IS  [phone]

## 2022-06-29 ENCOUNTER — TELEPHONE (OUTPATIENT)
Dept: INTERNAL MEDICINE | Facility: CLINIC | Age: 77
End: 2022-06-29

## 2022-06-29 NOTE — TELEPHONE ENCOUNTER
Caller: [facility]     Relationship to patient:     Best call back number: [phone]    Patient is needing: OUT OF RANGE    5.6 INR READING TAKEN 6-29-22

## 2022-06-30 ENCOUNTER — OFFICE VISIT (OUTPATIENT)
Dept: GASTROENTEROLOGY | Facility: CLINIC | Age: 77
End: 2022-06-30

## 2022-06-30 ENCOUNTER — OFFICE VISIT (OUTPATIENT)
Dept: INTERNAL MEDICINE | Facility: CLINIC | Age: 77
End: 2022-06-30

## 2022-06-30 VITALS
DIASTOLIC BLOOD PRESSURE: 46 MMHG | BODY MASS INDEX: 21.97 KG/M2 | SYSTOLIC BLOOD PRESSURE: 84 MMHG | HEART RATE: 88 BPM | TEMPERATURE: 97.3 F | HEIGHT: 67 IN | WEIGHT: 140 LBS

## 2022-06-30 VITALS
HEART RATE: 74 BPM | WEIGHT: 141.3 LBS | DIASTOLIC BLOOD PRESSURE: 56 MMHG | HEIGHT: 67 IN | TEMPERATURE: 97.2 F | SYSTOLIC BLOOD PRESSURE: 80 MMHG | BODY MASS INDEX: 22.18 KG/M2 | OXYGEN SATURATION: 94 %

## 2022-06-30 DIAGNOSIS — R13.19 ESOPHAGEAL DYSPHAGIA: ICD-10-CM

## 2022-06-30 DIAGNOSIS — K20.80 HERPES SIMPLEX ESOPHAGITIS: ICD-10-CM

## 2022-06-30 DIAGNOSIS — R79.1 SUPRATHERAPEUTIC INR: ICD-10-CM

## 2022-06-30 DIAGNOSIS — Z79.899 HIGH RISK MEDICATION USE: ICD-10-CM

## 2022-06-30 DIAGNOSIS — K50.00 CROHN'S DISEASE OF SMALL INTESTINE WITHOUT COMPLICATION: ICD-10-CM

## 2022-06-30 DIAGNOSIS — D68.61 ANTIPHOSPHOLIPID ANTIBODY SYNDROME: Primary | ICD-10-CM

## 2022-06-30 DIAGNOSIS — B37.81 CANDIDAL ESOPHAGITIS: ICD-10-CM

## 2022-06-30 DIAGNOSIS — N18.32 STAGE 3B CHRONIC KIDNEY DISEASE: ICD-10-CM

## 2022-06-30 DIAGNOSIS — J12.3 PNEUMONIA DUE TO HUMAN METAPNEUMOVIRUS: ICD-10-CM

## 2022-06-30 DIAGNOSIS — B00.89 HERPES SIMPLEX ESOPHAGITIS: ICD-10-CM

## 2022-06-30 DIAGNOSIS — K22.5 DIVERTICULUM OF ESOPHAGUS, ACQUIRED: Primary | ICD-10-CM

## 2022-06-30 PROBLEM — R06.03 ACUTE RESPIRATORY DISTRESS: Status: RESOLVED | Noted: 2022-06-15 | Resolved: 2022-06-30

## 2022-06-30 PROBLEM — R09.02 HYPOXIA: Status: RESOLVED | Noted: 2022-06-15 | Resolved: 2022-06-30

## 2022-06-30 PROBLEM — J20.9 ACUTE BRONCHITIS: Status: RESOLVED | Noted: 2022-06-15 | Resolved: 2022-06-30

## 2022-06-30 LAB — INR PPP: 4 (ref 0.9–1.1)

## 2022-06-30 PROCEDURE — 85610 PROTHROMBIN TIME: CPT | Performed by: INTERNAL MEDICINE

## 2022-06-30 PROCEDURE — 36416 COLLJ CAPILLARY BLOOD SPEC: CPT | Performed by: INTERNAL MEDICINE

## 2022-06-30 PROCEDURE — 99214 OFFICE O/P EST MOD 30 MIN: CPT | Performed by: INTERNAL MEDICINE

## 2022-06-30 PROCEDURE — 99495 TRANSJ CARE MGMT MOD F2F 14D: CPT | Performed by: INTERNAL MEDICINE

## 2022-06-30 PROCEDURE — 1111F DSCHRG MED/CURRENT MED MERGE: CPT | Performed by: INTERNAL MEDICINE

## 2022-06-30 RX ORDER — FLUCONAZOLE 100 MG/1
1 TABLET ORAL
COMMUNITY
Start: 2022-06-21 | End: 2022-08-10

## 2022-06-30 RX ORDER — FAMOTIDINE 20 MG/1
1 TABLET, FILM COATED ORAL
COMMUNITY
Start: 2022-06-21 | End: 2022-06-30

## 2022-06-30 NOTE — PROGRESS NOTES
No chief complaint on file.  crohn's and dysphagia        History of Present Illness  77-year old female presents today for evaluation. She was noted to have Candida on esophageal biopsy as well as herpetic esophagitis candida. She reports that her swallowing is much improved following her EGD. She is taking Diflucan. She continues famotidine 20 mg twice daily.     Crohn's is well maneged at this time with Stelara every 7 weeks and Pentasa 4 tabs twice daily. At times her bowel habits will vary between diarrhea and constipation, but at the moment her symptoms are well managed.     U[facility]ER GI ENDOSCOPY (06/20/2022 13:42) dr kelsey   The flexible scope was passed through the mouth and into the esophagus without difficulty.  Immediately noticed that there was some bilious fluid up in the upper esophagus.  The esophagus says showed signs of Candida esophagitis.  There was dysmotility with tertiary contractions throughout the esophagus.  GE junction was identified at 40 cm from the incisors.  Mild esophagitis.  No ulcer or tumor identified.  Scope passed easily into the stomach.  Diffuse mild gastritis throughout the entire stomach.  No ulcers and no tumor.  The scope passed easily through the pylorus and into the duodenum.  Scope was passed out to the fourth portion of the duodenum.  Entire duodenum appeared normal.  The scope was pulled back to the stomach.  Guidewire was now threaded through the scope and positioned in the stomach.  Using fluoroscopic control savory dilators were passed over the guidewire beginning with a 12.8 mm dilator and successively increasing to 14 mm, 15 mm, 16 mm, and 17 mm.  The scope was now reintroduced.  No obvious injury to the esophagus or the stomach.  Multiple biopsies were taken at the lower esophagus at the GE junction.  Tissue Pathology Exam (06/20/2022 17:25)  HERPETIC ESOPHAGITIS JAROCHO  SCANNED - IMAGING (03/22/2022)  IR Esophageal Dilatation (06/20/2022 17:28)      Review of  "Systems   HENT: Negative for trouble swallowing.    Gastrointestinal: Positive for constipation and diarrhea. Negative for abdominal distention, abdominal pain, anal bleeding, blood in stool, nausea, rectal pain and vomiting.         Result Review :       FL Esophagram Complete Double-Contrast (05/20/2022 11:06)      Vital Signs:   BP (!) 80/56   Pulse 74   Temp 97.2 °F (36.2 °C)   Ht 170.2 cm (67\")   Wt 64.1 kg (141 lb 4.8 oz)   SpO2 94%   BMI 22.13 kg/m²     Body mass index is 22.13 kg/m².     Physical Exam  Vitals reviewed.   Constitutional:       Appearance: Normal appearance.   Abdominal:      General: Bowel sounds are normal. There is no distension.      Palpations: Abdomen is soft. Abdomen is not rigid. There is no mass or pulsatile mass.      Tenderness: There is no abdominal tenderness. There is no guarding or rebound.           Assessment and Plan    Diagnoses and all orders for this visit:    1. Diverticulum of esophagus, acquired (Primary)    2. Esophageal dysphagia    3. Crohn's disease of small intestine without complication (HCC)    4. High risk medication use    5. Candidal esophagitis (HCC)    6. Herpes simplex esophagitis         I have reviewed and confirmed the accuracy of the HPI and Assessment and Plan as documented by the APRN MARIAM Lynn        Follow Up   No follow-ups on file.    Patient Instructions   1. Complete diflucan for treatment of candida esophagitis.     2.  Continue famotidine 20 mg twice daily.    3. Continue Pentasa 4 tabs twice daily and Stelara every 7 weeks for management of Crohn's disease.     4. We will discuss treatment options for herpetic esophagitis with your PCP, Dr. Leyva, and will contact you with recommendations.         "

## 2022-06-30 NOTE — PATIENT INSTRUCTIONS
Complete diflucan for treatment of candida esophagitis.     2.  Continue famotidine 20 mg twice daily.    3. Continue Pentasa 4 tabs twice daily and Stelara every 7 weeks for management of Crohn's disease.     4. We will discuss treatment options for herpetic esophagitis with your PCP, Dr. Leyva, and will contact you with recommendations.

## 2022-07-07 ENCOUNTER — TELEPHONE (OUTPATIENT)
Dept: INTERNAL MEDICINE | Facility: CLINIC | Age: 77
End: 2022-07-07

## 2022-07-07 NOTE — TELEPHONE ENCOUNTER
Hub staff attempted to follow warm transfer process and was unsuccessful     Caller: landy     Relationship to patient: [facility]     Best call back number:  [phone]      Patient is needing: OUT OF RANGE INR OF  3.8 TAKEN YESTERDAY

## 2022-07-08 RX ORDER — COLESEVELAM 180 1/1
TABLET ORAL
Qty: 180 TABLET | Refills: 3 | Status: SHIPPED | OUTPATIENT
Start: 2022-07-08 | End: 2023-01-23 | Stop reason: SDUPTHER

## 2022-07-13 ENCOUNTER — TELEPHONE (OUTPATIENT)
Dept: INTERNAL MEDICINE | Facility: CLINIC | Age: 77
End: 2022-07-13

## 2022-07-13 NOTE — TELEPHONE ENCOUNTER
Caller: LEONOR    Relationship to patient: [facility]    Best call back number:[phone]    Patient is needing: INR 8:15AM -  1.2

## 2022-07-16 DIAGNOSIS — D68.61 ANTIPHOSPHOLIPID ANTIBODY SYNDROME: ICD-10-CM

## 2022-07-18 RX ORDER — WARFARIN SODIUM 2 MG/1
TABLET ORAL
Qty: 180 TABLET | Refills: 3 | Status: SHIPPED | OUTPATIENT
Start: 2022-07-18 | End: 2023-01-23 | Stop reason: SDUPTHER

## 2022-07-18 RX ORDER — LEVOTHYROXINE SODIUM 0.1 MG/1
TABLET ORAL
Qty: 90 TABLET | Refills: 3 | Status: SHIPPED | OUTPATIENT
Start: 2022-07-18 | End: 2023-01-23 | Stop reason: SDUPTHER

## 2022-07-20 ENCOUNTER — DOCUMENTATION (OUTPATIENT)
Dept: INTERNAL MEDICINE | Facility: CLINIC | Age: 77
End: 2022-07-20

## 2022-07-20 ENCOUNTER — PATIENT MESSAGE (OUTPATIENT)
Dept: GASTROENTEROLOGY | Facility: CLINIC | Age: 77
End: 2022-07-20

## 2022-07-20 RX ORDER — VALACYCLOVIR HYDROCHLORIDE 1 G/1
1000 TABLET, FILM COATED ORAL 2 TIMES DAILY
Qty: 14 TABLET | Refills: 0 | Status: CANCELLED | OUTPATIENT
Start: 2022-07-20 | End: 2022-07-27

## 2022-07-20 RX ORDER — FAMOTIDINE 20 MG/1
20 TABLET, FILM COATED ORAL
Qty: 180 TABLET | Refills: 1 | Status: SHIPPED | OUTPATIENT
Start: 2022-07-20 | End: 2023-01-23 | Stop reason: SDUPTHER

## 2022-07-27 ENCOUNTER — APPOINTMENT (OUTPATIENT)
Dept: GENERAL RADIOLOGY | Facility: HOSPITAL | Age: 77
End: 2022-07-27

## 2022-07-27 ENCOUNTER — HOSPITAL ENCOUNTER (EMERGENCY)
Facility: HOSPITAL | Age: 77
Discharge: HOME | End: 2022-07-27
Attending: EMERGENCY MEDICINE | Admitting: EMERGENCY MEDICINE

## 2022-07-27 ENCOUNTER — APPOINTMENT (OUTPATIENT)
Dept: CT IMAGING | Facility: HOSPITAL | Age: 77
End: 2022-07-27

## 2022-07-27 VITALS
TEMPERATURE: 98.2 F | BODY MASS INDEX: 22.18 KG/M2 | HEIGHT: 67 IN | OXYGEN SATURATION: 93 % | RESPIRATION RATE: 16 BRPM | SYSTOLIC BLOOD PRESSURE: 121 MMHG | DIASTOLIC BLOOD PRESSURE: 57 MMHG | WEIGHT: 141.31 LBS | HEART RATE: 77 BPM

## 2022-07-27 DIAGNOSIS — R93.89 ABNORMAL CT SCAN: ICD-10-CM

## 2022-07-27 DIAGNOSIS — S22.41XA CLOSED FRACTURE OF MULTIPLE RIBS OF RIGHT SIDE, INITIAL ENCOUNTER: Primary | ICD-10-CM

## 2022-07-27 PROCEDURE — 99283 EMERGENCY DEPT VISIT LOW MDM: CPT

## 2022-07-27 PROCEDURE — 71250 CT THORAX DX C-: CPT

## 2022-07-27 PROCEDURE — 76377 3D RENDER W/INTRP POSTPROCES: CPT

## 2022-07-27 PROCEDURE — 74176 CT ABD & PELVIS W/O CONTRAST: CPT

## 2022-07-27 PROCEDURE — 71101 X-RAY EXAM UNILAT RIBS/CHEST: CPT

## 2022-07-27 RX ORDER — LIDOCAINE 50 MG/G
1 PATCH TOPICAL EVERY 24 HOURS
Qty: 6 EACH | Refills: 0 | Status: SHIPPED | OUTPATIENT
Start: 2022-07-27 | End: 2022-08-09

## 2022-07-27 RX ORDER — METHOCARBAMOL 500 MG/1
500 TABLET, FILM COATED ORAL ONCE
Status: COMPLETED | OUTPATIENT
Start: 2022-07-27 | End: 2022-07-27

## 2022-07-27 RX ORDER — NAPROXEN 500 MG/1
500 TABLET ORAL ONCE
Status: COMPLETED | OUTPATIENT
Start: 2022-07-27 | End: 2022-07-27

## 2022-07-27 RX ORDER — METHOCARBAMOL 750 MG/1
750 TABLET, FILM COATED ORAL 3 TIMES DAILY PRN
Qty: 15 TABLET | Refills: 0 | Status: SHIPPED | OUTPATIENT
Start: 2022-07-27 | End: 2022-08-09

## 2022-07-27 RX ORDER — LIDOCAINE 50 MG/G
1 PATCH TOPICAL
Status: DISCONTINUED | OUTPATIENT
Start: 2022-07-27 | End: 2022-07-27 | Stop reason: HOSPADM

## 2022-07-27 RX ADMIN — LIDOCAINE 1 PATCH: 50 PATCH TOPICAL at 15:58

## 2022-07-27 RX ADMIN — METHOCARBAMOL TABLETS 500 MG: 500 TABLET, COATED ORAL at 16:30

## 2022-07-27 RX ADMIN — NAPROXEN 500 MG: 500 TABLET ORAL at 15:57

## 2022-07-28 ENCOUNTER — TELEPHONE (OUTPATIENT)
Dept: INTERNAL MEDICINE | Facility: CLINIC | Age: 77
End: 2022-07-28

## 2022-07-28 DIAGNOSIS — E87.6 HYPOKALEMIA: ICD-10-CM

## 2022-07-28 DIAGNOSIS — S22.39XD CLOSED FRACTURE OF ONE RIB WITH ROUTINE HEALING, UNSPECIFIED LATERALITY, SUBSEQUENT ENCOUNTER: Primary | ICD-10-CM

## 2022-07-28 RX ORDER — POTASSIUM CHLORIDE 20 MEQ/1
20 TABLET, EXTENDED RELEASE ORAL 3 TIMES DAILY
Qty: 270 TABLET | Refills: 1 | Status: SHIPPED | OUTPATIENT
Start: 2022-07-28

## 2022-07-28 RX ORDER — HYDROCODONE BITARTRATE AND ACETAMINOPHEN 5; 325 MG/1; MG/1
1 TABLET ORAL EVERY 6 HOURS PRN
Qty: 20 TABLET | Refills: 0 | Status: SHIPPED | OUTPATIENT
Start: 2022-07-28 | End: 2022-08-08

## 2022-07-28 NOTE — TELEPHONE ENCOUNTER
Dr. GLYNN pt fell and broke some ribs.  She is in a lot of pain.  The hospital gave her naproxen and muscle relaxer that is not helping at all.  She did have some very old hydrocodone that she took and it sharif to help a little.  Her question is will you call something in stronger than the hospital gave her for pain?        [facility] pharmacy

## 2022-08-04 ENCOUNTER — OFFICE VISIT (OUTPATIENT)
Dept: INTERNAL MEDICINE | Facility: CLINIC | Age: 77
End: 2022-08-04

## 2022-08-04 DIAGNOSIS — H91.92 DECREASED HEARING OF LEFT EAR: ICD-10-CM

## 2022-08-04 DIAGNOSIS — M85.88 OTHER SPECIFIED DISORDERS OF BONE DENSITY AND STRUCTURE, OTHER SITE: ICD-10-CM

## 2022-08-04 DIAGNOSIS — D68.61 ANTIPHOSPHOLIPID ANTIBODY SYNDROME: Primary | ICD-10-CM

## 2022-08-04 LAB — INR PPP: 4.9 (ref 0.9–1.1)

## 2022-08-04 PROCEDURE — 85610 PROTHROMBIN TIME: CPT | Performed by: INTERNAL MEDICINE

## 2022-08-04 PROCEDURE — 36416 COLLJ CAPILLARY BLOOD SPEC: CPT | Performed by: INTERNAL MEDICINE

## 2022-08-04 PROCEDURE — 99214 OFFICE O/P EST MOD 30 MIN: CPT | Performed by: INTERNAL MEDICINE

## 2022-08-04 NOTE — PROGRESS NOTES
"Chief Complaint  ER follow up  (Broken ribs/)    Subjective        Joellen Dominguez presents to [facility] PRIMARY CARE  History of Present Illness  Tripped on something in her bedroom- thrown onto the bed, broke 2 ribs. Pain is improving but she is still taking hydrocodne every 6 hours.  Hasn't attempted wean.  She has no cough, is using IS.  Hasn't had a dexa in a while.   Thinks her ears feel full and she can't hear as well- thinks it's fairly quick change of the past weeks.   Breathing about the same.   Now on acyclovir for HSV esophagitis.   Crohns under control.       Objective   Vital Signs:  /58   Pulse 78   Temp 97.3 °F (36.3 °C)   Ht 170.2 cm (67\")   Wt 64.9 kg (143 lb)   BMI 22.40 kg/m²   Estimated body mass index is 22.4 kg/m² as calculated from the following:    Height as of this encounter: 170.2 cm (67\").    Weight as of this encounter: 64.9 kg (143 lb).    BMI is within normal parameters. No other follow-up for BMI required.      Physical Exam  Constitutional:       Appearance: Normal appearance.   HENT:      Right Ear: No decreased hearing noted. No tenderness. No middle ear effusion. There is no impacted cerumen.      Left Ear: Decreased hearing noted. No tenderness.  No middle ear effusion. There is no impacted cerumen.   Cardiovascular:      Rate and Rhythm: Normal rate and regular rhythm.   Pulmonary:      Effort: Pulmonary effort is normal.      Breath sounds: Normal breath sounds.   Chest:      Chest wall: Tenderness present.   Musculoskeletal:      Right lower leg: No edema.      Left lower leg: No edema.        Result Review :  The following data was reviewed by: Chitra Leyva MD on 08/04/2022:    Data reviewed: Recent hospitalization notes E          Assessment and Plan   Diagnoses and all orders for this visit:    1. Antiphospholipid antibody syndrome (HCC) (Primary)  Comments:  INR high today- like rel to acyclovir- will hold warfarin today and tomorrow, last " acyclovir tonight.  Recheck next week. sooner if bleeding.   Orders:  -     POCT INR    2. Other specified disorders of bone density and structure, other site  Comments:  check dexa with recent fx.   Orders:  -     DEXA Bone Density Axial    3. Decreased hearing of left ear  Comments:  to ENT for eval since this is a new finding.              Follow Up   Return for Keep previously scheduled appointment.  Patient was given instructions and counseling regarding her condition or for health maintenance advice. Please see specific information pulled into the AVS if appropriate.

## 2022-08-08 ENCOUNTER — HOSPITAL ENCOUNTER (OUTPATIENT)
Dept: BONE DENSITY | Facility: HOSPITAL | Age: 77
Discharge: HOME | End: 2022-08-08
Admitting: INTERNAL MEDICINE

## 2022-08-08 ENCOUNTER — TELEPHONE (OUTPATIENT)
Dept: INTERNAL MEDICINE | Facility: CLINIC | Age: 77
End: 2022-08-08

## 2022-08-08 VITALS
BODY MASS INDEX: 22.44 KG/M2 | HEART RATE: 78 BPM | SYSTOLIC BLOOD PRESSURE: 112 MMHG | WEIGHT: 143 LBS | TEMPERATURE: 97.3 F | HEIGHT: 67 IN | DIASTOLIC BLOOD PRESSURE: 58 MMHG

## 2022-08-08 DIAGNOSIS — S22.39XD CLOSED FRACTURE OF ONE RIB WITH ROUTINE HEALING, UNSPECIFIED LATERALITY, SUBSEQUENT ENCOUNTER: Primary | ICD-10-CM

## 2022-08-08 PROBLEM — B37.81 CANDIDAL ESOPHAGITIS: Status: RESOLVED | Noted: 2022-06-30 | Resolved: 2022-08-08

## 2022-08-08 PROCEDURE — 77080 DXA BONE DENSITY AXIAL: CPT

## 2022-08-08 RX ORDER — TRAMADOL HYDROCHLORIDE 50 MG/1
50 TABLET ORAL EVERY 6 HOURS PRN
Qty: 90 TABLET | Refills: 0 | Status: SHIPPED | OUTPATIENT
Start: 2022-08-08 | End: 2022-08-15

## 2022-08-08 NOTE — TELEPHONE ENCOUNTER
Renetta with [employer] called regarding patient's INR 1.7 today 8/8/22, if you have any questions please call Renetta at [phone].

## 2022-08-09 ENCOUNTER — OFFICE VISIT (OUTPATIENT)
Dept: SURGERY | Facility: CLINIC | Age: 77
End: 2022-08-09

## 2022-08-09 VITALS
WEIGHT: 143 LBS | HEART RATE: 71 BPM | OXYGEN SATURATION: 93 % | DIASTOLIC BLOOD PRESSURE: 62 MMHG | HEIGHT: 67 IN | BODY MASS INDEX: 22.44 KG/M2 | SYSTOLIC BLOOD PRESSURE: 94 MMHG

## 2022-08-09 DIAGNOSIS — R13.19 ESOPHAGEAL DYSPHAGIA: Primary | ICD-10-CM

## 2022-08-09 DIAGNOSIS — K22.5 DIVERTICULUM OF ESOPHAGUS, ACQUIRED: ICD-10-CM

## 2022-08-09 PROCEDURE — 99213 OFFICE O/P EST LOW 20 MIN: CPT | Performed by: THORACIC SURGERY (CARDIOTHORACIC VASCULAR SURGERY)

## 2022-08-09 NOTE — PROGRESS NOTES
"Chief Complaint  Dysphagia    Subjective        Joellen Dominguez presents to [facility] THORACIC SURGERY  History of Present Illness     Ms. Pardo was seen in our office today for further follow-up of her dysphagia.  She has a longstanding history of gastroesophageal reflux disease.  This is resulted and dysmotility with tertiary contractions of the esophagus and a diverticulum.  She also has some narrowing of the GE junction.  She underwent a esophagoscopy with dilation over guidewire without any problems.  This is improved her swallowing quite a bit.  She still has occasional difficulty with medications but otherwise is eating and swallowing well.  She has maintained her weight.  She recently had a fall at home and broke 2 ribs.  She seems to be recovering quite nicely from this.    Objective   Vital Signs:  BP 94/62 (BP Location: Left arm, Patient Position: Sitting, Cuff Size: Adult)   Pulse 71   Ht 170.2 cm (67\")   Wt 64.9 kg (143 lb)   SpO2 93%   BMI 22.40 kg/m²   Estimated body mass index is 22.4 kg/m² as calculated from the following:    Height as of this encounter: 170.2 cm (67\").    Weight as of this encounter: 64.9 kg (143 lb).    BMI is within normal parameters. No other follow-up for BMI required.      Physical Exam     Neck: No masses and no adenopathy    Chest: Tender along the right ninth and 10th ribs.  No chest wall deformity.  No masses.  Chest wall is stable.    Pulmonary: Lungs are clear to auscultation with equal breath sounds bilaterally      Result Review :  The following data was reviewed by: Bry Ca III, MD on 08/09/2022:    CT scan of the chest and abdomen performed July 27 was independently reviewed.  No pneumothorax.  No aortic dissection.  No pleural effusion.  Upper abdomen unremarkable.  Nondisplaced fractures of the right anterior ninth and 10th ribs.         Assessment and Plan   Diagnoses and all orders for this visit:    1. Esophageal dysphagia " (Primary)    2. Diverticulum of esophagus, acquired      Patient's dysphagia is stable.  We will hold off on further dilation for now.  I have recommended she return to our office in 6 months for further follow-up.  She knows that if problems arise before the 6 months is up to call us as soon as possible.  Thank you for allowing us to participate in the care of Ms. Mensah.       I spent 20 minutes caring for Joellen on this date of service. This time includes time spent by me in the following activities:preparing for the visit, reviewing tests, performing a medically appropriate examination and/or evaluation , counseling and educating the patient/family/caregiver, ordering medications, tests, or procedures, referring and communicating with other health care professionals , documenting information in the medical record, independently interpreting results and communicating that information with the patient/family/caregiver and care coordination  Follow Up   Return in about 6 months (around 2/9/2023) for Recheck.  Patient was given instructions and counseling regarding her condition or for health maintenance advice. Please see specific information pulled into the AVS if appropriate.

## 2022-08-10 ENCOUNTER — OFFICE VISIT (OUTPATIENT)
Dept: GASTROENTEROLOGY | Facility: CLINIC | Age: 77
End: 2022-08-10

## 2022-08-10 VITALS
DIASTOLIC BLOOD PRESSURE: 60 MMHG | WEIGHT: 145.9 LBS | OXYGEN SATURATION: 100 % | BODY MASS INDEX: 22.9 KG/M2 | SYSTOLIC BLOOD PRESSURE: 104 MMHG | HEIGHT: 67 IN | HEART RATE: 74 BPM | TEMPERATURE: 96.9 F

## 2022-08-10 DIAGNOSIS — R13.19 ESOPHAGEAL DYSPHAGIA: Primary | ICD-10-CM

## 2022-08-10 DIAGNOSIS — B00.89 HERPES SIMPLEX ESOPHAGITIS: ICD-10-CM

## 2022-08-10 DIAGNOSIS — B37.81 CANDIDAL ESOPHAGITIS: ICD-10-CM

## 2022-08-10 DIAGNOSIS — K50.80 CROHN'S DISEASE OF BOTH SMALL AND LARGE INTESTINE WITHOUT COMPLICATION: ICD-10-CM

## 2022-08-10 DIAGNOSIS — Z79.899 HIGH RISK MEDICATION USE: ICD-10-CM

## 2022-08-10 DIAGNOSIS — K20.80 HERPES SIMPLEX ESOPHAGITIS: ICD-10-CM

## 2022-08-10 PROCEDURE — 99214 OFFICE O/P EST MOD 30 MIN: CPT | Performed by: NURSE PRACTITIONER

## 2022-08-10 NOTE — PATIENT INSTRUCTIONS
For dysphagia, improved after treatment, continue to monitor.  Please notify the office if you have any recurrent symptoms.    For Crohn's disease, continue current treatment with Stelara and Pentasa.    Due to high risk medication use, it is recommended to stay up to date on vaccinations including a yearly flu vaccine. Do not receive any live virus vaccines. If you are ill, have a fever, or require an antibiotic please contact the office as we may consider holding dose of medication. Recommend yearly skin checks with dermatology and staying up to date on gynecologic exams.

## 2022-08-10 NOTE — PROGRESS NOTES
"Chief Complaint   Patient presents with   • GI Problem         History of Present Illness  Patient is a 77-year-old female who presents today for follow-up.  She had a recent EGD that showed esophageal Candida and all biopsies showed HSV esophagitis.  Patient also with history of Crohn's disease, current treatment with Stelara and Pentasa.  She was treated with fluconazole for esophageal Candida and completed a course of acyclovir for HSV esophagitis.    Patient presents today for follow-up.  She completed treatment with fluconazole and acyclovir and reports overall she is feeling better.  Nausea has resolved and dysphagia is much improved.  She has still had occasional dysphagia to pills but otherwise reports this has significantly improved.  She reports occasional heartburn but no persistent heartburn or reflux symptoms.    She continues on Stelara and Pentasa for Crohn's disease.  Denies any abdominal pain or diarrhea.  Reports she has been more constipation predominant lately and has been taking MiraLAX and Colace as needed for this which does work well.  She denies any blood in the stool.     Result Review :       Surgery with Bry Ca III, MD (06/20/2022)   Tissue Pathology Exam (06/20/2022 17:25)   UPPER GI ENDOSCOPY (06/20/2022 13:42)   Office Visit with Francisco Salomon MD (06/30/2022)       Vital Signs:   /60   Pulse 74   Temp 96.9 °F (36.1 °C)   Ht 170.2 cm (67\")   Wt 66.2 kg (145 lb 14.4 oz)   SpO2 100%   BMI 22.85 kg/m²     Body mass index is 22.85 kg/m².     Physical Exam  Vitals reviewed.   Constitutional:       General: She is not in acute distress.     Appearance: She is well-developed.   HENT:      Head: Normocephalic and atraumatic.   Pulmonary:      Effort: Pulmonary effort is normal. No respiratory distress.   Skin:     General: Skin is dry.      Coloration: Skin is not pale.   Neurological:      Mental Status: She is alert and oriented to person, place, and time. "   Psychiatric:         Thought Content: Thought content normal.           Assessment and Plan    Diagnoses and all orders for this visit:    1. Esophageal dysphagia (Primary)    2. Candidal esophagitis (HCC)    3. Herpes simplex esophagitis    4. Crohn's disease of both small and large intestine without complication (HCC)    5. High risk medication use         Discussion  Patient presents today for follow-up.  She has completed treatment for HSV and Candida esophagitis and symptoms have improved.  Suspect residual dysphagia is likely secondary to dysmotility seen at time of her EGD.  As symptoms have improved, no further treatment needed at this time and patient instructed to monitor for the return of any symptoms.    Crohn's symptoms are currently well controlled with current regimen and will continue current treatment.          Follow Up   Return in about 6 months (around 2/10/2023), or if symptoms worsen or fail to improve.    Patient Instructions   For dysphagia, improved after treatment, continue to monitor.  Please notify the office if you have any recurrent symptoms.    For Crohn's disease, continue current treatment with Stelara and Pentasa.    Due to high risk medication use, it is recommended to stay up to date on vaccinations including a yearly flu vaccine. Do not receive any live virus vaccines. If you are ill, have a fever, or require an antibiotic please contact the office as we may consider holding dose of medication. Recommend yearly skin checks with dermatology and staying up to date on gynecologic exams.

## 2022-08-11 ENCOUNTER — OFFICE VISIT (OUTPATIENT)
Dept: CARDIOLOGY | Facility: CLINIC | Age: 77
End: 2022-08-11

## 2022-08-11 VITALS
HEART RATE: 72 BPM | WEIGHT: 145 LBS | HEIGHT: 67 IN | RESPIRATION RATE: 18 BRPM | SYSTOLIC BLOOD PRESSURE: 100 MMHG | BODY MASS INDEX: 22.76 KG/M2 | DIASTOLIC BLOOD PRESSURE: 58 MMHG

## 2022-08-11 DIAGNOSIS — Z95.0 PRESENCE OF CARDIAC PACEMAKER: Chronic | ICD-10-CM

## 2022-08-11 DIAGNOSIS — Z98.890 S/P ATRIOVENTRICULAR NODAL ABLATION: Chronic | ICD-10-CM

## 2022-08-11 DIAGNOSIS — I48.21 ATRIAL FIBRILLATION, PERMANENT: Primary | ICD-10-CM

## 2022-08-11 DIAGNOSIS — I10 ESSENTIAL HYPERTENSION: Chronic | ICD-10-CM

## 2022-08-11 DIAGNOSIS — I34.0 MODERATE MITRAL INSUFFICIENCY: Chronic | ICD-10-CM

## 2022-08-11 PROCEDURE — 99212 OFFICE O/P EST SF 10 MIN: CPT | Performed by: INTERNAL MEDICINE

## 2022-08-11 PROCEDURE — 93288 INTERROG EVL PM/LDLS PM IP: CPT | Performed by: INTERNAL MEDICINE

## 2022-08-11 NOTE — PROGRESS NOTES
Subjective:     Encounter Date:08/11/2022      Patient ID: Joellen Dominguez is a 77 y.o. female.    Chief Complaint:  Chief Complaint   Patient presents with   • Atrial Fibrillation       HPI:  Ms Dominguez is a 78 yo patient of Dr. Karthik Reyez who has a  past medial history is significant for HTN , HLD, COPD, antiphospholipid antibody syndrome on coumadin.  She began having trouble with atrial fibrillation earlier this year.   She was admitted to [facility] 4/2020 with an episode of AF with rates in the 160's.  She was rate controlled and placed on amiodarone.  She had a JUAN C cardioversion and the JUAN C showed an EF of 55% with moderate concentric LVH, mild MR and severe LAE.  She remained in sinus rhythm only  A short time according to her .  She was admitted again 5/2020 with AF with RVR and her meds were adjusted and discharged.  Her  states that her pulse rate is usually in the 120 range at home.  Her symptoms include fatigue, dyspnea and some ankle edema.  She also has palpitations on a daily basis.     Her cardiac evaluation has included a cath in 4/2020 which showed nonobstructive disease, EF 50% and PA pressure of 46/20mmHg.     She underwent AV node ablation and implantation of a pacemaker in 2020.  Since she was last seen she as been doing well without palpitations or dyspnea.         The following portions of the patient's history were reviewed and updated as appropriate: current medications, past family history, past medical history, past social history, past surgical history and problem list.    Problem List:  Patient Active Problem List   Diagnosis   • Incontinence   • Degeneration of lumbar intervertebral disc   • Essential hypertension   • Depression   • Antiphospholipid antibody syndrome (HCC)   • Lichen sclerosus et atrophicus   • Myocardial infarction type 2 (HCC)   • Leukocytosis   • Pulmonary HTN (HCC)   • Cold agglutinin disease (HCC)   • Chronic diastolic CHF  "(congestive heart failure) (HCC)   • COPD with emphysema (HCC)   • Inflammatory bowel disease (Crohn's disease) (HCC)   • Long term current use of anticoagulant therapy   • Atrial fibrillation, permanent (HCC)   • S/P atrioventricular hyacinth ablation   • Presence of cardiac pacemaker   • Moderate mitral insufficiency   • Obstructive sleep apnea syndrome   • Diverticulum of esophagus, acquired   • High risk medication use   • Herpes simplex esophagitis   • Peripheral vascular disease (HCC)       Active Med List:    Current Outpatient Medications:   •  atorvastatin (LIPITOR) 10 MG tablet, Take 1 tablet by mouth Daily., Disp: 90 tablet, Rfl: 3  •  B-D 3CC LUER-CELESTE SYR 25GX5/8\" 25G X 5/8\" 3 ML misc, For use with monthly B-12 injections., Disp: 12 each, Rfl: 1  •  buPROPion XL (WELLBUTRIN XL) 300 MG 24 hr tablet, Take 1 tablet by mouth Daily., Disp: 90 tablet, Rfl: 2  •  Calcium Carbonate-Vit D-Min (CALCIUM 1200 PO), Take  by mouth Daily., Disp: , Rfl:   •  CBD oil (cannabidiol) capsule, Take  by mouth Daily., Disp: , Rfl:   •  clobetasol (TEMOVATE) 0.05 % cream, Apply 1 application topically to the appropriate area as directed 2 (Two) Times a Day As Needed., Disp: , Rfl:   •  colesevelam (WELCHOL) 625 MG tablet, TAKE 1 TABLET BY MOUTH  TWICE DAILY, Disp: 180 tablet, Rfl: 3  •  erythromycin (ROMYCIN) 5 MG/GM ophthalmic ointment, Daily As Needed., Disp: , Rfl:   •  famotidine (PEPCID) 20 MG tablet, Take 1 tablet by mouth 2 (Two) Times a Day Before Meals., Disp: 180 tablet, Rfl: 1  •  fexofenadine (ALLEGRA) 180 MG tablet, Take 180 mg by mouth Daily., Disp: , Rfl:   •  folic acid (FOLVITE) 400 MCG tablet, Take 400 mcg by mouth Daily., Disp: , Rfl:   •  levothyroxine (SYNTHROID, LEVOTHROID) 100 MCG tablet, TAKE 1 TABLET BY MOUTH  DAILY . EQUIVALENT TO  EUTHYROX, Disp: 90 tablet, Rfl: 3  •  Macitentan (Opsumit) 10 MG tablet, Take 10 mg by mouth Daily., Disp: , Rfl:   •  mesalamine (PENTASA) 500 MG CR capsule, Take 2 capsules " by mouth 4 (Four) Times a Day. (Patient taking differently: Take 2,000 mg by mouth 2 (Two) Times a Day.), Disp: 500 capsule, Rfl: 3  •  Methscopolamine Bromide (PAMINE FORTE PO), Take 1 tablet by mouth 2 (Two) Times a Day As Needed., Disp: , Rfl:   •  Multiple Vitamins-Minerals (OCUVITE ADULT 50+ PO), Take 1 tablet by mouth Daily., Disp: , Rfl:   •  potassium chloride (K-DUR,KLOR-CON) 20 MEQ CR tablet, Take 1 tablet by mouth 3 (Three) Times a Day., Disp: 270 tablet, Rfl: 1  •  Riociguat (Adempas) 2.5 MG tablet, Take 2.5 mg by mouth 3 (Three) Times a Day., Disp: , Rfl:   •  torsemide (DEMADEX) 20 MG tablet, Take 1 tablet by mouth Daily. (Patient taking differently: Take 40 mg by mouth Daily.), Disp: 30 tablet, Rfl: 0  •  traMADol (ULTRAM) 50 MG tablet, Take 1 tablet by mouth Every 6 (Six) Hours As Needed for Moderate Pain ., Disp: 90 tablet, Rfl: 0  •  Ustekinumab (STELARA SC), Inject 1 syringe under the skin into the appropriate area as directed. One syringe every seven weeks.  Option care.  pk, Disp: , Rfl:   •  warfarin (COUMADIN) 2 MG tablet, TAKE 2 TABLETS BY MOUTH 5  DAYS PER WEEK AND 1 TABLET  BY MOUTH ONE DAY WEEKLY FOR ATRIAL FIBRILLATION, Disp: 180 tablet, Rfl: 3  •  Zinc 50 MG capsule, Take  by mouth., Disp: , Rfl:      Past Medical History:  Past Medical History:   Diagnosis Date   • Acute deep vein thrombosis of lower extremity (HCC)    • Antiphospholipid antibody syndrome (HCC)    • Antiphospholipid antibody syndrome (HCC)    • Arrhythmia     ATRIAL FIBRILLATION   • Arthritis     OSTEO   • Benign essential hypertension    • Cholelithiasis 6/07    removed   • Chronic kidney disease     stage 3 renal failure   • Clotting disorder (HCC) 8/12    Primary Antiphospholipid Antibody Syndrome   • COPD (chronic obstructive pulmonary disease) (HCC)    • Coronary artery disease    • Crohn's disease (HCC)    • Cutaneous candidiasis    • Depression    • Disease of thyroid gland    • Elevated cholesterol    • GERD  (gastroesophageal reflux disease)    • GI (gastrointestinal bleed) 6/07    frequently in early Crohn's   • Hernia 5/11    repaired ventral hernias 2x   • History of echocardiogram 04/22/2020    mild-to-moderate concentric hypertrophy, EF 56 - 60%. LA severely dilated, Mild MAC, Mild MR with restrictive movement of the posterior leaflet   • Hyperlipidemia    • Hypertension    • Myocardial infarction (HCC)    • Osteopenia    • Polyp, uterus corpus    • Pulmonary hypertension (HCC)    • Sleep apnea     bipap       Past Surgical History:  Past Surgical History:   Procedure Laterality Date   • ABDOMINAL HERNIA REPAIR     • AMPUTATION DIGIT Left     SECOND TOE    • ARTERIOGRAM  07/30/2020    Procedure: Arteriogram;  Surgeon: Chong Buchanan MD;  Location: [facility] CATH INVASIVE LOCATION;  Service: Cardiovascular;;  rt femoral   • BILATERAL SALPINGO OOPHORECTOMY     • BREAST BIOPSY Right     BENIGN   • CARDIAC CATHETERIZATION N/A 04/22/2020    Surgeon: Paulo Singleton MD;  nonsignificant coronary artery disease normal LV function nonsignificant mitral regurgitation probably shortness of breath due to the cardiac arrhythmias and diastolic dysfunction   • CARDIAC CATHETERIZATION N/A 04/22/2020    Procedure: Coronary angiography;  Surgeon: Paulo Singleton MD;  Location: [facility] CATH INVASIVE LOCATION;  Service: Cardiology;  Laterality: N/A;   • CARDIAC CATHETERIZATION N/A 04/22/2020    Procedure: Left ventriculography;  Surgeon: Paulo Singleton MD;  [facility];  Service: Cardiology;  Laterality: N/A;   • CARDIAC CATHETERIZATION N/A 12/22/2020    Procedure: Right Heart Cath w/ hemodynamic challenge;  Surgeon: Mario Bay MD;  [facility];  Service: Cardiology;  Laterality: N/A;   • CARDIAC ELECTROPHYSIOLOGY PROCEDURE N/A 04/25/2020    Procedure: Cardioversion;  Surgeon: Paulo Singleton MD;  Location: [facility] CATH INVASIVE  LOCATION;  Service: Cardiology;  Laterality: N/A;   • CARDIAC ELECTROPHYSIOLOGY PROCEDURE N/A 07/29/2020    Procedure: PACEMAKER IMPLANTATION- DC;  Surgeon: Chong Buchanan MD;  Location:  [facility] CATH INVASIVE LOCATION;  Service: Cardiovascular;  Laterality: N/A;   • CARDIAC ELECTROPHYSIOLOGY PROCEDURE N/A 07/29/2020    Procedure: ABLATION AV NODE;  Surgeon: Chong Buchanan MD;  Location:  [facility] CATH INVASIVE LOCATION;  Service: Cardiovascular;  Laterality: N/A;   • CARDIAC ELECTROPHYSIOLOGY PROCEDURE N/A 07/30/2020    Procedure: ABLATION AV NODE PT HAS ST.BLESSING PPM;  Surgeon: Chong Buchanan MD;  Location: [facility] CATH INVASIVE LOCATION;  Service: Cardiovascular;  Laterality: N/A;   • CHOLECYSTECTOMY     • COLONOSCOPY  2018   • COSMETIC SURGERY     • D & C HYSTEROSCOPY N/A 10/13/2016    Procedure: DILATATION AND CURETTAGE HYSTEROSCOPY WITH MYOSURE;  Surgeon: Christopher Doll MD;  Location: [facility] MAIN OR;  Service:    • ENDOSCOPY     • ENDOSCOPY N/A 6/20/2022    Procedure: ESOPHAGOGASTRODUODENOSCOPY WITH  SAVORY DILATATION WITH FLUOROSCOPY AND COLD BIOPSIES;  Surgeon: Bry Ca III, MD;  Location: [facility] ENDOSCOPY;  Service: Gastroenterology;  Laterality: N/A;  pre: esophageal stricture, dysphagia  post: gastritis, esophagitis, diverticulum, esophageal stricture   • EYE SURGERY Bilateral     cataract   • FACELIFT     • FEMORAL ARTERY - FEMORAL ARTERY BYPASS GRAFT Bilateral    • FLEXIBLE SIGMOIDOSCOPY  7/80    when Crohn's first diagnosed   • HEMORRHOIDECTOMY     • TONSILLECTOMY AND ADENOIDECTOMY     • TUBAL ABDOMINAL LIGATION  6/75   • UPPER GASTROINTESTINAL ENDOSCOPY     • VASCULAR SURGERY      femoral stents        Social History:  Social History     Socioeconomic History   • Marital status:    Tobacco Use   • Smoking status: Former Smoker     Packs/day: 2.00     Years: 8.00     Pack years: 16.00     Types: Cigarettes     Start date: 7/1/1959     Quit date: 8/1/1967     Years since  "quittin.0   • Smokeless tobacco: Never Used   • Tobacco comment: from age 14 to 22   Vaping Use   • Vaping Use: Never used   Substance and Sexual Activity   • Alcohol use: No   • Drug use: Never   • Sexual activity: Yes     Partners: Male     Birth control/protection: None       Allergies:  Allergies   Allergen Reactions   • Infliximab Rash     Other reaction(s): Psoriasis   • Sulfa Antibiotics Hives       Immunizations:  Immunization History   Administered Date(s) Administered   • COVID-19 (PFIZER) PURPLE CAP 2021, 2021, 2021   • Covid-19 (Pfizer) Gray Cap 2022   • FLUAD TRI 65YR+ 10/07/2019   • Fluad Quad 65+ 09/15/2020   • Fluzone High Dose =>65 Years ([employer] ONLY) 2018, 10/07/2019, 10/12/2021   • Fluzone High-Dose 65+yrs 10/27/2021   • Influenza, Unspecified 10/27/2021   • Pneumococcal Conjugate 13-Valent (PCV13) 01/10/2015   • Pneumococcal Polysaccharide (PPSV23) 2021   • Tdap 2020          Objective:         Review of Systems   Constitutional: Negative for fatigue.   Cardiovascular: Negative for chest pain, palpitations and leg swelling.   All other systems reviewed and are negative.       /58   Pulse 72   Resp 18   Ht 170.2 cm (67\")   Wt 65.8 kg (145 lb)   BMI 22.71 kg/m²     Constitutional:       General: Not in acute distress.     Appearance: Well-developed.   Eyes:      General: No scleral icterus.     Conjunctiva/sclera: Conjunctivae normal.      Pupils: Pupils are equal, round, and reactive to light.   HENT:      Head: Normocephalic and atraumatic.   Neck:      Thyroid: No thyromegaly.   Pulmonary:      Effort: Pulmonary effort is normal.      Breath sounds: Normal breath sounds.   Cardiovascular:      Normal rate. Regular rhythm.   Abdominal:      General: Bowel sounds are normal.      Palpations: Abdomen is soft.   Musculoskeletal: Normal range of motion.      Cervical back: Normal range of motion. Skin:     General: Skin is warm and dry. "   Neurological:      Mental Status: Alert and oriented to person, place, and time.         In-Office Procedure(s):  Procedures  No orders to display     Pacemaker eval interpreted by [facility] [id number]  Battery 10 yrs to benjie    R wave paced  Threshold 0.75V  Impedance 450 ohms    Events - none     ASCVD RIsk Score::  The ASCVD Risk score (Rociodaphnie FAULKNER Jr., et al., 2013) failed to calculate for the following reasons:    The patient has a prior MI or stroke diagnosis    Recent Radiology:          Lab Review:       Recent labs reviewed and interpreted for clinical significance and application          Assessment:          Diagnosis Plan   1. Atrial fibrillation, permanent (HCC)     2. Essential hypertension     3. Moderate mitral insufficiency     4. Presence of cardiac pacemaker     5. S/P atrioventricular hyacinth ablation            Plan:      1. Permanent AF - s/p AV node ablation, remains in complete heart block, on warfarin for CHADS of 3 (age, gender, HTN)    2. Pacemaker followup - normal device function     Enrolled in remote monitoring  RTC 1 year      Level of Care:                 Chong Buchanan MD  08/11/22

## 2022-08-15 DIAGNOSIS — S22.49XS CLOSED FRACTURE OF MULTIPLE RIBS, UNSPECIFIED LATERALITY, SEQUELA: Primary | ICD-10-CM

## 2022-08-15 RX ORDER — HYDROCODONE BITARTRATE AND ACETAMINOPHEN 5; 325 MG/1; MG/1
1 TABLET ORAL EVERY 6 HOURS PRN
Qty: 30 TABLET | Refills: 0 | Status: SHIPPED | OUTPATIENT
Start: 2022-08-15 | End: 2022-09-14 | Stop reason: ALTCHOICE

## 2022-08-23 DIAGNOSIS — M81.0 AGE RELATED OSTEOPOROSIS, UNSPECIFIED PATHOLOGICAL FRACTURE PRESENCE: Primary | ICD-10-CM

## 2022-09-02 ENCOUNTER — TELEPHONE (OUTPATIENT)
Dept: INTERNAL MEDICINE | Facility: CLINIC | Age: 77
End: 2022-09-02

## 2022-09-06 DIAGNOSIS — M81.0 AGE RELATED OSTEOPOROSIS, UNSPECIFIED PATHOLOGICAL FRACTURE PRESENCE: Primary | ICD-10-CM

## 2022-09-06 RX ORDER — ZOLEDRONIC ACID 5 MG/100ML
5 INJECTION, SOLUTION INTRAVENOUS ONCE
Status: CANCELLED | OUTPATIENT
Start: 2022-09-06

## 2022-09-06 RX ORDER — SODIUM CHLORIDE 9 MG/ML
250 INJECTION, SOLUTION INTRAVENOUS ONCE
OUTPATIENT
Start: 2022-09-06

## 2022-09-07 ENCOUNTER — HOSPITAL ENCOUNTER (OUTPATIENT)
Dept: INFUSION THERAPY | Facility: HOSPITAL | Age: 77
Setting detail: INFUSION SERIES
Discharge: HOME | End: 2022-09-07

## 2022-09-07 VITALS
TEMPERATURE: 97.8 F | WEIGHT: 143.6 LBS | BODY MASS INDEX: 22.49 KG/M2 | RESPIRATION RATE: 20 BRPM | HEART RATE: 70 BPM | DIASTOLIC BLOOD PRESSURE: 51 MMHG | OXYGEN SATURATION: 100 % | SYSTOLIC BLOOD PRESSURE: 103 MMHG

## 2022-09-07 DIAGNOSIS — M81.0 AGE RELATED OSTEOPOROSIS, UNSPECIFIED PATHOLOGICAL FRACTURE PRESENCE: Primary | ICD-10-CM

## 2022-09-07 PROCEDURE — 25010000002 ZOLEDRONIC ACID 5 MG/100ML SOLUTION: Performed by: INTERNAL MEDICINE

## 2022-09-07 PROCEDURE — 96365 THER/PROPH/DIAG IV INF INIT: CPT

## 2022-09-07 RX ORDER — SODIUM CHLORIDE 9 MG/ML
250 INJECTION, SOLUTION INTRAVENOUS ONCE
OUTPATIENT
Start: 2023-09-07

## 2022-09-07 RX ORDER — ZOLEDRONIC ACID 5 MG/100ML
5 INJECTION, SOLUTION INTRAVENOUS ONCE
Status: COMPLETED | OUTPATIENT
Start: 2022-09-07 | End: 2022-09-07

## 2022-09-07 RX ORDER — ZOLEDRONIC ACID 5 MG/100ML
5 INJECTION, SOLUTION INTRAVENOUS ONCE
Status: CANCELLED | OUTPATIENT
Start: 2023-09-07

## 2022-09-07 RX ADMIN — ZOLEDRONIC ACID 5 MG: 0.05 INJECTION, SOLUTION INTRAVENOUS at 15:22

## 2022-09-13 NOTE — PROGRESS NOTES
"Chief Complaint  Atrial Fibrillation and Hypertension    Subjective    History of Present Illness      I saw Joellen Bailey today for cardiovascular care.  She is a pleasant 77-year-old female with moderate to severe reversible pulmonary hypertension.  She is to follow-up with Dr. Jeancarlos Cordero of pulmonary medicine.  She is free of chest pain pressure or tightness.  She does however report some increased dyspnea on exertion recently.  She is noted about a 4 to 5 pound weight gain and increased lower extremity edema.  She is followed by nephrology and she has advanced her diuretic therapy with a gradual decrease in her edema and dyspnea on exertion.  She is to follow-up with Dr. Cordero in the near future.  She is to resume her baseline diuretic therapy with torsemide at 20 mg once a day.  She remains on long-term anticoagulation secondary to permanent atrial fibrillation.  She has undergone previous AV node ablation and is status post permanent pacemaker placement.  Interrogation 8/11/2010 revealed greater than 10 years remaining on generator life, normal device function and ventricular pacing greater than 99%.    Objective   Vital Signs:   BP 94/54   Pulse 71   Ht 170.2 cm (67\")   Wt 66.7 kg (147 lb)   BMI 23.02 kg/m²     Constitutional:       Appearance: Well-developed.   Eyes:      Conjunctiva/sclera: Conjunctivae normal.      Pupils: Pupils are equal, round, and reactive to light.   HENT:      Head: Normocephalic and atraumatic.   Neck:      Thyroid: No thyromegaly.   Pulmonary:      Effort: Pulmonary effort is normal. No respiratory distress.      Breath sounds: Normal breath sounds. No wheezing. No rales.   Cardiovascular:      Normal rate. Regular rhythm.      Murmurs: There is a grade 2/6 high frequency blowing holosystolic murmur at the apex.      No gallop. No S3 and S4 gallop. No rub.   Edema:     Peripheral edema absent.   Abdominal:      General: Bowel sounds are normal. There is no " distension.      Palpations: Abdomen is soft. There is no abdominal mass.      Tenderness: There is no abdominal tenderness.   Musculoskeletal: Normal range of motion.      Cervical back: Neck supple. Skin:     General: Skin is warm and dry.      Findings: No erythema.   Neurological:      Mental Status: Alert and oriented to person, place, and time.         Result Review :     Common labs    Common Labsle 6/19/22 6/20/22 6/21/22 6/21/22      0612 0612   Glucose 120 (A) 92  85   BUN 36 (A) 33 (A)  28 (A)   Creatinine 1.44 (A) 1.39 (A)  1.18 (A)   Sodium 140 139  140   Potassium 3.6 3.1 (A)  3.5   Chloride 104 104  106   Calcium 8.8 8.7  8.7   Albumin 3.40 (A) 3.40 (A)  3.10 (A)   WBC   11.96 (A)    Hemoglobin   11.8 (A)    Hematocrit   35.8    Platelets   286    (A) Abnormal value                      Assessment and Plan    1. Atrial fibrillation, permanent (HCC)  Stable    2. Long term current use of anticoagulant therapy  Well-tolerated    3. S/P atrioventricular hyacinth ablation  Ventricular paced    4. Presence of cardiac pacemaker  Normal device function    5. Moderate mitral insufficiency  Improving volume status    6. Essential hypertension  Controlled    7. Pulmonary HTN ([facility])  Followed by Dr. Moon    8. Chronic diastolic CHF (congestive heart failure) ([facility])  Improved volume status    9. Obstructive sleep apnea syndrome  CPAP    10. Pulmonary emphysema, unspecified emphysema type ([facility])  Stable     11. Antiphospholipid antibody syndrome ([facility])    Joellen demonstrates mild volume excess which she reports is improving.  She will continue her current medical regimen and return her Demadex back to 20 mg daily once she is lost 2 more pounds of fluid.  She will follow up with Dr. Moon as scheduled.  I will plan follow-up in 6 months sooner if needed.        Follow Up   No follow-ups on file.  Patient was given instructions and counseling regarding her condition or for health maintenance advice. Please see  specific information pulled into the AVS if appropriate.

## 2022-09-14 ENCOUNTER — OFFICE VISIT (OUTPATIENT)
Dept: CARDIOLOGY | Facility: CLINIC | Age: 77
End: 2022-09-14

## 2022-09-14 VITALS
BODY MASS INDEX: 23.07 KG/M2 | HEART RATE: 71 BPM | SYSTOLIC BLOOD PRESSURE: 94 MMHG | DIASTOLIC BLOOD PRESSURE: 54 MMHG | WEIGHT: 147 LBS | HEIGHT: 67 IN

## 2022-09-14 DIAGNOSIS — D68.61 ANTIPHOSPHOLIPID ANTIBODY SYNDROME: ICD-10-CM

## 2022-09-14 DIAGNOSIS — J43.9 PULMONARY EMPHYSEMA, UNSPECIFIED EMPHYSEMA TYPE: ICD-10-CM

## 2022-09-14 DIAGNOSIS — Z95.0 PRESENCE OF CARDIAC PACEMAKER: ICD-10-CM

## 2022-09-14 DIAGNOSIS — Z79.01 LONG TERM CURRENT USE OF ANTICOAGULANT THERAPY: ICD-10-CM

## 2022-09-14 DIAGNOSIS — G47.33 OBSTRUCTIVE SLEEP APNEA SYNDROME: ICD-10-CM

## 2022-09-14 DIAGNOSIS — Z98.890 S/P ATRIOVENTRICULAR NODAL ABLATION: ICD-10-CM

## 2022-09-14 DIAGNOSIS — I34.0 MODERATE MITRAL INSUFFICIENCY: ICD-10-CM

## 2022-09-14 DIAGNOSIS — I50.32 CHRONIC DIASTOLIC CHF (CONGESTIVE HEART FAILURE): ICD-10-CM

## 2022-09-14 DIAGNOSIS — I48.21 ATRIAL FIBRILLATION, PERMANENT: Primary | ICD-10-CM

## 2022-09-14 DIAGNOSIS — I27.20 PULMONARY HTN: ICD-10-CM

## 2022-09-14 DIAGNOSIS — I10 ESSENTIAL HYPERTENSION: ICD-10-CM

## 2022-09-14 PROCEDURE — 99214 OFFICE O/P EST MOD 30 MIN: CPT | Performed by: INTERNAL MEDICINE

## 2022-09-16 PROCEDURE — 93296 REM INTERROG EVL PM/IDS: CPT | Performed by: INTERNAL MEDICINE

## 2022-09-16 PROCEDURE — 93294 REM INTERROG EVL PM/LDLS PM: CPT | Performed by: INTERNAL MEDICINE

## 2022-09-20 ENCOUNTER — OFFICE VISIT (OUTPATIENT)
Dept: INTERNAL MEDICINE | Facility: CLINIC | Age: 77
End: 2022-09-20

## 2022-09-20 VITALS
BODY MASS INDEX: 22.44 KG/M2 | DIASTOLIC BLOOD PRESSURE: 44 MMHG | TEMPERATURE: 97.3 F | WEIGHT: 143 LBS | SYSTOLIC BLOOD PRESSURE: 102 MMHG | HEIGHT: 67 IN | HEART RATE: 78 BPM

## 2022-09-20 DIAGNOSIS — M81.0 AGE RELATED OSTEOPOROSIS, UNSPECIFIED PATHOLOGICAL FRACTURE PRESENCE: ICD-10-CM

## 2022-09-20 DIAGNOSIS — D68.61 ANTIPHOSPHOLIPID ANTIBODY SYNDROME: Primary | ICD-10-CM

## 2022-09-20 DIAGNOSIS — I27.20 PULMONARY HTN: Chronic | ICD-10-CM

## 2022-09-20 DIAGNOSIS — Z23 NEED FOR INFLUENZA VACCINATION: ICD-10-CM

## 2022-09-20 DIAGNOSIS — E78.5 DYSLIPIDEMIA: ICD-10-CM

## 2022-09-20 LAB — INR PPP: 2.7 (ref 0.9–1.1)

## 2022-09-20 PROCEDURE — G0008 ADMIN INFLUENZA VIRUS VAC: HCPCS | Performed by: INTERNAL MEDICINE

## 2022-09-20 PROCEDURE — 85610 PROTHROMBIN TIME: CPT | Performed by: INTERNAL MEDICINE

## 2022-09-20 PROCEDURE — 99214 OFFICE O/P EST MOD 30 MIN: CPT | Performed by: INTERNAL MEDICINE

## 2022-09-20 PROCEDURE — 36416 COLLJ CAPILLARY BLOOD SPEC: CPT | Performed by: INTERNAL MEDICINE

## 2022-09-20 PROCEDURE — 90662 IIV NO PRSV INCREASED AG IM: CPT | Performed by: INTERNAL MEDICINE

## 2022-09-20 NOTE — PROGRESS NOTES
"Chief Complaint  Atrial Fibrillation    Subjective        Joellen Dominguez presents to [facility]  History of Present Illness  Feels her breathing is getting worse- has appt with Dr. Cordero later this week. She denies any CP, only has minimal edema which is unchanged.   Saw Dr. Castillo about her hearing loss- thinks she has sensorineural hearing loss and could benefit from hearing aides but she wants to put off for now pending other issues.  Got a Reclast infusion- no issues.       Objective   Vital Signs:  /44   Pulse 78   Temp 97.3 °F (36.3 °C)   Ht 170.2 cm (67\")   Wt 64.9 kg (143 lb)   BMI 22.40 kg/m²   Estimated body mass index is 22.4 kg/m² as calculated from the following:    Height as of this encounter: 170.2 cm (67\").    Weight as of this encounter: 64.9 kg (143 lb).    BMI is within normal parameters. No other follow-up for BMI required.      Physical Exam  Constitutional:       Appearance: Normal appearance.   Cardiovascular:      Rate and Rhythm: Normal rate and regular rhythm.   Pulmonary:      Breath sounds: Normal breath sounds.   Musculoskeletal:      Right lower leg: No edema.      Left lower leg: No edema.   Skin:     General: Skin is warm.   Neurological:      General: No focal deficit present.   Psychiatric:         Mood and Affect: Mood normal.         Thought Content: Thought content normal.        Result Review :                Assessment and Plan   Diagnoses and all orders for this visit:    1. Antiphospholipid antibody syndrome (HCC) (Primary)  Comments:  INR therapeutic  Orders:  -     POCT INR  -     CBC & Differential; Future    2. Pulmonary HTN (HCC)  Comments:  has f/u with pulmonary    3. Age related osteoporosis, unspecified pathological fracture presence  Comments:  continue Reclast, no issues with first infusion.     4. Dyslipidemia  Comments:  check labs at f/u  Orders:  -     Comprehensive Metabolic Panel; Future  -     Lipid Panel With / " Chol / HDL Ratio; Future  -     TSH; Future    5. Need for influenza vaccination  -     Fluzone High-Dose 65+yrs ([id number])             Follow Up   Return in about 6 months (around 3/20/2023) for Medicare Wellness, Lab Before FUP.  Patient was given instructions and counseling regarding her condition or for health maintenance advice. Please see specific information pulled into the AVS if appropriate.

## 2022-09-29 ENCOUNTER — TELEPHONE (OUTPATIENT)
Dept: INTERNAL MEDICINE | Facility: CLINIC | Age: 77
End: 2022-09-29

## 2022-09-29 NOTE — TELEPHONE ENCOUNTER
Pt made appointment for Monday.  She understands to reach out to office if gets worse or anything changes

## 2022-09-29 NOTE — TELEPHONE ENCOUNTER
I have no idea- could be sign of a blood clot- I have never heard of that after an echo.  I can see her if she'd be more comfortable.

## 2022-09-29 NOTE — TELEPHONE ENCOUNTER
Caller: Joellen Dominguez    Relationship: Self    Best call back number: [phone]    What was the call regarding: PATIENT WOULD LIKE A CALL FROM KORINA DOTY REGARDING SOMETHING STRANGE ON HER CHEST.    Do you require a callback: YES

## 2022-09-29 NOTE — TELEPHONE ENCOUNTER
Called spoke with PT,  she states the veins on the left side of her chest area are very pronounced,  very blue she has no pain.  PT did say she had a echo this morning not sure if this has anything to do with it.      This is new and right side looks normal    Anything to worry about?  Does she need to be seen?

## 2022-10-03 ENCOUNTER — OFFICE VISIT (OUTPATIENT)
Dept: INTERNAL MEDICINE | Facility: CLINIC | Age: 77
End: 2022-10-03

## 2022-10-03 ENCOUNTER — HOSPITAL ENCOUNTER (OUTPATIENT)
Dept: CARDIOLOGY | Facility: HOSPITAL | Age: 77
Discharge: HOME | End: 2022-10-03
Admitting: INTERNAL MEDICINE

## 2022-10-03 VITALS — WEIGHT: 142 LBS | HEIGHT: 67 IN | BODY MASS INDEX: 22.29 KG/M2 | TEMPERATURE: 97.3 F

## 2022-10-03 DIAGNOSIS — I82.A12 ACUTE DEEP VEIN THROMBOSIS (DVT) OF AXILLARY VEIN OF LEFT UPPER EXTREMITY: Primary | ICD-10-CM

## 2022-10-03 LAB
BH CV UPPER VENOUS LEFT AXILLARY AUGMENT: NORMAL
BH CV UPPER VENOUS LEFT AXILLARY COMPRESS: NORMAL
BH CV UPPER VENOUS LEFT AXILLARY PHASIC: NORMAL
BH CV UPPER VENOUS LEFT AXILLARY SPONT: NORMAL
BH CV UPPER VENOUS LEFT BASILIC FOREARM COMPRESS: NORMAL
BH CV UPPER VENOUS LEFT BASILIC UPPER COMPRESS: NORMAL
BH CV UPPER VENOUS LEFT BRACHIAL COMPRESS: NORMAL
BH CV UPPER VENOUS LEFT CEPHALIC FOREARM COMPRESS: NORMAL
BH CV UPPER VENOUS LEFT CEPHALIC UPPER COMPRESS: NORMAL
BH CV UPPER VENOUS LEFT INTERNAL JUGULAR AUGMENT: NORMAL
BH CV UPPER VENOUS LEFT INTERNAL JUGULAR COMPRESS: NORMAL
BH CV UPPER VENOUS LEFT INTERNAL JUGULAR PHASIC: NORMAL
BH CV UPPER VENOUS LEFT INTERNAL JUGULAR SPONT: NORMAL
BH CV UPPER VENOUS LEFT RADIAL COMPRESS: NORMAL
BH CV UPPER VENOUS LEFT SUBCLAVIAN AUGMENT: NORMAL
BH CV UPPER VENOUS LEFT SUBCLAVIAN COMPRESS: NORMAL
BH CV UPPER VENOUS LEFT SUBCLAVIAN PHASIC: NORMAL
BH CV UPPER VENOUS LEFT SUBCLAVIAN SPONT: NORMAL
BH CV UPPER VENOUS LEFT ULNAR COMPRESS: NORMAL
BH CV UPPER VENOUS RIGHT INTERNAL JUGULAR AUGMENT: NORMAL
BH CV UPPER VENOUS RIGHT INTERNAL JUGULAR COMPRESS: NORMAL
BH CV UPPER VENOUS RIGHT INTERNAL JUGULAR PHASIC: NORMAL
BH CV UPPER VENOUS RIGHT INTERNAL JUGULAR SPONT: NORMAL
BH CV UPPER VENOUS RIGHT SUBCLAVIAN AUGMENT: NORMAL
BH CV UPPER VENOUS RIGHT SUBCLAVIAN COMPRESS: NORMAL
BH CV UPPER VENOUS RIGHT SUBCLAVIAN PHASIC: NORMAL
BH CV UPPER VENOUS RIGHT SUBCLAVIAN SPONT: NORMAL
MAXIMAL PREDICTED HEART RATE: 142 BPM
STRESS TARGET HR: 121 BPM

## 2022-10-03 PROCEDURE — 99213 OFFICE O/P EST LOW 20 MIN: CPT | Performed by: INTERNAL MEDICINE

## 2022-10-03 PROCEDURE — 93971 EXTREMITY STUDY: CPT

## 2022-10-03 RX ORDER — SELEXIPAG 200 UG/1
TABLET, COATED ORAL
COMMUNITY
Start: 2022-09-27 | End: 2022-12-06 | Stop reason: ALTCHOICE

## 2022-10-03 NOTE — PROGRESS NOTES
"Chief Complaint  buldging veins (Chest area )    Subjective        Joellen Dominguez presents to [facility]  History of Present Illness noticed bulging veins in her chest wall since last Thursday.  Did have an echo on the same day.  She got a flu shot in the same arm a week or so prior. Had a lumbar epidural with IV in the L forearm Wednesday am.     Objective   Vital Signs:  Temp 97.3 °F (36.3 °C)   Ht 170.2 cm (67\")   Wt 64.4 kg (142 lb)   BMI 22.24 kg/m²   Estimated body mass index is 22.24 kg/m² as calculated from the following:    Height as of this encounter: 170.2 cm (67\").    Weight as of this encounter: 64.4 kg (142 lb).    BMI is within normal parameters. No other follow-up for BMI required.      Physical Exam  Constitutional:       Appearance: Normal appearance.   Cardiovascular:      Rate and Rhythm: Normal rate.   Pulmonary:      Effort: Pulmonary effort is normal.      Comments: L anterior CW and arm with prominent, nontender varicositis. Full ROM  Lymphadenopathy:      Cervical: No cervical adenopathy.        Result Review :                Assessment and Plan   Diagnoses and all orders for this visit:    1. Acute deep vein thrombosis (DVT) of axillary vein of left upper extremity (HCC) (Primary)  Comments:  check doppler upper extremity- if negative, consider CT chest-   Orders:  -     Duplex Venous Upper Extremity - Left CAR             Follow Up   No follow-ups on file.  Patient was given instructions and counseling regarding her condition or for health maintenance advice. Please see specific information pulled into the AVS if appropriate.       "

## 2022-10-04 ENCOUNTER — TELEPHONE (OUTPATIENT)
Dept: INTERNAL MEDICINE | Facility: CLINIC | Age: 77
End: 2022-10-04

## 2022-10-04 DIAGNOSIS — I87.1 SUPERIOR VENA CAVA SYNDROME: Primary | ICD-10-CM

## 2022-10-06 ENCOUNTER — TELEPHONE (OUTPATIENT)
Dept: INTERNAL MEDICINE | Facility: CLINIC | Age: 77
End: 2022-10-06

## 2022-10-06 RX ORDER — BUPROPION HYDROCHLORIDE 300 MG/1
300 TABLET ORAL DAILY
Qty: 90 TABLET | Refills: 3 | Status: SHIPPED | OUTPATIENT
Start: 2022-10-06 | End: 2023-01-23 | Stop reason: SDUPTHER

## 2022-10-06 NOTE — TELEPHONE ENCOUNTER
Caller: Joellen Dominguez    Relationship: Self    Best call back number: [phone]    What is the best time to reach you: ANYTIME     Who are you requesting to speak with (clinical staff, provider,  specific staff member): DR. DOTY    What was the call regarding: PATIENT WANTS TO SPEAK TO DR. DOTY REGARDING AND APPOINTMENT SHE HAS SCHEDULED AT THE END OF November TO HAVE A CTA DONE. PATIENT STATES THAT IS VERY FAR AWAY FOR HER.     Do you require a callback: YES

## 2022-10-17 DIAGNOSIS — I87.1 SUBCLAVIAN VEIN STENOSIS, LEFT: Primary | ICD-10-CM

## 2022-11-29 DIAGNOSIS — M54.10 RADICULAR PAIN OF LOWER EXTREMITY: Primary | ICD-10-CM

## 2022-11-30 ENCOUNTER — APPOINTMENT (OUTPATIENT)
Dept: CT IMAGING | Facility: HOSPITAL | Age: 77
End: 2022-11-30

## 2022-12-06 ENCOUNTER — OFFICE VISIT (OUTPATIENT)
Dept: INTERNAL MEDICINE | Facility: CLINIC | Age: 77
End: 2022-12-06

## 2022-12-06 VITALS — WEIGHT: 144 LBS | HEIGHT: 67 IN | BODY MASS INDEX: 22.6 KG/M2

## 2022-12-06 DIAGNOSIS — M54.16 LUMBAR RADICULOPATHY: Primary | ICD-10-CM

## 2022-12-06 PROCEDURE — 99213 OFFICE O/P EST LOW 20 MIN: CPT | Performed by: INTERNAL MEDICINE

## 2022-12-06 RX ORDER — SELEXIPAG 200-800MCG
KIT ORAL
COMMUNITY
Start: 2022-11-22

## 2022-12-06 RX ORDER — TRAMADOL HYDROCHLORIDE 50 MG/1
50 TABLET ORAL EVERY 6 HOURS PRN
Qty: 60 TABLET | Refills: 0 | Status: SHIPPED | OUTPATIENT
Start: 2022-12-06 | End: 2023-02-03 | Stop reason: SDUPTHER

## 2022-12-06 NOTE — PROGRESS NOTES
"Chief Complaint  Pain (Lower left leg )    Subjective        Joellen Dominguez presents to [facility]  History of Present Illness radicular pain in the L leg for a few years- she had EMG a few years ago- was tried on gabapentin which didn't help.  The pain has gotten worse, more constant.  Trouble with lifting the leg getting in and out of the car.   The pain is inside the L lower leg. Lately she takes some old tramadol for pain at night- helps her sleep.    She walks her dog 5-6 times per day, puts on a brace to help.  Gets epidurals about every 3-4 months for her chronic back and sciatic pain but does not affect the lower leg.     Objective   Vital Signs:  Ht 170.2 cm (67\")   Wt 65.3 kg (144 lb)   BMI 22.55 kg/m²   Estimated body mass index is 22.55 kg/m² as calculated from the following:    Height as of this encounter: 170.2 cm (67\").    Weight as of this encounter: 65.3 kg (144 lb).    BMI is within normal parameters. No other follow-up for BMI required.      Physical Exam  Constitutional:       Appearance: Normal appearance.   Musculoskeletal:      Right lower leg: No edema.      Left lower leg: No edema.   Neurological:      General: No focal deficit present.      Sensory: Sensory deficit: L medial lower leg with altered sensation to palpation.      Motor: No weakness.      Coordination: Coordination normal.      Gait: Gait normal.        Result Review :                Assessment and Plan   Diagnoses and all orders for this visit:    1. Lumbar radiculopathy (Primary)  Comments:  needs MRI as it has been a few years with worsening sx.  Tramadol given for prn only- check INR next week if taking regularly.   Orders:  -     MRI Lumbar Spine Without Contrast; Future  -     traMADol (ULTRAM) 50 MG tablet; Take 1 tablet by mouth Every 6 (Six) Hours As Needed for Moderate Pain.  Dispense: 60 tablet; Refill: 0             Follow Up   No follow-ups on file.  Patient was given instructions " and counseling regarding her condition or for health maintenance advice. Please see specific information pulled into the AVS if appropriate.

## 2022-12-14 PROBLEM — R13.19 ESOPHAGEAL DYSPHAGIA: Status: ACTIVE | Noted: 2022-05-31

## 2022-12-15 ENCOUNTER — ANTICOAGULATION VISIT (OUTPATIENT)
Dept: INTERNAL MEDICINE | Facility: CLINIC | Age: 77
End: 2022-12-15

## 2022-12-15 ENCOUNTER — TELEPHONE (OUTPATIENT)
Dept: INTERNAL MEDICINE | Facility: CLINIC | Age: 77
End: 2022-12-15

## 2022-12-15 DIAGNOSIS — D68.61 ANTIPHOSPHOLIPID ANTIBODY SYNDROME: ICD-10-CM

## 2022-12-16 PROCEDURE — 93296 REM INTERROG EVL PM/IDS: CPT | Performed by: INTERNAL MEDICINE

## 2022-12-16 PROCEDURE — 93294 REM INTERROG EVL PM/LDLS PM: CPT | Performed by: INTERNAL MEDICINE

## 2022-12-28 ENCOUNTER — TELEPHONE (OUTPATIENT)
Dept: INTERNAL MEDICINE | Facility: CLINIC | Age: 77
End: 2022-12-28

## 2022-12-28 NOTE — TELEPHONE ENCOUNTER
Caller: Joellen Dominguez    Relationship: Self    Best call back number:[phone]     What orders are you requesting (i.e. lab or imaging): MRI     In what timeframe would the patient need to come in:NEXT AVAILABLE      Where will you receive your lab/imaging services: [facility]     Additional notes:ORDER NEEDED     [facility] (F) [phone], (P) [phone]

## 2023-01-05 ENCOUNTER — TELEPHONE (OUTPATIENT)
Dept: INTERNAL MEDICINE | Facility: CLINIC | Age: 78
End: 2023-01-05

## 2023-01-05 NOTE — TELEPHONE ENCOUNTER
Caller: CALI    Relationship to patient: Other    Best call back number: [phone] OPTION 3    Patient is needing: CALI JOHNSON CALLED TO REPORT AN OUT OF RANGE INR LEVEL     PATIENT'S INR LEVEL WAS 1.4 ON 01/04/23

## 2023-01-19 ENCOUNTER — APPOINTMENT (OUTPATIENT)
Dept: MRI IMAGING | Facility: HOSPITAL | Age: 78
End: 2023-01-19

## 2023-01-23 DIAGNOSIS — R13.19 ESOPHAGEAL DYSPHAGIA: ICD-10-CM

## 2023-01-23 DIAGNOSIS — D68.61 ANTIPHOSPHOLIPID ANTIBODY SYNDROME: ICD-10-CM

## 2023-01-23 DIAGNOSIS — K50.00 CROHN'S DISEASE OF SMALL INTESTINE WITHOUT COMPLICATION: ICD-10-CM

## 2023-01-23 DIAGNOSIS — K50.80 CROHN'S DISEASE OF BOTH SMALL AND LARGE INTESTINE WITHOUT COMPLICATION: Primary | ICD-10-CM

## 2023-01-23 RX ORDER — COLESEVELAM 180 1/1
625 TABLET ORAL 2 TIMES DAILY
Qty: 180 TABLET | Refills: 3 | Status: SHIPPED | OUTPATIENT
Start: 2023-01-23 | End: 2023-02-15

## 2023-01-23 RX ORDER — BUPROPION HYDROCHLORIDE 300 MG/1
300 TABLET ORAL DAILY
Qty: 90 TABLET | Refills: 3 | Status: SHIPPED | OUTPATIENT
Start: 2023-01-23

## 2023-01-23 RX ORDER — FAMOTIDINE 20 MG/1
20 TABLET, FILM COATED ORAL
Qty: 180 TABLET | Refills: 3 | Status: SHIPPED | OUTPATIENT
Start: 2023-01-23

## 2023-01-23 RX ORDER — LEVOTHYROXINE SODIUM 0.1 MG/1
100 TABLET ORAL DAILY
Qty: 90 TABLET | Refills: 3 | Status: SHIPPED | OUTPATIENT
Start: 2023-01-23

## 2023-01-23 RX ORDER — WARFARIN SODIUM 2 MG/1
TABLET ORAL
Qty: 180 TABLET | Refills: 3 | Status: SHIPPED | OUTPATIENT
Start: 2023-01-23

## 2023-02-03 DIAGNOSIS — M54.16 LUMBAR RADICULOPATHY: ICD-10-CM

## 2023-02-03 RX ORDER — TRAMADOL HYDROCHLORIDE 50 MG/1
50 TABLET ORAL EVERY 6 HOURS PRN
Qty: 60 TABLET | Refills: 0 | Status: SHIPPED | OUTPATIENT
Start: 2023-02-03 | End: 2023-03-28 | Stop reason: SDUPTHER

## 2023-02-15 ENCOUNTER — OFFICE VISIT (OUTPATIENT)
Dept: GASTROENTEROLOGY | Facility: CLINIC | Age: 78
End: 2023-02-15
Payer: MEDICARE

## 2023-02-15 VITALS
WEIGHT: 139.4 LBS | HEART RATE: 71 BPM | SYSTOLIC BLOOD PRESSURE: 96 MMHG | DIASTOLIC BLOOD PRESSURE: 50 MMHG | TEMPERATURE: 98.6 F | BODY MASS INDEX: 21.88 KG/M2 | HEIGHT: 67 IN | OXYGEN SATURATION: 98 %

## 2023-02-15 DIAGNOSIS — K50.80 CROHN'S DISEASE OF BOTH SMALL AND LARGE INTESTINE WITHOUT COMPLICATION: Primary | ICD-10-CM

## 2023-02-15 DIAGNOSIS — R11.0 NAUSEA: ICD-10-CM

## 2023-02-15 DIAGNOSIS — Z79.899 HIGH RISK MEDICATION USE: ICD-10-CM

## 2023-02-15 PROCEDURE — 99214 OFFICE O/P EST MOD 30 MIN: CPT | Performed by: NURSE PRACTITIONER

## 2023-02-15 RX ORDER — IBUPROFEN 200 MG
1 CAPSULE ORAL
COMMUNITY
End: 2023-02-15

## 2023-02-15 RX ORDER — ONDANSETRON 4 MG/1
4 TABLET, FILM COATED ORAL EVERY 8 HOURS PRN
Qty: 30 TABLET | Refills: 1 | Status: SHIPPED | OUTPATIENT
Start: 2023-02-15 | End: 2023-03-02

## 2023-02-15 RX ORDER — USTEKINUMAB 45 MG/.5ML
INJECTION, SOLUTION SUBCUTANEOUS
COMMUNITY
Start: 2023-02-14 | End: 2023-02-15 | Stop reason: SDUPTHER

## 2023-02-15 NOTE — PATIENT INSTRUCTIONS
Stop welchol, this can cause constipation  If constipation after stopping welchol, start miralax  Ok for nausea Rx as needed    Work in increased calories when not nausea  Continue stelara  Continue Pepcid    Take upravi at same time of day    Continue pentasa

## 2023-02-15 NOTE — PROGRESS NOTES
"Chief Complaint   Patient presents with   • Follow-up     Crohn's disease, constipation, heartburn           History of Present Illness  77-year-old female presents today for follow-up.    She has a history of Crohn's ileocolitis diagnosed in 1978.    currently on Stelara and Pentasa.    She has had a change in symptoms with worsening nausea over the past couple of months, 1 episode of dry heaves.  Nausea has been present daily with decreased appetite.  Her weight is down 5 or 6 pounds over the past several weeks, unintentionally.  She attributes this to decreased oral intake due to nausea.    Famotidine controls symptoms of acid reflux.    She has been mindful of the timing of when she takes Upravi, she has been recently told it must be taken at the same time every day.  Since she has been doing this for the past couple of days nausea has improved and she has only had a couple of episodes, not constant.    She will use antiemetics as needed for nausea.    She continues to experience a couple of days of constipation and then will take Colace and this will work 2 days later, she will experience urgency and frequency and numerous bowel movements once she starts to have bowel movements.    She is taking colestipol daily.  She has not tried lower dose of colestipol.    She denies melena or hematochezia.    She has completed treatment for hepatic esophagitis diagnosed June 20, 2022 on EGD with Dr. Ca.    Review of Systems      Result Review :       COMPREHENSIVE METABOLIC PANEL (02/06/2023 15:05)   CBC AND DIFFERENTIAL (02/06/2023 15:05)       Vital Signs:   BP 96/50   Pulse 71   Temp 98.6 °F (37 °C)   Ht 170.2 cm (67\")   Wt 63.2 kg (139 lb 6.4 oz)   SpO2 98%   BMI 21.83 kg/m²     Body mass index is 21.83 kg/m².     Physical Exam  Vitals reviewed.   Constitutional:       General: She is not in acute distress.     Appearance: Normal appearance. She is well-developed. She is not ill-appearing.   Eyes:      " General: No scleral icterus.  Pulmonary:      Effort: No respiratory distress.   Skin:     Coloration: Skin is not jaundiced or pale.   Neurological:      Mental Status: She is alert.           Assessment and Plan    Diagnoses and all orders for this visit:    1. Crohn's disease of both small and large intestine without complication (HCC) (Primary)    2. High risk medication use    3. Nausea  -     ondansetron (ZOFRAN) 4 MG tablet; Take 1 tablet by mouth Every 8 (Eight) Hours As Needed for Nausea or Vomiting for up to 15 days.  Dispense: 30 tablet; Refill: 1    Crohn's disease, on Stelara, continue subcu injections.    Crohn's disease, continue Pentasa    Change in symptoms with nausea, decreased appetite and decreased caloric intake with weight loss.  This has improved as patient has been taking Upravi at the same time every day for the past several days and has not been experiencing nausea daily but has had intermittent episodes.  Will continue to monitor.    Will continue antinausea medications as needed.    Continue Pepcid    WelChol causes constipation, as you are having constipation and difficulty with regular bowel movements, stop WelChol and restart if diarrhea and loose stool returns otherwise hold off and do not take WelChol at this time.    Follow-up visit in 4 to 6 months, sooner if symptoms change or condition warrants.          Patient Instructions   Stop welchol, this can cause constipation  If constipation after stopping welchol, start miralax  Ok for nausea Rx as needed    Work in increased calories when not nausea  Continue stelara  Continue Pepcid    Take upravi at same time of day    Continue pentasa          EMR Dragon/Transcription Disclaimer:  This document has been Dictated utilizing Dragon dictation.

## 2023-02-17 ENCOUNTER — TELEPHONE (OUTPATIENT)
Dept: SURGERY | Facility: CLINIC | Age: 78
End: 2023-02-17
Payer: MEDICARE

## 2023-02-17 NOTE — TELEPHONE ENCOUNTER
Spoke with pt regarding upcoming visit on 2/22 with Dr. Greenfield. Pt was aware of office location.

## 2023-02-21 ENCOUNTER — TELEPHONE (OUTPATIENT)
Dept: INTERNAL MEDICINE | Facility: CLINIC | Age: 78
End: 2023-02-21
Payer: MEDICARE

## 2023-02-21 NOTE — TELEPHONE ENCOUNTER
PT INR today was 1.8.  She had an epidural a few weeks ago which she was off coumadin for that.  When she restarted she has been taking 4mg 3 x week and 2mg x 4

## 2023-02-21 NOTE — TELEPHONE ENCOUNTER
If she's been back on for more than 2 weeks she should be fine.  4 mg 5 days, 2 mg other 2 recheck 2 weeks.

## 2023-02-22 ENCOUNTER — OFFICE VISIT (OUTPATIENT)
Dept: SURGERY | Facility: CLINIC | Age: 78
End: 2023-02-22
Payer: MEDICARE

## 2023-02-22 ENCOUNTER — PATIENT ROUNDING (BHMG ONLY) (OUTPATIENT)
Dept: SURGERY | Facility: CLINIC | Age: 78
End: 2023-02-22
Payer: MEDICARE

## 2023-02-22 VITALS
BODY MASS INDEX: 21.35 KG/M2 | HEIGHT: 67 IN | DIASTOLIC BLOOD PRESSURE: 58 MMHG | HEART RATE: 62 BPM | WEIGHT: 136 LBS | OXYGEN SATURATION: 94 % | SYSTOLIC BLOOD PRESSURE: 98 MMHG

## 2023-02-22 DIAGNOSIS — R13.19 ESOPHAGEAL DYSPHAGIA: Primary | ICD-10-CM

## 2023-02-22 PROCEDURE — 99213 OFFICE O/P EST LOW 20 MIN: CPT | Performed by: STUDENT IN AN ORGANIZED HEALTH CARE EDUCATION/TRAINING PROGRAM

## 2023-02-22 NOTE — PROGRESS NOTES
February 22, 2023    Done in office       We're always looking for ways to make our patients' experiences even better. Do you have recommendations on ways we may improve?  no    Overall were you satisfied with your first visit to our practice? yes

## 2023-02-22 NOTE — PROGRESS NOTES
"Chief Complaint  Follow-up status post esophageal dilation  Subjective        Joellen Dominguez presents to [facility]  History of Present Illness  Ms. Mensah is a pleasant 77-year-old lady who underwent esophageal dilation with Dr. Ca on 6/20/2022.  She presents today for evaluation and follow-up for her dysphagia.  She states that her swallowing has improved greatly.  She occasionally does get a pill stuck in her throat but has no other issues.      Objective   Vital Signs:  There were no vitals taken for this visit.  Estimated body mass index is 21.83 kg/m² as calculated from the following:    Height as of 2/15/23: 170.2 cm (67\").    Weight as of 2/15/23: 63.2 kg (139 lb 6.4 oz).       BMI is within normal parameters. No other follow-up for BMI required.      Physical Exam  Vitals reviewed.   Constitutional:       Appearance: Normal appearance. She is normal weight.   HENT:      Head: Normocephalic.      Mouth/Throat:      Mouth: Mucous membranes are moist.   Cardiovascular:      Rate and Rhythm: Normal rate and regular rhythm.   Pulmonary:      Effort: Pulmonary effort is normal.      Breath sounds: Normal breath sounds.   Musculoskeletal:         General: Normal range of motion.      Cervical back: Normal range of motion.   Skin:     General: Skin is warm.   Neurological:      General: No focal deficit present.      Mental Status: She is alert and oriented to person, place, and time. Mental status is at baseline.   Psychiatric:         Mood and Affect: Mood normal.         Behavior: Behavior normal.         Thought Content: Thought content normal.         Judgment: Judgment normal.        Result Review :               Assessment and Plan   Diagnoses and all orders for this visit:    1. Esophageal dysphagia (Primary)      Ms. Dominguez is a pleasant 77-year-old lady who presents for evaluation after undergoing esophageal dilation by Dr. Ca.  She states that she is doing " well, occasionally does get a pill stuck while swallowing but is able to get that down.  She states that her overall swallowing is still improved prior to her previous esophageal dilation.  We discussed the possible need for repeat esophageal dilations.  She does not feel like she is at that point yet and is comfortable with her current ability to swallow.  She can follow-up in 6 months for evaluation for need of repeat esophageal dilation.  She does understand that if she is unable to swallow in the interim I be happy to see her back sooner.     I spent 20 minutes caring for Joellen on this date of service. This time includes time spent by me in the following activities:preparing for the visit, reviewing tests, obtaining and/or reviewing a separately obtained history, performing a medically appropriate examination and/or evaluation , counseling and educating the patient/family/caregiver, ordering medications, tests, or procedures, referring and communicating with other health care professionals , documenting information in the medical record, independently interpreting results and communicating that information with the patient/family/caregiver and care coordination  Follow Up   No follow-ups on file.  Patient was given instructions and counseling regarding her condition or for health maintenance advice. Please see specific information pulled into the AVS if appropriate.

## 2023-02-23 DIAGNOSIS — M54.16 LUMBAR RADICULOPATHY: Primary | ICD-10-CM

## 2023-03-28 ENCOUNTER — OFFICE VISIT (OUTPATIENT)
Dept: INTERNAL MEDICINE | Facility: CLINIC | Age: 78
End: 2023-03-28
Payer: MEDICARE

## 2023-03-28 VITALS
BODY MASS INDEX: 22.13 KG/M2 | DIASTOLIC BLOOD PRESSURE: 54 MMHG | WEIGHT: 141 LBS | HEART RATE: 62 BPM | HEIGHT: 67 IN | SYSTOLIC BLOOD PRESSURE: 118 MMHG

## 2023-03-28 DIAGNOSIS — I10 ESSENTIAL HYPERTENSION: Primary | Chronic | ICD-10-CM

## 2023-03-28 DIAGNOSIS — Z00.00 MEDICARE ANNUAL WELLNESS VISIT, SUBSEQUENT: ICD-10-CM

## 2023-03-28 DIAGNOSIS — E53.8 B12 DEFICIENCY: ICD-10-CM

## 2023-03-28 DIAGNOSIS — E53.8 B12 DEFICIENCY: Primary | ICD-10-CM

## 2023-03-28 DIAGNOSIS — D64.9 ANEMIA, UNSPECIFIED TYPE: ICD-10-CM

## 2023-03-28 DIAGNOSIS — M54.16 LUMBAR RADICULOPATHY: ICD-10-CM

## 2023-03-28 RX ORDER — CYANOCOBALAMIN 1000 UG/ML
1000 INJECTION, SOLUTION INTRAMUSCULAR; SUBCUTANEOUS
Status: SHIPPED | OUTPATIENT
Start: 2023-03-28

## 2023-03-28 RX ORDER — TRAMADOL HYDROCHLORIDE 50 MG/1
50 TABLET ORAL EVERY 6 HOURS PRN
Qty: 60 TABLET | Refills: 0 | Status: SHIPPED | OUTPATIENT
Start: 2023-03-28

## 2023-03-28 RX ORDER — CYANOCOBALAMIN 1000 UG/ML
1000 INJECTION, SOLUTION INTRAMUSCULAR; SUBCUTANEOUS
Qty: 3 ML | Refills: 3 | Status: SHIPPED | OUTPATIENT
Start: 2023-03-28 | End: 2023-04-07 | Stop reason: SDUPTHER

## 2023-03-28 RX ADMIN — CYANOCOBALAMIN 1000 MCG: 1000 INJECTION, SOLUTION INTRAMUSCULAR; SUBCUTANEOUS at 15:59

## 2023-03-28 NOTE — PROGRESS NOTES
The ABCs of the Annual Wellness Visit  Subsequent Medicare Wellness Visit    Subjective    Joellen Dominguez is a 77 y.o. female who presents for a Subsequent Medicare Wellness Visit.    The following portions of the patient's history were reviewed and   updated as appropriate: allergies, current medications, past family history, past medical history, past social history, past surgical history and problem list.    Compared to one year ago, the patient feels her physical   health is the same.    Compared to one year ago, the patient feels her mental   health is the same.    Recent Hospitalizations:  This patient has had a [facility] admission record on file within the last 365 days.    Current Medical Providers:  Patient Care Team:  Chitra Leyva MD as PCP - General (Internal Medicine)  Karthik Reyez MD as Consulting Physician (Cardiology)  Francisco Salomon MD as Consulting Physician (Gastroenterology)  Jose C Cordero MD as Consulting Physician (Pulmonary Disease)  Lillian Azevedo MD as Consulting Physician (Nephrology)  Thiago Perez MD as Consulting Physician (Urology)  Maura Ashley APRN as Nurse Practitioner (Gastroenterology)  Chong Buchanan MD as Consulting Physician (Cardiac Electrophysiology)    Outpatient Medications Prior to Visit   Medication Sig Dispense Refill   • atorvastatin (LIPITOR) 10 MG tablet Take 1 tablet by mouth Daily. 90 tablet 3   • buPROPion XL (WELLBUTRIN XL) 300 MG 24 hr tablet Take 1 tablet by mouth Daily. 90 tablet 3   • Calcium Carbonate-Vit D-Min (CALCIUM 1200 PO) Take  by mouth Daily.     • clobetasol (TEMOVATE) 0.05 % cream Apply 1 application topically to the appropriate area as directed 2 (Two) Times a Day As Needed.     • erythromycin (ROMYCIN) 5 MG/GM ophthalmic ointment Daily As Needed.     • famotidine (PEPCID) 20 MG tablet Take 1 tablet by mouth 2 (Two) Times a Day Before Meals. 180 tablet 3   • fexofenadine (ALLEGRA) 180 MG tablet  Take 1 tablet by mouth Daily.     • folic acid (FOLVITE) 400 MCG tablet Take 1 tablet by mouth Daily.     • levothyroxine (SYNTHROID, LEVOTHROID) 100 MCG tablet Take 1 tablet by mouth Daily. 90 tablet 3   • Macitentan (Opsumit) 10 MG tablet Take 10 mg by mouth Daily.     • Macitentan 10 MG tablet Take 1 tablet by mouth Daily.     • mesalamine (PENTASA) 500 MG CR capsule Take 2 capsules by mouth 4 (Four) Times a Day. (Patient taking differently: Take 4 capsules by mouth 2 (Two) Times a Day.) 500 capsule 3   • Methscopolamine Bromide (PAMINE FORTE PO) Take 1 tablet by mouth 2 (Two) Times a Day As Needed.     • potassium chloride (K-DUR,KLOR-CON) 20 MEQ CR tablet Take 1 tablet by mouth 3 (Three) Times a Day. 270 tablet 1   • Riociguat (Adempas) 2.5 MG tablet Take 1 tablet by mouth 3 (Three) Times a Day.     • torsemide (DEMADEX) 20 MG tablet Take 1 tablet by mouth Daily. (Patient taking differently: Take 2 tablets by mouth Daily.) 30 tablet 0   • Uptravi 200 & 800 MCG tablet therapy pack      • Ustekinumab (STELARA SC) Inject 1 syringe under the skin into the appropriate area as directed. One syringe every seven weeks.  Option care.  pk     • warfarin (COUMADIN) 2 MG tablet TAKE 2 TABLETS BY MOUTH 5  DAYS PER WEEK AND 1 TABLET  BY MOUTH ONE DAY WEEKLY FOR ATRIAL FIBRILLATION 180 tablet 3   • Zinc 50 MG capsule Take  by mouth.     • traMADol (ULTRAM) 50 MG tablet Take 1 tablet by mouth Every 6 (Six) Hours As Needed for Moderate Pain. 60 tablet 0     No facility-administered medications prior to visit.       Opioid medication/s are on active medication list.  and I have evaluated her active treatment plan and pain score trends (see table).  There were no vitals filed for this visit.  I have reviewed the chart for potential of high risk medication and harmful drug interactions in the elderly.            Aspirin is not on active medication list.  Aspirin use is not indicated based on review of current medical condition/s.  "Risk of harm outweighs potential benefits.  .    Patient Active Problem List   Diagnosis   • Degeneration of lumbar intervertebral disc   • Essential hypertension   • Depression   • Antiphospholipid antibody syndrome (HCC)   • Lichen sclerosus et atrophicus   • Myocardial infarction type 2 (HCC)   • Leukocytosis   • Pulmonary HTN (HCC)   • Cold agglutinin disease (HCC)   • Chronic diastolic CHF (congestive heart failure) (HCC)   • COPD with emphysema (HCC)   • Inflammatory bowel disease (Crohn's disease) (HCC)   • Long term current use of anticoagulant therapy   • Atrial fibrillation, permanent (HCC)   • S/P atrioventricular hyacinth ablation   • Presence of cardiac pacemaker   • Moderate mitral insufficiency   • Obstructive sleep apnea syndrome   • Diverticulum of esophagus, acquired   • High risk medication use   • Herpes simplex esophagitis   • Peripheral vascular disease (HCC)   • Age related osteoporosis   • Urinary incontinence   • Esophageal dysphagia     Advance Care Planning  Advance Directive is on file.  ACP discussion was held with the patient during this visit. Patient has an advance directive in EMR which is still valid.      Objective    Vitals:    03/28/23 1528   BP: 118/54   Pulse: 62   Weight: 64 kg (141 lb)   Height: 170.2 cm (67\")     Estimated body mass index is 22.08 kg/m² as calculated from the following:    Height as of this encounter: 170.2 cm (67\").    Weight as of this encounter: 64 kg (141 lb).    BMI is within normal parameters. No other follow-up for BMI required.      Does the patient have evidence of cognitive impairment? No    Lab Results   Component Value Date    CHLPL 77 03/21/2023    TRIG 56 03/21/2023    HDL 51 03/21/2023    LDL 12 03/21/2023    VLDL 14 03/21/2023        HEALTH RISK ASSESSMENT    Smoking Status:  Social History     Tobacco Use   Smoking Status Former   • Packs/day: 2.00   • Years: 8.00   • Pack years: 16.00   • Types: Cigarettes   • Start date: 7/1/1959   • Quit " date: 1967   • Years since quittin.6   • Passive exposure: Past   Smokeless Tobacco Never   Tobacco Comments    from age 14 to 22     Alcohol Consumption:  Social History     Substance and Sexual Activity   Alcohol Use No     Fall Risk Screen:    LOS Fall Risk Assessment was completed, and patient is at LOW risk for falls.Assessment completed on:3/28/2023    Depression Screening:  PHQ-2/PHQ-9 Depression Screening 3/28/2023   Little Interest or Pleasure in Doing Things 0-->not at all   Feeling Down, Depressed or Hopeless 0-->not at all   PHQ-9: Brief Depression Severity Measure Score 0       Health Habits and Functional and Cognitive Screening:  Functional & Cognitive Status 3/28/2023   Do you have difficulty preparing food and eating? No   Do you have difficulty bathing yourself, getting dressed or grooming yourself? No   Do you have difficulty using the toilet? No   Do you have difficulty moving around from place to place? -   Do you have trouble with steps or getting out of a bed or a chair? No   Current Diet Well Balanced Diet   Dental Exam Up to date   Eye Exam Up to date   Exercise (times per week) 0 times per week   Current Exercises Include No Regular Exercise   Do you need help using the phone?  No   Are you deaf or do you have serious difficulty hearing?  No   Do you need help with transportation? No   Do you need help shopping? No   Do you need help preparing meals?  No   Do you need help with housework?  No   Do you need help with laundry? No   Do you need help taking your medications? No   Do you need help managing money? No   Do you ever drive or ride in a car without wearing a seat belt? No   Have you felt unusual stress, anger or loneliness in the last month? No   Who do you live with? Spouse   If you need help, do you have trouble finding someone available to you? No   Have you been bothered in the last four weeks by sexual problems? No   Do you have difficulty concentrating, remembering  or making decisions? No       Age-appropriate Screening Schedule:  Refer to the list below for future screening recommendations based on patient's age, sex and/or medical conditions. Orders for these recommended tests are listed in the plan section. The patient has been provided with a written plan.    Health Maintenance   Topic Date Due   • ZOSTER VACCINE (1 of 2) Never done   • ANNUAL WELLNESS VISIT  03/15/2023   • MAMMOGRAM  01/03/2024   • LIPID PANEL  03/21/2024   • DXA SCAN  08/08/2024   • COLORECTAL CANCER SCREENING  08/24/2028   • TDAP/TD VACCINES (2 - Td or Tdap) 06/02/2030   • HEPATITIS C SCREENING  Completed   • COVID-19 Vaccine  Completed   • INFLUENZA VACCINE  Completed   • Pneumococcal Vaccine 65+  Completed                CMS Preventative Services Quick Reference  Risk Factors Identified During Encounter  Immunizations Discussed/Encouraged: Shingrix  The above risks/problems have been discussed with the patient.  Pertinent information has been shared with the patient in the After Visit Summary.  An After Visit Summary and PPPS were made available to the patient.    Follow Up:   Next Medicare Wellness visit to be scheduled in 1 year.       Additional E&M Note during same encounter follows:  Patient has multiple medical problems which are significant and separately identifiable that require additional work above and beyond the Medicare Wellness Visit.      Chief Complaint  Medicare Wellness-subsequent    Subjective        HPI  Joellen Dominguez is also being seen today for B12 deficiency- used to be on monthly injections but thinks she was told by a specialist at some point that she should stop it.  She is also taking iron supplements, one daily.  Her pulmonary fibrosis is good- the Uptravi is very helpful but it is $2500/month copay.   Seeing neurosurgeon next week for her back issues- pain has gotten worse- she is using tramadol for pain - usually just at night but sometimes during the night she takes a  "second one.        Objective   Vital Signs:  /54   Pulse 62   Ht 170.2 cm (67\")   Wt 64 kg (141 lb)   BMI 22.08 kg/m²     Physical Exam  Constitutional:       Appearance: Normal appearance.   Cardiovascular:      Rate and Rhythm: Normal rate.   Pulmonary:      Effort: Pulmonary effort is normal.   Musculoskeletal:      Right lower leg: No edema.      Left lower leg: No edema.          The following data was reviewed by: Chitra Leyva MD on 03/28/2023:  Common labs    Common Labs 6/20/22 6/21/22 6/21/22 3/21/23 3/21/23 3/21/23     0612 0612 0939 0939 0939   Glucose 92  85  85    BUN 33 (A)  28 (A)  21    Creatinine 1.39 (A)  1.18 (A)  1.29 (A)    Sodium 139  140  145    Potassium 3.1 (A)  3.5  4.0    Chloride 104  106  107    Calcium 8.7  8.7  9.4    Total Protein     5.8 (A)    Albumin 3.40 (A)  3.10 (A)  3.9    Total Bilirubin     0.7    Alkaline Phosphatase     44    AST (SGOT)     18    ALT (SGPT)     9    WBC  11.96 (A)  6.10     Hemoglobin  11.8 (A)  10.4 (A)     Hematocrit  35.8  31.4 (A)     Platelets  286  237     Total Cholesterol      77   Triglycerides      56   HDL Cholesterol      51   LDL Cholesterol       12   (A) Abnormal value       Comments are available for some flowsheets but are not being displayed.           Data reviewed: Consultant notes pulm           Assessment and Plan   Diagnoses and all orders for this visit:    1. Essential hypertension (Primary)    2. Lumbar radiculopathy  Comments:  needs MRI as it has been a few years with worsening sx.  Tramadol given for prn only- check INR next week if taking regularly.   Orders:  -     traMADol (ULTRAM) 50 MG tablet; Take 1 tablet by mouth Every 6 (Six) Hours As Needed for Moderate Pain.  Dispense: 60 tablet; Refill: 0    3. Anemia, unspecified type  Comments:  B12 and iron def- increase iron to BID and B12 to monthly inj. recheck 3 months.   Orders:  -     CBC & Differential; Future  -     Iron Profile; Future  -     Vitamin B12; " Future    4. B12 deficiency  Comments:  resume monthly B12 inj  Orders:  -     cyanocobalamin injection 1,000 mcg    5. Medicare annual wellness visit, subsequent  Comments:  Doing ok despite multiple medical issues- recommended Shingrix.  continue what she can do staying active!             Follow Up   Return in about 3 months (around 6/28/2023) for Recheck, Lab Before FUP.  Patient was given instructions and counseling regarding her condition or for health maintenance advice. Please see specific information pulled into the AVS if appropriate.

## 2023-04-07 DIAGNOSIS — E53.8 B12 DEFICIENCY: ICD-10-CM

## 2023-04-07 RX ORDER — CYANOCOBALAMIN 1000 UG/ML
1000 INJECTION, SOLUTION INTRAMUSCULAR; SUBCUTANEOUS
Qty: 3 ML | Refills: 3 | Status: SHIPPED | OUTPATIENT
Start: 2023-04-07

## 2023-04-21 ENCOUNTER — TELEPHONE (OUTPATIENT)
Dept: CARDIOLOGY | Facility: CLINIC | Age: 78
End: 2023-04-21
Payer: MEDICARE

## 2023-05-22 LAB — INR PPP: 1.8

## 2023-05-23 ENCOUNTER — ANTICOAGULATION VISIT (OUTPATIENT)
Dept: INTERNAL MEDICINE | Facility: CLINIC | Age: 78
End: 2023-05-23
Payer: MEDICARE

## 2023-05-23 ENCOUNTER — TELEPHONE (OUTPATIENT)
Dept: INTERNAL MEDICINE | Facility: CLINIC | Age: 78
End: 2023-05-23

## 2023-05-23 NOTE — TELEPHONE ENCOUNTER
Last week she was 1.2 and you had her continue the same medication and repeat yesterday.  Now she is 1.3

## 2023-05-23 NOTE — TELEPHONE ENCOUNTER
Caller: [facility]    Relationship: Other    Best call back number: [phone]    What was the call regarding: LEIA FROM [facility] CALLING TO REPORT OUT OF RANGE INR.    05/22/23   INR:1.3

## 2023-05-31 ENCOUNTER — TELEPHONE (OUTPATIENT)
Dept: INTERNAL MEDICINE | Facility: CLINIC | Age: 78
End: 2023-05-31

## 2023-05-31 NOTE — TELEPHONE ENCOUNTER
Samara ([facility]) [phone]   Ms Alberto Out of Range INR was 1.6.

## 2023-06-05 DIAGNOSIS — K50.80 CROHN'S DISEASE OF BOTH SMALL AND LARGE INTESTINE WITHOUT COMPLICATION: Primary | ICD-10-CM

## 2023-06-05 RX ORDER — MESALAMINE 500 MG/1
1000 CAPSULE, EXTENDED RELEASE ORAL 4 TIMES DAILY
Qty: 500 CAPSULE | Refills: 3 | Status: SHIPPED | OUTPATIENT
Start: 2023-06-05

## 2023-06-05 RX ORDER — MESALAMINE 500 MG/1
1000 CAPSULE, EXTENDED RELEASE ORAL 4 TIMES DAILY
Qty: 500 CAPSULE | Refills: 3 | Status: SHIPPED | OUTPATIENT
Start: 2023-06-05 | End: 2023-06-05 | Stop reason: SDUPTHER

## 2023-06-12 DIAGNOSIS — M54.16 LUMBAR RADICULOPATHY: ICD-10-CM

## 2023-06-12 RX ORDER — TRAMADOL HYDROCHLORIDE 50 MG/1
50 TABLET ORAL EVERY 6 HOURS PRN
Qty: 60 TABLET | Refills: 0 | Status: SHIPPED | OUTPATIENT
Start: 2023-06-12

## 2023-07-06 PROBLEM — E53.8 B12 DEFICIENCY: Status: ACTIVE | Noted: 2023-07-06

## 2023-07-06 PROBLEM — D50.9 IRON DEFICIENCY ANEMIA: Status: ACTIVE | Noted: 2023-07-06

## 2023-07-24 ENCOUNTER — TELEPHONE (OUTPATIENT)
Dept: GASTROENTEROLOGY | Facility: CLINIC | Age: 78
End: 2023-07-24
Payer: MEDICARE

## 2023-07-24 NOTE — TELEPHONE ENCOUNTER
Sakshi Martinez, Layne Dorsey RN  I saw patient in the office today.  She is currently getting Stelara injections at [facility].  She reports her financial assistance is running out and soon injections will be $1400 a shot.  Does she have any other more affordable ways to get this?  [facility] pk

## 2023-08-01 ENCOUNTER — TELEPHONE (OUTPATIENT)
Dept: GASTROENTEROLOGY | Facility: CLINIC | Age: 78
End: 2023-08-01
Payer: MEDICARE

## 2023-08-01 ENCOUNTER — PATIENT MESSAGE (OUTPATIENT)
Dept: GASTROENTEROLOGY | Facility: CLINIC | Age: 78
End: 2023-08-01
Payer: MEDICARE

## 2023-08-07 NOTE — PROGRESS NOTES
"Joellen Dominguez is a 77 y.o. female admitted to [facility] and  is seen for follow up of viral pneumonia    Admission date 6/15/2022 D/C date 6/21/2022  Discharge summary is available.  Risk for Readmission (LACE) Score: 12 (6/21/2022  6:01 AM)         Current outpatient and discharge medications have been reconciled for the patient.  Reviewed by: Chitra Leyva MD      She was admitted for the following reason(s):  Primary admitting diagnosis at discharge was viral pneumonia.  Pertinent secondary diagnoses include candida esophagitis.  Course During Hospital Stay:  reviewed  Procedures Performed in Hospital: please see EPIC record for further details of results and finding  Pending tests: none  Pain Control:  no pain  Diet: as tolerated  Activity after Discharge: activity as tolerated    Items to be addressed at first follow up visit include:  1. Medication changes: addition of fluconazole for 20 days and famotidine  2.     She reports her overall condition are improving since discharge.  No specific complaints.  Social support is said to be adequate to meet her needs. .     Objective   Vitals:    06/30/22 1037   BP: (!) 84/46   Pulse: 88   Temp: 97.3 °F (36.3 °C)   Weight: 63.5 kg (140 lb)   Height: 170.2 cm (67\")     Body mass index is 21.93 kg/m².    Physical Exam  Constitutional:       Appearance: Normal appearance.   Cardiovascular:      Rate and Rhythm: Normal rate and regular rhythm.   Pulmonary:      Effort: Pulmonary effort is normal.      Breath sounds: Normal breath sounds.   Musculoskeletal:      Right lower leg: No edema.      Left lower leg: No edema.   Neurological:      General: No focal deficit present.   Psychiatric:         Mood and Affect: Mood normal.         Thought Content: Thought content normal.          Assessment & Plan    Problem List Items Addressed This Visit        Coag and Thromboembolic    Antiphospholipid antibody syndrome (HCC) - Primary    Relevant Orders    POCT " INR (Completed)       Infectious Diseases    Candidal esophagitis (HCC)    Relevant Medications    famotidine (PEPCID) 20 MG tablet    fluconazole (DIFLUCAN) 100 MG tablet      Other Visit Diagnoses     Supratherapeutic INR        hold until INR < 2.5- then resume 1/2 warfarin dose until done with fluconazole.  check INR every other day while on fluconazole and warfarin.    Pneumonia due to human metapneumovirus        clinically improved    Stage 3b chronic kidney disease (HCC)        back to baseline at dischare           {Followup: 23]    This post hospital patient care was coordinated using transitional care management strategy:  I certify that the following are true:  1. Communication was made within two business days of discharge.  2. Complexity of MDM is MODERATE  3. A Face to Face visit occurred within within 14 days       Male

## 2023-08-08 ENCOUNTER — TELEPHONE (OUTPATIENT)
Dept: GASTROENTEROLOGY | Facility: CLINIC | Age: 78
End: 2023-08-08

## 2023-08-10 ENCOUNTER — TELEPHONE (OUTPATIENT)
Dept: INTERNAL MEDICINE | Facility: CLINIC | Age: 78
End: 2023-08-10

## 2023-08-26 DIAGNOSIS — U07.1 COVID-19 VIRUS INFECTION: Primary | ICD-10-CM

## 2023-09-08 ENCOUNTER — PATIENT MESSAGE (OUTPATIENT)
Dept: GASTROENTEROLOGY | Facility: CLINIC | Age: 78
End: 2023-09-08
Payer: MEDICARE

## 2023-09-11 DIAGNOSIS — I10 ESSENTIAL HYPERTENSION: Primary | ICD-10-CM

## 2023-09-14 ENCOUNTER — LAB (OUTPATIENT)
Facility: HOSPITAL | Age: 78
End: 2023-09-14
Payer: MEDICARE

## 2023-09-14 LAB
ANION GAP SERPL CALCULATED.3IONS-SCNC: 10 MMOL/L (ref 5–15)
BUN SERPL-MCNC: 30 MG/DL (ref 8–23)
BUN/CREAT SERPL: 22.4 (ref 7–25)
CALCIUM SPEC-SCNC: 9.2 MG/DL (ref 8.6–10.5)
CHLORIDE SERPL-SCNC: 110 MMOL/L (ref 98–107)
CO2 SERPL-SCNC: 21 MMOL/L (ref 22–29)
CREAT SERPL-MCNC: 1.34 MG/DL (ref 0.57–1)
EGFRCR SERPLBLD CKD-EPI 2021: 40.4 ML/MIN/1.73
GLUCOSE SERPL-MCNC: 92 MG/DL (ref 65–99)
POTASSIUM SERPL-SCNC: 4.6 MMOL/L (ref 3.5–5.2)
SODIUM SERPL-SCNC: 141 MMOL/L (ref 136–145)

## 2023-09-14 PROCEDURE — 80048 BASIC METABOLIC PNL TOTAL CA: CPT | Performed by: INTERNAL MEDICINE

## 2023-09-14 PROCEDURE — 36415 COLL VENOUS BLD VENIPUNCTURE: CPT | Performed by: INTERNAL MEDICINE

## 2023-09-15 ENCOUNTER — TELEPHONE (OUTPATIENT)
Dept: SURGERY | Facility: CLINIC | Age: 78
End: 2023-09-15
Payer: MEDICARE

## 2023-09-18 RX ORDER — ONDANSETRON 4 MG/1
TABLET, FILM COATED ORAL
Qty: 30 TABLET | Refills: 0 | Status: SHIPPED | OUTPATIENT
Start: 2023-09-18

## 2023-09-23 DIAGNOSIS — M54.16 LUMBAR RADICULOPATHY: ICD-10-CM

## 2023-09-25 RX ORDER — TRAMADOL HYDROCHLORIDE 50 MG/1
TABLET ORAL
Qty: 60 TABLET | Refills: 0 | Status: SHIPPED | OUTPATIENT
Start: 2023-09-25

## 2023-09-27 DIAGNOSIS — M81.0 AGE RELATED OSTEOPOROSIS, UNSPECIFIED PATHOLOGICAL FRACTURE PRESENCE: ICD-10-CM

## 2023-09-27 DIAGNOSIS — M85.88 OTHER SPECIFIED DISORDERS OF BONE DENSITY AND STRUCTURE, OTHER SITE: Primary | ICD-10-CM

## 2023-09-27 RX ORDER — ZOLEDRONIC ACID 5 MG/100ML
5 INJECTION, SOLUTION INTRAVENOUS ONCE
OUTPATIENT
Start: 2023-09-27

## 2023-09-27 RX ORDER — SODIUM CHLORIDE 9 MG/ML
250 INJECTION, SOLUTION INTRAVENOUS ONCE
OUTPATIENT
Start: 2023-09-27

## 2023-09-27 NOTE — PROGRESS NOTES
"Chief Complaint  Follow-up status post esophageal dilation    Subjective        Joellen Dominguez presents to [facility] THORACIC SURGERY  History of Present Illness    Ms. Mensah is a pleasant 77-year-old lady who underwent esophageal dilation with Dr. Ca on 6/20/2022 with appropriate response.  She presents today for evaluation and follow-up for her dysphagia.  She states she has noticed some difficulty swallowing liquids over the past month.  She has trouble swallowing pills and large pieces of meat and has been focusing more on a soft diet with liquids.  She would like to schedule EGD with dilation as she has had relief from this in the past.      Objective   Vital Signs:  /56 (BP Location: Left arm, Patient Position: Sitting, Cuff Size: Adult)   Pulse 89   Ht 170.2 cm (67\")   Wt 61.2 kg (135 lb)   SpO2 100%   BMI 21.14 kg/m²   Estimated body mass index is 21.14 kg/m² as calculated from the following:    Height as of this encounter: 170.2 cm (67\").    Weight as of this encounter: 61.2 kg (135 lb).       BMI is within normal parameters. No other follow-up for BMI required.      Physical Exam  Vitals reviewed.   Constitutional:       Appearance: Normal appearance. She is normal weight.   HENT:      Head: Normocephalic.      Mouth/Throat:      Mouth: Mucous membranes are moist.   Cardiovascular:      Rate and Rhythm: Normal rate and regular rhythm.   Pulmonary:      Effort: Pulmonary effort is normal.      Breath sounds: Normal breath sounds.   Musculoskeletal:         General: Normal range of motion.      Cervical back: Normal range of motion.   Skin:     General: Skin is warm.   Neurological:      General: No focal deficit present.      Mental Status: She is alert and oriented to person, place, and time. Mental status is at baseline.   Psychiatric:         Mood and Affect: Mood normal.         Behavior: Behavior normal.         Thought Content: Thought content normal.         " Judgment: Judgment normal.      Result Review :               Assessment and Plan   Diagnoses and all orders for this visit:    1. Esophageal dysphagia (Primary)  -     Case Request; Standing  -     sodium chloride 0.9 % flush 3 mL  -     sodium chloride 0.9 % flush 3-10 mL  -     sodium chloride 0.9 % infusion 40 mL  -     Case Request    Other orders  -     Follow Anesthesia Guidelines / Protocol; Future  -     Follow Anesthesia Guidelines / Protocol; Standing  -     Verify / Perform Chlorhexidine Skin Prep; Standing  -     Verify / Perform Chlorhexidine Skin Prep if Indicated (If Not Already Completed); Standing  -     Obtain Informed Consent; Future  -     Provide NPO Instructions to Patient; Future  -     Chlorhexidine Skin Prep; Future  -     Insert Peripheral IV; Standing  -     Saline Lock & Maintain IV Access; Standing      Ms. Dominguez is a pleasant 77-year-old lady who presents for evaluation after undergoing esophageal dilation by Dr. Ca on 6/20/2022.  Had difficulty swallowing solid foods and pills over the past month and is requesting to repeat an EGD with dilation in the near future.  Preoperative orders have been placed our surgery scheduler will call with details and final arrangements.  She will need to stop her warfarin about 5 days prior to procedure.  This was discussed with her today.       I spent 32 minutes caring for Joellen on this date of service. This time includes time spent by me in the following activities:preparing for the visit, reviewing tests, obtaining and/or reviewing a separately obtained history, performing a medically appropriate examination and/or evaluation , counseling and educating the patient/family/caregiver, ordering medications, tests, or procedures, referring and communicating with other health care professionals , documenting information in the medical record, independently interpreting results and communicating that information with the patient/family/caregiver and  care coordination      Follow Up   Return for Next scheduled follow up.  Patient was given instructions and counseling regarding her condition or for health maintenance advice. Please see specific information pulled into the AVS if appropriate.

## 2023-09-27 NOTE — H&P (VIEW-ONLY)
"Chief Complaint  Follow-up status post esophageal dilation    Subjective        Joellen Dominguez presents to [facility]  History of Present Illness    Ms. Mensah is a pleasant 77-year-old lady who underwent esophageal dilation with Dr. Ca on 6/20/2022 with appropriate response.  She presents today for evaluation and follow-up for her dysphagia.  She states she has noticed some difficulty swallowing liquids over the past month.  She has trouble swallowing pills and large pieces of meat and has been focusing more on a soft diet with liquids.  She would like to schedule EGD with dilation as she has had relief from this in the past.      Objective   Vital Signs:  /56 (BP Location: Left arm, Patient Position: Sitting, Cuff Size: Adult)   Pulse 89   Ht 170.2 cm (67\")   Wt 61.2 kg (135 lb)   SpO2 100%   BMI 21.14 kg/m²   Estimated body mass index is 21.14 kg/m² as calculated from the following:    Height as of this encounter: 170.2 cm (67\").    Weight as of this encounter: 61.2 kg (135 lb).       BMI is within normal parameters. No other follow-up for BMI required.      Physical Exam  Vitals reviewed.   Constitutional:       Appearance: Normal appearance. She is normal weight.   HENT:      Head: Normocephalic.      Mouth/Throat:      Mouth: Mucous membranes are moist.   Cardiovascular:      Rate and Rhythm: Normal rate and regular rhythm.   Pulmonary:      Effort: Pulmonary effort is normal.      Breath sounds: Normal breath sounds.   Musculoskeletal:         General: Normal range of motion.      Cervical back: Normal range of motion.   Skin:     General: Skin is warm.   Neurological:      General: No focal deficit present.      Mental Status: She is alert and oriented to person, place, and time. Mental status is at baseline.   Psychiatric:         Mood and Affect: Mood normal.         Behavior: Behavior normal.         Thought Content: Thought content normal.         " Judgment: Judgment normal.      Result Review :               Assessment and Plan   Diagnoses and all orders for this visit:    1. Esophageal dysphagia (Primary)  -     Case Request; Standing  -     sodium chloride 0.9 % flush 3 mL  -     sodium chloride 0.9 % flush 3-10 mL  -     sodium chloride 0.9 % infusion 40 mL  -     Case Request    Other orders  -     Follow Anesthesia Guidelines / Protocol; Future  -     Follow Anesthesia Guidelines / Protocol; Standing  -     Verify / Perform Chlorhexidine Skin Prep; Standing  -     Verify / Perform Chlorhexidine Skin Prep if Indicated (If Not Already Completed); Standing  -     Obtain Informed Consent; Future  -     Provide NPO Instructions to Patient; Future  -     Chlorhexidine Skin Prep; Future  -     Insert Peripheral IV; Standing  -     Saline Lock & Maintain IV Access; Standing      Ms. Dominguez is a pleasant 77-year-old lady who presents for evaluation after undergoing esophageal dilation by Dr. Ca on 6/20/2022.  Had difficulty swallowing solid foods and pills over the past month and is requesting to repeat an EGD with dilation in the near future.  Preoperative orders have been placed our surgery scheduler will call with details and final arrangements.  She will need to stop her warfarin about 5 days prior to procedure.  This was discussed with her today.       I spent 32 minutes caring for Joellen on this date of service. This time includes time spent by me in the following activities:preparing for the visit, reviewing tests, obtaining and/or reviewing a separately obtained history, performing a medically appropriate examination and/or evaluation , counseling and educating the patient/family/caregiver, ordering medications, tests, or procedures, referring and communicating with other health care professionals , documenting information in the medical record, independently interpreting results and communicating that information with the patient/family/caregiver and  care coordination      Follow Up   Return for Next scheduled follow up.  Patient was given instructions and counseling regarding her condition or for health maintenance advice. Please see specific information pulled into the AVS if appropriate.

## 2023-09-28 ENCOUNTER — OFFICE VISIT (OUTPATIENT)
Dept: SURGERY | Facility: CLINIC | Age: 78
End: 2023-09-28
Payer: MEDICARE

## 2023-09-28 VITALS
WEIGHT: 135 LBS | HEART RATE: 89 BPM | SYSTOLIC BLOOD PRESSURE: 106 MMHG | OXYGEN SATURATION: 100 % | DIASTOLIC BLOOD PRESSURE: 56 MMHG | BODY MASS INDEX: 21.19 KG/M2 | HEIGHT: 67 IN

## 2023-09-28 DIAGNOSIS — R13.19 ESOPHAGEAL DYSPHAGIA: Primary | ICD-10-CM

## 2023-09-28 PROCEDURE — 3078F DIAST BP <80 MM HG: CPT | Performed by: NURSE PRACTITIONER

## 2023-09-28 PROCEDURE — 3074F SYST BP LT 130 MM HG: CPT | Performed by: NURSE PRACTITIONER

## 2023-09-28 PROCEDURE — 99214 OFFICE O/P EST MOD 30 MIN: CPT | Performed by: NURSE PRACTITIONER

## 2023-09-28 PROCEDURE — 1160F RVW MEDS BY RX/DR IN RCRD: CPT | Performed by: NURSE PRACTITIONER

## 2023-09-28 PROCEDURE — 1159F MED LIST DOCD IN RCRD: CPT | Performed by: NURSE PRACTITIONER

## 2023-09-28 RX ORDER — SODIUM CHLORIDE 0.9 % (FLUSH) 0.9 %
3 SYRINGE (ML) INJECTION EVERY 12 HOURS SCHEDULED
OUTPATIENT
Start: 2023-09-28

## 2023-09-28 RX ORDER — SODIUM CHLORIDE 0.9 % (FLUSH) 0.9 %
3-10 SYRINGE (ML) INJECTION AS NEEDED
OUTPATIENT
Start: 2023-09-28

## 2023-09-28 RX ORDER — SODIUM CHLORIDE 9 MG/ML
40 INJECTION, SOLUTION INTRAVENOUS AS NEEDED
OUTPATIENT
Start: 2023-09-28

## 2023-10-10 ENCOUNTER — PATIENT MESSAGE (OUTPATIENT)
Dept: GASTROENTEROLOGY | Facility: CLINIC | Age: 78
End: 2023-10-10
Payer: MEDICARE

## 2023-10-10 RX ORDER — METHSCOPOLAMINE BROMIDE 5 MG/1
5 TABLET ORAL 2 TIMES DAILY PRN
Qty: 60 TABLET | Refills: 11 | Status: SHIPPED | OUTPATIENT
Start: 2023-10-10 | End: 2024-10-09

## 2023-10-16 ENCOUNTER — HOSPITAL ENCOUNTER (OUTPATIENT)
Dept: INFUSION THERAPY | Facility: HOSPITAL | Age: 78
Discharge: HOME | End: 2023-10-16
Payer: MEDICARE

## 2023-10-16 ENCOUNTER — PRE-ADMISSION TESTING (OUTPATIENT)
Dept: PREADMISSION TESTING | Facility: HOSPITAL | Age: 78
End: 2023-10-16
Payer: MEDICARE

## 2023-10-16 VITALS
SYSTOLIC BLOOD PRESSURE: 127 MMHG | WEIGHT: 136 LBS | BODY MASS INDEX: 21.3 KG/M2 | HEART RATE: 72 BPM | OXYGEN SATURATION: 98 % | TEMPERATURE: 97.1 F | DIASTOLIC BLOOD PRESSURE: 58 MMHG | RESPIRATION RATE: 16 BRPM

## 2023-10-16 DIAGNOSIS — M85.88 OTHER SPECIFIED DISORDERS OF BONE DENSITY AND STRUCTURE, OTHER SITE: ICD-10-CM

## 2023-10-16 DIAGNOSIS — M81.0 AGE RELATED OSTEOPOROSIS, UNSPECIFIED PATHOLOGICAL FRACTURE PRESENCE: Primary | ICD-10-CM

## 2023-10-16 LAB
ALBUMIN SERPL-MCNC: 3.9 G/DL (ref 3.5–5.2)
ALBUMIN/GLOB SERPL: 1.7 G/DL
ALP SERPL-CCNC: 51 U/L (ref 39–117)
ALT SERPL W P-5'-P-CCNC: 11 U/L (ref 1–33)
ANION GAP SERPL CALCULATED.3IONS-SCNC: 10 MMOL/L (ref 5–15)
APTT PPP: 47.7 SECONDS (ref 22.7–35.4)
AST SERPL-CCNC: 13 U/L (ref 1–32)
BASOPHILS # BLD AUTO: 0.04 10*3/MM3 (ref 0–0.2)
BASOPHILS NFR BLD AUTO: 0.7 % (ref 0–1.5)
BILIRUB SERPL-MCNC: 0.6 MG/DL (ref 0–1.2)
BUN SERPL-MCNC: 29 MG/DL (ref 8–23)
BUN/CREAT SERPL: 23.4 (ref 7–25)
CALCIUM SPEC-SCNC: 9.3 MG/DL (ref 8.6–10.5)
CHLORIDE SERPL-SCNC: 109 MMOL/L (ref 98–107)
CO2 SERPL-SCNC: 26 MMOL/L (ref 22–29)
CREAT SERPL-MCNC: 1.24 MG/DL (ref 0.57–1)
DEPRECATED RDW RBC AUTO: 45.1 FL (ref 37–54)
EGFRCR SERPLBLD CKD-EPI 2021: 44.4 ML/MIN/1.73
EOSINOPHIL # BLD AUTO: 0.27 10*3/MM3 (ref 0–0.4)
EOSINOPHIL NFR BLD AUTO: 4.7 % (ref 0.3–6.2)
ERYTHROCYTE [DISTWIDTH] IN BLOOD BY AUTOMATED COUNT: 14.1 % (ref 12.3–15.4)
GLOBULIN UR ELPH-MCNC: 2.3 GM/DL
GLUCOSE SERPL-MCNC: 85 MG/DL (ref 65–99)
HCT VFR BLD AUTO: 32.8 % (ref 34–46.6)
HGB BLD-MCNC: 10.4 G/DL (ref 12–15.9)
IMM GRANULOCYTES # BLD AUTO: 0.02 10*3/MM3 (ref 0–0.05)
IMM GRANULOCYTES NFR BLD AUTO: 0.3 % (ref 0–0.5)
INR PPP: 1.45 (ref 0.9–1.1)
LYMPHOCYTES # BLD AUTO: 1.06 10*3/MM3 (ref 0.7–3.1)
LYMPHOCYTES NFR BLD AUTO: 18.5 % (ref 19.6–45.3)
MAGNESIUM SERPL-MCNC: 2.3 MG/DL (ref 1.6–2.4)
MCH RBC QN AUTO: 28.3 PG (ref 26.6–33)
MCHC RBC AUTO-ENTMCNC: 31.7 G/DL (ref 31.5–35.7)
MCV RBC AUTO: 89.4 FL (ref 79–97)
MONOCYTES # BLD AUTO: 0.37 10*3/MM3 (ref 0.1–0.9)
MONOCYTES NFR BLD AUTO: 6.5 % (ref 5–12)
NEUTROPHILS NFR BLD AUTO: 3.96 10*3/MM3 (ref 1.7–7)
NEUTROPHILS NFR BLD AUTO: 69.3 % (ref 42.7–76)
NRBC BLD AUTO-RTO: 0 /100 WBC (ref 0–0.2)
PHOSPHATE SERPL-MCNC: 3.3 MG/DL (ref 2.5–4.5)
PLATELET # BLD AUTO: 233 10*3/MM3 (ref 140–450)
PMV BLD AUTO: 10.1 FL (ref 6–12)
POTASSIUM SERPL-SCNC: 3.4 MMOL/L (ref 3.5–5.2)
PROT SERPL-MCNC: 6.2 G/DL (ref 6–8.5)
PROTHROMBIN TIME: 17.9 SECONDS (ref 11.7–14.2)
QT INTERVAL: 434 MS
QTC INTERVAL: 469 MS
RBC # BLD AUTO: 3.67 10*6/MM3 (ref 3.77–5.28)
SODIUM SERPL-SCNC: 145 MMOL/L (ref 136–145)
WBC NRBC COR # BLD: 5.72 10*3/MM3 (ref 3.4–10.8)

## 2023-10-16 PROCEDURE — 96374 THER/PROPH/DIAG INJ IV PUSH: CPT

## 2023-10-16 PROCEDURE — 36415 COLL VENOUS BLD VENIPUNCTURE: CPT

## 2023-10-16 PROCEDURE — 93005 ELECTROCARDIOGRAM TRACING: CPT

## 2023-10-16 PROCEDURE — 80053 COMPREHEN METABOLIC PANEL: CPT | Performed by: INTERNAL MEDICINE

## 2023-10-16 PROCEDURE — 85025 COMPLETE CBC W/AUTO DIFF WBC: CPT | Performed by: INTERNAL MEDICINE

## 2023-10-16 PROCEDURE — 96365 THER/PROPH/DIAG IV INF INIT: CPT

## 2023-10-16 PROCEDURE — 84100 ASSAY OF PHOSPHORUS: CPT | Performed by: INTERNAL MEDICINE

## 2023-10-16 PROCEDURE — 25010000002 ZOLEDRONIC ACID 5 MG/100ML SOLUTION: Performed by: INTERNAL MEDICINE

## 2023-10-16 PROCEDURE — 83735 ASSAY OF MAGNESIUM: CPT | Performed by: INTERNAL MEDICINE

## 2023-10-16 PROCEDURE — 85610 PROTHROMBIN TIME: CPT

## 2023-10-16 PROCEDURE — 85730 THROMBOPLASTIN TIME PARTIAL: CPT

## 2023-10-16 RX ORDER — SODIUM CHLORIDE 9 MG/ML
250 INJECTION, SOLUTION INTRAVENOUS ONCE
Status: COMPLETED | OUTPATIENT
Start: 2023-10-16 | End: 2023-10-16

## 2023-10-16 RX ORDER — SODIUM CHLORIDE 9 MG/ML
250 INJECTION, SOLUTION INTRAVENOUS ONCE
OUTPATIENT
Start: 2024-10-15

## 2023-10-16 RX ORDER — ZOLEDRONIC ACID 5 MG/100ML
5 INJECTION, SOLUTION INTRAVENOUS ONCE
Status: CANCELLED | OUTPATIENT
Start: 2024-10-15

## 2023-10-16 RX ORDER — GABAPENTIN 300 MG/1
300 CAPSULE ORAL DAILY
COMMUNITY

## 2023-10-16 RX ORDER — ZOLEDRONIC ACID 5 MG/100ML
5 INJECTION, SOLUTION INTRAVENOUS ONCE
Status: COMPLETED | OUTPATIENT
Start: 2023-10-16 | End: 2023-10-16

## 2023-10-16 RX ADMIN — ZOLEDRONIC ACID 5 MG: 5 INJECTION, SOLUTION INTRAVENOUS at 14:06

## 2023-10-16 RX ADMIN — SODIUM CHLORIDE 100 ML: 9 INJECTION, SOLUTION INTRAVENOUS at 14:24

## 2023-10-16 NOTE — DISCHARGE INSTRUCTIONS
CHLORHEXIDINE CLOTH INSTRUCTIONS  The morning of surgery follow these instructions using the Chlorhexidine cloths you've been given.  These steps reduce bacteria on the body.  Do not use the cloths near your eyes, ears mouth, genitalia or on open wounds.  Throw the cloths away after use but do not try to flush them down a toilet.      Open and remove one cloth at a time from the package.    Leave the cloth unfolded and begin the bathing.  Massage the skin with the cloths using gentle pressure to remove bacteria.  Do not scrub harshly.   Follow the steps below with one 2% CHG cloth per area (6 total cloths).  One cloth for neck, shoulders and chest.  One cloth for both arms, hands, fingers and underarms (do underarms last).  One cloth for the abdomen followed by groin.  One cloth for right leg and foot including between the toes.  One cloth for left leg and foot including between the toes.  The last cloth is to be used for the back of the neck, back and buttocks.    Allow the CHG to air dry 3 minutes on the skin which will give it time to work and decrease the chance of irritation.  The skin may feel sticky until it is dry.  Do not rinse with water or any other liquid or you will lose the beneficial effects of the CHG.  If mild skin irritation occurs, do rinse the skin to remove the CHG.  Report this to the nurse at time of admission.  Do not apply lotions, creams, ointments, deodorants or perfumes after using the clothes. Dress in clean clothes before coming to the hospital.    Take the following medications the morning of surgery: LEVOTHYROXINE, OPSUMIT, RIOCIGUAT AND UPTRAVI     ARRIVAL TIME 05:30      If you are on prescription narcotic pain medication to control your pain you may also take that medication the morning of surgery.    General Instructions:  Do not eat solid food after midnight the night before surgery.  You may drink clear liquids day of surgery but must stop at least one hour before your hospital  arrival time.  It is beneficial for you to have a clear drink that contains carbohydrates the day of surgery.  We suggest a 12 to 20 ounce bottle of Gatorade or Powerade for non-diabetic patients or a 12 to 20 ounce bottle of G2 or Powerade Zero for diabetic patients. (Pediatric patients, are not advised to drink a 12 to 20 ounce carbohydrate drink)    Clear liquids are liquids you can see through.  Nothing red in color.     Plain water                               Sports drinks  Sodas                                   Gelatin (Jell-O)  Fruit juices without pulp such as white grape juice and apple juice  Popsicles that contain no fruit or yogurt  Tea or coffee (no cream or milk added)  Gatorade / Powerade  G2 / Powerade Zero    Patients who avoid smoking, chewing tobacco and alcohol for 4 weeks prior to surgery have a reduced risk of post-operative complications.  Quit smoking as many days before surgery as you can.  Do not smoke, use chewing tobacco or drink alcohol the day of surgery.   If applicable bring your C-PAP/ BI-PAP machine in with you to preop day of surgery.  Bring any papers given to you in the doctor’s office.  Wear clean comfortable clothes.  Do not wear contact lenses, false eyelashes or make-up.  Bring a case for your glasses.   Bring crutches or walker if applicable.  Remove all piercings.  Leave jewelry and any other valuables at home.  Hair extensions with metal clips must be removed prior to surgery.  The Pre-Admission Testing nurse will instruct you to bring medications if unable to obtain an accurate list in Pre-Admission Testing.        Preventing a Surgical Site Infection:  For 2 to 3 days before surgery, avoid shaving with a razor because the razor can irritate skin and make it easier to develop an infection.    Any areas of open skin can increase the risk of a post-operative wound infection by allowing bacteria to enter and travel throughout the body.  Notify your surgeon if you have any  skin wounds / rashes even if it is not near the expected surgical site.  The area will need assessed to determine if surgery should be delayed until it is healed.  The night prior to surgery shower using a fresh bar of anti-bacterial soap (such as Dial) and clean washcloth.  Sleep in a clean bed with clean clothing.  Do not allow pets to sleep with you.  Shower on the morning of surgery using a fresh bar of anti-bacterial soap (such as Dial) and clean washcloth.  Dry with a clean towel and dress in clean clothing.  Ask your surgeon if you will be receiving antibiotics prior to surgery.  Make sure you, your family, and all healthcare providers clean their hands with soap and water or an alcohol based hand  before caring for you or your wound.    Day of surgery:  Your arrival time is approximately two hours before your scheduled surgery time.  Upon arrival, a Pre-op nurse and Anesthesiologist will review your health history, obtain vital signs, and answer questions you may have.  The only belongings needed at this time will be a list of your home medications and if applicable your C-PAP/BI-PAP machine.  A Pre-op nurse will start an IV and you may receive medication in preparation for surgery, including something to help you relax.     Please be aware that surgery does come with discomfort.  We want to make every effort to control your discomfort so please discuss any uncontrolled symptoms with your nurse.   Your doctor will most likely have prescribed pain medications.      If you are going home after surgery you will receive individualized written care instructions before being discharged.  A responsible adult must drive you to and from the hospital on the day of your surgery and stay with you for 24 hours.  Discharge prescriptions can be filled by the hospital pharmacy during regular pharmacy hours.  If you are having surgery late in the day/evening your prescription may be e-prescribed to your pharmacy.   Please verify your pharmacy hours or chose a 24 hour pharmacy to avoid not having access to your prescription because your pharmacy has closed for the day.    If you are staying overnight following surgery, you will be transported to your hospital room following the recovery period.  [facility] has all private rooms.    If you have any questions please call Pre-Admission Testing at [phone].  Deductibles and co-payments are collected on the day of service. Please be prepared to pay the required co-pay, deductible or deposit on the day of service as defined by your plan.    Call your surgeon immediately if you experience any of the following symptoms:  Sore Throat  Shortness of Breath or difficulty breathing  Cough  Chills  Body soreness or muscle pain  Headache  Fever  New loss of taste or smell  Do not arrive for your surgery ill.  Your procedure will need to be rescheduled to another time.  You will need to call your physician before the day of surgery to avoid any unnecessary exposure to hospital staff as well as other patients.

## 2023-10-23 ENCOUNTER — ANESTHESIA EVENT (OUTPATIENT)
Dept: PERIOP | Facility: HOSPITAL | Age: 78
End: 2023-10-23
Payer: MEDICARE

## 2023-10-23 ENCOUNTER — APPOINTMENT (OUTPATIENT)
Dept: GENERAL RADIOLOGY | Facility: HOSPITAL | Age: 78
End: 2023-10-23
Payer: MEDICARE

## 2023-10-23 ENCOUNTER — HOSPITAL ENCOUNTER (OUTPATIENT)
Facility: HOSPITAL | Age: 78
Setting detail: HOSPITAL OUTPATIENT SURGERY
Discharge: HOME | End: 2023-10-23
Attending: STUDENT IN AN ORGANIZED HEALTH CARE EDUCATION/TRAINING PROGRAM | Admitting: STUDENT IN AN ORGANIZED HEALTH CARE EDUCATION/TRAINING PROGRAM
Payer: MEDICARE

## 2023-10-23 ENCOUNTER — ANESTHESIA (OUTPATIENT)
Dept: PERIOP | Facility: HOSPITAL | Age: 78
End: 2023-10-23
Payer: MEDICARE

## 2023-10-23 VITALS
DIASTOLIC BLOOD PRESSURE: 56 MMHG | TEMPERATURE: 97.6 F | RESPIRATION RATE: 18 BRPM | HEART RATE: 72 BPM | OXYGEN SATURATION: 94 % | SYSTOLIC BLOOD PRESSURE: 105 MMHG

## 2023-10-23 DIAGNOSIS — R13.19 ESOPHAGEAL DYSPHAGIA: ICD-10-CM

## 2023-10-23 PROCEDURE — 25810000003 LACTATED RINGERS PER 1000 ML: Performed by: ANESTHESIOLOGY

## 2023-10-23 PROCEDURE — 43249 ESOPH EGD DILATION <30 MM: CPT | Performed by: STUDENT IN AN ORGANIZED HEALTH CARE EDUCATION/TRAINING PROGRAM

## 2023-10-23 PROCEDURE — 25010000002 SUGAMMADEX 200 MG/2ML SOLUTION: Performed by: NURSE ANESTHETIST, CERTIFIED REGISTERED

## 2023-10-23 PROCEDURE — 25010000002 DEXAMETHASONE SODIUM PHOSPHATE 20 MG/5ML SOLUTION: Performed by: NURSE ANESTHETIST, CERTIFIED REGISTERED

## 2023-10-23 PROCEDURE — 25010000002 PROPOFOL 10 MG/ML EMULSION: Performed by: NURSE ANESTHETIST, CERTIFIED REGISTERED

## 2023-10-23 PROCEDURE — 74360 X-RAY GUIDE GI DILATION: CPT

## 2023-10-23 PROCEDURE — C1713 ANCHOR/SCREW BN/BN,TIS/BN: HCPCS | Performed by: STUDENT IN AN ORGANIZED HEALTH CARE EDUCATION/TRAINING PROGRAM

## 2023-10-23 PROCEDURE — 25010000002 ONDANSETRON PER 1 MG: Performed by: NURSE ANESTHETIST, CERTIFIED REGISTERED

## 2023-10-23 PROCEDURE — C1769 GUIDE WIRE: HCPCS | Performed by: STUDENT IN AN ORGANIZED HEALTH CARE EDUCATION/TRAINING PROGRAM

## 2023-10-23 RX ORDER — LIDOCAINE HYDROCHLORIDE 40 MG/ML
SOLUTION TOPICAL AS NEEDED
Status: DISCONTINUED | OUTPATIENT
Start: 2023-10-23 | End: 2023-10-23 | Stop reason: SURG

## 2023-10-23 RX ORDER — PHENYLEPHRINE HCL IN 0.9% NACL 0.5 MG/5ML
SYRINGE (ML) INTRAVENOUS AS NEEDED
Status: DISCONTINUED | OUTPATIENT
Start: 2023-10-23 | End: 2023-10-23 | Stop reason: SURG

## 2023-10-23 RX ORDER — HYDROCODONE BITARTRATE AND ACETAMINOPHEN 5; 325 MG/1; MG/1
1 TABLET ORAL ONCE AS NEEDED
Status: DISCONTINUED | OUTPATIENT
Start: 2023-10-23 | End: 2023-10-23 | Stop reason: HOSPADM

## 2023-10-23 RX ORDER — FLUMAZENIL 0.1 MG/ML
0.2 INJECTION INTRAVENOUS AS NEEDED
Status: DISCONTINUED | OUTPATIENT
Start: 2023-10-23 | End: 2023-10-23 | Stop reason: HOSPADM

## 2023-10-23 RX ORDER — PROPOFOL 10 MG/ML
VIAL (ML) INTRAVENOUS AS NEEDED
Status: DISCONTINUED | OUTPATIENT
Start: 2023-10-23 | End: 2023-10-23 | Stop reason: SURG

## 2023-10-23 RX ORDER — LIDOCAINE HYDROCHLORIDE 20 MG/ML
INJECTION, SOLUTION INFILTRATION; PERINEURAL AS NEEDED
Status: DISCONTINUED | OUTPATIENT
Start: 2023-10-23 | End: 2023-10-23 | Stop reason: SURG

## 2023-10-23 RX ORDER — DROPERIDOL 2.5 MG/ML
0.62 INJECTION, SOLUTION INTRAMUSCULAR; INTRAVENOUS
Status: DISCONTINUED | OUTPATIENT
Start: 2023-10-23 | End: 2023-10-23 | Stop reason: HOSPADM

## 2023-10-23 RX ORDER — SODIUM CHLORIDE 9 MG/ML
40 INJECTION, SOLUTION INTRAVENOUS AS NEEDED
Status: DISCONTINUED | OUTPATIENT
Start: 2023-10-23 | End: 2023-10-23 | Stop reason: HOSPADM

## 2023-10-23 RX ORDER — HYDROCODONE BITARTRATE AND ACETAMINOPHEN 7.5; 325 MG/1; MG/1
1 TABLET ORAL EVERY 4 HOURS PRN
Status: DISCONTINUED | OUTPATIENT
Start: 2023-10-23 | End: 2023-10-23 | Stop reason: HOSPADM

## 2023-10-23 RX ORDER — NALOXONE HCL 0.4 MG/ML
0.2 VIAL (ML) INJECTION AS NEEDED
Status: DISCONTINUED | OUTPATIENT
Start: 2023-10-23 | End: 2023-10-23 | Stop reason: HOSPADM

## 2023-10-23 RX ORDER — ONDANSETRON 2 MG/ML
4 INJECTION INTRAMUSCULAR; INTRAVENOUS ONCE AS NEEDED
Status: DISCONTINUED | OUTPATIENT
Start: 2023-10-23 | End: 2023-10-23 | Stop reason: HOSPADM

## 2023-10-23 RX ORDER — SODIUM CHLORIDE 0.9 % (FLUSH) 0.9 %
3-10 SYRINGE (ML) INJECTION AS NEEDED
Status: DISCONTINUED | OUTPATIENT
Start: 2023-10-23 | End: 2023-10-23 | Stop reason: HOSPADM

## 2023-10-23 RX ORDER — FENTANYL CITRATE 50 UG/ML
25 INJECTION, SOLUTION INTRAMUSCULAR; INTRAVENOUS
Status: DISCONTINUED | OUTPATIENT
Start: 2023-10-23 | End: 2023-10-23 | Stop reason: HOSPADM

## 2023-10-23 RX ORDER — LIDOCAINE HYDROCHLORIDE 10 MG/ML
0.5 INJECTION, SOLUTION INFILTRATION; PERINEURAL ONCE AS NEEDED
Status: DISCONTINUED | OUTPATIENT
Start: 2023-10-23 | End: 2023-10-23 | Stop reason: HOSPADM

## 2023-10-23 RX ORDER — SODIUM CHLORIDE 0.9 % (FLUSH) 0.9 %
3 SYRINGE (ML) INJECTION EVERY 12 HOURS SCHEDULED
Status: DISCONTINUED | OUTPATIENT
Start: 2023-10-23 | End: 2023-10-23 | Stop reason: HOSPADM

## 2023-10-23 RX ORDER — LABETALOL HYDROCHLORIDE 5 MG/ML
5 INJECTION, SOLUTION INTRAVENOUS
Status: DISCONTINUED | OUTPATIENT
Start: 2023-10-23 | End: 2023-10-23 | Stop reason: HOSPADM

## 2023-10-23 RX ORDER — ROCURONIUM BROMIDE 10 MG/ML
INJECTION, SOLUTION INTRAVENOUS AS NEEDED
Status: DISCONTINUED | OUTPATIENT
Start: 2023-10-23 | End: 2023-10-23 | Stop reason: SURG

## 2023-10-23 RX ORDER — EPHEDRINE SULFATE 50 MG/ML
5 INJECTION, SOLUTION INTRAVENOUS ONCE AS NEEDED
Status: DISCONTINUED | OUTPATIENT
Start: 2023-10-23 | End: 2023-10-23 | Stop reason: HOSPADM

## 2023-10-23 RX ORDER — HYDROMORPHONE HYDROCHLORIDE 1 MG/ML
0.25 INJECTION, SOLUTION INTRAMUSCULAR; INTRAVENOUS; SUBCUTANEOUS
Status: DISCONTINUED | OUTPATIENT
Start: 2023-10-23 | End: 2023-10-23 | Stop reason: HOSPADM

## 2023-10-23 RX ORDER — ONDANSETRON 2 MG/ML
INJECTION INTRAMUSCULAR; INTRAVENOUS AS NEEDED
Status: DISCONTINUED | OUTPATIENT
Start: 2023-10-23 | End: 2023-10-23 | Stop reason: SURG

## 2023-10-23 RX ORDER — FAMOTIDINE 10 MG/ML
20 INJECTION, SOLUTION INTRAVENOUS ONCE
Status: COMPLETED | OUTPATIENT
Start: 2023-10-23 | End: 2023-10-23

## 2023-10-23 RX ORDER — IPRATROPIUM BROMIDE AND ALBUTEROL SULFATE 2.5; .5 MG/3ML; MG/3ML
3 SOLUTION RESPIRATORY (INHALATION) ONCE AS NEEDED
Status: DISCONTINUED | OUTPATIENT
Start: 2023-10-23 | End: 2023-10-23 | Stop reason: HOSPADM

## 2023-10-23 RX ORDER — PROMETHAZINE HYDROCHLORIDE 25 MG/1
25 TABLET ORAL ONCE AS NEEDED
Status: DISCONTINUED | OUTPATIENT
Start: 2023-10-23 | End: 2023-10-23 | Stop reason: HOSPADM

## 2023-10-23 RX ORDER — SODIUM CHLORIDE, SODIUM LACTATE, POTASSIUM CHLORIDE, CALCIUM CHLORIDE 600; 310; 30; 20 MG/100ML; MG/100ML; MG/100ML; MG/100ML
9 INJECTION, SOLUTION INTRAVENOUS CONTINUOUS
Status: DISCONTINUED | OUTPATIENT
Start: 2023-10-23 | End: 2023-10-23 | Stop reason: HOSPADM

## 2023-10-23 RX ORDER — DIPHENHYDRAMINE HYDROCHLORIDE 50 MG/ML
12.5 INJECTION INTRAMUSCULAR; INTRAVENOUS
Status: DISCONTINUED | OUTPATIENT
Start: 2023-10-23 | End: 2023-10-23 | Stop reason: HOSPADM

## 2023-10-23 RX ORDER — HYDRALAZINE HYDROCHLORIDE 20 MG/ML
5 INJECTION INTRAMUSCULAR; INTRAVENOUS
Status: DISCONTINUED | OUTPATIENT
Start: 2023-10-23 | End: 2023-10-23 | Stop reason: HOSPADM

## 2023-10-23 RX ORDER — EPHEDRINE SULFATE 50 MG/ML
INJECTION INTRAVENOUS AS NEEDED
Status: DISCONTINUED | OUTPATIENT
Start: 2023-10-23 | End: 2023-10-23 | Stop reason: SURG

## 2023-10-23 RX ORDER — DEXAMETHASONE SODIUM PHOSPHATE 4 MG/ML
INJECTION, SOLUTION INTRA-ARTICULAR; INTRALESIONAL; INTRAMUSCULAR; INTRAVENOUS; SOFT TISSUE AS NEEDED
Status: DISCONTINUED | OUTPATIENT
Start: 2023-10-23 | End: 2023-10-23 | Stop reason: SURG

## 2023-10-23 RX ORDER — PROMETHAZINE HYDROCHLORIDE 25 MG/1
25 SUPPOSITORY RECTAL ONCE AS NEEDED
Status: DISCONTINUED | OUTPATIENT
Start: 2023-10-23 | End: 2023-10-23 | Stop reason: HOSPADM

## 2023-10-23 RX ADMIN — ONDANSETRON 4 MG: 2 INJECTION INTRAMUSCULAR; INTRAVENOUS at 08:05

## 2023-10-23 RX ADMIN — SODIUM CHLORIDE, POTASSIUM CHLORIDE, SODIUM LACTATE AND CALCIUM CHLORIDE 9 ML/HR: 600; 310; 30; 20 INJECTION, SOLUTION INTRAVENOUS at 07:14

## 2023-10-23 RX ADMIN — ROCURONIUM BROMIDE 35 MG: 10 INJECTION, SOLUTION INTRAVENOUS at 07:44

## 2023-10-23 RX ADMIN — PROPOFOL 120 MG: 10 INJECTION, EMULSION INTRAVENOUS at 07:44

## 2023-10-23 RX ADMIN — FAMOTIDINE 20 MG: 10 INJECTION INTRAVENOUS at 07:14

## 2023-10-23 RX ADMIN — Medication 100 MCG: at 07:46

## 2023-10-23 RX ADMIN — DEXAMETHASONE SODIUM PHOSPHATE 4 MG: 4 INJECTION, SOLUTION INTRAMUSCULAR; INTRAVENOUS at 07:52

## 2023-10-23 RX ADMIN — SUGAMMADEX 200 MG: 100 INJECTION, SOLUTION INTRAVENOUS at 08:10

## 2023-10-23 RX ADMIN — LIDOCAINE HYDROCHLORIDE 1 EACH: 40 SOLUTION TOPICAL at 07:45

## 2023-10-23 RX ADMIN — EPHEDRINE SULFATE 10 MG: 50 INJECTION INTRAVENOUS at 07:46

## 2023-10-23 RX ADMIN — LIDOCAINE HYDROCHLORIDE 60 MG: 20 INJECTION, SOLUTION INFILTRATION; PERINEURAL at 07:44

## 2023-10-23 NOTE — OP NOTE
ESOPHAGOGASTRODUODENOSCOPY WITH DILATATION  Procedure Report    Patient Name:  Joellen Dominguez  YOB: 1945    Date of Surgery:  10/23/2023     Indications:  Ms. Dominguez is a pleasant 78-year-old lady who presents today for a planned EGD with dilation.  She has a known esophageal stricture likely secondary to reflux.  She has undergone EGD with dilation once in the past with Dr. Ca.  She had developed recurrent dysphagia and presents again today for EGD with possible dilation.  Pre-op Diagnosis:   Esophageal dysphagia [R13.19]       Post-Op Diagnosis Codes:     * Esophageal dysphagia [R13.19]    Procedure/CPT® Codes:      Procedure(s):  ESOPHAGOGASTRODUODENOSCOPY WITH DILATATION with savory dilators, 12.8mm, 14mm, 15mm, 16mm, 17mm dilators    Staff:  Surgeon(s):  Say Greenfield MD PhD         Anesthesia: General    Estimated Blood Loss: 0 mL    Implants:    Nothing was implanted during the procedure    Specimen:          None      Findings: No gross abnormalities were seen on EGD.  Small hiatal hernia.  GE junction was located at approximately 40 cm from the incisors.    Complications: None  [place] [id number]    Description of Procedure: After informed consent was obtained Ms. Mensah was brought to the operating room where timeout was performed correctly identifying patient, procedure and any special instrumentation needed.  After this, general anesthesia was induced and a single-lumen endotracheal tube was placed.  Adult gastroscope was then passed into the oropharynx and into the esophagus without difficulty.  There was no esophagitis or Candida noted throughout the proximal esophagus.  She had some dysmotility with tertiary contractions noted.  The GE junction was noted at approximately 40 cm from the teeth.  I did not notice any ulcers or tumors.  The scope easily passed into the stomach without issue.  No ulcers or tumors were noted in the stomach.  She did have a large amount of blood  evacuated old food debris in her stomach.  Scope was passed easily through the pylorus into the duodenum without issue.  This appeared normal.  The scope was then passed back into the stomach.  The guidewire was now threaded through the scope and positioned into the stomach.  Under fluoroscopic guidance, savory dilators were passed over the wire beginning with 12.8 mm dilation and successfully increased to 14 mm, 15 mm, 16 mm, and finally a 17 mm.  No blood was noted on the dilators.  The scope was then reintroduced into the oropharynx and down into the esophagus.  No obvious injury was noted to the esophagus and/or the stomach.  No biopsies were taken as there was no abnormalities noted at the GE junction.  She tolerated this procedure well, the scope was removed from the oropharynx and she was taken to the PACU in stable condition.      Say Greenfield MD PhD     Date: 10/23/2023  Time: 08:27 EDT

## 2023-10-23 NOTE — ANESTHESIA POSTPROCEDURE EVALUATION
Patient: Joellen Dominguez    Procedure Summary       Date: 10/23/23 Room / Location: [facility] OR [address] [place] [address] MAIN OR    Anesthesia Start: 0738 Anesthesia Stop: 0824    Procedure: ESOPHAGOGASTRODUODENOSCOPY WITH DILATATION (Esophagus) Diagnosis:       Esophageal dysphagia      (Esophageal dysphagia [R13.19])    Surgeons: Say Greenfield MD PhD Provider: Luba West MD    Anesthesia Type: general ASA Status: 4            Anesthesia Type: general    Vitals  Vitals Value Taken Time   /52 10/23/23 0915   Temp 36.4 °C (97.6 °F) 10/23/23 0915   Pulse 71 10/23/23 0916   Resp 18 10/23/23 0900   SpO2 91 % 10/23/23 0916   Vitals shown include unfiled device data.        Post Anesthesia Care and Evaluation    Patient location during evaluation: PHASE II  Patient participation: complete - patient participated  Level of consciousness: awake and alert  Pain management: adequate    Airway patency: patent  Anesthetic complications: No anesthetic complications  PONV Status: none  Cardiovascular status: acceptable and hemodynamically stable  Respiratory status: acceptable, nonlabored ventilation and spontaneous ventilation  Hydration status: acceptable

## 2023-10-23 NOTE — ANESTHESIA PROCEDURE NOTES
Airway  Urgency: elective    Date/Time: 10/23/2023 7:45 AM  Airway not difficult    General Information and Staff    Patient location during procedure: OR  Anesthesiologist: Luba West MD  CRNA/CAA: Carlos Reece CRNA    Indications and Patient Condition  Indications for airway management: airway protection    Preoxygenated: yes  MILS maintained throughout  Mask difficulty assessment: 1 - vent by mask    Final Airway Details  Final airway type: endotracheal airway      Successful airway: ETT  Cuffed: yes   Successful intubation technique: direct laryngoscopy  Endotracheal tube insertion site: oral  Blade: Maryuri  Blade size: 3  ETT size (mm): 7.0  Cormack-Lehane Classification: grade I - full view of glottis  Placement verified by: chest auscultation and capnometry   Measured from: lips  ETT/EBT  to lips (cm): 20  Number of attempts at approach: 1  Assessment: lips, teeth, and gum same as pre-op and atraumatic intubation

## 2023-10-23 NOTE — ANESTHESIA PREPROCEDURE EVALUATION
Anesthesia Evaluation     Patient summary reviewed and Nursing notes reviewed                Airway   Mallampati: II  Dental    (+) poor dentition    Pulmonary - normal exam   (+) a smoker Former, COPD, asthma,sleep apnea  Cardiovascular - normal exam    ECG reviewed    (+) pacemaker pacemaker, hypertension, valvular problems/murmurs MR, past MI , CAD, dysrhythmias Atrial Fib, CHF , PVD, hyperlipidemia    ROS comment: Reviewed echo:    · Calculated left ventricular EF = 58% Estimated left ventricular EF was in agreement with the calculated left ventricular EF. Left ventricular systolic function is normal. Normal left ventricular cavity size noted. Left ventricular wall thickness is consistent with concentric hypertrophy. Left ventricular diastolic function was indeterminate.  · The left atrial cavity is moderately dilated.  · Severe mitral annular calcification is present. Mild to moderate mitral valve regurgitation is present. No significant mitral valve stenosis is present.  · Mild to moderate tricuspid valve regurgitation is present. Estimated right ventricular systolic pressure from tricuspid regurgitation is normal (<35 mmHg). Calculated right ventricular systolic pressure from tricuspid regurgitation is 28 mmHg.      Neuro/Psych  (+) psychiatric history Depression  GI/Hepatic/Renal/Endo    (+) GERD, renal disease- CRI, thyroid problem     Musculoskeletal     Abdominal    Substance History      OB/GYN          Other   arthritis,                 Anesthesia Plan    ASA 4     general       Anesthetic plan, risks, benefits, and alternatives have been provided, discussed and informed consent has been obtained with: patient.    CODE STATUS:

## 2023-11-27 ENCOUNTER — PATIENT MESSAGE (OUTPATIENT)
Dept: GASTROENTEROLOGY | Facility: CLINIC | Age: 78
End: 2023-11-27
Payer: MEDICARE

## 2023-11-27 DIAGNOSIS — K86.2 PANCREATIC CYST: Primary | ICD-10-CM

## 2023-12-04 ENCOUNTER — OFFICE (AMBULATORY)
Dept: URBAN - METROPOLITAN AREA CLINIC 76 | Facility: CLINIC | Age: 78
End: 2023-12-04

## 2023-12-04 VITALS
SYSTOLIC BLOOD PRESSURE: 136 MMHG | WEIGHT: 139 LBS | DIASTOLIC BLOOD PRESSURE: 72 MMHG | OXYGEN SATURATION: 94 % | HEIGHT: 67 IN | HEART RATE: 94 BPM

## 2023-12-04 DIAGNOSIS — K86.2 CYST OF PANCREAS: ICD-10-CM

## 2023-12-04 DIAGNOSIS — K50.10 CROHN'S DISEASE OF LARGE INTESTINE WITHOUT COMPLICATIONS: ICD-10-CM

## 2023-12-04 DIAGNOSIS — I27.20 PULMONARY HYPERTENSION, UNSPECIFIED: ICD-10-CM

## 2023-12-04 DIAGNOSIS — Z79.01 LONG TERM (CURRENT) USE OF ANTICOAGULANTS: ICD-10-CM

## 2023-12-04 PROCEDURE — 99204 OFFICE O/P NEW MOD 45 MIN: CPT | Performed by: NURSE PRACTITIONER

## 2023-12-28 ENCOUNTER — ON CAMPUS - OUTPATIENT (AMBULATORY)
Dept: URBAN - METROPOLITAN AREA HOSPITAL 77 | Facility: HOSPITAL | Age: 78
End: 2023-12-28

## 2023-12-28 DIAGNOSIS — K83.8 OTHER SPECIFIED DISEASES OF BILIARY TRACT: ICD-10-CM

## 2023-12-28 DIAGNOSIS — K86.89 OTHER SPECIFIED DISEASES OF PANCREAS: ICD-10-CM

## 2023-12-28 DIAGNOSIS — R93.3 ABNORMAL FINDINGS ON DIAGNOSTIC IMAGING OF OTHER PARTS OF DI: ICD-10-CM

## 2023-12-28 DIAGNOSIS — K86.2 CYST OF PANCREAS: ICD-10-CM

## 2023-12-28 DIAGNOSIS — K44.9 DIAPHRAGMATIC HERNIA WITHOUT OBSTRUCTION OR GANGRENE: ICD-10-CM

## 2023-12-28 PROCEDURE — 43242 EGD US FINE NEEDLE BX/ASPIR: CPT | Performed by: INTERNAL MEDICINE

## 2024-01-08 ENCOUNTER — OFFICE VISIT (OUTPATIENT)
Dept: INTERNAL MEDICINE | Facility: CLINIC | Age: 79
End: 2024-01-08
Payer: MEDICARE

## 2024-01-08 VITALS — WEIGHT: 143 LBS | HEIGHT: 67 IN | BODY MASS INDEX: 22.44 KG/M2

## 2024-01-08 DIAGNOSIS — N90.7 EPIDERMAL CYST OF VULVA: Primary | ICD-10-CM

## 2024-01-08 PROCEDURE — 99213 OFFICE O/P EST LOW 20 MIN: CPT | Performed by: INTERNAL MEDICINE

## 2024-01-08 NOTE — PROGRESS NOTES
"Chief Complaint  Hypertension    Subjective        Joellen Dominguez presents to [facility]  History of Present Illness has a lump on vagina- noticed a few weeks ago. No pain, hasn't changed. She is going thru a lot of different testing and evaluations for various cysts, etc. Had EUS and drainage of pancreatic cyst- results pending.   Also has an enlarging cyst in the adrenal- recommended she have removed.  Breathing worseing, watching fluid level- followed by nephrology.     Objective   Vital Signs:  Ht 170.2 cm (67\")   Wt 64.9 kg (143 lb)   BMI 22.40 kg/m²   Estimated body mass index is 22.4 kg/m² as calculated from the following:    Height as of this encounter: 170.2 cm (67\").    Weight as of this encounter: 64.9 kg (143 lb).       BMI is within normal parameters. No other follow-up for BMI required.      Physical Exam  Constitutional:       Appearance: Normal appearance.   Genitourinary:      Lymphadenopathy:      Lower Body: No right inguinal adenopathy. No left inguinal adenopathy.        Result Review :  The following data was reviewed by: Chitra Leyva MD on 01/08/2024:    Data reviewed : Radiologic studies CT and Consultant notes urology, nephrology             Assessment and Plan   Diagnoses and all orders for this visit:    1. Epidermal cyst of vulva (Primary)  Comments:  reassured- could get bigger and need to be addressed but with other issues can follow.             Follow Up   No follow-ups on file.  Patient was given instructions and counseling regarding her condition or for health maintenance advice. Please see specific information pulled into the AVS if appropriate.         "

## 2024-01-10 ENCOUNTER — OFFICE VISIT (OUTPATIENT)
Dept: GASTROENTEROLOGY | Facility: CLINIC | Age: 79
End: 2024-01-10
Payer: MEDICARE

## 2024-01-10 VITALS
SYSTOLIC BLOOD PRESSURE: 120 MMHG | HEART RATE: 82 BPM | WEIGHT: 144.7 LBS | BODY MASS INDEX: 22.71 KG/M2 | TEMPERATURE: 97.1 F | DIASTOLIC BLOOD PRESSURE: 70 MMHG | HEIGHT: 67 IN | OXYGEN SATURATION: 89 %

## 2024-01-10 DIAGNOSIS — Z79.899 HIGH RISK MEDICATION USE: ICD-10-CM

## 2024-01-10 DIAGNOSIS — K50.80 CROHN'S DISEASE OF BOTH SMALL AND LARGE INTESTINE WITHOUT COMPLICATION: Primary | ICD-10-CM

## 2024-01-10 DIAGNOSIS — K86.2 PANCREATIC CYST: ICD-10-CM

## 2024-01-10 DIAGNOSIS — R19.8 ALTERNATING CONSTIPATION AND DIARRHEA: ICD-10-CM

## 2024-01-10 PROCEDURE — 1159F MED LIST DOCD IN RCRD: CPT | Performed by: NURSE PRACTITIONER

## 2024-01-10 PROCEDURE — 99214 OFFICE O/P EST MOD 30 MIN: CPT | Performed by: NURSE PRACTITIONER

## 2024-01-10 PROCEDURE — 3078F DIAST BP <80 MM HG: CPT | Performed by: NURSE PRACTITIONER

## 2024-01-10 PROCEDURE — 3074F SYST BP LT 130 MM HG: CPT | Performed by: NURSE PRACTITIONER

## 2024-01-10 PROCEDURE — 1160F RVW MEDS BY RX/DR IN RCRD: CPT | Performed by: NURSE PRACTITIONER

## 2024-01-10 NOTE — PROGRESS NOTES
"Chief Complaint   Patient presents with    Crohn's Disease           History of Present Illness  Patient is a 78-year-old female who presents today for Follow-up. She has a history of Crohn's ileocolitis, diagnosed in 1978, current treatment with Stelara and Pentasa. She had a CT scan performed November 2023 that showed pancreatic cystic lesions that had increased in size.  She was referred for endoscopic ultrasound.  Lesions were felt to represent IPMN's.  There was a larger lesion noted which fluid was aspirated for analysis.  Results from this are not available for review at this time.    Patient presents today for follow-up.  She reports overall symptoms remain relatively stable since last office visit.  She continues on Stelara every 7 weeks.  She has had no significant abdominal pain or blood in the stool.  Her bowels continue to move somewhat irregularly.  She will have days where she is does not have a bowel movement and other days where she has a bowel movement every 2 to 3 days.  At times she has issues with incontinence.  She has been taking FiberCon 3 pills/day and WelChol once per day and does feel this has helped with her bowel symptoms.     Result Review :       ENDOSCOPY, INT (12/28/2023)    SCANNED - IMAGING (11/21/2023)    Office Visit with Sakshi Martinez APRN (07/17/2023)    Office Visit with Maura Ashley APRN (02/15/2023)    Vital Signs:   /70   Pulse 82   Temp 97.1 °F (36.2 °C)   Ht 170.2 cm (67.01\")   Wt 65.6 kg (144 lb 11.2 oz)   SpO2 (!) 89% Comment: pULM htn  BMI 22.66 kg/m²     Body mass index is 22.66 kg/m².     Physical Exam  Vitals reviewed.   Constitutional:       General: She is not in acute distress.     Appearance: She is well-developed.   HENT:      Head: Normocephalic and atraumatic.   Pulmonary:      Effort: Pulmonary effort is normal. No respiratory distress.   Abdominal:      General: Abdomen is protuberant. Bowel sounds are normal.      Palpations: Abdomen " is soft.      Tenderness: There is no abdominal tenderness.   Skin:     General: Skin is dry.      Coloration: Skin is not pale.   Neurological:      Mental Status: She is alert and oriented to person, place, and time.   Psychiatric:         Thought Content: Thought content normal.           Assessment and Plan    Diagnoses and all orders for this visit:    1. Crohn's disease of both small and large intestine without complication (Primary)    2. High risk medication use    3. Alternating constipation and diarrhea    4. Pancreatic cyst         Discussion  Patient presents today for follow-up of Crohn's disease.  Symptoms are stable and controlled on Stelara and will continue current treatment.  We will obtain copy of her previous colonoscopy for review to determine when next colonoscopy for surveillance should be completed.    Patient recently underwent endoscopic ultrasound for pancreatic cyst.  Will obtain copy of fluid studies for review and anticipate we will likely plan on MRCP for monitoring to be done in 3 to 6 months.              Follow Up   Return in about 9 months (around 10/10/2024).    Patient Instructions   For Crohn's disease, continue treatment with Stelara every 7 weeks.    Will obtain copy of previous colonoscopy and recent lab work from endoscopic ultrasound for review and provide further recommendations.    For alternating bowel habits, continue WelChol 1 pill daily and continue FiberCon.  Could consider increasing FiberCon to 4 pills daily, trial of Konsyl, or trial of Colace as needed for constipation.

## 2024-01-10 NOTE — PATIENT INSTRUCTIONS
For Crohn's disease, continue treatment with Stelara every 7 weeks.    Will obtain copy of previous colonoscopy and recent lab work from endoscopic ultrasound for review and provide further recommendations.    For alternating bowel habits, continue WelChol 1 pill daily and continue FiberCon.  Could consider increasing FiberCon to 4 pills daily, trial of Konsyl, or trial of Colace as needed for constipation.

## 2024-01-18 DIAGNOSIS — E87.6 HYPOKALEMIA: ICD-10-CM

## 2024-01-18 DIAGNOSIS — D68.61 ANTIPHOSPHOLIPID ANTIBODY SYNDROME: ICD-10-CM

## 2024-01-18 RX ORDER — BUPROPION HYDROCHLORIDE 300 MG/1
300 TABLET ORAL DAILY
Qty: 90 TABLET | Refills: 3 | Status: SHIPPED | OUTPATIENT
Start: 2024-01-18

## 2024-01-18 RX ORDER — POTASSIUM CHLORIDE 20 MEQ/1
20 TABLET, EXTENDED RELEASE ORAL 2 TIMES DAILY
Qty: 270 TABLET | Refills: 1 | Status: SHIPPED | OUTPATIENT
Start: 2024-01-18

## 2024-01-18 RX ORDER — LEVOTHYROXINE SODIUM 0.1 MG/1
100 TABLET ORAL DAILY
Qty: 90 TABLET | Refills: 3 | Status: SHIPPED | OUTPATIENT
Start: 2024-01-18

## 2024-01-18 RX ORDER — WARFARIN SODIUM 2 MG/1
TABLET ORAL
Qty: 180 TABLET | Refills: 3 | Status: SHIPPED | OUTPATIENT
Start: 2024-01-18

## 2024-02-08 NOTE — TELEPHONE ENCOUNTER
02/08/24 1523   Discharge Planning   Living Arrangements Spouse/significant other   Support Systems Spouse/significant other;Friends/neighbors   Type of Residence Private residence   Do you have animals or pets at home? No   Who is requesting discharge planning? Provider   Home or Post Acute Services None   Does the patient need discharge transport arranged? No   Financial Resource Strain   How hard is it for you to pay for the very basics like food, housing, medical care, and heating? Not hard   Housing Stability   In the last 12 months, was there a time when you were not able to pay the mortgage or rent on time? N   In the last 12 months, how many places have you lived? 2   In the last 12 months, was there a time when you did not have a steady place to sleep or slept in a shelter (including now)? Y  (Pt said that she has lived in her car in the past)   Transportation Needs   In the past 12 months, has lack of transportation kept you from medical appointments or from getting medications? no   In the past 12 months, has lack of transportation kept you from meetings, work, or from getting things needed for daily living? No   Patient Choice   Patient / Family choosing to utilize agency / facility established prior to hospitalization No        I put the order in for this on 12/6- has it not been addressed?

## 2024-02-27 RX ORDER — ONDANSETRON 4 MG/1
4 TABLET, FILM COATED ORAL EVERY 8 HOURS PRN
Qty: 30 TABLET | Refills: 2 | Status: SHIPPED | OUTPATIENT
Start: 2024-02-27

## 2024-03-07 ENCOUNTER — TELEPHONE (OUTPATIENT)
Dept: INTERNAL MEDICINE | Facility: CLINIC | Age: 79
End: 2024-03-07
Payer: MEDICARE

## 2024-03-07 NOTE — TELEPHONE ENCOUNTER
[facility] is wanting to know if you all received the documentation that was faxed over on this patient 2/26/24 and 3/1/24, please call [phone]

## 2024-03-14 ENCOUNTER — TELEPHONE (OUTPATIENT)
Dept: GASTROENTEROLOGY | Facility: CLINIC | Age: 79
End: 2024-03-14
Payer: MEDICARE

## 2024-03-14 LAB — INR PPP: 6

## 2024-03-14 NOTE — TELEPHONE ENCOUNTER
Jacy ( tech) from [facility],  left a Voice Message stating that they faxed us a request for a refill/new RX of Stelara. Pt uses it every 7 weeks. They are going to re-fax it, for us to fill it out and send document back to them.     T [phone]

## 2024-03-15 ENCOUNTER — ANTICOAGULATION VISIT (OUTPATIENT)
Dept: INTERNAL MEDICINE | Facility: CLINIC | Age: 79
End: 2024-03-15
Payer: MEDICARE

## 2024-03-15 ENCOUNTER — PATIENT MESSAGE (OUTPATIENT)
Dept: INTERNAL MEDICINE | Facility: CLINIC | Age: 79
End: 2024-03-15
Payer: MEDICARE

## 2024-03-15 DIAGNOSIS — R79.1 SUPRATHERAPEUTIC INR: ICD-10-CM

## 2024-03-15 DIAGNOSIS — D59.12 COLD AGGLUTININ DISEASE: Primary | ICD-10-CM

## 2024-03-19 ENCOUNTER — TELEPHONE (OUTPATIENT)
Dept: INTERNAL MEDICINE | Facility: CLINIC | Age: 79
End: 2024-03-19
Payer: MEDICARE

## 2024-03-19 NOTE — TELEPHONE ENCOUNTER
Caller: [facility]    Relationship: Other    Best call back number: [phone]       What was the call regarding: ADARSH IS CALLING TO SEE IF THE OFFICE HAS RECEIVED THE FAX THAT SHE HAS SENT OVER.   [phone]    PLEASE NIVIA AND ADVISE

## 2024-03-25 ENCOUNTER — OFFICE VISIT (OUTPATIENT)
Dept: INTERNAL MEDICINE | Facility: CLINIC | Age: 79
End: 2024-03-25
Payer: MEDICARE

## 2024-03-25 VITALS
DIASTOLIC BLOOD PRESSURE: 36 MMHG | HEART RATE: 76 BPM | HEIGHT: 67 IN | SYSTOLIC BLOOD PRESSURE: 86 MMHG | WEIGHT: 141 LBS | BODY MASS INDEX: 22.13 KG/M2

## 2024-03-25 DIAGNOSIS — I27.20 PULMONARY HTN: Chronic | ICD-10-CM

## 2024-03-25 DIAGNOSIS — D64.9 ANEMIA, UNSPECIFIED TYPE: Primary | Chronic | ICD-10-CM

## 2024-03-25 DIAGNOSIS — I48.21 ATRIAL FIBRILLATION, PERMANENT: Chronic | ICD-10-CM

## 2024-03-25 PROCEDURE — 3074F SYST BP LT 130 MM HG: CPT | Performed by: INTERNAL MEDICINE

## 2024-03-25 PROCEDURE — 99214 OFFICE O/P EST MOD 30 MIN: CPT | Performed by: INTERNAL MEDICINE

## 2024-03-25 PROCEDURE — 3078F DIAST BP <80 MM HG: CPT | Performed by: INTERNAL MEDICINE

## 2024-03-25 RX ORDER — SPIRONOLACTONE 25 MG/1
1 TABLET ORAL DAILY
COMMUNITY
Start: 2024-02-29

## 2024-03-25 RX ORDER — METOLAZONE 2.5 MG/1
2.5 TABLET ORAL 3 TIMES DAILY PRN
COMMUNITY
Start: 2024-01-11

## 2024-03-25 NOTE — PROGRESS NOTES
"Chief Complaint  Dizziness    Subjective        Joellen Dominguez presents to [facility]  Dizziness     Hasn't been doing well for a couple of months- mainly feels \"wobbly\" and like she has brainfog- doesn't answer questions at times.   She was found to be profoundly anemia in early Feb- has gotten 2 rounds of iron infusions but doesn't feel any better.   Seeing nephrologist who is monitoring her volume status vs renal function. Currently on spironolactone, torsemide daily and metolazone prn.   She thinks her Crohn's is under control- no visible blood.   BP has been low regularly for a while - 100/ 50s- can be as low 60s.     Objective   Vital Signs:  BP (!) 86/36   Pulse 76   Ht 170.2 cm (67.01\")   Wt 64 kg (141 lb)   BMI 22.08 kg/m²   Estimated body mass index is 22.08 kg/m² as calculated from the following:    Height as of this encounter: 170.2 cm (67.01\").    Weight as of this encounter: 64 kg (141 lb).       BMI is within normal parameters. No other follow-up for BMI required.      Physical Exam  Constitutional:       General: She is not in acute distress.     Appearance: She is ill-appearing.   Cardiovascular:      Rate and Rhythm: Normal rate. Rhythm irregular.   Pulmonary:      Effort: Pulmonary effort is normal.      Breath sounds: Normal breath sounds.   Musculoskeletal:      Right lower leg: No edema.      Left lower leg: No edema.        Result Review :    The following data was reviewed by: Chitra Leyva MD on 03/25/2024:  Common labs          9/14/2023    10:09 10/16/2023    13:16 1/12/2024    10:07   Common Labs   Glucose 92  85     BUN 30  29     Creatinine 1.34  1.24     Sodium 141  145     Potassium 4.6  3.4     Chloride 110  109     Calcium 9.2  9.3     Albumin  3.9     Total Bilirubin  0.6     Alkaline Phosphatase  51     AST (SGOT)  13     ALT (SGPT)  11     WBC  5.72  5.98       Hemoglobin  10.4  9.0       Hematocrit  32.8  25.2       Platelets  233  265     "      Details          This result is from an external source.             Data reviewed : Consultant notes renal, pulmonary             Assessment and Plan     Diagnoses and all orders for this visit:    1. Anemia, unspecified type (Primary)  Comments:  I think multifactorial- chronic disease, renal insufficiency- she is going to discuss with hematologist- cont iron infusions.    2. Pulmonary HTN  Comments:  Main issue- very volume sensitive- has close f/u with Dr. Cordero    3. Atrial fibrillation, permanent  Comments:  discussed potential to take NOAC- they will d/w Dr. Cheema.             Follow Up     No follow-ups on file.  Patient was given instructions and counseling regarding her condition or for health maintenance advice. Please see specific information pulled into the AVS if appropriate.

## 2024-03-28 ENCOUNTER — HOSPITAL ENCOUNTER (OUTPATIENT)
Dept: MRI IMAGING | Facility: HOSPITAL | Age: 79
Discharge: HOME | End: 2024-03-28
Admitting: NURSE PRACTITIONER
Payer: MEDICARE

## 2024-03-28 VITALS
TEMPERATURE: 97.7 F | RESPIRATION RATE: 18 BRPM | HEART RATE: 75 BPM | SYSTOLIC BLOOD PRESSURE: 108 MMHG | DIASTOLIC BLOOD PRESSURE: 45 MMHG | OXYGEN SATURATION: 100 %

## 2024-03-28 DIAGNOSIS — K86.2 PANCREATIC CYST: Primary | ICD-10-CM

## 2024-03-28 DIAGNOSIS — K86.2 PANCREATIC CYST: ICD-10-CM

## 2024-03-28 LAB — CREAT BLDA-MCNC: 3.3 MG/DL (ref 0.6–1.3)

## 2024-03-28 PROCEDURE — 82565 ASSAY OF CREATININE: CPT

## 2024-03-28 PROCEDURE — 74181 MRI ABDOMEN W/O CONTRAST: CPT

## 2024-03-28 NOTE — NURSING NOTE
"Cre 3.3  GFR 13.7    I reported these numbers to Mehnaz in MRI and she stated she \"will contact ordering physician.\"  "

## 2024-03-28 NOTE — NURSING NOTE
MRI completed without contrast. No signs or symptoms of distress. Anette Asif returned and interrogated pacer. IV removed, pt dressed, taken by visitor wheelchair to Main entrance by  to exit.

## 2024-03-29 ENCOUNTER — HOSPITAL ENCOUNTER (INPATIENT)
Facility: HOSPITAL | Age: 79
LOS: 3 days | Discharge: HOME | DRG: 812 | End: 2024-04-01
Attending: STUDENT IN AN ORGANIZED HEALTH CARE EDUCATION/TRAINING PROGRAM | Admitting: INTERNAL MEDICINE
Payer: MEDICARE

## 2024-03-29 DIAGNOSIS — R79.1 SUPRATHERAPEUTIC INR: ICD-10-CM

## 2024-03-29 DIAGNOSIS — N17.9 AKI (ACUTE KIDNEY INJURY): ICD-10-CM

## 2024-03-29 DIAGNOSIS — D64.9 ANEMIA, UNSPECIFIED TYPE: Primary | ICD-10-CM

## 2024-03-29 LAB
ABO GROUP BLD: NORMAL
ALBUMIN SERPL-MCNC: 3.7 G/DL (ref 3.5–5.2)
ALBUMIN/GLOB SERPL: 1.5 G/DL
ALP SERPL-CCNC: 46 U/L (ref 39–117)
ALT SERPL W P-5'-P-CCNC: 12 U/L (ref 1–33)
ANION GAP SERPL CALCULATED.3IONS-SCNC: 11.9 MMOL/L (ref 5–15)
APTT PPP: 64.6 SECONDS (ref 22.7–35.4)
AST SERPL-CCNC: 20 U/L (ref 1–32)
BASOPHILS # BLD AUTO: 0.03 10*3/MM3 (ref 0–0.2)
BASOPHILS NFR BLD AUTO: 0.4 % (ref 0–1.5)
BILIRUB SERPL-MCNC: 0.3 MG/DL (ref 0–1.2)
BLD GP AB SCN SERPL QL: NEGATIVE
BUN SERPL-MCNC: 110 MG/DL (ref 8–23)
BUN/CREAT SERPL: 46.2 (ref 7–25)
CALCIUM SPEC-SCNC: 8.8 MG/DL (ref 8.6–10.5)
CHLORIDE SERPL-SCNC: 98 MMOL/L (ref 98–107)
CO2 SERPL-SCNC: 26.1 MMOL/L (ref 22–29)
CREAT SERPL-MCNC: 2.38 MG/DL (ref 0.57–1)
DEPRECATED RDW RBC AUTO: 51.6 FL (ref 37–54)
EGFRCR SERPLBLD CKD-EPI 2021: 20.3 ML/MIN/1.73
EOSINOPHIL # BLD AUTO: 0.32 10*3/MM3 (ref 0–0.4)
EOSINOPHIL NFR BLD AUTO: 4.2 % (ref 0.3–6.2)
ERYTHROCYTE [DISTWIDTH] IN BLOOD BY AUTOMATED COUNT: 16.4 % (ref 12.3–15.4)
FERRITIN SERPL-MCNC: 65.9 NG/ML (ref 13–150)
GLOBULIN UR ELPH-MCNC: 2.5 GM/DL
GLUCOSE SERPL-MCNC: 110 MG/DL (ref 65–99)
HCT VFR BLD AUTO: 20.9 % (ref 34–46.6)
HGB BLD-MCNC: 6.2 G/DL (ref 12–15.9)
IMM GRANULOCYTES # BLD AUTO: 0.07 10*3/MM3 (ref 0–0.05)
IMM GRANULOCYTES NFR BLD AUTO: 0.9 % (ref 0–0.5)
INR PPP: 9.54 (ref 0.9–1.1)
IRON 24H UR-MRATE: 35 MCG/DL (ref 37–145)
IRON SATN MFR SERPL: 7 % (ref 20–50)
LYMPHOCYTES # BLD AUTO: 1.05 10*3/MM3 (ref 0.7–3.1)
LYMPHOCYTES NFR BLD AUTO: 13.7 % (ref 19.6–45.3)
MCH RBC QN AUTO: 25.8 PG (ref 26.6–33)
MCHC RBC AUTO-ENTMCNC: 29.7 G/DL (ref 31.5–35.7)
MCV RBC AUTO: 87.1 FL (ref 79–97)
MONOCYTES # BLD AUTO: 0.68 10*3/MM3 (ref 0.1–0.9)
MONOCYTES NFR BLD AUTO: 8.9 % (ref 5–12)
NEUTROPHILS NFR BLD AUTO: 5.52 10*3/MM3 (ref 1.7–7)
NEUTROPHILS NFR BLD AUTO: 71.9 % (ref 42.7–76)
NRBC BLD AUTO-RTO: 0 /100 WBC (ref 0–0.2)
NT-PROBNP SERPL-MCNC: 1944 PG/ML (ref 0–1800)
PLATELET # BLD AUTO: 358 10*3/MM3 (ref 140–450)
PMV BLD AUTO: 10 FL (ref 6–12)
POTASSIUM SERPL-SCNC: 5 MMOL/L (ref 3.5–5.2)
PROT SERPL-MCNC: 6.2 G/DL (ref 6–8.5)
PROTHROMBIN TIME: 77 SECONDS (ref 11.7–14.2)
RBC # BLD AUTO: 2.4 10*6/MM3 (ref 3.77–5.28)
RH BLD: POSITIVE
SODIUM SERPL-SCNC: 136 MMOL/L (ref 136–145)
T&S EXPIRATION DATE: NORMAL
TIBC SERPL-MCNC: 508 MCG/DL (ref 298–536)
TRANSFERRIN SERPL-MCNC: 341 MG/DL (ref 200–360)
WBC NRBC COR # BLD AUTO: 7.67 10*3/MM3 (ref 3.4–10.8)

## 2024-03-29 PROCEDURE — 86850 RBC ANTIBODY SCREEN: CPT | Performed by: STUDENT IN AN ORGANIZED HEALTH CARE EDUCATION/TRAINING PROGRAM

## 2024-03-29 PROCEDURE — 85025 COMPLETE CBC W/AUTO DIFF WBC: CPT | Performed by: STUDENT IN AN ORGANIZED HEALTH CARE EDUCATION/TRAINING PROGRAM

## 2024-03-29 PROCEDURE — 99285 EMERGENCY DEPT VISIT HI MDM: CPT

## 2024-03-29 PROCEDURE — 83880 ASSAY OF NATRIURETIC PEPTIDE: CPT | Performed by: STUDENT IN AN ORGANIZED HEALTH CARE EDUCATION/TRAINING PROGRAM

## 2024-03-29 PROCEDURE — 85610 PROTHROMBIN TIME: CPT | Performed by: STUDENT IN AN ORGANIZED HEALTH CARE EDUCATION/TRAINING PROGRAM

## 2024-03-29 PROCEDURE — 86922 COMPATIBILITY TEST ANTIGLOB: CPT

## 2024-03-29 PROCEDURE — 80053 COMPREHEN METABOLIC PANEL: CPT | Performed by: STUDENT IN AN ORGANIZED HEALTH CARE EDUCATION/TRAINING PROGRAM

## 2024-03-29 PROCEDURE — 86901 BLOOD TYPING SEROLOGIC RH(D): CPT | Performed by: STUDENT IN AN ORGANIZED HEALTH CARE EDUCATION/TRAINING PROGRAM

## 2024-03-29 PROCEDURE — 85730 THROMBOPLASTIN TIME PARTIAL: CPT | Performed by: STUDENT IN AN ORGANIZED HEALTH CARE EDUCATION/TRAINING PROGRAM

## 2024-03-29 PROCEDURE — 82728 ASSAY OF FERRITIN: CPT | Performed by: STUDENT IN AN ORGANIZED HEALTH CARE EDUCATION/TRAINING PROGRAM

## 2024-03-29 PROCEDURE — 86920 COMPATIBILITY TEST SPIN: CPT

## 2024-03-29 PROCEDURE — 86900 BLOOD TYPING SEROLOGIC ABO: CPT | Performed by: STUDENT IN AN ORGANIZED HEALTH CARE EDUCATION/TRAINING PROGRAM

## 2024-03-29 PROCEDURE — 86870 RBC ANTIBODY IDENTIFICATION: CPT | Performed by: STUDENT IN AN ORGANIZED HEALTH CARE EDUCATION/TRAINING PROGRAM

## 2024-03-29 PROCEDURE — 83540 ASSAY OF IRON: CPT | Performed by: STUDENT IN AN ORGANIZED HEALTH CARE EDUCATION/TRAINING PROGRAM

## 2024-03-29 PROCEDURE — 25010000002 VITAMIN K1 PER 1 MG: Performed by: STUDENT IN AN ORGANIZED HEALTH CARE EDUCATION/TRAINING PROGRAM

## 2024-03-29 PROCEDURE — 84466 ASSAY OF TRANSFERRIN: CPT | Performed by: STUDENT IN AN ORGANIZED HEALTH CARE EDUCATION/TRAINING PROGRAM

## 2024-03-29 RX ADMIN — PHYTONADIONE 10 MG: 10 INJECTION, EMULSION INTRAMUSCULAR; INTRAVENOUS; SUBCUTANEOUS at 23:52

## 2024-03-30 PROBLEM — R79.1 SUPRATHERAPEUTIC INR: Status: ACTIVE | Noted: 2024-03-30

## 2024-03-30 LAB
ALBUMIN SERPL-MCNC: 3.5 G/DL (ref 3.5–5.2)
ANION GAP SERPL CALCULATED.3IONS-SCNC: 11.3 MMOL/L (ref 5–15)
BLD GP AB SCN SERPL QL: POSITIVE
BUN SERPL-MCNC: 105 MG/DL (ref 8–23)
BUN/CREAT SERPL: 44.5 (ref 7–25)
CALCIUM SPEC-SCNC: 8.1 MG/DL (ref 8.6–10.5)
CHLORIDE SERPL-SCNC: 102 MMOL/L (ref 98–107)
CO2 SERPL-SCNC: 24.7 MMOL/L (ref 22–29)
CREAT SERPL-MCNC: 2.36 MG/DL (ref 0.57–1)
DEPRECATED RDW RBC AUTO: 51.1 FL (ref 37–54)
EGFRCR SERPLBLD CKD-EPI 2021: 20.5 ML/MIN/1.73
ERYTHROCYTE [DISTWIDTH] IN BLOOD BY AUTOMATED COUNT: 16.3 % (ref 12.3–15.4)
GLUCOSE SERPL-MCNC: 113 MG/DL (ref 65–99)
HCT VFR BLD AUTO: 18.4 % (ref 34–46.6)
HGB BLD-MCNC: 5.5 G/DL (ref 12–15.9)
INR PPP: 2.08 (ref 0.9–1.1)
MAGNESIUM SERPL-MCNC: 2.9 MG/DL (ref 1.6–2.4)
MCH RBC QN AUTO: 25.9 PG (ref 26.6–33)
MCHC RBC AUTO-ENTMCNC: 29.9 G/DL (ref 31.5–35.7)
MCV RBC AUTO: 86.8 FL (ref 79–97)
NONSPECIFIC COLD AUTOANTIBODY: NORMAL
PHOSPHATE SERPL-MCNC: 3.4 MG/DL (ref 2.5–4.5)
PLATELET # BLD AUTO: 325 10*3/MM3 (ref 140–450)
PMV BLD AUTO: 9.5 FL (ref 6–12)
POTASSIUM SERPL-SCNC: 4.4 MMOL/L (ref 3.5–5.2)
PROTHROMBIN TIME: 23.6 SECONDS (ref 11.7–14.2)
QT INTERVAL: 467 MS
QT INTERVAL: 475 MS
QTC INTERVAL: 504 MS
QTC INTERVAL: 513 MS
RBC # BLD AUTO: 2.12 10*6/MM3 (ref 3.77–5.28)
SODIUM SERPL-SCNC: 138 MMOL/L (ref 136–145)
WBC NRBC COR # BLD AUTO: 7.21 10*3/MM3 (ref 3.4–10.8)

## 2024-03-30 PROCEDURE — 85610 PROTHROMBIN TIME: CPT | Performed by: INTERNAL MEDICINE

## 2024-03-30 PROCEDURE — 86900 BLOOD TYPING SEROLOGIC ABO: CPT

## 2024-03-30 PROCEDURE — 93010 ELECTROCARDIOGRAM REPORT: CPT | Performed by: INTERNAL MEDICINE

## 2024-03-30 PROCEDURE — 85018 HEMOGLOBIN: CPT | Performed by: INTERNAL MEDICINE

## 2024-03-30 PROCEDURE — 80069 RENAL FUNCTION PANEL: CPT | Performed by: NURSE PRACTITIONER

## 2024-03-30 PROCEDURE — 85014 HEMATOCRIT: CPT | Performed by: INTERNAL MEDICINE

## 2024-03-30 PROCEDURE — 99222 1ST HOSP IP/OBS MODERATE 55: CPT | Performed by: INTERNAL MEDICINE

## 2024-03-30 PROCEDURE — 85027 COMPLETE CBC AUTOMATED: CPT | Performed by: NURSE PRACTITIONER

## 2024-03-30 PROCEDURE — 93005 ELECTROCARDIOGRAM TRACING: CPT | Performed by: STUDENT IN AN ORGANIZED HEALTH CARE EDUCATION/TRAINING PROGRAM

## 2024-03-30 PROCEDURE — 86901 BLOOD TYPING SEROLOGIC RH(D): CPT

## 2024-03-30 PROCEDURE — P9016 RBC LEUKOCYTES REDUCED: HCPCS

## 2024-03-30 PROCEDURE — 36430 TRANSFUSION BLD/BLD COMPNT: CPT

## 2024-03-30 PROCEDURE — 83735 ASSAY OF MAGNESIUM: CPT | Performed by: STUDENT IN AN ORGANIZED HEALTH CARE EDUCATION/TRAINING PROGRAM

## 2024-03-30 RX ORDER — GABAPENTIN 300 MG/1
300 CAPSULE ORAL DAILY
Status: DISCONTINUED | OUTPATIENT
Start: 2024-03-30 | End: 2024-04-01 | Stop reason: HOSPADM

## 2024-03-30 RX ORDER — ATORVASTATIN CALCIUM 20 MG/1
10 TABLET, FILM COATED ORAL NIGHTLY
Status: DISCONTINUED | OUTPATIENT
Start: 2024-03-30 | End: 2024-04-01 | Stop reason: HOSPADM

## 2024-03-30 RX ORDER — LEVOTHYROXINE SODIUM 0.1 MG/1
100 TABLET ORAL
Status: DISCONTINUED | OUTPATIENT
Start: 2024-03-31 | End: 2024-04-01 | Stop reason: HOSPADM

## 2024-03-30 RX ORDER — BISACODYL 5 MG/1
5 TABLET, DELAYED RELEASE ORAL DAILY PRN
Status: DISCONTINUED | OUTPATIENT
Start: 2024-03-30 | End: 2024-04-01 | Stop reason: HOSPADM

## 2024-03-30 RX ORDER — NITROGLYCERIN 0.4 MG/1
0.4 TABLET SUBLINGUAL
Status: DISCONTINUED | OUTPATIENT
Start: 2024-03-30 | End: 2024-04-01 | Stop reason: HOSPADM

## 2024-03-30 RX ORDER — POLYETHYLENE GLYCOL 3350 17 G/17G
17 POWDER, FOR SOLUTION ORAL DAILY PRN
Status: DISCONTINUED | OUTPATIENT
Start: 2024-03-30 | End: 2024-04-01 | Stop reason: HOSPADM

## 2024-03-30 RX ORDER — BISACODYL 10 MG
10 SUPPOSITORY, RECTAL RECTAL DAILY PRN
Status: DISCONTINUED | OUTPATIENT
Start: 2024-03-30 | End: 2024-04-01 | Stop reason: HOSPADM

## 2024-03-30 RX ORDER — ACETAMINOPHEN 325 MG/1
650 TABLET ORAL EVERY 4 HOURS PRN
Status: DISCONTINUED | OUTPATIENT
Start: 2024-03-30 | End: 2024-04-01 | Stop reason: HOSPADM

## 2024-03-30 RX ORDER — BUPROPION HYDROCHLORIDE 300 MG/1
300 TABLET ORAL DAILY
Status: DISCONTINUED | OUTPATIENT
Start: 2024-03-30 | End: 2024-04-01 | Stop reason: HOSPADM

## 2024-03-30 RX ORDER — COLESEVELAM 180 1/1
625 TABLET ORAL DAILY
COMMUNITY

## 2024-03-30 RX ORDER — SODIUM CHLORIDE 0.9 % (FLUSH) 0.9 %
10 SYRINGE (ML) INJECTION AS NEEDED
Status: DISCONTINUED | OUTPATIENT
Start: 2024-03-30 | End: 2024-04-01 | Stop reason: HOSPADM

## 2024-03-30 RX ORDER — UBIDECARENONE 100 MG
100 CAPSULE ORAL DAILY
COMMUNITY

## 2024-03-30 RX ORDER — AMOXICILLIN 250 MG
2 CAPSULE ORAL 2 TIMES DAILY PRN
Status: DISCONTINUED | OUTPATIENT
Start: 2024-03-30 | End: 2024-04-01 | Stop reason: HOSPADM

## 2024-03-30 RX ORDER — ONDANSETRON 4 MG/1
4 TABLET, ORALLY DISINTEGRATING ORAL EVERY 6 HOURS PRN
Status: DISCONTINUED | OUTPATIENT
Start: 2024-03-30 | End: 2024-04-01 | Stop reason: HOSPADM

## 2024-03-30 RX ORDER — CALCIUM POLYCARBOPHIL 625 MG
TABLET ORAL DAILY
COMMUNITY

## 2024-03-30 RX ORDER — ONDANSETRON 2 MG/ML
4 INJECTION INTRAMUSCULAR; INTRAVENOUS EVERY 6 HOURS PRN
Status: DISCONTINUED | OUTPATIENT
Start: 2024-03-30 | End: 2024-04-01 | Stop reason: HOSPADM

## 2024-03-30 RX ORDER — ALUMINA, MAGNESIA, AND SIMETHICONE 2400; 2400; 240 MG/30ML; MG/30ML; MG/30ML
15 SUSPENSION ORAL EVERY 6 HOURS PRN
Status: DISCONTINUED | OUTPATIENT
Start: 2024-03-30 | End: 2024-04-01 | Stop reason: HOSPADM

## 2024-03-30 RX ADMIN — GABAPENTIN 300 MG: 300 CAPSULE ORAL at 14:13

## 2024-03-30 RX ADMIN — ACETAMINOPHEN 325MG 650 MG: 325 TABLET ORAL at 14:14

## 2024-03-30 RX ADMIN — BUPROPION HYDROCHLORIDE 300 MG: 300 TABLET, EXTENDED RELEASE ORAL at 14:13

## 2024-03-30 RX ADMIN — MESALAMINE 2000 MG: 250 CAPSULE ORAL at 14:13

## 2024-03-30 RX ADMIN — MESALAMINE 2000 MG: 250 CAPSULE ORAL at 20:50

## 2024-03-30 RX ADMIN — ATORVASTATIN CALCIUM 10 MG: 20 TABLET, FILM COATED ORAL at 20:51

## 2024-03-30 NOTE — ED TRIAGE NOTES
Patient to ED per PV from home w/ reports of elevated creatinine per outpatient labs. Patient w/ history of stage 3 CKD.

## 2024-03-30 NOTE — H&P
Patient Name:  Joellen Dominguez  YOB: 1945  MRN:  [id number]  Admit Date:  3/29/2024  Patient Care Team:  Chitra Leyva MD as PCP - General (Internal Medicine)  Francisco Salomon MD as Consulting Physician (Gastroenterology)  Jose C Cordero MD as Consulting Physician (Pulmonary Disease)  Lillian Azevedo MD (Inactive) as Consulting Physician (Nephrology)  Thiago Perez MD as Consulting Physician (Urology)  Maura Ashley APRN as Nurse Practitioner (Gastroenterology)  Chong Buchanan MD as Consulting Physician (Cardiac Electrophysiology)  Dave Keene MD as Consulting Physician (Hematology and Oncology)  Sakshi Martinez APRN as Nurse Practitioner (Nurse Practitioner)      Subjective   History Present Illness     Chief Complaint   Patient presents with    Abnormal Lab     creatinine     History of Present Illness  Ms. Dominguez is a 78 y.o. former smoker with a history of Crohn's disease, atrial fibrillation, chronic diastolic CHF, COPD, depression, esophageal dysphagia, HTN, NEIL, PVD, permanent pacemaker, pulmonary hypertension, antiphospholipid antibody syndrome on Coumadin, and EVA that presents to [facility] complaining of abnormal lab work.  Patient had outpatient labs today that showed a creatinine 3.3.  Workup in emergency department revealed a creatinine 2.3, hemoglobin of 6.2, and INR 9.54.  She denies any melena or hematochezia.  She also denies any worsening shortness of breath or chest pain.  She does follow hematology for iron deficiency anemia.  Two units of packed red blood cells have been ordered in the emergency department.  She also has received vitamin K in the ED.  We were asked admit to admit for further treatment.    Review of Systems   Constitutional:  Negative for chills and fever.   HENT:  Negative for congestion and rhinorrhea.    Eyes:  Negative for photophobia and visual disturbance.   Respiratory:  Negative for cough  and shortness of breath.    Cardiovascular:  Negative for chest pain and palpitations.   Gastrointestinal:  Negative for constipation, diarrhea, nausea and vomiting.   Endocrine: Negative for cold intolerance and heat intolerance.   Genitourinary:  Negative for difficulty urinating and dysuria.   Musculoskeletal:  Negative for gait problem and joint swelling.   Skin:  Negative for rash and wound.   Neurological:  Negative for dizziness, light-headedness and headaches.   Psychiatric/Behavioral:  Negative for sleep disturbance and suicidal ideas.         Personal History     Past Medical History:   Diagnosis Date    Acute deep vein thrombosis of lower extremity     Allergic     Anemia     Antiphospholipid antibody syndrome     Antiphospholipid antibody syndrome     Arrhythmia     ATRIAL FIBRILLATION    Arthritis     OSTEO    Asthma     pulmonary hypertension    Benign essential hypertension     CHF (congestive heart failure) 4/2020    only in hospital    Cholelithiasis 6/07    removed    Chronic kidney disease     stage 3 renal failure    Clotting disorder 8/12    Primary Antiphospholipid Antibody Syndrome    COPD (chronic obstructive pulmonary disease)     Coronary artery disease     Crohn's disease     Cutaneous candidiasis     Depression     Disease of thyroid gland     Elevated cholesterol     GERD (gastroesophageal reflux disease)     GI (gastrointestinal bleed) 6/07    frequently in early Crohn's    Hernia 5/11    repaired ventral hernias 2x    History of echocardiogram 04/22/2020    mild-to-moderate concentric hypertrophy, EF 56 - 60%. LA severely dilated, Mild MAC, Mild MR with restrictive movement of the posterior leaflet    Hyperlipidemia     Hypertension     Low back pain     Myocardial infarction     Osteopenia     Polyp, uterus corpus     Pulmonary hypertension     Renal insufficiency     Scoliosis     Sleep apnea     bipap     Past Surgical History:   Procedure Laterality Date    ABDOMINAL HERNIA REPAIR       AMPUTATION DIGIT Left     SECOND TOE     ARTERIOGRAM  07/30/2020    Procedure: Arteriogram;  Surgeon: Chong Buchanan MD;  Location: [facility] CATH INVASIVE LOCATION;  Service: Cardiovascular;;  rt femoral    BILATERAL SALPINGO OOPHORECTOMY      BREAST BIOPSY Right     BENIGN    CARDIAC CATHETERIZATION N/A 04/22/2020    Surgeon: Paulo Singleton MD;  nonsignificant coronary artery disease normal LV function nonsignificant mitral regurgitation probably shortness of breath due to the cardiac arrhythmias and diastolic dysfunction    CARDIAC CATHETERIZATION N/A 04/22/2020    Procedure: Coronary angiography;  Surgeon: Paulo Singleton MD;  Location: [facility] CATH INVASIVE LOCATION;  Service: Cardiology;  Laterality: N/A;    CARDIAC CATHETERIZATION N/A 04/22/2020    Procedure: Left ventriculography;  Surgeon: Paulo Singleton MD;  Location: [facility] CATH INVASIVE LOCATION;  Service: Cardiology;  Laterality: N/A;    CARDIAC CATHETERIZATION N/A 12/22/2020    Procedure: Right Heart Cath w/ hemodynamic challenge;  Surgeon: Mario Bay MD;  Location: [facility] CATH INVASIVE LOCATION;  Service: Cardiology;  Laterality: N/A;    CARDIAC ELECTROPHYSIOLOGY PROCEDURE N/A 04/25/2020    Procedure: Cardioversion;  Surgeon: Paulo Singleton MD;  Location: [facility] CATH INVASIVE LOCATION;  Service: Cardiology;  Laterality: N/A;    CARDIAC ELECTROPHYSIOLOGY PROCEDURE N/A 07/29/2020    Procedure: PACEMAKER IMPLANTATION- DC;  Surgeon: Chong Buchanan MD;  Location: [facility] CATH INVASIVE LOCATION;  Service: Cardiovascular;  Laterality: N/A;    CARDIAC ELECTROPHYSIOLOGY PROCEDURE N/A 07/29/2020    Procedure: ABLATION AV NODE;  Surgeon: Chong Buchanan MD;  Location: [facility] CATH INVASIVE LOCATION;  Service: Cardiovascular;  Laterality: N/A;    CARDIAC ELECTROPHYSIOLOGY PROCEDURE N/A 07/30/2020    Procedure: ABLATION AV NODE PT HAS ST.BLESSING PPM;  Surgeon: Chong Buchanan MD;  Location: [facility]  CATH INVASIVE LOCATION;  Service: Cardiovascular;  Laterality: N/A;    CHOLECYSTECTOMY      COLONOSCOPY      COSMETIC SURGERY      D & C HYSTEROSCOPY N/A 10/13/2016    Procedure: DILATATION AND CURETTAGE HYSTEROSCOPY WITH MYOSURE;  Surgeon: Christopher Doll MD;  Location: [facility] MAIN OR;  Service:     ENDOSCOPY      ENDOSCOPY N/A 2022    Procedure: ESOPHAGOGASTRODUODENOSCOPY WITH  SAVORY DILATATION WITH FLUOROSCOPY AND COLD BIOPSIES;  Surgeon: Bry Ca III, MD;  Location: [facility] ENDOSCOPY;  Service: Gastroenterology;  Laterality: N/A;  pre: esophageal stricture, dysphagia  post: gastritis, esophagitis, diverticulum, esophageal stricture    ENDOSCOPY N/A 10/23/2023    Procedure: ESOPHAGOGASTRODUODENOSCOPY WITH DILATATION;  Surgeon: Say Greenfield MD PhD;  Location: [facility] MAIN OR;  Service: Gastroenterology;  Laterality: N/A;    EYE SURGERY Bilateral     cataract    FACELIFT      FEMORAL ARTERY - FEMORAL ARTERY BYPASS GRAFT Bilateral     FLEXIBLE SIGMOIDOSCOPY      when Crohn's first diagnosed    HEMORRHOIDECTOMY      TONSILLECTOMY AND ADENOIDECTOMY      TUBAL ABDOMINAL LIGATION      UPPER GASTROINTESTINAL ENDOSCOPY      VASCULAR SURGERY      femoral stents      Family History   Problem Relation Age of Onset    Inflammatory bowel disease Mother     Hypertension Mother     Liver disease Mother         due to hepatitis from blood transfusions    Anemia Mother     Irritable bowel syndrome Mother     Stroke Father     Autoimmune disease Brother     Lung cancer Paternal Uncle     Colon cancer Neg Hx     Colon polyps Neg Hx     Crohn's disease Neg Hx     Ulcerative colitis Neg Hx      Social History     Tobacco Use    Smoking status: Former     Current packs/day: 0.00     Average packs/day: 2.0 packs/day for 8.1 years (16.2 ttl pk-yrs)     Types: Cigarettes     Start date: 1959     Quit date: 1967     Years since quittin.7     Passive exposure: Past    Smokeless tobacco: Never     Tobacco comments:     from age 14 to 22   Vaping Use    Vaping status: Never Used   Substance Use Topics    Alcohol use: No    Drug use: Never     No current facility-administered medications on file prior to encounter.     Current Outpatient Medications on File Prior to Encounter   Medication Sig Dispense Refill    atorvastatin (LIPITOR) 10 MG tablet Take 1 tablet by mouth Daily. 90 tablet 3    buPROPion XL (WELLBUTRIN XL) 300 MG 24 hr tablet Take 1 tablet by mouth Daily. 90 tablet 3    Calcium Carbonate-Vit D-Min (CALCIUM 1200 PO) Take  by mouth Daily.      Cholecalciferol (Vitamin D3) 50 MCG (2000 UT) capsule Take 1 capsule by mouth Daily.      clobetasol (TEMOVATE) 0.05 % cream Apply 1 Application topically to the appropriate area as directed 2 (Two) Times a Day As Needed.      coenzyme Q10 100 MG capsule Take 1 capsule by mouth Daily.      colesevelam (Welchol) 625 MG tablet Take 1 tablet by mouth Daily.      cyanocobalamin 1000 MCG/ML injection Inject 1 mL into the appropriate muscle as directed by prescriber Every 28 (Twenty-Eight) Days. 3 mL 3    fexofenadine (ALLEGRA) 180 MG tablet Take 1 tablet by mouth Daily.      folic acid (FOLVITE) 400 MCG tablet Take 1 tablet by mouth Daily.      gabapentin (NEURONTIN) 300 MG capsule Take 1 capsule by mouth Daily. Pt can take up to 3x daily      levothyroxine (SYNTHROID, LEVOTHROID) 100 MCG tablet Take 1 tablet by mouth Daily. 90 tablet 3    Macitentan (Opsumit) 10 MG tablet Take 1 tablet by mouth Daily.      mesalamine (PENTASA) 500 MG CR capsule Take 2 capsules by mouth 4 (Four) Times a Day. (Patient taking differently: Take 4 capsules by mouth 2 (Two) Times a Day.) 500 capsule 3    methscopolamine (PAMINE FORTE) 5 MG tablet Take 1 tablet by mouth 2 (Two) Times a Day As Needed (abdominal pain). 60 tablet 11    metOLazone (ZAROXOLYN) 2.5 MG tablet Take 1 tablet by mouth 3 (Three) Times a Day As Needed (edema).      ondansetron (ZOFRAN) 4 MG tablet Take 1 tablet by  mouth Every 8 (Eight) Hours As Needed for Nausea or Vomiting. 30 tablet 2    polycarbophil (calcium polycarbophil) 625 MG tablet tablet Take  by mouth Daily.      potassium chloride (K-DUR,KLOR-CON) 20 MEQ CR tablet Take 1 tablet by mouth 2 (Two) Times a Day. (Patient taking differently: Take 1 tablet by mouth 2 (Two) Times a Day. Two tabs in the morning and one at night) 270 tablet 1    Riociguat (Adempas) 2.5 MG tablet Take 1 tablet by mouth 3 (Three) Times a Day.      spironolactone (ALDACTONE) 25 MG tablet Take 1 tablet by mouth Daily.      torsemide (DEMADEX) 20 MG tablet Take 1 tablet by mouth Daily. (Patient taking differently: Take 5 tablets by mouth Daily.) 30 tablet 0    Uptravi 200 & 800 MCG tablet therapy pack Take 1,600 mcg by mouth Daily.      Ustekinumab (STELARA SC) Inject 1 syringe under the skin into the appropriate area as directed. One syringe every seven weeks.  Option care.  pk      warfarin (COUMADIN) 2 MG tablet Take 1 tablet 4 days  weekly and 2 tablets daily x 3 days weekly (Patient taking differently: Take 2 tablets by mouth 4 (Four) Times a Week. Takes 4mg  tablet 5 days a week  and 2 mg 2 days a week) 180 tablet 3    Zinc 50 MG capsule Take  by mouth.       Allergies   Allergen Reactions    Infliximab Rash     Other reaction(s): Psoriasis    Sulfa Antibiotics Hives       Objective    Objective     Vital Signs  Temp:  [98.1 °F (36.7 °C)] 98.1 °F (36.7 °C)  Heart Rate:  [69-71] 70  Resp:  [18] 18  BP: ()/(45-53) 107/51  SpO2:  [90 %-98 %] 90 %  on   ;   Device (Oxygen Therapy): room air  Body mass index is 22.34 kg/m².    Physical Exam  Vitals and nursing note reviewed.   Constitutional:       General: She is not in acute distress.     Appearance: She is well-developed. She is not toxic-appearing.      Comments: Chronically ill-appearing   HENT:      Head: Normocephalic and atraumatic.   Eyes:      General: No scleral icterus.        Right eye: No discharge.         Left eye: No  discharge.      Conjunctiva/sclera: Conjunctivae normal.   Neck:      Vascular: No JVD.   Cardiovascular:      Rate and Rhythm: Normal rate and regular rhythm.      Heart sounds: Normal heart sounds. No murmur heard.     No friction rub. No gallop.   Pulmonary:      Effort: Pulmonary effort is normal. No respiratory distress.      Breath sounds: Normal breath sounds. No wheezing or rales.   Abdominal:      General: Bowel sounds are normal. There is no distension.      Palpations: Abdomen is soft.      Tenderness: There is no abdominal tenderness. There is no guarding.   Musculoskeletal:         General: No tenderness or deformity. Normal range of motion.      Cervical back: Normal range of motion and neck supple.   Skin:     General: Skin is warm and dry.      Capillary Refill: Capillary refill takes less than 2 seconds.   Neurological:      Mental Status: She is alert and oriented to person, place, and time.   Psychiatric:         Mood and Affect: Affect is angry.         Behavior: Behavior normal.     Results Review:  I reviewed the patient's new clinical results.    Lab Results (last 24 hours)       Procedure Component Value Units Date/Time    CBC & Differential [[id number]]  (Abnormal) Collected: 03/29/24 2139    Specimen: Blood Updated: 03/29/24 2155    Narrative:      The following orders were created for panel order CBC & Differential.  Procedure                               Abnormality         Status                     ---------                               -----------         ------                     CBC Auto Differential[[id number]]        Abnormal            Final result                 Please view results for these tests on the individual orders.    Comprehensive Metabolic Panel [[id number]]  (Abnormal) Collected: 03/29/24 2139    Specimen: Blood Updated: 03/29/24 2218     Glucose 110 mg/dL       mg/dL      Creatinine 2.38 mg/dL      Sodium 136 mmol/L      Potassium 5.0 mmol/L      Comment:  Slight hemolysis detected by analyzer. Result may be falsely elevated.        Chloride 98 mmol/L      CO2 26.1 mmol/L      Calcium 8.8 mg/dL      Total Protein 6.2 g/dL      Albumin 3.7 g/dL      ALT (SGPT) 12 U/L      AST (SGOT) 20 U/L      Comment: Slight hemolysis detected by analyzer. Result may be falsely elevated.        Alkaline Phosphatase 46 U/L      Total Bilirubin 0.3 mg/dL      Globulin 2.5 gm/dL      A/G Ratio 1.5 g/dL      BUN/Creatinine Ratio 46.2     Anion Gap 11.9 mmol/L      eGFR 20.3 mL/min/1.73     Narrative:      GFR Normal >60  Chronic Kidney Disease <60  Kidney Failure <15    The GFR formula is only valid for adults with stable renal function between ages 18 and 70.    Protime-INR [[id number]]  (Abnormal) Collected: 03/29/24 2139    Specimen: Blood Updated: 03/29/24 2300     Protime 77.0 Seconds      INR 9.54    aPTT [[id number]]  (Abnormal) Collected: 03/29/24 2139    Specimen: Blood Updated: 03/29/24 2300     PTT 64.6 seconds     CBC Auto Differential [[id number]]  (Abnormal) Collected: 03/29/24 2139    Specimen: Blood Updated: 03/29/24 2155     WBC 7.67 10*3/mm3      RBC 2.40 10*6/mm3      Hemoglobin 6.2 g/dL      Hematocrit 20.9 %      MCV 87.1 fL      MCH 25.8 pg      MCHC 29.7 g/dL      RDW 16.4 %      RDW-SD 51.6 fl      MPV 10.0 fL      Platelets 358 10*3/mm3      Neutrophil % 71.9 %      Lymphocyte % 13.7 %      Monocyte % 8.9 %      Eosinophil % 4.2 %      Basophil % 0.4 %      Immature Grans % 0.9 %      Neutrophils, Absolute 5.52 10*3/mm3      Lymphocytes, Absolute 1.05 10*3/mm3      Monocytes, Absolute 0.68 10*3/mm3      Eosinophils, Absolute 0.32 10*3/mm3      Basophils, Absolute 0.03 10*3/mm3      Immature Grans, Absolute 0.07 10*3/mm3      nRBC 0.0 /100 WBC     BNP [[id number]]  (Abnormal) Collected: 03/29/24 2139    Specimen: Blood Updated: 03/29/24 2209     proBNP 1,944.0 pg/mL     Narrative:      This assay is used as an aid in the diagnosis of individuals suspected of having  heart failure. It can be used as an aid in the diagnosis of acute decompensated heart failure (ADHF) in patients presenting with signs and symptoms of ADHF to the emergency department (ED). In addition, NT-proBNP of <300 pg/mL indicates ADHF is not likely.    Age Range Result Interpretation  NT-proBNP Concentration (pg/mL:      <50             Positive            >450                   Gray                 300-450                    Negative             <300    50-75           Positive            >900                  Gray                300-900                  Negative            <300      >75             Positive            >1800                  Gray                300-1800                  Negative            <300    Iron Profile [[id number]]  (Abnormal) Collected: 03/29/24 2139    Specimen: Blood Updated: 03/29/24 2249     Iron 35 mcg/dL      Iron Saturation (TSAT) 7 %      Transferrin 341 mg/dL      TIBC 508 mcg/dL     Ferritin [[id number]]  (Normal) Collected: 03/29/24 2139    Specimen: Blood Updated: 03/29/24 2253     Ferritin 65.90 ng/mL     Narrative:      Results may be falsely decreased if patient taking Biotin.      Renal Function Panel [[id number]]  (Abnormal) Collected: 03/30/24 0312    Specimen: Blood Updated: 03/30/24 0356     Glucose 113 mg/dL       mg/dL      Creatinine 2.36 mg/dL      Sodium 138 mmol/L      Potassium 4.4 mmol/L      Chloride 102 mmol/L      CO2 24.7 mmol/L      Calcium 8.1 mg/dL      Albumin 3.5 g/dL      Phosphorus 3.4 mg/dL      Anion Gap 11.3 mmol/L      BUN/Creatinine Ratio 44.5     eGFR 20.5 mL/min/1.73     Narrative:      GFR Normal >60  Chronic Kidney Disease <60  Kidney Failure <15    The GFR formula is only valid for adults with stable renal function between ages 18 and 70.    CBC (No Diff) [[id number]]  (Abnormal) Collected: 03/30/24 0312    Specimen: Blood Updated: 03/30/24 0341     WBC 7.21 10*3/mm3      RBC 2.12 10*6/mm3      Hemoglobin 5.5 g/dL      Hematocrit  18.4 %      MCV 86.8 fL      MCH 25.9 pg      MCHC 29.9 g/dL      RDW 16.3 %      RDW-SD 51.1 fl      MPV 9.5 fL      Platelets 325 10*3/mm3     Magnesium [[id number]]  (Abnormal) Collected: 03/30/24 0312    Specimen: Blood Updated: 03/30/24 0438     Magnesium 2.9 mg/dL             Imaging Results (Last 24 Hours)       ** No results found for the last 24 hours. **            Results for orders placed during the hospital encounter of 04/11/22    Adult Transthoracic Echo Complete W/ Color, Spectral and Contrast if Necessary Per Protocol    Interpretation Summary  · Calculated left ventricular EF = 58% Estimated left ventricular EF was in agreement with the calculated left ventricular EF. Left ventricular systolic function is normal. Normal left ventricular cavity size noted. Left ventricular wall thickness is consistent with concentric hypertrophy. Left ventricular diastolic function was indeterminate.  · The left atrial cavity is moderately dilated.  · Severe mitral annular calcification is present. Mild to moderate mitral valve regurgitation is present. No significant mitral valve stenosis is present.  · Mild to moderate tricuspid valve regurgitation is present. Estimated right ventricular systolic pressure from tricuspid regurgitation is normal (<35 mmHg). Calculated right ventricular systolic pressure from tricuspid regurgitation is 28 mmHg.      ECG 12 Lead Rhythm Change    (Results Pending)        Assessment/Plan     Active Hospital Problems    Diagnosis  POA    **Anemia [D64.9]  Yes    Supratherapeutic INR [R79.1]  Yes    Esophageal dysphagia [R13.19]  Yes    Obstructive sleep apnea syndrome [G47.33]  Yes    Presence of cardiac pacemaker [Z95.0]  Yes    Atrial fibrillation, permanent [I48.21]  Yes    Long term current use of anticoagulant therapy [Z79.01]  Not Applicable    COPD with emphysema [J43.9]  Yes    Chronic diastolic CHF (congestive heart failure) [I50.32]  Yes    Pulmonary HTN [I27.20]  Yes     Depression [F32.A]  Yes    Essential hypertension [I10]  Yes    Antiphospholipid antibody syndrome [D68.61]  Yes    Peripheral vascular disease [I73.9]  Yes      Resolved Hospital Problems   No resolved problems to display.       Ms. Dominguez is a 78 y.o. former smoker with a history of chronic disease, atrial fibrillation, chronic diastolic CHF, COPD, depression, esophageal dysphagia, HTN, NEIL, PVD, permanent pacemaker, pulmonary hypertension, antiphospholipid antibody syndrome, and anemia who presents with anemia and MARY ANN.    Anemia  Hx of EVA  History of Crohn's disease  -Denies any dark or bloody stools.  Check stool for occult blood  -2 units of packed red blood cells have been ordered in the ED but have yet to be given due to to some blood incompatibility issue.  Awaiting for match.  -N.p.o. after midnight  -GI and hematology consultation  -Hold Coumadin    MARY ANN on CKD stage IIIb  -Creat 2.3 and 2.36 respectively, baseline serum creat between 0.8-1.2.  -Nephrology consultation in a.m.  -Avoid nephrotoxins  -Trend BMP    Permanent atrial fibrillation  -Status post permanent pacemaker placement.  Rate currently controlled, resume antiarrhythmic  -Hold Coumadin for now as her INR supratherapeutic    CAD  -Denies any chest pain  -Resume home regimen    PVD  -Status post bilateral stent placement and bilateral femoral bypass grafting    COPD  -Compensating, resume home nebulizer     Chronic diastolic congestive heart failure  -No signs or symptoms of fluid overload on exam  -Daily weights  -Strict I's and O  -Resume home regimen    Benign essential hypertension  -Stable, resume home regimen     NEIL  -May resume home BiPAP    Antiphospholipid antibody syndrome  -Currently on warfarin which is being held      I discussed the patient's findings and my recommendations with patient and ED provider.    VTE Prophylaxis - Warfarin.  Code Status - Full code.       MARIAM Pineda  [facility]  03/30/24  04:48 EDT

## 2024-03-30 NOTE — PLAN OF CARE
Goal Outcome Evaluation:         New admit for Anemia.Hgb 6.2 on arrival, down to 5.5.Two units of PRBC ordered,but blood bank unable to prepare d/t blood incompatibility issues.[facility],  aware,BP soft,pt states low BP is  normal for her, asymptomatic.Still waiting on blood bank to prepare blood(See previous notes),Hem/Onc, renal & GI consults called in.WCTM.

## 2024-03-30 NOTE — CONSULTS
[facility]  Initial Inpatient Consult Note    Referring Provider: Molina BECERRA    Reason for Consultation: Anemia with hx of Crohn's disease     Subjective     History of present illness:    78 y.o. female that we are asked to see forAnemia with hx of Crohn's disease.  Patient is followed by MARIAM Mchugh.    Patient has a history of Crohn's ileocolitis which was diagnosed in 1978.  She is currently treated with Stelara and Pentasa.  She also has a history of pancreatic cystic lesions and had an EUS November 2023-workup was consistent with IPMN.    She was admitted due to abnormal labs which found an acute kidney injury.  She was also noted to have significant anemia.  She is chronically anticoagulated due to history of A-fib and antiphospholipid antibody syndrome.  Her INR was supratherapeutic at 9.5 on admission.  Hemoglobin was 6.2 with normocytic indices.    She is short of breath.  She did says that her abdomen feels tight.  Bowel movements are normal for her.  She has not seen any blood in her stool.  Occasional nausea in the morning which is not uncommon for her.  No abdominal pain heartburn or dysphagia.  Only new medication of late was Aldactone.    She reports that her gums have been bleeding when she brushes her teeth.  Otherwise she has not seen any bleeding    Past Medical History:  Past Medical History:   Diagnosis Date    Acute deep vein thrombosis of lower extremity     Allergic     Anemia     Antiphospholipid antibody syndrome     Antiphospholipid antibody syndrome     Arrhythmia     ATRIAL FIBRILLATION    Arthritis     OSTEO    Asthma     pulmonary hypertension    Benign essential hypertension     CHF (congestive heart failure) 4/2020    only in hospital    Cholelithiasis 6/07    removed    Chronic kidney disease     stage 3 renal failure    Clotting disorder 8/12    Primary Antiphospholipid Antibody Syndrome    COPD (chronic obstructive pulmonary disease)      Coronary artery disease     Crohn's disease     Cutaneous candidiasis     Depression     Disease of thyroid gland     Elevated cholesterol     GERD (gastroesophageal reflux disease)     GI (gastrointestinal bleed) 6/07    frequently in early Crohn's    Hernia 5/11    repaired ventral hernias 2x    History of echocardiogram 04/22/2020    mild-to-moderate concentric hypertrophy, EF 56 - 60%. LA severely dilated, Mild MAC, Mild MR with restrictive movement of the posterior leaflet    Hyperlipidemia     Hypertension     Low back pain     Myocardial infarction     Osteopenia     Polyp, uterus corpus     Pulmonary hypertension     Renal insufficiency     Scoliosis     Sleep apnea     bipap     Past Surgical History:  Past Surgical History:   Procedure Laterality Date    ABDOMINAL HERNIA REPAIR      AMPUTATION DIGIT Left     SECOND TOE     ARTERIOGRAM  07/30/2020    Procedure: Arteriogram;  Surgeon: Chong Buchanan MD;  Location: [facility] CATH INVASIVE LOCATION;  Service: Cardiovascular;;  rt femoral    BILATERAL SALPINGO OOPHORECTOMY      BREAST BIOPSY Right     BENIGN    CARDIAC CATHETERIZATION N/A 04/22/2020    Surgeon: Paulo Singleton MD;  nonsignificant coronary artery disease normal LV function nonsignificant mitral regurgitation probably shortness of breath due to the cardiac arrhythmias and diastolic dysfunction    CARDIAC CATHETERIZATION N/A 04/22/2020    Procedure: Coronary angiography;  Surgeon: Paulo Singleton MD;  [facility];  Service: Cardiology;  Laterality: N/A;    CARDIAC CATHETERIZATION N/A 04/22/2020    Procedure: Left ventriculography;  Surgeon: Paulo Singleton MD;  [facility];  Service: Cardiology;  Laterality: N/A;    CARDIAC CATHETERIZATION N/A 12/22/2020    Procedure: Right Heart Cath w/ hemodynamic challenge;  Surgeon: Mario Bay MD;  Location: [facility] CATH INVASIVE LOCATION;  Service: Cardiology;   Laterality: N/A;    CARDIAC ELECTROPHYSIOLOGY PROCEDURE N/A 04/25/2020    Procedure: Cardioversion;  Surgeon: Paulo Singleton MD;  Location: [facility] CATH INVASIVE LOCATION;  Service: Cardiology;  Laterality: N/A;    CARDIAC ELECTROPHYSIOLOGY PROCEDURE N/A 07/29/2020    Procedure: PACEMAKER IMPLANTATION- DC;  Surgeon: Chong Buchanan MD;  Location: [facility] CATH INVASIVE LOCATION;  Service: Cardiovascular;  Laterality: N/A;    CARDIAC ELECTROPHYSIOLOGY PROCEDURE N/A 07/29/2020    Procedure: ABLATION AV NODE;  Surgeon: Chong Buchanan MD;  Location: [facility] CATH INVASIVE LOCATION;  Service: Cardiovascular;  Laterality: N/A;    CARDIAC ELECTROPHYSIOLOGY PROCEDURE N/A 07/30/2020    Procedure: ABLATION AV NODE PT HAS ST.BLESSING PPM;  Surgeon: Chong Buchanan MD;  Location: [facility] CATH INVASIVE LOCATION;  Service: Cardiovascular;  Laterality: N/A;    CHOLECYSTECTOMY      COLONOSCOPY  2018    COSMETIC SURGERY      D & C HYSTEROSCOPY N/A 10/13/2016    Procedure: DILATATION AND CURETTAGE HYSTEROSCOPY WITH MYOSURE;  Surgeon: Christopher Doll MD;  Location: [facility] OR;  Service:     ENDOSCOPY      ENDOSCOPY N/A 6/20/2022    Procedure: ESOPHAGOGASTRODUODENOSCOPY WITH  SAVORY DILATATION WITH FLUOROSCOPY AND COLD BIOPSIES;  Surgeon: Bry Ca III, MD;  Location: [facility] ENDOSCOPY;  Service: Gastroenterology;  Laterality: N/A;  pre: esophageal stricture, dysphagia  post: gastritis, esophagitis, diverticulum, esophageal stricture    ENDOSCOPY N/A 10/23/2023    Procedure: ESOPHAGOGASTRODUODENOSCOPY WITH DILATATION;  Surgeon: aSy Greenfield MD PhD;  Location: [facility] MAIN OR;  Service: Gastroenterology;  Laterality: N/A;    EYE SURGERY Bilateral     cataract    FACELIFT      FEMORAL ARTERY - FEMORAL ARTERY BYPASS GRAFT Bilateral     FLEXIBLE SIGMOIDOSCOPY  7/80    when Crohn's first diagnosed    HEMORRHOIDECTOMY      TONSILLECTOMY AND ADENOIDECTOMY      TUBAL ABDOMINAL LIGATION  6/75    UPPER  GASTROINTESTINAL ENDOSCOPY      VASCULAR SURGERY      femoral stents       Social History:   Social History     Tobacco Use    Smoking status: Former     Current packs/day: 0.00     Average packs/day: 2.0 packs/day for 8.1 years (16.2 ttl pk-yrs)     Types: Cigarettes     Start date: 1959     Quit date: 1967     Years since quittin.7     Passive exposure: Past    Smokeless tobacco: Never    Tobacco comments:     from age 14 to 22   Substance Use Topics    Alcohol use: No      Family History:  Family History   Problem Relation Age of Onset    Inflammatory bowel disease Mother     Hypertension Mother     Liver disease Mother         due to hepatitis from blood transfusions    Anemia Mother     Irritable bowel syndrome Mother     Stroke Father     Autoimmune disease Brother     Lung cancer Paternal Uncle     Colon cancer Neg Hx     Colon polyps Neg Hx     Crohn's disease Neg Hx     Ulcerative colitis Neg Hx        Home Meds:  Facility-Administered Medications Prior to Admission   Medication Dose Route Frequency Provider Last Rate Last Admin    cyanocobalamin injection 1,000 mcg  1,000 mcg Intramuscular Q28 Days Chitra Leyva MD   1,000 mcg at 23 1559     Medications Prior to Admission   Medication Sig Dispense Refill Last Dose    atorvastatin (LIPITOR) 10 MG tablet Take 1 tablet by mouth Daily. 90 tablet 3 3/29/2024    buPROPion XL (WELLBUTRIN XL) 300 MG 24 hr tablet Take 1 tablet by mouth Daily. 90 tablet 3 3/29/2024    Calcium Carbonate-Vit D-Min (CALCIUM 1200 PO) Take  by mouth Daily.   3/29/2024    Cholecalciferol (Vitamin D3) 50 MCG (2000 UT) capsule Take 1 capsule by mouth Daily.   3/29/2024    clobetasol (TEMOVATE) 0.05 % cream Apply 1 Application topically to the appropriate area as directed 2 (Two) Times a Day As Needed.   Past Week    coenzyme Q10 100 MG capsule Take 1 capsule by mouth Daily.   3/29/2024    colesevelam (Welchol) 625 MG tablet Take 1 tablet by mouth Daily.   3/29/2024     cyanocobalamin 1000 MCG/ML injection Inject 1 mL into the appropriate muscle as directed by prescriber Every 28 (Twenty-Eight) Days. 3 mL 3 3/1/2024    fexofenadine (ALLEGRA) 180 MG tablet Take 1 tablet by mouth Daily.   3/29/2024    folic acid (FOLVITE) 400 MCG tablet Take 1 tablet by mouth Daily.   3/29/2024    gabapentin (NEURONTIN) 300 MG capsule Take 1 capsule by mouth Daily. Pt can take up to 3x daily   3/29/2024    levothyroxine (SYNTHROID, LEVOTHROID) 100 MCG tablet Take 1 tablet by mouth Daily. 90 tablet 3 3/29/2024    Macitentan (Opsumit) 10 MG tablet Take 1 tablet by mouth Daily.   3/29/2024    mesalamine (PENTASA) 500 MG CR capsule Take 2 capsules by mouth 4 (Four) Times a Day. (Patient taking differently: Take 4 capsules by mouth 2 (Two) Times a Day.) 500 capsule 3 3/29/2024    methscopolamine (PAMINE FORTE) 5 MG tablet Take 1 tablet by mouth 2 (Two) Times a Day As Needed (abdominal pain). 60 tablet 11 Past Week    metOLazone (ZAROXOLYN) 2.5 MG tablet Take 1 tablet by mouth 3 (Three) Times a Day As Needed (edema).   3/29/2024    ondansetron (ZOFRAN) 4 MG tablet Take 1 tablet by mouth Every 8 (Eight) Hours As Needed for Nausea or Vomiting. 30 tablet 2 Past Week    polycarbophil (calcium polycarbophil) 625 MG tablet tablet Take  by mouth Daily.   3/29/2024    potassium chloride (K-DUR,KLOR-CON) 20 MEQ CR tablet Take 1 tablet by mouth 2 (Two) Times a Day. (Patient taking differently: Take 1 tablet by mouth 2 (Two) Times a Day. Two tabs in the morning and one at night) 270 tablet 1 3/29/2024    Riociguat (Adempas) 2.5 MG tablet Take 1 tablet by mouth 3 (Three) Times a Day.   3/29/2024    spironolactone (ALDACTONE) 25 MG tablet Take 1 tablet by mouth Daily.   3/29/2024    torsemide (DEMADEX) 20 MG tablet Take 1 tablet by mouth Daily. (Patient taking differently: Take 5 tablets by mouth Daily.) 30 tablet 0 3/29/2024    Uptravi 200 & 800 MCG tablet therapy pack Take 1,600 mcg by mouth Daily.   3/29/2024     Ustekinumab (STELARA SC) Inject 1 syringe under the skin into the appropriate area as directed. One syringe every seven weeks.  Option care.  pk   Past Week    warfarin (COUMADIN) 2 MG tablet Take 1 tablet 4 days  weekly and 2 tablets daily x 3 days weekly (Patient taking differently: Take 2 tablets by mouth 4 (Four) Times a Week. Takes 4mg  tablet 5 days a week  and 2 mg 2 days a week) 180 tablet 3 Patient Taking Differently    Zinc 50 MG capsule Take  by mouth.   3/29/2024     Current Meds:   atorvastatin, 10 mg, Oral, Nightly  buPROPion XL, 300 mg, Oral, Daily  gabapentin, 300 mg, Oral, Daily  [START ON 3/31/2024] levothyroxine, 100 mcg, Oral, Q AM  mesalamine, 2,000 mg, Oral, BID      Allergies:  Allergies   Allergen Reactions    Infliximab Rash     Other reaction(s): Psoriasis    Sulfa Antibiotics Hives     Review of Systems  Pertinent items are noted in HPI     Objective     Vital Signs  Temp:  [97.9 °F (36.6 °C)-98.4 °F (36.9 °C)] 98.2 °F (36.8 °C)  Heart Rate:  [69-72] 70  Resp:  [8-21] 21  BP: ()/(41-53) 112/46  Physical Exam:  General Appearance:    Alert, cooperative, appears short of breath, on oxygen   Head:    Normocephalic, without obvious abnormality, atraumatic   Eyes:          conjunctivae and sclerae normal, no   icterus   Throat:   no thrush, oral mucosa moist   Neck:   Supple, no adenopathy   Lungs:     Clear to auscultation bilaterally    Heart:    Regular rhythm and normal rate    Chest Wall:    No abnormalities observed   Abdomen:     Soft, slightly distended, nontender; normal bowel sounds   Extremities:   no edema, no redness   Skin:   No bruising or rash   Psychiatric:  normal mood and insight     Results Review:   I reviewed the patient's new clinical results.    Results from last 7 days   Lab Units 03/30/24  0312 03/29/24  2139   WBC 10*3/mm3 7.21 7.67   HEMOGLOBIN g/dL 5.5* 6.2*   HEMATOCRIT % 18.4* 20.9*   PLATELETS 10*3/mm3 325 358     Results from last 7 days   Lab Units  "03/30/24  0312 03/29/24 2139 03/28/24  1038   SODIUM mmol/L 138 136  --    POTASSIUM mmol/L 4.4 5.0  --    CHLORIDE mmol/L 102 98  --    CO2 mmol/L 24.7 26.1  --    BUN mg/dL 105* 110*  --    CREATININE mg/dL 2.36* 2.38* 3.30*   CALCIUM mg/dL 8.1* 8.8  --    BILIRUBIN mg/dL  --  0.3  --    ALK PHOS U/L  --  46  --    ALT (SGPT) U/L  --  12  --    AST (SGOT) U/L  --  20  --    GLUCOSE mg/dL 113* 110*  --      Results from last 7 days   Lab Units 03/30/24  0816 03/29/24 2139   INR  2.08* 9.54*     No results found for: \"LIPASE\"    Radiology:  No orders to display       Assessment & Plan   Active Hospital Problems    Diagnosis     **Anemia     Supratherapeutic INR     Esophageal dysphagia     Obstructive sleep apnea syndrome     Presence of cardiac pacemaker     Atrial fibrillation, permanent     Long term current use of anticoagulant therapy     COPD with emphysema     Chronic diastolic CHF (congestive heart failure)     Pulmonary HTN     Depression     Essential hypertension     Antiphospholipid antibody syndrome     Peripheral vascular disease        Assessment:  Symptomatic anemia  supratherapeutic INR  History of Crohn's disease maintained on Stelara and Pentasa  hx of pancreatic IPMN  MARY ANN    Plan:  She describes spontaneous bleeding at home prior to admission likely related to her supratherapeutic INR.  She describes bleeding of her gums even with brushing her teeth.  She has had no visualized GI bleeding.  She had a fairly recent EGD/EUS.  She has no new GI symptoms.  She does not want a colonoscopy.  Drop in hemoglobin is likely related to significant Coumadin toxicity.  Follow H&H once her INR has been corrected  Continue Pentasa  Okay for diet from GI standpoint  Hemoccult has been ordered by primary team which will most likely be positive given significant coagulopathy.  This will not  from the GI standpoint in the absence of overt bleeding.        I discussed the patients findings and my " recommendations with patient and family.          Mary Coppola M.D.  [facility]  [phone]

## 2024-03-30 NOTE — ED PROVIDER NOTES
EMERGENCY DEPARTMENT ENCOUNTER    Room Number:  15/15  PCP: Chitra Leyva MD  History obtained from: Patient      HPI:  Chief Complaint: Abnormal lab  A complete HPI/ROS/PMH/PSH/SH/FH are unobtainable due to: N/A  Context: Joellen Dominguez is a 78 y.o. female who presents to the ED c/o abnormal lab.  Reports that she was called and told that her kidney function was worse.  Denies any other complaints.  No melena or hematochezia.  No abdominal pain.  No chest pain or shortness of breath.            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    Chronic diastolic CHF (congestive heart failure) 05/20/2020    COPD with emphysema 06/09/2020    Long term current use of anticoagulant therapy 06/19/2020    Atrial fibrillation, permanent 07/22/2020    S/P atrioventricular hyacinth ablation 08/14/2020    Presence of cardiac pacemaker 08/14/2020    Moderate mitral insufficiency 01/08/2021    Obstructive sleep apnea syndrome 08/30/2021    Diverticulum of esophagus, acquired 05/31/2022    High risk medication use 06/30/2022    Herpes simplex esophagitis 06/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    Peripheral vascular disease 04/01/2014    Age related osteoporosis 08/23/2022    Urinary incontinence 08/19/2015    Esophageal dysphagia 05/31/2022    B12 deficiency 07/06/2023    Iron deficiency anemia 07/06/2023    Other specified disorders of bone density and structure, other site 09/27/2023     Resolved Ambulatory Problems     Diagnosis Date Noted    Other hyperlipidemia 04/29/2019    Crohn's disease of small intestine with complication 05/06/2020    Lactic acidosis 05/20/2020    Acute  kidney injury (MARY ANN) with acute tubular necrosis (ATN) 06/16/2020    Edema due to hypervolemia 07/22/2020    Chronic fatigue 07/22/2020    Hypoxia 06/15/2022    Acute bronchitis 06/15/2022    Acute respiratory distress 06/15/2022    Candidal esophagitis 06/30/2022     Past Medical History:   Diagnosis Date    Acute deep vein thrombosis of lower extremity     Allergic     Anemia     Arrhythmia     Arthritis     Asthma     Benign essential hypertension     CHF (congestive heart failure) 4/2020    Cholelithiasis 6/07    Chronic kidney disease     Clotting disorder 8/12    COPD (chronic obstructive pulmonary disease)     Coronary artery disease     Crohn's disease     Cutaneous candidiasis     Disease of thyroid gland     Elevated cholesterol     GERD (gastroesophageal reflux disease)     GI (gastrointestinal bleed) 6/07    Hernia 5/11    History of echocardiogram 04/22/2020    Hyperlipidemia     Hypertension     Low back pain     Myocardial infarction     Osteopenia     Polyp, uterus corpus     Pulmonary hypertension     Renal insufficiency     Scoliosis     Sleep apnea          PAST SURGICAL HISTORY  Past Surgical History:   Procedure Laterality Date    ABDOMINAL HERNIA REPAIR      AMPUTATION DIGIT Left     SECOND TOE     ARTERIOGRAM  07/30/2020    Procedure: Arteriogram;  Surgeon: Chong Buchanan MD;  Location:  [facility] CATH INVASIVE LOCATION;  Service: Cardiovascular;;  rt femoral    BILATERAL SALPINGO OOPHORECTOMY      BREAST BIOPSY Right     BENIGN    CARDIAC CATHETERIZATION N/A 04/22/2020    Surgeon: Paulo Singleton MD;  nonsignificant coronary artery disease normal LV function nonsignificant mitral regurgitation probably shortness of breath due to the cardiac arrhythmias and diastolic dysfunction    CARDIAC CATHETERIZATION N/A 04/22/2020    Procedure: Coronary angiography;  Surgeon: Paulo Singleton MD;  Location:  [facility] CATH INVASIVE LOCATION;  Service: Cardiology;  Laterality: N/A;    CARDIAC  CATHETERIZATION N/A 04/22/2020    Procedure: Left ventriculography;  Surgeon: Paulo Singleton MD;  Location: [facility] CATH INVASIVE LOCATION;  Service: Cardiology;  Laterality: N/A;    CARDIAC CATHETERIZATION N/A 12/22/2020    Procedure: Right Heart Cath w/ hemodynamic challenge;  Surgeon: Mario Bay MD;  Location: [facility] CATH INVASIVE LOCATION;  Service: Cardiology;  Laterality: N/A;    CARDIAC ELECTROPHYSIOLOGY PROCEDURE N/A 04/25/2020    Procedure: Cardioversion;  Surgeon: Paulo Singleton MD;  Location: [facility] CATH INVASIVE LOCATION;  Service: Cardiology;  Laterality: N/A;    CARDIAC ELECTROPHYSIOLOGY PROCEDURE N/A 07/29/2020    Procedure: PACEMAKER IMPLANTATION- DC;  Surgeon: Chong Buchanan MD;  Location: [facility] CATH INVASIVE LOCATION;  Service: Cardiovascular;  Laterality: N/A;    CARDIAC ELECTROPHYSIOLOGY PROCEDURE N/A 07/29/2020    Procedure: ABLATION AV NODE;  Surgeon: Chong Buchanan MD;  Location: [facility] CATH INVASIVE LOCATION;  Service: Cardiovascular;  Laterality: N/A;    CARDIAC ELECTROPHYSIOLOGY PROCEDURE N/A 07/30/2020    Procedure: ABLATION AV NODE PT HAS ST.BLESSING PPM;  Surgeon: Chong Buchanan MD;  Location: [facility] CATH INVASIVE LOCATION;  Service: Cardiovascular;  Laterality: N/A;    CHOLECYSTECTOMY      COLONOSCOPY  2018    COSMETIC SURGERY      D & C HYSTEROSCOPY N/A 10/13/2016    Procedure: DILATATION AND CURETTAGE HYSTEROSCOPY WITH MYOSURE;  Surgeon: Christopher Doll MD;  Location: [facility] OR;  Service:     ENDOSCOPY      ENDOSCOPY N/A 6/20/2022    Procedure: ESOPHAGOGASTRODUODENOSCOPY WITH  SAVORY DILATATION WITH FLUOROSCOPY AND COLD BIOPSIES;  Surgeon: Bry Ca III, MD;  Location: [facility] ENDOSCOPY;  Service: Gastroenterology;  Laterality: N/A;  pre: esophageal stricture, dysphagia  post: gastritis, esophagitis, diverticulum, esophageal stricture    ENDOSCOPY N/A 10/23/2023    Procedure: ESOPHAGOGASTRODUODENOSCOPY WITH DILATATION;   Surgeon: Say Greenfield MD PhD;  Location: [facility] MAIN OR;  Service: Gastroenterology;  Laterality: N/A;    EYE SURGERY Bilateral     cataract    FACELIFT      FEMORAL ARTERY - FEMORAL ARTERY BYPASS GRAFT Bilateral     FLEXIBLE SIGMOIDOSCOPY      when Crohn's first diagnosed    HEMORRHOIDECTOMY      TONSILLECTOMY AND ADENOIDECTOMY      TUBAL ABDOMINAL LIGATION      UPPER GASTROINTESTINAL ENDOSCOPY      VASCULAR SURGERY      femoral stents          FAMILY HISTORY  Family History   Problem Relation Age of Onset    Inflammatory bowel disease Mother     Hypertension Mother     Liver disease Mother         due to hepatitis from blood transfusions    Anemia Mother     Irritable bowel syndrome Mother     Stroke Father     Autoimmune disease Brother     Lung cancer Paternal Uncle     Colon cancer Neg Hx     Colon polyps Neg Hx     Crohn's disease Neg Hx     Ulcerative colitis Neg Hx          SOCIAL HISTORY  Social History     Socioeconomic History    Marital status:    Tobacco Use    Smoking status: Former     Current packs/day: 0.00     Average packs/day: 2.0 packs/day for 8.1 years (16.2 ttl pk-yrs)     Types: Cigarettes     Start date: 1959     Quit date: 1967     Years since quittin.6     Passive exposure: Past    Smokeless tobacco: Never    Tobacco comments:     from age 14 to 22   Vaping Use    Vaping status: Never Used   Substance and Sexual Activity    Alcohol use: No    Drug use: Never    Sexual activity: Yes     Partners: Male     Birth control/protection: None         ALLERGIES  Infliximab and Sulfa antibiotics        REVIEW OF SYSTEMS    As per HPI      PHYSICAL EXAM  ED Triage Vitals   Temp Heart Rate Resp BP SpO2   24   98.1 °F (36.7 °C) 71 18 133/51 98 %      Temp src Heart Rate Source Patient Position BP Location FiO2 (%)   24 -- -- --   Tympanic Monitor          Physical  Exam  Constitutional:       General: She is not in acute distress.  HENT:      Head: Normocephalic and atraumatic.   Cardiovascular:      Rate and Rhythm: Normal rate and regular rhythm.   Pulmonary:      Effort: Pulmonary effort is normal. No respiratory distress.   Abdominal:      General: There is no distension.      Palpations: Abdomen is soft.      Tenderness: There is no abdominal tenderness.   Musculoskeletal:         General: No swelling or deformity.   Skin:     General: Skin is warm and dry.   Neurological:      Mental Status: She is alert. Mental status is at baseline.           Vital signs and nursing notes reviewed.          LAB RESULTS  Recent Results (from the past 24 hour(s))   Comprehensive Metabolic Panel    Collection Time: 03/29/24  9:39 PM    Specimen: Blood   Result Value Ref Range    Glucose 110 (H) 65 - 99 mg/dL     (H) 8 - 23 mg/dL    Creatinine 2.38 (H) 0.57 - 1.00 mg/dL    Sodium 136 136 - 145 mmol/L    Potassium 5.0 3.5 - 5.2 mmol/L    Chloride 98 98 - 107 mmol/L    CO2 26.1 22.0 - 29.0 mmol/L    Calcium 8.8 8.6 - 10.5 mg/dL    Total Protein 6.2 6.0 - 8.5 g/dL    Albumin 3.7 3.5 - 5.2 g/dL    ALT (SGPT) 12 1 - 33 U/L    AST (SGOT) 20 1 - 32 U/L    Alkaline Phosphatase 46 39 - 117 U/L    Total Bilirubin 0.3 0.0 - 1.2 mg/dL    Globulin 2.5 gm/dL    A/G Ratio 1.5 g/dL    BUN/Creatinine Ratio 46.2 (H) 7.0 - 25.0    Anion Gap 11.9 5.0 - 15.0 mmol/L    eGFR 20.3 (L) >60.0 mL/min/1.73   Protime-INR    Collection Time: 03/29/24  9:39 PM    Specimen: Blood   Result Value Ref Range    Protime 77.0 (C) 11.7 - 14.2 Seconds    INR 9.54 (C) 0.90 - 1.10   aPTT    Collection Time: 03/29/24  9:39 PM    Specimen: Blood   Result Value Ref Range    PTT 64.6 (H) 22.7 - 35.4 seconds   CBC Auto Differential    Collection Time: 03/29/24  9:39 PM    Specimen: Blood   Result Value Ref Range    WBC 7.67 3.40 - 10.80 10*3/mm3    RBC 2.40 (L) 3.77 - 5.28 10*6/mm3    Hemoglobin 6.2 (C) 12.0 - 15.9 g/dL     Hematocrit 20.9 (C) 34.0 - 46.6 %    MCV 87.1 79.0 - 97.0 fL    MCH 25.8 (L) 26.6 - 33.0 pg    MCHC 29.7 (L) 31.5 - 35.7 g/dL    RDW 16.4 (H) 12.3 - 15.4 %    RDW-SD 51.6 37.0 - 54.0 fl    MPV 10.0 6.0 - 12.0 fL    Platelets 358 140 - 450 10*3/mm3    Neutrophil % 71.9 42.7 - 76.0 %    Lymphocyte % 13.7 (L) 19.6 - 45.3 %    Monocyte % 8.9 5.0 - 12.0 %    Eosinophil % 4.2 0.3 - 6.2 %    Basophil % 0.4 0.0 - 1.5 %    Immature Grans % 0.9 (H) 0.0 - 0.5 %    Neutrophils, Absolute 5.52 1.70 - 7.00 10*3/mm3    Lymphocytes, Absolute 1.05 0.70 - 3.10 10*3/mm3    Monocytes, Absolute 0.68 0.10 - 0.90 10*3/mm3    Eosinophils, Absolute 0.32 0.00 - 0.40 10*3/mm3    Basophils, Absolute 0.03 0.00 - 0.20 10*3/mm3    Immature Grans, Absolute 0.07 (H) 0.00 - 0.05 10*3/mm3    nRBC 0.0 0.0 - 0.2 /100 WBC   BNP    Collection Time: 03/29/24  9:39 PM    Specimen: Blood   Result Value Ref Range    proBNP 1,944.0 (H) 0.0 - 1,800.0 pg/mL   Iron Profile    Collection Time: 03/29/24  9:39 PM    Specimen: Blood   Result Value Ref Range    Iron 35 (L) 37 - 145 mcg/dL    Iron Saturation (TSAT) 7 (L) 20 - 50 %    Transferrin 341 200 - 360 mg/dL    TIBC 508 298 - 536 mcg/dL   Ferritin    Collection Time: 03/29/24  9:39 PM    Specimen: Blood   Result Value Ref Range    Ferritin 65.90 13.00 - 150.00 ng/mL   Type & Screen    Collection Time: 03/29/24 10:12 PM    Specimen: Blood   Result Value Ref Range    ABO Type A     RH type Positive     Antibody Screen Negative     T&S Expiration Date 4/1/2024 11:59:59 PM        Ordered the above labs and reviewed the results.        RADIOLOGY  No Radiology Exams Resulted Within Past 24 Hours    Ordered the above noted radiological studies. Reviewed by me in PACS.            MEDICATIONS GIVEN IN ER  Medications   phytonadione (AQUA-MEPHYTON, VITAMIN K) 10 mg in sodium chloride 0.9 % 50 mL IVPB (has no administration in time range)               MEDICAL DECISION MAKING, PROGRESS, and CONSULTS    MDM: Patient  presented emergency department with nonspecific acute kidney injury, otherwise well-appearing, vitals otherwise stable.  Labs otherwise significant for worsening anemia with hemoglobin of 6.2.  Ordered 2 units PRBC.  Chemistry panel positive for acute kidney injury with creatinine of 2.3, also with significant elevation in BUN concerning for possible upper GI bleeding.  Patient denies any melena or epigastric abdominal pain.  She is on warfarin.  INR is supratherapeutic.  Discussed with inpatient team, will admit for further workup and management of her symptoms.    All labs have been independently reviewed by me.  All radiology studies have been reviewed by me and I have also reviewed the radiology report.   EKG's independently viewed and interpreted by me.  Discussion below represents my analysis of pertinent findings related to patient's condition, differential diagnosis, treatment plan and final disposition.      Additional sources:  - Discussed/ obtained information from independent historians:     - External (non-ED) record review:     - Chronic or social conditions impacting care: Anticoagulation    - Shared decision making: Discussed plan for admission, patient agrees      Orders placed during this visit:  Orders Placed This Encounter   Procedures    Comprehensive Metabolic Panel    Protime-INR    aPTT    CBC Auto Differential    BNP    Iron Profile    Ferritin    Verify Informed Consent    [facility] (on-call MD unless specified) Details    Type & Screen    Prepare RBC, 2 Units    Inpatient Admission    CBC & Differential         Additional orders considered but not ordered:  Considered [facility] however patient is hemodynamically stable and there is no sign of active bleeding, will continue to monitor closely.  Started with vitamin K.        Differential diagnosis includes but is not limited to:    Acute blood loss anemia, acute kidney injury, upper GI bleed, dehydration, overdiuresis, heart failure  exacerbation, iron deficiency      Independent interpretation of labs, radiology studies, and discussions with consultants:  ED Course as of 03/29/24 2314   Fri Mar 29, 2024   2254 Iron(!): 35 [FS]   2254 Ferritin: 65.90 [FS]   2254 Hemoglobin(!!): 6.2 [FS]   2254 Creatinine(!): 2.38 [FS]   2254 BUN(!): 110 [FS]      ED Course User Index  [FS] Aris Hendrix MD           DIAGNOSIS  Final diagnoses:   Anemia, unspecified type   MARY ANN (acute kidney injury)   Supratherapeutic INR         DISPOSITION  Admitted to telemetry        Latest Documented Vital Signs:  As of 23:14 EDT  BP- 109/47 HR- 69 Temp- 98.1 °F (36.7 °C) (Tympanic) O2 sat- 98%              --    Please note that portions of this were completed with a voice recognition program.       Note Disclaimer: At [facility], we believe that sharing information builds trust and better relationships. You are receiving this note because you are receiving care at [facility] or recently visited. It is possible you will see health information before a provider has talked with you about it. This kind of information can be easy to misunderstand. To help you fully understand what it means for your health, we urge you to discuss this note with your provider.             Aris Hendrix MD  03/29/24 2314       Aris Hendrix MD  03/29/24 2314

## 2024-03-30 NOTE — PLAN OF CARE
Goal Outcome Evaluation:  Plan of Care Reviewed With: patient, spouse    Progress: improving  Outcome Evaluation: Ms. Dominguez admitted on 3/29 for anemia. 2 units PRBC's ordered (blood warmer used per blood bank) - 2nd unit infusing currently. Patient SOA after first unit done infusing - vitals stable, patient reports SOA on and off at home - Dr. Stout and blood bank notified. Cameron to not be infusion reaction and more likely related to volume given. Patient felt less SOA shortly afterwards. H&H Q 8 hours to be given post-2nd unit. INR down to 2.08. Diet advance to GI soft/fiber restricted. Spouse at bedside. Patient stable and needs met at this time.

## 2024-03-30 NOTE — NURSING NOTE
Called blood bank to ask about PRBC's status. Stated patient has a specific antibody and they are still crossmatching blood. Blood bank will call when ready.

## 2024-03-30 NOTE — CONSULTS
[facility] Consult Note      Patient Name: Joellen Dominguez  : 1945  MRN: [id number]  Primary Care Physician:  Chitra Leyva MD  Referring Physician: Aris Hendrix MD  Date of admission: 3/29/2024    Subjective     Reason for Consult:  MAR YANN    HPI:   Joellen Dominguez is a 78 y.o. female with CKD stage 4, CAD, dCHF + pulm HTN, PVD (b/p BLE stents), AFIB & antiphospholipid Ab syn on AC, HTN, IBD, COPD who was directed to ER last night due to MARY ANN and severe anemia.  Hgb 5.5 and BUN/Cr 110/2.4 with K 5.0.  Cr was 1.2 in Oct 2023 but up to 1.9 in Nov and 2.2 on 24.  Sees Dr Owusu in our office, most recent visit with Sonia Zepeda NP on 24.  Coumadin toxic with INR 9.5, given vit K.  Home meds notable for torsemide 100 mg, aldactone, metolazone PRN.  EF 58% by echo in 2022.  She suspects worsening renal fcn recently due to need for higher dose diuretic regimen.  No swelling lately, but when diuretics de-escalated she gains water weight and becomes short of breath.  No n/v or melena.       MRI abd shows pancreatic IPMN, large left adrenal myolipoma, multi renal cysts but also right indeterminate renal lesion.    Review of Systems:   14 point review of systems is otherwise negative except for mentioned above on HPI    Personal History     Past Medical History:   Diagnosis Date    Acute deep vein thrombosis of lower extremity     Allergic     Anemia     Antiphospholipid antibody syndrome     Antiphospholipid antibody syndrome     Arrhythmia     ATRIAL FIBRILLATION    Arthritis     OSTEO    Asthma     pulmonary hypertension    Benign essential hypertension     CHF (congestive heart failure) 2020    only in hospital    Cholelithiasis     removed    Chronic kidney disease     stage 3 renal failure    Clotting disorder     Primary Antiphospholipid Antibody Syndrome    COPD (chronic obstructive pulmonary disease)     Coronary artery disease     Crohn's disease      Cutaneous candidiasis     Depression     Disease of thyroid gland     Elevated cholesterol     GERD (gastroesophageal reflux disease)     GI (gastrointestinal bleed) 6/07    frequently in early Crohn's    Hernia 5/11    repaired ventral hernias 2x    History of echocardiogram 04/22/2020    mild-to-moderate concentric hypertrophy, EF 56 - 60%. LA severely dilated, Mild MAC, Mild MR with restrictive movement of the posterior leaflet    Hyperlipidemia     Hypertension     Low back pain     Myocardial infarction     Osteopenia     Polyp, uterus corpus     Pulmonary hypertension     Renal insufficiency     Scoliosis     Sleep apnea     bipap       Past Surgical History:   Procedure Laterality Date    ABDOMINAL HERNIA REPAIR      AMPUTATION DIGIT Left     SECOND TOE     ARTERIOGRAM  07/30/2020    Procedure: Arteriogram;  Surgeon: Chong Buchanan MD;  Location: [facility] CATH INVASIVE LOCATION;  Service: Cardiovascular;;  rt femoral    BILATERAL SALPINGO OOPHORECTOMY      BREAST BIOPSY Right     BENIGN    CARDIAC CATHETERIZATION N/A 04/22/2020    Surgeon: Paulo Singleton MD;  nonsignificant coronary artery disease normal LV function nonsignificant mitral regurgitation probably shortness of breath due to the cardiac arrhythmias and diastolic dysfunction    CARDIAC CATHETERIZATION N/A 04/22/2020    Procedure: Coronary angiography;  Surgeon: Paulo Singleton MD;  Location: [facility] CATH INVASIVE LOCATION;  Service: Cardiology;  Laterality: N/A;    CARDIAC CATHETERIZATION N/A 04/22/2020    Procedure: Left ventriculography;  Surgeon: Paulo Singleton MD;  [facility]AS[facility] LOC[facility];  Service: Cardiology;  Laterality: N/A;    CARDIAC CATHETERIZATION N/A 12/22/2020    Procedure: Right Heart Cath w/ hemodynamic challenge;  Surgeon: Mario Bay MD;  [facility];  Service: Cardiology;  Laterality: N/A;    CARDIAC ELECTROPHYSIOLOGY PROCEDURE N/A 04/25/2020     Procedure: Cardioversion;  Surgeon: Paulo Singleton MD;  Location: [facility] CATH INVASIVE LOCATION;  Service: Cardiology;  Laterality: N/A;    CARDIAC ELECTROPHYSIOLOGY PROCEDURE N/A 07/29/2020    Procedure: PACEMAKER IMPLANTATION- DC;  Surgeon: Chong Buchanan MD;  Location: [facility] CATH INVASIVE LOCATION;  Service: Cardiovascular;  Laterality: N/A;    CARDIAC ELECTROPHYSIOLOGY PROCEDURE N/A 07/29/2020    Procedure: ABLATION AV NODE;  Surgeon: Chong Buchanan MD;  Location: [facility] CATH INVASIVE LOCATION;  Service: Cardiovascular;  Laterality: N/A;    CARDIAC ELECTROPHYSIOLOGY PROCEDURE N/A 07/30/2020    Procedure: ABLATION AV NODE PT HAS ST.BLESSING PPM;  Surgeon: Chong Buchanan MD;  Location: [facility] CATH INVASIVE LOCATION;  Service: Cardiovascular;  Laterality: N/A;    CHOLECYSTECTOMY      COLONOSCOPY  2018    COSMETIC SURGERY      D & C HYSTEROSCOPY N/A 10/13/2016    Procedure: DILATATION AND CURETTAGE HYSTEROSCOPY WITH MYOSURE;  Surgeon: Christopher Doll MD;  Location: [facility] OR;  Service:     ENDOSCOPY      ENDOSCOPY N/A 6/20/2022    Procedure: ESOPHAGOGASTRODUODENOSCOPY WITH  SAVORY DILATATION WITH FLUOROSCOPY AND COLD BIOPSIES;  Surgeon: Bry Ca III, MD;  Location: [facility] ENDOSCOPY;  Service: Gastroenterology;  Laterality: N/A;  pre: esophageal stricture, dysphagia  post: gastritis, esophagitis, diverticulum, esophageal stricture    ENDOSCOPY N/A 10/23/2023    Procedure: ESOPHAGOGASTRODUODENOSCOPY WITH DILATATION;  Surgeon: Say Greenfield MD PhD;  Location: [facility] OR;  Service: Gastroenterology;  Laterality: N/A;    EYE SURGERY Bilateral     cataract    FACELIFT      FEMORAL ARTERY - FEMORAL ARTERY BYPASS GRAFT Bilateral     FLEXIBLE SIGMOIDOSCOPY  7/80    when Crohn's first diagnosed    HEMORRHOIDECTOMY      TONSILLECTOMY AND ADENOIDECTOMY      TUBAL ABDOMINAL LIGATION  6/75    UPPER GASTROINTESTINAL ENDOSCOPY      VASCULAR SURGERY      femoral stents         Family History: family history includes Anemia in her mother; Autoimmune disease in her brother; Hypertension in her mother; Inflammatory bowel disease in her mother; Irritable bowel syndrome in her mother; Liver disease in her mother; Lung cancer in her paternal uncle; Stroke in her father.    Social History:  reports that she quit smoking about 56 years ago. Her smoking use included cigarettes. She started smoking about 64 years ago. She has a 16.2 pack-year smoking history. She has been exposed to tobacco smoke. She has never used smokeless tobacco. She reports that she does not drink alcohol and does not use drugs.    Home Medications:  Prior to Admission medications    Medication Sig Start Date End Date Taking? Authorizing Provider   atorvastatin (LIPITOR) 10 MG tablet Take 1 tablet by mouth Daily. 1/17/22  Yes Karthik Reyez MD   buPROPion XL (WELLBUTRIN XL) 300 MG 24 hr tablet Take 1 tablet by mouth Daily. 1/18/24  Yes Chitra Leyva MD   Calcium Carbonate-Vit D-Min (CALCIUM 1200 PO) Take  by mouth Daily.   Yes Mey Lyles MD   Cholecalciferol (Vitamin D3) 50 MCG (2000 UT) capsule Take 1 capsule by mouth Daily.   Yes Mey Lyles MD   clobetasol (TEMOVATE) 0.05 % cream Apply 1 Application topically to the appropriate area as directed 2 (Two) Times a Day As Needed.   Yes Mey Lyles MD   coenzyme Q10 100 MG capsule Take 1 capsule by mouth Daily.   Yes Mey Lyles MD   colesevelam (Welchol) 625 MG tablet Take 1 tablet by mouth Daily.   Yes Mey Lyles MD   cyanocobalamin 1000 MCG/ML injection Inject 1 mL into the appropriate muscle as directed by prescriber Every 28 (Twenty-Eight) Days. 4/7/23  Yes Chitra Leyva MD   fexofenadine (ALLEGRA) 180 MG tablet Take 1 tablet by mouth Daily.   Yes Mey Lyles MD   folic acid (FOLVITE) 400 MCG tablet Take 1 tablet by mouth Daily.   Yes Mey Lyles MD   gabapentin (NEURONTIN) 300 MG capsule  Take 1 capsule by mouth Daily. Pt can take up to 3x daily   Yes ProviderMey MD   levothyroxine (SYNTHROID, LEVOTHROID) 100 MCG tablet Take 1 tablet by mouth Daily. 1/18/24  Yes Chitra Leyva MD   Macitentan (Opsumit) 10 MG tablet Take 1 tablet by mouth Daily.   Yes ProviderMey MD   mesalamine (PENTASA) 500 MG CR capsule Take 2 capsules by mouth 4 (Four) Times a Day.  Patient taking differently: Take 4 capsules by mouth 2 (Two) Times a Day. 6/5/23  Yes Maeve Novoa APRN   methscopolamine (PAMINE FORTE) 5 MG tablet Take 1 tablet by mouth 2 (Two) Times a Day As Needed (abdominal pain). 10/10/23 10/9/24 Yes Sakshi Martinez APRN   metOLazone (ZAROXOLYN) 2.5 MG tablet Take 1 tablet by mouth 3 (Three) Times a Day As Needed (edema). 1/11/24  Yes Mey Lyles MD   ondansetron (ZOFRAN) 4 MG tablet Take 1 tablet by mouth Every 8 (Eight) Hours As Needed for Nausea or Vomiting. 2/27/24  Yes Sakshi Martinez APRN   polycarbophil (calcium polycarbophil) 625 MG tablet tablet Take  by mouth Daily.   Yes ProviderMey MD   potassium chloride (K-DUR,KLOR-CON) 20 MEQ CR tablet Take 1 tablet by mouth 2 (Two) Times a Day.  Patient taking differently: Take 1 tablet by mouth 2 (Two) Times a Day. Two tabs in the morning and one at night 1/18/24  Yes Chitra Leyva MD   Riociguat (Adempas) 2.5 MG tablet Take 1 tablet by mouth 3 (Three) Times a Day.   Yes ProviderMey MD   spironolactone (ALDACTONE) 25 MG tablet Take 1 tablet by mouth Daily. 2/29/24  Yes ProviderMey MD   torsemide (DEMADEX) 20 MG tablet Take 1 tablet by mouth Daily.  Patient taking differently: Take 5 tablets by mouth Daily. 6/21/22  Yes Thelma Saldana MD   Uptravi 200 & 800 MCG tablet therapy pack Take 1,600 mcg by mouth Daily. 11/22/22  Yes Provider, Historical, MD   Ustekinumab (STELARA SC) Inject 1 syringe under the skin into the appropriate area as directed. One syringe every seven weeks.  Option  care.  pk   Yes Provider, MD Mey   warfarin (COUMADIN) 2 MG tablet Take 1 tablet 4 days  weekly and 2 tablets daily x 3 days weekly  Patient taking differently: Take 2 tablets by mouth 4 (Four) Times a Week. Takes 4mg  tablet 5 days a week  and 2 mg 2 days a week 1/18/24  Yes Chitra Leyva MD   Zinc 50 MG capsule Take  by mouth.   Yes Provider, MD Mey       Allergies:  Allergies   Allergen Reactions    Infliximab Rash     Other reaction(s): Psoriasis    Sulfa Antibiotics Hives       Objective     Vitals:   Temp:  [97.9 °F (36.6 °C)-98.4 °F (36.9 °C)] 97.9 °F (36.6 °C)  Heart Rate:  [69-72] 70  Resp:  [8-18] 8  BP: ()/(41-53) 101/45    Intake/Output Summary (Last 24 hours) at 3/30/2024 0946  Last data filed at 3/30/2024 0811  Gross per 24 hour   Intake 1720 ml   Output --   Net 1720 ml       Physical Exam:    General Appearance: frail pleasant WF comfortable alert  Skin: warm and dry  HEENT: oral mucosa normal, nonicteric sclera  Neck: supple, no JVD  Lungs: CTA bila tno rales  Heart: RRR, normal S1 and S2  Abdomen: soft, nontender, nondistended  : no palpable bladder  Extremities: no edema, cyanosis or clubbing  Neuro: normal speech and mental status     Scheduled Meds:        IV Meds:        Results Reviewed:   I have personally reviewed the results from the time of this admission to 3/30/2024 09:46 EDT     Lab Results   Component Value Date    GLUCOSE 113 (H) 03/30/2024    CALCIUM 8.1 (L) 03/30/2024     03/30/2024    K 4.4 03/30/2024    CO2 24.7 03/30/2024     03/30/2024     (H) 03/30/2024    CREATININE 2.36 (H) 03/30/2024    EGFRIFAFRI 42 (L) 02/06/2023    EGFRIFNONA 38 (L) 02/04/2021    BCR 44.5 (H) 03/30/2024    ANIONGAP 11.3 03/30/2024      Lab Results   Component Value Date    MG 2.9 (H) 03/30/2024    PHOS 3.4 03/30/2024    ALBUMIN 3.5 03/30/2024           Assessment / Plan     ASSESSMENT:  MARY ANN, non olig - degree of prerenal azotemia from GIB with BUN/Cr 105/2.3,  compounded by high dose diuretic use in assoc with pulm HTN.  K/HCo3 normal.  CKD stage 4 - renal fcn has worsened considerably last few mos with Cr 1.2 in OCT, high 1's to low 2's since, with cardiorenal syn picture  Anemia of ABL, hgb down to 5.5; TSAT < 10%  Diastolic CHF + Pulm HTN  AFIB & antiphospholipid Ab syn  Coumadin toxicity, s/p Vit K, INR down 9.5 to 2  Hx CAD  HTN, SBP low 100s off meds   Hx IBD    PLAN:  Transfusion in progress  Hold diuretics today but will closely monitor vol status post transfusion  GI & hematology to see     Thank you for involving us in the care of Joellen Dominguez.  Please feel free to call with any questions.    Mario Granda MD  03/30/24  09:46 EDT    [facility]  [phone]

## 2024-03-30 NOTE — ED NOTES
Nursing report ED to floor  Joellen Dominguez  78 y.o.  female    HPI :  HPI (Adult)  Stated Reason for Visit: elevated creatinine, history of stage 3 CKD  History Obtained From: patient    Joellen Dominguez is a 78 y.o. female who presents to the ED c/o abnormal lab.  Reports that she was called and told that her kidney function was worse.  Denies any other complaints.  No melena or hematochezia.  No abdominal pain.  No chest pain or shortness of breath.       Chief Complaint  Chief Complaint   Patient presents with    Abnormal Lab     creatinine       Admitting doctor:   Osmar Denson MD    Admitting diagnosis:   The primary encounter diagnosis was Anemia, unspecified type. Diagnoses of MARY ANN (acute kidney injury) and Supratherapeutic INR were also pertinent to this visit.    Code status:   Current Code Status       Date Active Code Status Order ID Comments User Context       Prior            Allergies:   Infliximab and Sulfa antibiotics    Isolation:   No active isolations    Intake and Output  No intake or output data in the 24 hours ending 03/29/24 2328    Weight:   There were no vitals filed for this visit.    Most recent vitals:   Vitals:    03/29/24 2119 03/29/24 2122 03/29/24 2301   BP:  133/51 109/47   Pulse: 71  69   Resp: 18     Temp: 98.1 °F (36.7 °C)     TempSrc: Tympanic     SpO2: 98%         Active LDAs/IV Access:   Lines, Drains & Airways       Active LDAs       Name Placement date Placement time Site Days    Peripheral IV 03/29/24 2138 Anterior;Distal;Left Forearm 03/29/24 2138  Forearm  less than 1    Peripheral IV 03/29/24 2310 Anterior;Distal;Right Forearm 03/29/24 2310  Forearm  less than 1                    Labs (abnormal labs have a star):   Labs Reviewed   COMPREHENSIVE METABOLIC PANEL - Abnormal; Notable for the following components:       Result Value    Glucose 110 (*)      (*)     Creatinine 2.38 (*)     BUN/Creatinine Ratio 46.2 (*)     eGFR 20.3 (*)     All other components within  normal limits    Narrative:     GFR Normal >60  Chronic Kidney Disease <60  Kidney Failure <15    The GFR formula is only valid for adults with stable renal function between ages 18 and 70.   PROTIME-INR - Abnormal; Notable for the following components:    Protime 77.0 (*)     INR 9.54 (*)     All other components within normal limits   APTT - Abnormal; Notable for the following components:    PTT 64.6 (*)     All other components within normal limits   CBC WITH AUTO DIFFERENTIAL - Abnormal; Notable for the following components:    RBC 2.40 (*)     Hemoglobin 6.2 (*)     Hematocrit 20.9 (*)     MCH 25.8 (*)     MCHC 29.7 (*)     RDW 16.4 (*)     Lymphocyte % 13.7 (*)     Immature Grans % 0.9 (*)     Immature Grans, Absolute 0.07 (*)     All other components within normal limits   BNP (IN-HOUSE) - Abnormal; Notable for the following components:    proBNP 1,944.0 (*)     All other components within normal limits    Narrative:     This assay is used as an aid in the diagnosis of individuals suspected of having heart failure. It can be used as an aid in the diagnosis of acute decompensated heart failure (ADHF) in patients presenting with signs and symptoms of ADHF to the emergency department (ED). In addition, NT-proBNP of <300 pg/mL indicates ADHF is not likely.    Age Range Result Interpretation  NT-proBNP Concentration (pg/mL:      <50             Positive            >450                   Gray                 300-450                    Negative             <300    50-75           Positive            >900                  Gray                300-900                  Negative            <300      >75             Positive            >1800                  Gray                300-1800                  Negative            <300   IRON PROFILE - Abnormal; Notable for the following components:    Iron 35 (*)     Iron Saturation (TSAT) 7 (*)     All other components within normal limits   FERRITIN - Normal    Narrative:      Results may be falsely decreased if patient taking Biotin.     TYPE AND SCREEN   PREPARE RBC   CBC AND DIFFERENTIAL    Narrative:     The following orders were created for panel order CBC & Differential.  Procedure                               Abnormality         Status                     ---------                               -----------         ------                     CBC Auto Differential[[id number]]        Abnormal            Final result                 Please view results for these tests on the individual orders.       EKG:   No orders to display       Meds given in ED:   Medications   phytonadione (AQUA-MEPHYTON, VITAMIN K) 10 mg in sodium chloride 0.9 % 50 mL IVPB (has no administration in time range)       Imaging results:  No radiology results for the last day    Ambulatory status:   - As tolerated     Social issues:   Social History     Socioeconomic History    Marital status:    Tobacco Use    Smoking status: Former     Current packs/day: 0.00     Average packs/day: 2.0 packs/day for 8.1 years (16.2 ttl pk-yrs)     Types: Cigarettes     Start date: 1959     Quit date: 1967     Years since quittin.6     Passive exposure: Past    Smokeless tobacco: Never    Tobacco comments:     from age 14 to 22   Vaping Use    Vaping status: Never Used   Substance and Sexual Activity    Alcohol use: No    Drug use: Never    Sexual activity: Yes     Partners: Male     Birth control/protection: None       Peripheral Neurovascular  Peripheral Neurovascular (Adult)  Peripheral Neurovascular WDL: WDL    Neuro Cognitive  Neuro Cognitive (Adult)  Cognitive/Neuro/Behavioral WDL: WDL, level of consciousness, orientation, mood/behavior, speech  Level of Consciousness: Alert  Orientation: oriented x 4  Speech: clear, spontaneous, logical  Mood/Behavior: behavior appropriate to situation, cooperative, calm  Additional Documentation: [place] Coma Scale (Group)  [place] Coma Scale  Best Eye Response: 4-->(E4)  spontaneous  Best Motor Response: 6-->(M6) obeys commands  Best Verbal Response: 5-->(V5) oriented  [place] Coma Scale Score: 15    Learning  Learning Assessment (Adult)  Learning Readiness and Ability: no barriers identified  Education Provided  Person Taught: patient    Respiratory  Respiratory (Adult)  Airway WDL: WDL  Respiratory WDL  Respiratory WDL: WDL, rhythm/pattern  Rhythm/Pattern, Respiratory: unlabored, pattern regular, no shortness of breath reported, depth regular    Abdominal Pain       Pain Assessments  Pain (Adult)  (0-10) Pain Rating: Rest: 0    NIH Stroke Scale       Haley Baca RN  03/29/24 23:28 EDT

## 2024-03-31 ENCOUNTER — OFFICE (AMBULATORY)
Dept: URBAN - METROPOLITAN AREA CLINIC 64 | Facility: CLINIC | Age: 79
End: 2024-03-31

## 2024-03-31 DIAGNOSIS — K50.10 CROHN'S DISEASE OF LARGE INTESTINE WITHOUT COMPLICATIONS: ICD-10-CM

## 2024-03-31 PROBLEM — T45.4X5S ADVERSE EFFECT OF IRON AND ITS COMPOUNDS, SEQUELA: Status: ACTIVE | Noted: 2024-03-31

## 2024-03-31 LAB
ALBUMIN SERPL-MCNC: 3.4 G/DL (ref 3.5–5.2)
ANION GAP SERPL CALCULATED.3IONS-SCNC: 11.2 MMOL/L (ref 5–15)
BASOPHILS # BLD AUTO: 0.03 10*3/MM3 (ref 0–0.2)
BASOPHILS NFR BLD AUTO: 0.4 % (ref 0–1.5)
BH BB BLOOD EXPIRATION DATE: NORMAL
BH BB BLOOD EXPIRATION DATE: NORMAL
BH BB BLOOD TYPE BARCODE: 5100
BH BB BLOOD TYPE BARCODE: 5100
BH BB DISPENSE STATUS: NORMAL
BH BB DISPENSE STATUS: NORMAL
BH BB PRODUCT CODE: NORMAL
BH BB PRODUCT CODE: NORMAL
BH BB UNIT NUMBER: NORMAL
BH BB UNIT NUMBER: NORMAL
BUN SERPL-MCNC: 83 MG/DL (ref 8–23)
BUN/CREAT SERPL: 40.1 (ref 7–25)
CALCIUM SPEC-SCNC: 7.6 MG/DL (ref 8.6–10.5)
CHLORIDE SERPL-SCNC: 105 MMOL/L (ref 98–107)
CO2 SERPL-SCNC: 22.8 MMOL/L (ref 22–29)
CREAT SERPL-MCNC: 2.07 MG/DL (ref 0.57–1)
CROSSMATCH INTERPRETATION: NORMAL
CROSSMATCH INTERPRETATION: NORMAL
DEPRECATED RDW RBC AUTO: 48 FL (ref 37–54)
EGFRCR SERPLBLD CKD-EPI 2021: 24 ML/MIN/1.73
EOSINOPHIL # BLD AUTO: 0.3 10*3/MM3 (ref 0–0.4)
EOSINOPHIL NFR BLD AUTO: 4.1 % (ref 0.3–6.2)
ERYTHROCYTE [DISTWIDTH] IN BLOOD BY AUTOMATED COUNT: 16 % (ref 12.3–15.4)
FOLATE SERPL-MCNC: >20 NG/ML (ref 4.78–24.2)
GLUCOSE SERPL-MCNC: 96 MG/DL (ref 65–99)
HCT VFR BLD AUTO: 23.9 % (ref 34–46.6)
HCT VFR BLD AUTO: 25.2 % (ref 34–46.6)
HCT VFR BLD AUTO: 25.6 % (ref 34–46.6)
HCT VFR BLD AUTO: 27.1 % (ref 34–46.6)
HEMOCCULT STL QL: NEGATIVE
HGB BLD-MCNC: 7.4 G/DL (ref 12–15.9)
HGB BLD-MCNC: 7.7 G/DL (ref 12–15.9)
HGB BLD-MCNC: 7.8 G/DL (ref 12–15.9)
HGB BLD-MCNC: 8.3 G/DL (ref 12–15.9)
IMM GRANULOCYTES # BLD AUTO: 0.05 10*3/MM3 (ref 0–0.05)
IMM GRANULOCYTES NFR BLD AUTO: 0.7 % (ref 0–0.5)
INR PPP: 1.47 (ref 0.9–1.1)
LDH SERPL-CCNC: 160 U/L (ref 135–214)
LYMPHOCYTES # BLD AUTO: 0.88 10*3/MM3 (ref 0.7–3.1)
LYMPHOCYTES NFR BLD AUTO: 12 % (ref 19.6–45.3)
MCH RBC QN AUTO: 26.1 PG (ref 26.6–33)
MCHC RBC AUTO-ENTMCNC: 31 G/DL (ref 31.5–35.7)
MCV RBC AUTO: 84.2 FL (ref 79–97)
MONOCYTES # BLD AUTO: 0.54 10*3/MM3 (ref 0.1–0.9)
MONOCYTES NFR BLD AUTO: 7.4 % (ref 5–12)
NEUTROPHILS NFR BLD AUTO: 5.53 10*3/MM3 (ref 1.7–7)
NEUTROPHILS NFR BLD AUTO: 75.4 % (ref 42.7–76)
NRBC BLD AUTO-RTO: 0 /100 WBC (ref 0–0.2)
PHOSPHATE SERPL-MCNC: 3.2 MG/DL (ref 2.5–4.5)
PLATELET # BLD AUTO: 283 10*3/MM3 (ref 140–450)
PMV BLD AUTO: 10.5 FL (ref 6–12)
POTASSIUM SERPL-SCNC: 3.4 MMOL/L (ref 3.5–5.2)
PROTHROMBIN TIME: 18.1 SECONDS (ref 11.7–14.2)
RBC # BLD AUTO: 2.84 10*6/MM3 (ref 3.77–5.28)
SODIUM SERPL-SCNC: 139 MMOL/L (ref 136–145)
UNIT  ABO: NORMAL
UNIT  ABO: NORMAL
UNIT  RH: NORMAL
UNIT  RH: NORMAL
VIT B12 BLD-MCNC: 461 PG/ML (ref 211–946)
WBC NRBC COR # BLD AUTO: 7.33 10*3/MM3 (ref 3.4–10.8)

## 2024-03-31 PROCEDURE — 85610 PROTHROMBIN TIME: CPT | Performed by: INTERNAL MEDICINE

## 2024-03-31 PROCEDURE — 85014 HEMATOCRIT: CPT | Performed by: INTERNAL MEDICINE

## 2024-03-31 PROCEDURE — 25010000002 CALCIUM GLUCONATE-NACL 1-0.675 GM/50ML-% SOLUTION: Performed by: INTERNAL MEDICINE

## 2024-03-31 PROCEDURE — 82272 OCCULT BLD FECES 1-3 TESTS: CPT | Performed by: NURSE PRACTITIONER

## 2024-03-31 PROCEDURE — 82746 ASSAY OF FOLIC ACID SERUM: CPT | Performed by: INTERNAL MEDICINE

## 2024-03-31 PROCEDURE — 25810000003 SODIUM CHLORIDE 0.9 % SOLUTION: Performed by: INTERNAL MEDICINE

## 2024-03-31 PROCEDURE — 85025 COMPLETE CBC W/AUTO DIFF WBC: CPT | Performed by: INTERNAL MEDICINE

## 2024-03-31 PROCEDURE — 85018 HEMOGLOBIN: CPT | Performed by: INTERNAL MEDICINE

## 2024-03-31 PROCEDURE — 99232 SBSQ HOSP IP/OBS MODERATE 35: CPT | Performed by: INTERNAL MEDICINE

## 2024-03-31 PROCEDURE — 80069 RENAL FUNCTION PANEL: CPT | Performed by: INTERNAL MEDICINE

## 2024-03-31 PROCEDURE — 25010000002 NA FERRIC GLUC CPLX PER 12.5 MG: Performed by: INTERNAL MEDICINE

## 2024-03-31 PROCEDURE — 25010000002 ONDANSETRON PER 1 MG: Performed by: NURSE PRACTITIONER

## 2024-03-31 PROCEDURE — 99426 PRIN CARE MGMT STAFF 1ST 30: CPT | Performed by: INTERNAL MEDICINE

## 2024-03-31 PROCEDURE — 82607 VITAMIN B-12: CPT | Performed by: INTERNAL MEDICINE

## 2024-03-31 PROCEDURE — 99223 1ST HOSP IP/OBS HIGH 75: CPT | Performed by: INTERNAL MEDICINE

## 2024-03-31 PROCEDURE — 63710000001 DIPHENHYDRAMINE PER 50 MG: Performed by: INTERNAL MEDICINE

## 2024-03-31 PROCEDURE — 83615 LACTATE (LD) (LDH) ENZYME: CPT | Performed by: INTERNAL MEDICINE

## 2024-03-31 RX ORDER — POTASSIUM CHLORIDE 750 MG/1
20 TABLET, FILM COATED, EXTENDED RELEASE ORAL 2 TIMES DAILY WITH MEALS
Status: DISCONTINUED | OUTPATIENT
Start: 2024-03-31 | End: 2024-04-01 | Stop reason: HOSPADM

## 2024-03-31 RX ORDER — CALCIUM GLUCONATE 20 MG/ML
1000 INJECTION, SOLUTION INTRAVENOUS ONCE
Status: COMPLETED | OUTPATIENT
Start: 2024-03-31 | End: 2024-03-31

## 2024-03-31 RX ORDER — WARFARIN SODIUM 3 MG/1
3 TABLET ORAL
Status: COMPLETED | OUTPATIENT
Start: 2024-03-31 | End: 2024-03-31

## 2024-03-31 RX ORDER — SPIRONOLACTONE 25 MG/1
25 TABLET ORAL DAILY
Status: DISCONTINUED | OUTPATIENT
Start: 2024-03-31 | End: 2024-04-01 | Stop reason: HOSPADM

## 2024-03-31 RX ORDER — FAMOTIDINE 10 MG/ML
20 INJECTION, SOLUTION INTRAVENOUS DAILY
Status: COMPLETED | OUTPATIENT
Start: 2024-03-31 | End: 2024-04-01

## 2024-03-31 RX ORDER — DIPHENHYDRAMINE HCL 25 MG
25 CAPSULE ORAL DAILY
Status: COMPLETED | OUTPATIENT
Start: 2024-03-31 | End: 2024-04-01

## 2024-03-31 RX ORDER — TORSEMIDE 100 MG/1
100 TABLET ORAL DAILY
Status: DISCONTINUED | OUTPATIENT
Start: 2024-03-31 | End: 2024-04-01 | Stop reason: HOSPADM

## 2024-03-31 RX ORDER — DIPHENOXYLATE HYDROCHLORIDE AND ATROPINE SULFATE 2.5; .025 MG/1; MG/1
1 TABLET ORAL 4 TIMES DAILY PRN
Status: DISCONTINUED | OUTPATIENT
Start: 2024-03-31 | End: 2024-04-01 | Stop reason: HOSPADM

## 2024-03-31 RX ADMIN — LEVOTHYROXINE SODIUM 100 MCG: 100 TABLET ORAL at 04:49

## 2024-03-31 RX ADMIN — SPIRONOLACTONE 25 MG: 25 TABLET, FILM COATED ORAL at 14:32

## 2024-03-31 RX ADMIN — TORSEMIDE 100 MG: 100 TABLET ORAL at 14:32

## 2024-03-31 RX ADMIN — MESALAMINE 2000 MG: 250 CAPSULE ORAL at 08:07

## 2024-03-31 RX ADMIN — CALCIUM GLUCONATE 1000 MG: 20 INJECTION, SOLUTION INTRAVENOUS at 14:33

## 2024-03-31 RX ADMIN — GABAPENTIN 300 MG: 300 CAPSULE ORAL at 08:07

## 2024-03-31 RX ADMIN — DIPHENOXYLATE HYDROCHLORIDE AND ATROPINE SULFATE 1 TABLET: 2.5; .025 TABLET ORAL at 15:06

## 2024-03-31 RX ADMIN — POTASSIUM CHLORIDE 20 MEQ: 750 TABLET, EXTENDED RELEASE ORAL at 14:32

## 2024-03-31 RX ADMIN — SODIUM CHLORIDE 250 MG: 9 INJECTION, SOLUTION INTRAVENOUS at 11:00

## 2024-03-31 RX ADMIN — ONDANSETRON 4 MG: 2 INJECTION INTRAMUSCULAR; INTRAVENOUS at 10:25

## 2024-03-31 RX ADMIN — BUPROPION HYDROCHLORIDE 300 MG: 300 TABLET, EXTENDED RELEASE ORAL at 08:07

## 2024-03-31 RX ADMIN — DIPHENHYDRAMINE HYDROCHLORIDE 25 MG: 25 CAPSULE ORAL at 10:25

## 2024-03-31 RX ADMIN — ATORVASTATIN CALCIUM 10 MG: 20 TABLET, FILM COATED ORAL at 20:09

## 2024-03-31 RX ADMIN — MESALAMINE 2000 MG: 250 CAPSULE ORAL at 20:09

## 2024-03-31 RX ADMIN — FAMOTIDINE 20 MG: 10 INJECTION INTRAVENOUS at 10:25

## 2024-03-31 RX ADMIN — WARFARIN 3 MG: 3 TABLET ORAL at 18:06

## 2024-03-31 NOTE — PROGRESS NOTES
LOS: 2 days     Name: Joellen Dominguez  Age: 78 y.o.  Sex: female  :  1945  MRN: [id number]         Primary Care Physician: Chitra Leyva MD    Subjective   Subjective  Reports feeling better status post 2 units packed red blood cells yesterday.  Feels stronger with more energy.  No acute overnight events.  Denies any signs of bleeding.    Objective   Vital Signs  Temp:  [97.7 °F (36.5 °C)-98.6 °F (37 °C)] 98.1 °F (36.7 °C)  Heart Rate:  [70-87] 70  Resp:  [18-22] 18  BP: ()/(42-90) 114/49  Body mass index is 22.34 kg/m².    Objective:  General Appearance:  Comfortable and in no acute distress (Elderly, weak and deconditioned appearing).    Vital signs: (most recent): Blood pressure 114/49, pulse 70, temperature 98.1 °F (36.7 °C), temperature source Oral, resp. rate 18, weight 64.7 kg (142 lb 11.2 oz), SpO2 95%.    Lungs:  Normal effort and normal respiratory rate.  She is not in respiratory distress.  There are decreased breath sounds.    Heart: Normal rate.  Regular rhythm.    Abdomen: Abdomen is soft.  Bowel sounds are normal.   There is no abdominal tenderness.     Extremities: There is no dependent edema or local swelling.    Neurological: Patient is alert and oriented to person, place and time.    Skin:  Warm and dry.                Results Review:       I reviewed the patient's new clinical results.    Results from last 7 days   Lab Units 24  0801 24   WBC 10*3/mm3  --  7.33  --  7.21 7.67   HEMOGLOBIN g/dL 8.3* 7.4* 7.7* 5.5* 6.2*   PLATELETS 10*3/mm3  --  283  --  325 358     Results from last 7 days   Lab Units 24  0351 24  1038   SODIUM mmol/L 139 138 136  --    POTASSIUM mmol/L 3.4* 4.4 5.0  --    CHLORIDE mmol/L 105 102 98  --    CO2 mmol/L 22.8 24.7 26.1  --    BUN mg/dL 83* 105* 110*  --    CREATININE mg/dL 2.07* 2.36* 2.38* 3.30*   CALCIUM mg/dL 7.6* 8.1* 8.8  --    GLUCOSE mg/dL  96 113* 110*  --      Results from last 7 days   Lab Units 03/31/24  0351 03/30/24  0816 03/29/24  2139   INR  1.47* 2.08* 9.54*             Scheduled Meds:   atorvastatin, 10 mg, Oral, Nightly  buPROPion XL, 300 mg, Oral, Daily  diphenhydrAMINE, 25 mg, Oral, Daily  famotidine, 20 mg, Intravenous, Daily  ferric gluconate, 250 mg, Intravenous, Daily  gabapentin, 300 mg, Oral, Daily  levothyroxine, 100 mcg, Oral, Q AM  mesalamine, 2,000 mg, Oral, BID      PRN Meds:     acetaminophen    aluminum-magnesium hydroxide-simethicone    senna-docusate sodium **AND** polyethylene glycol **AND** bisacodyl **AND** bisacodyl    nitroglycerin    ondansetron ODT **OR** ondansetron    sodium chloride  Continuous Infusions:       Assessment & Plan   Active Hospital Problems    Diagnosis  POA    **Anemia [D64.9]  Yes    Supratherapeutic INR [R79.1]  Yes    Esophageal dysphagia [R13.19]  Yes    Obstructive sleep apnea syndrome [G47.33]  Yes    Presence of cardiac pacemaker [Z95.0]  Yes    Atrial fibrillation, permanent [I48.21]  Yes    Long term current use of anticoagulant therapy [Z79.01]  Not Applicable    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       Assessment & Plan    -Anemia improving status post 2 units of packed red blood cells.   Her hematologist has been consulted.  IV iron has been ordered.  -No signs of bleeding and GI does not recommend any endoscopic evaluation at this time.  -Nephrology managing MARY ANN on advanced CKD.  Diuretics presently on hold.  -Discussed with hematology.  She will get IV iron.  Given no signs of bleeding we will ask pharmacy to start dosing her Coumadin once again.        Expected  discharge date/ time has not been documented.     Bry Stout MD  [facility]  03/31/24  09:37 EDT

## 2024-03-31 NOTE — PLAN OF CARE
Goal Outcome Evaluation:  Plan of Care Reviewed With: patient        Progress: improving  Outcome Evaluation: VSS, BP still soft but asymptomatic, no c/o any discomfort. Hgb 7.7 at midnight recheck. Awaiting for BM. Call light within reach, refused bed alarm but has been calling for bathroom. All needs met .

## 2024-03-31 NOTE — PLAN OF CARE
Goal Outcome Evaluation:  Plan of Care Reviewed With: patient, spouse    Progress: improving  Outcome Evaluation: Ms. Dominguez admitted on 3/29 for anemia. Hgb up to 8.3. Fecal occult negative. Iron given - patient tolerated well. Torsemide and Spironolactone restarted. K - 3.4 - K home dose restarted. Calcium - 7.6 - replaced with 1g. PRN Lomotil ordered for diarrhea - given 1x. PRN Zofran given this AM after breakfast - nausea relieved. Warfarin to be restarted this evening (INR- 1.47). Ambulating to bathroom with standby assistance. Spouse at bedside this morning. Plan is home at discharge. Patient stable and needs met at this time.

## 2024-03-31 NOTE — PROGRESS NOTES
[facility], [place], operated by [facility]  Inpatient Progress Note    Reason for Follow Up:  Anemia with hx of Crohn's disease     Subjective     Interval History:   Ate lunch then had diarrhea.  Requesting Lomotil.  No abdominal pain or blood in the stool    Current Facility-Administered Medications:     acetaminophen (TYLENOL) tablet 650 mg, 650 mg, Oral, Q4H PRN, Molina Whalen APRN, 650 mg at 03/30/24 1414    aluminum-magnesium hydroxide-simethicone (MAALOX MAX) 400-400-40 MG/5ML suspension 15 mL, 15 mL, Oral, Q6H PRN, Molina Whalen APRN    atorvastatin (LIPITOR) tablet 10 mg, 10 mg, Oral, Nightly, Bry Stout MD, 10 mg at 03/30/24 2051    sennosides-docusate (PERICOLACE) 8.6-50 MG per tablet 2 tablet, 2 tablet, Oral, BID PRN **AND** polyethylene glycol (MIRALAX) packet 17 g, 17 g, Oral, Daily PRN **AND** bisacodyl (DULCOLAX) EC tablet 5 mg, 5 mg, Oral, Daily PRN **AND** bisacodyl (DULCOLAX) suppository 10 mg, 10 mg, Rectal, Daily PRN, Molina Whalen, APRN    buPROPion XL (WELLBUTRIN XL) 24 hr tablet 300 mg, 300 mg, Oral, Daily, Bry Stout MD, 300 mg at 03/31/24 0807    calcium gluconate 1000 Mg/50ml 0.675% NaCl IV SOLN, 1,000 mg, Intravenous, Once, Mario Granda MD    diphenhydrAMINE (BENADRYL) capsule 25 mg, 25 mg, Oral, Daily, Maria E Bingham MD PhD, 25 mg at 03/31/24 1025    famotidine (PEPCID) injection 20 mg, 20 mg, Intravenous, Daily, Maria E Bingham MD PhD, 20 mg at 03/31/24 1025    ferric gluconate (FERRLECIT) 250 MG in sodium chloride 0.9% 250 mL IVPB, 250 mg, Intravenous, Daily, Maria E Bingham MD PhD, Last Rate: 125 mL/hr at 03/31/24 1100, 250 mg at 03/31/24 1100    gabapentin (NEURONTIN) capsule 300 mg, 300 mg, Oral, Daily, Bry Stout MD, 300 mg at 03/31/24 0807    levothyroxine (SYNTHROID, LEVOTHROID) tablet 100 mcg, 100 mcg, Oral, Q AM, Bry Stout MD, 100 mcg at 03/31/24 0449    mesalamine (PENTASA) CR  capsule 2,000 mg, 2,000 mg, Oral, BID, Bry Stout MD, 2,000 mg at 03/31/24 0807    nitroglycerin (NITROSTAT) SL tablet 0.4 mg, 0.4 mg, Sublingual, Q5 Min PRN, Molina Whalen APRN    ondansetron ODT (ZOFRAN-ODT) disintegrating tablet 4 mg, 4 mg, Oral, Q6H PRN **OR** ondansetron (ZOFRAN) injection 4 mg, 4 mg, Intravenous, Q6H PRN, Molina Whalen APRN, 4 mg at 03/31/24 1025    Pharmacy to dose warfarin, , Does not apply, Continuous PRN, Bry Stout MD    potassium chloride (K-DUR,KLOR-CON) ER tablet 20 mEq, 20 mEq, Oral, BID With Meals, Mario Granda MD    sodium chloride 0.9 % flush 10 mL, 10 mL, Intravenous, PRN, Molina Whalen APRN    spironolactone (ALDACTONE) tablet 25 mg, 25 mg, Oral, Daily, Mario Granda MD    torsemide (DEMADEX) tablet 100 mg, 100 mg, Oral, Daily, Mario Granda MD    warfarin (COUMADIN) (dosing per levels), , Does not apply, Daily, Bry Stout MD    warfarin (COUMADIN) tablet 3 mg, 3 mg, Oral, Once, Bry Stout MD  Review of Systems:    Positive for diarrhea, negative for fevers chills nausea or vomiting    Objective     Vital Signs  Temp:  [97.7 °F (36.5 °C)-98.6 °F (37 °C)] 98.1 °F (36.7 °C)  Heart Rate:  [70-87] 70  Resp:  [18-22] 18  BP: ()/(42-57) 114/49  Body mass index is 22.34 kg/m².    Intake/Output Summary (Last 24 hours) at 3/31/2024 1352  Last data filed at 3/31/2024 0807  Gross per 24 hour   Intake 780 ml   Output --   Net 780 ml     I/O this shift:  In: 240 [P.O.:240]  Out: -      Physical Exam:   General: patient awake, alert and cooperative   Eyes: Normal lids and lashes, no scleral icterus   Neck: supple, normal ROM   Skin: warm and dry, not jaundiced   Pulm: regular and unlabored   Abdomen: soft, nontender, nondistended   Extremities: no rash or edema   Psychiatric: Normal mood and behavior; memory intact     Results Review:     I reviewed the patient's new  "clinical results.    Results from last 7 days   Lab Units 03/31/24  0801 03/31/24  0351 03/30/24  2347 03/30/24  0312 03/29/24  2139   WBC 10*3/mm3  --  7.33  --  7.21 7.67   HEMOGLOBIN g/dL 8.3* 7.4* 7.7* 5.5* 6.2*   HEMATOCRIT % 27.1* 23.9* 25.2* 18.4* 20.9*   PLATELETS 10*3/mm3  --  283  --  325 358     Results from last 7 days   Lab Units 03/31/24  0351 03/30/24  0312 03/29/24  2139   SODIUM mmol/L 139 138 136   POTASSIUM mmol/L 3.4* 4.4 5.0   CHLORIDE mmol/L 105 102 98   CO2 mmol/L 22.8 24.7 26.1   BUN mg/dL 83* 105* 110*   CREATININE mg/dL 2.07* 2.36* 2.38*   CALCIUM mg/dL 7.6* 8.1* 8.8   BILIRUBIN mg/dL  --   --  0.3   ALK PHOS U/L  --   --  46   ALT (SGPT) U/L  --   --  12   AST (SGOT) U/L  --   --  20   GLUCOSE mg/dL 96 113* 110*     Results from last 7 days   Lab Units 03/31/24  0351 03/30/24  0816 03/29/24  2139   INR  1.47* 2.08* 9.54*     No results found for: \"LIPASE\"    Radiology:  No orders to display       Assessment & Plan     Active Hospital Problems    Diagnosis     **Anemia     Adverse effect of iron and its compounds, sequela     Supratherapeutic INR     Iron (Fe) deficiency anemia     Esophageal dysphagia     Obstructive sleep apnea syndrome     Presence of cardiac pacemaker     Atrial fibrillation, permanent     Long term current use of anticoagulant therapy     COPD with emphysema     Chronic diastolic CHF (congestive heart failure)     Pulmonary HTN     Depression     Essential hypertension     Antiphospholipid antibody syndrome     Peripheral vascular disease        Assessment:  Symptomatic anemia  supratherapeutic INR- corrected  History of Crohn's disease maintained on Stelara and Pentasa  hx of pancreatic IPMN  MARY ANN      Plan:  Continue po pentasa - rcvd recent stelara injection as scheduled prior to admit  INR corrected - h/h stable  Low res diet as tolerated  She had a fairly recent EGD/EUS. She has no new GI symptoms. She does not want a colonoscopy. Drop in hemoglobin is likely " related to significant Coumadin toxicity.   Add lomotil prn for diarrhea which she uses at home  No further recommendations at this time-we will sign off but are available as needed    I discussed the patients findings and my recommendations with patient.            Mary Coppola M.D.  [facility]  [phone]

## 2024-03-31 NOTE — PROGRESS NOTES
[facility] Clinical Pharmacy Services: Warfarin Dosing/Monitoring Consult  Joellen Dominguez is a 78 y.o. female, estimated creatinine clearance is 22.5 mL/min (A) (by C-G formula based on SCr of 2.07 mg/dL (H)). weighing 64.7 kg (142 lb 11.2 oz).    Results from last 7 days   Lab Units 03/31/24  0801 03/31/24  0351 03/30/24  2347 03/30/24  0816 03/30/24  0312 03/29/24  2139   INR   --  1.47*  --  2.08*  --  9.54*   APTT seconds  --   --   --   --   --  64.6*   HEMOGLOBIN g/dL 8.3* 7.4* 7.7*  --  5.5* 6.2*   HEMATOCRIT % 27.1* 23.9* 25.2*  --  18.4* 20.9*   PLATELETS 10*3/mm3  --  283  --   --  325 358   3/31 Fecal Occult Blood -negative    Prior to admission anticoagulation: RN confirmed Warfarin dosing with patient 3/31:  4mg five days a week and 2mg the other two days of the week (average of ~3.4mg/day)    Hospital Anticoagulation:  Consulting provider: Dr. Stout  Start date: home medication, consult start 3/31  Indication: A Fib - requiring full anticoagulation  Target INR: 2 - 3  Expected duration: home medication   Bridge Therapy: No, defer any possible bridging to Provider    Potential food or drug interactions:   -Acetaminophen: increased risk of bleeding  -Levothyroxine (home med): increased risk of bleeding  -Mesalamine (home med): increased or decreased effects on Warfarin possible    Dietary Orders (From admission, onward)       Start     Ordered    03/30/24 1449  Diet: Gastrointestinal; Fiber-Restricted; Fluid Consistency: Thin (IDDSI 0)  Diet Effective Now        References:    Diet Order [facility]   Question Answer Comment   Diets: Gastrointestinal    Gastrointestinal Diet: Fiber-Restricted    Fluid Consistency: Thin (IDDSI 0)        03/30/24 1449                  Education complete?/Date: No; plan for follow up TBD    Assessment/Plan:  INR elevated at 9.54 on 3/29. Received FFP and Vitamin K 10mg IV.      Given elevated INR on hospital presentation, plan for Warfarin 3mg today.  Pharmacy will  dose daily for now.    Monitor for any signs or symptoms of bleeding  Follow up daily INRs and dose adjustments    Pharmacy will continue to follow until discharge or discontinuation of warfarin.     Steven Geiger, PharmD  Clinical Pharmacist

## 2024-03-31 NOTE — CONSULTS
.     REASON FOR CONSULTATION:     Provide an opinion on any further workup or treatment of iron deficiency anemia.                             REQUESTING PHYSICIAN: MARIAM Montague.     RECORDS OBTAINED:  Records of the patient's history including those obtained from the referring provider were reviewed and summarized in detail.    HISTORY OF PRESENT ILLNESS:  The patient is a 78 y.o. year old female who was brought to [facility] emergency room because of Coumadin toxicity with INR 9.5 on admission.  She was also found to having severe anemia with a hemoglobin 6.2.      This patient has been taking Coumadin for atrial fibrillation and antiphospholipid antibodies syndrome, she was followed by hematologist Dr. Keene at the [facility].  Patient also has Crohn's disease with ileal colitis diagnosed in 1970, and on Stelara and Pentasa still having some intermittent diarrhea.    Patient has a INR meter at home however she did not measure recently because interruption of Coumadin therapy from epidural injection in the end of the procedure.  Nevertheless she was not feeling well recently, and also a few days ago noticed having gum bleeding when brushing teeth.  She checked her INR which was elevated and was told to come to ER for evaluation.  Patient reports she has no melena hematochezia.    Patient reports she was actually recently given IV iron therapy 3 doses at Dr. Keene's clinic.  She denies any reaction to IV iron infusion.    Patient reports no recent infection in the was not taking any antibiotics such as Bactrim, Cipro, Levaquin, azithromycin or Diflucan.  Also no other new medications.  She has been eating well.    On 3/29/2024 when she presented in late night, laboratory study showed acute renal injury with creatinine 2.38, , glucose 110, normal electrolytes including calcium 8.8, normal liver function panel, INR 9.54, PT 77.0 seconds and PTT 64.6  seconds.  Hemoglobin 6.2, platelets 350,000 WBC 7670 neutrophils 5520 lymphocytes 1050.  Iron study reported deficiency with free iron 35 TIBC 508 iron saturation 7% and ferritin 65.9 ng/mL.    Repeat labs in the early morning on 3/30/2024 showed worsening hemoglobin 5.5.  Patient was given 2 units PRBC transfusion, and also FFP, with improved INR 2.08.  Posttransfusion hemoglobin has also improved to 7.7 on 3/30/2024.      Patient was seen by GI service.  Apparently patient had a recent appointment scope examination, so GI service recommended no repeating scope because patient has no apparent GI bleeding.  Her stool sample was tested negative for occult blood this morning on 3/31/2024.  Laboratory studies showed further improved hemoglobin 8.3.  Maintains normal WBC and platelets.  INR 1.47.    This morning on 3/31/2024 we obtained a liver study which reported mediocre B12 level at 461 pg/mL, and excellent folate more than 20 ng/mL.    Patient reports she was diagnosed of vitamin B12 deficiency and receives B12 injection monthly when she remembers.         Past Medical History:   Diagnosis Date    Acute deep vein thrombosis of lower extremity     Allergic     Anemia     Antiphospholipid antibody syndrome     Antiphospholipid antibody syndrome     Arrhythmia     ATRIAL FIBRILLATION    Arthritis     OSTEO    Asthma     pulmonary hypertension    Benign essential hypertension     CHF (congestive heart failure) 4/2020    only in hospital    Cholelithiasis 6/07    removed    Chronic kidney disease     stage 3 renal failure    Clotting disorder 8/12    Primary Antiphospholipid Antibody Syndrome    COPD (chronic obstructive pulmonary disease)     Coronary artery disease     Crohn's disease     Cutaneous candidiasis     Depression     Disease of thyroid gland     Elevated cholesterol     GERD (gastroesophageal reflux disease)     GI (gastrointestinal bleed) 6/07    frequently in early Crohn's    Hernia 5/11    repaired  ventral hernias 2x    History of echocardiogram 04/22/2020    mild-to-moderate concentric hypertrophy, EF 56 - 60%. LA severely dilated, Mild MAC, Mild MR with restrictive movement of the posterior leaflet    Hyperlipidemia     Hypertension     Low back pain     Myocardial infarction     Osteopenia     Polyp, uterus corpus     Pulmonary hypertension     Renal insufficiency     Scoliosis     Sleep apnea     bipap     Past Surgical History:   Procedure Laterality Date    ABDOMINAL HERNIA REPAIR      AMPUTATION DIGIT Left     SECOND TOE     ARTERIOGRAM  07/30/2020    Procedure: Arteriogram;  Surgeon: Chong Buchanan MD;  Location: [facility] CATH INVASIVE LOCATION;  Service: Cardiovascular;;  rt femoral    BILATERAL SALPINGO OOPHORECTOMY      BREAST BIOPSY Right     BENIGN    CARDIAC CATHETERIZATION N/A 04/22/2020    Surgeon: Paulo Singleton MD;  nonsignificant coronary artery disease normal LV function nonsignificant mitral regurgitation probably shortness of breath due to the cardiac arrhythmias and diastolic dysfunction    CARDIAC CATHETERIZATION N/A 04/22/2020    Procedure: Coronary angiography;  Surgeon: Paulo Singleton MD;  Location: [facility] CATH INVASIVE LOCATION;  Service: Cardiology;  Laterality: N/A;    CARDIAC CATHETERIZATION N/A 04/22/2020    Procedure: Left ventriculography;  Surgeon: Paulo Singleton MD;  Location: [facility] CATH INVASIVE LOCATION;  Service: Cardiology;  Laterality: N/A;    CARDIAC CATHETERIZATION N/A 12/22/2020    Procedure: Right Heart Cath w/ hemodynamic challenge;  Surgeon: Mario Bay MD;  Location[facility] C[facility]VASIVE LOC[facility];  Service: Cardiology;  Laterality: N/A;    CARDIAC ELECTROPHYSIOLOGY PROCEDURE N/A 04/25/2020    Procedure: Cardioversion;  Surgeon: Paulo Singleton MD;  Location: [facility] CATH INVASIVE LOCATION;  Service: Cardiology;  Laterality: N/A;    CARDIAC ELECTROPHYSIOLOGY PROCEDURE N/A 07/29/2020    Procedure: PACEMAKER  IMPLANTATION- DC;  Surgeon: Chong Buchanan MD;  Location: [facility] CATH INVASIVE LOCATION;  Service: Cardiovascular;  Laterality: N/A;    CARDIAC ELECTROPHYSIOLOGY PROCEDURE N/A 07/29/2020    Procedure: ABLATION AV NODE;  Surgeon: Chong Buchanan MD;  Location: [facility] CATH INVASIVE LOCATION;  Service: Cardiovascular;  Laterality: N/A;    CARDIAC ELECTROPHYSIOLOGY PROCEDURE N/A 07/30/2020    Procedure: ABLATION AV NODE PT HAS ST.BLESSING PPM;  Surgeon: Chong Buchanan MD;  Location: [facility] CATH INVASIVE LOCATION;  Service: Cardiovascular;  Laterality: N/A;    CHOLECYSTECTOMY      COLONOSCOPY  2018    COSMETIC SURGERY      D & C HYSTEROSCOPY N/A 10/13/2016    Procedure: DILATATION AND CURETTAGE HYSTEROSCOPY WITH MYOSURE;  Surgeon: Christopher Doll MD;  Location: [facility] MAIN OR;  Service:     ENDOSCOPY      ENDOSCOPY N/A 6/20/2022    Procedure: ESOPHAGOGASTRODUODENOSCOPY WITH  SAVORY DILATATION WITH FLUOROSCOPY AND COLD BIOPSIES;  Surgeon: Bry Ca III, MD;  Location: [facility] ENDOSCOPY;  Service: Gastroenterology;  Laterality: N/A;  pre: esophageal stricture, dysphagia  post: gastritis, esophagitis, diverticulum, esophageal stricture    ENDOSCOPY N/A 10/23/2023    Procedure: ESOPHAGOGASTRODUODENOSCOPY WITH DILATATION;  Surgeon: Say Greenfield MD PhD;  Location: [facility] MAIN OR;  Service: Gastroenterology;  Laterality: N/A;    EYE SURGERY Bilateral     cataract    FACELIFT      FEMORAL ARTERY - FEMORAL ARTERY BYPASS GRAFT Bilateral     FLEXIBLE SIGMOIDOSCOPY  7/80    when Crohn's first diagnosed    HEMORRHOIDECTOMY      TONSILLECTOMY AND ADENOIDECTOMY      TUBAL ABDOMINAL LIGATION  6/75    UPPER GASTROINTESTINAL ENDOSCOPY      VASCULAR SURGERY      femoral stents        MEDICATIONS    Current Facility-Administered Medications:     acetaminophen (TYLENOL) tablet 650 mg, 650 mg, Oral, Q4H PRN, Molina Whalen, APRN, 650 mg at 03/30/24 1414    aluminum-magnesium hydroxide-simethicone (MAALOX MAX)  400-400-40 MG/5ML suspension 15 mL, 15 mL, Oral, Q6H PRN, Molina Whalen APRN    atorvastatin (LIPITOR) tablet 10 mg, 10 mg, Oral, Nightly, Bry Stout MD, 10 mg at 03/30/24 2051    sennosides-docusate (PERICOLACE) 8.6-50 MG per tablet 2 tablet, 2 tablet, Oral, BID PRN **AND** polyethylene glycol (MIRALAX) packet 17 g, 17 g, Oral, Daily PRN **AND** bisacodyl (DULCOLAX) EC tablet 5 mg, 5 mg, Oral, Daily PRN **AND** bisacodyl (DULCOLAX) suppository 10 mg, 10 mg, Rectal, Daily PRN, Molina Whalen APRN    buPROPion XL (WELLBUTRIN XL) 24 hr tablet 300 mg, 300 mg, Oral, Daily, Bry Stout MD, 300 mg at 03/31/24 0807    diphenhydrAMINE (BENADRYL) capsule 25 mg, 25 mg, Oral, Daily, Maria E Bingham MD PhD    famotidine (PEPCID) injection 20 mg, 20 mg, Intravenous, Daily, Maria E Bingham MD PhD    ferric gluconate (FERRLECIT) 250 MG in sodium chloride 0.9% 250 mL IVPB, 250 mg, Intravenous, Daily, Maria E Bingham MD PhD    gabapentin (NEURONTIN) capsule 300 mg, 300 mg, Oral, Daily, Bry Stout MD, 300 mg at 03/31/24 0807    levothyroxine (SYNTHROID, LEVOTHROID) tablet 100 mcg, 100 mcg, Oral, Q AM, Bry Stout MD, 100 mcg at 03/31/24 0449    mesalamine (PENTASA) CR capsule 2,000 mg, 2,000 mg, Oral, BID, Bry Stout MD, 2,000 mg at 03/31/24 0807    nitroglycerin (NITROSTAT) SL tablet 0.4 mg, 0.4 mg, Sublingual, Q5 Min PRN, Molina Whalen APRN    ondansetron ODT (ZOFRAN-ODT) disintegrating tablet 4 mg, 4 mg, Oral, Q6H PRN **OR** ondansetron (ZOFRAN) injection 4 mg, 4 mg, Intravenous, Q6H PRN, Molina Whalen, APRN    sodium chloride 0.9 % flush 10 mL, 10 mL, Intravenous, PRN, Molina Whalen, APRN    ALLERGIES:     Allergies   Allergen Reactions    Infliximab Rash     Other reaction(s): Psoriasis    Sulfa Antibiotics Hives       SOCIAL HISTORY:       Social History     Socioeconomic History    Marital status:    Tobacco  Use    Smoking status: Former     Current packs/day: 0.00     Average packs/day: 2.0 packs/day for 8.1 years (16.2 ttl pk-yrs)     Types: Cigarettes     Start date: 1959     Quit date: 1967     Years since quittin.7     Passive exposure: Past    Smokeless tobacco: Never    Tobacco comments:     from age 14 to 22   Vaping Use    Vaping status: Never Used   Substance and Sexual Activity    Alcohol use: No    Drug use: Never    Sexual activity: Yes     Partners: Male     Birth control/protection: None         FAMILY HISTORY:  Family History   Problem Relation Age of Onset    Inflammatory bowel disease Mother     Hypertension Mother     Liver disease Mother         due to hepatitis from blood transfusions    Anemia Mother     Irritable bowel syndrome Mother     Stroke Father     Autoimmune disease Brother     Lung cancer Paternal Uncle     Colon cancer Neg Hx     Colon polyps Neg Hx     Crohn's disease Neg Hx     Ulcerative colitis Neg Hx        REVIEW OF SYSTEMS:  Review of Systems   Constitutional:  Positive for fatigue. Negative for chills, diaphoresis, fever and unexpected weight change.   HENT:  Negative for nosebleeds, sore throat and trouble swallowing.         Gum bleeding when brushing teeth   Respiratory:  Negative for cough and shortness of breath.    Cardiovascular:  Negative for chest pain and leg swelling.   Gastrointestinal:  Negative for abdominal pain, anal bleeding and blood in stool.   Genitourinary:  Negative for dysuria and hematuria.   Musculoskeletal:  Negative for joint swelling.   Neurological:  Negative for syncope.   Hematological:  Negative for adenopathy. Bruises/bleeds easily.   Psychiatric/Behavioral:  Negative for confusion.               Vitals:    24 1935 24 2305 24 0740 24 0759   BP: 102/53 99/42 160/90 114/49   BP Location: Left arm Left arm Right arm Right arm   Patient Position: Lying Lying Lying Lying   Pulse: 70 70 71 70   Resp: 18 18 18 18    Temp: 97.7 °F (36.5 °C) 98.6 °F (37 °C) 98.6 °F (37 °C) 98.1 °F (36.7 °C)   TempSrc: Oral Oral Oral Oral   SpO2: 96% 93% 92% 95%   Weight:              No data to display               PHYSICAL EXAM:      CONSTITUTIONAL:  Vital signs reviewed.  Well-developed, thin female lying in bed, no distress, looks comfortable.  EYES:  Conjunctivae and lids unremarkable.  PERRLA  EARS,NOSE,MOUTH,THROAT:  Ears and nose appear unremarkable.  Lips appear unremarkable.  RESPIRATORY:  Normal respiratory effort.  Lungs clear to auscultation bilaterally.  CARDIOVASCULAR: Irregular rhythm, rate controlled.  Normal S1, S2.  No murmurs.  No significant lower extremity edema.  GASTROINTESTINAL: Abdomen appears unremarkable.  Nontender.  No hepatomegaly.  No splenomegaly.  MUSCULOSKELETAL:Unremarkable digits/nails.  No cyanosis or clubbing.  SKIN:  Warm.  No rashes.  PSYCHIATRIC:  Normal judgment and insight.  Normal mood and affect.      RECENT LABS:  CBC:      Lab 03/31/24  0801 03/31/24 0351 03/30/24 2347 03/30/24 0312 03/29/24 2139   WBC  --  7.33  --  7.21 7.67   HEMOGLOBIN 8.3* 7.4*   < > 5.5* 6.2*   HEMATOCRIT 27.1* 23.9*   < > 18.4* 20.9*   PLATELETS  --  283  --  325 358   NEUTROS ABS  --  5.53  --   --  5.52   IMMATURE GRANS (ABS)  --  0.05  --   --  0.07*   LYMPHS ABS  --  0.88  --   --  1.05   MONOS ABS  --  0.54  --   --  0.68   EOS ABS  --  0.30  --   --  0.32   MCV  --  84.2  --  86.8 87.1    < > = values in this interval not displayed.     CMP:        Lab 03/31/24  0351 03/30/24 0312 03/29/24 2139 03/28/24  1038   SODIUM 139 138 136  --    POTASSIUM 3.4* 4.4 5.0  --    CHLORIDE 105 102 98  --    CO2 22.8 24.7 26.1  --    ANION GAP 11.2 11.3 11.9  --    BUN 83* 105* 110*  --    CREATININE 2.07* 2.36* 2.38* 3.30*   EGFR 24.0* 20.5* 20.3*  --    GLUCOSE 96 113* 110*  --    CALCIUM 7.6* 8.1* 8.8  --    MAGNESIUM  --  2.9*  --   --    PHOSPHORUS 3.2 3.4  --   --    TOTAL PROTEIN  --   --  6.2  --    ALBUMIN 3.4* 3.5 3.7   --    GLOBULIN  --   --  2.5  --    ALT (SGPT)  --   --  12  --    AST (SGOT)  --   --  20  --    BILIRUBIN  --   --  0.3  --    ALK PHOS  --   --  46  --      Lab Results   Component Value Date    IRON 35 (L) 03/29/2024    TIBC 508 03/29/2024    FERRITIN 65.90 03/29/2024     Lab Results   Component Value Date    FOLATE >20.00 03/31/2024     Lab Results   Component Value Date    [id number] 03/31/2024     Lab Results   Component Value Date    INR 1.47 (H) 03/31/2024    INR 2.08 (H) 03/30/2024    INR 9.54 (C) 03/29/2024    PROTIME 18.1 (H) 03/31/2024    PROTIME 23.6 (H) 03/30/2024    PROTIME 77.0 (C) 03/29/2024           Assessment & Plan     *.  Coumadin toxicity.  Patient takes Coumadin for A-fib and antiphospholipid antibody syndrome.  This has been reversed with FFP and also 10 mg vitamin K, INR 1.47 today on 3/31/2024.   This patient takes Coumadin for years because of A-fib and also antiphospholipid syndrome, followed by Dr. Keene.  Patient has a machine for monitoring INR at home.  Because patient has no evidence of GI bleeding with negative stool occult blood, I think this patient needs to be started back on Coumadin and then follow-up by Dr. Keene.    *.  Iron deficiency anemia.  Patient denies melena hematochezia.  Her stool was tested negative for occult blood today on 3/31/2024.  Patient has been seen by GI service, recommended observation only.  Patient reports she was given IV iron therapy recently at the office of her primary hematologist Dr. Keene because of poor tolerance and the GI side effects.  Nevertheless laboratory study on 3/29/2024 reported iron saturation 7% and ferritin 65.9ng/mL, still has iron deficiency.  I recommended to treat the patient with intravenous iron ferric gluconate 250 mg daily for 2 doses.     *.  Acute renal injury.  It is improving.  Patient is followed by nephrology service.    *  PLAN:  Intravenous iron therapy with ferric gluconate 250 mg daily for 2  doses.  Patient will be given premedication 20 mg Pepcid IV and also oral Benadryl 25 mg prior to each dose.  I recommended restart the patient on Coumadin, she has self-management device at home for monitoring INR and the patient will continue follow-up with Dr. Keene her primary hematologist for management of anticoagulation.  Usually Coumadin would be the best choice for antiphospholipid antibodies syndrome.  She does have to monitor INR more closely.   Management of other medical condition per primary team.  Check CBC in the morning.  Transfuse PRBC to maintain hemoglobin >7.0.      Patient is new to me for all the problems listed above.  I reviewed medical records including notes from ER, primary team and also nephrology as well as GI service.    I discussed with the patient for laboratory results and management plan.  I also spoke with nursing staff.      Will sign off.  Please call if having questions.      Thanks for letting us participate in the care of this patient!       MAXIMILIANO RIOS M.D., Ph.D.

## 2024-03-31 NOTE — PROGRESS NOTES
[facility] Progress Note      Patient Name: Joellen Dominguez  : 1945  MRN: [id number]  Primary Care Physician:  Chitra Leyva MD  Date of admission: 3/29/2024    Subjective     Interval History:   F/u CKD4    Review of Systems:   UOP NR (void x5)  Feels better  Denies dyspnea or nausea     Objective     Vitals:   Temp:  [97.7 °F (36.5 °C)-98.6 °F (37 °C)] 98.1 °F (36.7 °C)  Heart Rate:  [70-87] 70  Resp:  [18-22] 18  BP: ()/(42-57) 114/49    Intake/Output Summary (Last 24 hours) at 3/31/2024 1216  Last data filed at 3/31/2024 0807  Gross per 24 hour   Intake 1080 ml   Output --   Net 1080 ml       Physical Exam:    General Appearance: frail WF no distress  Neck: supple, no JVD  Lungs: CTA bilat no rales  Heart: RRR, normal S1 and S2  Abdomen: soft, nontender, nondistended  : no palpable bladder  Extremities: no edema, cyanosis or clubbing  Neuro: normal speech and mental status     Scheduled Meds:     atorvastatin, 10 mg, Oral, Nightly  buPROPion XL, 300 mg, Oral, Daily  diphenhydrAMINE, 25 mg, Oral, Daily  famotidine, 20 mg, Intravenous, Daily  ferric gluconate, 250 mg, Intravenous, Daily  gabapentin, 300 mg, Oral, Daily  levothyroxine, 100 mcg, Oral, Q AM  mesalamine, 2,000 mg, Oral, BID  warfarin (COUMADIN) (dosing per levels), , Does not apply, Daily  warfarin, 3 mg, Oral, Once      IV Meds:   Pharmacy to dose warfarin,         Results Reviewed:   I have personally reviewed the results from the time of this admission to 3/31/2024 12:16 EDT     Results from last 7 days   Lab Units 24  0351 24  0312 24  2139   SODIUM mmol/L 139 138 136   POTASSIUM mmol/L 3.4* 4.4 5.0   CHLORIDE mmol/L 105 102 98   CO2 mmol/L 22.8 24.7 26.1   BUN mg/dL 83* 105* 110*   CREATININE mg/dL 2.07* 2.36* 2.38*   CALCIUM mg/dL 7.6* 8.1* 8.8   BILIRUBIN mg/dL  --   --  0.3   ALK PHOS U/L  --   --  46   ALT (SGPT) U/L  --   --  12   AST (SGOT) U/L  --   --  20   GLUCOSE mg/dL 96  113* 110*     Estimated Creatinine Clearance: 22.5 mL/min (A) (by C-G formula based on SCr of 2.07 mg/dL (H)).  Results from last 7 days   Lab Units 03/31/24  0351 03/30/24  0312   MAGNESIUM mg/dL  --  2.9*   PHOSPHORUS mg/dL 3.2 3.4         Results from last 7 days   Lab Units 03/31/24  0801 03/31/24  0351 03/30/24  2347 03/30/24  0312 03/29/24  2139   WBC 10*3/mm3  --  7.33  --  7.21 7.67   HEMOGLOBIN g/dL 8.3* 7.4* 7.7* 5.5* 6.2*   PLATELETS 10*3/mm3  --  283  --  325 358     Results from last 7 days   Lab Units 03/31/24  0351 03/30/24  0816 03/29/24  2139   INR  1.47* 2.08* 9.54*       Assessment / Plan     ASSESSMENT:  MARY ANN, non olig - degree of prerenal azotemia from GIB with BUN/Cr 105/2.3, compounded by high dose diuretic use in assoc with pulm HTN.  Waste products slightly better today 83/2.1.  K 3.4.  Ca low 7.6 and corrects ~ 8 for alb  CKD stage 4 - renal fcn has worsened considerably last few mos with Cr 1.2 in OCT, high 1's to low 2's since, with cardiorenal syn picture and underlying pulm HTN sensitive to need for high dose diuretic use.  Sees Dr Owusu  Anemia of ABL, initial hgb down to 5.5; TSAT < 10% - transfused and hgb up to 8.3 today.  IV iron per hematology  Diastolic CHF + Pulm HTN  AFIB & antiphospholipid Ab syn  Coumadin toxicity, s/p Vit K, INR down 9.5 to 2 to 1.5.  Note hematology recs to resume coumadin with closer home monitoring of levels   Hx CAD  HTN, SBP low 100s off meds   Hx IBD    PLAN:  Resume torsemide/aldactone home doses 100/25 mg daily  Resume sched KCL 20meq BID  IV iron per hematology  Ca gluconate 1gm; check D25  D/W patient need for dialysis planning measures in office      Mario Granda MD  03/31/24  12:16 EDT    [facility]  [phone]

## 2024-04-01 ENCOUNTER — READMISSION MANAGEMENT (OUTPATIENT)
Dept: CALL CENTER | Facility: HOSPITAL | Age: 79
End: 2024-04-01
Payer: MEDICARE

## 2024-04-01 VITALS
BODY MASS INDEX: 22.34 KG/M2 | SYSTOLIC BLOOD PRESSURE: 95 MMHG | HEART RATE: 72 BPM | RESPIRATION RATE: 18 BRPM | TEMPERATURE: 98.4 F | WEIGHT: 142.7 LBS | DIASTOLIC BLOOD PRESSURE: 46 MMHG | OXYGEN SATURATION: 93 %

## 2024-04-01 LAB
25(OH)D3 SERPL-MCNC: 29.2 NG/ML (ref 30–100)
ALBUMIN SERPL-MCNC: 3.5 G/DL (ref 3.5–5.2)
ANION GAP SERPL CALCULATED.3IONS-SCNC: 11 MMOL/L (ref 5–15)
BUN SERPL-MCNC: 70 MG/DL (ref 8–23)
BUN/CREAT SERPL: 33 (ref 7–25)
CALCIUM SPEC-SCNC: 7.9 MG/DL (ref 8.6–10.5)
CHLORIDE SERPL-SCNC: 108 MMOL/L (ref 98–107)
CO2 SERPL-SCNC: 24 MMOL/L (ref 22–29)
CREAT SERPL-MCNC: 2.12 MG/DL (ref 0.57–1)
DEPRECATED RDW RBC AUTO: 48.3 FL (ref 37–54)
EGFRCR SERPLBLD CKD-EPI 2021: 23.3 ML/MIN/1.73
ERYTHROCYTE [DISTWIDTH] IN BLOOD BY AUTOMATED COUNT: 15.9 % (ref 12.3–15.4)
GLUCOSE SERPL-MCNC: 97 MG/DL (ref 65–99)
HCT VFR BLD AUTO: 24.4 % (ref 34–46.6)
HGB BLD-MCNC: 7.5 G/DL (ref 12–15.9)
INR PPP: 1.49 (ref 0.9–1.1)
MCH RBC QN AUTO: 26 PG (ref 26.6–33)
MCHC RBC AUTO-ENTMCNC: 30.7 G/DL (ref 31.5–35.7)
MCV RBC AUTO: 84.7 FL (ref 79–97)
PHOSPHATE SERPL-MCNC: 3.5 MG/DL (ref 2.5–4.5)
PLATELET # BLD AUTO: 274 10*3/MM3 (ref 140–450)
PMV BLD AUTO: 10.1 FL (ref 6–12)
POTASSIUM SERPL-SCNC: 3.5 MMOL/L (ref 3.5–5.2)
PROTHROMBIN TIME: 18.3 SECONDS (ref 11.7–14.2)
RBC # BLD AUTO: 2.88 10*6/MM3 (ref 3.77–5.28)
SODIUM SERPL-SCNC: 143 MMOL/L (ref 136–145)
WBC NRBC COR # BLD AUTO: 7.41 10*3/MM3 (ref 3.4–10.8)

## 2024-04-01 PROCEDURE — 25010000002 NA FERRIC GLUC CPLX PER 12.5 MG: Performed by: INTERNAL MEDICINE

## 2024-04-01 PROCEDURE — 85610 PROTHROMBIN TIME: CPT | Performed by: INTERNAL MEDICINE

## 2024-04-01 PROCEDURE — 25810000003 SODIUM CHLORIDE 0.9 % SOLUTION: Performed by: INTERNAL MEDICINE

## 2024-04-01 PROCEDURE — 63710000001 DIPHENHYDRAMINE PER 50 MG: Performed by: INTERNAL MEDICINE

## 2024-04-01 PROCEDURE — 82306 VITAMIN D 25 HYDROXY: CPT | Performed by: INTERNAL MEDICINE

## 2024-04-01 PROCEDURE — 85027 COMPLETE CBC AUTOMATED: CPT | Performed by: INTERNAL MEDICINE

## 2024-04-01 PROCEDURE — 80069 RENAL FUNCTION PANEL: CPT | Performed by: INTERNAL MEDICINE

## 2024-04-01 RX ORDER — WARFARIN SODIUM 4 MG/1
4 TABLET ORAL
Status: COMPLETED | OUTPATIENT
Start: 2024-04-01 | End: 2024-04-01

## 2024-04-01 RX ADMIN — DIPHENHYDRAMINE HYDROCHLORIDE 25 MG: 25 CAPSULE ORAL at 08:24

## 2024-04-01 RX ADMIN — LEVOTHYROXINE SODIUM 100 MCG: 100 TABLET ORAL at 06:20

## 2024-04-01 RX ADMIN — MESALAMINE 2000 MG: 250 CAPSULE ORAL at 08:24

## 2024-04-01 RX ADMIN — WARFARIN 4 MG: 4 TABLET ORAL at 17:38

## 2024-04-01 RX ADMIN — GABAPENTIN 300 MG: 300 CAPSULE ORAL at 08:24

## 2024-04-01 RX ADMIN — FAMOTIDINE 20 MG: 10 INJECTION INTRAVENOUS at 09:37

## 2024-04-01 RX ADMIN — SODIUM CHLORIDE 250 MG: 9 INJECTION, SOLUTION INTRAVENOUS at 09:37

## 2024-04-01 RX ADMIN — POTASSIUM CHLORIDE 20 MEQ: 750 TABLET, EXTENDED RELEASE ORAL at 08:24

## 2024-04-01 RX ADMIN — BUPROPION HYDROCHLORIDE 300 MG: 300 TABLET, EXTENDED RELEASE ORAL at 08:24

## 2024-04-01 NOTE — OUTREACH NOTE
Prep Survey      Flowsheet Row Responses   [facility] [place] patient discharged from? [place]   Is LACE score < 7 ? No   Eligibility [facility]   Date of Admission 03/29/24   Date of Discharge 04/01/24   Discharge Disposition Home or Self Care   Discharge diagnosis anemia   Does the patient have one of the following disease processes/diagnoses(primary or secondary)? Other   Does the patient have Home health ordered? No   Is there a DME ordered? No   Prep survey completed? Yes            Tiffany STYLES - Registered Nurse

## 2024-04-01 NOTE — CASE MANAGEMENT/SOCIAL WORK
Discharge Planning Assessment  [facility]     Patient Name: Joellen Dominguez  MRN: [id number]  Today's Date: 4/1/2024    Admit Date: 3/29/2024    Plan: Home with family support   Discharge Needs Assessment       Row Name 04/01/24 1132       Living Environment    People in Home spouse    Name(s) of People in Home Wade Dominguez  [phone]    Current Living Arrangements home    Potentially Unsafe Housing Conditions none    Primary Care Provided by self    Provides Primary Care For no one    Family Caregiver if Needed spouse    Family Caregiver Names Wade Dominguez  [phone]    Quality of Family Relationships supportive       Resource/Environmental Concerns    Resource/Environmental Concerns none    Transportation Concerns none       Transition Planning    Patient/Family Anticipates Transition to home with family    Patient/Family Anticipated Services at Transition none    Transportation Anticipated family or friend will provide       Discharge Needs Assessment    Readmission Within the Last 30 Days no previous admission in last 30 days    Equipment Currently Used at Home cpap    Concerns to be Addressed no discharge needs identified;denies needs/concerns at this time    Anticipated Changes Related to Illness none    Provided Post Acute Provider List? N/A    N/A Provider List Comment no needs identified    Provided Post Acute Provider Quality & Resource List? N/A    Current Discharge Risk chronically ill                   Discharge Plan       Row Name 04/01/24 1136       Plan    Plan Comments IMM 3/30/2024. Met with patient and  at bedside. Patient granted me permission to speak to her while  remained in the room. Face sheet verified. Patient states she is independent with all aspects of her ADL’s. She denies using any adaptive equipment. She does use CPAP. Patient uses the [facility] in [facility], denies any issues affording or remembering to take her medications. Patient will use Meds to  Beds at discharge. Wade Dominguez  [phone] is patient’s POA. Discharge plans are to return home with family support.  to transport. Will continue to monitor for new or changing discharge needs. Donita COLON RN [facility]      Row Name 04/01/24 1135       Plan    Plan Home with family support    Patient/Family in Agreement with Plan yes                  Continued Care and Services - Admitted Since 3/29/2024    No active coordination exists for this encounter.       Expected Discharge Date and Time       Expected Discharge Date Expected Discharge Time    Apr 2, 2024            Demographic Summary       Row Name 04/01/24 1131       General Information    Admission Type inpatient    Arrived From emergency department    Required Notices Provided Important Message from Medicare    Referral Source admission list    Reason for Consult discharge planning    Preferred Language English       Contact Information    Permission Granted to Share Info With family/designee  Wade Dominguez  [phone]                   Functional Status       Row Name 04/01/24 1132       Functional Status    Usual Activity Tolerance good    Current Activity Tolerance moderate       Functional Status, IADL    Medications independent    Meal Preparation assistive person    Housekeeping assistive person    Laundry assistive person    Shopping assistive person       Mental Status    General Appearance WDL WDL       Mental Status Summary    Recent Changes in Mental Status/Cognitive Functioning no changes       Employment/    Employment Status retired                   Psychosocial    No documentation.                  Abuse/Neglect    No documentation.                  Legal    No documentation.                  Substance Abuse    No documentation.                  Patient Forms    No documentation.                     Donita Meyer RN

## 2024-04-01 NOTE — DISCHARGE SUMMARY
Date of Admission: 3/29/2024  Date of Discharge:  4/1/2024  Primary Care Physician: Chitra Leyva MD     Discharge Diagnosis:  Active Hospital Problems    Diagnosis  POA    **Anemia [D64.9]  Yes    Adverse effect of iron and its compounds, sequela [T45.4X5S]  Unknown    Supratherapeutic INR [R79.1]  Yes    Iron (Fe) deficiency anemia [D50.9]  Yes    Esophageal dysphagia [R13.19]  Yes    Obstructive sleep apnea syndrome [G47.33]  Yes    Presence of cardiac pacemaker [Z95.0]  Yes    Atrial fibrillation, permanent [I48.21]  Yes    Long term current use of anticoagulant therapy [Z79.01]  Not Applicable    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       DETAILS OF HOSPITAL STAY     Hospital Course  This is a 78-year-old female with history of iron deficiency anemia, Crohn's disease, chronic kidney disease, A-fib, CAD, COPD, antiphospholipid antibody who presented to the emergency room after lab work in the outpatient setting found her to have MARY ANN and significant anemia. Her INR was supratherapeutic at 9.2. Please see H&P for full details. She was transfused 2 units of PRBCs with improvement and stability of Hgb thereafter.  There were no signs of bleeding.  She was found to be iron deficient.  She was evaluated by GI who noted she had fairly recent endoscopes and did not recommend repeating these.  Hematology saw her and prescribed IV iron.  Nephrology managed her MARY ANN on CKD 4.  Cr is now stable and she is on oral diuretics.  She was given VitK and INR corrected.  Given no signs of bleeding she was restarted on Coumadin and will follow these via home monitoring and follow up with her outpatient hematologist.   She will also follow up with nephrology and her PCP.  She has been cleared by all to be discharged home.    Physical Exam at Discharge:  General: No acute distress, AAOx3  HEENT: EOMI, PERRL  Cardiovascular: +s1 and s2, RRR  Lungs: No rhonchi or wheezing  Abdomen: soft, nontender    Consults:   Consults       Date and Time Order Name Status Description    3/30/2024  4:57 AM Hematology & Oncology Inpatient Consult Completed     3/30/2024  4:57 AM Inpatient Gastroenterology Consult Completed     3/30/2024 12:19 AM Inpatient Nephrology Consult Completed     3/29/2024 10:30 PM [facility] (on-call MD unless specified) Details                Condition on Discharge: Stable, improved    Discharge Disposition  Home or Self Care    Discharge Medications     Discharge Medications        Changes to Medications        Instructions Start Date   mesalamine 500 MG CR capsule  Commonly known as: PENTASA  What changed:   how much to take  when to take this   1,000 mg, Oral, 4 Times Daily      potassium chloride 20 MEQ CR tablet  Commonly known as: KLOR-CON M20  What changed: additional instructions   20 mEq, Oral, 2 Times Daily      torsemide 20 MG tablet  Commonly known as: DEMADEX  What changed: how much to take   20 mg, Oral, Daily      warfarin 2 MG tablet  Commonly known as: COUMADIN  What changed:   how much to take  how to take this  when to take this  additional instructions   Take 1 tablet 4 days  weekly and 2 tablets daily x 3 days weekly             Continue These Medications        Instructions Start Date   Adempas 2.5 MG tablet  Generic drug: Riociguat   2.5 mg, Oral, 3 Times Daily      atorvastatin 10 MG tablet  Commonly known as: LIPITOR   10 mg, Oral, Daily      buPROPion  MG 24 hr tablet  Commonly known as: WELLBUTRIN XL   300 mg, Oral, Daily      CALCIUM 1200 PO   Oral, Daily      clobetasol propionate 0.05 % cream  Commonly known as: TEMOVATE   1 application , Topical, 2 Times Daily PRN      coenzyme Q10 100 MG  capsule   100 mg, Oral, Daily      cyanocobalamin 1000 MCG/ML injection   1,000 mcg, Intramuscular, Every 28 Days      fexofenadine 180 MG tablet  Commonly known as: ALLEGRA   180 mg, Oral, Daily      folic acid 400 MCG tablet  Commonly known as: FOLVITE   400 mcg, Oral, Daily      gabapentin 300 MG capsule  Commonly known as: NEURONTIN   300 mg, Oral, Daily, Pt can take up to 3x daily      levothyroxine 100 MCG tablet  Commonly known as: SYNTHROID, LEVOTHROID   100 mcg, Oral, Daily      methscopolamine 5 MG tablet  Commonly known as: PAMINE FORTE   5 mg, Oral, 2 Times Daily PRN      metOLazone 2.5 MG tablet  Commonly known as: ZAROXOLYN   2.5 mg, Oral, 3 Times Daily PRN      ondansetron 4 MG tablet  Commonly known as: ZOFRAN   4 mg, Oral, Every 8 Hours PRN      Opsumit 10 MG tablet  Generic drug: Macitentan   10 mg, Oral, Daily      polycarbophil 625 MG tablet tablet   Oral, Daily      spironolactone 25 MG tablet  Commonly known as: ALDACTONE   1 tablet, Oral, Daily      STELARA SC   1 syringe, Subcutaneous, One syringe every seven weeks.  Option care.  pk      Uptravi Titration 200 & 800 MCG tablet therapy pack  Generic drug: Selexipag   1,600 mcg, Oral, Daily      Vitamin D3 50 MCG capsule  Generic drug: Cholecalciferol   2,000 Units, Oral, Daily      Welchol 625 MG tablet  Generic drug: colesevelam   625 mg, Oral, Daily      Zinc 50 MG capsule   Oral               Discharge Diet:   Diet Instructions       Diet: Regular/House Diet, Gastrointestinal Diets; Fiber-Restricted; Regular (IDDSI 7); Thin (IDDSI 0)      Discharge Diet:  Regular/House Diet  Gastrointestinal Diets       Gastrointestinal Diet: Fiber-Restricted    Texture: Regular (IDDSI 7)    Fluid Consistency: Thin (IDDSI 0)            Activity at Discharge:   Activity Instructions       Activity as Tolerated              Follow-up Appointments  Future Appointments   Date Time Provider Department Center   5/6/2024  3:15 PM [facility]   10/9/2024 10:30 AM Sakshi Martinez, APRN [facility]     Additional Instructions for the Follow-ups that You Need to Schedule       Discharge Follow-up with PCP   As directed       Currently Documented PCP:    Chitra Leyva MD    PCP Phone Number:    [phone]     Follow Up Details: 1 week        Discharge Follow-up with Specified Provider: Dr Owusu as scheduled   As directed      To: Dr Owusu as scheduled        Discharge Follow-up with Specified Provider: Dr. Keene for additional coumadin management   As directed      To: Dr. Keene for additional coumadin management              I have examined and discussed discharge planning with the patient today.    I wore full protective equipment throughout the patient encounter including eye protection and facemask.  Hand hygiene was performed before donning protective equipment and after removal when leaving the room.     Bry Stout MD  04/01/24  16:12 EDT    Time: Discharge greater than 30 min

## 2024-04-01 NOTE — PROGRESS NOTES
LOS: 3 days     Name: Joellen Dominguez  Age: 78 y.o.  Sex: female  :  1945  MRN: [id number]         Primary Care Physician: Chitra Leyva MD    Subjective   Subjective  Reports a mild, nonproductive cough this morning.  No shortness of breath beyond her baseline.  Otherwise no new complaints or acute overnight events.  No signs of bleeding.    Objective   Vital Signs  Temp:  [98.2 °F (36.8 °C)-99.3 °F (37.4 °C)] 98.2 °F (36.8 °C)  Heart Rate:  [70-86] 75  Resp:  [18] 18  BP: ()/(43-55) 99/52  Body mass index is 22.34 kg/m².    Objective:  General Appearance:  Comfortable and in no acute distress (Elderly, weak and deconditioned).    Vital signs: (most recent): Blood pressure 99/52, pulse 75, temperature 98.2 °F (36.8 °C), temperature source Oral, resp. rate 18, weight 64.7 kg (142 lb 11.2 oz), SpO2 94%.    Lungs:  Normal effort and normal respiratory rate.    Heart: Normal rate.  Regular rhythm.    Abdomen: Abdomen is soft.  Bowel sounds are normal.   There is no abdominal tenderness.     Extremities: There is no dependent edema or local swelling.    Neurological: Patient is alert and oriented to person, place and time.    Skin:  Warm and dry.                Results Review:       I reviewed the patient's new clinical results.    Results from last 7 days   Lab Units 24  0341 24  1656 24  0801 24  03524  2347 24   WBC 10*3/mm3 7.41  --   --  7.33  --  7.21 7.67   HEMOGLOBIN g/dL 7.5* 7.8* 8.3* 7.4* 7.7* 5.5* 6.2*   PLATELETS 10*3/mm3 274  --   --  283  --  325 358     Results from last 7 days   Lab Units 24  0341 24  0351 24  0312 24  1038   SODIUM mmol/L 143 139 138 136  --    POTASSIUM mmol/L 3.5 3.4* 4.4 5.0  --    CHLORIDE mmol/L 108* 105 102 98  --    CO2 mmol/L 24.0 22.8 24.7 26.1  --    BUN mg/dL 70* 83* 105* 110*  --    CREATININE mg/dL 2.12* 2.07* 2.36* 2.38* 3.30*   CALCIUM mg/dL 7.9* 7.6* 8.1*  8.8  --    GLUCOSE mg/dL 97 96 113* 110*  --      Results from last 7 days   Lab Units 04/01/24  0341 03/31/24  0351 03/30/24  0816 03/29/24  2139   INR  1.49* 1.47* 2.08* 9.54*             Scheduled Meds:   atorvastatin, 10 mg, Oral, Nightly  buPROPion XL, 300 mg, Oral, Daily  ferric gluconate, 250 mg, Intravenous, Daily  gabapentin, 300 mg, Oral, Daily  levothyroxine, 100 mcg, Oral, Q AM  mesalamine, 2,000 mg, Oral, BID  potassium chloride, 20 mEq, Oral, BID With Meals  spironolactone, 25 mg, Oral, Daily  torsemide, 100 mg, Oral, Daily  warfarin (COUMADIN) (dosing per levels), , Does not apply, Daily      PRN Meds:     acetaminophen    aluminum-magnesium hydroxide-simethicone    senna-docusate sodium **AND** polyethylene glycol **AND** bisacodyl **AND** bisacodyl    diphenoxylate-atropine    nitroglycerin    ondansetron ODT **OR** ondansetron    Pharmacy to dose warfarin    sodium chloride  Continuous Infusions:  Pharmacy to dose warfarin,         Assessment & Plan   Active Hospital Problems    Diagnosis  POA    **Anemia [D64.9]  Yes    Adverse effect of iron and its compounds, sequela [T45.4X5S]  Unknown    Supratherapeutic INR [R79.1]  Yes    Iron (Fe) deficiency anemia [D50.9]  Yes    Esophageal dysphagia [R13.19]  Yes    Obstructive sleep apnea syndrome [G47.33]  Yes    Presence of cardiac pacemaker [Z95.0]  Yes    Atrial fibrillation, permanent [I48.21]  Yes    Long term current use of anticoagulant therapy [Z79.01]  Not Applicable    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       Assessment & Plan    -Hemoglobin overall looking stable status post 2 units of packed red blood cells.  She is receiving  IV iron and has been restarted on her Coumadin.  -No signs of GI bleeding.  GI does not recommend any endoscopic evaluation at this time and have signed off  -Nephrology managing MARY ANN on advanced CKD.  Torsemide and spironolactone held this morning due to soft blood pressures.    Dispo  She plans to return home upon discharge when medically stable    Expected discharge date/ time has not been documented.     Bry Stout MD  [facility]  04/01/24  10:24 EDT

## 2024-04-01 NOTE — PROGRESS NOTES
[facility] Progress Note      Patient Name: Joellen Dominguez  : 1945  MRN: [id number]  Primary Care Physician:  Chitra Leyva MD  Date of admission: 3/29/2024    Subjective     Interval History:   F/u CKD4    Patient lying in bed  Dyspnea at rest  Intermittent dry cough  No chest pain  Tolerating oral intake  No abdominal pain    Objective     Vitals:   Temp:  [98.2 °F (36.8 °C)-99.3 °F (37.4 °C)] 98.4 °F (36.9 °C)  Heart Rate:  [70-86] 72  Resp:  [18] 18  BP: ()/(43-52) 95/46    Intake/Output Summary (Last 24 hours) at 2024 1622  Last data filed at 2024 1300  Gross per 24 hour   Intake 0 ml   Output 0 ml   Net 0 ml       Physical Exam:    General Appearance: frail WF no distress  Neck: supple, no JVD  Lungs: Bilateral fine rales  Heart: RRR, normal S1 and S2  Abdomen: soft, nontender, nondistended  : no palpable bladder  Extremities: no edema, cyanosis or clubbing  Neuro: normal speech and mental status     Scheduled Meds:     atorvastatin, 10 mg, Oral, Nightly  buPROPion XL, 300 mg, Oral, Daily  gabapentin, 300 mg, Oral, Daily  levothyroxine, 100 mcg, Oral, Q AM  mesalamine, 2,000 mg, Oral, BID  potassium chloride, 20 mEq, Oral, BID With Meals  spironolactone, 25 mg, Oral, Daily  torsemide, 100 mg, Oral, Daily  warfarin (COUMADIN) (dosing per levels), , Does not apply, Daily  warfarin, 4 mg, Oral, Once      IV Meds:   Pharmacy to dose warfarin,         Results Reviewed:   I have personally reviewed the results from the time of this admission to 2024 16:22 EDT     Results from last 7 days   Lab Units 24  0341 24  0351 24  0312 24  2139   SODIUM mmol/L 143 139 138 136   POTASSIUM mmol/L 3.5 3.4* 4.4 5.0   CHLORIDE mmol/L 108* 105 102 98   CO2 mmol/L 24.0 22.8 24.7 26.1   BUN mg/dL 70* 83* 105* 110*   CREATININE mg/dL 2.12* 2.07* 2.36* 2.38*   CALCIUM mg/dL 7.9* 7.6* 8.1* 8.8   BILIRUBIN mg/dL  --   --   --  0.3   ALK PHOS U/L  --   --    --  46   ALT (SGPT) U/L  --   --   --  12   AST (SGOT) U/L  --   --   --  20   GLUCOSE mg/dL 97 96 113* 110*     Estimated Creatinine Clearance: 22 mL/min (A) (by C-G formula based on SCr of 2.12 mg/dL (H)).  Results from last 7 days   Lab Units 04/01/24  0341 03/31/24  0351 03/30/24  0312   MAGNESIUM mg/dL  --   --  2.9*   PHOSPHORUS mg/dL 3.5 3.2 3.4         Results from last 7 days   Lab Units 04/01/24  0341 03/31/24  1656 03/31/24  0801 03/31/24  0351 03/30/24  2347 03/30/24  0312 03/29/24  2139   WBC 10*3/mm3 7.41  --   --  7.33  --  7.21 7.67   HEMOGLOBIN g/dL 7.5* 7.8* 8.3* 7.4* 7.7* 5.5* 6.2*   PLATELETS 10*3/mm3 274  --   --  283  --  325 358     Results from last 7 days   Lab Units 04/01/24  0341 03/31/24  0351 03/30/24  0816 03/29/24  2139   INR  1.49* 1.47* 2.08* 9.54*       Assessment / Plan     ASSESSMENT:    MARY ANN, non olig - degree of prerenal azotemia from GIB with BUN/Cr 105/2.3, compounded by high dose diuretic use in assoc with pulm HTN.     CKD stage 4 - renal fcn has worsened considerably last few mos with Cr 1.2 in OCT, high 1's to low 2's since, with cardiorenal syn picture and underlying pulm HTN .    Anemia of ABL, initial hgb down to 5.5; TSAT < 10% - transfused .  Follow H&H closely IV iron per hematology    Diastolic CHF + Pulm HTN adjust diuretics.  Minimal fluid gains will cause significant respiratory symptoms    AFIB & antiphospholipid antibody syndrome    Coumadin toxicity, s/p Vit K, INR down 9.5 to 2 to 1.5.  Note hematology recs to resume coumadin with closer home monitoring of levels       PLAN:  - Continue current dose of diuretics can be titrated as an outpatient based on weight gains and dyspnea  -Patient has been educated multiple times by pulmonary and nephrology to adjust diuretics based on weight gains and patient is really reliable.  On adjusting diuretics  -Patient can be discharged and be  followed closely in renal clinic.  Patient has a scheduled appointment next week  in renal clinic     Discussed with the nursing staff      Thank you for allowing us to participate from this patient care        Jorge Owusu MD  04/01/24  16:22 EDT    [facility]  [phone]

## 2024-04-01 NOTE — PROGRESS NOTES
[facility] Clinical Pharmacy Services: Warfarin Dosing/Monitoring Consult  Joellen Dominguez is a 78 y.o. female, estimated creatinine clearance is 22 mL/min (A) (by C-G formula based on SCr of 2.12 mg/dL (H)). weighing 64.7 kg (142 lb 11.2 oz).    Results from last 7 days   Lab Units 04/01/24  0341 03/31/24  1656 03/31/24  0801 03/31/24  0351 03/30/24  2347 03/30/24  0816 03/30/24  0312 03/29/24  2139   INR  1.49*  --   --  1.47*  --  2.08*  --  9.54*   APTT seconds  --   --   --   --   --   --   --  64.6*   HEMOGLOBIN g/dL 7.5* 7.8* 8.3* 7.4* 7.7*  --  5.5* 6.2*   HEMATOCRIT % 24.4* 25.6* 27.1* 23.9* 25.2*  --  18.4* 20.9*   PLATELETS 10*3/mm3 274  --   --  283  --   --  325 358   3/31 Fecal Occult Blood -negative    Prior to admission anticoagulation: RN confirmed Warfarin dosing with patient 3/31:  4mg five days a week and 2mg the other two days of the week (average of ~3.4mg/day)    Hospital Anticoagulation:  Consulting provider: Dr. Stout  Start date: home medication, consult start 3/31  Indication: A Fib - requiring full anticoagulation  Target INR: 2 - 3  Expected duration: home medication   Bridge Therapy: No    Potential food or drug interactions:   -Acetaminophen: increased risk of bleeding  -Levothyroxine (home med): increased risk of bleeding  -Mesalamine (home med): increased or decreased effects on Warfarin possible    Dietary Orders (From admission, onward)       Start     Ordered    03/30/24 1449  Diet: Gastrointestinal; Fiber-Restricted; Fluid Consistency: Thin (IDDSI 0)  Diet Effective Now        References:    Diet Order [place]   Question Answer Comment   Diets: Gastrointestinal    Gastrointestinal Diet: Fiber-Restricted    Fluid Consistency: Thin (IDDSI 0)        03/30/24 1449                  Education complete?/Date: No; plan for follow up TBD    Assessment/Plan:  INR elevated at 9.54 on 3/29. Received FFP and Vitamin K 10mg IV. Warfarin restarted 3/31 with 3mg dose, INR today  1.49    Warfarin 4mg once today. Pharmacy will dose daily for now.    Monitor for any signs or symptoms of bleeding  Follow up daily INRs and dose adjustments    Pharmacy will continue to follow until discharge or discontinuation of warfarin.     Twan Prabhakar [facility]  Clinical Pharmacist

## 2024-04-01 NOTE — PLAN OF CARE
Goal Outcome Evaluation:  Plan of Care Reviewed With: patient        Progress: improving  Outcome Evaluation: Vital signs stable. No c/o pain. Hgb stable at 7.5  No diarrhea or bloody stools. Coumadin restarted yesterday. No complaints or concerns per patient. Pt rested well throughout the night. Needs met. Safety maintained. Plan is home at discharge. Plan of care ongoing.

## 2024-04-02 ENCOUNTER — OFFICE VISIT (OUTPATIENT)
Dept: INTERNAL MEDICINE | Facility: CLINIC | Age: 79
End: 2024-04-02
Payer: MEDICARE

## 2024-04-02 ENCOUNTER — TRANSITIONAL CARE MANAGEMENT TELEPHONE ENCOUNTER (OUTPATIENT)
Dept: CALL CENTER | Facility: HOSPITAL | Age: 79
End: 2024-04-02
Payer: MEDICARE

## 2024-04-02 ENCOUNTER — TELEPHONE (OUTPATIENT)
Dept: INTERNAL MEDICINE | Facility: CLINIC | Age: 79
End: 2024-04-02

## 2024-04-02 VITALS — HEIGHT: 67 IN | BODY MASS INDEX: 21.97 KG/M2 | WEIGHT: 140 LBS

## 2024-04-02 DIAGNOSIS — E87.6 HYPOKALEMIA: ICD-10-CM

## 2024-04-02 DIAGNOSIS — R79.1 SUPRATHERAPEUTIC INR: ICD-10-CM

## 2024-04-02 DIAGNOSIS — I27.20 PULMONARY HTN: Chronic | ICD-10-CM

## 2024-04-02 DIAGNOSIS — K86.89 PANCREATIC MASS: ICD-10-CM

## 2024-04-02 DIAGNOSIS — T80.1XXA INTRAVENOUS INFILTRATION, INITIAL ENCOUNTER: ICD-10-CM

## 2024-04-02 DIAGNOSIS — D64.9 ANEMIA, UNSPECIFIED TYPE: Primary | Chronic | ICD-10-CM

## 2024-04-02 PROBLEM — J43.9 COPD WITH EMPHYSEMA: Chronic | Status: RESOLVED | Noted: 2020-06-09 | Resolved: 2024-04-02

## 2024-04-02 NOTE — TELEPHONE ENCOUNTER
Caller: Alberto Joellen STYLES     Best call back number: [phone]*    What is your medical concern? PATIENT CALLING STATING THAT SHE DISCHARGED FROM [facility] ON 4/1, AND IS HAVING REDNESS AND SORENESS IN THE AREA WHERE THE IV WAS PLACED (BELOW RIGHT ELBOW). THE PATIENT IS REQUESTING A CALL BACK TO [facility].    How long has this issue been going on? PATIENT NOTICED REDNESS AND SORENESS TODAY    Is your provider already aware of this issue? NO    Have you been treated for this issue? NO

## 2024-04-02 NOTE — OUTREACH NOTE
Call Center TCM Note      Flowsheet Row Responses   [facility] patient discharged from? [facility]   Does the patient have one of the following disease processes/diagnoses(primary or secondary)? Other   TCM attempt successful? No   Unsuccessful attempts Attempt 2  [Pt has appt at 3:30 today]            Etelvina Nagy LPN    4/2/2024, 14:09 EDT

## 2024-04-02 NOTE — TELEPHONE ENCOUNTER
Does she want to come in this afternoon and let me see and we can do her hospital f/u- if the TCM line has called her already

## 2024-04-02 NOTE — PROGRESS NOTES
Transitional Care Follow Up Visit  Subjective     Joellen Dominguez is a 78 y.o. female who presents for a transitional care management visit.    Within 48 business hours after discharge our office contacted her via telephone to coordinate her care and needs.      I reviewed and discussed the details of that call along with the discharge summary, hospital problems, inpatient lab results, inpatient diagnostic studies, and consultation reports with Joellen.     Current outpatient and discharge medications have been reconciled for the patient.  Reviewed by: Chitra Leyva MD          4/1/2024     6:44 PM   Date of TCM Phone Call   [facility]   Date of Admission 3/29/2024   Date of Discharge 4/1/2024   Discharge Disposition Home or Self Care     Risk for Readmission (LACE) Score: 12 (4/1/2024  6:00 AM)      History of Present Illness   Course During Hospital Stay:  here after I sent her to ER with elevated creatinine prior to MRI- creatinine was elevated, hemoglobin very low @6.2.  no source of bleeding found, thought to be related to INR >9. She was given 2 rounds of IV iron and U PRBCs.  Creatinine improved to 2.1, hemoglobin 7.5. INR 1.49.  She is not feeling great, has eaten a good meal since discharge. She is having no breathing problems.   Resumed warfarin - 4 mg last night, plans 4 mg 5 days/week and 2 mg 2 days- but stopping because she has an epidural scheduled next week.   Noticed this am the R lower arm is swollen above where the IV was placed- it is tender and warm.  Hasn't changed throughout the day. No fever, chills.      The following portions of the patient's history were reviewed and updated as appropriate: allergies, current medications, past family history, past medical history, past social history, past surgical history, and problem list.    Review of Systems   Constitutional:  Positive for fatigue. Negative for fever and unexpected weight change.   HENT:  Negative for trouble  swallowing.    Respiratory:  Positive for shortness of breath.    Cardiovascular:  Negative for chest pain and leg swelling.   Gastrointestinal:  Negative for abdominal pain and blood in stool.   Genitourinary:  Negative for difficulty urinating.   Musculoskeletal:  Negative for arthralgias.   Neurological:  Negative for headaches.       Objective   Physical Exam  Constitutional:       General: She is not in acute distress.     Appearance: She is ill-appearing.   Cardiovascular:      Rate and Rhythm: Normal rate and regular rhythm.   Pulmonary:      Effort: Pulmonary effort is normal.   Skin:     Comments: R forearm with swelling, tender- no streaking         Assessment & Plan   Diagnoses and all orders for this visit:    1. Anemia, unspecified type (Primary)  Comments:  multifactorial, stable- watch INR closely. Recheck next week or sooner if starts to feel different.  Orders:  -     CBC & Differential    2. Hypokalemia  Comments:  recheck labs today- has nephrology f/u Monday  Orders:  -     Comprehensive Metabolic Panel    3. Intravenous infiltration, initial encounter  Comments:  alternate heat and cold, elevate- call if worsens, changes.    4. Supratherapeutic INR  Comments:  complex- doubt can switch to NOAC- hold for procedure, will check about every 2-3 days for a bit to insure stability.    5. Pancreatic mass  Comments:  Reviewed MRI, had biopsied- discussed not pursuing anything more at this point- not sure she would want another MRI in 6m as recommended.    6. Pulmonary HTN  Comments:  stable now but volume status is tenuous.  Checks daily wts- call to adjust diuretic as needed.      Pt and  with lots to discuss after this hospitalization- including iron overload on MRI- with normal ferritin we discussed not likely hemachromatosis- agree with not getting iron infusions as they aren't helpful-     They understand that she continues to be quite ill- with so many significant diagnoses we will  proceed  slowly- plan as above.

## 2024-04-02 NOTE — OUTREACH NOTE
Call Center TCM Note      Flowsheet Row Responses   [facility] in [place] patient discharged from? [facility]   Does the patient have one of the following disease processes/diagnoses(primary or secondary)? Other   TCM attempt successful? No   Unsuccessful attempts Attempt 1            Etelvina Nagy LPN    4/2/2024, 10:20 EDT

## 2024-04-02 NOTE — CASE MANAGEMENT/SOCIAL WORK
Case Management Discharge Note      Final Note: Home with spouse. Transport by family    Provided Post Acute Provider List?: N/A  N/A Provider List Comment: no needs identified  Provided Post Acute Provider Quality & Resource List?: N/A    Selected Continued Care - Discharged on 4/1/2024 Admission date: 3/29/2024 - Discharge disposition: Home or Self Care      Destination    No services have been selected for the patient.                Durable Medical Equipment    No services have been selected for the patient.                Dialysis/Infusion    No services have been selected for the patient.                Home Medical Care    No services have been selected for the patient.                Therapy    No services have been selected for the patient.                Community Resources    No services have been selected for the patient.                Community & DME    No services have been selected for the patient.                    Transportation Services  Private: Car    Final Discharge Disposition Code: 01 - home or self-care

## 2024-04-03 ENCOUNTER — TELEPHONE (OUTPATIENT)
Dept: INTERNAL MEDICINE | Facility: CLINIC | Age: 79
End: 2024-04-03
Payer: MEDICARE

## 2024-04-03 ENCOUNTER — TELEMEDICINE (OUTPATIENT)
Dept: INTERNAL MEDICINE | Facility: CLINIC | Age: 79
End: 2024-04-03
Payer: MEDICARE

## 2024-04-03 ENCOUNTER — TRANSITIONAL CARE MANAGEMENT TELEPHONE ENCOUNTER (OUTPATIENT)
Dept: CALL CENTER | Facility: HOSPITAL | Age: 79
End: 2024-04-03
Payer: MEDICARE

## 2024-04-03 DIAGNOSIS — I80.9 PHLEBITIS: Primary | ICD-10-CM

## 2024-04-03 DIAGNOSIS — R79.1 SUPRATHERAPEUTIC INR: ICD-10-CM

## 2024-04-03 PROCEDURE — 99214 OFFICE O/P EST MOD 30 MIN: CPT | Performed by: INTERNAL MEDICINE

## 2024-04-03 RX ORDER — CEPHALEXIN 500 MG/1
500 CAPSULE ORAL 3 TIMES DAILY
Qty: 21 CAPSULE | Refills: 0 | Status: SHIPPED | OUTPATIENT
Start: 2024-04-03

## 2024-04-03 NOTE — TELEPHONE ENCOUNTER
Please call  back @ [phone] calling back stating her RT arm is still hurting, and has questions on what we should do now. Please advise

## 2024-04-03 NOTE — PROGRESS NOTES
"Chief Complaint  No chief complaint on file.    Subjective        Joellen Dominguez presents to [facility] PRIMARY [facility]  History of Present Illness Mode of Visit: Video  Location of patient: home  Location of provider: [facility] clinic  You have chosen to receive care through a telehealth visit.  Does the patient consent to use a video/audio connection for your medical care today? Yes  The visit included audio and video interaction. No technical issues occurred during this visit.   Sw her yesterday with swelling and redness of R forearm related to recent IV. She has been putting warm and cold compresses on it since. Says the area is more red and more tender. One streak. No systemic symptoms.     Objective   Vital Signs:  There were no vitals taken for this visit.  Estimated body mass index is 21.92 kg/m² as calculated from the following:    Height as of 4/2/24: 170.2 cm (67.01\").    Weight as of 4/2/24: 63.5 kg (140 lb).       BMI is within normal parameters. No other follow-up for BMI required.      Physical Exam  Constitutional:       Appearance: Normal appearance.   Skin:     Comments: As best I can see during video visit, the area on forearm is darker red, not grossly larger        Result Review :                     Assessment and Plan     Diagnoses and all orders for this visit:    1. Phlebitis (Primary)  Comments:  I still think this is irritative but will tx with abx as it has gotten acutely worse. Keep elevated and warm compresses. Call in am if worseing.    2. Supratherapeutic INR  Comments:  Has not rechecked- may be holding due to epidural procedure next week. If remains on warfarin, recommend checking INR in about 24 hours after the abx starts    Other orders  -     cephalexin (Keflex) 500 MG capsule; Take 1 capsule by mouth 3 (Three) Times a Day.  Dispense: 21 capsule; Refill: 0             Follow Up     No follow-ups on file.  Patient was given instructions and counseling regarding her " condition or for health maintenance advice. Please see specific information pulled into the AVS if appropriate.

## 2024-04-03 NOTE — OUTREACH NOTE
Call Center TCM Note      Flowsheet Row Responses   [facility] patient discharged from? [place]   Does the patient have one of the following disease processes/diagnoses(primary or secondary)? Other   TCM attempt successful? Yes   Call start time 0853  [Patient saw her pcp yesterday which meets tcm criteria.]   Discharge diagnosis anemia   Does the patient have an appointment with their PCP within 7-14 days of discharge? Yes   TCM call completed? Yes            Jessica Hernandez LPN    4/3/2024, 08:56 EDT

## 2024-04-09 DIAGNOSIS — N28.89 RIGHT RENAL MASS: Primary | ICD-10-CM

## 2024-04-10 ENCOUNTER — READMISSION MANAGEMENT (OUTPATIENT)
Dept: CALL CENTER | Facility: HOSPITAL | Age: 79
End: 2024-04-10
Payer: MEDICARE

## 2024-04-10 NOTE — OUTREACH NOTE
Medical Week 2 Survey      Flowsheet Row Responses   [facility] patient discharged from? [place]   Does the patient have one of the following disease processes/diagnoses(primary or secondary)? Other   Week 2 attempt successful? No   Unsuccessful attempts Attempt 1            Yaya MARTÍNEZ - Registered Nurse

## 2024-04-16 ENCOUNTER — PATIENT MESSAGE (OUTPATIENT)
Dept: GASTROENTEROLOGY | Facility: CLINIC | Age: 79
End: 2024-04-16
Payer: MEDICARE

## 2024-04-16 ENCOUNTER — TELEPHONE (OUTPATIENT)
Dept: INTERNAL MEDICINE | Facility: CLINIC | Age: 79
End: 2024-04-16
Payer: MEDICARE

## 2024-04-16 NOTE — TELEPHONE ENCOUNTER
Caller: ADARSH    Relationship: Other [facility]    Best call back number: [phone]     What was the call regarding: THEY ARE WANTING A STATUS UPDATE ON MEDICAL REQUESTS FOR SCREENING (IMMUNODEFICIENCY) THAT WERE FAXED FOR PATIENT. PLEASE CALL.

## 2024-04-18 ENCOUNTER — READMISSION MANAGEMENT (OUTPATIENT)
Dept: CALL CENTER | Facility: HOSPITAL | Age: 79
End: 2024-04-18
Payer: MEDICARE

## 2024-04-18 ENCOUNTER — HOSPITAL ENCOUNTER (OUTPATIENT)
Facility: HOSPITAL | Age: 79
Discharge: HOME | End: 2024-04-18
Admitting: NURSE PRACTITIONER
Payer: MEDICARE

## 2024-04-18 DIAGNOSIS — N28.89 RIGHT RENAL MASS: ICD-10-CM

## 2024-04-18 PROCEDURE — 76775 US EXAM ABDO BACK WALL LIM: CPT

## 2024-04-18 NOTE — OUTREACH NOTE
Medical Week 3 Survey      Flowsheet Row Responses   [facility] patient discharged from? [place]   Does the patient have one of the following disease processes/diagnoses(primary or secondary)? Other   Week 3 attempt successful? No   Unsuccessful attempts Attempt 1   oke Lencho HENDRICKSON - Registered Nurse

## 2024-04-23 DIAGNOSIS — N28.89 RIGHT RENAL MASS: Primary | ICD-10-CM

## 2024-05-06 ENCOUNTER — OFFICE VISIT (OUTPATIENT)
Dept: INTERNAL MEDICINE | Facility: CLINIC | Age: 79
End: 2024-05-06
Payer: MEDICARE

## 2024-05-06 VITALS
BODY MASS INDEX: 22.13 KG/M2 | SYSTOLIC BLOOD PRESSURE: 102 MMHG | HEIGHT: 67 IN | WEIGHT: 141 LBS | DIASTOLIC BLOOD PRESSURE: 67 MMHG | HEART RATE: 84 BPM

## 2024-05-06 DIAGNOSIS — N18.4 STAGE 4 CHRONIC KIDNEY DISEASE: ICD-10-CM

## 2024-05-06 DIAGNOSIS — D68.61 ANTIPHOSPHOLIPID ANTIBODY SYNDROME: Primary | ICD-10-CM

## 2024-05-06 DIAGNOSIS — Z00.00 MEDICARE ANNUAL WELLNESS VISIT, SUBSEQUENT: ICD-10-CM

## 2024-05-06 DIAGNOSIS — I27.20 PULMONARY HTN: Chronic | ICD-10-CM

## 2024-05-06 PROBLEM — R79.1 SUPRATHERAPEUTIC INR: Status: RESOLVED | Noted: 2024-03-30 | Resolved: 2024-05-06

## 2024-05-06 LAB — INR PPP: 2.5 (ref 0.9–1.1)

## 2024-05-06 PROCEDURE — 3078F DIAST BP <80 MM HG: CPT | Performed by: INTERNAL MEDICINE

## 2024-05-06 PROCEDURE — 1160F RVW MEDS BY RX/DR IN RCRD: CPT | Performed by: INTERNAL MEDICINE

## 2024-05-06 PROCEDURE — 85610 PROTHROMBIN TIME: CPT | Performed by: INTERNAL MEDICINE

## 2024-05-06 PROCEDURE — G0439 PPPS, SUBSEQ VISIT: HCPCS | Performed by: INTERNAL MEDICINE

## 2024-05-06 PROCEDURE — 36416 COLLJ CAPILLARY BLOOD SPEC: CPT | Performed by: INTERNAL MEDICINE

## 2024-05-06 PROCEDURE — 3074F SYST BP LT 130 MM HG: CPT | Performed by: INTERNAL MEDICINE

## 2024-05-06 PROCEDURE — 1159F MED LIST DOCD IN RCRD: CPT | Performed by: INTERNAL MEDICINE

## 2024-05-06 PROCEDURE — 1170F FXNL STATUS ASSESSED: CPT | Performed by: INTERNAL MEDICINE

## 2024-06-10 DIAGNOSIS — E53.8 B12 DEFICIENCY: ICD-10-CM

## 2024-06-10 RX ORDER — CYANOCOBALAMIN 1000 UG/ML
INJECTION, SOLUTION INTRAMUSCULAR; SUBCUTANEOUS
Qty: 3 ML | Refills: 0 | Status: SHIPPED | OUTPATIENT
Start: 2024-06-10

## 2024-06-13 RX ORDER — CLOBETASOL PROPIONATE 0.5 MG/G
1 CREAM TOPICAL 2 TIMES DAILY
Qty: 60 G | Refills: 1 | Status: SHIPPED | OUTPATIENT
Start: 2024-06-13

## 2024-06-30 ENCOUNTER — OFFICE (AMBULATORY)
Dept: URBAN - METROPOLITAN AREA CLINIC 64 | Facility: CLINIC | Age: 79
End: 2024-06-30
Payer: COMMERCIAL

## 2024-06-30 DIAGNOSIS — K50.10 CROHN'S DISEASE OF LARGE INTESTINE WITHOUT COMPLICATIONS: ICD-10-CM

## 2024-06-30 PROCEDURE — 99426 PRIN CARE MGMT STAFF 1ST 30: CPT | Performed by: INTERNAL MEDICINE

## 2024-07-02 RX ORDER — COLESEVELAM 180 1/1
625 TABLET ORAL DAILY
Qty: 90 TABLET | Refills: 1 | Status: SHIPPED | OUTPATIENT
Start: 2024-07-02

## 2024-07-30 ENCOUNTER — OFFICE VISIT (OUTPATIENT)
Dept: INTERNAL MEDICINE | Facility: CLINIC | Age: 79
End: 2024-07-30
Payer: MEDICARE

## 2024-07-30 VITALS — WEIGHT: 141 LBS | TEMPERATURE: 97.3 F | HEIGHT: 67 IN | BODY MASS INDEX: 22.13 KG/M2

## 2024-07-30 DIAGNOSIS — B37.2 CUTANEOUS CANDIDIASIS: Primary | ICD-10-CM

## 2024-07-30 PROCEDURE — 1125F AMNT PAIN NOTED PAIN PRSNT: CPT | Performed by: INTERNAL MEDICINE

## 2024-07-30 PROCEDURE — 99213 OFFICE O/P EST LOW 20 MIN: CPT | Performed by: INTERNAL MEDICINE

## 2024-07-30 RX ORDER — KETOCONAZOLE 20 MG/G
1 CREAM TOPICAL DAILY
Qty: 60 G | Refills: 0 | Status: SHIPPED | OUTPATIENT
Start: 2024-07-30

## 2024-07-30 NOTE — PROGRESS NOTES
"Chief Complaint  Rash    Subjective        Joellen Dominguez presents to [facility] PRIMARY CARE  Rash     has a rash under abd that isn't healing- there is a lot of itching- has been using steroid cream and medicated powder but doesn't go away. Denies it anywhere else- otherwise health is being monitored closely with nephrology, hematology and pulmonary.     Objective   Vital Signs:  Temp 97.3 °F (36.3 °C) (Temporal)   Ht 170.2 cm (67.01\")   Wt 64 kg (141 lb)   BMI 22.08 kg/m²   Estimated body mass index is 22.08 kg/m² as calculated from the following:    Height as of this encounter: 170.2 cm (67.01\").    Weight as of this encounter: 64 kg (141 lb).       BMI is within normal parameters. No other follow-up for BMI required.      Physical Exam  Skin:     Comments: Suprapubic area with erythema, maceration- no oozing        Result Review :                     Assessment and Plan     Diagnoses and all orders for this visit:    1. Cutaneous candidiasis (Primary)  Comments:  will try topical antifunagals first- oral if doesn't improve.    Other orders  -     ketoconazole (NIZORAL) 2 % cream; Apply 1 Application topically to the appropriate area as directed Daily.  Dispense: 60 g; Refill: 0             Follow Up     No follow-ups on file.  Patient was given instructions and counseling regarding her condition or for health maintenance advice. Please see specific information pulled into the AVS if appropriate.         "
Health Care Proxy (HCP)

## 2024-08-20 ENCOUNTER — OFFICE VISIT (OUTPATIENT)
Dept: INTERNAL MEDICINE | Facility: CLINIC | Age: 79
End: 2024-08-20
Payer: MEDICARE

## 2024-08-20 ENCOUNTER — HOSPITAL ENCOUNTER (OUTPATIENT)
Facility: HOSPITAL | Age: 79
Discharge: HOME | End: 2024-08-20
Admitting: INTERNAL MEDICINE
Payer: MEDICARE

## 2024-08-20 VITALS — WEIGHT: 141 LBS | HEIGHT: 67 IN | BODY MASS INDEX: 22.13 KG/M2

## 2024-08-20 DIAGNOSIS — R07.89 CHEST WALL PAIN: ICD-10-CM

## 2024-08-20 DIAGNOSIS — Z79.01 LONG TERM CURRENT USE OF ANTICOAGULANT THERAPY: Chronic | ICD-10-CM

## 2024-08-20 DIAGNOSIS — R07.89 CHEST WALL PAIN: Primary | ICD-10-CM

## 2024-08-20 PROCEDURE — 99214 OFFICE O/P EST MOD 30 MIN: CPT | Performed by: INTERNAL MEDICINE

## 2024-08-20 PROCEDURE — 71250 CT THORAX DX C-: CPT

## 2024-08-20 PROCEDURE — 1125F AMNT PAIN NOTED PAIN PRSNT: CPT | Performed by: INTERNAL MEDICINE

## 2024-08-20 RX ORDER — ENOXAPARIN SODIUM 100 MG/ML
INJECTION SUBCUTANEOUS
COMMUNITY
Start: 2024-08-12

## 2024-08-20 RX ORDER — TRAMADOL HYDROCHLORIDE 50 MG/1
50 TABLET ORAL EVERY 6 HOURS PRN
Qty: 30 TABLET | Refills: 0 | Status: SHIPPED | OUTPATIENT
Start: 2024-08-20

## 2024-08-20 NOTE — PROGRESS NOTES
"Chief Complaint  Pain (After fall )    Subjective        Joellen Dominguez presents to [facility] PRIMARY CARE  History of Present Illness  Fell 3 weeks ago- doesn't remember hitting chest, knows she hit head- no LOC, did not go to ER- had no pain or bruising immediately afterward. Started having pain in her chest wall about a week afterward- radiates around to R side- there is no point tenderness. Pain no better, maybe worse. The pain has made it even harder to breath.    Dr. Vizcaino stopped the coumadin and put her on Lovenox instead due to persistent heme + stools and low hemoglobin.     Objective   Vital Signs:  Ht 170.2 cm (67.01\")   Wt 64 kg (141 lb)   BMI 22.08 kg/m²   Estimated body mass index is 22.08 kg/m² as calculated from the following:    Height as of this encounter: 170.2 cm (67.01\").    Weight as of this encounter: 64 kg (141 lb).    BMI is within normal parameters. No other follow-up for BMI required.      Physical Exam  Constitutional:       Appearance: Normal appearance.   Cardiovascular:      Rate and Rhythm: Normal rate.   Pulmonary:      Comments: Shallow breaths due to chest wall pain  Chest:      Chest wall: Tenderness present.        Result Review :                Assessment and Plan   Diagnoses and all orders for this visit:    1. Chest wall pain (Primary)  Comments:  check CT- suspect sternal fracture- discussed tx which would consist of pain meds, deep breathing whenshe can.  Orders:  -     CT Chest Without Contrast; Future  -     traMADol (ULTRAM) 50 MG tablet; Take 1 tablet by mouth Every 6 (Six) Hours As Needed for Moderate Pain.  Dispense: 30 tablet; Refill: 0             Follow Up   No follow-ups on file.  Patient was given instructions and counseling regarding her condition or for health maintenance advice. Please see specific information pulled into the AVS if appropriate.             "

## 2024-08-27 ENCOUNTER — OFFICE (AMBULATORY)
Age: 79
End: 2024-08-27

## 2024-08-27 ENCOUNTER — OFFICE (AMBULATORY)
Dept: URBAN - METROPOLITAN AREA CLINIC 76 | Facility: CLINIC | Age: 79
End: 2024-08-27

## 2024-08-27 VITALS
SYSTOLIC BLOOD PRESSURE: 104 MMHG | SYSTOLIC BLOOD PRESSURE: 104 MMHG | SYSTOLIC BLOOD PRESSURE: 104 MMHG | OXYGEN SATURATION: 92 % | DIASTOLIC BLOOD PRESSURE: 66 MMHG | WEIGHT: 142 LBS | SYSTOLIC BLOOD PRESSURE: 104 MMHG | OXYGEN SATURATION: 92 % | OXYGEN SATURATION: 92 % | DIASTOLIC BLOOD PRESSURE: 66 MMHG | WEIGHT: 142 LBS | HEART RATE: 96 BPM | HEIGHT: 67 IN | DIASTOLIC BLOOD PRESSURE: 66 MMHG | HEART RATE: 96 BPM | WEIGHT: 142 LBS | HEART RATE: 96 BPM | OXYGEN SATURATION: 92 % | HEIGHT: 67 IN | SYSTOLIC BLOOD PRESSURE: 104 MMHG | OXYGEN SATURATION: 92 % | WEIGHT: 142 LBS | DIASTOLIC BLOOD PRESSURE: 66 MMHG | DIASTOLIC BLOOD PRESSURE: 66 MMHG | HEIGHT: 67 IN | HEIGHT: 67 IN | HEART RATE: 96 BPM | WEIGHT: 142 LBS | DIASTOLIC BLOOD PRESSURE: 66 MMHG | HEART RATE: 96 BPM | SYSTOLIC BLOOD PRESSURE: 104 MMHG | HEIGHT: 67 IN | WEIGHT: 142 LBS | WEIGHT: 142 LBS | DIASTOLIC BLOOD PRESSURE: 66 MMHG | SYSTOLIC BLOOD PRESSURE: 104 MMHG | HEART RATE: 96 BPM | OXYGEN SATURATION: 92 % | OXYGEN SATURATION: 92 % | HEIGHT: 67 IN | HEART RATE: 96 BPM | HEIGHT: 67 IN

## 2024-08-27 DIAGNOSIS — K50.80 CROHN'S DISEASE OF BOTH SMALL AND LARGE INTESTINE WITHOUT CO: ICD-10-CM

## 2024-08-27 DIAGNOSIS — R93.3 ABNORMAL FINDINGS ON DIAGNOSTIC IMAGING OF OTHER PARTS OF DI: ICD-10-CM

## 2024-08-27 DIAGNOSIS — K86.2 CYST OF PANCREAS: ICD-10-CM

## 2024-08-27 PROCEDURE — 99213 OFFICE O/P EST LOW 20 MIN: CPT | Performed by: NURSE PRACTITIONER

## 2024-08-31 ENCOUNTER — OFFICE (AMBULATORY)
Age: 79
End: 2024-08-31

## 2024-08-31 ENCOUNTER — OFFICE (AMBULATORY)
Dept: URBAN - METROPOLITAN AREA CLINIC 64 | Facility: CLINIC | Age: 79
End: 2024-08-31

## 2024-08-31 DIAGNOSIS — K50.10 CROHN'S DISEASE OF LARGE INTESTINE WITHOUT COMPLICATIONS: ICD-10-CM

## 2024-08-31 PROCEDURE — 99426 PRIN CARE MGMT STAFF 1ST 30: CPT | Performed by: INTERNAL MEDICINE

## 2024-08-31 PROCEDURE — 99427 PRIN CARE MGMT STAFF EA ADDL: CPT | Performed by: INTERNAL MEDICINE

## 2024-09-01 DIAGNOSIS — E53.8 B12 DEFICIENCY: ICD-10-CM

## 2024-09-03 RX ORDER — CYANOCOBALAMIN 1000 UG/ML
1000 INJECTION, SOLUTION INTRAMUSCULAR; SUBCUTANEOUS
Qty: 3 ML | Refills: 2 | Status: SHIPPED | OUTPATIENT
Start: 2024-09-03

## 2024-09-04 ENCOUNTER — TRANSCRIBE ORDERS (OUTPATIENT)
Dept: ADMINISTRATIVE | Facility: HOSPITAL | Age: 79
End: 2024-09-04
Payer: MEDICARE

## 2024-09-04 DIAGNOSIS — K86.2 PANCREATIC CYST: Primary | ICD-10-CM

## 2024-09-18 DIAGNOSIS — M85.88 OTHER SPECIFIED DISORDERS OF BONE DENSITY AND STRUCTURE, OTHER SITE: Primary | ICD-10-CM

## 2024-09-30 ENCOUNTER — OFFICE (AMBULATORY)
Dept: URBAN - METROPOLITAN AREA CLINIC 64 | Facility: CLINIC | Age: 79
End: 2024-09-30
Payer: COMMERCIAL

## 2024-09-30 ENCOUNTER — OFFICE (AMBULATORY)
Age: 79
End: 2024-09-30
Payer: COMMERCIAL

## 2024-09-30 DIAGNOSIS — K50.10 CROHN'S DISEASE OF LARGE INTESTINE WITHOUT COMPLICATIONS: ICD-10-CM

## 2024-09-30 PROCEDURE — 99426 PRIN CARE MGMT STAFF 1ST 30: CPT | Performed by: INTERNAL MEDICINE

## 2024-10-09 ENCOUNTER — SPECIALTY PHARMACY (OUTPATIENT)
Dept: GASTROENTEROLOGY | Facility: CLINIC | Age: 79
End: 2024-10-09
Payer: MEDICARE

## 2024-10-09 ENCOUNTER — TELEPHONE (OUTPATIENT)
Dept: GASTROENTEROLOGY | Facility: CLINIC | Age: 79
End: 2024-10-09

## 2024-10-09 ENCOUNTER — OFFICE VISIT (OUTPATIENT)
Dept: GASTROENTEROLOGY | Facility: CLINIC | Age: 79
End: 2024-10-09
Payer: MEDICARE

## 2024-10-09 VITALS
HEART RATE: 70 BPM | SYSTOLIC BLOOD PRESSURE: 90 MMHG | BODY MASS INDEX: 21.99 KG/M2 | OXYGEN SATURATION: 98 % | TEMPERATURE: 96.7 F | DIASTOLIC BLOOD PRESSURE: 55 MMHG | WEIGHT: 140.1 LBS | HEIGHT: 67 IN

## 2024-10-09 DIAGNOSIS — K50.80 CROHN'S DISEASE OF BOTH SMALL AND LARGE INTESTINE WITHOUT COMPLICATION: Primary | ICD-10-CM

## 2024-10-09 DIAGNOSIS — R19.8 ALTERNATING CONSTIPATION AND DIARRHEA: ICD-10-CM

## 2024-10-09 DIAGNOSIS — Z79.899 HIGH RISK MEDICATION USE: ICD-10-CM

## 2024-10-09 DIAGNOSIS — K86.2 PANCREATIC CYST: ICD-10-CM

## 2024-10-09 PROCEDURE — 3074F SYST BP LT 130 MM HG: CPT | Performed by: NURSE PRACTITIONER

## 2024-10-09 PROCEDURE — 1159F MED LIST DOCD IN RCRD: CPT | Performed by: NURSE PRACTITIONER

## 2024-10-09 PROCEDURE — 3078F DIAST BP <80 MM HG: CPT | Performed by: NURSE PRACTITIONER

## 2024-10-09 PROCEDURE — 99214 OFFICE O/P EST MOD 30 MIN: CPT | Performed by: NURSE PRACTITIONER

## 2024-10-09 PROCEDURE — 1160F RVW MEDS BY RX/DR IN RCRD: CPT | Performed by: NURSE PRACTITIONER

## 2024-10-09 RX ORDER — TORSEMIDE 100 MG/1
100 TABLET ORAL DAILY
COMMUNITY

## 2024-10-09 RX ORDER — ERYTHROMYCIN 5 MG/G
OINTMENT OPHTHALMIC DAILY PRN
COMMUNITY

## 2024-10-09 RX ORDER — VIT A/VIT C/VIT E/ZINC/COPPER 2148-113
1 TABLET ORAL DAILY
COMMUNITY

## 2024-10-09 RX ORDER — METHYLDOPA/HYDROCHLOROTHIAZIDE 250MG-15MG
TABLET ORAL
COMMUNITY

## 2024-10-09 NOTE — PROGRESS NOTES
"Chief Complaint   Patient presents with    Crohn's Disease         History of Present Illness  Patient is an 79-year-old female who presents today for follow-up.  She has a history of Crohn's ileocolitis, diagnosed in 1978, current treatment with Stelara and Pentasa.  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    Patient presents today for follow-up.  She reports overall symptoms remain relatively stable since last office visit.  She continues to alternate between diarrhea and constipation but feels the fluctuation is less extreme than it has been in the past.  She is generally having a bowel movement on a daily basis.  She has been taking FiberCon of 6 tablets/day and WelChol 1 pill/day.    She continues on Stelara for Crohn's disease, has had no issues or side effects of the medication.    She notes occasional tightness in her abdomen and occasional nausea but no significant pain or vomiting.    She does report increased gas And belching.    She was hospitalized in March for anemia, this was ultimately felt to be due to Coumadin toxicity.  This has been discontinued and she is now on Lovenox.  Most recent hemoglobin was 11.9.  She denies any visible blood in the stool or melena.    She will be going on a river cruise through the [place] area next week.     Result Review :       MRI Abdomen Without Contrast (03/28/2024 13:58)    Office Visit with Sakshi Martinez APRN (01/10/2024)    ENDOSCOPY, INT (12/28/2023)    SCANNED - IMAGING (11/21/2023)    Vital Signs:   BP 90/55   Pulse 70   Temp 96.7 °F (35.9 °C)   Ht 170.2 cm (67.01\")   Wt 63.5 kg (140 lb 1.6 oz)   SpO2 98%   BMI 21.94 kg/m²     Body mass index is 21.94 kg/m².     Physical Exam  Vitals reviewed.   Constitutional:       General: She is not in acute distress.     Appearance: She is well-developed.   HENT:      Head: Normocephalic and atraumatic.   Pulmonary:      Effort: Pulmonary effort is normal. No respiratory distress. "   Abdominal:      General: Abdomen is protuberant. Bowel sounds are normal.      Palpations: Abdomen is soft.      Tenderness: There is no abdominal tenderness.   Skin:     General: Skin is dry.      Coloration: Skin is not pale.   Neurological:      Mental Status: She is alert and oriented to person, place, and time.   Psychiatric:         Thought Content: Thought content normal.           Assessment and Plan    Diagnoses and all orders for this visit:    1. Crohn's disease of both small and large intestine without complication (Primary)    2. Pancreatic cyst    3. High risk medication use    4. Alternating constipation and diarrhea    Other orders  -     riFAXIMin (Xifaxan) 550 MG tablet; Take 1 tablet by mouth 3 (Three) Times a Day for 14 days.  Dispense: 42 tablet; Refill: 2         Discussion  Patient presents today for follow-up of Crohn's disease.  Overall symptoms are stable and controlled and will continue current treatment with Stelara.    Regarding increase gas and intermittent diarrhea, recommended treatment with Xifaxan.  Suspect component of irritable bowel syndrome with diarrhea or small intestinal bacterial overgrowth to her symptoms.    She is scheduled for MRI tomorrow for monitoring of pancreatic cyst.  Will follow-up on these results.  She would prefer to minimize invasive procedures if possible.              Follow Up   Return for Recheck in 6-12 months.    Patient Instructions   For Crohn's disease, continue current treatment with Stelara.    For IBS-D, complete course of Xifaxan as prescribed.     For gas, recommend trial of low FODMAP diet.  Okay to use over-the-counter gas reducers such as Gas-X or Phazyme.    Proceed with MRI as planned for surveillance of pancreatic cyst.

## 2024-10-09 NOTE — PROGRESS NOTES
Mesalamine er capsule PA approved  Key: [id number]  PA Case ID #: [id number]  This approval authorizes your coverage from 01/01/2024 - 12/31/2024     Barbara Narvaez  Specialty Pharmacy Technician

## 2024-10-09 NOTE — PATIENT INSTRUCTIONS
For Crohn's disease, continue current treatment with Stelara.    For IBS-D, complete course of Xifaxan as prescribed.     For gas, recommend trial of low FODMAP diet.  Okay to use over-the-counter gas reducers such as Gas-X or Phazyme.    Proceed with MRI as planned for surveillance of pancreatic cyst.

## 2024-10-10 ENCOUNTER — HOSPITAL ENCOUNTER (OUTPATIENT)
Dept: MRI IMAGING | Facility: HOSPITAL | Age: 79
Discharge: HOME | End: 2024-10-10
Admitting: NURSE PRACTITIONER
Payer: MEDICARE

## 2024-10-10 VITALS
BODY MASS INDEX: 23.22 KG/M2 | WEIGHT: 136 LBS | HEART RATE: 70 BPM | OXYGEN SATURATION: 99 % | RESPIRATION RATE: 16 BRPM | SYSTOLIC BLOOD PRESSURE: 114 MMHG | DIASTOLIC BLOOD PRESSURE: 64 MMHG | TEMPERATURE: 98 F | HEIGHT: 64 IN

## 2024-10-10 DIAGNOSIS — K86.2 PANCREATIC CYST: ICD-10-CM

## 2024-10-10 PROCEDURE — 74181 MRI ABDOMEN W/O CONTRAST: CPT

## 2024-10-31 ENCOUNTER — OFFICE (AMBULATORY)
Age: 79
End: 2024-10-31
Payer: COMMERCIAL

## 2024-10-31 ENCOUNTER — OFFICE (AMBULATORY)
Dept: URBAN - METROPOLITAN AREA CLINIC 64 | Facility: CLINIC | Age: 79
End: 2024-10-31
Payer: COMMERCIAL

## 2024-10-31 DIAGNOSIS — K50.10 CROHN'S DISEASE OF LARGE INTESTINE WITHOUT COMPLICATIONS: ICD-10-CM

## 2024-10-31 PROCEDURE — 99426 PRIN CARE MGMT STAFF 1ST 30: CPT | Performed by: INTERNAL MEDICINE

## 2024-11-07 ENCOUNTER — OFFICE VISIT (OUTPATIENT)
Dept: INTERNAL MEDICINE | Facility: CLINIC | Age: 79
End: 2024-11-07
Payer: MEDICARE

## 2024-11-07 VITALS
DIASTOLIC BLOOD PRESSURE: 56 MMHG | SYSTOLIC BLOOD PRESSURE: 86 MMHG | WEIGHT: 142 LBS | HEIGHT: 64 IN | HEART RATE: 78 BPM | BODY MASS INDEX: 24.24 KG/M2

## 2024-11-07 DIAGNOSIS — Z79.01 LONG TERM CURRENT USE OF ANTICOAGULANT THERAPY: Primary | Chronic | ICD-10-CM

## 2024-11-07 DIAGNOSIS — I27.20 PULMONARY HTN: Chronic | ICD-10-CM

## 2024-11-07 DIAGNOSIS — I10 ESSENTIAL HYPERTENSION: Chronic | ICD-10-CM

## 2024-11-07 PROCEDURE — 1160F RVW MEDS BY RX/DR IN RCRD: CPT | Performed by: INTERNAL MEDICINE

## 2024-11-07 PROCEDURE — 3078F DIAST BP <80 MM HG: CPT | Performed by: INTERNAL MEDICINE

## 2024-11-07 PROCEDURE — G2211 COMPLEX E/M VISIT ADD ON: HCPCS | Performed by: INTERNAL MEDICINE

## 2024-11-07 PROCEDURE — 3074F SYST BP LT 130 MM HG: CPT | Performed by: INTERNAL MEDICINE

## 2024-11-07 PROCEDURE — 1125F AMNT PAIN NOTED PAIN PRSNT: CPT | Performed by: INTERNAL MEDICINE

## 2024-11-07 PROCEDURE — 99213 OFFICE O/P EST LOW 20 MIN: CPT | Performed by: INTERNAL MEDICINE

## 2024-11-07 PROCEDURE — 1159F MED LIST DOCD IN RCRD: CPT | Performed by: INTERNAL MEDICINE

## 2024-11-07 NOTE — PROGRESS NOTES
"Chief Complaint  Hypertension and multiple medical problems    Subjective        Joellen Dominguez presents to [facility] PRIMARY [facility]  History of Present Illness  here for reg f/u- multiple medical problems- most stable including Crohns, chronic anemia and advanced renal disease.   has been following pancreatic lesion- now to have EUS because it has gotten bigger on MRI.   Pulm hypertension stable- follows closely by Dr. Cordero.   Sternal pain is resolved.   Reviewed diuretic use- torsemide/spironolactone daily and metolazone prn fluid retention.     Objective   Vital Signs:  BP (!) 86/56   Pulse 78   Ht 162.6 cm (64\")   Wt 64.4 kg (142 lb)   BMI 24.37 kg/m²   Estimated body mass index is 24.37 kg/m² as calculated from the following:    Height as of this encounter: 162.6 cm (64\").    Weight as of this encounter: 64.4 kg (142 lb).    BMI is within normal parameters. No other follow-up for BMI required.      Physical Exam  Constitutional:       Appearance: Normal appearance.   Cardiovascular:      Rate and Rhythm: Normal rate.   Pulmonary:      Effort: Pulmonary effort is normal.        Result Review :      Data reviewed : Consultant notes heme, pulm, nephrology           Assessment and Plan   Diagnoses and all orders for this visit:    1. Long term current use of anticoagulant therapy (Primary)    2. Essential hypertension    3. Pulmonary HTN    We discussed all of the above and her other chronic medical issues- no changes made.  She is actually doing quite well- BP low, has been - due to multiple medications.  She is not lightheaded, orthostatic.   Discussed d/c mmg- has never had issues and with other medical conditions not likely to be a problem.   INR reviewed.          Follow Up   Return in about 6 months (around 5/7/2025) for Medicare Wellness.  Patient was given instructions and counseling regarding her condition or for health maintenance advice. Please see specific information " pulled into the AVS if appropriate.           Answers submitted by the patient for this visit:  Other (Submitted on 10/31/2024)  Please describe your symptoms.: follow  up  Have you had these symptoms before?: Yes  How long have you been having these symptoms?: Greater than 2 weeks  Primary Reason for Visit (Submitted on 10/31/2024)  What is the primary reason for your visit?: Problem Not Listed

## 2024-11-29 ENCOUNTER — PATIENT MESSAGE (OUTPATIENT)
Dept: GASTROENTEROLOGY | Facility: CLINIC | Age: 79
End: 2024-11-29
Payer: MEDICARE

## 2024-12-05 ENCOUNTER — OFFICE VISIT (OUTPATIENT)
Dept: INTERNAL MEDICINE | Facility: CLINIC | Age: 79
End: 2024-12-05
Payer: MEDICARE

## 2024-12-05 VITALS — HEIGHT: 64 IN | BODY MASS INDEX: 23.9 KG/M2 | WEIGHT: 140 LBS

## 2024-12-05 DIAGNOSIS — R53.1 GENERALIZED WEAKNESS: Primary | ICD-10-CM

## 2024-12-05 DIAGNOSIS — I27.20 PULMONARY HTN: Chronic | ICD-10-CM

## 2024-12-05 PROCEDURE — 1160F RVW MEDS BY RX/DR IN RCRD: CPT | Performed by: INTERNAL MEDICINE

## 2024-12-05 PROCEDURE — 1159F MED LIST DOCD IN RCRD: CPT | Performed by: INTERNAL MEDICINE

## 2024-12-05 PROCEDURE — 1125F AMNT PAIN NOTED PAIN PRSNT: CPT | Performed by: INTERNAL MEDICINE

## 2024-12-05 PROCEDURE — 99213 OFFICE O/P EST LOW 20 MIN: CPT | Performed by: INTERNAL MEDICINE

## 2024-12-05 PROCEDURE — G2211 COMPLEX E/M VISIT ADD ON: HCPCS | Performed by: INTERNAL MEDICINE

## 2024-12-05 NOTE — PROGRESS NOTES
"Chief Complaint  Uncontrollable Muscle Weakness    Subjective        Joellen Dominguez presents to [facility] PRIMARY [facility]ARE  History of Present Illness woke up in the middle of the night-abut a week ago-  couldn't get herself up or motored to the bathroom.  had to full assist. Not as bad in the am but not normal.  Has been a little better every day since. Feels over the same time period she is not thinking as clearly.  Labs from a few days prior were unchanged.    thinks that she needs PT and some more exercise.  Was going to classes at [facility] but the distance from the parking lot to the class was too far for her to manage.   No recent illness. She is homebound- unable to drive given multiple medical issues- weakness, fatigue.     Objective   Vital Signs:  Ht 162.6 cm (64\")   Wt 63.5 kg (140 lb)   BMI 24.03 kg/m²   Estimated body mass index is 24.03 kg/m² as calculated from the following:    Height as of this encounter: 162.6 cm (64\").    Weight as of this encounter: 63.5 kg (140 lb).    BMI is within normal parameters. No other follow-up for BMI required.      Physical Exam  Constitutional:       Appearance: Normal appearance.   Cardiovascular:      Rate and Rhythm: Normal rate.   Pulmonary:      Effort: Pulmonary effort is normal.   Neurological:      Motor: Weakness present.      Gait: Gait abnormal.        Result Review :                Assessment and Plan   Diagnoses and all orders for this visit:    1. Generalized weakness (Primary)  Comments:  multifactorial- discussed diet, agree to home PT which I think will be helpful  Orders:  -     Ambulatory Referral to Home Health    2. Pulmonary HTN  Comments:  This is primarily what limits her activity- stable per dr. Templeton- increase exercise to help             Follow Up   Return for Keep previously scheduled appointment.  Patient was given instructions and counseling regarding her condition or for health maintenance advice. Please see " specific information pulled into the AVS if appropriate.           Answers submitted by the patient for this visit:  Primary Reason for Visit (Submitted on 12/3/2024)  What is the primary reason for your visit?: Problem Not Listed

## 2024-12-06 ENCOUNTER — ON CAMPUS - OUTPATIENT (AMBULATORY)
Age: 79
End: 2024-12-06
Payer: MEDICARE

## 2024-12-06 ENCOUNTER — ON CAMPUS - OUTPATIENT (AMBULATORY)
Dept: URBAN - METROPOLITAN AREA HOSPITAL 77 | Facility: HOSPITAL | Age: 79
End: 2024-12-06
Payer: MEDICARE

## 2024-12-06 DIAGNOSIS — K29.50 UNSPECIFIED CHRONIC GASTRITIS WITHOUT BLEEDING: ICD-10-CM

## 2024-12-06 DIAGNOSIS — R93.3 ABNORMAL FINDINGS ON DIAGNOSTIC IMAGING OF OTHER PARTS OF DI: ICD-10-CM

## 2024-12-06 DIAGNOSIS — K86.2 CYST OF PANCREAS: ICD-10-CM

## 2024-12-06 PROCEDURE — 43242 EGD US FINE NEEDLE BX/ASPIR: CPT | Performed by: INTERNAL MEDICINE

## 2024-12-17 ENCOUNTER — OUTSIDE FACILITY SERVICE (OUTPATIENT)
Dept: INTERNAL MEDICINE | Facility: CLINIC | Age: 79
End: 2024-12-17
Payer: MEDICARE

## 2024-12-17 PROCEDURE — OUTSIDEPOS PR OUTSIDE POS PLACEHOLDER: Performed by: INTERNAL MEDICINE

## 2024-12-17 RX ORDER — BUPROPION HYDROCHLORIDE 300 MG/1
300 TABLET ORAL DAILY
Qty: 90 TABLET | Refills: 3 | Status: SHIPPED | OUTPATIENT
Start: 2024-12-17

## 2024-12-17 RX ORDER — COLESEVELAM 180 1/1
625 TABLET ORAL DAILY
Qty: 90 TABLET | Refills: 1 | Status: SHIPPED | OUTPATIENT
Start: 2024-12-17

## 2025-01-07 ENCOUNTER — OUTSIDE FACILITY SERVICE (OUTPATIENT)
Dept: INTERNAL MEDICINE | Facility: CLINIC | Age: 80
End: 2025-01-07
Payer: MEDICARE

## 2025-01-09 ENCOUNTER — OUTSIDE FACILITY SERVICE (OUTPATIENT)
Dept: INTERNAL MEDICINE | Facility: CLINIC | Age: 80
End: 2025-01-09
Payer: MEDICARE

## 2025-01-09 PROCEDURE — G0180 MD CERTIFICATION HHA PATIENT: HCPCS | Performed by: INTERNAL MEDICINE

## 2025-01-20 ENCOUNTER — TELEPHONE (OUTPATIENT)
Dept: INTERNAL MEDICINE | Facility: CLINIC | Age: 80
End: 2025-01-20

## 2025-01-20 ENCOUNTER — OUTSIDE FACILITY SERVICE (OUTPATIENT)
Dept: INTERNAL MEDICINE | Facility: CLINIC | Age: 80
End: 2025-01-20
Payer: MEDICARE

## 2025-01-20 NOTE — TELEPHONE ENCOUNTER
3:30 on the 28th- make sure it can be a hospital f/u- not sure if we can from [facility]- 30 minutes either way.

## 2025-01-28 ENCOUNTER — OFFICE VISIT (OUTPATIENT)
Dept: INTERNAL MEDICINE | Facility: CLINIC | Age: 80
End: 2025-01-28
Payer: MEDICARE

## 2025-01-28 DIAGNOSIS — I27.20 PULMONARY HTN: Chronic | ICD-10-CM

## 2025-01-28 DIAGNOSIS — C80.1 MUCINOUS ADENOCARCINOMA: ICD-10-CM

## 2025-01-28 DIAGNOSIS — D64.89 ANEMIA DUE TO OTHER CAUSE, NOT CLASSIFIED: Primary | ICD-10-CM

## 2025-01-28 PROCEDURE — 1125F AMNT PAIN NOTED PAIN PRSNT: CPT | Performed by: INTERNAL MEDICINE

## 2025-01-28 PROCEDURE — 99213 OFFICE O/P EST LOW 20 MIN: CPT | Performed by: INTERNAL MEDICINE

## 2025-01-28 NOTE — PROGRESS NOTES
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 ([facility])- says she is handling the news ok, \"haven't broken down yet\" -  very involved.  She has f/u with Dr. Cordero next week- is now using O2 since hospitalization. Has little exercise capacity.     Objective   Vital Signs:  There were no vitals taken for this visit.  Estimated body mass index is 24.03 kg/m² as calculated from the following:    Height as of 12/5/24: 162.6 cm (64\").    Weight as of 12/5/24: 63.5 kg (140 lb).    BMI is within normal parameters. No other follow-up for BMI required.      Physical Exam  Constitutional:       Appearance: Ill appearance: chronically.   Cardiovascular:      Rate and Rhythm: Normal rate.   Pulmonary:      Effort: Pulmonary effort is normal.        Result Review :                Assessment and Plan   Diagnoses and all orders for this visit:    1. Anemia due to other cause, not classified (Primary)    2. Pulmonary HTN    3. Mucinous adenocarcinoma    Chart reviewed with pt and - as many questions answered as I could- will follow along- support given         Follow Up   No follow-ups on file.  Patient was given instructions and counseling regarding her condition or for health maintenance advice. Please see specific information pulled into the AVS if appropriate.           "

## 2025-01-30 RX ORDER — LEVOTHYROXINE SODIUM 100 UG/1
100 TABLET ORAL DAILY
Qty: 90 TABLET | Refills: 3 | Status: SHIPPED | OUTPATIENT
Start: 2025-01-30

## 2025-01-31 ENCOUNTER — OFFICE (AMBULATORY)
Dept: URBAN - METROPOLITAN AREA CLINIC 64 | Facility: CLINIC | Age: 80
End: 2025-01-31
Payer: MEDICARE

## 2025-01-31 DIAGNOSIS — K50.10 CROHN'S DISEASE OF LARGE INTESTINE WITHOUT COMPLICATIONS: ICD-10-CM

## 2025-01-31 DIAGNOSIS — K50.80 CROHN'S DISEASE OF BOTH SMALL AND LARGE INTESTINE WITHOUT CO: ICD-10-CM

## 2025-01-31 PROCEDURE — 99426 PRIN CARE MGMT STAFF 1ST 30: CPT | Performed by: INTERNAL MEDICINE

## 2025-02-04 ENCOUNTER — OUTSIDE FACILITY SERVICE (OUTPATIENT)
Dept: INTERNAL MEDICINE | Facility: CLINIC | Age: 80
End: 2025-02-04
Payer: MEDICARE

## 2025-02-13 ENCOUNTER — OUTSIDE FACILITY SERVICE (OUTPATIENT)
Dept: INTERNAL MEDICINE | Facility: CLINIC | Age: 80
End: 2025-02-13
Payer: MEDICARE

## 2025-02-13 PROCEDURE — OUTSIDEPOS PR OUTSIDE POS PLACEHOLDER: Performed by: INTERNAL MEDICINE

## 2025-02-17 ENCOUNTER — OUTSIDE FACILITY SERVICE (OUTPATIENT)
Dept: INTERNAL MEDICINE | Facility: CLINIC | Age: 80
End: 2025-02-17
Payer: MEDICARE

## 2025-02-17 PROCEDURE — OUTSIDEPOS PR OUTSIDE POS PLACEHOLDER: Performed by: INTERNAL MEDICINE

## 2025-02-24 ENCOUNTER — OUTSIDE FACILITY SERVICE (OUTPATIENT)
Dept: INTERNAL MEDICINE | Facility: CLINIC | Age: 80
End: 2025-02-24
Payer: MEDICARE

## 2025-03-10 PROCEDURE — G0179 MD RECERTIFICATION HHA PT: HCPCS | Performed by: INTERNAL MEDICINE

## 2025-03-11 ENCOUNTER — TELEPHONE (OUTPATIENT)
Dept: GASTROENTEROLOGY | Facility: CLINIC | Age: 80
End: 2025-03-11
Payer: MEDICARE

## 2025-03-11 DIAGNOSIS — C78.7 METASTATIC ADENOCARCINOMA TO LIVER: Primary | ICD-10-CM

## 2025-03-11 NOTE — TELEPHONE ENCOUNTER
Dr Salomon,  I spoke with Mr Dominguez.  Patient with anemia for several months.  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  Per path report on 01/14/2025.  They are very comfortable with the current oncologist from [place] but would like a second opinion.  I told him the oncologists at [facility] are excellent.     He would like the name of an oncologist who can treat liver cancer/colon origin.    2.   Also, is it ok to cancel her appointment on 03/17?  It's        difficult to get her out but he will get her here if she needs to be seen.

## 2025-03-13 ENCOUNTER — OUTSIDE FACILITY SERVICE (OUTPATIENT)
Dept: INTERNAL MEDICINE | Facility: CLINIC | Age: 80
End: 2025-03-13
Payer: MEDICARE

## 2025-03-17 ENCOUNTER — OFFICE VISIT (OUTPATIENT)
Dept: GASTROENTEROLOGY | Facility: CLINIC | Age: 80
End: 2025-03-17
Payer: MEDICARE

## 2025-03-17 VITALS
DIASTOLIC BLOOD PRESSURE: 48 MMHG | BODY MASS INDEX: 22.2 KG/M2 | HEART RATE: 70 BPM | WEIGHT: 130 LBS | OXYGEN SATURATION: 92 % | HEIGHT: 64 IN | SYSTOLIC BLOOD PRESSURE: 96 MMHG | TEMPERATURE: 97.3 F

## 2025-03-17 DIAGNOSIS — C78.7 METASTATIC ADENOCARCINOMA TO LIVER: ICD-10-CM

## 2025-03-17 DIAGNOSIS — K50.80 CROHN'S DISEASE OF BOTH SMALL AND LARGE INTESTINE WITHOUT COMPLICATION: Primary | ICD-10-CM

## 2025-03-17 DIAGNOSIS — K86.2 PANCREATIC CYST: ICD-10-CM

## 2025-03-17 PROCEDURE — 99214 OFFICE O/P EST MOD 30 MIN: CPT | Performed by: NURSE PRACTITIONER

## 2025-03-17 PROCEDURE — 1160F RVW MEDS BY RX/DR IN RCRD: CPT | Performed by: NURSE PRACTITIONER

## 2025-03-17 PROCEDURE — 3078F DIAST BP <80 MM HG: CPT | Performed by: NURSE PRACTITIONER

## 2025-03-17 PROCEDURE — 3074F SYST BP LT 130 MM HG: CPT | Performed by: NURSE PRACTITIONER

## 2025-03-17 PROCEDURE — 1159F MED LIST DOCD IN RCRD: CPT | Performed by: NURSE PRACTITIONER

## 2025-03-17 RX ORDER — OMEPRAZOLE 40 MG/1
CAPSULE, DELAYED RELEASE ORAL
COMMUNITY
Start: 2025-02-27

## 2025-03-17 RX ORDER — PROCHLORPERAZINE MALEATE 10 MG
1 TABLET ORAL EVERY 6 HOURS PRN
COMMUNITY
Start: 2025-02-03

## 2025-03-17 RX ORDER — POTASSIUM CHLORIDE 1500 MG/1
20 TABLET, EXTENDED RELEASE ORAL DAILY
COMMUNITY
Start: 2025-01-30

## 2025-03-17 NOTE — PATIENT INSTRUCTIONS
Due to constipation, recommend stopping WelChol.    Recommend discussing ongoing use of Stelara with oncology.  If they would like you to stop taking, okay to do so from a GI standpoint.

## 2025-03-17 NOTE — PROGRESS NOTES
"Chief Complaint   Patient presents with    GI Problem         History of Present Illness  Patient is an 79-year-old female who presents today for follow-up. She has a history of Crohn's ileocolitis, diagnosed in 1978, current treatment with Stelara and Pentasa. 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  Following with the [facility] for this.  Primary source is unknown, recent EGD and colonoscopy with no malignancy identified.    She has been treated with 5-FU, has had 2 doses thus far but is not tolerating this well, has missed the last 2 doses due to significant issues with weakness.  They had discussed possibly treating the liver lesions locally however felt this management strategy would not be effective as it was felt that this was a metastasis and not a primary tumor and would still require systemic therapy.     She has had issues with constipation, having a bowel movement every few days which is unusual for her, historically has more issues with diarrhea.  She has had some nausea.  Appetite waxes and wanes.    She continues on Stelara for Crohn's disease, recent EGD and colonoscopy with no active Crohn's disease noted.     Result Review :       BCC CBC W/ AUTO DIFF (03/10/2025 11:17)    COMPREHENSIVE METABOLIC PANEL (03/10/2025 11:17)    PET W/CT Tumor Skull Mid-Thigh (01/29/2025 11:48)    Colon EGD (02/27/2025 14:00)    Office Visit with Sakshi Martinez APRN (10/09/2024)    MRI Abdomen Without Contrast (03/28/2024 13:58)    Vital Signs:   BP 96/48   Pulse 70   Temp 97.3 °F (36.3 °C)   Ht 162.6 cm (64\")   Wt 59 kg (130 lb) Comment: Per patient's   SpO2 92%   BMI 22.31 kg/m²     Body mass index is 22.31 kg/m².     Physical Exam  Vitals reviewed.   Constitutional:       General: She is not " in acute distress.     Appearance: She is well-developed.   HENT:      Head: Normocephalic and atraumatic.   Pulmonary:      Effort: Pulmonary effort is normal. No respiratory distress.   Skin:     General: Skin is dry.      Coloration: Skin is not pale.   Neurological:      Mental Status: She is oriented to person, place, and time. She is lethargic.   Psychiatric:         Thought Content: Thought content normal.           Assessment and Plan    Diagnoses and all orders for this visit:    1. Crohn's disease of both small and large intestine without complication (Primary)    2. Metastatic adenocarcinoma to liver    3. Pancreatic cyst         Discussion  Patient presents today for follow-up of Crohn's disease.  Symptoms are stable and controlled on Stelara.  Due to new diagnosis of metastatic adenocarcinoma, recommended discussing the Stelara use with oncology.  Patient's  will send a message to oncologist Dr. Hollins.  They are interested in a second opinion to see if any other treatment options may be recommended and referral has been placed and our office will help facilitate this.    Patient may follow-up with our office on an as-needed basis and was instructed to call for any new or worsening symptoms GI symptoms.  Discussed if they recommend stopping Stelara, okay to do so.  Recent endoscopic evaluation with no active Crohn's disease and anticipate she would do okay if therapy needs to be held.  From a symptom standpoint, she has been experiencing constipation and recommended discontinuing WelChol.  If this does not address the constipation she was instructed to notify the office.          Follow Up   Return if symptoms worsen or fail to improve.    Patient Instructions   Due to constipation, recommend stopping WelChol.    Recommend discussing ongoing use of Stelara with oncology.  If they would like you to stop taking, okay to do so from a GI standpoint.

## 2025-03-18 ENCOUNTER — OUTSIDE FACILITY SERVICE (OUTPATIENT)
Dept: INTERNAL MEDICINE | Facility: CLINIC | Age: 80
End: 2025-03-18
Payer: MEDICARE

## (undated) DEVICE — GLIDESHEATH BASIC HYDROPHILIC COATED INTRODUCER SHEATH: Brand: GLIDESHEATH

## (undated) DEVICE — CATH DIAG IMPULSE FL3.5 5F 100CM

## (undated) DEVICE — LIMB HOLDER, WRIST/ANKLE: Brand: DEROYAL

## (undated) DEVICE — CANN O2 ETCO2 FITS ALL CONN CO2 SMPL A/ 7IN DISP LF

## (undated) DEVICE — HI-TORQUE BALANCE MIDDLEWEIGHT GUIDE WIRE .014 STRAIGHT TIP 3.0 CM X 190 CM: Brand: HI-TORQUE BALANCE MIDDLEWEIGHT

## (undated) DEVICE — CATH DIAG IMPULSE FR4 5F 100CM

## (undated) DEVICE — INTENDED FOR TISSUE SEPARATION, AND OTHER PROCEDURES THAT REQUIRE A SHARP SURGICAL BLADE TO PUNCTURE OR CUT.: Brand: BARD-PARKER ® CARBON RIB-BACK BLADES

## (undated) DEVICE — Device

## (undated) DEVICE — INTRO SHEATH PRELUDE SNAP .038 6F 13CM W/SDPRT

## (undated) DEVICE — GLIDESHEATH SLENDER STAINLESS STEEL KIT: Brand: GLIDESHEATH SLENDER

## (undated) DEVICE — GW INQWIRE FC PTFE J/3MM .035 180

## (undated) DEVICE — LOU PACE DEFIB: Brand: MEDLINE INDUSTRIES, INC.

## (undated) DEVICE — KT MANIFLD CARDIAC

## (undated) DEVICE — SKIN PREP TRAY W/CHG: Brand: MEDLINE INDUSTRIES, INC.

## (undated) DEVICE — ELECTRD ECG CARBON/SNP RL FM A/ 5PK

## (undated) DEVICE — TB FEED JEJUNAL 18F

## (undated) DEVICE — GASTROINTESTINAL ANCHOR SET, SAF-T-PEXY* T-FASTENERS: Brand: GASTROINTESTINAL ANCHOR SET, SAF-T-PEXY* T-FASTENERS

## (undated) DEVICE — GW EMR FIX EXCHG J STD .035 3MM 260CM

## (undated) DEVICE — LN SMPL CO2 SHTRM SD STREAM W/M LUER

## (undated) DEVICE — CATH ABL SAFIRE BIDIR UNIV MD CRV 7F 8MM 2-5-2MM 110CM

## (undated) DEVICE — SUT SILK 0 PSL 18IN 580H

## (undated) DEVICE — BALN PRESS WEDGE 5F 110CM

## (undated) DEVICE — KT ORCA ORCAPOD DISP STRL

## (undated) DEVICE — GW DREAMWIRE STR SS .035IN 260CM

## (undated) DEVICE — CATH ABL SAFIRE BIDIR UNIV LG CRV 7F 8MM 2-5-2MM 110CM

## (undated) DEVICE — SENSR O2 OXIMAX FNGR A/ 18IN NONSTR

## (undated) DEVICE — PINNACLE INTRODUCER SHEATH: Brand: PINNACLE

## (undated) DEVICE — PK CATH CARD 40

## (undated) DEVICE — BITEBLOCK OMNI BLOC

## (undated) DEVICE — AIRLIFE™ NASAL OXYGEN CANNULA CURVED, NONFLARED TIP WITH 21 FOOT (6.4 M) CRUSH-RESISTANT TUBING, OVER-THE-EAR STYLE: Brand: AIRLIFE™

## (undated) DEVICE — NDL PERC 1PRT THNWALL W/BASEPLT 18G 7CM

## (undated) DEVICE — LOU EP: Brand: MEDLINE INDUSTRIES, INC.

## (undated) DEVICE — FRCP BX RADJAW4 NDL 2.8 240CM LG OG BX40

## (undated) DEVICE — BNDR ABD 4PANEL 12IN MED/LG

## (undated) DEVICE — CATH VENT MIV RADL PIG ST TIP 4F 110CM

## (undated) DEVICE — PENCL E/S HNDSWTCH SMOKEEVAC HOLSTR 10FT

## (undated) DEVICE — TUBING, SUCTION, 1/4" X 10', STRAIGHT: Brand: MEDLINE

## (undated) DEVICE — ADAPT CLN BIOGUARD AIR/H2O DISP

## (undated) DEVICE — BLCK/BITE BLOX W/DENTL/RIM W/STRAP 54F